# Patient Record
Sex: MALE | Race: BLACK OR AFRICAN AMERICAN | NOT HISPANIC OR LATINO | Employment: OTHER | URBAN - NONMETROPOLITAN AREA
[De-identification: names, ages, dates, MRNs, and addresses within clinical notes are randomized per-mention and may not be internally consistent; named-entity substitution may affect disease eponyms.]

---

## 2019-09-13 PROCEDURE — 99282 EMERGENCY DEPT VISIT SF MDM: CPT

## 2019-09-14 ENCOUNTER — HOSPITAL ENCOUNTER (EMERGENCY)
Facility: HOSPITAL | Age: 39
Discharge: HOME | End: 2019-09-14
Admitting: EMERGENCY MEDICINE

## 2019-09-14 VITALS
BODY MASS INDEX: 22.4 KG/M2 | DIASTOLIC BLOOD PRESSURE: 117 MMHG | RESPIRATION RATE: 16 BRPM | TEMPERATURE: 97.5 F | HEIGHT: 71 IN | SYSTOLIC BLOOD PRESSURE: 162 MMHG | HEART RATE: 67 BPM | WEIGHT: 160 LBS | OXYGEN SATURATION: 100 %

## 2019-09-14 DIAGNOSIS — G89.29 CHRONIC RIGHT-SIDED LOW BACK PAIN WITH RIGHT-SIDED SCIATICA: Primary | ICD-10-CM

## 2019-09-14 DIAGNOSIS — M54.41 CHRONIC RIGHT-SIDED LOW BACK PAIN WITH RIGHT-SIDED SCIATICA: Primary | ICD-10-CM

## 2019-09-14 RX ORDER — LIDOCAINE 50 MG/G
1 PATCH TOPICAL EVERY 24 HOURS
Qty: 5 PATCH | Refills: 0 | Status: SHIPPED | OUTPATIENT
Start: 2019-09-14 | End: 2020-11-28

## 2019-09-14 RX ORDER — CYCLOBENZAPRINE HCL 10 MG
10 TABLET ORAL 3 TIMES DAILY PRN
Qty: 12 TABLET | Refills: 0 | Status: SHIPPED | OUTPATIENT
Start: 2019-09-14 | End: 2020-11-28

## 2019-09-14 RX ORDER — NAPROXEN 500 MG/1
500 TABLET ORAL 2 TIMES DAILY WITH MEALS
Qty: 20 TABLET | Refills: 0 | Status: SHIPPED | OUTPATIENT
Start: 2019-09-14 | End: 2020-11-28

## 2019-09-14 NOTE — ED PROVIDER NOTES
Subjective     History provided by:  Patient  Back Pain   Location:  Lumbar spine  Quality:  Aching  Radiates to:  R thigh  Pain severity:  Moderate  Pain is:  Worse during the day  Onset quality:  Gradual  Duration:  72 months  Timing:  Intermittent  Progression:  Waxing and waning  Chronicity:  Recurrent  Context: lifting heavy objects    Relieved by:  Nothing  Worsened by:  Nothing  Ineffective treatments:  None tried  Associated symptoms: no abdominal pain, no abdominal swelling, no bladder incontinence, no bowel incontinence, no chest pain, no dysuria, no fever, no headaches, no leg pain, no numbness, no paresthesias, no pelvic pain, no perianal numbness, no tingling, no weakness and no weight loss    Risk factors: no lack of exercise, not obese and no recent surgery        Review of Systems   Constitutional: Negative for chills, diaphoresis, fatigue, fever and weight loss.   HENT: Negative for congestion and trouble swallowing.    Respiratory: Negative for shortness of breath and wheezing.    Cardiovascular: Negative for chest pain and palpitations.   Gastrointestinal: Negative for abdominal pain, bowel incontinence, diarrhea and nausea.   Genitourinary: Negative for bladder incontinence, dysuria and pelvic pain.   Musculoskeletal: Positive for back pain. Negative for arthralgias and myalgias.   Neurological: Negative for dizziness, tingling, weakness, numbness, headaches and paresthesias.   Hematological: Negative for adenopathy. Does not bruise/bleed easily.       Past Medical History:   Diagnosis Date   • Asthma    • Hypertension        No Known Allergies    No past surgical history on file.    No family history on file.    Social History     Socioeconomic History   • Marital status: Single     Spouse name: Not on file   • Number of children: Not on file   • Years of education: Not on file   • Highest education level: Not on file   Tobacco Use   • Smoking status: Current Every Day Smoker     Packs/day: 1.00  "    Types: Cigarettes   Substance and Sexual Activity   • Alcohol use: Yes     Comment: OCC   • Drug use: No   • Sexual activity: Defer       Lab Results (last 24 hours)     ** No results found for the last 24 hours. **          Objective   Physical Exam   Constitutional: He is oriented to person, place, and time. He appears well-developed and well-nourished. No distress.   HENT:   Head: Normocephalic and atraumatic.   Right Ear: External ear normal.   Left Ear: External ear normal.   Eyes: Conjunctivae and EOM are normal. Pupils are equal, round, and reactive to light. Right eye exhibits no discharge. Left eye exhibits no discharge. No scleral icterus.   Neck: Normal range of motion. Neck supple. No tracheal deviation present. No thyromegaly present.   Cardiovascular: Normal rate, regular rhythm, normal heart sounds and intact distal pulses. Exam reveals no friction rub.   No murmur heard.  Pulmonary/Chest: Effort normal and breath sounds normal. No respiratory distress. He has no wheezes.   Abdominal: Soft. Bowel sounds are normal. He exhibits no distension. There is no tenderness. There is no guarding.   Musculoskeletal:        Lumbar back: He exhibits decreased range of motion, tenderness, pain and spasm. He exhibits no bony tenderness, no swelling, no edema, no deformity and no laceration.   Right paraspinal muscular spasm.  Positive straight leg raises at 20 degrees.  Neurovascularly intact distally.   Neurological: He is alert and oriented to person, place, and time.   Skin: Skin is warm and dry. Capillary refill takes less than 2 seconds. He is not diaphoretic.   Psychiatric: He has a normal mood and affect. His behavior is normal.   Nursing note and vitals reviewed.      Procedures         No orders to display       BP (!) 158/102 (BP Location: Left arm, Patient Position: Sitting)   Pulse 70   Temp 97.5 °F (36.4 °C) (Oral)   Resp 16   Ht 180.3 cm (71\")   Wt 72.6 kg (160 lb)   SpO2 98%   BMI 22.32 " kg/m²     ED Course         Medications - No data to display         MDM  Number of Diagnoses or Management Options  Chronic right-sided low back pain with right-sided sciatica:   Diagnosis management comments: 39-year-old Afro-American male with radicular symptoms.  No red flag symptoms or midline tenderness, saddle anesthesia, bowel incontinence, bladder retention.  States his pain is been ongoing for 8 years approximately.  He has had pain management injections previously.  No indications for further work-up at this time.  We will treat symptomatically and have follow-up at the orthopedic [facility] with primary care.  He is agreeable to plan of care at this time.    Risk of Complications, Morbidity, and/or Mortality  Presenting problems: low  Diagnostic procedures: low  Management options: low    Patient Progress  Patient progress: stable      Final diagnoses:   Chronic right-sided low back pain with right-sided sciatica          Jaylan Dudley PA-C  09/14/19 0056

## 2019-09-14 NOTE — DISCHARGE INSTRUCTIONS
Please follow-up with your primary care physician or at the orthopedic [facility] as directed.  Return to the ER should he develop any other new, worsening or other concerning symptoms such as those listed below.      Contact a health care provider if:  Your pain and other symptoms get worse.  Your pain medicine is not helping.  Your pain has not improved after a few weeks of home care.  You have a fever.  Get help right away if:  You have severe pain, weakness, or numbness.  You have difficulty with bladder or bowel control.

## 2020-11-28 PROCEDURE — U0003 INFECTIOUS AGENT DETECTION BY NUCLEIC ACID (DNA OR RNA); SEVERE ACUTE RESPIRATORY SYNDROME CORONAVIRUS 2 (SARS-COV-2) (CORONAVIRUS DISEASE [COVID-19]), AMPLIFIED PROBE TECHNIQUE, MAKING USE OF HIGH THROUGHPUT TECHNOLOGIES AS DESCRIBED BY CMS-2020-01-R: HCPCS | Performed by: NURSE PRACTITIONER

## 2021-01-25 ENCOUNTER — TRANSCRIBE ORDERS (OUTPATIENT)
Dept: ADMINISTRATIVE | Facility: HOSPITAL | Age: 41
End: 2021-01-25

## 2021-01-25 ENCOUNTER — HOSPITAL ENCOUNTER (OUTPATIENT)
Dept: GENERAL RADIOLOGY | Facility: HOSPITAL | Age: 41
Discharge: HOME | End: 2021-01-25

## 2021-01-25 DIAGNOSIS — M79.645 FINGER PAIN, LEFT: ICD-10-CM

## 2021-01-25 DIAGNOSIS — M79.645 FINGER PAIN, LEFT: Primary | ICD-10-CM

## 2021-01-25 PROCEDURE — 73140 X-RAY EXAM OF FINGER(S): CPT

## 2021-07-13 ENCOUNTER — HOSPITAL ENCOUNTER (EMERGENCY)
Facility: HOSPITAL | Age: 41
Discharge: HOME | End: 2021-07-14
Admitting: INTERNAL MEDICINE

## 2021-07-13 DIAGNOSIS — T78.40XA ALLERGIC REACTION, INITIAL ENCOUNTER: Primary | ICD-10-CM

## 2021-07-13 DIAGNOSIS — I10 ESSENTIAL HYPERTENSION: ICD-10-CM

## 2021-07-13 PROCEDURE — 99283 EMERGENCY DEPT VISIT LOW MDM: CPT

## 2021-07-14 VITALS
DIASTOLIC BLOOD PRESSURE: 98 MMHG | SYSTOLIC BLOOD PRESSURE: 148 MMHG | TEMPERATURE: 98.9 F | RESPIRATION RATE: 20 BRPM | OXYGEN SATURATION: 98 % | HEART RATE: 72 BPM | WEIGHT: 158 LBS | HEIGHT: 71 IN | BODY MASS INDEX: 22.12 KG/M2

## 2021-07-14 PROCEDURE — 96372 THER/PROPH/DIAG INJ SC/IM: CPT

## 2021-07-14 PROCEDURE — 25010000002 DEXAMETHASONE PER 1 MG: Performed by: NURSE PRACTITIONER

## 2021-07-14 RX ORDER — CLONIDINE HYDROCHLORIDE 0.1 MG/1
0.2 TABLET ORAL ONCE
Status: COMPLETED | OUTPATIENT
Start: 2021-07-14 | End: 2021-07-14

## 2021-07-14 RX ORDER — METHYLPREDNISOLONE 4 MG/1
TABLET ORAL
Qty: 21 TABLET | Refills: 0 | Status: SHIPPED | OUTPATIENT
Start: 2021-07-14 | End: 2022-04-01

## 2021-07-14 RX ORDER — DEXAMETHASONE SODIUM PHOSPHATE 10 MG/ML
10 INJECTION INTRAMUSCULAR; INTRAVENOUS ONCE
Status: COMPLETED | OUTPATIENT
Start: 2021-07-14 | End: 2021-07-14

## 2021-07-14 RX ORDER — TRIAMCINOLONE ACETONIDE 1 MG/G
CREAM TOPICAL 2 TIMES DAILY
Qty: 15 G | Refills: 0 | Status: SHIPPED | OUTPATIENT
Start: 2021-07-14 | End: 2021-07-21

## 2021-07-14 RX ADMIN — CLONIDINE HYDROCHLORIDE 0.2 MG: 0.1 TABLET ORAL at 00:26

## 2021-07-14 RX ADMIN — DEXAMETHASONE SODIUM PHOSPHATE 10 MG: 10 INJECTION INTRAMUSCULAR; INTRAVENOUS at 00:26

## 2021-07-14 NOTE — DISCHARGE INSTRUCTIONS
Follow up with one of the [facility] physician groups below to setup primary care. If you have trouble making an appointment, please call the [facility] Nurse Line at [phone]    Dr. Alejandra Serrano DO, Dr. Ruben Pineda DO, and NAM [place]  [facility]  [address] [place] [address], [id number] [phone]    Dr. Zi Fields MD  [facility] - [address] [address], [address], [place], [place] [address] [phone]    Dr. Beka Arrieta DO, Dr. Ehsan Ahuja DO,  NAM Worthy, and NAM Martin  [facility] - [address] [address], [address], [place], [place] [id number] [phone]     Dr. Goldie Hays MD, and NAM Andrew  [facility] [address] [id number] [phone]    Dr. Simón Morales MD and Dr. Brandon Burroughs MD  [address], [address] [phone]    Dr. Angelo Connell MD  [facility]  [address] [place] [address], [address], [place], [place], [address] [phone]    Dr. Rambo Lee MD  [facility]  [address], [place], [address] [phone]    Drink plenty of fluid. Tylenol or motrin as needed for pain/fever. Medication as ordered.  Can take over the counter benadryl as directed for the next few days.  Monitor blood pressure and keep record.  Take BP medication as previously prescribed. Follow up with PCP - call tomorrow for appointment. Return to ED if condition does not improve or worsens

## 2021-07-14 NOTE — ED PROVIDER NOTES
Subjective   40 yom presents with c/o rash to his arms and abdomen.  He sates he noticed the rash a couple days ago.  He states it itches at times.  He denies SOB.   He states he had similar issues a few years ago and saw Dr Hu, dermatology.  It was determined he was allergic to a certain type of dust.  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 His BP is elevated today.  He denies headache, visual changes or CP.  He has a history of HTN          Review of Systems   Constitutional: Negative for activity change, appetite change, fatigue and fever.   HENT: Negative for congestion, ear pain, facial swelling and sore throat.    Eyes: Negative for discharge and visual disturbance.   Respiratory: Negative for apnea, chest tightness, shortness of breath, wheezing and stridor.    Cardiovascular: Negative for chest pain and palpitations.   Gastrointestinal: Negative for abdominal distention, abdominal pain, diarrhea, nausea and vomiting.   Genitourinary: Negative for difficulty urinating and dysuria.   Musculoskeletal: Negative for arthralgias and myalgias.   Skin: Positive for rash. Negative for wound.   Neurological: Negative for dizziness and seizures.   Psychiatric/Behavioral: Negative for agitation and confusion.       Past Medical History:   Diagnosis Date   • Asthma    • Hypertension        No Known Allergies    No past surgical history on file.    No family history on file.    Social History     Socioeconomic History   • Marital status: Single     Spouse name: Not on file   • Number of children: Not on file   • Years of education: Not on file   • Highest education level: Not on file   Tobacco Use   • Smoking status: Current Every Day Smoker     Packs/day: 1.00     Types: Cigarettes   Substance and Sexual Activity   • Alcohol use: Yes     Comment: OCC   • Drug use: No   • Sexual activity: Defer           Objective   Physical Exam  Vitals and nursing note reviewed.   Constitutional:        Appearance: He is well-developed.   HENT:      Head: Normocephalic.   Eyes:      Pupils: Pupils are equal, round, and reactive to light.   Cardiovascular:      Rate and Rhythm: Normal rate and regular rhythm.      Heart sounds: No murmur heard.     Pulmonary:      Effort: Pulmonary effort is normal.      Breath sounds: Normal breath sounds.   Abdominal:      General: Bowel sounds are normal.      Palpations: Abdomen is soft.   Musculoskeletal:         General: Normal range of motion.      Cervical back: Normal range of motion and neck supple.   Skin:     General: Skin is warm and dry.      Comments: Red raised rash present to abdomen and bilateral upper extremities.  There is no drainage.   Neurological:      Mental Status: He is alert and oriented to person, place, and time.         Procedures           ED Course  ED Course as of Jul 14 1600 Wed Jul 14, 2021   0007 I discussed treatment plan with the patient.  His BP is elevated - manual BP recheck 208/106.  He denies headache, visual changes or CP.  He states he has HTN and has not been taking his medication. We will treat his BP prior to discharge. He voiced understanding of plan of care and discharge instructions.       [KS]      ED Course User Index  [KS] Claude Perez, APRN                                           MDM  Number of Diagnoses or Management Options  Allergic reaction, initial encounter: minor  Essential hypertension: established and worsening     Amount and/or Complexity of Data Reviewed  Clinical lab tests: ordered and reviewed  Tests in the radiology section of CPT®: ordered and reviewed    Risk of Complications, Morbidity, and/or Mortality  Presenting problems: minimal  Diagnostic procedures: minimal  Management options: minimal    Patient Progress  Patient progress: stable      Final diagnoses:   Allergic reaction, initial encounter   Essential hypertension       ED Disposition  ED Disposition     ED Disposition Condition  Comment    Discharge Stable           Provider, No Known  [facility] [address]    Schedule an appointment as soon as possible for a visit in 1 day  Routine ED follow up         Medication List      New Prescriptions    methylPREDNISolone 4 MG dose pack  Commonly known as: MEDROL  Take as directed on package instructions.     triamcinolone 0.1 % cream  Commonly known as: KENALOG  Apply  topically to the appropriate area as directed 2 (Two) Times a Day for 7 days.           Where to Get Your Medications      These medications were sent to [facility]/pharmacy #[id number] - [place], [place] - [address] AT ACROSS FROM [address] - [phone] [facility] [phone]   [address] [facility] [address]    Hours: 24-hours Phone: [phone]   · methylPREDNISolone 4 MG dose pack  · triamcinolone 0.1 % cream          Chris, Claude Phoenix, APRN  07/14/21 7102

## 2022-04-01 PROCEDURE — 87491 CHLMYD TRACH DNA AMP PROBE: CPT | Performed by: FAMILY MEDICINE

## 2022-04-01 PROCEDURE — 87591 N.GONORRHOEAE DNA AMP PROB: CPT | Performed by: FAMILY MEDICINE

## 2022-04-01 PROCEDURE — 87661 TRICHOMONAS VAGINALIS AMPLIF: CPT | Performed by: FAMILY MEDICINE

## 2022-06-07 ENCOUNTER — TRANSCRIBE ORDERS (OUTPATIENT)
Dept: ADMINISTRATIVE | Facility: HOSPITAL | Age: 42
End: 2022-06-07

## 2022-06-07 ENCOUNTER — HOSPITAL ENCOUNTER (OUTPATIENT)
Dept: GENERAL RADIOLOGY | Facility: HOSPITAL | Age: 42
Discharge: HOME | End: 2022-06-07

## 2022-06-07 DIAGNOSIS — M54.50 LUMBAR BACK PAIN: Primary | ICD-10-CM

## 2022-06-07 DIAGNOSIS — M54.50 LUMBAR BACK PAIN: ICD-10-CM

## 2022-06-07 PROCEDURE — 72100 X-RAY EXAM L-S SPINE 2/3 VWS: CPT

## 2022-10-01 ENCOUNTER — HOSPITAL ENCOUNTER (EMERGENCY)
Facility: HOSPITAL | Age: 42
Discharge: HOME | End: 2022-10-02
Attending: STUDENT IN AN ORGANIZED HEALTH CARE EDUCATION/TRAINING PROGRAM | Admitting: STUDENT IN AN ORGANIZED HEALTH CARE EDUCATION/TRAINING PROGRAM

## 2022-10-01 ENCOUNTER — APPOINTMENT (OUTPATIENT)
Dept: GENERAL RADIOLOGY | Facility: HOSPITAL | Age: 42
End: 2022-10-01

## 2022-10-01 DIAGNOSIS — I10 HYPERTENSION, UNSPECIFIED TYPE: Primary | ICD-10-CM

## 2022-10-01 LAB
ALBUMIN SERPL-MCNC: 4.5 G/DL (ref 3.5–5.2)
ALBUMIN/GLOB SERPL: 1.3 G/DL
ALP SERPL-CCNC: 196 U/L (ref 39–117)
ALT SERPL W P-5'-P-CCNC: 137 U/L (ref 1–41)
ANION GAP SERPL CALCULATED.3IONS-SCNC: 12 MMOL/L (ref 5–15)
AST SERPL-CCNC: 293 U/L (ref 1–40)
BASOPHILS # BLD MANUAL: 0.07 10*3/MM3 (ref 0–0.2)
BASOPHILS NFR BLD MANUAL: 1 % (ref 0–1.5)
BILIRUB SERPL-MCNC: 1.1 MG/DL (ref 0–1.2)
BUN SERPL-MCNC: 11 MG/DL (ref 6–20)
BUN/CREAT SERPL: 10.8 (ref 7–25)
CALCIUM SPEC-SCNC: 9.4 MG/DL (ref 8.6–10.5)
CHLORIDE SERPL-SCNC: 98 MMOL/L (ref 98–107)
CO2 SERPL-SCNC: 25 MMOL/L (ref 22–29)
CREAT SERPL-MCNC: 1.02 MG/DL (ref 0.76–1.27)
DEPRECATED RDW RBC AUTO: 37.2 FL (ref 37–54)
EGFRCR SERPLBLD CKD-EPI 2021: 94.1 ML/MIN/1.73
EOSINOPHIL # BLD MANUAL: 0.28 10*3/MM3 (ref 0–0.4)
EOSINOPHIL NFR BLD MANUAL: 3.9 % (ref 0.3–6.2)
ERYTHROCYTE [DISTWIDTH] IN BLOOD BY AUTOMATED COUNT: 12 % (ref 12.3–15.4)
GLOBULIN UR ELPH-MCNC: 3.4 GM/DL
GLUCOSE SERPL-MCNC: 104 MG/DL (ref 65–99)
HCT VFR BLD AUTO: 37.7 % (ref 37.5–51)
HGB BLD-MCNC: 14.2 G/DL (ref 13–17.7)
HOLD SPECIMEN: NORMAL
HOLD SPECIMEN: NORMAL
LYMPHOCYTES # BLD MANUAL: 2.17 10*3/MM3 (ref 0.7–3.1)
LYMPHOCYTES NFR BLD MANUAL: 2 % (ref 5–12)
MCH RBC QN AUTO: 32.1 PG (ref 26.6–33)
MCHC RBC AUTO-ENTMCNC: 37.7 G/DL (ref 31.5–35.7)
MCV RBC AUTO: 85.3 FL (ref 79–97)
MONOCYTES # BLD: 0.14 10*3/MM3 (ref 0.1–0.9)
NEUTROPHILS # BLD AUTO: 4.48 10*3/MM3 (ref 1.7–7)
NEUTROPHILS NFR BLD MANUAL: 62.7 % (ref 42.7–76)
PLAT MORPH BLD: NORMAL
PLATELET # BLD AUTO: 141 10*3/MM3 (ref 140–450)
PMV BLD AUTO: 11.2 FL (ref 6–12)
POTASSIUM SERPL-SCNC: 4.7 MMOL/L (ref 3.5–5.2)
PROT SERPL-MCNC: 7.9 G/DL (ref 6–8.5)
RBC # BLD AUTO: 4.42 10*6/MM3 (ref 4.14–5.8)
SODIUM SERPL-SCNC: 135 MMOL/L (ref 136–145)
SPHEROCYTES BLD QL SMEAR: ABNORMAL
TARGETS BLD QL SMEAR: ABNORMAL
TROPONIN T SERPL-MCNC: <0.01 NG/ML (ref 0–0.03)
VARIANT LYMPHS NFR BLD MANUAL: 10.8 % (ref 0–5)
VARIANT LYMPHS NFR BLD MANUAL: 19.6 % (ref 19.6–45.3)
WBC MORPH BLD: NORMAL
WBC NRBC COR # BLD: 7.14 10*3/MM3 (ref 3.4–10.8)
WHOLE BLOOD HOLD SPECIMEN: NORMAL

## 2022-10-01 PROCEDURE — 71045 X-RAY EXAM CHEST 1 VIEW: CPT

## 2022-10-01 PROCEDURE — 85379 FIBRIN DEGRADATION QUANT: CPT | Performed by: STUDENT IN AN ORGANIZED HEALTH CARE EDUCATION/TRAINING PROGRAM

## 2022-10-01 PROCEDURE — 93005 ELECTROCARDIOGRAM TRACING: CPT | Performed by: STUDENT IN AN ORGANIZED HEALTH CARE EDUCATION/TRAINING PROGRAM

## 2022-10-01 PROCEDURE — 99285 EMERGENCY DEPT VISIT HI MDM: CPT

## 2022-10-01 PROCEDURE — 85025 COMPLETE CBC W/AUTO DIFF WBC: CPT | Performed by: STUDENT IN AN ORGANIZED HEALTH CARE EDUCATION/TRAINING PROGRAM

## 2022-10-01 PROCEDURE — 36415 COLL VENOUS BLD VENIPUNCTURE: CPT

## 2022-10-01 PROCEDURE — 84484 ASSAY OF TROPONIN QUANT: CPT

## 2022-10-01 PROCEDURE — 96374 THER/PROPH/DIAG INJ IV PUSH: CPT

## 2022-10-01 PROCEDURE — 80053 COMPREHEN METABOLIC PANEL: CPT

## 2022-10-01 PROCEDURE — 84484 ASSAY OF TROPONIN QUANT: CPT | Performed by: STUDENT IN AN ORGANIZED HEALTH CARE EDUCATION/TRAINING PROGRAM

## 2022-10-01 PROCEDURE — 93010 ELECTROCARDIOGRAM REPORT: CPT | Performed by: INTERNAL MEDICINE

## 2022-10-01 PROCEDURE — 85007 BL SMEAR W/DIFF WBC COUNT: CPT | Performed by: STUDENT IN AN ORGANIZED HEALTH CARE EDUCATION/TRAINING PROGRAM

## 2022-10-01 RX ORDER — LABETALOL HYDROCHLORIDE 5 MG/ML
10 INJECTION, SOLUTION INTRAVENOUS ONCE
Status: COMPLETED | OUTPATIENT
Start: 2022-10-01 | End: 2022-10-01

## 2022-10-01 RX ORDER — ASPIRIN 81 MG/1
324 TABLET, CHEWABLE ORAL ONCE
Status: COMPLETED | OUTPATIENT
Start: 2022-10-01 | End: 2022-10-01

## 2022-10-01 RX ORDER — SODIUM CHLORIDE 0.9 % (FLUSH) 0.9 %
10 SYRINGE (ML) INJECTION AS NEEDED
Status: DISCONTINUED | OUTPATIENT
Start: 2022-10-01 | End: 2022-10-02 | Stop reason: HOSPADM

## 2022-10-01 RX ADMIN — ASPIRIN 324 MG: 81 TABLET, CHEWABLE ORAL at 22:58

## 2022-10-01 RX ADMIN — LABETALOL HYDROCHLORIDE 10 MG: 5 INJECTION INTRAVENOUS at 23:38

## 2022-10-02 ENCOUNTER — APPOINTMENT (OUTPATIENT)
Dept: CT IMAGING | Facility: HOSPITAL | Age: 42
End: 2022-10-02

## 2022-10-02 VITALS
WEIGHT: 152 LBS | HEART RATE: 62 BPM | TEMPERATURE: 98.4 F | RESPIRATION RATE: 18 BRPM | DIASTOLIC BLOOD PRESSURE: 93 MMHG | OXYGEN SATURATION: 94 % | SYSTOLIC BLOOD PRESSURE: 143 MMHG | BODY MASS INDEX: 21.76 KG/M2 | HEIGHT: 70 IN

## 2022-10-02 LAB
D DIMER PPP FEU-MCNC: 0.76 MCGFEU/ML (ref 0–0.5)
TROPONIN T SERPL-MCNC: <0.01 NG/ML (ref 0–0.03)
WHOLE BLOOD HOLD COAG: NORMAL

## 2022-10-02 PROCEDURE — 0 IOPAMIDOL PER 1 ML: Performed by: STUDENT IN AN ORGANIZED HEALTH CARE EDUCATION/TRAINING PROGRAM

## 2022-10-02 PROCEDURE — 71275 CT ANGIOGRAPHY CHEST: CPT

## 2022-10-02 RX ORDER — AMLODIPINE BESYLATE 5 MG/1
5 TABLET ORAL DAILY
Qty: 14 TABLET | Refills: 0 | Status: SHIPPED | OUTPATIENT
Start: 2022-10-02 | End: 2022-10-05 | Stop reason: SDUPTHER

## 2022-10-02 RX ADMIN — IOPAMIDOL 100 ML: 755 INJECTION, SOLUTION INTRAVENOUS at 01:36

## 2022-10-02 NOTE — DISCHARGE INSTRUCTIONS
Please return if your chest pain severely worsens, if you become lightheaded or pass out, if you have severe shortness of breath, or for any other emergent concerns. Otherwise please see your primary care doctor as soon as possible for repeat evaluation and consideration of more testing.

## 2022-10-02 NOTE — ED PROVIDER NOTES
Subjective   History of Present Illness   Patient presents with chest pain.  Feels sharp and is in the front of his chest.  Has been present for most of the day.  Rates it as mild to moderate.  He checked his blood pressure and it was extremely high and he thinks this is related prompted his presentation.  No difficulty breathing but he does feel the pain is worse with inspiration.  No fevers or chills.  No lightheadedness or syncope.  No abdominal pain nausea or vomiting.  Has not seen a doctor about his high blood pressure.    Review of Systems   Constitutional: Negative for chills and fever.   HENT: Negative for congestion and sore throat.    Eyes: Negative for pain and redness.   Respiratory: Negative for cough and shortness of breath.    Cardiovascular: Positive for chest pain. Negative for palpitations.   Gastrointestinal: Negative for abdominal pain and vomiting.   Genitourinary: Negative for difficulty urinating and dysuria.   Musculoskeletal: Negative for gait problem and joint swelling.   Skin: Negative for rash and wound.   Neurological: Negative for syncope and light-headedness.       Past Medical History:   Diagnosis Date   • Asthma    • Hypertension        No Known Allergies    History reviewed. No pertinent surgical history.    History reviewed. No pertinent family history.    Social History     Socioeconomic History   • Marital status: Single   Tobacco Use   • Smoking status: Current Every Day Smoker     Packs/day: 1.00     Types: Cigarettes   • Smokeless tobacco: Never Used   Substance and Sexual Activity   • Alcohol use: Yes     Comment: OCC   • Drug use: No   • Sexual activity: Defer           Objective   Physical Exam  Vitals reviewed.   Constitutional:       General: He is not in acute distress.  HENT:      Head: Normocephalic and atraumatic.   Eyes:      Extraocular Movements: Extraocular movements intact.      Conjunctiva/sclera: Conjunctivae normal.   Cardiovascular:      Pulses: Normal  pulses.      Heart sounds: Normal heart sounds.   Pulmonary:      Effort: Pulmonary effort is normal. No respiratory distress.   Abdominal:      General: Abdomen is flat. There is no distension.   Musculoskeletal:      Cervical back: Normal range of motion and neck supple.      Right lower leg: No tenderness. No edema.      Left lower leg: No tenderness. No edema.   Skin:     General: Skin is warm and dry.   Neurological:      General: No focal deficit present.      Mental Status: He is alert. Mental status is at baseline.   Psychiatric:         Behavior: Behavior normal.         Thought Content: Thought content normal.         Procedures           ED Course  ED Course as of 10/03/22 0746   Sat Oct 01, 2022   2321 Heart sore 2 [AS]      ED Course User Index  [AS] Jones Bryant MD MDM   James Taylor is a 42 y.o. male with PMH above who presents to the Emergency Department with chest pain. Diagnoses considered include hypertensive emergency, PE, ACS, MSK chest pain, pleurisy, GERD, pneumonia, pericarditis, aortic dissection. Patient hemodynamically stable; tamponade physiology unlikely. Given the differential, initial evaluation will include ECG, CXR, CBC, BMP, Tn x2.     ED Course:   - HEARS score calculated to be 2.  - Patient received labetalol without much improvement in his blood pressure; he denied chest pain or repeat evaluation  - EKG reviewed by me; shows sinus rhythm, no focal ischemic changes, no arrhythmia.  - chest x-ray reviewed by me and shows no focal consolidations, no mediastinal widening, no cardiomegaly, no other acute cardiopulmonary process.  - Lab studies reviewed by me and are significant for elevated D-dimer, mildly elevated LFTs; patient endorses that he does drink alcohol and his AST to ALT is about 2-1.  Normal troponin.  CTA chest ordered.  -Patient received Vasotec with significant improvement in his blood pressure and resolution of his  pain  -CTA chest negative for acute thromboembolic phenomenon  - On re-evaluation, patient feels well. Repeat Troponin negative. At this time, ACS is unlikely given the above ECG interpretation and negative troponin. Aortic dissection unlikely given lack of widened mediastinum on CXR, pain does not radiate to the back, is not described as tearing, and peripheral pulses noted to be equal in bilateral upper extremities. Pericarditis unlikely as the pain is not positional, no diffuse ST changes on ECG. No sign of pneumonia on CXR. Tamponade physiology unlikely given BP stable, no JVD on exam, no electrical alternans on ECG.     Blood pressure has improved to non severe levels  No further workup indicated at this time. Patient is stable and appropriate for discharge. Patient received strict return precautions including worsening chest pain, shortness of breath, lightheadedness, passing out, or other concerns and was instructed to follow-up with their primary care provider immediately regarding their chest pain for consideration of further outpatient workup and management of his hypertension.  Will prescribe amlodipine until follow-up.  All questions answered. Patient/family was understanding and in agreement with today's assessment and plan. The patient was monitored during their stay in the ED and dispositioned without acute event.    Electronically signed by:  Jones Bryant MD 10/3/2022 07:46 CDT  Note: Dragon medical dictation software was used in the creation of this note.        Final diagnoses:   Hypertension, unspecified type       ED Disposition  ED Disposition     ED Disposition   Discharge    Condition   Stable    Comment   --             Roman Mondragon MD  [address]  [place] [address]  [phone]               Medication List      Changed    amLODIPine 5 MG tablet  Commonly known as: NORVASC  Take 1 tablet by mouth Daily.  What changed:   · medication strength  · how much to take            Where to Get Your Medications      These medications were sent to [facility]/pharmacy #[id number] - [place], [place] - [address] AT [address]CROSS FROM [address] - [phone]  - [phone]   [address] [place] [address]    Hours: 24-hours Phone: [phone]   · amLODIPine 5 MG tablet          Jones Bryant MD  10/03/22 0770

## 2022-10-03 LAB
QT INTERVAL: 382 MS
QT INTERVAL: 414 MS
QTC INTERVAL: 443 MS
QTC INTERVAL: 456 MS

## 2022-10-05 ENCOUNTER — OFFICE VISIT (OUTPATIENT)
Dept: FAMILY MEDICINE CLINIC | Facility: CLINIC | Age: 42
End: 2022-10-05

## 2022-10-05 VITALS
DIASTOLIC BLOOD PRESSURE: 113 MMHG | WEIGHT: 155.6 LBS | BODY MASS INDEX: 22.28 KG/M2 | OXYGEN SATURATION: 99 % | HEART RATE: 86 BPM | TEMPERATURE: 97.7 F | HEIGHT: 70 IN | SYSTOLIC BLOOD PRESSURE: 192 MMHG

## 2022-10-05 DIAGNOSIS — Z00.00 LABORATORY TESTS ORDERED AS PART OF A COMPLETE PHYSICAL EXAM (CPE): ICD-10-CM

## 2022-10-05 DIAGNOSIS — R74.8 ELEVATED LIVER ENZYMES: ICD-10-CM

## 2022-10-05 DIAGNOSIS — R25.2 MUSCLE CRAMPS: ICD-10-CM

## 2022-10-05 DIAGNOSIS — I10 HYPERTENSION, UNSPECIFIED TYPE: Primary | ICD-10-CM

## 2022-10-05 DIAGNOSIS — Z83.3 FAMILY HISTORY OF DIABETES MELLITUS IN MOTHER: ICD-10-CM

## 2022-10-05 DIAGNOSIS — Z11.59 NEED FOR HEPATITIS C SCREENING TEST: ICD-10-CM

## 2022-10-05 DIAGNOSIS — F17.200 SMOKER: ICD-10-CM

## 2022-10-05 PROCEDURE — 99214 OFFICE O/P EST MOD 30 MIN: CPT | Performed by: FAMILY MEDICINE

## 2022-10-05 RX ORDER — AMLODIPINE BESYLATE 5 MG/1
5 TABLET ORAL DAILY
Qty: 30 TABLET | Refills: 0 | Status: SHIPPED | OUTPATIENT
Start: 2022-10-05 | End: 2022-11-02 | Stop reason: SDUPTHER

## 2022-10-05 NOTE — PATIENT INSTRUCTIONS
Please fast for your upcoming labs.  Fasting means having no juice, no milk, and no food, but you can have anything with zero calories such as water, black coffee, unsweet tea or diet soda while you are fasting.  We recommend 12 hours of fasting before the labs but if you can't do that, then 8-10 hours is acceptable. There is no need for an appointment for the labwork; the main lab is open 6 AM to 5 PM Monday through Friday on the first floor in the [facility].  Go to the main entrance.  Make sure you drink lots of water before the labs are done so it's easier for them to draw the blood and for you to urinate if urine tests have been ordered.      - You should get a Tdap  (tetanus and whooping cough vaccine) and a pneumonia shot due to the smoking.

## 2022-10-06 NOTE — PROGRESS NOTES
Chief Complaint  Establish Care and Hypertension    Subjective        History of Present Illness  James Taylor is a 42 y.o. male who presents to [facility]     James Taylor is present to establish care. The patient was recently seen in the ER at [facility] by Jones Bryant MD, for chest pain and he had markedly elevated blood pressure. He was at the emergency room on 10/01/2022. Those notes were reviewed today. Blood pressure has been high in the past, as high as 176/112 mmHg back in 04/2022. On review of his chart, the patient's chest pain is rated mild to moderate and sharp in the center of his chest. He denied any associated difficulty breathing, but did feel the pain was worse with inspiration. He has a history of hypertension and asthma. At the emergency room, he had an EKG that showed a normal sinus rhythm, moderate voltage criteria for LVH and was rated as a borderline EKG with nonspecific T wave abnormality replacing inverted T waves in the inferior lead leads. The patient had a CMP with a blood sugar of 104, a slightly low sodium of 135, and markedly elevated liver function tests with an ALT of 137, AST of 293, and alkaline phosphatase of 196. He has not had elevated liver function tests in that range in the past, but did have an elevated bilirubin of 1.4 approximately 7 years ago and an AST of 55. Then, the patient underwent a CT angiogram of his chest which showed no evidence of pulmonary embolus or acute intrathoracic pathology. He did have calcified and noncalcified slightly enlarged lymph nodes at the right hilum measuring up to 2 cm, probably related to healed and granulomatous disease. He was noted to have hepatic steatosis. He had no evidence of any other lymphadenopathy. He was treated at the ER with labetalol and aspirin. His troponins were fine. His D-dimer was mildly elevated at 0.16. His CBC had low monocytes and some mildly elevated atypical lymphocytes. He had  no anemia and he had no macrocytosis. He did have a mildly decreased RDW of 12.0. The patient was prescribed amlodipine 5 mg daily and given 14 pills. His blood pressure on check in today was 192/113 mmHg. His body mass index is normal and he has no known drug allergies.    The patient reports he has had hypertension for a couple of years, but it has not gotten worse since he got older. He is a smoker and smokes half a pack a day. The patient reports he has been smoking more since his father passed away 6 years ago at the age of 63 from bone marrow cancer. His mother is a diabetic. The patient reports he was tested for diabetes when he was in the hospital, but he was never told anything. He was told his blood pressure can mess with his kidneys. The patient reports he drinks 2 to 3 beers daily. He denies taking a lot of Tylenol or ibuprofen. The patient is not sure if he has had trouble with his stomach ulcers in the past. He reports he is doing good right now. The patient is a  and does some lifting at work. He reports the blood pressure gives him headaches sometimes. The patient reports the pain used to be in both temples, not more one side than the other. He denies having vision changes when the headaches come on. The patient reports the pressure in his eyes are okay. He denies having any swelling in his hands or feet from the high blood pressure. The patient has never had to see a cardiologist. He has not taken any other blood pressure medications in the past.    His maternal uncle had 2 strokes and is in a nursing home now.        Review of Systems     Past Medical History:   Diagnosis Date   • Asthma    • Hypertension        Outpatient Medications Prior to Visit   Medication Sig Dispense Refill   • amLODIPine (NORVASC) 5 MG tablet Take 1 tablet by mouth Daily. 14 tablet 0     No facility-administered medications prior to visit.        Objective   Vital Signs:   BP (!) 192/113 (BP Location: Left arm,  "Patient Position: Sitting, Cuff Size: Adult)   Pulse 86   Temp 97.7 °F (36.5 °C) (Temporal)   Ht 177.8 cm (70\")   Wt 70.6 kg (155 lb 9.6 oz)   SpO2 99%   BMI 22.33 kg/m²       Physical Exam  Vitals reviewed.   Constitutional:       Appearance: Normal appearance. He is not ill-appearing.   HENT:      Right Ear: External ear normal.      Left Ear: External ear normal.      Nose: Nose normal.   Eyes:      Extraocular Movements: Extraocular movements intact.      Conjunctiva/sclera: Conjunctivae normal.   Cardiovascular:      Rate and Rhythm: Normal rate and regular rhythm.      Heart sounds: No murmur heard.     Comments: His heartbeats are rather pounding in the left lower chest.    Pulmonary:      Effort: Pulmonary effort is normal.      Breath sounds: Normal breath sounds.   Abdominal:      Palpations: Abdomen is soft. There is no hepatomegaly or splenomegaly.      Tenderness: There is no abdominal tenderness. Negative signs include Jeter's sign.      Comments:        Musculoskeletal:         General: No swelling. Normal range of motion.      Right lower leg: No edema.      Left lower leg: No edema.   Lymphadenopathy:      Head:      Right side of head: No tonsillar adenopathy.      Left side of head: No tonsillar adenopathy.      Cervical: No cervical adenopathy.   Skin:     General: Skin is warm.      Findings: No rash.   Neurological:      General: No focal deficit present.      Mental Status: He is alert and oriented to person, place, and time.      Motor: No tremor.   Psychiatric:         Mood and Affect: Mood normal.         Behavior: Behavior normal.                 Assessment and Plan    Diagnoses and all orders for this visit:    1. Hypertension, unspecified type (Primary)  -     TSH; Future  -     Comprehensive Metabolic Panel; Future  -     Urinalysis With Microscopic - Urine, Clean Catch; Future  -     MicroAlbumin, Urine, Random - Urine, Clean Catch; Future  -     amLODIPine (NORVASC) 5 MG " tablet; Take 1 tablet by mouth Daily. For high blood pressure  Dispense: 30 tablet; Refill: 0    2. Family history of diabetes mellitus in mother  -     Hemoglobin A1c; Future    3. Smoker  -     Comprehensive Metabolic Panel; Future    4. Elevated liver enzymes  -     Iron Profile; Future  -     TSH; Future  -     Gamma GT; Future  -     Hemoglobin A1c; Future  -     Lactate Dehydrogenase; Future  -     Hepatitis panel, acute; Future    5. Laboratory tests ordered as part of a complete physical exam (CPE)  -     Lipid Panel; Future  -     Iron Profile; Future  -     Vitamin D 25 Hydroxy; Future  -     Magnesium; Future  -     TSH; Future  -     Comprehensive Metabolic Panel; Future  -     Urinalysis With Microscopic - Urine, Clean Catch; Future  -     MicroAlbumin, Urine, Random - Urine, Clean Catch; Future  -     Hemoglobin A1c; Future    6. Muscle cramps  -     Vitamin D 25 Hydroxy; Future  -     Magnesium; Future    7. Need for hepatitis C screening test  -     Hepatitis panel, acute; Future      The plan will be to continue the amlodipine 5 mg daily, get some fasting labs to look at comorbidities and make sure his lipids are okay and rule out diabetes, which he may have a risk for given his mother's history of diabetes and his elevated liver function tests. We are going to screen for hepatitis C as well. He will return for the lab results in the office at the time of his upcoming physical and I have counseled him today on vaccinations.          Follow Up {Instructions Charge Capture  Follow-up Communications :23}  Return in about 19 days (around 10/24/2022) for Annual physical/ recheck BP.    Patient was given instructions and counseling regarding his condition or for health maintenance advice.   Patient Instructions   - Fast for upcoming labs    - You should get a Tdap  (tetanus and whooping cough vaccine) and a pneumonia shot due to the smoking.          Please see specific information/handouts pulled into  the AVS if appropriate.       Radha Desir M.D.  [facility]    Transcribed from ambient dictation for Radha Desir MD by Vanda Suarez.  10/05/22   19:22 CDT    Patient verbalized consent to the visit recording.

## 2022-10-24 ENCOUNTER — LAB (OUTPATIENT)
Dept: LAB | Facility: HOSPITAL | Age: 42
End: 2022-10-24

## 2022-10-24 DIAGNOSIS — R25.2 MUSCLE CRAMPS: ICD-10-CM

## 2022-10-24 DIAGNOSIS — Z11.59 NEED FOR HEPATITIS C SCREENING TEST: ICD-10-CM

## 2022-10-24 DIAGNOSIS — I10 HYPERTENSION, UNSPECIFIED TYPE: ICD-10-CM

## 2022-10-24 DIAGNOSIS — F17.200 SMOKER: ICD-10-CM

## 2022-10-24 DIAGNOSIS — Z00.00 LABORATORY TESTS ORDERED AS PART OF A COMPLETE PHYSICAL EXAM (CPE): ICD-10-CM

## 2022-10-24 DIAGNOSIS — R74.8 ELEVATED LIVER ENZYMES: ICD-10-CM

## 2022-10-24 DIAGNOSIS — Z83.3 FAMILY HISTORY OF DIABETES MELLITUS IN MOTHER: ICD-10-CM

## 2022-10-24 LAB
25(OH)D3 SERPL-MCNC: 19.4 NG/ML (ref 30–100)
ALBUMIN SERPL-MCNC: 4.5 G/DL (ref 3.5–5.2)
ALBUMIN UR-MCNC: <1.2 MG/DL
ALBUMIN/GLOB SERPL: 1.5 G/DL
ALP SERPL-CCNC: 164 U/L (ref 39–117)
ALT SERPL W P-5'-P-CCNC: 90 U/L (ref 1–41)
ANION GAP SERPL CALCULATED.3IONS-SCNC: 15.9 MMOL/L (ref 5–15)
AST SERPL-CCNC: 301 U/L (ref 1–40)
BILIRUB SERPL-MCNC: 1 MG/DL (ref 0–1.2)
BUN SERPL-MCNC: 8 MG/DL (ref 6–20)
BUN/CREAT SERPL: 7.8 (ref 7–25)
CALCIUM SPEC-SCNC: 8.9 MG/DL (ref 8.6–10.5)
CHLORIDE SERPL-SCNC: 94 MMOL/L (ref 98–107)
CHOLEST SERPL-MCNC: 143 MG/DL (ref 0–200)
CO2 SERPL-SCNC: 22.1 MMOL/L (ref 22–29)
CREAT SERPL-MCNC: 1.02 MG/DL (ref 0.76–1.27)
EGFRCR SERPLBLD CKD-EPI 2021: 94.1 ML/MIN/1.73
GGT SERPL-CCNC: 1932 U/L (ref 8–61)
GLOBULIN UR ELPH-MCNC: 3 GM/DL
GLUCOSE SERPL-MCNC: 73 MG/DL (ref 65–99)
HAV IGM SERPL QL IA: NORMAL
HBA1C MFR BLD: <4.3 % (ref 4.8–5.6)
HBV CORE IGM SERPL QL IA: NORMAL
HBV SURFACE AG SERPL QL IA: NORMAL
HCV AB SER DONR QL: NORMAL
HDLC SERPL-MCNC: 43 MG/DL (ref 40–60)
IRON 24H UR-MRATE: 133 MCG/DL (ref 59–158)
IRON SATN MFR SERPL: 37 % (ref 20–50)
LDH SERPL-CCNC: 281 U/L (ref 135–225)
LDLC SERPL CALC-MCNC: 71 MG/DL (ref 0–100)
LDLC/HDLC SERPL: 1.54 {RATIO}
MAGNESIUM SERPL-MCNC: 2 MG/DL (ref 1.6–2.6)
POTASSIUM SERPL-SCNC: 3.5 MMOL/L (ref 3.5–5.2)
PROT SERPL-MCNC: 7.5 G/DL (ref 6–8.5)
SODIUM SERPL-SCNC: 132 MMOL/L (ref 136–145)
TIBC SERPL-MCNC: 364 MCG/DL (ref 298–536)
TRANSFERRIN SERPL-MCNC: 244 MG/DL (ref 200–360)
TRIGL SERPL-MCNC: 168 MG/DL (ref 0–150)
TSH SERPL DL<=0.05 MIU/L-ACNC: 0.48 UIU/ML (ref 0.27–4.2)
VLDLC SERPL-MCNC: 29 MG/DL (ref 5–40)

## 2022-10-24 PROCEDURE — 83036 HEMOGLOBIN GLYCOSYLATED A1C: CPT

## 2022-10-24 PROCEDURE — 84443 ASSAY THYROID STIM HORMONE: CPT

## 2022-10-24 PROCEDURE — 81001 URINALYSIS AUTO W/SCOPE: CPT

## 2022-10-24 PROCEDURE — 36415 COLL VENOUS BLD VENIPUNCTURE: CPT

## 2022-10-24 PROCEDURE — 82043 UR ALBUMIN QUANTITATIVE: CPT

## 2022-10-24 PROCEDURE — 80061 LIPID PANEL: CPT

## 2022-10-24 PROCEDURE — 82306 VITAMIN D 25 HYDROXY: CPT

## 2022-10-24 PROCEDURE — 83735 ASSAY OF MAGNESIUM: CPT

## 2022-10-24 PROCEDURE — 83540 ASSAY OF IRON: CPT

## 2022-10-24 PROCEDURE — 80053 COMPREHEN METABOLIC PANEL: CPT

## 2022-10-24 PROCEDURE — 84466 ASSAY OF TRANSFERRIN: CPT

## 2022-10-24 PROCEDURE — 83615 LACTATE (LD) (LDH) ENZYME: CPT

## 2022-10-24 PROCEDURE — 82977 ASSAY OF GGT: CPT

## 2022-10-24 PROCEDURE — 80074 ACUTE HEPATITIS PANEL: CPT

## 2022-10-25 LAB
BACTERIA UR QL AUTO: NORMAL /HPF
BILIRUB UR QL STRIP: NEGATIVE
CLARITY UR: CLEAR
COLOR UR: YELLOW
GLUCOSE UR STRIP-MCNC: NEGATIVE MG/DL
HGB UR QL STRIP.AUTO: NEGATIVE
HYALINE CASTS UR QL AUTO: NORMAL /LPF
KETONES UR QL STRIP: NEGATIVE
LEUKOCYTE ESTERASE UR QL STRIP.AUTO: NEGATIVE
NITRITE UR QL STRIP: NEGATIVE
PH UR STRIP.AUTO: <=5 [PH] (ref 5–8)
PROT UR QL STRIP: NEGATIVE
RBC # UR STRIP: NORMAL /HPF
REF LAB TEST METHOD: NORMAL
SP GR UR STRIP: <1.005 (ref 1–1.03)
SQUAMOUS #/AREA URNS HPF: NORMAL /HPF
UROBILINOGEN UR QL STRIP: ABNORMAL
WBC # UR STRIP: NORMAL /HPF

## 2022-11-02 ENCOUNTER — OFFICE VISIT (OUTPATIENT)
Dept: FAMILY MEDICINE CLINIC | Facility: CLINIC | Age: 42
End: 2022-11-02

## 2022-11-02 VITALS
OXYGEN SATURATION: 100 % | BODY MASS INDEX: 22.33 KG/M2 | DIASTOLIC BLOOD PRESSURE: 100 MMHG | SYSTOLIC BLOOD PRESSURE: 209 MMHG | TEMPERATURE: 97 F | HEART RATE: 68 BPM | WEIGHT: 156 LBS | HEIGHT: 70 IN

## 2022-11-02 DIAGNOSIS — I10 SEVERE UNCONTROLLED HYPERTENSION: Primary | ICD-10-CM

## 2022-11-02 DIAGNOSIS — E78.1 PURE HYPERTRIGLYCERIDEMIA: ICD-10-CM

## 2022-11-02 DIAGNOSIS — E55.9 VITAMIN D DEFICIENCY: ICD-10-CM

## 2022-11-02 DIAGNOSIS — R79.89 ELEVATED LFTS: ICD-10-CM

## 2022-11-02 DIAGNOSIS — E87.1 HYPONATREMIA: ICD-10-CM

## 2022-11-02 DIAGNOSIS — R74.8 ELEVATED LIVER ENZYMES: ICD-10-CM

## 2022-11-02 DIAGNOSIS — R10.811 RIGHT UPPER QUADRANT ABDOMINAL TENDERNESS WITHOUT REBOUND TENDERNESS: ICD-10-CM

## 2022-11-02 DIAGNOSIS — F17.200 SMOKER: ICD-10-CM

## 2022-11-02 DIAGNOSIS — Z82.3 FAMILY HISTORY OF STROKE: ICD-10-CM

## 2022-11-02 PROCEDURE — 99214 OFFICE O/P EST MOD 30 MIN: CPT | Performed by: FAMILY MEDICINE

## 2022-11-02 RX ORDER — AMLODIPINE BESYLATE 5 MG/1
5 TABLET ORAL DAILY
Qty: 30 TABLET | Refills: 5 | Status: ON HOLD | OUTPATIENT
Start: 2022-11-02 | End: 2023-03-05

## 2022-11-02 RX ORDER — CLONIDINE HYDROCHLORIDE 0.1 MG/1
0.1 TABLET ORAL ONCE
Status: COMPLETED | OUTPATIENT
Start: 2022-11-02 | End: 2022-11-02

## 2022-11-02 RX ORDER — LISINOPRIL 20 MG/1
20 TABLET ORAL DAILY
Qty: 30 TABLET | Refills: 5 | Status: SHIPPED | OUTPATIENT
Start: 2022-11-02 | End: 2023-03-10 | Stop reason: HOSPADM

## 2022-11-02 RX ADMIN — CLONIDINE HYDROCHLORIDE 0.1 MG: 0.1 TABLET ORAL at 16:08

## 2022-11-02 NOTE — PATIENT INSTRUCTIONS
- Check the blood pressure daily with a goal of < 140/90 and keep a log of the readings.    - Ideal blood pressure control is 120's/70's.    - If you have any lip swelling or throat clogging/cough with the start of the lisinopril, please stop the medication and let Dr. Desir know.

## 2022-11-06 RX ORDER — ERGOCALCIFEROL 1.25 MG/1
50000 CAPSULE ORAL WEEKLY
Qty: 5 CAPSULE | Refills: 11 | Status: SHIPPED | OUTPATIENT
Start: 2022-11-06

## 2022-11-07 NOTE — PROGRESS NOTES
Chief Complaint  Hypertension (3 week follow up,)    Subjective        History of Present Illness  James Taylor is a 42 y.o. male who presents to [facility]   BP is markedly uncontrolled at 209/100, even higher than last appt here when it was 192/113 on 10/5/22.  Pt has no chest pain, no headache, no dizziness, no shortness of breath or swelling in legs.  Generally feels his normal self.    Result Review :   The following data was reviewed by: Radha Desir MD on 11/02/2022:    Lab on 10/24/2022   Component Date Value Ref Range Status   • Total Cholesterol 10/24/2022 143  0 - 200 mg/dL Final   • Triglycerides 10/24/2022 168 (H)  0 - 150 mg/dL Final   • HDL Cholesterol 10/24/2022 43  40 - 60 mg/dL Final   • LDL Cholesterol  10/24/2022 71  0 - 100 mg/dL Final   • VLDL Cholesterol 10/24/2022 29  5 - 40 mg/dL Final   • LDL/HDL Ratio 10/24/2022 1.54   Final   • Iron 10/24/2022 133  59 - 158 mcg/dL Final   • Iron Saturation 10/24/2022 37  20 - 50 % Final   • Transferrin 10/24/2022 244  200 - 360 mg/dL Final   • TIBC 10/24/2022 364  298 - 536 mcg/dL Final   • 25 Hydroxy, Vitamin D 10/24/2022 19.4 (L)  30.0 - 100.0 ng/ml Final   • Magnesium 10/24/2022 2.0  1.6 - 2.6 mg/dL Final   • TSH 10/24/2022 0.481  0.270 - 4.200 uIU/mL Final   • Glucose 10/24/2022 73  65 - 99 mg/dL Final   • BUN 10/24/2022 8  6 - 20 mg/dL Final   • Creatinine 10/24/2022 1.02  0.76 - 1.27 mg/dL Final   • Sodium 10/24/2022 132 (L)  136 - 145 mmol/L Final   • Potassium 10/24/2022 3.5  3.5 - 5.2 mmol/L Final   • Chloride 10/24/2022 94 (L)  98 - 107 mmol/L Final   • CO2 10/24/2022 22.1  22.0 - 29.0 mmol/L Final   • Calcium 10/24/2022 8.9  8.6 - 10.5 mg/dL Final   • Total Protein 10/24/2022 7.5  6.0 - 8.5 g/dL Final   • Albumin 10/24/2022 4.50  3.50 - 5.20 g/dL Final   • ALT (SGPT) 10/24/2022 90 (H)  1 - 41 U/L Final   • AST (SGOT) 10/24/2022 301 (H)  1 - 40 U/L Final   • Alkaline Phosphatase 10/24/2022 164 (H)  39 - 117  U/L Final   • Total Bilirubin 10/24/2022 1.0  0.0 - 1.2 mg/dL Final   • Globulin 10/24/2022 3.0  gm/dL Final   • A/G Ratio 10/24/2022 1.5  g/dL Final   • BUN/Creatinine Ratio 10/24/2022 7.8  7.0 - 25.0 Final   • Anion Gap 10/24/2022 15.9 (H)  5.0 - 15.0 mmol/L Final   • eGFR 10/24/2022 94.1  >60.0 mL/min/1.73 Final    National Kidney Foundation and American Society of Nephrology (ASN) Task Force recommended calculation based on the Chronic Kidney Disease Epidemiology Collaboration (CKD-EPI) equation refit without adjustment for race.   • GGT 10/24/2022 1,932 (H)  8 - 61 U/L Final   • Microalbumin, Urine 10/24/2022 <1.2  mg/dL Final   • Hemoglobin A1C 10/24/2022 <4.30 (L)  4.80 - 5.60 % Final   • LDH 10/24/2022 281 (H)  135 - 225 U/L Final   • Hepatitis B Surface Ag 10/24/2022 Non-Reactive  Non-Reactive Final   • Hep A IgM 10/24/2022 Non-Reactive  Non-Reactive Final   • Hep B C IgM 10/24/2022 Non-Reactive  Non-Reactive Final   • Hepatitis C Ab 10/24/2022 Non-Reactive  Non-Reactive Final   • Color, UA 10/24/2022 Yellow  Yellow, Straw Final   • Appearance, UA 10/24/2022 Clear  Clear Final   • pH, UA 10/24/2022 <=5.0  5.0 - 8.0 Final   • Specific Gravity, UA 10/24/2022 <1.005 (L)  1.005 - 1.030 Final   • Glucose, UA 10/24/2022 Negative  Negative Final   • Ketones, UA 10/24/2022 Negative  Negative Final   • Bilirubin, UA 10/24/2022 Negative  Negative Final   • Blood, UA 10/24/2022 Negative  Negative Final   • Protein, UA 10/24/2022 Negative  Negative Final   • Leuk Esterase, UA 10/24/2022 Negative  Negative Final   • Nitrite, UA 10/24/2022 Negative  Negative Final   • Urobilinogen, UA 10/24/2022 0.2 E.U./dL  0.2 - 1.0 E.U./dL Final   • RBC, UA 10/24/2022 0-2  None Seen, 0-2 /HPF Final   • WBC, UA 10/24/2022 0-2  None Seen, 0-2 /HPF Final   • Bacteria, UA 10/24/2022 None Seen  None Seen /HPF Final   • Squamous Epithelial Cells, UA 10/24/2022 0-2  None Seen, 0-2 /HPF Final   • Hyaline Casts, UA 10/24/2022 0-2  None Seen  /LPF Final   • Methodology 10/24/2022 Automated Microscopy   Final   Admission on 10/01/2022, Discharged on 10/02/2022   Component Date Value Ref Range Status   • QT Interval 10/01/2022 382  ms Final   • QTC Interval 10/01/2022 443  ms Final   • QT Interval 10/01/2022 414  ms Final   • QTC Interval 10/01/2022 456  ms Final   • Glucose 10/01/2022 104 (H)  65 - 99 mg/dL Final   • BUN 10/01/2022 11  6 - 20 mg/dL Final   • Creatinine 10/01/2022 1.02  0.76 - 1.27 mg/dL Final   • Sodium 10/01/2022 135 (L)  136 - 145 mmol/L Final   • Potassium 10/01/2022 4.7  3.5 - 5.2 mmol/L Final    Slight hemolysis detected by analyzer. Results may be affected.   • Chloride 10/01/2022 98  98 - 107 mmol/L Final   • CO2 10/01/2022 25.0  22.0 - 29.0 mmol/L Final   • Calcium 10/01/2022 9.4  8.6 - 10.5 mg/dL Final   • Total Protein 10/01/2022 7.9  6.0 - 8.5 g/dL Final   • Albumin 10/01/2022 4.50  3.50 - 5.20 g/dL Final   • ALT (SGPT) 10/01/2022 137 (H)  1 - 41 U/L Final   • AST (SGOT) 10/01/2022 293 (H)  1 - 40 U/L Final    Slight hemolysis detected by analyzer. Results may be affected.   • Alkaline Phosphatase 10/01/2022 196 (H)  39 - 117 U/L Final   • Total Bilirubin 10/01/2022 1.1  0.0 - 1.2 mg/dL Final   • Globulin 10/01/2022 3.4  gm/dL Final   • A/G Ratio 10/01/2022 1.3  g/dL Final   • BUN/Creatinine Ratio 10/01/2022 10.8  7.0 - 25.0 Final   • Anion Gap 10/01/2022 12.0  5.0 - 15.0 mmol/L Final   • eGFR 10/01/2022 94.1  >60.0 mL/min/1.73 Final    National Kidney Foundation and American Society of Nephrology (ASN) Task Force recommended calculation based on the Chronic Kidney Disease Epidemiology Collaboration (CKD-EPI) equation refit without adjustment for race.   • Troponin T 10/01/2022 <0.010  0.000 - 0.030 ng/mL Final   • Troponin T 10/01/2022 <0.010  0.000 - 0.030 ng/mL Final   • Extra Tube 10/01/2022 Hold for add-ons.   Final    Auto resulted.   • Extra Tube 10/01/2022 hold for add-on   Final    Auto resulted   • Extra Tube  10/01/2022 Hold for add-ons.   Final    Auto resulted.   • Extra Tube 10/01/2022 Hold for add-ons.   Final    Auto resulted   • WBC 10/01/2022 7.14  3.40 - 10.80 10*3/mm3 Final   • RBC 10/01/2022 4.42  4.14 - 5.80 10*6/mm3 Final   • Hemoglobin 10/01/2022 14.2  13.0 - 17.7 g/dL Final   • Hematocrit 10/01/2022 37.7  37.5 - 51.0 % Final   • MCV 10/01/2022 85.3  79.0 - 97.0 fL Final   • MCH 10/01/2022 32.1  26.6 - 33.0 pg Final   • MCHC 10/01/2022 37.7 (H)  31.5 - 35.7 g/dL Final   • RDW 10/01/2022 12.0 (L)  12.3 - 15.4 % Final   • RDW-SD 10/01/2022 37.2  37.0 - 54.0 fl Final   • MPV 10/01/2022 11.2  6.0 - 12.0 fL Final   • Platelets 10/01/2022 141  140 - 450 10*3/mm3 Final   • Neutrophil % 10/01/2022 62.7  42.7 - 76.0 % Final   • Lymphocyte % 10/01/2022 19.6  19.6 - 45.3 % Final   • Monocyte % 10/01/2022 2.0 (L)  5.0 - 12.0 % Final   • Eosinophil % 10/01/2022 3.9  0.3 - 6.2 % Final   • Basophil % 10/01/2022 1.0  0.0 - 1.5 % Final   • Atypical Lymphocyte % 10/01/2022 10.8 (H)  0.0 - 5.0 % Final   • Neutrophils Absolute 10/01/2022 4.48  1.70 - 7.00 10*3/mm3 Final   • Lymphocytes Absolute 10/01/2022 2.17  0.70 - 3.10 10*3/mm3 Final   • Monocytes Absolute 10/01/2022 0.14  0.10 - 0.90 10*3/mm3 Final   • Eosinophils Absolute 10/01/2022 0.28  0.00 - 0.40 10*3/mm3 Final   • Basophils Absolute 10/01/2022 0.07  0.00 - 0.20 10*3/mm3 Final   • Target Cells 10/01/2022 Mod/2+  None Seen Final   • Spherocytes 10/01/2022 Slight/1+  None Seen Final   • WBC Morphology 10/01/2022 Normal  Normal Final   • Platelet Morphology 10/01/2022 Normal  Normal Final   • D-Dimer, Quantitative 10/01/2022 0.76 (H)  0.00 - 0.50 MCGFEU/mL Final                Review of Systems     Past Medical History:   Diagnosis Date   • Asthma    • Hypertension        Outpatient Medications Prior to Visit   Medication Sig Dispense Refill   • amLODIPine (NORVASC) 5 MG tablet Take 1 tablet by mouth Daily. For high blood pressure 30 tablet 0     No facility-administered  "medications prior to visit.        Objective   Vital Signs:   BP (!) 209/100   Pulse 68   Temp 97 °F (36.1 °C) (Temporal)   Ht 177.8 cm (70\")   Wt 70.8 kg (156 lb)   SpO2 100%   BMI 22.38 kg/m²       Physical Exam  Vitals reviewed.   Constitutional:       Appearance: Normal appearance. He is not ill-appearing.   HENT:      Right Ear: External ear normal.      Left Ear: External ear normal.      Nose: Nose normal.      Mouth/Throat:      Mouth: Mucous membranes are moist.      Pharynx: Oropharynx is clear.   Eyes:      Extraocular Movements: Extraocular movements intact.      Conjunctiva/sclera: Conjunctivae normal.   Neck:      Comments: Normal thyroid exam  Cardiovascular:      Rate and Rhythm: Normal rate and regular rhythm.      Heart sounds: No murmur heard.  Pulmonary:      Effort: Pulmonary effort is normal.      Breath sounds: Normal breath sounds.   Abdominal:      General: Bowel sounds are normal.      Palpations: Abdomen is soft.      Tenderness: There is no abdominal tenderness.   Musculoskeletal:      Cervical back: Neck supple.      Right lower leg: No edema.      Left lower leg: No edema.   Lymphadenopathy:      Head:      Right side of head: No tonsillar adenopathy.      Left side of head: No tonsillar adenopathy.      Cervical: No cervical adenopathy.   Skin:     General: Skin is warm.      Findings: No rash.   Neurological:      General: No focal deficit present.      Mental Status: He is alert and oriented to person, place, and time.      Motor: No tremor or seizure activity.      Coordination: Coordination is intact.      Gait: Gait is intact.   Psychiatric:         Mood and Affect: Mood normal.         Behavior: Behavior normal.                 Assessment and Plan {CC Problem List  Visit Diagnosis  ROS  Review (Popup)  [facility] Maintenance  Quality  BestPractice  Medications  SmartSets  SnapShot Encounters  Media :23}   Diagnoses and all orders for this visit:    1. Severe " uncontrolled hypertension (Primary)  -     cloNIDine (CATAPRES) tablet 0.1 mg  -     amLODIPine (NORVASC) 5 MG tablet; Take 1 tablet by mouth Daily. For high blood pressure  Dispense: 30 tablet; Refill: 5  -     lisinopril (PRINIVIL,ZESTRIL) 20 MG tablet; Take 1 tablet by mouth Daily. For blood pressure control  Dispense: 30 tablet; Refill: 5    2. Elevated liver enzymes  -     Ambulatory Referral to Gastroenterology  -     US Elastography Parenchyma; Future  -     US Liver; Future    3. Smoker    4. Family history of stroke    5. Right upper quadrant abdominal tenderness without rebound tenderness  -     Ambulatory Referral to Gastroenterology  -     US Elastography Parenchyma; Future  -     US Liver; Future    6. Pure hypertriglyceridemia    7. Vitamin D deficiency  -     vitamin D (ERGOCALCIFEROL) 1.25 MG (52074 UT) capsule capsule; Take 1 capsule by mouth 1 (One) Time Per Week. To treat low vitamin D. Take with food.  Dispense: 5 capsule; Refill: 11    8. Hyponatremia    9. Elevated LFTs    Pt was given a clonidine 0.1 mg pill here in the office and blood pressure improved mildly;        11/2/2022 3:03 PM 11/2/2022 3:41 PM 11/2/2022 4:08 PM   /112 227/111 209/100   BP Location Left arm Left arm         Told pt to go fill the BP meds and start the lisinopril as soon as he gets home.  Continue the amlodipine in the AM's.    TOBACCO CESSATION COUNSELING:    Method and skills for cessation: recommend quitting ASAP; try progressive restrictions, increasing length of time between cigarettes and finding something else to do with hands and attention   Established quit date: none   Medication management, if this is the chosen method: none   Resources for patient support. **Free literature may be given to patient.:   Schedule follow-up appointment to assess patient status: next visit   Total time counseled (in minutes):3             Follow Up   Return in about 29 days (around 12/1/2022) for recheck blood pressure  and bring the BP log in.    Patient was given instructions and counseling regarding his condition or for health maintenance advice.   Patient Instructions   - Check the blood pressure daily with a goal of < 140/90 and keep a log of the readings.    - Ideal blood pressure control is 120's/70's.    - If you have any lip swelling or throat clogging/cough with the start of the lisinopril, please stop the medication and let Dr. Desir know.       Please see specific information/handouts pulled into the AVS if appropriate.       Radha Desir M.D.  [facility]   Family Medicine

## 2022-11-09 ENCOUNTER — LAB (OUTPATIENT)
Dept: LAB | Facility: HOSPITAL | Age: 42
End: 2022-11-09

## 2022-11-09 ENCOUNTER — OFFICE VISIT (OUTPATIENT)
Dept: GASTROENTEROLOGY | Facility: CLINIC | Age: 42
End: 2022-11-09

## 2022-11-09 VITALS
DIASTOLIC BLOOD PRESSURE: 90 MMHG | HEART RATE: 83 BPM | SYSTOLIC BLOOD PRESSURE: 148 MMHG | OXYGEN SATURATION: 99 % | TEMPERATURE: 98 F | WEIGHT: 155 LBS | HEIGHT: 70 IN | BODY MASS INDEX: 22.19 KG/M2

## 2022-11-09 DIAGNOSIS — R79.89 ELEVATED LIVER FUNCTION TESTS: Primary | ICD-10-CM

## 2022-11-09 DIAGNOSIS — R79.89 ELEVATED LIVER FUNCTION TESTS: ICD-10-CM

## 2022-11-09 DIAGNOSIS — F10.10 ETOH ABUSE: ICD-10-CM

## 2022-11-09 DIAGNOSIS — Z78.9 NONSMOKER: ICD-10-CM

## 2022-11-09 LAB
ALBUMIN SERPL-MCNC: 4.6 G/DL (ref 3.5–5.2)
ALBUMIN/GLOB SERPL: 1.4 G/DL
ALP SERPL-CCNC: 142 U/L (ref 39–117)
ALT SERPL W P-5'-P-CCNC: 50 U/L (ref 1–41)
ANION GAP SERPL CALCULATED.3IONS-SCNC: 10 MMOL/L (ref 5–15)
AST SERPL-CCNC: 89 U/L (ref 1–40)
BILIRUB SERPL-MCNC: 0.8 MG/DL (ref 0–1.2)
BUN SERPL-MCNC: 7 MG/DL (ref 6–20)
BUN/CREAT SERPL: 7.1 (ref 7–25)
CALCIUM SPEC-SCNC: 9.8 MG/DL (ref 8.6–10.5)
CHLORIDE SERPL-SCNC: 96 MMOL/L (ref 98–107)
CO2 SERPL-SCNC: 29 MMOL/L (ref 22–29)
CREAT SERPL-MCNC: 0.99 MG/DL (ref 0.76–1.27)
EGFRCR SERPLBLD CKD-EPI 2021: 97.5 ML/MIN/1.73
FERRITIN SERPL-MCNC: 797.8 NG/ML (ref 30–400)
GLOBULIN UR ELPH-MCNC: 3.4 GM/DL
GLUCOSE SERPL-MCNC: 90 MG/DL (ref 65–99)
INR PPP: 1.11 (ref 0.91–1.09)
POTASSIUM SERPL-SCNC: 3.3 MMOL/L (ref 3.5–5.2)
PROT SERPL-MCNC: 8 G/DL (ref 6–8.5)
PROTHROMBIN TIME: 13.9 SECONDS (ref 11.9–14.6)
SODIUM SERPL-SCNC: 135 MMOL/L (ref 136–145)

## 2022-11-09 PROCEDURE — 80053 COMPREHEN METABOLIC PANEL: CPT | Performed by: CLINICAL NURSE SPECIALIST

## 2022-11-09 PROCEDURE — 86376 MICROSOMAL ANTIBODY EACH: CPT | Performed by: CLINICAL NURSE SPECIALIST

## 2022-11-09 PROCEDURE — 99214 OFFICE O/P EST MOD 30 MIN: CPT | Performed by: CLINICAL NURSE SPECIALIST

## 2022-11-09 PROCEDURE — 82103 ALPHA-1-ANTITRYPSIN TOTAL: CPT | Performed by: CLINICAL NURSE SPECIALIST

## 2022-11-09 PROCEDURE — 82104 ALPHA-1-ANTITRYPSIN PHENO: CPT | Performed by: CLINICAL NURSE SPECIALIST

## 2022-11-09 PROCEDURE — 36415 COLL VENOUS BLD VENIPUNCTURE: CPT | Performed by: CLINICAL NURSE SPECIALIST

## 2022-11-09 PROCEDURE — 82728 ASSAY OF FERRITIN: CPT | Performed by: CLINICAL NURSE SPECIALIST

## 2022-11-09 PROCEDURE — 86381 MITOCHONDRIAL ANTIBODY EACH: CPT

## 2022-11-09 PROCEDURE — 85610 PROTHROMBIN TIME: CPT | Performed by: CLINICAL NURSE SPECIALIST

## 2022-11-09 PROCEDURE — 86038 ANTINUCLEAR ANTIBODIES: CPT | Performed by: CLINICAL NURSE SPECIALIST

## 2022-11-09 PROCEDURE — 86015 ACTIN ANTIBODY EACH: CPT | Performed by: CLINICAL NURSE SPECIALIST

## 2022-11-09 PROCEDURE — 82390 ASSAY OF CERULOPLASMIN: CPT | Performed by: CLINICAL NURSE SPECIALIST

## 2022-11-09 NOTE — PROGRESS NOTES
James Taylor  1980 11/9/2022  Chief Complaint   Patient presents with   • GI Problem     New patient ref for abdominal pain and elevated liver function test     Subjective   HPI  James Tyalor is a 42 y.o. male who presents with a complaint of elevated liver function tests. This was incidental finding with his PCP for follow up on blood pressure. ALT 90, , T bili 1.0, ALKP 164. He has no associated symptoms. No nausea or vomiting. No wt loss. No fever chills or sweats. Labs for hepatitis A, B, and C are negative. No family hx for liver disease. He does drink ETOH on average 2 beers per day and occasional liquor. He has drank since he was 21 and more heavily for 3 years.     Past Medical History:   Diagnosis Date   • Asthma    • Hypertension      History reviewed. No pertinent surgical history.    Outpatient Medications Marked as Taking for the 11/9/22 encounter (Office Visit) with Yudelka Taylor APRN   Medication Sig Dispense Refill   • amLODIPine (NORVASC) 5 MG tablet Take 1 tablet by mouth Daily. For high blood pressure 30 tablet 5   • lisinopril (PRINIVIL,ZESTRIL) 20 MG tablet Take 1 tablet by mouth Daily. For blood pressure control 30 tablet 5   • vitamin D (ERGOCALCIFEROL) 1.25 MG (85295 UT) capsule capsule Take 1 capsule by mouth 1 (One) Time Per Week. To treat low vitamin D. Take with food. 5 capsule 11     No Known Allergies  Social History     Socioeconomic History   • Marital status: Single   Tobacco Use   • Smoking status: Every Day     Packs/day: 1.00     Years: 10.00     Pack years: 10.00     Types: Cigarettes     Start date: 4/2/2012   • Smokeless tobacco: Never   Substance and Sexual Activity   • Alcohol use: Yes     Alcohol/week: 2.0 standard drinks     Types: 2 Cans of beer per week     Comment: 2 beers a day   • Drug use: No   • Sexual activity: Yes     Partners: Female     Birth control/protection: Condom, Same-sex partner     Family History   Problem Relation Age of Onset   •  "Anxiety disorder Mother    • Diabetes Mother    • Hyperlipidemia Mother    • Cancer Father    • Hyperlipidemia Father    • Colon cancer Neg Hx    • Colon polyps Neg Hx      Health Maintenance   Topic Date Due   • COVID-19 Vaccine (1) Never done   • ANNUAL PHYSICAL  Never done   • Pneumococcal Vaccine 0-64 (1 - PCV) Never done   • TDAP/TD VACCINES (1 - Tdap) Never done   • INFLUENZA VACCINE  03/31/2023 (Originally 8/1/2022)   • HEPATITIS C SCREENING  Completed     Review of Systems   Constitutional: Negative for activity change, appetite change, chills, diaphoresis, fatigue, fever and unexpected weight change.   HENT: Negative for ear pain, hearing loss, mouth sores, sore throat, trouble swallowing and voice change.    Eyes: Negative.    Respiratory: Negative for cough, choking, shortness of breath and wheezing.    Cardiovascular: Negative for chest pain and palpitations.   Gastrointestinal: Negative for abdominal pain, blood in stool, constipation, diarrhea, nausea and vomiting.   Endocrine: Negative for cold intolerance and heat intolerance.   Genitourinary: Negative for decreased urine volume, dysuria, frequency, hematuria and urgency.   Musculoskeletal: Negative for back pain, gait problem and myalgias.   Skin: Negative for color change, pallor and rash.   Allergic/Immunologic: Negative for food allergies and immunocompromised state.   Neurological: Negative for dizziness, tremors, seizures, syncope, weakness, light-headedness, numbness and headaches.   Hematological: Negative for adenopathy. Does not bruise/bleed easily.   Psychiatric/Behavioral: Negative for agitation and confusion. The patient is not nervous/anxious.    All other systems reviewed and are negative.    Objective   Vitals:    11/09/22 1427   BP: 148/90   Pulse: 83   Temp: 98 °F (36.7 °C)   TempSrc: Temporal   SpO2: 99%   Weight: 70.3 kg (155 lb)   Height: 177.8 cm (70\")     Body mass index is 22.24 kg/m².  Physical Exam  Constitutional:       " Appearance: He is well-developed.   HENT:      Head: Normocephalic and atraumatic.   Eyes:      Pupils: Pupils are equal, round, and reactive to light.   Neck:      Trachea: No tracheal deviation.   Cardiovascular:      Rate and Rhythm: Normal rate and regular rhythm.      Heart sounds: Normal heart sounds. No murmur heard.    No friction rub. No gallop.   Pulmonary:      Effort: Pulmonary effort is normal. No respiratory distress.      Breath sounds: Normal breath sounds. No wheezing or rales.   Chest:      Chest wall: No tenderness.   Abdominal:      General: Bowel sounds are normal. There is no distension.      Palpations: Abdomen is soft. Abdomen is not rigid.      Tenderness: There is no abdominal tenderness. There is no guarding or rebound.   Musculoskeletal:         General: No tenderness or deformity. Normal range of motion.      Cervical back: Normal range of motion and neck supple.   Skin:     General: Skin is warm and dry.      Coloration: Skin is not pale.      Findings: No rash.   Neurological:      Mental Status: He is alert and oriented to person, place, and time.      Deep Tendon Reflexes: Reflexes are normal and symmetric.   Psychiatric:         Behavior: Behavior normal.         Thought Content: Thought content normal.         Judgment: Judgment normal.       Assessment & Plan   Diagnoses and all orders for this visit:    1. Elevated liver function tests (Primary)  -     Alpha - 1 - Antitrypsin Phenotype  -     FABIÁN  -     Anti-Smooth Muscle Antibody Titer  -     Ceruloplasmin  -     Ferritin  -     Mitochondrial Antibodies, M2; Future  -     Anti-microsomal Antibody  -     Comprehensive Metabolic Panel  -     Protime-INR    2. ETOH abuse    3. Nonsmoker    ETOH cessation suggested  Get labs for underlying liver disease  Discussed risk factors.   Ultrasound is Friday    Part of this note may be an electronic transcription/translation of spoken language to printed text using the Dragon Dictation  System.  Body mass index is 22.24 kg/m².  Return in about 3 weeks (around 11/30/2022).    BMI is within normal parameters. No other follow-up for BMI required.      All risks, benefits, alternatives, and indications of colonoscopy and/or Endoscopy procedure have been discussed with the patient. Risks to include perforation of the colon requiring possible surgery or colostomy, risk of bleeding from biopsies or removal of colon tissue, possibility of missing a colon polyp or cancer, or adverse drug reaction.  Benefits to include the diagnosis and management of disease of the colon and rectum. Alternatives to include barium enema, radiographic evaluation, lab testing or no intervention. Pt verbalizes understanding and agrees.     Yudelka Taylor, APRN  11/9/2022  14:59 CST          If you smoke or use tobacco, 4 minutes reading provided  Steps to Quit Smoking  Smoking tobacco can be harmful to your health and can affect almost every organ in your body. Smoking puts you, and those around you, at risk for developing many serious chronic diseases. Quitting smoking is difficult, but it is one of the best things that you can do for your health. It is never too late to quit.  What are the benefits of quitting smoking?  When you quit smoking, you lower your risk of developing serious diseases and conditions, such as:  · Lung cancer or lung disease, such as COPD.  · Heart disease.  · Stroke.  · Heart attack.  · Infertility.  · Osteoporosis and bone fractures.  Additionally, symptoms such as coughing, wheezing, and shortness of breath may get better when you quit. You may also find that you get sick less often because your body is stronger at fighting off colds and infections. If you are pregnant, quitting smoking can help to reduce your chances of having a baby of low birth weight.  How do I get ready to quit?  When you decide to quit smoking, create a plan to make sure that you are successful. Before you quit:  · Pick a  date to quit. Set a date within the next two weeks to give you time to prepare.  · Write down the reasons why you are quitting. Keep this list in places where you will see it often, such as on your bathroom mirror or in your car or wallet.  · Identify the people, places, things, and activities that make you want to smoke (triggers) and avoid them. Make sure to take these actions:  ¨ Throw away all cigarettes at home, at work, and in your car.  ¨ Throw away smoking accessories, such as ashtrays and lighters.  ¨ Clean your car and make sure to empty the ashtray.  ¨ Clean your home, including curtains and carpets.  · Tell your family, friends, and coworkers that you are quitting. Support from your loved ones can make quitting easier.  · Talk with your health care provider about your options for quitting smoking.  · Find out what treatment options are covered by your health insurance.  What strategies can I use to quit smoking?  Talk with your healthcare provider about different strategies to quit smoking. Some strategies include:  · Quitting smoking altogether instead of gradually lessening how much you smoke over a period of time. Research shows that quitting “cold turkey” is more successful than gradually quitting.  · Attending in-person counseling to help you build problem-solving skills. You are more likely to have success in quitting if you attend several counseling sessions. Even short sessions of 10 minutes can be effective.  · Finding resources and support systems that can help you to quit smoking and remain smoke-free after you quit. These resources are most helpful when you use them often. They can include:  ¨ Online chats with a counselor.  ¨ Telephone quitlines.  ¨ Printed self-help materials.  ¨ Support groups or group counseling.  ¨ Text messaging programs.  ¨ Mobile phone applications.  · Taking medicines to help you quit smoking. (If you are pregnant or breastfeeding, talk with your health care provider  first.) Some medicines contain nicotine and some do not. Both types of medicines help with cravings, but the medicines that include nicotine help to relieve withdrawal symptoms. Your health care provider may recommend:  ¨ Nicotine patches, gum, or lozenges.  ¨ Nicotine inhalers or sprays.  ¨ Non-nicotine medicine that is taken by mouth.  Talk with your health care provider about combining strategies, such as taking medicines while you are also receiving in-person counseling. Using these two strategies together makes you more likely to succeed in quitting than if you used either strategy on its own.  If you are pregnant or breastfeeding, talk with your health care provider about finding counseling or other support strategies to quit smoking. Do not take medicine to help you quit smoking unless told to do so by your health care provider.  What things can I do to make it easier to quit?  Quitting smoking might feel overwhelming at first, but there is a lot that you can do to make it easier. Take these important actions:  · Reach out to your family and friends and ask that they support and encourage you during this time. Call telephone quitlines, reach out to support groups, or work with a counselor for support.  · Ask people who smoke to avoid smoking around you.  · Avoid places that trigger you to smoke, such as bars, parties, or smoke-break areas at work.  · Spend time around people who do not smoke.  · Lessen stress in your life, because stress can be a smoking trigger for some people. To lessen stress, try:  ¨ Exercising regularly.  ¨ Deep-breathing exercises.  ¨ Yoga.  ¨ Meditating.  ¨ Performing a body scan. This involves closing your eyes, scanning your body from head to toe, and noticing which parts of your body are particularly tense. Purposefully relax the muscles in those areas.  · Download or purchase mobile phone or tablet apps (applications) that can help you stick to your quit plan by providing  reminders, tips, and encouragement. There are many free apps, such as QuitGuide from the CDC (Centers for Disease Control and Prevention). You can find other support for quitting smoking (smoking cessation) through smokefree.gov and other websites.  How will I feel when I quit smoking?  Within the first 24 hours of quitting smoking, you may start to feel some withdrawal symptoms. These symptoms are usually most noticeable 2-3 days after quitting, but they usually do not last beyond 2-3 weeks. Changes or symptoms that you might experience include:  · Mood swings.  · Restlessness, anxiety, or irritation.  · Difficulty concentrating.  · Dizziness.  · Strong cravings for sugary foods in addition to nicotine.  · Mild weight gain.  · Constipation.  · Nausea.  · Coughing or a sore throat.  · Changes in how your medicines work in your body.  · A depressed mood.  · Difficulty sleeping (insomnia).  After the first 2-3 weeks of quitting, you may start to notice more positive results, such as:  · Improved sense of smell and taste.  · Decreased coughing and sore throat.  · Slower heart rate.  · Lower blood pressure.  · Clearer skin.  · The ability to breathe more easily.  · Fewer sick days.  Quitting smoking is very challenging for most people. Do not get discouraged if you are not successful the first time. Some people need to make many attempts to quit before they achieve long-term success. Do your best to stick to your quit plan, and talk with your health care provider if you have any questions or concerns.  This information is not intended to replace advice given to you by your health care provider. Make sure you discuss any questions you have with your health care provider.  Document Released: 12/12/2002 Document Revised: 08/15/2017 Document Reviewed: 05/03/2016  [employer] Interactive Patient Education © 2017 [employer]

## 2022-11-10 LAB
ANA SER QL: NEGATIVE
CERULOPLASMIN SERPL-MCNC: 21 MG/DL (ref 16–31)
LKM-1 AB SER-ACNC: 2.4 UNITS (ref 0–20)
MITOCHONDRIA M2 IGG SER-ACNC: <20 UNITS (ref 0–20)
SMA IGG SER-ACNC: 19 UNITS (ref 0–19)

## 2022-11-11 ENCOUNTER — APPOINTMENT (OUTPATIENT)
Dept: ULTRASOUND IMAGING | Facility: HOSPITAL | Age: 42
End: 2022-11-11

## 2022-11-11 ENCOUNTER — HOSPITAL ENCOUNTER (OUTPATIENT)
Dept: ULTRASOUND IMAGING | Facility: HOSPITAL | Age: 42
End: 2022-11-11

## 2022-11-14 LAB
A1AT PHENOTYP SERPL IFE: NORMAL
A1AT SERPL-MCNC: 165 MG/DL (ref 101–187)

## 2022-11-16 ENCOUNTER — HOSPITAL ENCOUNTER (OUTPATIENT)
Dept: ULTRASOUND IMAGING | Facility: HOSPITAL | Age: 42
Discharge: HOME | End: 2022-11-16
Admitting: FAMILY MEDICINE

## 2022-11-16 ENCOUNTER — APPOINTMENT (OUTPATIENT)
Dept: ULTRASOUND IMAGING | Facility: HOSPITAL | Age: 42
End: 2022-11-16

## 2022-11-16 DIAGNOSIS — R10.811 RIGHT UPPER QUADRANT ABDOMINAL TENDERNESS WITHOUT REBOUND TENDERNESS: ICD-10-CM

## 2022-11-16 DIAGNOSIS — R74.8 ELEVATED LIVER ENZYMES: ICD-10-CM

## 2022-11-16 PROCEDURE — 76705 ECHO EXAM OF ABDOMEN: CPT

## 2022-11-16 PROCEDURE — 76981 USE PARENCHYMA: CPT

## 2022-11-30 ENCOUNTER — OFFICE VISIT (OUTPATIENT)
Dept: GASTROENTEROLOGY | Facility: CLINIC | Age: 42
End: 2022-11-30

## 2022-11-30 VITALS
WEIGHT: 155 LBS | BODY MASS INDEX: 22.19 KG/M2 | DIASTOLIC BLOOD PRESSURE: 80 MMHG | TEMPERATURE: 98 F | SYSTOLIC BLOOD PRESSURE: 138 MMHG | OXYGEN SATURATION: 99 % | HEART RATE: 76 BPM | HEIGHT: 70 IN

## 2022-11-30 DIAGNOSIS — F10.10 ETOH ABUSE: ICD-10-CM

## 2022-11-30 DIAGNOSIS — R79.89 ELEVATED LIVER FUNCTION TESTS: Primary | ICD-10-CM

## 2022-11-30 DIAGNOSIS — Z78.9 NONSMOKER: ICD-10-CM

## 2022-11-30 PROCEDURE — 99213 OFFICE O/P EST LOW 20 MIN: CPT | Performed by: CLINICAL NURSE SPECIALIST

## 2022-11-30 NOTE — PROGRESS NOTES
James Taylor  1980 11/30/2022  Chief Complaint   Patient presents with   • GI Problem     Discuss elevated liver functions     Subjective   HPI  James Taylor is a 42 y.o. male who presents with a complaint of fatty liver due to ETOH. Ultrasound with elastography was F2 showing no mass and steatosis. He has cut back to approx 1 beer per day 24 oz. He is aware of the need to continue cutting back. Ultrasound was consistent with fatty liver.   Lab Results   Component Value Date    GLUCOSE 90 11/09/2022    BUN 7 11/09/2022    CREATININE 0.99 11/09/2022    BCR 7.1 11/09/2022    K 3.3 (L) 11/09/2022    CO2 29.0 11/09/2022    CALCIUM 9.8 11/09/2022    ALBUMIN 4.60 11/09/2022    AST 89 (H) 11/09/2022    ALT 50 (H) 11/09/2022       No abdominal pain. No nausea or vomiting. No fever chills or sweats.       Past Medical History:   Diagnosis Date   • Asthma    • Hypertension      History reviewed. No pertinent surgical history.    Outpatient Medications Marked as Taking for the 11/30/22 encounter (Office Visit) with Yudelka Taylor APRN   Medication Sig Dispense Refill   • amLODIPine (NORVASC) 5 MG tablet Take 1 tablet by mouth Daily. For high blood pressure 30 tablet 5   • lisinopril (PRINIVIL,ZESTRIL) 20 MG tablet Take 1 tablet by mouth Daily. For blood pressure control 30 tablet 5   • vitamin D (ERGOCALCIFEROL) 1.25 MG (04682 UT) capsule capsule Take 1 capsule by mouth 1 (One) Time Per Week. To treat low vitamin D. Take with food. 5 capsule 11     No Known Allergies  Social History     Socioeconomic History   • Marital status: Single   Tobacco Use   • Smoking status: Every Day     Packs/day: 1.00     Years: 10.00     Pack years: 10.00     Types: Cigarettes     Start date: 4/2/2012   • Smokeless tobacco: Never   Substance and Sexual Activity   • Alcohol use: Yes     Alcohol/week: 2.0 standard drinks     Types: 2 Cans of beer per week     Comment: 2 beers a day   • Drug use: No   • Sexual activity: Yes     Partners:  Female     Birth control/protection: Condom, Same-sex partner     Family History   Problem Relation Age of Onset   • Anxiety disorder Mother    • Diabetes Mother    • Hyperlipidemia Mother    • Cancer Father    • Hyperlipidemia Father    • Colon cancer Neg Hx    • Colon polyps Neg Hx      Health Maintenance   Topic Date Due   • COVID-19 Vaccine (1) Never done   • ANNUAL PHYSICAL  Never done   • Pneumococcal Vaccine 0-64 (1 - PCV) Never done   • TDAP/TD VACCINES (1 - Tdap) Never done   • INFLUENZA VACCINE  03/31/2023 (Originally 8/1/2022)   • HEPATITIS C SCREENING  Completed     Review of Systems   Constitutional: Negative for activity change, appetite change, chills, diaphoresis, fatigue, fever and unexpected weight change.   HENT: Negative for ear pain, hearing loss, mouth sores, sore throat, trouble swallowing and voice change.    Eyes: Negative.    Respiratory: Negative for cough, choking, shortness of breath and wheezing.    Cardiovascular: Negative for chest pain and palpitations.   Gastrointestinal: Negative for abdominal pain, blood in stool, constipation, diarrhea, nausea and vomiting.   Endocrine: Negative for cold intolerance and heat intolerance.   Genitourinary: Negative for decreased urine volume, dysuria, frequency, hematuria and urgency.   Musculoskeletal: Negative for back pain, gait problem and myalgias.   Skin: Negative for color change, pallor and rash.   Allergic/Immunologic: Negative for food allergies and immunocompromised state.   Neurological: Negative for dizziness, tremors, seizures, syncope, weakness, light-headedness, numbness and headaches.   Hematological: Negative for adenopathy. Does not bruise/bleed easily.   Psychiatric/Behavioral: Negative for agitation and confusion. The patient is not nervous/anxious.    All other systems reviewed and are negative.    Objective   Vitals:    11/30/22 1347   BP: 138/80   Pulse: 76   Temp: 98 °F (36.7 °C)   TempSrc: Temporal   SpO2: 99%   Weight:  "70.3 kg (155 lb)   Height: 177.8 cm (70\")     Body mass index is 22.24 kg/m².  Physical Exam  Constitutional:       Appearance: He is well-developed.   HENT:      Head: Normocephalic and atraumatic.   Eyes:      Pupils: Pupils are equal, round, and reactive to light.   Neck:      Trachea: No tracheal deviation.   Cardiovascular:      Rate and Rhythm: Normal rate and regular rhythm.      Heart sounds: Normal heart sounds. No murmur heard.    No friction rub. No gallop.   Pulmonary:      Effort: Pulmonary effort is normal. No respiratory distress.      Breath sounds: Normal breath sounds. No wheezing or rales.   Chest:      Chest wall: No tenderness.   Abdominal:      General: Bowel sounds are normal. There is no distension.      Palpations: Abdomen is soft. Abdomen is not rigid.      Tenderness: There is no abdominal tenderness. There is no guarding or rebound.   Musculoskeletal:         General: No tenderness or deformity. Normal range of motion.      Cervical back: Normal range of motion and neck supple.   Skin:     General: Skin is warm and dry.      Coloration: Skin is not pale.      Findings: No rash.   Neurological:      Mental Status: He is alert and oriented to person, place, and time.      Deep Tendon Reflexes: Reflexes are normal and symmetric.   Psychiatric:         Behavior: Behavior normal.         Thought Content: Thought content normal.         Judgment: Judgment normal.       Assessment & Plan   Diagnoses and all orders for this visit:    1. Elevated liver function tests (Primary)  -     Comprehensive Metabolic Panel; Future    2. ETOH abuse    3. Nonsmoker    fatty liver discussed. I discussed how fatty liver can lead to cirrhosis, disability and pre-mature death.  How it also can be a sign of increased risk for cardiovascular disease.  I discussed the importance of getting rid of fat in the liver by controlling lipids and glucose, avoiding etoh helps, and gradual weight loss to ideal body weight is " very important.      He is aware to keep cutting back his ETOH I have offered to help find a rehab or AA and he says that he can continue to wean off.     Numbers are trending down    Part of this note may be an electronic transcription/translation of spoken language to printed text using the Dragon Dictation System.  Body mass index is 22.24 kg/m².  Return in about 3 months (around 2/28/2023).    BMI is within normal parameters. No other follow-up for BMI required.      All risks, benefits, alternatives, and indications of colonoscopy and/or Endoscopy procedure have been discussed with the patient. Risks to include perforation of the colon requiring possible surgery or colostomy, risk of bleeding from biopsies or removal of colon tissue, possibility of missing a colon polyp or cancer, or adverse drug reaction.  Benefits to include the diagnosis and management of disease of the colon and rectum. Alternatives to include barium enema, radiographic evaluation, lab testing or no intervention. Pt verbalizes understanding and agrees.     Yudelka Taylor, APRN  11/30/2022  14:25 CST          If you smoke or use tobacco, 4 minutes reading provided  Steps to Quit Smoking  Smoking tobacco can be harmful to your health and can affect almost every organ in your body. Smoking puts you, and those around you, at risk for developing many serious chronic diseases. Quitting smoking is difficult, but it is one of the best things that you can do for your health. It is never too late to quit.  What are the benefits of quitting smoking?  When you quit smoking, you lower your risk of developing serious diseases and conditions, such as:  · Lung cancer or lung disease, such as COPD.  · Heart disease.  · Stroke.  · Heart attack.  · Infertility.  · Osteoporosis and bone fractures.  Additionally, symptoms such as coughing, wheezing, and shortness of breath may get better when you quit. You may also find that you get sick less often because  your body is stronger at fighting off colds and infections. If you are pregnant, quitting smoking can help to reduce your chances of having a baby of low birth weight.  How do I get ready to quit?  When you decide to quit smoking, create a plan to make sure that you are successful. Before you quit:  · Pick a date to quit. Set a date within the next two weeks to give you time to prepare.  · Write down the reasons why you are quitting. Keep this list in places where you will see it often, such as on your bathroom mirror or in your car or wallet.  · Identify the people, places, things, and activities that make you want to smoke (triggers) and avoid them. Make sure to take these actions:  ¨ Throw away all cigarettes at home, at work, and in your car.  ¨ Throw away smoking accessories, such as ashtrays and lighters.  ¨ Clean your car and make sure to empty the ashtray.  ¨ Clean your home, including curtains and carpets.  · Tell your family, friends, and coworkers that you are quitting. Support from your loved ones can make quitting easier.  · Talk with your health care provider about your options for quitting smoking.  · Find out what treatment options are covered by your health insurance.  What strategies can I use to quit smoking?  Talk with your healthcare provider about different strategies to quit smoking. Some strategies include:  · Quitting smoking altogether instead of gradually lessening how much you smoke over a period of time. Research shows that quitting “cold turkey” is more successful than gradually quitting.  · Attending in-person counseling to help you build problem-solving skills. You are more likely to have success in quitting if you attend several counseling sessions. Even short sessions of 10 minutes can be effective.  · Finding resources and support systems that can help you to quit smoking and remain smoke-free after you quit. These resources are most helpful when you use them often. They can  include:  ¨ Online chats with a counselor.  ¨ Telephone quitlines.  ¨ Printed self-help materials.  ¨ Support groups or group counseling.  ¨ Text messaging programs.  ¨ Mobile phone applications.  · Taking medicines to help you quit smoking. (If you are pregnant or breastfeeding, talk with your health care provider first.) Some medicines contain nicotine and some do not. Both types of medicines help with cravings, but the medicines that include nicotine help to relieve withdrawal symptoms. Your health care provider may recommend:  ¨ Nicotine patches, gum, or lozenges.  ¨ Nicotine inhalers or sprays.  ¨ Non-nicotine medicine that is taken by mouth.  Talk with your health care provider about combining strategies, such as taking medicines while you are also receiving in-person counseling. Using these two strategies together makes you more likely to succeed in quitting than if you used either strategy on its own.  If you are pregnant or breastfeeding, talk with your health care provider about finding counseling or other support strategies to quit smoking. Do not take medicine to help you quit smoking unless told to do so by your health care provider.  What things can I do to make it easier to quit?  Quitting smoking might feel overwhelming at first, but there is a lot that you can do to make it easier. Take these important actions:  · Reach out to your family and friends and ask that they support and encourage you during this time. Call telephone quitlines, reach out to support groups, or work with a counselor for support.  · Ask people who smoke to avoid smoking around you.  · Avoid places that trigger you to smoke, such as bars, parties, or smoke-break areas at work.  · Spend time around people who do not smoke.  · Lessen stress in your life, because stress can be a smoking trigger for some people. To lessen stress, try:  ¨ Exercising regularly.  ¨ Deep-breathing exercises.  ¨ Yoga.  ¨ Meditating.  ¨ Performing a body  scan. This involves closing your eyes, scanning your body from head to toe, and noticing which parts of your body are particularly tense. Purposefully relax the muscles in those areas.  · Download or purchase mobile phone or tablet apps (applications) that can help you stick to your quit plan by providing reminders, tips, and encouragement. There are many free apps, such as QuitGuide from the CDC (Centers for Disease Control and Prevention). You can find other support for quitting smoking (smoking cessation) through smokefree.gov and other websites.  How will I feel when I quit smoking?  Within the first 24 hours of quitting smoking, you may start to feel some withdrawal symptoms. These symptoms are usually most noticeable 2-3 days after quitting, but they usually do not last beyond 2-3 weeks. Changes or symptoms that you might experience include:  · Mood swings.  · Restlessness, anxiety, or irritation.  · Difficulty concentrating.  · Dizziness.  · Strong cravings for sugary foods in addition to nicotine.  · Mild weight gain.  · Constipation.  · Nausea.  · Coughing or a sore throat.  · Changes in how your medicines work in your body.  · A depressed mood.  · Difficulty sleeping (insomnia).  After the first 2-3 weeks of quitting, you may start to notice more positive results, such as:  · Improved sense of smell and taste.  · Decreased coughing and sore throat.  · Slower heart rate.  · Lower blood pressure.  · Clearer skin.  · The ability to breathe more easily.  · Fewer sick days.  Quitting smoking is very challenging for most people. Do not get discouraged if you are not successful the first time. Some people need to make many attempts to quit before they achieve long-term success. Do your best to stick to your quit plan, and talk with your health care provider if you have any questions or concerns.  This information is not intended to replace advice given to you by your health care provider. Make sure you discuss  any questions you have with your health care provider.  Document Released: 12/12/2002 Document Revised: 08/15/2017 Document Reviewed: 05/03/2016  [employer] Interactive Patient Education © 2017 [employer]

## 2022-12-07 ENCOUNTER — OFFICE VISIT (OUTPATIENT)
Dept: FAMILY MEDICINE CLINIC | Facility: CLINIC | Age: 42
End: 2022-12-07

## 2022-12-07 VITALS
OXYGEN SATURATION: 99 % | HEART RATE: 75 BPM | WEIGHT: 160 LBS | DIASTOLIC BLOOD PRESSURE: 120 MMHG | BODY MASS INDEX: 22.9 KG/M2 | SYSTOLIC BLOOD PRESSURE: 210 MMHG | TEMPERATURE: 98.4 F | HEIGHT: 70 IN

## 2022-12-07 DIAGNOSIS — F17.200 SMOKER: ICD-10-CM

## 2022-12-07 DIAGNOSIS — E55.9 VITAMIN D DEFICIENCY: ICD-10-CM

## 2022-12-07 DIAGNOSIS — R74.8 ELEVATED LIVER ENZYMES: ICD-10-CM

## 2022-12-07 DIAGNOSIS — E87.6 HYPOKALEMIA: ICD-10-CM

## 2022-12-07 DIAGNOSIS — I10 SEVERE UNCONTROLLED HYPERTENSION: Primary | ICD-10-CM

## 2022-12-07 DIAGNOSIS — R45.4 IRRITABLE: ICD-10-CM

## 2022-12-07 DIAGNOSIS — E78.1 PURE HYPERTRIGLYCERIDEMIA: ICD-10-CM

## 2022-12-07 DIAGNOSIS — F43.29 STRESS AND ADJUSTMENT REACTION: ICD-10-CM

## 2022-12-07 PROCEDURE — 99214 OFFICE O/P EST MOD 30 MIN: CPT | Performed by: FAMILY MEDICINE

## 2022-12-07 RX ORDER — AMLODIPINE BESYLATE 10 MG/1
10 TABLET ORAL DAILY
Qty: 30 TABLET | Refills: 2 | Status: SHIPPED | OUTPATIENT
Start: 2022-12-07 | End: 2023-03-10 | Stop reason: HOSPADM

## 2022-12-07 RX ORDER — BUSPIRONE HYDROCHLORIDE 5 MG/1
5 TABLET ORAL 3 TIMES DAILY PRN
Qty: 30 TABLET | Refills: 2 | Status: SHIPPED | OUTPATIENT
Start: 2022-12-07 | End: 2023-03-22

## 2022-12-10 NOTE — PROGRESS NOTES
Chief Complaint  Follow-up and Hypertension    Subjective        History of Present Illness  James Taylor is a 42 y.o. male who presents to [facility]   His BP is extremely high.  He is nervous about being at the doctor and says it is often high.  In Nov here it was 209/100 but since then he has had two visits in GI with BP of 138/80 and 148/90; says he is compliant with his BP meds.    Pt has woman with him who has encouraged him to ask about his irritability and he says he needs something to help with that as he can get hicks at times and yell.  Pt has a h/o heavy drinking and elevated LFTs but has cut back to 2 beers a day reportedly with occ hard liquor.      Result Review :   The following data was reviewed by: Radha Desir MD on 12/07/2022:    Lab on 11/09/2022   Component Date Value Ref Range Status   • Mitochondrial Ab 11/09/2022 <20.0  0.0 - 20.0 Units Final                                    Negative    0.0 - 20.0                                  Equivocal  20.1 - 24.9                                  Positive         >24.9  Mitochondrial (M2) Antibodies are found in 90-96% of  patients with primary biliary cirrhosis.   Office Visit on 11/09/2022   Component Date Value Ref Range Status   • A-1 Antitrypsin 11/09/2022 165  101 - 187 mg/dL Final   • Phenotype 11/09/2022 MM   Final           Phenotype  Population     A-1-AT Concentration*                    Incidence %   % of MM (Typical Range)         MM            86.5%       100%     (96 - 189)         MS             8.0%        86%     (83 - 161)         MZ             3.9%        61%     (60 - 111)         FM             0.4%       100%     (93 - 191)         SZ             0.3%        41%     (42 -  75)         SS             0.1%        64%     (62 - 119)         ZZ             0.05%       19%     (16 -  38)         FS             0.05%       70%     (70 - 128)         FZ            Unknown      46%     (44 -  88)          FF            Unknown                Unknown  *A-1-AT concentration in the homozygous MM phenotype   is taken as the reference normal. Percent deficiency   in each phenotype is reported relative to this   reference. Ranges used to confirm phenotype.   • FABIÁN Direct 11/09/2022 Negative  Negative Final   • Smooth Muscle Ab 11/09/2022 19  0 - 19 Units Final                     Negative                     0 - 19                   Weak positive               20 - 30                   Moderate to strong positive     >30   Actin Antibodies are found in 52-85% of patients with   autoimmune hepatitis or chronic active hepatitis and   in 22% of patients with primary biliary cirrhosis.   • Ceruloplasmin 11/09/2022 21  16 - 31 mg/dL Final   • Ferritin 11/09/2022 797.80 (H)  30.00 - 400.00 ng/mL Final   • Liver Fraction: 11/09/2022 2.4  0.0 - 20.0 Units Final                                    Negative    0.0 - 20.0                                  Equivocal  20.1 - 24.9                                  Positive         >24.9  LKM type 1 antibodies are detected in patients with  autoimmune hepatitis type 2 and in up to 8% of  patients with chronic HCV infection.   • Glucose 11/09/2022 90  65 - 99 mg/dL Final   • BUN 11/09/2022 7  6 - 20 mg/dL Final   • Creatinine 11/09/2022 0.99  0.76 - 1.27 mg/dL Final   • Sodium 11/09/2022 135 (L)  136 - 145 mmol/L Final   • Potassium 11/09/2022 3.3 (L)  3.5 - 5.2 mmol/L Final   • Chloride 11/09/2022 96 (L)  98 - 107 mmol/L Final   • CO2 11/09/2022 29.0  22.0 - 29.0 mmol/L Final   • Calcium 11/09/2022 9.8  8.6 - 10.5 mg/dL Final   • Total Protein 11/09/2022 8.0  6.0 - 8.5 g/dL Final   • Albumin 11/09/2022 4.60  3.50 - 5.20 g/dL Final   • ALT (SGPT) 11/09/2022 50 (H)  1 - 41 U/L Final   • AST (SGOT) 11/09/2022 89 (H)  1 - 40 U/L Final   • Alkaline Phosphatase 11/09/2022 142 (H)  39 - 117 U/L Final   • Total Bilirubin 11/09/2022 0.8  0.0 - 1.2 mg/dL Final   • Globulin 11/09/2022 3.4  gm/dL  Final   • A/G Ratio 11/09/2022 1.4  g/dL Final   • BUN/Creatinine Ratio 11/09/2022 7.1  7.0 - 25.0 Final   • Anion Gap 11/09/2022 10.0  5.0 - 15.0 mmol/L Final   • eGFR 11/09/2022 97.5  >60.0 mL/min/1.73 Final    National Kidney Foundation and American Society of Nephrology (ASN) Task Force recommended calculation based on the Chronic Kidney Disease Epidemiology Collaboration (CKD-EPI) equation refit without adjustment for race.   • Protime 11/09/2022 13.9  11.9 - 14.6 Seconds Final   • INR 11/09/2022 1.11 (H)  0.91 - 1.09 Final   Lab on 10/24/2022   Component Date Value Ref Range Status   • Total Cholesterol 10/24/2022 143  0 - 200 mg/dL Final   • Triglycerides 10/24/2022 168 (H)  0 - 150 mg/dL Final   • HDL Cholesterol 10/24/2022 43  40 - 60 mg/dL Final   • LDL Cholesterol  10/24/2022 71  0 - 100 mg/dL Final   • VLDL Cholesterol 10/24/2022 29  5 - 40 mg/dL Final   • LDL/HDL Ratio 10/24/2022 1.54   Final   • Iron 10/24/2022 133  59 - 158 mcg/dL Final   • Iron Saturation 10/24/2022 37  20 - 50 % Final   • Transferrin 10/24/2022 244  200 - 360 mg/dL Final   • TIBC 10/24/2022 364  298 - 536 mcg/dL Final   • 25 Hydroxy, Vitamin D 10/24/2022 19.4 (L)  30.0 - 100.0 ng/ml Final   • Magnesium 10/24/2022 2.0  1.6 - 2.6 mg/dL Final   • TSH 10/24/2022 0.481  0.270 - 4.200 uIU/mL Final   • Glucose 10/24/2022 73  65 - 99 mg/dL Final   • BUN 10/24/2022 8  6 - 20 mg/dL Final   • Creatinine 10/24/2022 1.02  0.76 - 1.27 mg/dL Final   • Sodium 10/24/2022 132 (L)  136 - 145 mmol/L Final   • Potassium 10/24/2022 3.5  3.5 - 5.2 mmol/L Final   • Chloride 10/24/2022 94 (L)  98 - 107 mmol/L Final   • CO2 10/24/2022 22.1  22.0 - 29.0 mmol/L Final   • Calcium 10/24/2022 8.9  8.6 - 10.5 mg/dL Final   • Total Protein 10/24/2022 7.5  6.0 - 8.5 g/dL Final   • Albumin 10/24/2022 4.50  3.50 - 5.20 g/dL Final   • ALT (SGPT) 10/24/2022 90 (H)  1 - 41 U/L Final   • AST (SGOT) 10/24/2022 301 (H)  1 - 40 U/L Final   • Alkaline Phosphatase 10/24/2022  164 (H)  39 - 117 U/L Final   • Total Bilirubin 10/24/2022 1.0  0.0 - 1.2 mg/dL Final   • Globulin 10/24/2022 3.0  gm/dL Final   • A/G Ratio 10/24/2022 1.5  g/dL Final   • BUN/Creatinine Ratio 10/24/2022 7.8  7.0 - 25.0 Final   • Anion Gap 10/24/2022 15.9 (H)  5.0 - 15.0 mmol/L Final   • eGFR 10/24/2022 94.1  >60.0 mL/min/1.73 Final    National Kidney Foundation and American Society of Nephrology (ASN) Task Force recommended calculation based on the Chronic Kidney Disease Epidemiology Collaboration (CKD-EPI) equation refit without adjustment for race.   • GGT 10/24/2022 1,932 (H)  8 - 61 U/L Final   • Microalbumin, Urine 10/24/2022 <1.2  mg/dL Final   • Hemoglobin A1C 10/24/2022 <4.30 (L)  4.80 - 5.60 % Final   • LDH 10/24/2022 281 (H)  135 - 225 U/L Final   • Hepatitis B Surface Ag 10/24/2022 Non-Reactive  Non-Reactive Final   • Hep A IgM 10/24/2022 Non-Reactive  Non-Reactive Final   • Hep B C IgM 10/24/2022 Non-Reactive  Non-Reactive Final   • Hepatitis C Ab 10/24/2022 Non-Reactive  Non-Reactive Final   • Color, UA 10/24/2022 Yellow  Yellow, Straw Final   • Appearance, UA 10/24/2022 Clear  Clear Final   • pH, UA 10/24/2022 <=5.0  5.0 - 8.0 Final   • Specific Gravity, UA 10/24/2022 <1.005 (L)  1.005 - 1.030 Final   • Glucose, UA 10/24/2022 Negative  Negative Final   • Ketones, UA 10/24/2022 Negative  Negative Final   • Bilirubin, UA 10/24/2022 Negative  Negative Final   • Blood, UA 10/24/2022 Negative  Negative Final   • Protein, UA 10/24/2022 Negative  Negative Final   • Leuk Esterase, UA 10/24/2022 Negative  Negative Final   • Nitrite, UA 10/24/2022 Negative  Negative Final   • Urobilinogen, UA 10/24/2022 0.2 E.U./dL  0.2 - 1.0 E.U./dL Final   • RBC, UA 10/24/2022 0-2  None Seen, 0-2 /HPF Final   • WBC, UA 10/24/2022 0-2  None Seen, 0-2 /HPF Final   • Bacteria, UA 10/24/2022 None Seen  None Seen /HPF Final   • Squamous Epithelial Cells, UA 10/24/2022 0-2  None Seen, 0-2 /HPF Final   • Hyaline Casts, UA 10/24/2022  "0-2  None Seen /LPF Final   • Methodology 10/24/2022 Automated Microscopy   Final                Review of Systems     Past Medical History:   Diagnosis Date   • Asthma    • Hypertension        Outpatient Medications Prior to Visit   Medication Sig Dispense Refill   • amLODIPine (NORVASC) 5 MG tablet Take 1 tablet by mouth Daily. For high blood pressure 30 tablet 5   • lisinopril (PRINIVIL,ZESTRIL) 20 MG tablet Take 1 tablet by mouth Daily. For blood pressure control 30 tablet 5   • vitamin D (ERGOCALCIFEROL) 1.25 MG (98404 UT) capsule capsule Take 1 capsule by mouth 1 (One) Time Per Week. To treat low vitamin D. Take with food. 5 capsule 11     No facility-administered medications prior to visit.      Pt had extensive testing Oct 1st at the ER when he went in with chest pain and uncontrolled HTN.  Those notes were reviewed today.    Objective   Vital Signs:   BP (!) 210/120 (BP Location: Left arm, Patient Position: Sitting, Cuff Size: Adult)   Pulse 75   Temp 98.4 °F (36.9 °C) (Temporal)   Ht 177.8 cm (70\")   Wt 72.6 kg (160 lb)   SpO2 99%   BMI 22.96 kg/m²       Physical Exam  Vitals reviewed.   HENT:      Head: Normocephalic.   Eyes:      Comments: Injected sclerae, says it's a family trait.  R slt > than L injection.  No subconjunctival hemorrhage.  Normal lids.  No eye drainage.    Neck:      Comments: No JVD  Cardiovascular:      Rate and Rhythm: Normal rate and regular rhythm.   Pulmonary:      Effort: Pulmonary effort is normal.      Breath sounds: Normal breath sounds.   Abdominal:      General: Bowel sounds are normal.      Palpations: Abdomen is soft.      Tenderness: There is no abdominal tenderness.   Musculoskeletal:      Right lower leg: No edema.      Left lower leg: No edema.   Skin:     General: Skin is warm.      Findings: No erythema.   Neurological:      Mental Status: He is alert and oriented to person, place, and time.   Psychiatric:         Mood and Affect: Affect normal.         " Speech: Speech normal.         Behavior: Behavior normal. Behavior is cooperative.         Cognition and Memory: Cognition and memory normal.      Comments: Concerned about the BP but not agitated or in any obvious discomfort.  No signs of stroke changes on exam.                 Assessment and Plan    Diagnoses and all orders for this visit:    1. Severe uncontrolled hypertension (Primary)  -     amLODIPine (NORVASC) 10 MG tablet; Take 1 tablet by mouth Daily. For high blood pressure  Dispense: 30 tablet; Refill: 2  -     Comprehensive Metabolic Panel; Future  -     Urine Drug Screen - Urine, Clean Catch; Future  -     MicroAlbumin, Urine, Random - Urine, Clean Catch; Future  -     Urinalysis With Microscopic - Urine, Clean Catch; Future    2. Irritable  -     sertraline (Zoloft) 50 MG tablet; Take 1 tablet by mouth Daily. To help with mood. Take with food  Dispense: 30 tablet; Refill: 1  -     Urine Drug Screen - Urine, Clean Catch; Future    3. Stress and adjustment reaction  -     sertraline (Zoloft) 50 MG tablet; Take 1 tablet by mouth Daily. To help with mood. Take with food  Dispense: 30 tablet; Refill: 1  -     busPIRone (BUSPAR) 5 MG tablet; Take 1 tablet by mouth 3 (Three) Times a Day As Needed (for relief of irritability, stress or anxiety).  Dispense: 30 tablet; Refill: 2  -     Urine Drug Screen - Urine, Clean Catch; Future    4. Pure hypertriglyceridemia    5. Smoker  -     Comprehensive Metabolic Panel; Future    6. Elevated liver enzymes  -     Comprehensive Metabolic Panel; Future  -     Urinalysis With Microscopic - Urine, Clean Catch; Future    7. Vitamin D deficiency  -     Vitamin D,25-Hydroxy; Future    8. Hypokalemia  -     Comprehensive Metabolic Panel; Future    Treating stress reactions may help lower the blood pressure.  Lowering the blood pressure may also make him less irritable.    We decided not to send him to the ER for acute management today since he appears fine on exam and BP was  normal range in the past week at GI.  Will do more aggressive home management.  He needs to focus on good sleep and good relaxing exercise ( rather than all the hard physical activity he does at work).    Will have him increase the amlodipine to 10 mg daily and gave him a print out for the 10 mg pill.  He will take another 5 mg of norvasc as soon as he gets home and then in the AM start taking two of the 5 mg pills once a day.  If that is tolerated and effective ( no swelling in legs and no constipation and improved numbers at goal of 120/70's), he will fill the 10 mg rx and continue the lisinopril at the 20 mg daily dose.    Lisinopril should be correcting the low K.    If the blood pressure is still running over 145/90, then he needs to increase the lisinopril to 40 mg a day and continue the 10 mg norvasc.  He will need to call for that rx to be sent in on the 40 mg lisinopril.  He will monitor for any cough or shortness of breath.     Will get recheck of Vit D, CMP ( including K and renal /liver status), as well as a UDS at the next appointment.        Follow Up   Return in about 4 weeks (around 1/4/2023) for Recheck, high blood pressure and Zoloft start.  Sooner prn.  Bring in home BP readings or send some through [employer].    Patient was given instructions and counseling regarding his condition or for health maintenance advice.   Patient Instructions   - Go home and take another amlodipine 5  mg pill and recheck the blood pressure in an hour.    - Call us if the blood pressure isn't coming down in the next 24 hours and if it's not to goal in 7 to 10 days.    - Go to ER for onset of any difficulties with speech, movement, or thinking that could be signs of a stroke.     Please see specific information/handouts pulled into the AVS if appropriate.       Radha Desir M.D.  [facility]

## 2023-01-05 ENCOUNTER — TELEPHONE (OUTPATIENT)
Dept: FAMILY MEDICINE CLINIC | Facility: CLINIC | Age: 43
End: 2023-01-05
Payer: MEDICAID

## 2023-01-05 DIAGNOSIS — F43.29 STRESS AND ADJUSTMENT REACTION: ICD-10-CM

## 2023-01-05 DIAGNOSIS — R45.4 IRRITABLE: ICD-10-CM

## 2023-01-05 NOTE — TELEPHONE ENCOUNTER
Per [facility] pharmacy requesting 90 day supply on sertraline hcl 50 mg. Last filled by Dr. Desir on 12/7/22, qty 30, 1 refill. Please advise

## 2023-03-04 ENCOUNTER — APPOINTMENT (OUTPATIENT)
Dept: GENERAL RADIOLOGY | Facility: HOSPITAL | Age: 43
DRG: 64 | End: 2023-03-04
Payer: MEDICAID

## 2023-03-04 ENCOUNTER — APPOINTMENT (OUTPATIENT)
Dept: CT IMAGING | Facility: HOSPITAL | Age: 43
DRG: 64 | End: 2023-03-04
Payer: MEDICAID

## 2023-03-04 ENCOUNTER — HOSPITAL ENCOUNTER (INPATIENT)
Facility: HOSPITAL | Age: 43
LOS: 6 days | Discharge: NURSING, REHAB OR LONG-TERM CARE | DRG: 64 | End: 2023-03-10
Attending: STUDENT IN AN ORGANIZED HEALTH CARE EDUCATION/TRAINING PROGRAM | Admitting: NEUROLOGICAL SURGERY
Payer: MEDICAID

## 2023-03-04 DIAGNOSIS — R13.10 DYSPHAGIA, UNSPECIFIED TYPE: ICD-10-CM

## 2023-03-04 DIAGNOSIS — G93.6 CEREBRAL EDEMA: ICD-10-CM

## 2023-03-04 DIAGNOSIS — R29.898 LEFT ARM WEAKNESS: ICD-10-CM

## 2023-03-04 DIAGNOSIS — R29.810 FACIAL DROOP: ICD-10-CM

## 2023-03-04 DIAGNOSIS — Z78.9 DECREASED ACTIVITIES OF DAILY LIVING (ADL): ICD-10-CM

## 2023-03-04 DIAGNOSIS — Z74.09 IMPAIRED MOBILITY: ICD-10-CM

## 2023-03-04 DIAGNOSIS — R90.89 MIDLINE SHIFT OF BRAIN: ICD-10-CM

## 2023-03-04 DIAGNOSIS — I62.9 INTRACRANIAL BLEEDING: Primary | ICD-10-CM

## 2023-03-04 DIAGNOSIS — Z86.79 HISTORY OF HYPERTENSION: ICD-10-CM

## 2023-03-04 DIAGNOSIS — R51.9 ACUTE NONINTRACTABLE HEADACHE, UNSPECIFIED HEADACHE TYPE: ICD-10-CM

## 2023-03-04 PROBLEM — F10.10 ALCOHOL ABUSE: Status: ACTIVE | Noted: 2023-03-04

## 2023-03-04 PROBLEM — R74.01 ELEVATED TRANSAMINASE LEVEL: Status: ACTIVE | Noted: 2023-03-04

## 2023-03-04 PROBLEM — I10 MALIGNANT HYPERTENSION: Status: ACTIVE | Noted: 2023-03-04

## 2023-03-04 LAB
ABO GROUP BLD: NORMAL
ALBUMIN SERPL-MCNC: 4.5 G/DL (ref 3.5–5.2)
ALBUMIN/GLOB SERPL: 1.5 G/DL
ALP SERPL-CCNC: 190 U/L (ref 39–117)
ALT SERPL W P-5'-P-CCNC: 180 U/L (ref 1–41)
ANION GAP SERPL CALCULATED.3IONS-SCNC: 12 MMOL/L (ref 5–15)
APTT PPP: 32.5 SECONDS (ref 24.1–35)
AST SERPL-CCNC: 615 U/L (ref 1–40)
BASOPHILS # BLD AUTO: 0.07 10*3/MM3 (ref 0–0.2)
BASOPHILS NFR BLD AUTO: 1.1 % (ref 0–1.5)
BILIRUB SERPL-MCNC: 1.2 MG/DL (ref 0–1.2)
BLD GP AB SCN SERPL QL: NEGATIVE
BUN SERPL-MCNC: 14 MG/DL (ref 6–20)
BUN/CREAT SERPL: 12.4 (ref 7–25)
CALCIUM SPEC-SCNC: 9.2 MG/DL (ref 8.6–10.5)
CHLORIDE SERPL-SCNC: 102 MMOL/L (ref 98–107)
CHOLEST SERPL-MCNC: 121 MG/DL (ref 0–200)
CO2 SERPL-SCNC: 26 MMOL/L (ref 22–29)
CREAT SERPL-MCNC: 1.13 MG/DL (ref 0.76–1.27)
DEPRECATED RDW RBC AUTO: 41.3 FL (ref 37–54)
EGFRCR SERPLBLD CKD-EPI 2021: 83.2 ML/MIN/1.73
EOSINOPHIL # BLD AUTO: 0.17 10*3/MM3 (ref 0–0.4)
EOSINOPHIL NFR BLD AUTO: 2.6 % (ref 0.3–6.2)
ERYTHROCYTE [DISTWIDTH] IN BLOOD BY AUTOMATED COUNT: 13.2 % (ref 12.3–15.4)
GLOBULIN UR ELPH-MCNC: 3.1 GM/DL
GLUCOSE BLDC GLUCOMTR-MCNC: 133 MG/DL (ref 70–130)
GLUCOSE SERPL-MCNC: 110 MG/DL (ref 65–99)
HBA1C MFR BLD: <4.2 % (ref 4.8–5.6)
HCT VFR BLD AUTO: 38.3 % (ref 37.5–51)
HDLC SERPL-MCNC: 24 MG/DL (ref 40–60)
HGB BLD-MCNC: 13.6 G/DL (ref 13–17.7)
HOLD SPECIMEN: NORMAL
HOLD SPECIMEN: NORMAL
INR PPP: 1.22 (ref 0.91–1.09)
LDL/HDL RATIO NULL: ABNORMAL
LDLC SERPL CALC-MCNC: 20 MG/DL (ref 0–100)
LYMPHOCYTES # BLD AUTO: 2.58 10*3/MM3 (ref 0.7–3.1)
LYMPHOCYTES NFR BLD AUTO: 39.2 % (ref 19.6–45.3)
MCH RBC QN AUTO: 30.8 PG (ref 26.6–33)
MCHC RBC AUTO-ENTMCNC: 35.5 G/DL (ref 31.5–35.7)
MCV RBC AUTO: 86.7 FL (ref 79–97)
MONOCYTES # BLD AUTO: 0.41 10*3/MM3 (ref 0.1–0.9)
MONOCYTES NFR BLD AUTO: 6.2 % (ref 5–12)
NEUTROPHILS NFR BLD AUTO: 3.35 10*3/MM3 (ref 1.7–7)
NEUTROPHILS NFR BLD AUTO: 50.9 % (ref 42.7–76)
PLATELET # BLD AUTO: 104 10*3/MM3 (ref 140–450)
PMV BLD AUTO: 11 FL (ref 6–12)
POTASSIUM SERPL-SCNC: 3.8 MMOL/L (ref 3.5–5.2)
PROT SERPL-MCNC: 7.6 G/DL (ref 6–8.5)
PROTHROMBIN TIME: 15.6 SECONDS (ref 11.8–14.8)
RBC # BLD AUTO: 4.42 10*6/MM3 (ref 4.14–5.8)
RH BLD: POSITIVE
SODIUM SERPL-SCNC: 140 MMOL/L (ref 136–145)
T&S EXPIRATION DATE: NORMAL
TRIGL SERPL-MCNC: 589 MG/DL (ref 0–150)
TROPONIN T SERPL HS-MCNC: 17 NG/L
VLDLC SERPL-MCNC: 77 MG/DL (ref 5–40)
WBC NRBC COR # BLD: 6.58 10*3/MM3 (ref 3.4–10.8)
WHOLE BLOOD HOLD COAG: NORMAL
WHOLE BLOOD HOLD SPECIMEN: NORMAL

## 2023-03-04 PROCEDURE — 86901 BLOOD TYPING SEROLOGIC RH(D): CPT | Performed by: STUDENT IN AN ORGANIZED HEALTH CARE EDUCATION/TRAINING PROGRAM

## 2023-03-04 PROCEDURE — 86850 RBC ANTIBODY SCREEN: CPT | Performed by: STUDENT IN AN ORGANIZED HEALTH CARE EDUCATION/TRAINING PROGRAM

## 2023-03-04 PROCEDURE — 99285 EMERGENCY DEPT VISIT HI MDM: CPT

## 2023-03-04 PROCEDURE — 70498 CT ANGIOGRAPHY NECK: CPT

## 2023-03-04 PROCEDURE — 85730 THROMBOPLASTIN TIME PARTIAL: CPT | Performed by: STUDENT IN AN ORGANIZED HEALTH CARE EDUCATION/TRAINING PROGRAM

## 2023-03-04 PROCEDURE — 84484 ASSAY OF TROPONIN QUANT: CPT | Performed by: STUDENT IN AN ORGANIZED HEALTH CARE EDUCATION/TRAINING PROGRAM

## 2023-03-04 PROCEDURE — 25510000001 IOPAMIDOL PER 1 ML: Performed by: STUDENT IN AN ORGANIZED HEALTH CARE EDUCATION/TRAINING PROGRAM

## 2023-03-04 PROCEDURE — 97166 OT EVAL MOD COMPLEX 45 MIN: CPT

## 2023-03-04 PROCEDURE — 70496 CT ANGIOGRAPHY HEAD: CPT

## 2023-03-04 PROCEDURE — 99223 1ST HOSP IP/OBS HIGH 75: CPT | Performed by: NEUROLOGICAL SURGERY

## 2023-03-04 PROCEDURE — 93010 ELECTROCARDIOGRAM REPORT: CPT | Performed by: EMERGENCY MEDICINE

## 2023-03-04 PROCEDURE — 80053 COMPREHEN METABOLIC PANEL: CPT | Performed by: STUDENT IN AN ORGANIZED HEALTH CARE EDUCATION/TRAINING PROGRAM

## 2023-03-04 PROCEDURE — 83036 HEMOGLOBIN GLYCOSYLATED A1C: CPT | Performed by: NEUROLOGICAL SURGERY

## 2023-03-04 PROCEDURE — 92610 EVALUATE SWALLOWING FUNCTION: CPT

## 2023-03-04 PROCEDURE — 80061 LIPID PANEL: CPT | Performed by: NEUROLOGICAL SURGERY

## 2023-03-04 PROCEDURE — 70450 CT HEAD/BRAIN W/O DYE: CPT

## 2023-03-04 PROCEDURE — 86900 BLOOD TYPING SEROLOGIC ABO: CPT | Performed by: STUDENT IN AN ORGANIZED HEALTH CARE EDUCATION/TRAINING PROGRAM

## 2023-03-04 PROCEDURE — 71045 X-RAY EXAM CHEST 1 VIEW: CPT

## 2023-03-04 PROCEDURE — 93005 ELECTROCARDIOGRAM TRACING: CPT | Performed by: STUDENT IN AN ORGANIZED HEALTH CARE EDUCATION/TRAINING PROGRAM

## 2023-03-04 PROCEDURE — 82962 GLUCOSE BLOOD TEST: CPT

## 2023-03-04 PROCEDURE — 85025 COMPLETE CBC W/AUTO DIFF WBC: CPT | Performed by: STUDENT IN AN ORGANIZED HEALTH CARE EDUCATION/TRAINING PROGRAM

## 2023-03-04 PROCEDURE — 85610 PROTHROMBIN TIME: CPT | Performed by: STUDENT IN AN ORGANIZED HEALTH CARE EDUCATION/TRAINING PROGRAM

## 2023-03-04 PROCEDURE — 97162 PT EVAL MOD COMPLEX 30 MIN: CPT

## 2023-03-04 PROCEDURE — 36415 COLL VENOUS BLD VENIPUNCTURE: CPT | Performed by: NEUROLOGICAL SURGERY

## 2023-03-04 RX ORDER — SODIUM CHLORIDE 0.9 % (FLUSH) 0.9 %
10 SYRINGE (ML) INJECTION AS NEEDED
Status: DISCONTINUED | OUTPATIENT
Start: 2023-03-04 | End: 2023-03-10 | Stop reason: HOSPADM

## 2023-03-04 RX ORDER — NICOTINE 21 MG/24HR
1 PATCH, TRANSDERMAL 24 HOURS TRANSDERMAL EVERY 24 HOURS
Status: DISCONTINUED | OUTPATIENT
Start: 2023-03-04 | End: 2023-03-10 | Stop reason: HOSPADM

## 2023-03-04 RX ORDER — CHLORHEXIDINE GLUCONATE 500 MG/1
1 CLOTH TOPICAL EVERY 24 HOURS
Status: DISCONTINUED | OUTPATIENT
Start: 2023-03-05 | End: 2023-03-09

## 2023-03-04 RX ORDER — LORAZEPAM 1 MG/1
1 TABLET ORAL
Status: DISCONTINUED | OUTPATIENT
Start: 2023-03-04 | End: 2023-03-10 | Stop reason: HOSPADM

## 2023-03-04 RX ORDER — LORAZEPAM 1 MG/1
2 TABLET ORAL
Status: DISCONTINUED | OUTPATIENT
Start: 2023-03-04 | End: 2023-03-10 | Stop reason: HOSPADM

## 2023-03-04 RX ORDER — ENALAPRILAT 2.5 MG/2ML
1.25 INJECTION INTRAVENOUS EVERY 6 HOURS PRN
Status: DISCONTINUED | OUTPATIENT
Start: 2023-03-04 | End: 2023-03-10 | Stop reason: HOSPADM

## 2023-03-04 RX ORDER — LORAZEPAM 2 MG/ML
2 INJECTION INTRAMUSCULAR
Status: DISCONTINUED | OUTPATIENT
Start: 2023-03-04 | End: 2023-03-10 | Stop reason: HOSPADM

## 2023-03-04 RX ORDER — SODIUM CHLORIDE 0.9 % (FLUSH) 0.9 %
10 SYRINGE (ML) INJECTION EVERY 12 HOURS SCHEDULED
Status: DISCONTINUED | OUTPATIENT
Start: 2023-03-04 | End: 2023-03-10 | Stop reason: HOSPADM

## 2023-03-04 RX ORDER — AMLODIPINE BESYLATE 10 MG/1
10 TABLET ORAL DAILY
Status: DISCONTINUED | OUTPATIENT
Start: 2023-03-05 | End: 2023-03-10 | Stop reason: HOSPADM

## 2023-03-04 RX ORDER — ACETAMINOPHEN 325 MG/1
650 TABLET ORAL EVERY 4 HOURS PRN
Status: DISCONTINUED | OUTPATIENT
Start: 2023-03-04 | End: 2023-03-10 | Stop reason: HOSPADM

## 2023-03-04 RX ORDER — LABETALOL HYDROCHLORIDE 5 MG/ML
10 INJECTION, SOLUTION INTRAVENOUS
Status: DISCONTINUED | OUTPATIENT
Start: 2023-03-04 | End: 2023-03-09

## 2023-03-04 RX ORDER — LORAZEPAM 2 MG/ML
1 INJECTION INTRAMUSCULAR
Status: DISCONTINUED | OUTPATIENT
Start: 2023-03-04 | End: 2023-03-10 | Stop reason: HOSPADM

## 2023-03-04 RX ORDER — CHLORHEXIDINE GLUCONATE 500 MG/1
1 CLOTH TOPICAL ONCE
Status: COMPLETED | OUTPATIENT
Start: 2023-03-04 | End: 2023-03-04

## 2023-03-04 RX ORDER — ACETAMINOPHEN 650 MG/1
650 SUPPOSITORY RECTAL EVERY 4 HOURS PRN
Status: DISCONTINUED | OUTPATIENT
Start: 2023-03-04 | End: 2023-03-10 | Stop reason: HOSPADM

## 2023-03-04 RX ORDER — SODIUM CHLORIDE 9 MG/ML
40 INJECTION, SOLUTION INTRAVENOUS AS NEEDED
Status: DISCONTINUED | OUTPATIENT
Start: 2023-03-04 | End: 2023-03-10 | Stop reason: HOSPADM

## 2023-03-04 RX ADMIN — NICARDIPINE HYDROCHLORIDE 12.5 MG/HR: 25 INJECTION, SOLUTION INTRAVENOUS at 02:17

## 2023-03-04 RX ADMIN — Medication 1 APPLICATION: at 17:51

## 2023-03-04 RX ADMIN — CHLORHEXIDINE GLUCONATE 1 APPLICATION: 500 CLOTH TOPICAL at 17:51

## 2023-03-04 RX ADMIN — ENALAPRILAT 1.25 MG: 1.25 INJECTION INTRAVENOUS at 21:54

## 2023-03-04 RX ADMIN — NICARDIPINE HYDROCHLORIDE 10 MG/HR: 25 INJECTION, SOLUTION INTRAVENOUS at 19:24

## 2023-03-04 RX ADMIN — NICARDIPINE HYDROCHLORIDE 5 MG/HR: 25 INJECTION, SOLUTION INTRAVENOUS at 01:38

## 2023-03-04 RX ADMIN — NICARDIPINE HYDROCHLORIDE 5 MG/HR: 25 INJECTION, SOLUTION INTRAVENOUS at 09:49

## 2023-03-04 RX ADMIN — Medication 10 ML: at 21:00

## 2023-03-04 RX ADMIN — Medication 10 ML: at 02:17

## 2023-03-04 RX ADMIN — Medication 10 ML: at 09:49

## 2023-03-04 RX ADMIN — NICARDIPINE HYDROCHLORIDE 5 MG/HR: 25 INJECTION, SOLUTION INTRAVENOUS at 14:46

## 2023-03-04 RX ADMIN — METOPROLOL TARTRATE 25 MG: 25 TABLET, FILM COATED ORAL at 20:59

## 2023-03-04 RX ADMIN — NICARDIPINE HYDROCHLORIDE 12.5 MG/HR: 25 INJECTION, SOLUTION INTRAVENOUS at 21:54

## 2023-03-04 RX ADMIN — NICARDIPINE HYDROCHLORIDE 7.5 MG/HR: 25 INJECTION, SOLUTION INTRAVENOUS at 04:03

## 2023-03-04 RX ADMIN — NICOTINE 1 PATCH: 21 PATCH, EXTENDED RELEASE TRANSDERMAL at 17:51

## 2023-03-04 RX ADMIN — IOPAMIDOL 100 ML: 755 INJECTION, SOLUTION INTRAVENOUS at 01:41

## 2023-03-04 RX ADMIN — SERTRALINE HYDROCHLORIDE 50 MG: 50 TABLET, FILM COATED ORAL at 17:51

## 2023-03-04 NOTE — PLAN OF CARE
Goal Outcome Evaluation:  Plan of Care Reviewed With: patient        Progress: no change  Outcome Evaluation: OT eval completed. Pt A&Ox4 with fatigue reported. Pt reports PLOF of I. Pt completed supine>sit with CGA and tolerated EOB sitting with CGA/SBA required. Pt required Max A for donning socks while seated EOB. Pt completed sit<>stand t/f with CGA and impulsivity noted. Pt completed forward/backward steps at EOB with Mod A required secondary to limited weight shift and step length with L LE. Pt with c/o increased fatigue with activities and requested transition to supine with Mod A required. Skilled OT warranted to provide education and address deficits in balance, activity tolerance, trunk control, coordination, grasp, ROM, and strength in order to increase pt safety and I during ADLs, fxl mobility, and fxl t/f's. Pt would benefit from IP rehab at D/C.

## 2023-03-04 NOTE — PLAN OF CARE
Problem: Adult Inpatient Plan of Care  Goal: Plan of Care Review  Outcome: Ongoing, Progressing  Flowsheets (Taken 3/4/2023 0900)  Progress: (Eval) --  Plan of Care Reviewed With: (RNGuillermina)   patient   family   other (see comments)  Outcome Evaluation:  Clinical bedside swallowing evaluation completed per SLP this AM, co-eval with OT. Pt was fatigued, yet maintained ability to respond to cues and follow commands throughout eval with increased fatigue only noted at the conclusion of the evaluation. Pt denied hx of dysphagia and pneumonia. Mild dysarthria observed characterized by decreased breath support, decreased articulatory precision though only minimally impacted speech intelligibility. Oral mech exam revealed decreased mandibular strength and ROM bilaterally, mod lingual deviation upon protrusion with reduced lingual ROM, moderate reduced L labial ROM with retraction for smile. Mildly delayed volitional swallow, decreased respiratory support with cough. Pt was presented a full range of consistencies excluding mechanical soft. Pt was noted to have mildly reduced, yet functional labial seal on spoon and straw. He appeared to have mildly delayed oral transit, which was inconsistent, though only appeared to have functional impact with regular/thin liquid consistency, as he was observed to have two piecemeal swallows with each thin liquid trial. Mildly audible swallow with thin, concern for reduced pharyngeal control with thin. Adequate mandibular bite strength with solid, though reduced rotary mandibular movement during mastication, which was also prolonged with the regular solid. Mild L lower labial residue from solid, required a 1x verbal cue to clear though did not warrant cues for orientation of location of residue, therefore, confirming L labial sensory awareness of residue. No oral residue post solid trial. No overt s/s of aspiration. SLP educated pt and family on observations, general aspiration risks and  concerns, and SLP diet, liquid, and safe feeding recommendations. This was also reviewed with RN, Guillermina, post eval. Cannot fully r/o aspiration at this time.   RECS:    D/C NPO status, ok to start mechanical soft diet consistency with regular/thin liquid consistency   Supervision with staff or family with PO intake, monitor for impulsivity and encourage pt to reduce rate of feeding if this is observed   Will likely warrant staff or family assistance with feeding   Encourage bolus placement on the R side of the oral cavity for optimal sensation and bolus control   Monitor for L pocketing, encourage alternation between bites and sips consistency   Encouarge L lingual sweep if L oral residue is observed   Meds whole with thin liquids and/or in pudding/applesauce   General aspiration precautions   RN to monitor for increased lung congestion   Notify SLP if concerns arise with recommended diet.   ST to continue to follow and tx for dysphagia. Will plan to eval motor speech and cognitive fx if concerns persist. Thanks!

## 2023-03-04 NOTE — THERAPY EVALUATION
Patient Name: James Taylor  : 1980    MRN: [id number]                              Today's Date: 3/4/2023       Admit Date: 3/4/2023    Visit Dx:     ICD-10-CM ICD-9-CM   1. Intracranial bleeding (HCC)  I62.9 432.9   2. Facial droop  R29.810 781.94   3. Left arm weakness  R29.898 729.89   4. Acute nonintractable headache, unspecified headache type  R51.9 784.0   5. History of hypertension  Z86.79 V12.59   6. Cerebral edema (HCC)  G93.6 348.5   7. Midline shift of brain  R90.89 793.0   8. Dysphagia, unspecified type  R13.10 787.20     Patient Active Problem List   Diagnosis   • Intracranial bleeding (HCC)     Past Medical History:   Diagnosis Date   • Asthma    • Hypertension      History reviewed. No pertinent surgical history.   General Information     Row Name 23 0855          OT Time and Intention    Document Type evaluation  Pt presents with facial droop and L arm weakness. Dx: R basal ganglia hemorrhagic stroke. Hx includes: HTN.  -LR     Mode of Treatment occupational therapy  -LR     Row Name 23 0855          General Information    Patient Profile Reviewed yes  -LR     Prior Level of Function independent:;all household mobility;community mobility;transfer;ADL's;home management;cooking;cleaning;driving;shopping  -LR     Existing Precautions/Restrictions fall  L sided weakness, SBP goal < 140  -LR     Barriers to Rehab medically complex  -LR     Row Name 23 0855          Occupational Profile    Environmental Supports and Barriers (Occupational Profile) tub/shower combo  -LR     Row Name 23 0855          Living Environment    People in Home parent(s)  -LR     Row Name 23 0855          Home Main Entrance    Number of Stairs, Main Entrance one  -LR     Stair Railings, Main Entrance none  -LR     Row Name 23 0855          Stairs Within Home, Primary    Number of Stairs, Within Home, Primary none  -LR     Row Name 23 0855          Cognition    Orientation Status  (Cognition) oriented x 4  -LR     Row Name 03/04/23 0855          Safety Issues, Functional Mobility    Safety Issues Affecting Function (Mobility) impulsivity;problem-solving;safety precaution awareness;safety precautions follow-through/compliance;sequencing abilities  -LR     Impairments Affecting Function (Mobility) balance;coordination;endurance/activity tolerance;grasp;motor control;muscle tone abnormal;postural/trunk control;range of motion (ROM);strength  -LR           User Key  (r) = Recorded By, (t) = Taken By, (c) = Cosigned By    Initials Name Provider Type    LR Kathleen Cavazos OT Occupational Therapist                 Mobility/ADL's     Row Name 03/04/23 0855          Bed Mobility    Bed Mobility supine-sit;sit-supine  -LR     Supine-Sit [place] (Bed Mobility) contact guard;verbal cues  -LR     Sit-Supine [place] (Bed Mobility) moderate assist (50% patient effort);verbal cues  -LR     Bed Mobility, Safety Issues decreased use of arms for pushing/pulling;decreased use of legs for bridging/pushing  -LR     Assistive Device (Bed Mobility) bed rails;head of bed elevated  -LR     Row Name 03/04/23 0855          Transfers    Transfers sit-stand transfer;stand-sit transfer  -LR     Row Name 03/04/23 0855          Sit-Stand Transfer    Sit-Stand [place] (Transfers) contact guard;verbal cues  -LR     Row Name 03/04/23 0855          Stand-Sit Transfer    Stand-Sit [place] (Transfers) contact guard;verbal cues  -LR     Row Name 03/04/23 0855          Functional Mobility    Functional Mobility- Ind. Level moderate assist (50% patient effort);verbal cues required  -LR     Functional Mobility- Safety Issues step length decreased;weight-shifting ability decreased;loses balance backward  -LR     Functional Mobility- Comment forward/backward steps at EOB  -LR     Row Name 03/04/23 0855          Activities of Daily Living    BADL Assessment/Intervention lower body dressing  -     Row Name  03/04/23 0855          Lower Body Dressing Assessment/Training    [place] Level (Lower Body Dressing) don;socks;maximum assist (25% patient effort);verbal cues  -LR     Position (Lower Body Dressing) edge of bed sitting  -LR           User Key  (r) = Recorded By, (t) = Taken By, (c) = Cosigned By    Initials Name Provider Type    LR Kathleen Cavazos OT Occupational Therapist               Obj/Interventions     Row Name 03/04/23 0855          Sensory Assessment (Somatosensory)    Sensory Assessment (Somatosensory) UE sensation intact  -LR     Row Name 03/04/23 0855          Vision Assessment/Intervention    Visual Impairment/Limitations WFL  -LR     Vision Assessment Comment Pt with eyes closed frequently throughout eval.  -LR     Row Name 03/04/23 0855          Range of Motion Comprehensive    General Range of Motion upper extremity range of motion deficits identified  -LR     Comment, General Range of Motion L UE proximal AROM approx 50%, PROM WFL, distal AROM WFL, except hand 75%; R UE AROM WFL  -LR     Row Name 03/04/23 0855          Strength Comprehensive (MMT)    General Manual Muscle Testing (MMT) Assessment upper extremity strength deficits identified  -LR     Comment, General Manual Muscle Testing (MMT) Assessment R UE 4+/5, L UE distal 3/5, L UE proximal 2/5  -LR     Row Name 03/04/23 0855          Motor Skills    Motor Skills coordination  -LR     Coordination fine motor deficit;gross motor deficit;left;upper extremity;severe impairment  -LR     Row Name 03/04/23 0855          Balance    Balance Assessment sitting static balance;sitting dynamic balance;standing static balance;standing dynamic balance  -LR     Static Sitting Balance standby assist  -LR     Dynamic Sitting Balance standby assist;contact guard  -LR     Position, Sitting Balance unsupported;sitting edge of bed;supported  -LR     Static Standing Balance minimal assist  -LR     Dynamic Standing Balance moderate assist  -LR      Position/Device Used, Standing Balance unsupported  -LR           User Key  (r) = Recorded By, (t) = Taken By, (c) = Cosigned By    Initials Name Provider Type    Kathleen Lugo OT Occupational Therapist               Goals/Plan     Row Name 03/04/23 0855          Transfer Goal 1 (OT)    Activity/Assistive Device (Transfer Goal 1, OT) toilet;bed-to-chair/chair-to-bed  -LR     [place] Level/Cues Needed (Transfer Goal 1, OT) standby assist  -LR     Time Frame (Transfer Goal 1, OT) long term goal (LTG);by discharge  -LR     Progress/Outcome (Transfer Goal 1, OT) new goal  -LR     Row Name 03/04/23 0855          Dressing Goal 1 (OT)    Activity/Device (Dressing Goal 1, OT) lower body dressing  -LR     [place]/Cues Needed (Dressing Goal 1, OT) minimum assist (75% or more patient effort)  -LR     Time Frame (Dressing Goal 1, OT) long term goal (LTG);by discharge  -LR     Progress/Outcome (Dressing Goal 1, OT) new goal  -LR     Row Name 03/04/23 0855          Toileting Goal 1 (OT)    Activity/Device (Toileting Goal 1, OT) toileting skills, all  -LR     [place] Level/Cues Needed (Toileting Goal 1, OT) contact guard required  -LR     Time Frame (Toileting Goal 1, OT) long term goal (LTG);by discharge  -LR     Progress/Outcome (Toileting Goal 1, OT) new goal  -LR     Row Name 03/04/23 0855          Therapy Assessment/Plan (OT)    Planned Therapy Interventions (OT) activity tolerance training;adaptive equipment training;BADL retraining;functional balance retraining;neuromuscular control/coordination retraining;occupation/activity based interventions;passive ROM/stretching;patient/caregiver education/training;ROM/therapeutic exercise;strengthening exercise;transfer/mobility retraining  -LR           User Key  (r) = Recorded By, (t) = Taken By, (c) = Cosigned By    Initials Name Provider Type    Kathleen Lugo OT Occupational Therapist               Clinical Impression     Row Name 03/04/23 0855           Pain Assessment    Pretreatment Pain Rating 0/10 - no pain  -LR     Posttreatment Pain Rating 0/10 - no pain  -LR     Row Name 03/04/23 0855          Plan of Care Review    Plan of Care Reviewed With patient  -LR     Progress no change  -LR     Outcome Evaluation OT eval completed. Pt A&Ox4 with fatigue reported. Pt reports PLOF of I. Pt completed supine>sit with CGA and tolerated EOB sitting with CGA/SBA required. Pt required Max A for donning socks while seated EOB. Pt completed sit<>stand t/f with CGA and impulsivity noted. Pt completed forward/backward steps at EOB with Mod A required secondary to limited weight shift and step length with L LE. Pt with c/o increased fatigue with activities and requested transition to supine with Mod A required. Skilled OT warranted to provide education and address deficits in balance, activity tolerance, trunk control, coordination, grasp, ROM, and strength in order to increase pt safety and I during ADLs, fxl mobility, and fxl t/f's. Pt would benefit from IP rehab at D/C.  -LR     Row Name 03/04/23 0897          Therapy Assessment/Plan (OT)    Rehab Potential (OT) good, to achieve stated therapy goals  -LR     Criteria for Skilled Therapeutic Interventions Met (OT) yes;skilled treatment is necessary  -LR     Therapy Frequency (OT) 5 times/wk  -LR     Predicted Duration of Therapy Intervention (OT) until hospital D/C  -LR     Row Name 03/04/23 0855          Therapy Plan Review/Discharge Plan (OT)    Anticipated Discharge Disposition (OT) inpatient rehabilitation facility  -LR     Row Name 03/04/23 0855          Vital Signs    Intra Systolic BP Rehab 137  -LR     Intra Treatment Diastolic   -LR     Pre SpO2 (%) 97  -LR     O2 Delivery Pre Treatment room air  -LR     Intra SpO2 (%) 97  -LR     O2 Delivery Intra Treatment room air  -LR     Post SpO2 (%) 97  -LR     O2 Delivery Post Treatment room air  -LR     Pre Patient Position Supine  -LR     Intra Patient  Position Standing  -LR     Post Patient Position Supine  -LR     Row Name 03/04/23 0855          Positioning and Restraints    Pre-Treatment Position in bed  -LR     Post Treatment Position bed  -LR     In Bed notified nsg;fowlers;call light within reach;encouraged to call for assist;with family/caregiver;side rails up x2  -LR           User Key  (r) = Recorded By, (t) = Taken By, (c) = Cosigned By    Initials Name Provider Type    LR Kathleen Cavazos OT Occupational Therapist               Outcome Measures     Row Name 03/04/23 0855          How much help from another is currently needed...    Putting on and taking off regular lower body clothing? 2  -LR     Bathing (including washing, rinsing, and drying) 2  -LR     Toileting (which includes using toilet bed pan or urinal) 3  -LR     Putting on and taking off regular upper body clothing 3  -LR     Taking care of personal grooming (such as brushing teeth) 3  -LR     Eating meals 3  -LR     AM-PAC 6 Clicks Score (OT) 16  -LR     Row Name 03/04/23 0855          Modified [place] Scale    Pre-Stroke Modified [place] Scale 0 - No Symptoms at all.  -LR     Modified [place] Scale 4 - Moderately severe disability.  Unable to walk without assistance, and unable to attend to own bodily needs without assistance.  -LR     Row Name 03/04/23 0855          Functional Assessment    Outcome Measure Options AM-PAC 6 Clicks Daily Activity (OT);Modified [place]  -LR           User Key  (r) = Recorded By, (t) = Taken By, (c) = Cosigned By    Initials Name Provider Type    Kathleen Lugo OT Occupational Therapist                Occupational Therapy Education     Title: PT OT SLP Therapies (Done)     Topic: Occupational Therapy (Done)     Point: ADL training (Done)     Description:   Instruct learner(s) on proper safety adaptation and remediation techniques during self care or transfers.   Instruct in proper use of assistive devices.              Learning Progress Summary            Patient Acceptance, E,D, VU,NR by LR at 3/4/2023 0951                   Point: Home exercise program (Done)     Description:   Instruct learner(s) on appropriate technique for monitoring, assisting and/or progressing therapeutic exercises/activities.              Learning Progress Summary           Patient Acceptance, E,D, VU,NR by LR at 3/4/2023 0951                   Point: Precautions (Done)     Description:   Instruct learner(s) on prescribed precautions during self-care and functional transfers.              Learning Progress Summary           Patient Acceptance, E,D, VU,NR by LR at 3/4/2023 0951                   Point: Body mechanics (Done)     Description:   Instruct learner(s) on proper positioning and spine alignment during self-care, functional mobility activities and/or exercises.              Learning Progress Summary           Patient Acceptance, E,D, VU,NR by LR at 3/4/2023 0951                               User Key     Initials Effective Dates Name Provider Type Discipline     11/22/22 -  Kathleen Cavazos OT Occupational Therapist OT              OT Recommendation and Plan  Planned Therapy Interventions (OT): activity tolerance training, adaptive equipment training, BADL retraining, functional balance retraining, neuromuscular control/coordination retraining, occupation/activity based interventions, passive ROM/stretching, patient/caregiver education/training, ROM/therapeutic exercise, strengthening exercise, transfer/mobility retraining  Therapy Frequency (OT): 5 times/wk  Plan of Care Review  Plan of Care Reviewed With: patient  Progress: no change  Outcome Evaluation: OT eval completed. Pt A&Ox4 with fatigue reported. Pt reports PLOF of I. Pt completed supine>sit with CGA and tolerated EOB sitting with CGA/SBA required. Pt required Max A for donning socks while seated EOB. Pt completed sit<>stand t/f with CGA and impulsivity noted. Pt completed forward/backward steps at EOB with Mod A  required secondary to limited weight shift and step length with L LE. Pt with c/o increased fatigue with activities and requested transition to supine with Mod A required. Skilled OT warranted to provide education and address deficits in balance, activity tolerance, trunk control, coordination, grasp, ROM, and strength in order to increase pt safety and I during ADLs, fxl mobility, and fxl t/f's. Pt would benefit from IP rehab at D/C.     Time Calculation:    Time Calculation- OT     Row Name 03/04/23 0855             Time Calculation- OT    OT Start Time 0855  +8 min chart review  -LR      OT Stop Time 0932  -LR      OT Time Calculation (min) 37 min  -LR      OT Received On 03/04/23  -LR      OT Goal Re-Cert Due Date 03/14/23  -LR         Untimed Charges    OT Eval/Re-eval Minutes 45  -LR         Total Minutes    Untimed Charges Total Minutes 45  -LR       Total Minutes 45  -LR            User Key  (r) = Recorded By, (t) = Taken By, (c) = Cosigned By    Initials Name Provider Type    LR Kathleen Cavazos OT Occupational Therapist              Therapy Charges for Today     Code Description Service Date Service Provider Modifiers Qty    [id number] HC OT EVAL MOD COMPLEXITY 3 3/4/2023 Kathleen Cavazos OT GO 1               Kathleen Cavazos OT  3/4/2023

## 2023-03-04 NOTE — ED PROVIDER NOTES
"EMERGENCY DEPARTMENT ATTENDING NOTE    Patient Name: James Taylor    Chief Complaint   Patient presents with   • Stroke       PATIENT PRESENTATION:  James Taylor is a very pleasant 42 y.o. male with history of hypertension who presented to this emergency department visiting his mother and then when other family arrived they felt he was off his normal with facial droop and left arm weakness.    Patient denies any prior history of stroke he does has history hypertension for which he takes blood pressure medication.  He actually feels fine and had not even noticed his symptoms until family told him.  He endorses a mild headache denies any vision change or hearing changes.  Denies any numbness in his extremities.  Denies any fevers chills chest pain shortness of breath.  No history of headaches or migraine headaches.      PHYSICAL EXAM:   VS: /76   Pulse 84   Temp 97.7 °F (36.5 °C) (Oral)   Resp 19   Ht 177.8 cm (70\")   Wt 68.2 kg (150 lb 4.8 oz)   SpO2 99%   BMI 21.57 kg/m²   GENERAL: Well-appearing middle-age man sitting up stretcher no acute distress; well-nourished, well-developed, awake, alert, no acute distress, nontoxic appearing, comfortable  EYES: PERRL, sclerae anicteric, extraocular movements grossly intact, symmetric lids  EARS, NOSE, MOUTH, THROAT: atraumatic external nose and ears, moist mucous membranes  NECK: symmetric, trachea midline  RESPIRATORY: unlabored respiratory effort, clear to auscultation bilaterally, good air movement  CARDIOVASCULAR: no murmurs, peripheral pulses 2+ and equal in all extremities  GI: soft, nontender, nondistended  MUSCULOSKELETAL/EXTREMITIES: extremities without obvious deformity  SKIN: warm and dry with no obvious rashes  NEUROLOGIC: moving all 4 extremities symmetrically, CN II-XII grossly intact except for some left lower facial asymmetry with smile and facial droop as well as left upper extremity weakness that does not completely hit the bed  PSYCHIATRIC: " alert, pleasant and cooperative. Appropriate mood and affect.      MEDICAL DECISION MAKING:    James Taylor is a 42 y.o. male with history of hypertension who initially was visiting his mother in this emergency department and then when family arrived they felt like he had acute new symptoms of facial droop and left arm weakness so immediately presented to triage and a stroke alert was activated.    NIHSS 2 for L upper extremity weakness and minor paralysis of the left is labial fold and smile asymmetry.    Differential Diagnosis Considered: Acute intracranial bleeding including subdural subarachnoid and epidural hemorrhage, acute ischemic stroke, Bell's palsy    Labs Ordered:  Labs Reviewed   COMPREHENSIVE METABOLIC PANEL - Abnormal; Notable for the following components:       Result Value    Glucose 110 (*)     ALT (SGPT) 180 (*)     AST (SGOT) 615 (*)     Alkaline Phosphatase 190 (*)     All other components within normal limits    Narrative:     GFR Normal >60                  Chronic Kidney Disease <60                  Kidney Failure <15                     PROTIME-INR - Abnormal; Notable for the following components:    Protime 15.6 (*)     INR 1.22 (*)     All other components within normal limits   SINGLE HSTROPONIN T - Abnormal; Notable for the following components:    HS Troponin T 17 (*)     All other components within normal limits    Narrative:     High Sensitive Troponin T Reference Range:                  <10.0 ng/L- Negative Female for AMI                  <15.0 ng/L- Negative Male for AMI                  >=10 - Abnormal Female indicating possible myocardial injury.                  >=15 - Abnormal Male indicating possible myocardial injury.                   Clinicians would have to utilize clinical acumen, EKG, Troponin, and serial changes to determine if it is an Acute Myocardial Infarction or myocardial injury due to an underlying chronic condition.                                        CBC WITH  AUTO DIFFERENTIAL - Abnormal; Notable for the following components:    Platelets 104 (*)     All other components within normal limits   POCT GLUCOSE FINGERSTICK - Abnormal; Notable for the following components:    Glucose 133 (*)     All other components within normal limits   APTT - Normal   RAINBOW DRAW    Narrative:     The following orders were created for panel order [place] Draw.                  Procedure                               Abnormality         Status                                     ---------                               -----------         ------                                     Green Top (Gel)[[id number]]                                  Final result                               Lavender Top[[id number]]                                     Final result                               Red Top[[id number]]                                          Final result                               Light Blue Top[[id number]]                                   Final result                                                 Please view results for these tests on the individual orders.   HEMOGLOBIN A1C   LIPID PANEL   POCT GLUCOSE FINGERSTICK   POCT GLUCOSE FINGERSTICK   POCT GLUCOSE FINGERSTICK   POCT GLUCOSE FINGERSTICK   POCT GLUCOSE FINGERSTICK   TYPE AND SCREEN   CBC AND DIFFERENTIAL    Narrative:     The following orders were created for panel order CBC & Differential.                  Procedure                               Abnormality         Status                                     ---------                               -----------         ------                                     CBC Auto Differential[[id number]]        Abnormal            Final result                                                 Please view results for these tests on the individual orders.   GREEN TOP   LAVENDER TOP   RED TOP   LIGHT BLUE TOP        Imaging Ordered:   CT Head Without Contrast Stroke Protocol    (Results Pending)    CT Angiogram Head w AI Analysis of LVO    (Results Pending)   CT Angiogram Neck    (Results Pending)   XR Chest 1 View    (Results Pending)       Internal chart review:   Past Medical History:   Diagnosis Date   • Asthma    • Hypertension        History reviewed. No pertinent surgical history.    No Known Allergies      Current Facility-Administered Medications:   •  acetaminophen (TYLENOL) tablet 650 mg, 650 mg, Oral, Q4H PRN **OR** acetaminophen (TYLENOL) suppository 650 mg, 650 mg, Rectal, Q4H PRN, Brandon Gannon MD  •  enalaprilat (VASOTEC) injection 1.25 mg, 1.25 mg, Intravenous, Q6H PRN, Brandon Gannon MD  •  labetalol (NORMODYNE,TRANDATE) injection 10 mg, 10 mg, Intravenous, Q10 Min PRN, Brandon Gannon MD  •  niCARdipine (CARDENE) 25 mg in 250 mL NS infusion, 5-15 mg/hr, Intravenous, Titrated, Romero Jang MD, Last Rate: 100 mL/hr at 03/04/23 0145, 10 mg/hr at 03/04/23 0145  •  niCARdipine (CARDENE) 25 mg in 250 mL NS infusion, 5-15 mg/hr, Intravenous, Titrated, Brandon Gannon MD, Last Rate: 125 mL/hr at 03/04/23 0217, 12.5 mg/hr at 03/04/23 0217  •  sodium chloride 0.9 % flush 10 mL, 10 mL, Intravenous, PRN, Romero Jang MD  •  sodium chloride 0.9 % flush 10 mL, 10 mL, Intravenous, Q12H, Brandon Gannon MD, 10 mL at 03/04/23 0217  •  sodium chloride 0.9 % flush 10 mL, 10 mL, Intravenous, PRN, Brandon Gannon MD  •  sodium chloride 0.9 % infusion 40 mL, 40 mL, Intravenous, PRN, Brandon Gannon MD    My EKG interpretation: Normal sinus rhythm with rate 77 significant left ventricular hypertrophy seen in the lateral leads with notable T wave inversions in lead V3 to V6.    My lab interpretation: Initial fingerstick glucose 133.  Slight elevated high sensor troponin 17.  Slight elevated under 1.22.  CMP with notable AST and ALT liver enzyme elevation.  Otherwise normal white blood cell count hemoglobin.    My imaging interpretation: CT head with large intraparenchymal hemorrhage in  the right basal ganglia.  Surrounding edema with slight midline shift 2 mm.    Discussed with: Initially the on-call neurologist due to stroke activation.  Immediately called neurosurgery given intracranial bleeding and they admitted the patient to the ICU to their service.  Dr. Gannon recommended Cardene with goal blood pressure 140 which was ordered.    ED Course and Re-evaluation: 43yo M who was initially visiting his mother in this hospital and then when his ride was picking him up noticed concerning symptoms for stroke so was immediately brought back to the emergency department.  I was called to triage by our triage nurse and we will evaluate the patient.  Notable left facial droop and left upper extremity drift but otherwise normal neurologic exam.  Given patient's age and history of hypertension concern for possible stroke.  Stroke alert was immediately activated given NIH stroke scale of 2.  Discussed with our neurologist, Dr. Mckeon, who agreed with imaging.  As soon as I reviewed the imaging and the CT scanner immediately called neurosurgery, Dr. Gannon.  Initiated Cardene with a blood pressure goal of 140.  Initiated every hour neurochecks.  Suspect poorly controlled hypertension as led to his acute basal ganglia intracranial hemorrhage.  No other history of trauma or signs of trauma.  No evidence of any other skull fracture.  No evidence of any cerebral aneurysm.  Patient was admitted to the ICU for further management under neurosurgery.      ED Diagnosis:  Facial droop; Left arm weakness; Acute nonintractable headache, unspecified headache type; History of hypertension; Intracranial bleeding (HCC); Cerebral edema (HCC); Midline shift of brain    Disposition: admit to ICU with neurosurgery        Signed:  Romero Jang MD  Emergency Medicine Physician    Please note that portions of this note were completed with a voice recognition program.      Romero Jang MD  03/04/23 0254

## 2023-03-04 NOTE — H&P
Date of Admission: 3/4/2023  Primary Care Physician: Radha Desir MD        Subjective:    Chief complaint hemorrhagic stroke    HPI: 42-year-old gentleman who was in his otherwise normal state of health yesterday when family members noticed that he was having trouble with weakness and speaking.  He was sent to the emergency room where a CT scan demonstrated a right basal ganglia hemorrhagic stroke.  He does have a history of malignant hypertension is treated by Dr. Desir.  He is not taking any anticoagulants.  This morning's complaint of headache.  He denies any nausea or vomiting.    Review of Systems   Neurological: Positive for headaches.   All other systems reviewed and are negative.       Pertinent positives/negatives documented in HPI.  All other systems reviewed and negative.    Past Medical History:   Past Medical History:   Diagnosis Date   • Asthma    • Hypertension        Past Surgical History: History reviewed. No pertinent surgical history.    Family History: family history includes Anxiety disorder in his mother; Cancer in his father; Diabetes in his mother; Hyperlipidemia in his father and mother.    Social History:  reports that he has been smoking cigarettes. He started smoking about 10 years ago. He has a 15.00 pack-year smoking history. He has never used smokeless tobacco. He reports current alcohol use of about 4.0 standard drinks per week. He reports that he does not use drugs.    Code Status: Full    Medications:  Medications Prior to Admission   Medication Sig Dispense Refill Last Dose   • amLODIPine (NORVASC) 10 MG tablet Take 1 tablet by mouth Daily. For high blood pressure 30 tablet 2    • amLODIPine (NORVASC) 5 MG tablet Take 1 tablet by mouth Daily. For high blood pressure 30 tablet 5    • busPIRone (BUSPAR) 5 MG tablet Take 1 tablet by mouth 3 (Three) Times a Day As Needed (for relief of irritability, stress or anxiety). 30 tablet 2    • lisinopril (PRINIVIL,ZESTRIL) 20 MG tablet  Take 1 tablet by mouth Daily. For blood pressure control 30 tablet 5    • sertraline (Zoloft) 50 MG tablet Take 1 tablet by mouth Daily. To help with mood. Take with food 90 tablet 0    • vitamin D (ERGOCALCIFEROL) 1.25 MG (68684 UT) capsule capsule Take 1 capsule by mouth 1 (One) Time Per Week. To treat low vitamin D. Take with food. 5 capsule 11        Allergies:  No Known Allergies    Objective:  Physical Exam  Eyes:      Extraocular Movements: EOM normal.      Pupils: Pupils are equal, round, and reactive to light.   Cardiovascular:      Rate and Rhythm: Normal rate and regular rhythm.   Pulmonary:      Effort: No respiratory distress.      Breath sounds: Normal breath sounds.   Abdominal:      General: There is no distension.      Palpations: Abdomen is soft.   Neurological:      Mental Status: He is oriented to person, place, and time.      Coordination: Finger-Nose-Finger Test abnormal (Left upper and lower extremity ataxia).   Psychiatric:         Speech: Speech normal.         Neurologic Exam     Mental Status   Oriented to person, place, and time.   Attention: normal.   Speech: speech is normal   Level of consciousness: alert  Knowledge: good.     Cranial Nerves     CN II   Visual fields full to confrontation.     CN III, IV, VI   Pupils are equal, round, and reactive to light.  Extraocular motions are normal.   Ophthalmoparesis: Right gaze preference but extraocular movements are intact.    CN V   Facial sensation intact.     CN VII   Right facial weakness: none  Left facial weakness: central (Left facial droop)    CN VIII   CN VIII normal.     CN IX, X   CN IX normal.   CN X normal.     CN XI   CN XI normal.     CN XII   CN XII normal.     Motor Exam   Muscle bulk: normal  Overall muscle tone: normal  Right arm pronator drift: absent  Left arm pronator drift: absent    Strength   Strength 5/5 except as noted. Right upper and lower extremity 5 out of 5 in all flexion-extension movements    Left upper and  lower extremity 3 out of 5 in most flexion-extension movements but ataxic.     Sensory Exam   Left arm light touch: decreased from elbow  Left leg light touch: decreased from knee  Left arm pinprick: decreased from elbow  Left leg pinprick: decreased from knee    Gait, Coordination, and Reflexes     Coordination   Finger to nose coordination: abnormal (Left upper and lower extremity ataxia)    Tremor   Resting tremor: absent  Intention tremor: absent  Action tremor: absent    Reflexes   Reflexes 2+ except as noted.       Vital Signs  Temp:  [97.7 °F (36.5 °C)-97.9 °F (36.6 °C)] 97.7 °F (36.5 °C)  Heart Rate:  [] 136  Resp:  [16-19] 19  BP: (108-211)/() 141/90        Results Review:  I reviewed the patient's new clinical results.  I reviewed the patient's new imaging results and agree with the interpretation.  I reviewed the patient's other test results and agree with the interpretation         Lab Results (last 24 hours)     Procedure Component Value Units Date/Time    Lipid Panel [[id number]]  (Abnormal) Collected: 03/04/23 0253    Specimen: Blood Updated: 03/04/23 0325     Total Cholesterol 121 mg/dL      Triglycerides 589 mg/dL      HDL Cholesterol 24 mg/dL      LDL Cholesterol  20 mg/dL      VLDL Cholesterol 77 mg/dL      LDL/HDL Ratio Unable to Calculate    Narrative:      Cholesterol Reference Ranges  (U.S. Department of Health and Human Services ATP III Classifications)    Desirable          <200 mg/dL  Borderline High    200-239 mg/dL  High Risk          >240 mg/dL      Triglyceride Reference Ranges  (U.S. Department of Health and Human Services ATP III Classifications)    Normal           <150 mg/dL  Borderline High  150-199 mg/dL  High             200-499 mg/dL  Very High        >500 mg/dL    HDL Reference Ranges  (U.S. Department of Health and Human Services ATP III Classifications)    Low     <40 mg/dl (major risk factor for CHD)  High    >60 mg/dl ('negative' risk factor for CHD)        LDL  Reference Ranges  (U.S. Department of Health and Human Services ATP III Classifications)    Optimal          <100 mg/dL  Near Optimal     100-129 mg/dL  Borderline High  130-159 mg/dL  High             160-189 mg/dL  Very High        >189 mg/dL    Hemoglobin A1c [[id number]]  (Abnormal) Collected: 03/04/23 0253    Specimen: Blood Updated: 03/04/23 0315     Hemoglobin A1C <4.20 %     Narrative:      Hemoglobin A1C Ranges:    Increased Risk for Diabetes  5.7% to 6.4%  Diabetes                     >= 6.5%  Diabetic Goal                < 7.0%    [place] Draw [[id number]] Collected: 03/04/23 0115    Specimen: Blood Updated: 03/04/23 0215    Narrative:      The following orders were created for panel order [place] Draw.  Procedure                               Abnormality         Status                     ---------                               -----------         ------                     Green Top (Gel)[[id number]]                                  Final result               Lavender Top[[id number]]                                     Final result               Red Top[[id number]]                                          Final result               Light Blue Top[[id number]]                                   Final result                 Please view results for these tests on the individual orders.    Green Top (Gel) [[id number]] Collected: 03/04/23 0115    Specimen: Blood Updated: 03/04/23 0215     Extra Tube Hold for add-ons.     Comment: Auto resulted.       Light Blue Top [[id number]] Collected: 03/04/23 0115    Specimen: Blood Updated: 03/04/23 0215     Extra Tube Hold for add-ons.     Comment: Auto resulted       Lavender Top [[id number]] Collected: 03/04/23 0115    Specimen: Blood Updated: 03/04/23 0215     Extra Tube hold for add-on     Comment: Auto resulted       Red Top [[id number]] Collected: 03/04/23 0115    Specimen: Blood Updated: 03/04/23 0215     Extra Tube Hold for add-ons.     Comment: Auto resulted.        Comprehensive Metabolic Panel [[id number]]  (Abnormal) Collected: 03/04/23 0115    Specimen: Blood Updated: 03/04/23 0156     Glucose 110 mg/dL      BUN 14 mg/dL      Creatinine 1.13 mg/dL      Sodium 140 mmol/L      Potassium 3.8 mmol/L      Chloride 102 mmol/L      CO2 26.0 mmol/L      Calcium 9.2 mg/dL      Total Protein 7.6 g/dL      Albumin 4.5 g/dL      ALT (SGPT) 180 U/L      AST (SGOT) 615 U/L      Alkaline Phosphatase 190 U/L      Total Bilirubin 1.2 mg/dL      Globulin 3.1 gm/dL      A/G Ratio 1.5 g/dL      BUN/Creatinine Ratio 12.4     Anion Gap 12.0 mmol/L      eGFR 83.2 mL/min/1.73     Narrative:      GFR Normal >60  Chronic Kidney Disease <60  Kidney Failure <15      Single High Sensitivity Troponin T [[id number]]  (Abnormal) Collected: 03/04/23 0115    Specimen: Blood Updated: 03/04/23 0154     HS Troponin T 17 ng/L     Narrative:      High Sensitive Troponin T Reference Range:  <10.0 ng/L- Negative Female for AMI  <15.0 ng/L- Negative Male for AMI  >=10 - Abnormal Female indicating possible myocardial injury.  >=15 - Abnormal Male indicating possible myocardial injury.   Clinicians would have to utilize clinical acumen, EKG, Troponin, and serial changes to determine if it is an Acute Myocardial Infarction or myocardial injury due to an underlying chronic condition.         Protime-INR [[id number]]  (Abnormal) Collected: 03/04/23 0115    Specimen: Blood Updated: 03/04/23 0152     Protime 15.6 Seconds      INR 1.22    aPTT [[id number]]  (Normal) Collected: 03/04/23 0115    Specimen: Blood Updated: 03/04/23 0152     PTT 32.5 seconds     CBC & Differential [[id number]]  (Abnormal) Collected: 03/04/23 0115    Specimen: Blood Updated: 03/04/23 0143    Narrative:      The following orders were created for panel order CBC & Differential.  Procedure                               Abnormality         Status                     ---------                               -----------         ------                     CBC  Auto Differential[[id number]]        Abnormal            Final result                 Please view results for these tests on the individual orders.    CBC Auto Differential [[id number]]  (Abnormal) Collected: 03/04/23 0115    Specimen: Blood Updated: 03/04/23 0143     WBC 6.58 10*3/mm3      RBC 4.42 10*6/mm3      Hemoglobin 13.6 g/dL      Hematocrit 38.3 %      MCV 86.7 fL      MCH 30.8 pg      MCHC 35.5 g/dL      RDW 13.2 %      RDW-SD 41.3 fl      MPV 11.0 fL      Platelets 104 10*3/mm3      Neutrophil % 50.9 %      Lymphocyte % 39.2 %      Monocyte % 6.2 %      Eosinophil % 2.6 %      Basophil % 1.1 %      Neutrophils, Absolute 3.35 10*3/mm3      Lymphocytes, Absolute 2.58 10*3/mm3      Monocytes, Absolute 0.41 10*3/mm3      Eosinophils, Absolute 0.17 10*3/mm3      Basophils, Absolute 0.07 10*3/mm3     POC Glucose Once [[id number]]  (Abnormal) Collected: 03/04/23 0106    Specimen: Blood Updated: 03/04/23 0117     Glucose 133 mg/dL      Comment: : [id number] Claudepierre ArianeMeter ID: [id number]             Imaging Results (Last 24 Hours)     Procedure Component Value Units Date/Time    XR Chest 1 View [[id number]] Resulted: 03/04/23 0152     Updated: 03/04/23 0152    CT Head Without Contrast Stroke Protocol [[id number]] Resulted: 03/04/23 0112     Updated: 03/04/23 0141    CT Angiogram Head w AI Analysis of LVO [[id number]] Resulted: 03/04/23 0112     Updated: 03/04/23 0141    CT Angiogram Neck [[id number]] Resulted: 03/04/23 0112     Updated: 03/04/23 0141                 Assessment/Plan:   1.  Hemorrhagic stroke.  Patient was admitted to the ICU.  He is currently on a Cardene drip for systolic blood pressures greater than 140.  His blood pressures been under good control since she is admitted.  He does have history of malignant hypertension.  We are getting to the hospital service involved to aid with blood pressure control.  We will repeat CT scan this morning.  Will be seen by ST PT and OT  2.  Elevated LFTs.   Patient has a history of elevated LFTs per his primary care doctor notes.  Again hospitalist service to see  3.  Markedly elevated triglycerides.    Active Hospital Problems    Diagnosis    • **Intracranial bleeding (HCC)        I discussed the patient's findings and my recommendations with patient, family and nursing staff    Brandon Gannon MD  03/04/23  07:32 CST    Time: More than 50% of time spent in counseling and coordination of care:  Total face-to-face/floor time 60 min.  Time spent in counseling 30 min. Counseling included the following topics: Diagnosis, condition, treatment, and plan

## 2023-03-04 NOTE — ED NOTES
PT'S GIRLFRIEND REPORTS [facility] PAIN, UNKNOWN LNWT

## 2023-03-04 NOTE — THERAPY EVALUATION
Patient Name: James Taylor  : 1980    MRN: [id number]                              Today's Date: 3/4/2023       Admit Date: 3/4/2023    Visit Dx:     ICD-10-CM ICD-9-CM   1. Intracranial bleeding (HCC)  I62.9 432.9   2. Facial droop  R29.810 781.94   3. Left arm weakness  R29.898 729.89   4. Acute nonintractable headache, unspecified headache type  R51.9 784.0   5. History of hypertension  Z86.79 V12.59   6. Cerebral edema (HCC)  G93.6 348.5   7. Midline shift of brain  R90.89 793.0   8. Dysphagia, unspecified type  R13.10 787.20   9. Impaired mobility  Z74.09 799.89     Patient Active Problem List   Diagnosis   • Intracranial bleeding (HCC)     Past Medical History:   Diagnosis Date   • Asthma    • Hypertension      History reviewed. No pertinent surgical history.   General Information     Row Name 23 1042          Physical Therapy Time and Intention    Document Type evaluation  difficulty speaking, L sided weakness, Dx:  R basal ganglia hemorrhagic stroke  -     Mode of Treatment physical therapy  -     Row Name 23 1042          General Information    Patient Profile Reviewed yes  -     Prior Level of Function independent:;community mobility;ADL's;driving  -     Existing Precautions/Restrictions fall  INR elevated  -     Barriers to Rehab physical barrier  -     Row Name 23 1042          Living Environment    People in Home parent(s)  -     Row Name 23 1042          Home Main Entrance    Number of Stairs, Main Entrance one  -     Stair Railings, Main Entrance none  -     Row Name 23 1042          Stairs Within Home, Primary    Number of Stairs, Within Home, Primary none  -     Row Name 23 1042          Cognition    Orientation Status (Cognition) oriented x 4  -     Row Name 23 1042          Safety Issues, Functional Mobility    Safety Issues Affecting Function (Mobility) ability to follow commands;insight into deficits/self-awareness;safety  precaution awareness;problem-solving  -     Impairments Affecting Function (Mobility) balance;coordination;endurance/activity tolerance;grasp;motor control;strength  -           User Key  (r) = Recorded By, (t) = Taken By, (c) = Cosigned By    Initials Name Provider Type     Swapnil Reid, PT Physical Therapist               Mobility     Row Name 03/04/23 1042          Bed Mobility    Bed Mobility supine-sit;sit-supine  -     Supine-Sit [place] (Bed Mobility) contact guard  -     Sit-Supine [place] (Bed Mobility) standby assist  -     Assistive Device (Bed Mobility) head of bed elevated  -     Row Name 03/04/23 1042          Sit-Stand Transfer    Sit-Stand [place] (Transfers) minimum assist (75% patient effort)  -[facility] Name 03/04/23 1042          Gait/Stairs (Locomotion)    [place] Level (Gait) moderate assist (50% patient effort)  -     Distance in Feet (Gait) 30  -     Deviations/Abnormal Patterns (Gait) left sided deviations;ataxic  -     Left Sided Gait Deviations leans left  drifts L, step to gait pattern on L  -           User Key  (r) = Recorded By, (t) = Taken By, (c) = Cosigned By    Initials Name Provider Type     Swapnil Reid, PT Physical Therapist               Obj/Interventions     Row Name 03/04/23 1042          Range of Motion Comprehensive    General Range of Motion bilateral upper extremity ROM WFL;bilateral lower extremity ROM WFL  -     Row Name 03/04/23 1042          Strength Comprehensive (MMT)    General Manual Muscle Testing (MMT) Assessment upper extremity strength deficits identified;lower extremity strength deficits identified  -     Comment, General Manual Muscle Testing (MMT) Assessment RLE 5/5; L knee ext 5/5; L hip flexion 3/5; L DF/PF 3/5  -     Row Name 03/04/23 1042          Sensory Assessment (Somatosensory)    Sensory Assessment (Somatosensory) sensation intact  -           User Key  (r) = Recorded By, (t) = Taken By, (c) =  Cosigned By    Initials Name Provider Type     Swapnil Reid, PT Physical Therapist               Goals/Plan     Row Name 03/04/23 1042          Bed Mobility Goal 1 (PT)    Activity/Assistive Device (Bed Mobility Goal 1, PT) bed mobility activities, all  -     [place] Level/Cues Needed (Bed Mobility Goal 1, PT) standby assist  -     Time Frame (Bed Mobility Goal 1, PT) 10 days  -     Progress/Outcomes (Bed Mobility Goal 1, PT) new goal  -[facility] Name 03/04/23 1042          Transfer Goal 1 (PT)    Activity/Assistive Device (Transfer Goal 1, PT) sit-to-stand/stand-to-sit  -     [place] Level/Cues Needed (Transfer Goal 1, PT) standby assist  -     Time Frame (Transfer Goal 1, PT) 10 days  -LH     Progress/Outcome (Transfer Goal 1, PT) new goal  -[facility] Name 03/04/23 1042          Gait Training Goal 1 (PT)    Activity/Assistive Device (Gait Training Goal 1, PT) gait (walking locomotion);decrease fall risk;diminish gait deviation;improve balance and speed;decrease asymmetrical patterns  -     [place] Level (Gait Training Goal 1, PT) contact guard required  -     Distance (Gait Training Goal 1, PT) 75ft  -LH     Time Frame (Gait Training Goal 1, PT) 10 days  -LH     Progress/Outcome (Gait Training Goal 1, PT) new [facility]  -[facility] Name 03/04/23 1042          Therapy Assessment/Plan (PT)    Planned Therapy Interventions (PT) balance training;bed mobility training;gait training;home exercise program;strengthening;patient/family education;transfer training;neuromuscular re-education  -           User Key  (r) = Recorded By, (t) = Taken By, (c) = Cosigned By    Initials Name Provider Type     Swapnil Reid, PT Physical Therapist               Clinical Impression     Row Name 03/04/23 1042          Pain    Pretreatment Pain Rating 0/10 - no pain  -     Posttreatment Pain Rating 0/10 - no pain  -     Pain Intervention(s) Medication (See MAR)  -[facility] Name 03/04/23 1042           Plan of Care Review    Plan of Care Reviewed With patient  -     Outcome Evaluation PT IE complete.  A&Ox4.  No c/o pain.  Pt drowsy, but follows commands.  Pt independent PLOF.  CGA bed mobility.  Min A sit<>stand.  Ambulated 30ft mod A x1.  Pt leans left, drifts left, and demo a step-to gait pattern on left.  Pt w/ L hip and ankle weakness, but L knee extension was 5/5 MMT.  BP reading 166/121 post activity.  RN notified.  PT to see for progressive ambulation, strengthening, and gait safety.  Recommend acute rehab at [place].  Thank you for referral.  -     Row Name 03/04/23 1042          Therapy Assessment/Plan (PT)    Patient/Family Therapy Goals Statement (PT) return home  -     Rehab Potential (PT) good, to achieve stated therapy goals  -     Criteria for Skilled Interventions Met (PT) yes;skilled treatment is necessary  -     Therapy Frequency (PT) 2 times/day  -     Predicted Duration of Therapy Intervention (PT) until MA  -     Row Name 03/04/23 1042          Vital Signs    Post Systolic BP Rehab 166   -LH     Post Treatment Diastolic    15 minutes later BP was 142/92  -     Row Name 03/04/23 1042          Positioning and Restraints    Pre-Treatment Position in bed  -     Post Treatment Position bed  -     In Bed fowlers;call light within reach;side rails up x2;notified [facility]  -           User Key  (r) = Recorded By, (t) = Taken By, (c) = Cosigned By    Initials Name Provider Type     Swapnil Reid, PT Physical Therapist               Outcome Measures     Row Name 03/04/23 1042 03/04/23 0900       How much help from another person do you currently need...    Turning from your back to your side while in flat bed without using bedrails? 3  - 3  -MB    Moving from lying on back to sitting on the side of a flat bed without bedrails? 2  - 3  -MB    Moving to and from a bed to a chair (including a wheelchair)? 2  - 3  -MB    Standing up from a chair using your arms (e.g., wheelchair,  bedside chair)? 3  - 3  -MB    Climbing 3-5 steps with a railing? 2  - 3  -MB    To walk in hospital room? 2  - 3  -MB    AM-PAC 6 Clicks Score (PT) 14  - 18  -MB    Highest level of mobility 4 --> Transferred to chair/commode  - 6 --> Walked 10 steps or more  -MB    Row Name 03/04/23 1042 03/04/23 0855       Modified [place] Scale    Pre-Stroke Modified [place] Scale 0 - No Symptoms at all.  - 0 - No Symptoms at all.  -LR    Modified [place] Scale 4 - Moderately severe disability.  Unable to walk without assistance, and unable to attend to own bodily needs without assistance.  -LH 4 - Moderately severe disability.  Unable to walk without assistance, and unable to attend to own bodily needs without assistance.  -    Row Name 03/04/23 1042 03/04/23 0855       Functional Assessment    Outcome Measure Options AM-PAC 6 Clicks Basic Mobility (PT)  - AM-PAC 6 Clicks Daily Activity (OT);Modified Gee  -          User Key  (r) = Recorded By, (t) = Taken By, (c) = Cosigned By    Initials Name Provider Type     Swapnil Reid, PT Physical Therapist    Guillermina Brewer RN Registered Nurse    Kathleen Lugo, OT Occupational Therapist                             Physical Therapy Education     Title: PT OT SLP Therapies (Done)     Topic: Physical Therapy (Done)     Point: Mobility training (Done)     Learning Progress Summary           Patient Acceptance, E,D, DU,VU by  at 3/4/2023 1127    Comment: benefits of PT and POC, call for A OOB                   Point: Precautions (Done)     Learning Progress Summary           Patient Acceptance, E,D, DU,VU by  at 3/4/2023 1127    Comment: benefits of PT and POC, call for A OOB                               User Key     Initials Effective Dates Name Provider Type Discipline     02/03/23 -  Swapnil Reid, PT Physical Therapist PT              PT Recommendation and Plan  Planned Therapy Interventions (PT): balance training, bed mobility training, gait  training, home exercise program, strengthening, patient/family education, transfer training, neuromuscular re-education  Plan of Care Reviewed With: patient  Outcome Evaluation: PT IE complete.  A&Ox4.  No c/o pain.  Pt drowsy, but follows commands.  Pt independent PLOF.  CGA bed mobility.  Min A sit<>stand.  Ambulated 30ft mod A x1.  Pt leans left, drifts left, and demo a step-to gait pattern on left.  Pt w/ L hip and ankle weakness, but L knee extension was 5/5 MMT.  BP reading 166/121 post activity.  RN notified.  PT to see for progressive ambulation, strengthening, and gait safety.  Recommend acute rehab at [place].  Thank you for referral.     Time Calculation:    PT Charges     Row Name 03/04/23 1129             Time Calculation    Start Time 1042  -      Stop Time 1125  -      Time Calculation (min) 43 min  -      PT Received On 03/04/23  -      PT Goal Re-Cert Due Date 03/14/23  -         Untimed Charges    PT Eval/Re-eval Minutes 43  -         Total Minutes    Untimed Charges Total Minutes 43  -       Total Minutes 43  -            User Key  (r) = Recorded By, (t) = Taken By, (c) = Cosigned By    Initials Name Provider Type     Swapnil Reid, PT Physical Therapist              Therapy Charges for Today     Code Description Service Date Service Provider Modifiers Qty    63180998095 HC PT EVAL MOD COMPLEXITY 3 3/4/2023 Swapnil Reid PT GP 1          PT G-Codes  Outcome Measure Options: AM-PAC 6 Clicks Basic Mobility (PT)  AM-PAC 6 Clicks Score (PT): 14  AM-PAC 6 Clicks Score (OT): 16  Modified [place] Scale: 4 - Moderately severe disability.  Unable to walk without assistance, and unable to attend to own bodily needs without assistance.  PT Discharge Summary  Anticipated Discharge Disposition (PT): inpatient rehabilitation facility    Swapnil Reid PT  3/4/2023

## 2023-03-04 NOTE — PLAN OF CARE
Problem: Adult Inpatient Plan of Care  Goal: Plan of Care Review  Recent Flowsheet Documentation  Taken 3/4/2023 1042 by Swapnil Reid, PT  Plan of Care Reviewed With: patient  Outcome Evaluation: PT IE complete.  A&Ox4.  No c/o pain.  Pt drowsy, but follows commands.  Pt independent PLOF.  CGA bed mobility.  Min A sit<>stand.  Ambulated 30ft mod A x1.  Pt leans left, drifts left, and demo a step-to gait pattern on left.  Pt w/ L hip and ankle weakness, but L knee extension was 5/5 MMT.  BP reading 166/121 post activity.  RN notified.  PT to see for progressive ambulation, strengthening, and gait safety.  Recommend acute rehab at [place].  Thank you for referral.   Goal Outcome Evaluation:  Plan of Care Reviewed With: patient           Outcome Evaluation: PT IE complete.  A&Ox4.  No c/o pain.  Pt drowsy, but follows commands.  Pt independent PLOF.  CGA bed mobility.  Min A sit<>stand.  Ambulated 30ft mod A x1.  Pt leans left, drifts left, and demo a step-to gait pattern on left.  Pt w/ L hip and ankle weakness, but L knee extension was 5/5 MMT.  BP reading 166/121 post activity.  RN notified.  PT to see for progressive ambulation, strengthening, and gait safety.  Recommend acute rehab at [place].  Thank you for referral.

## 2023-03-04 NOTE — THERAPY EVALUATION
Acute Care - Speech Language Pathology   Swallow Initial Evaluation Psychiatric     Patient Name: James Taylor  : 1980  MRN: [id number]  Today's Date: 3/4/2023               Admit Date: 3/4/2023     Clinical bedside swallowing evaluation completed per SLP this AM, co-eval with OT. Pt was fatigued, yet maintained ability to respond to cues and follow commands throughout eval with increased fatigue only noted at the conclusion of the evaluation. Pt denied hx of dysphagia and pneumonia. Mild dysarthria observed characterized by decreased breath support, decreased articulatory precision though only minimally impacted speech intelligibility. [facility] revealed decreased mandibular strength and ROM bilaterally, mod lingual deviation upon protrusion with reduced lingual ROM, moderate reduced L labial ROM with retraction for smile. Mildly delayed volitional swallow, decreased respiratory support with cough. Pt was presented a full range of consistencies excluding mechanical soft. Pt was noted to have mildly reduced, yet functional labial seal on spoon and straw. He appeared to have mildly delayed oral transit, which was inconsistent, though only appeared to have functional impact with regular/thin liquid consistency, as he was observed to have two piecemeal swallows with each thin liquid trial. Mildly audible swallow with thin, concern for reduced pharyngeal control with thin. Adequate mandibular bite strength with solid, though reduced rotary mandibular movement during mastication, which was also prolonged with the regular solid. Mild L lower labial residue from solid, required a 1x verbal cue to clear though did not warrant cues for orientation of location of residue, therefore, confirming L labial sensory awareness of residue. No oral residue post solid trial. No overt s/s of aspiration. SLP educated pt and family on observations, general aspiration risks and concerns, and SLP diet, liquid, and safe feeding  recommendations. This was also reviewed with RNGuillermina, post eval. Cannot fully r/o aspiration at this time.   RECS:   • D/C NPO status, ok to start mechanical soft diet consistency with regular/thin liquid consistency  • Supervision with staff or family with PO intake, monitor for impulsivity and encourage pt to reduce rate of feeding if this is observed  • Will likely warrant staff or family assistance with feeding  • Encourage bolus placement on the R side of the oral cavity for optimal sensation and bolus control  • Monitor for L pocketing, encourage alternation between bites and sips consistency  • Encouarge L lingual sweep if L oral residue is observed  • Meds whole with thin liquids and/or in pudding/applesauce  • General aspiration precautions  • RN to monitor for increased lung congestion  • Notify SLP if concerns arise with recommended diet.   ST to continue to follow and tx for dysphagia. Will plan to eval motor speech and cognitive fx if concerns persist. Thanks!   Kristine Sanchez, CCC-SLP 3/4/2023 09:57 CST    Visit Dx:     ICD-10-CM ICD-9-CM   1. Intracranial bleeding (HCC)  I62.9 432.9   2. Facial droop  R29.810 781.94   3. Left arm weakness  R29.898 729.89   4. Acute nonintractable headache, unspecified headache type  R51.9 784.0   5. History of hypertension  Z86.79 V12.59   6. Cerebral edema (HCC)  G93.6 348.5   7. Midline shift of brain  R90.89 793.0   8. Dysphagia, unspecified type  R13.10 787.20     Patient Active Problem List   Diagnosis   • Intracranial bleeding (HCC)     Past Medical History:   Diagnosis Date   • Asthma    • Hypertension      History reviewed. No pertinent surgical history.    SLP Recommendation and Plan  SLP Swallowing Diagnosis: mild-moderate, oral dysphagia, suspected pharyngeal dysphagia (03/04/23 0900)  SLP Diet Recommendation: soft to chew textures, thin liquids (03/04/23 0900)  Recommended Precautions and Strategies: upright posture during/after eating, small bites of  food and sips of liquid, 1:1 supervision, assist with feeding, fatigue precautions, general aspiration precautions, check mouth frequently for oral residue/pocketing, alternate between small bites of food and sips of liquid, other (see comments) (Bolus placement on strong side of oral cavity for optimal bolus control.) (03/04/23 0900)  SLP Rec. for Method of Medication Administration: meds whole, with thin liquids (03/04/23 0900)     Monitor for Signs of Aspiration: yes, notify SLP if any concerns, cough, gurgly voice, throat clearing, pneumonia, right lower lobe infiltrates (03/04/23 0900)     Swallow Criteria for Skilled Therapeutic Interventions Met: demonstrates skilled criteria (03/04/23 0900)  Anticipated Discharge Disposition (SLP): unknown (03/04/23 0900)  Rehab Potential/Prognosis, Swallowing: good, to achieve stated therapy goals (03/04/23 0900)  Therapy Frequency (Swallow): at least, 2 days per week (03/04/23 0900)  Predicted Duration Therapy Intervention (Days): until discharge (03/04/23 0900)                                        Plan of Care Reviewed With: patient, family, other (see comments) (RN, Guillermina)  Progress:  (Eval)  Outcome Evaluation: Clinical bedside swallowing evaluation completed per SLP this AM, co-eval with OT. Pt was fatigued, yet maintained ability to respond to cues and follow commands throughout eval with increased fatigue only noted at the conclusion of the evaluation. Pt denied hx of dysphagia and pneumonia. Mild dysarthria observed characterized by decreased breath support, decreased articulatory precision though only minimally impacted speech intelligibility. Oral mech exam revealed decreased mandibular strength and ROM bilaterally, mod lingual deviation upon protrusion with reduced lingual ROM, moderate reduced L labial ROM with retraction for smile. Mildly delayed volitional swallow, decreased respiratory support with cough. Pt was presented a full range of consistencies  excluding mechanical soft. Pt was noted to have mildly reduced, yet functional labial seal on spoon and straw. He appeared to have mildly delayed oral transit, which was inconsistent, though only appeared to have functional impact with regular/thin liquid consistency, as he was observed to have two piecemeal swallows with each thin liquid trial. Mildly audible swallow with thin, concern for reduced pharyngeal control with thin. Adequate mandibular bite strength with solid, though reduced rotary mandibular movement during mastication, which was also prolonged with the regular solid. Mild L lower labial residue from solid, required a 1x verbal cue to clear though did not warrant cues for orientation of location of residue, therefore, confirming L labial sensory awareness of residue. No oral residue post solid trial. No overt s/s of aspiration. SLP educated pt and family on observations, general aspiration risks and concerns, and SLP diet, liquid, and safe feeding recommendations. This was also reviewed with RN, Guillermina, tavo rolle. Cannot fully r/o aspiration at this time. RECS: D/C NPO status, ok to start mechanical soft diet consistency with regular/thin liquid consistency; Supervision with staff or family with PO intake, monitor for impulsivity and encourage pt to reduce rate of feeding if this is observed; Will likely warrant staff or family assistance with feeding; Encourage bolus placement on the R side of the oral cavity for optimal sensation and bolus control; Monitor for L pocketing, encourage alternation between bites and sips consistency; Encouarge L lingual sweep if L oral residue is observed; Meds whole with thin liquids and/or in pudding/applesauce; General aspiration precautions; RN to monitor      SWALLOW EVALUATION (last 72 hours)     SLP Adult Swallow Evaluation     Row Name 03/04/23 0900                   Rehab Evaluation    Document Type evaluation  -TM        Subjective Information fatigue  -TM         Patient Observations alert;cooperative  -TM        Patient/Family/Caregiver Comments/Observations Female visitor in room at time of eval.  -TM        Patient Effort adequate  -TM           General Information    Patient Profile Reviewed yes  -TM        Pertinent History Of Current Problem Acute R basal ganglia hemorrhagic CVA, weakness, speech difficulty, headache. Hx of asthma, HTN, tobacco use, alcohol use.  -TM        Current Method of Nutrition NPO  -TM        Precautions/Limitations, Vision WFL;for purposes of eval  -TM        Precautions/Limitations, Hearing WFL;for purposes of eval  -TM        Prior Level of Function-Communication WFL  -TM        Prior Level of Function-Swallowing no diet consistency restrictions  -TM        Plans/Goals Discussed with patient;family;other (see comments)  RNGuillermina  -        Barriers to Rehab none identified  -TM        Patient's Goals for Discharge patient did not state  -TM        Family Goals for Discharge family did not state  -TM           Pain    Additional Documentation Pain Scale: Numbers Pre/Post-Treatment (Group)  -TM           Pain Scale: Numbers Pre/Post-Treatment    Pretreatment Pain Rating 0/10 - no pain  -TM           Oral Motor Structure and Function    Dentition Assessment natural, present and adequate  -TM        Secretion Management WNL/WFL  -TM        Mucosal Quality moist, healthy  -TM        Volitional Swallow delayed  -TM        Volitional Cough reduced respiratory support  -TM           Oral Musculature and Cranial Nerve Assessment    Oral Motor General Assessment oral labial or buccal impairment;mandibular impairment;lingual impairment  -TM        Mandibular Impairment Detail, Cranial Nerve V (Trigeminal) CN5: motor impairment;reduced strength on left  -TM        Oral Labial or Buccal Impairment, Detail, Cranial Nerve VII (Facial): CN7: Motor Impairment;left labial droop;reduced ROM;other (see comments)  L labial weakness with retraction for smile   -TM        Lingual Impairment, Detail. Cranial Nerves IX, XII (Glossopharyngeal and Hypoglossal) CN12: Motor Impairment;reduced strength left;reduced strength;reduced lingual ROM;other (see comments)  Mod L lingual deviation upon protrusion  -TM        Vocal Impairment, Detail. Cranial Nerve X (Vagus) CN10: Motor;vocal quality abnormality (see comments);other (see comments)  Decreased vocal intensity  -TM           General Eating/Swallowing Observations    Respiratory Support Currently in Use room air  -TM        Eating/Swallowing Skills fed by SLP  -TM        Positioning During Eating upright 90 degree;other (see comments)  On side of bed, co-eval with OT  -TM        Utensils Used spoon;straw  -TM        Consistencies Trialed pudding thick;honey-thick liquids;nectar/syrup-thick liquids;thin liquids;regular textures  -TM           Respiratory    Respiratory Status WFL  -TM           Clinical Swallow Eval    Oral Prep Phase impaired  -TM        Oral Transit impaired  -TM        Oral Residue WFL  -TM        Pharyngeal Phase suspected pharyngeal impairment  -TM        Esophageal Phase unremarkable  -TM        Clinical Swallow Evaluation Summary See note.  -TM           Oral Prep Concerns    Oral Prep Concerns prolonged mastication;incomplete or weak lip closure around spoon  -TM        Prolonged Mastication regular consistencies  -TM        Incomplete or Weak Lip Closure Around Spoon pudding;honey  -TM           Oral Transit Concerns    Oral Transit Concerns delayed initiation of bolus transit  -TM        Delayed Intiation of Bolus Transit thin  -TM        Oral Transit Concerns, Comment Piecemeal transit with thin  -TM           Pharyngeal Phase Concerns    Pharyngeal Phase Concerns other (see comments)  Piecemeal swallows with thin  -TM           SLP Evaluation Clinical Impression    SLP Swallowing Diagnosis mild-moderate;oral dysphagia;suspected pharyngeal dysphagia  -TM        Functional Impact risk of  aspiration/pneumonia  -TM        Rehab Potential/Prognosis, Swallowing good, to achieve stated therapy goals  -TM        Swallow Criteria for Skilled Therapeutic Interventions Met demonstrates skilled criteria  -TM           Recommendations    Therapy Frequency (Swallow) at least;2 days per week  -TM        Predicted Duration Therapy Intervention (Days) until discharge  -TM        SLP Diet Recommendation soft to chew textures;thin liquids  -TM        Recommended Precautions and Strategies upright posture during/after eating;small bites of food and sips of liquid;1:1 supervision;assist with feeding;fatigue precautions;general aspiration precautions;check mouth frequently for oral residue/pocketing;alternate between small bites of food and sips of liquid;other (see comments)  Bolus placement on strong side of oral cavity for optimal bolus control.  -TM        Oral Care Recommendations Oral Care BID/PRN  -TM        SLP Rec. for Method of Medication Administration meds whole;with thin liquids  -TM        Monitor for Signs of Aspiration yes;notify SLP if any concerns;cough;gurgly voice;throat clearing;pneumonia;right lower lobe infiltrates  -TM        Anticipated Discharge Disposition (SLP) unknown  -TM           Swallow Goals (SLP)    Swallow LTGs Swallow Long Term Goal (free text)  -TM        Swallow STGs diet tolerance goal selection (SLP);labial strengthening goal selection (SLP);lingual strengthening goal selection (SLP)  -TM        Diet Tolerance Goal Selection (SLP) Patient will tolerate trials of  -TM        Labial Strengthening Goal Selection (SLP) labial strengthening, SLP goal 1  -TM        Lingual Strengthening Goal Selection (SLP) lingual strengthening, SLP goal 1  -TM           (LTG) Swallow    (LTG) Swallow Pt will tolerate LRD without overt s/s of aspiration.  -TM        [place] (Swallow Long Term Goal) with minimal cues (75-90% accuracy)  -TM        Time Frame (Swallow Long Term Goal) by discharge   -TM        Barriers (Swallow Long Term Goal) L weakness  -TM        Progress/Outcomes (Swallow Long Term Goal) new goal;goal ongoing  -TM           (STG) Patient will tolerate trials of    Consistencies Trialed (Tolerate trials) soft to chew (ground) textures;thin liquids;regular textures  -TM        Desired Outcome (Tolerate trials) without signs/symptoms of aspiration;without signs of distress;with adequate oral prep/transit/clearance  -TM        [place] (Tolerate trials) with minimal cues (75-90% accuracy)  -TM        Time Frame (Tolerate trials) by discharge  -TM        Progress/Outcomes (Tolerate trials) new goal;goal ongoing  -TM           (STG) Labial Strengthening Goal 1 (SLP)    Activity (Labial Strengthening Goal 1, SLP) increase labial tone  -TM        Increase Labial Tone labial resistance exercises  -TM        [place]/Accuracy (Labial Strengthening Goal 1, SLP) with minimal cues (75-90% accuracy)  -TM        Time Frame (Labial Strengthening Goal 1, SLP) by discharge  -TM        Barriers (Labial Strengthening Goal 1, SLP) L weakness  -TM        Progress/Outcomes (Labial Strengthening Goal 1, SLP) new goal;goal ongoing  -TM           (STG) Lingual Strengthening Goal 1 (SLP)    Activity (Lingual Strengthening Goal 1, SLP) increase lingual tone/sensation/control/coordination/movement;increase tongue back strength  -TM        Increase Lingual Tone/Sensation/Control/Coordination/Movement lingual movement exercises  -TM        Increase Tongue Back Strength lingual resistance exercises  -TM        [place]/Accuracy (Lingual Strengthening Goal 1, SLP) with minimal cues (75-90% accuracy)  -TM        Time Frame (Lingual Strengthening Goal 1, SLP) by discharge  -TM        Barriers (Lingual Strengthening Goal 1, SLP) L weakness  -TM        Progress/Outcomes (Lingual Strengthening Goal 1, SLP) new goal;goal ongoing  -TM              User Key  (r) = Recorded By, (t) = Taken By, (c) = Cosigned By     Initials Name Effective Dates    TM Kristine Sanchez, CCC-SLP 02/03/23 -                 EDUCATION  The patient has been educated in the following areas:   Dysphagia (Swallowing Impairment).        SLP GOALS     Row Name 03/04/23 0900             (LTG) Swallow    (LTG) Swallow Pt will tolerate LRD without overt s/s of aspiration.  -TM      [place] (Swallow Long Term Goal) with minimal cues (75-90% accuracy)  -TM      Time Frame (Swallow Long Term Goal) by discharge  -TM      Barriers (Swallow Long Term Goal) L weakness  -TM      Progress/Outcomes (Swallow Long Term Goal) new goal;goal ongoing  -TM         (STG) Patient will tolerate trials of    Consistencies Trialed (Tolerate trials) soft to chew (ground) textures;thin liquids;regular textures  -TM      Desired Outcome (Tolerate trials) without signs/symptoms of aspiration;without signs of distress;with adequate oral prep/transit/clearance  -TM      [place] (Tolerate trials) with minimal cues (75-90% accuracy)  -TM      Time Frame (Tolerate trials) by discharge  -TM      Progress/Outcomes (Tolerate trials) new goal;goal ongoing  -TM         (STG) Labial Strengthening Goal 1 (SLP)    Activity (Labial Strengthening Goal 1, SLP) increase labial tone  -TM      Increase Labial Tone labial resistance exercises  -TM      [place]/Accuracy (Labial Strengthening Goal 1, SLP) with minimal cues (75-90% accuracy)  -TM      Time Frame (Labial Strengthening Goal 1, SLP) by discharge  -TM      Barriers (Labial Strengthening Goal 1, SLP) L weakness  -TM      Progress/Outcomes (Labial Strengthening Goal 1, SLP) new goal;goal ongoing  -TM         (STG) Lingual Strengthening Goal 1 (SLP)    Activity (Lingual Strengthening Goal 1, SLP) increase lingual tone/sensation/control/coordination/movement;increase tongue back strength  -TM      Increase Lingual Tone/Sensation/Control/Coordination/Movement lingual movement exercises  -TM      Increase Tongue Back Strength lingual  resistance exercises  -TM      [place]/Accuracy (Lingual Strengthening Goal 1, SLP) with minimal cues (75-90% accuracy)  -TM      Time Frame (Lingual Strengthening Goal 1, SLP) by discharge  -TM      Barriers (Lingual Strengthening Goal 1, SLP) L weakness  -TM      Progress/Outcomes (Lingual Strengthening Goal 1, SLP) new goal;goal ongoing  -TM            User Key  (r) = Recorded By, (t) = Taken By, (c) = Cosigned By    Initials Name Provider Type    TM Kristine Sanchez CCC-SLP Speech and Language Pathologist                   Time Calculation:    Time Calculation- SLP     Row Name 03/04/23 0956             Time Calculation- SLP    SLP Start Time 0900  -TM      SLP Stop Time 0958  -TM      SLP Time Calculation (min) 58 min  -TM      SLP Received On 03/04/23  -TM      SLP Goal Re-Cert Due Date 03/14/23  -TM         Untimed Charges    SLP Eval/Re-eval  ST Eval Oral Pharyng Swallow - 69481  -TM      [id number] Eval Oral Pharyng Swallow Minutes 58  -TM         Total Minutes    Untimed Charges Total Minutes 58  -TM       Total Minutes 58  -TM            User Key  (r) = Recorded By, (t) = Taken By, (c) = Cosigned By    Initials Name Provider Type    TM Kristine Sanchez CCC-SLP Speech and Language Pathologist                Therapy Charges for Today     Code Description Service Date Service Provider Modifiers Qty    [id number]  ST EVAL ORAL PHARYNG SWALLOW 4 3/4/2023 Kristine Sanchez CCC-SLP GN 1               ROSS Garza  3/4/2023

## 2023-03-04 NOTE — ED NOTES
MD Jang notified immediately upon quick assessment of pt in triage to come assess pt on stretcher. MD Jang by bedside within 1 minute.

## 2023-03-04 NOTE — CASE MANAGEMENT/SOCIAL WORK
Discharge Planning Assessment  [facility]     Patient Name: James Taylor  MRN: [id number]  Today's Date: 3/4/2023    Admit Date: 3/4/2023        Discharge Needs Assessment     Row Name 03/04/23 1311       Living Environment    People in Home parent(s)    Name(s) of People in Home Mother    Current Living Arrangements home    Primary Care Provided by self    Provides Primary Care For no one    Family Caregiver if Needed parent(s)    Quality of Family Relationships helpful;involved    Able to Return to Prior Arrangements other (see comments)    Living Arrangement Comments Therapy recommending acute rehab       Resource/Environmental Concerns    Resource/Environmental Concerns none    Transportation Concerns none       Food Insecurity    Within the past 12 months, you worried that your food would run out before you got the money to buy more. Never true    Within the past 12 months, the food you bought just didn't last and you didn't have money to get more. Never true       Transition Planning    Patient/Family Anticipates Transition to home with help/services;inpatient rehabilitation facility    Patient/Family Anticipated Services at Transition rehabilitation services    Transportation Anticipated family or friend will provide       Discharge Needs Assessment    Readmission Within the Last 30 Days no previous admission in last 30 days    Equipment Currently Used at Home none    Concerns to be Addressed adjustment to diagnosis/illness;discharge planning    Anticipated Changes Related to Illness none    Equipment Needed After Discharge other (see comments)    Outpatient/Agency/Support Group Needs inpatient rehabilitation facility;homecare agency    Discharge Facility/Level of Care Needs rehabilitation facility;home with home health               Discharge Plan     Row Name 03/04/23 4608       Plan    Plan Comments Pt admitted with stroke. Noted therapy has recommended inpt rehab at [place]. SW did call pt mother Ady  [phone]) to discuss but had to leave a VM.              Continued Care and Services - Admitted Since 3/4/2023    Coordination has not been started for this encounter.          Demographic Summary    No documentation.                Functional Status    No documentation.                Psychosocial    No documentation.                Abuse/Neglect    No documentation.                Legal    No documentation.                Substance Abuse    No documentation.                Patient Forms    No documentation.                   LOI Mata

## 2023-03-04 NOTE — PLAN OF CARE
Goal Outcome Evaluation:      Pt remains on cardene gtt at 5 mg/hr to maintain SBP <140. UOP good. Oriented x4. Follows commands, but markedly weaker on left side. PERRLA. Otherwise stable. Will continue to monitor.     Problem: Adjustment to Illness (Stroke, Hemorrhagic)  Goal: Optimal Coping  Intervention: Support Psychosocial Response to Stroke  Recent Flowsheet Documentation  Taken 3/4/2023 0400 by Kareem Pollack, RN  Family/Support System Care: support provided  Taken 3/4/2023 0200 by Kareem Pollack, RN  Family/Support System Care: support provided     Problem: Functional Ability Impaired (Stroke, Hemorrhagic)  Goal: Optimal Functional Ability  Intervention: Optimize Functional Ability  Recent Flowsheet Documentation  Taken 3/4/2023 0400 by Kareem Pollakc, RN  Activity Management: bedrest  Taken 3/4/2023 0300 by Kareem Pollack, NANCY  Activity Management: bedrest  Taken 3/4/2023 0200 by Kareem Pollack, NANCY  Activity Management: bedrest     Problem: Pain (Stroke, Hemorrhagic)  Goal: Acceptable Pain Control  Intervention: Monitor and Manage Pain  Recent Flowsheet Documentation  Taken 3/4/2023 0200 by Kareem Pollack, RN  Pain Management Interventions: care clustered     Problem: Sensorimotor Impairment (Stroke, Hemorrhagic)  Goal: Improved Sensorimotor Function  Intervention: Optimize Range of Motion, Motor Control and Function  Recent Flowsheet Documentation  Taken 3/4/2023 0200 by Kareem Pollack RN  Positioning/Transfer Devices:   pillows   in use     Problem: Hypertension Acute  Goal: Blood Pressure Within Desired Range  Intervention: Normalize Blood Pressure  Recent Flowsheet Documentation  Taken 3/4/2023 0200 by Kareem Pollack, RN  Medication Review/Management:   medications reviewed   dosing adjusted

## 2023-03-05 ENCOUNTER — APPOINTMENT (OUTPATIENT)
Dept: CT IMAGING | Facility: HOSPITAL | Age: 43
DRG: 64 | End: 2023-03-05
Payer: MEDICAID

## 2023-03-05 PROCEDURE — 70450 CT HEAD/BRAIN W/O DYE: CPT

## 2023-03-05 PROCEDURE — 99291 CRITICAL CARE FIRST HOUR: CPT | Performed by: NEUROLOGICAL SURGERY

## 2023-03-05 PROCEDURE — 74174 CTA ABD&PLVS W/CONTRAST: CPT

## 2023-03-05 PROCEDURE — 97110 THERAPEUTIC EXERCISES: CPT

## 2023-03-05 PROCEDURE — 25010000002 LORAZEPAM PER 2 MG: Performed by: FAMILY MEDICINE

## 2023-03-05 PROCEDURE — 25510000001 IOPAMIDOL PER 1 ML: Performed by: NEUROLOGICAL SURGERY

## 2023-03-05 PROCEDURE — 97116 GAIT TRAINING THERAPY: CPT

## 2023-03-05 RX ORDER — TERAZOSIN 5 MG/1
5 CAPSULE ORAL NIGHTLY
Status: DISCONTINUED | OUTPATIENT
Start: 2023-03-05 | End: 2023-03-10 | Stop reason: HOSPADM

## 2023-03-05 RX ORDER — METOPROLOL TARTRATE 50 MG/1
50 TABLET, FILM COATED ORAL EVERY 12 HOURS SCHEDULED
Status: DISCONTINUED | OUTPATIENT
Start: 2023-03-05 | End: 2023-03-10 | Stop reason: HOSPADM

## 2023-03-05 RX ADMIN — NICARDIPINE HYDROCHLORIDE 5 MG/HR: 25 INJECTION, SOLUTION INTRAVENOUS at 15:57

## 2023-03-05 RX ADMIN — NICARDIPINE HYDROCHLORIDE 7.5 MG/HR: 25 INJECTION, SOLUTION INTRAVENOUS at 03:05

## 2023-03-05 RX ADMIN — LABETALOL HYDROCHLORIDE 10 MG: 5 INJECTION INTRAVENOUS at 14:53

## 2023-03-05 RX ADMIN — NICARDIPINE HYDROCHLORIDE 10 MG/HR: 25 INJECTION, SOLUTION INTRAVENOUS at 23:53

## 2023-03-05 RX ADMIN — NICOTINE 1 PATCH: 21 PATCH, EXTENDED RELEASE TRANSDERMAL at 18:14

## 2023-03-05 RX ADMIN — Medication 1 APPLICATION: at 20:10

## 2023-03-05 RX ADMIN — LORAZEPAM 1 MG: 1 TABLET ORAL at 14:51

## 2023-03-05 RX ADMIN — NICARDIPINE HYDROCHLORIDE 10 MG/HR: 25 INJECTION, SOLUTION INTRAVENOUS at 20:54

## 2023-03-05 RX ADMIN — ENALAPRILAT 1.25 MG: 1.25 INJECTION INTRAVENOUS at 13:49

## 2023-03-05 RX ADMIN — METOPROLOL TARTRATE 25 MG: 25 TABLET, FILM COATED ORAL at 09:16

## 2023-03-05 RX ADMIN — NICARDIPINE HYDROCHLORIDE 10 MG/HR: 25 INJECTION, SOLUTION INTRAVENOUS at 18:19

## 2023-03-05 RX ADMIN — Medication 1 APPLICATION: at 09:16

## 2023-03-05 RX ADMIN — NICARDIPINE HYDROCHLORIDE 12.5 MG/HR: 25 INJECTION, SOLUTION INTRAVENOUS at 00:19

## 2023-03-05 RX ADMIN — LORAZEPAM 1 MG: 1 TABLET ORAL at 02:38

## 2023-03-05 RX ADMIN — SERTRALINE HYDROCHLORIDE 50 MG: 50 TABLET, FILM COATED ORAL at 18:14

## 2023-03-05 RX ADMIN — LORAZEPAM 2 MG: 2 INJECTION INTRAMUSCULAR; INTRAVENOUS at 18:14

## 2023-03-05 RX ADMIN — LORAZEPAM 2 MG: 2 INJECTION INTRAMUSCULAR; INTRAVENOUS at 19:36

## 2023-03-05 RX ADMIN — AMLODIPINE BESYLATE 10 MG: 10 TABLET ORAL at 09:16

## 2023-03-05 RX ADMIN — Medication 10 ML: at 13:49

## 2023-03-05 RX ADMIN — CHLORHEXIDINE GLUCONATE 1 APPLICATION: 500 CLOTH TOPICAL at 03:05

## 2023-03-05 RX ADMIN — IOPAMIDOL 100 ML: 755 INJECTION, SOLUTION INTRAVENOUS at 23:15

## 2023-03-05 RX ADMIN — Medication 10 ML: at 20:10

## 2023-03-05 NOTE — PLAN OF CARE
Patient remains alert and oriented to person, place, and time. NIHSS 7 based on left facial droop, left arm ataxia, and drift in left arm and leg. Slight increase noted in midline shift on 8 pm CT scan. MD is aware. SBP maintained less than 140 with Cardene drip and PRN medication. Highest CIWA score this shift 10 and PRN ativan given reduced score to 2. Patient has very unsteady gait. Patient educated about not getting out of bed without assistance. Bed alarm is on.  Urine output is marginally adequate and very dark y[place]a in color.     Problem: Skin Injury Risk Increased  Goal: Skin Health and Integrity  Outcome: Ongoing, Progressing  Intervention: Optimize Skin Protection  Recent Flowsheet Documentation  Taken 3/5/2023 0358 by Ulysses Alex, RN  Head of Bed (HOB) Positioning: HOB at 30-45 degrees  Taken 3/5/2023 0000 by Ulysses Alex, RN  Head of Bed (HOB) Positioning: HOB at 20-30 degrees     Problem: Adjustment to Illness (Stroke, Hemorrhagic)  Goal: Optimal Coping  Outcome: Ongoing, Progressing     Problem: Bowel Elimination Impaired (Stroke, Hemorrhagic)  Goal: Effective Bowel Elimination  Outcome: Ongoing, Progressing     Problem: Cerebral Tissue Perfusion (Stroke, Hemorrhagic)  Goal: Optimal Cerebral Tissue Perfusion  Outcome: Ongoing, Progressing     Problem: Cognitive Impairment (Stroke, Hemorrhagic)  Goal: Optimal Cognitive Function  Outcome: Ongoing, Progressing     Problem: Communication Impairment (Stroke, Hemorrhagic)  Goal: Effective Communication Skills  Outcome: Ongoing, Progressing  Intervention: Optimize Communication Skills  Recent Flowsheet Documentation  Taken 3/5/2023 0500 by Ulysses Alex, RN  Communication Enhancement Strategies: call light answered in person  Taken 3/5/2023 0358 by Ulysses Alex RN  Communication Enhancement Strategies: call light answered in person  Taken 3/4/2023 1955 by Ulysses Alex, RN  Communication Enhancement Strategies: call light answered in person     Problem:  Functional Ability Impaired (Stroke, Hemorrhagic)  Goal: Optimal Functional Ability  Outcome: Ongoing, Progressing  Intervention: Optimize Functional Ability  Recent Flowsheet Documentation  Taken 3/5/2023 0000 by Ulysses Alex RN  Activity Management: bedrest  Taken 3/4/2023 1955 by Ulysses Alex RN  Activity Management: bedrest     Problem: Pain (Stroke, Hemorrhagic)  Goal: Acceptable Pain Control  Outcome: Ongoing, Progressing     Problem: Respiratory Compromise (Stroke, Hemorrhagic)  Goal: Effective Oxygenation and Ventilation  Outcome: Ongoing, Progressing  Intervention: Optimize Oxygenation and Ventilation  Recent Flowsheet Documentation  Taken 3/5/2023 0358 by Ulysses Alex RN  Head of Bed (HOB) Positioning: HOB at 30-45 degrees  Taken 3/5/2023 0000 by Ulysses Alex RN  Head of Bed (HOB) Positioning: HOB at 20-30 degrees     Problem: Sensorimotor Impairment (Stroke, Hemorrhagic)  Goal: Improved Sensorimotor Function  Outcome: Ongoing, Progressing  Intervention: Optimize Range of Motion, Motor Control and Function  Recent Flowsheet Documentation  Taken 3/5/2023 0358 by Ulysses Alex RN  Positioning/Transfer Devices:   pillows   in use  Taken 3/5/2023 0000 by Ulysses Alex RN  Positioning/Transfer Devices:   pillows   in use  Taken 3/4/2023 1955 by Ulysses Alex RN  Range of Motion: active ROM (range of motion) encouraged     Problem: Swallowing Impairment (Stroke, Hemorrhagic)  Goal: Optimal Eating and Swallowing Without Aspiration  Outcome: Ongoing, Progressing     Problem: Urinary Elimination Impaired (Stroke, Hemorrhagic)  Goal: Effective Urinary Elimination  Outcome: Ongoing, Progressing     Problem: Hypertension Acute  Goal: Blood Pressure Within Desired Range  Outcome: Ongoing, Progressing     Problem: Adult Inpatient Plan of Care  Goal: Plan of Care Review  Outcome: Ongoing, Progressing  Goal: Patient-Specific Goal (Individualized)  Outcome: Ongoing, Progressing  Goal: Absence of Hospital-Acquired  Illness or Injury  Outcome: Ongoing, Progressing  Intervention: Identify and Manage Fall Risk  Recent Flowsheet Documentation  Taken 3/5/2023 0500 by Ulysses Alex RN  Safety Promotion/Fall Prevention:   safety round/check completed   fall prevention program maintained  Taken 3/5/2023 0358 by Ulysses Alex RN  Safety Promotion/Fall Prevention: safety round/check completed  Taken 3/5/2023 0301 by Ulysses Alex RN  Safety Promotion/Fall Prevention:   safety round/check completed   fall prevention program maintained  Taken 3/5/2023 0200 by Ulysses Alex RN  Safety Promotion/Fall Prevention:   safety round/check completed   fall prevention program maintained  Taken 3/5/2023 0100 by Ulysses Alex RN  Safety Promotion/Fall Prevention:   safety round/check completed   fall prevention program maintained  Taken 3/5/2023 0000 by Ulysses Alex RN  Safety Promotion/Fall Prevention:   safety round/check completed   fall prevention program maintained  Taken 3/4/2023 2300 by Ulysses Alex RN  Safety Promotion/Fall Prevention:   safety round/check completed   fall prevention program maintained  Taken 3/4/2023 2200 by Ulysses Alex RN  Safety Promotion/Fall Prevention:   safety round/check completed   fall prevention program maintained  Taken 3/4/2023 2100 by Ulysses Alex RN  Safety Promotion/Fall Prevention:   safety round/check completed   fall prevention program maintained  Taken 3/4/2023 1955 by Ulysses Alex RN  Safety Promotion/Fall Prevention:   safety round/check completed   fall prevention program maintained  Intervention: Prevent Skin Injury  Recent Flowsheet Documentation  Taken 3/5/2023 0358 by Ulysses Alex RN  Body Position: position changed independently  Taken 3/5/2023 0000 by Ulysses Alex RN  Body Position: position changed independently  Taken 3/4/2023 1955 by Ulysses Alex RN  Body Position: position changed independently  Intervention: Prevent and Manage VTE (Venous Thromboembolism) Risk  Recent Flowsheet  Documentation  Taken 3/5/2023 0358 by Ulysses Alex RN  VTE Prevention/Management:   bilateral   sequential compression devices off  Taken 3/5/2023 0000 by Ulysses Alex RN  Activity Management: bedrest  VTE Prevention/Management:   bilateral   sequential compression devices on  Taken 3/4/2023 1955 by Ulysses Alex RN  Activity Management: bedrest  VTE Prevention/Management:   sequential compression devices on   bilateral  Range of Motion: active ROM (range of motion) encouraged  Goal: Optimal Comfort and Wellbeing  Outcome: Ongoing, Progressing  Intervention: Provide Person-Centered Care  Recent Flowsheet Documentation  Taken 3/5/2023 0500 by Ulysses Alex RN  Trust Relationship/Rapport: care explained  Taken 3/5/2023 0358 by Ulysses Alex RN  Trust Relationship/Rapport: care explained  Taken 3/5/2023 0301 by Ulysses Alex RN  Trust Relationship/Rapport: care explained  Taken 3/5/2023 0200 by Ulysses Alex RN  Trust Relationship/Rapport: care explained  Taken 3/5/2023 0100 by Ulysses Alex RN  Trust Relationship/Rapport: care explained  Taken 3/5/2023 0000 by Ulysses Alex RN  Trust Relationship/Rapport: care explained  Taken 3/4/2023 2300 by Ulysses Alex RN  Trust Relationship/Rapport: care explained  Taken 3/4/2023 2200 by Ulysses Alex RN  Trust Relationship/Rapport: care explained  Taken 3/4/2023 2100 by Ulysses Alex RN  Trust Relationship/Rapport: care explained  Taken 3/4/2023 1955 by Ulysses Alex RN  Trust Relationship/Rapport: care explained  Taken 3/4/2023 1900 by Ulysses Alex RN  Trust Relationship/Rapport: care explained  Goal: Readiness for Transition of Care  Outcome: Ongoing, Progressing     Problem: Fall Injury Risk  Goal: Absence of Fall and Fall-Related Injury  Outcome: Ongoing, Progressing  Intervention: Promote Injury-Free Environment  Recent Flowsheet Documentation  Taken 3/5/2023 0500 by Ulysses Alex RN  Safety Promotion/Fall Prevention:   safety round/check completed   fall prevention  program maintained  Taken 3/5/2023 0358 by Ulysses Alex, RN  Safety Promotion/Fall Prevention: safety round/check completed  Taken 3/5/2023 0301 by Ulysses Alex, RN  Safety Promotion/Fall Prevention:   safety round/check completed   fall prevention program maintained  Taken 3/5/2023 0200 by Ulysses Alex, NANCY  Safety Promotion/Fall Prevention:   safety round/check completed   fall prevention program maintained  Taken 3/5/2023 0100 by Ulysses Alex, RN  Safety Promotion/Fall Prevention:   safety round/check completed   fall prevention program maintained  Taken 3/5/2023 0000 by Ulysses Alex RN  Safety Promotion/Fall Prevention:   safety round/check completed   fall prevention program maintained  Taken 3/4/2023 2300 by Ulysses Alex, RN  Safety Promotion/Fall Prevention:   safety round/check completed   fall prevention program maintained  Taken 3/4/2023 2200 by Ulysses Alex, NANCY  Safety Promotion/Fall Prevention:   safety round/check completed   fall prevention program maintained  Taken 3/4/2023 2100 by Ulysses Alex, RN  Safety Promotion/Fall Prevention:   safety round/check completed   fall prevention program maintained  Taken 3/4/2023 1955 by Ulysses Alex RN  Safety Promotion/Fall Prevention:   safety round/check completed   fall prevention program maintained   Goal Outcome Evaluation:

## 2023-03-05 NOTE — THERAPY TREATMENT NOTE
Acute Care - Physical Therapy Treatment Note  Psychiatric     Patient Name: James Taylor  : 1980  MRN: [id number]  Today's Date: 3/5/2023      Visit Dx:     ICD-10-CM ICD-9-CM   1. Intracranial bleeding (HCC)  I62.9 432.9   2. Facial droop  R29.810 781.94   3. Left arm weakness  R29.898 729.89   4. Acute nonintractable headache, unspecified headache type  R51.9 784.0   5. History of hypertension  Z86.79 V12.59   6. Cerebral edema (HCC)  G93.6 348.5   7. Midline shift of brain  R90.89 793.0   8. Dysphagia, unspecified type  R13.10 787.20   9. Impaired mobility  Z74.09 799.89     Patient Active Problem List   Diagnosis   • Intracranial bleeding (HCC)   • Malignant hypertension   • Elevated transaminase level   • Alcohol abuse     Past Medical History:   Diagnosis Date   • Asthma    • Hypertension      History reviewed. No pertinent surgical history.  PT Assessment (last 12 hours)     PT Evaluation and Treatment     Row Name 23          Physical Therapy Time and Intention    Subjective Information no complaints  -     Document Type therapy note (daily note)  -     Mode of Treatment physical therapy  -     Comment pt extremely lethargic. Had great difficulty keeping eyes open. Pt. did wake up a little more once standing, but reverted back once sitting.   -     Row Name 23          General Information    Existing Precautions/Restrictions fall  Left side weakness and neglect.  -     Row Name 23          Pain    Pretreatment Pain Rating 0/10 - no pain  -     Posttreatment Pain Rating 0/10 - no pain  -     Row Name 23          Bed Mobility    Supine-Sit [place] (Bed Mobility) verbal cues;minimum assist (75% patient effort);moderate assist (50% patient effort)  -     Assistive Device (Bed Mobility) head of bed elevated  -     Row Name 23          Sit-Stand Transfer    Sit-Stand [place] (Transfers) verbal cues;minimum assist (75% patient  effort)  -     Row Name 03/05/23 0931          Stand-Sit Transfer    Stand-Sit [place] (Transfers) verbal cues;minimum assist (75% patient effort)  -     Row Name 03/05/23 0931          Gait/Stairs (Locomotion)    [place] Level (Gait) verbal cues;minimum assist (75% patient effort);moderate assist (50% patient effort);2 person assist  -     Distance in Feet (Gait) 30  -     Deviations/Abnormal Patterns (Gait) left sided deviations;ataxic;naren decreased;stride length decreased  -     Left Sided Gait Deviations heel strike decreased;leans left  step to and hip hiking with LLE.  -     Row Name 03/05/23 0931          Balance    Comment, Balance Min-Mod assist for sitting balance. Left and posterior lean. Worked on anterior and right leaning. Pt. unaware of deficits while sitting EOB.  -     Row Name 03/05/23 0931          Motor Skills    Comments, Therapeutic Exercise actively moves R UE/LE. active movement of L LE, but required assist of L UE  -     Row Name 03/05/23 0931          Plan of Care Review    Plan of Care Reviewed With patient  -     Progress no change  -     Outcome Evaluation Pt. agreed to therapy. Pt. very lethargic and difficulty keeping eyes open during tx. Pt. needed MOD assist for bed mobility. He was unable to maintain midline with sitting EOB-needing Min-Mod assist to correct and maintain. He appeared unaware of his deficits. He was able to actively move his R UE and LE, but required assist for moving L UE and demonstrated neglect of that limb. Pt. was able to stand with MIN-MOD assist. He walked a short distance with assist of 2. He walks with a left lean and left hip hiking to clear foot. Pt. will need acute rehab following discharge for further therapy.  -     Row Name 03/05/23 0931          Positioning and Restraints    Pre-Treatment Position in bed  -     Post Treatment Position chair  -     In Chair reclined;call light within reach;encouraged to call for  assist;notified nsg  -           User Key  (r) = Recorded By, (t) = Taken By, (c) = Cosigned By    Initials Name Provider Type    Radha Guillen PTA Physical Therapist Assistant                Physical Therapy Education     Title: PT OT SLP Therapies (Done)     Topic: Physical Therapy (Done)     Point: Mobility training (Done)     Learning Progress Summary           Patient Acceptance, E,D, DU,VU by  at 3/4/2023 1127    Comment: benefits of PT and POC, call for A OOB                   Point: Precautions (Done)     Learning Progress Summary           Patient Acceptance, E,D, DU,VU by  at 3/4/2023 1127    Comment: benefits of PT and POC, call for A OOB                               User Key     Initials Effective Dates Name Provider Type [facility] 02/03/23 -  Swapnil Reid, PT Physical Therapist PT              PT Recommendation and Plan     Plan of Care Reviewed With: patient  Progress: no change  Outcome Evaluation: Pt. agreed to therapy. Pt. very lethargic and difficulty keeping eyes open during tx. Pt. needed MOD assist for bed mobility. He was unable to maintain midline with sitting EOB-needing Min-Mod assist to correct and maintain. He appeared unaware of his deficits. He was able to actively move his R UE and LE, but required assist for moving L UE and demonstrated neglect of that limb. Pt. was able to stand with MIN-MOD assist. He walked a short distance with assist of 2. He walks with a left lean and left hip hiking to clear foot. Pt. will need acute rehab following discharge for further therapy.   Outcome Measures     Row Name 03/05/23 0931             How much help from another person do you currently need...    Turning from your back to your side while in flat bed without using bedrails? 3  -MF      Moving from lying on back to sitting on the side of a flat bed without bedrails? 2  -MF      Moving to and from a bed to a chair (including a wheelchair)? 3  -MF      Standing up from a  chair using your arms (e.g., wheelchair, bedside chair)? 3  -MF      Climbing 3-5 steps with a railing? 1  -MF      To walk in hospital room? 2  -MF      AM-PAC 6 Clicks Score (PT) 14  -MF         Functional Assessment    Outcome Measure Options AM-PAC 6 Clicks Basic Mobility (PT)  -MF            User Key  (r) = Recorded By, (t) = Taken By, (c) = Cosigned By    Initials Name Provider Type    Radha Guillen PTA Physical Therapist Assistant                 Time Calculation:    PT Charges     Row Name 03/05/23 1342             Time Calculation    Start Time 0931  -MF      Stop Time 0959  -MF      Time Calculation (min) 28 min  -MF      PT Received On 03/05/23  -         Time Calculation- PT    Total Timed Code Minutes- PT 28 minute(s)  -         Timed Charges    50603 - PT Therapeutic Exercise Minutes 14  -MF      30526 - Gait Training Minutes  14  -MF         Total Minutes    Timed Charges Total Minutes 28  -MF       Total Minutes 28  -MF            User Key  (r) = Recorded By, (t) = Taken By, (c) = Cosigned By    Initials Name Provider Type    Radha Guillen PTA Physical Therapist Assistant              Therapy Charges for Today     Code Description Service Date Service Provider Modifiers Qty    73248233637 HC PT THER PROC EA 15 MIN 3/5/2023 Radha Flynn PTA GP 1    99020460496 HC GAIT TRAINING EA 15 MIN 3/5/2023 Radha Flynn PTA GP 1          PT G-Codes  Outcome Measure Options: AM-PAC 6 Clicks Basic Mobility (PT)  AM-PAC 6 Clicks Score (PT): 14  AM-PAC 6 Clicks Score (OT): 16  Modified Gee Scale: 4 - Moderately severe disability.  Unable to walk without assistance, and unable to attend to own bodily needs without assistance.    Radha Flynn PTA  3/5/2023

## 2023-03-05 NOTE — CONSULTS
[facility] Medicine Consult  Consults    Date of Admission: 3/4/2023  Date of Consult: 03/05/23    Primary Care Physician: Radha Desir MD  Referring Physician: Dr. Brandon Gannon  Chief Complaint/Reason for Consultation: Medical management of management of hypertension    Subjective   History of Present Illness    This 42-year-old male presented to emergency department with a chief complaint of weakness and difficulty speaking.  Evaluation in the emergency department showed a CT scan significant for right basal ganglia hemorrhagic stroke.  The patient has a history of hypertension that is difficult to control and he was treated by Dr. Desir.  His primary complaint was of headache prior to development of neurologic symptoms.  He was evaluated yesterday initially and was started on 10 mg of amlodipine daily as well as metoprolol 25 mg twice daily.  Initially nursing was able to wean him from Cardene drip.  But that drip has been restarted today because of elevated blood pressure outside goal range.  Metoprolol will be increased to 50 mg p.o. twice daily and Hytrin 5 mg daily will be added to his regimen.  I will obtain a CT angiogram of the abdomen and pelvis to assess for renal artery stenosis.  Lab work shows triglycerides elevated at 589, HDL low at 24, VLDL elevated at 77.  Glucose 110, alk phos 190, , .  INR 1.22.  CBC is unremarkable.  CT angiogram of the neck shows patent vertebral arteries with calcified and noncalcified plaque in both carotids.  CT angiogram of the head showed no major branch occlusion or aneurysm.  CT of the head showed acute hemorrhage in the right basal ganglia measuring 4.7 x 1.8 cm with mild surrounding edema with a lateral ventricular shift to the left at 2.9 mm.  Repeat CT scan of the head today shows left shift slightly increased to 4 mm with no change in the hemorrhage.    Review of Systems   Constitutional: Positive for  activity change.   HENT: Negative.    Eyes: Negative.    Respiratory: Negative.    Cardiovascular: Negative.    Gastrointestinal: Negative.    Endocrine: Negative.    Genitourinary: Negative.    Musculoskeletal: Negative.    Skin: Negative.    Allergic/Immunologic: Negative.    Neurological: Positive for speech difficulty and weakness.      Otherwise complete ROS is negative except as mentioned above.    Past Medical History:   Past Medical History:   Diagnosis Date   • Asthma    • Hypertension      Past Surgical History:History reviewed. No pertinent surgical history.  Social History:  reports that he has been smoking cigarettes. He started smoking about 10 years ago. He has a 15.00 pack-year smoking history. He has never used smokeless tobacco. He reports current alcohol use of about 4.0 standard drinks per week. He reports that he does not use drugs.    Family History: family history includes Anxiety disorder in his mother; Cancer in his father; Diabetes in his mother; Hyperlipidemia in his father and mother.     Allergies: No Known Allergies  Medications: Scheduled Meds:amLODIPine, 10 mg, Oral, Daily  Chlorhexidine Gluconate Cloth, 1 application, Topical, Q24H  metoprolol tartrate, 25 mg, Oral, Q12H  mupirocin, 1 application, Each Nare, BID  nicotine, 1 patch, Transdermal, Q24H  sertraline, 50 mg, Oral, Q24H  sodium chloride, 10 mL, Intravenous, Q12H      Continuous Infusions:niCARdipine, 5-15 mg/hr, Last Rate: Stopped (03/05/23 0549)  niCARdipine, 5-15 mg/hr, Last Rate: 5 mg/hr (03/05/23 1557)      PRN Meds:.•  acetaminophen **OR** acetaminophen  •  enalaprilat  •  labetalol  •  LORazepam **OR** LORazepam **OR** LORazepam **OR** LORazepam **OR** LORazepam **OR** LORazepam  •  sodium chloride  •  sodium chloride  •  sodium chloride    I have utilized all available immediate resources to obtain, update, or review the patient's current medications (including all prescriptions, over-the-counter products, herbals,  cannabis/cannabidiol products, and vitamin/mineral/dietary (nutritional) supplements).     Objective   Objective    Physical Exam:   Temp:  [97.5 °F (36.4 °C)-98.7 °F (37.1 °C)] 97.5 °F (36.4 °C)  Heart Rate:  [] 92  Resp:  [18-19] 19  BP: ()/() 130/101  Physical Exam  Constitutional:       Appearance: Normal appearance. He is normal weight.   HENT:      Head: Normocephalic and atraumatic.      Right Ear: External ear normal.      Left Ear: External ear normal.      Nose: Nose normal.      Mouth/Throat:      Mouth: Mucous membranes are moist.      Pharynx: Oropharynx is clear.   Eyes:      General: No scleral icterus.     Extraocular Movements: Extraocular movements intact.      Conjunctiva/sclera: Conjunctivae normal.      Pupils: Pupils are equal, round, and reactive to light.   Cardiovascular:      Rate and Rhythm: Normal rate and regular rhythm.      Pulses: Normal pulses.      Heart sounds: Normal heart sounds. No murmur heard.  Pulmonary:      Effort: Pulmonary effort is normal. No respiratory distress.      Breath sounds: Normal breath sounds.   Abdominal:      General: Abdomen is flat. Bowel sounds are normal.      Palpations: Abdomen is soft. There is no mass.      Tenderness: There is no abdominal tenderness.   Musculoskeletal:         General: Normal range of motion.      Right lower leg: No edema.      Left lower leg: No edema.   Skin:     General: Skin is warm and dry.      Coloration: Skin is not jaundiced.   Neurological:      Mental Status: He is alert.      Motor: Weakness and pronator drift (L) present. Atrophy: ROLAND, LUE.      Comments: 3/5 strength left upper and lower extremities   Psychiatric:         Mood and Affect: Mood normal.         Judgment: Judgment normal.         Results Reviewed:  I have personally reviewed current lab, radiology, and data and agree with results.  Lab Results (last 24 hours)     ** No results found for the last 24 hours. **        Imaging Results  (Last 24 Hours)     Procedure Component Value Units Date/Time    CT Head Without Contrast [[id number]] Collected: 03/05/23 1123     Updated: 03/05/23 1129    Narrative:      EXAMINATION:  CT HEAD WO CONTRAST-  3/5/2023 10:46 AM CST     HISTORY: Stroke, follow up; I62.9-Nontraumatic intracranial hemorrhage,  unspecified; R29.810-Facial weakness; R29.898-Other symptoms and signs  involving the musculoskeletal system; R51.9-Headache, unspecified;  Z86.79-Personal history of other diseases of the circulatory system;  G93.6-Cerebral edema; R90.89-Other abnormal findings on diagnostic  imaging of central nervous system.     TECHNIQUE: Multiple axial images were obtained through the brain without  contrast infusion. Multiplanar images were reconstructed.     DLP: 684 mGy-cm. Automated dosage reduction technique was utilized to  reduce patient dosage.     COMPARISON: 03/04/2023.     FINDINGS: A right basal ganglia hemorrhage is not significantly changed.  This measures about 4.8 x 2.3 cm. There is edema surrounding the  hemorrhage. There is sulcal effacement on the right side. There is  compression of the right lateral ventricle. There is shifting of the  lateral ventricles to the left by about 4 mm. There is mucosal  thickening in the right maxillary sinus. No calvarial fracture is seen.          Impression:      Right basal ganglia hemorrhage is not significantly  changed. There is surrounding edema with sulcal effacement. There is  compression of the right lateral ventricle and shifting of the lateral  ventricles to the left by about 4 mm.     This report was finalized on 03/05/2023 11:26 by Dr. Claus Bhagat MD.    CT Head Without Contrast [[id number]] Collected: 03/04/23 2005     Updated: 03/04/23 2013    Narrative:      EXAM/TECHNIQUE: CT head without contrast     INDICATION: Stroke, follow up; I62.9-Nontraumatic intracranial  hemorrhage, unspecified; R29.810-Facial weakness; R29.898-Other symptoms  and signs involving  the musculoskeletal system; R51.9-Headache,  unspecified; Z86.79-Personal history of other diseases of the  circulatory system; G93.6-Cerebral edema; R90.89-Other abnormal findings  on diagnostic imaging of central nervous system; R13.10-Dysphagia,  unspecifi     COMPARISON: 03/04/2020 0759     DLP: 663 mGy cm. Automated exposure control was also utilized to  decrease patient radiation dose.     FINDINGS:     The study is degraded by patient motion.     Slight increase in size of 4.8 x 2.2 cm focus of acute hemorrhage in the  RIGHT basal ganglia with mild surrounding vasogenic edema. Slight  increase in 5 to 6 mm RIGHT to LEFT midline shift. Similar mass effect  on the RIGHT lateral ventricle. No intraventricular hemorrhage. No loss  of gray-white differentiation. Basilar cisterns are patent. No acute  orbital finding. Mastoid air cells are clear. Partially imaged RIGHT  maxillary sinus opacification. No acute osseous finding.       Impression:         Slight increase in size of 4.8 x 2.2 cm acute hemorrhage in the RIGHT  basal ganglia with mild surrounding vasogenic edema. Slight increase in  5 to 6 mm RIGHT to LEFT midline shift.  This report was finalized on 03/04/2023 20:10 by Dr. Jackson Weber MD.          Assessment / Plan   Assessment:   Active Hospital Problems    Diagnosis    • **Intracranial bleeding (HCC)    • Malignant hypertension    • Elevated transaminase level    • Alcohol abuse         Treatment Plan  Initiate low-dose CIWA protocol  Amlodipine 10 mg p.o. daily  Increase metoprolol to 50 mg p.o. every 12 hours  Add Hytrin 5 mg daily to regimen  Plan to transition metoprolol to tartrate to metoprolol succinate at discharge  CT angiogram of the abdomen and pelvis to assess for renal artery stenosis    Medical Decision Making  Number and Complexity of problems:   1) malignant hypertension: Acute, high complexity  2) intracranial bleeding: Acute, high complexity  3) elevated transaminases secondary to  alcohol abuse: Chronic, moderate complexity    Differential Diagnosis: None others considered    Conditions and Status        Condition is improving.     [facility] Data  External documents reviewed: Care everywhere documents  Cardiac tracing (EKG, telemetry) interpretation: See HPI  Radiology interpretation: See HPI  Labs reviewed: See HPI  Any tests that were considered but not ordered: Urinary 17 hydroxysteroids and metanephrines     Decision rules/scores evaluated (example [id number], [place], etc): None     Discussed with: The patient     Care Planning  Shared decision making: The patient  Code status and discussions: Full code    Disposition  Social Determinants of Health that impact treatment or disposition: None known  I expect the patient to be discharged by the primary service.     I confirmed that the patient's Advance Care Plan is present, code status is documented, or surrogate decision maker is listed in the patient's medical record.     The patient's surrogate decision maker is his mother.     Patient seen and examined by me on 3/5/2023 at 1600.    Electronically signed by Donavan Hernandez DO, 03/05/23, 16:57 CST.

## 2023-03-05 NOTE — PLAN OF CARE
Goal Outcome Evaluation:  Plan of Care Reviewed With: patient        Progress: no change  Outcome Evaluation: Pt. agreed to therapy. Pt. very lethargic and difficulty keeping eyes open during tx. Pt. needed MOD assist for bed mobility. He was unable to maintain midline with sitting EOB-needing Min-Mod assist to correct and maintain. He appeared unaware of his deficits. He was able to actively move his R UE and LE, but required assist for moving L UE and demonstrated neglect of that limb. Pt. was able to stand with MIN-MOD assist. He walked a short distance with assist of 2. He walks with a left lean and left hip hiking to clear foot. Pt. will need acute rehab following discharge for further therapy.

## 2023-03-05 NOTE — PROGRESS NOTES
"Progress Note    Patient:  James Taylor  YOB: 1980  MRN: [id number]   Admit date: 3/4/2023   Admitting Physician: Brandon Gannon MD  Primary Care Physician: Radha Desir MD    Chief Complaint: Hemorrhagic stroke    Interval History: No events overnight.  Tolerating p.o.  Working with physical therapy.    Intake/Output Summary (Last 24 hours) at 3/5/2023 0839  Last data filed at 3/5/2023 0412  Gross per 24 hour   Intake 1767 ml   Output 450 ml   Net 1317 ml     Allergies: No Known Allergies  Current Scheduled Medications:   amLODIPine, 10 mg, Oral, Daily  Chlorhexidine Gluconate Cloth, 1 application, Topical, Q24H  metoprolol tartrate, 25 mg, Oral, Q12H  mupirocin, 1 application, Each Nare, BID  nicotine, 1 patch, Transdermal, Q24H  sertraline, 50 mg, Oral, Q24H  sodium chloride, 10 mL, Intravenous, Q12H      Current PRN Medications:  •  acetaminophen **OR** acetaminophen  •  enalaprilat  •  labetalol  •  LORazepam **OR** LORazepam **OR** LORazepam **OR** LORazepam **OR** LORazepam **OR** LORazepam  •  sodium chloride  •  sodium chloride  •  sodium chloride    Review of Systems   Neurological: Positive for weakness.   All other systems reviewed and are negative.      Pertinent positives/negatives documented in HPI.  All other systems reviewed and negative.    Vital Signs:  /88   Pulse 85   Temp 98 °F (36.7 °C) (Oral)   Resp 18   Ht 177.8 cm (70\")   Wt 65.5 kg (144 lb 8 oz)   SpO2 96%   BMI 20.73 kg/m²     Physical Exam  Cardiovascular:      Rate and Rhythm: Normal rate and regular rhythm.   Pulmonary:      Effort: No respiratory distress.      Breath sounds: Normal breath sounds.   Abdominal:      General: There is no distension.      Palpations: Abdomen is soft.       Vital signs reviewed.  Line/IV site: No erythema, warmth, induration, or tenderness.    Objective:  Vital signs: (most recent): Blood pressure 118/88, pulse 85, temperature 98 °F (36.7 °C), temperature source Oral, " "resp. rate 18, height 177.8 cm (70\"), weight 65.5 kg (144 lb 8 oz), SpO2 96 %.    Lungs:  He is not in respiratory distress.    Heart: Normal rate.  Regular rhythm.    Abdomen: Abdomen is soft and non-distended.        Neurologic Exam  Mental Status   Oriented to person, place, and time.   Attention: normal.   Speech: speech is normal   Level of consciousness: alert  Knowledge: good.      Cranial Nerves      CN II   Visual fields full to confrontation.      CN III, IV, VI   Pupils are equal, round, and reactive to light.  Extraocular motions are normal.   Ophthalmoparesis: Right gaze preference but extraocular movements are intact.     CN V   Facial sensation intact.      CN VII   Right facial weakness: none  Left facial weakness: central (Left facial droop)     CN VIII   CN VIII normal.      CN IX, X   CN IX normal.   CN X normal.      CN XI   CN XI normal.      CN XII   CN XII normal.      Motor Exam   Muscle bulk: normal  Overall muscle tone: normal  Right arm pronator drift: absent  Left arm pronator drift: absent     Strength   Strength 5/5 except as noted. Right upper and lower extremity 5 out of 5 in all flexion-extension movements    Left upper and lower extremity 3 out of 5 in most flexion-extension movements but ataxic.     Lab Results:  ABG:     CBC:   Results from last 7 days   Lab Units 03/04/23  0115   WBC 10*3/mm3 6.58   HEMOGLOBIN g/dL 13.6   HEMATOCRIT % 38.3   PLATELETS 10*3/mm3 104*     BMP:  Results from last 7 days   Lab Units 03/04/23  0115   SODIUM mmol/L 140   POTASSIUM mmol/L 3.8   CHLORIDE mmol/L 102   CO2 mmol/L 26.0   BUN mg/dL 14   CREATININE mg/dL 1.13   GLUCOSE mg/dL 110*   CALCIUM mg/dL 9.2   ALT (SGPT) U/L 180*     Culture Results: No results found for: BLOODCX, URINECX, WOUNDCX, MRSACX, RESPCX, STOOLCX        Radiology:   CT scan of the brain yesterday shows some increasing surrounding edema slight increase in size of the overall hemorrhage.  Some increased midline " shift.  Additional Studies Reviewed:     Impression/Recommendations:   CV: Heart rate and blood pressure stable.  Currently off Cardene with good control blood pressure.   hospitalist consult pending  RESP: Oxygenating well  GI: Tolerating p.o.  : BUN/creatinine urine output are adequate  NEURO: Exam essentially stable.  Was able to ambulate yesterday with physical therapy.  Repeat CT scan today.  ID:  Approximately 1 hour was spent in critical care management of this patient including review of radiology studies, review of vital signs and laboratory values, physical exam, counseling with the patient.    Intracranial bleeding (HCC)    Malignant hypertension    Elevated transaminase level    Alcohol abuse      Brandon Gannon MD

## 2023-03-05 NOTE — THERAPY TREATMENT NOTE
Acute Care - Physical Therapy Treatment Note  [facility]     Patient Name: James Taylor  : 1980  MRN: [id number]  Today's Date: 3/5/2023      Visit Dx:     ICD-10-CM ICD-9-CM   1. Intracranial bleeding (HCC)  I62.9 432.9   2. Facial droop  R29.810 781.94   3. Left arm weakness  R29.898 729.89   4. Acute nonintractable headache, unspecified headache type  R51.9 784.0   5. History of hypertension  Z86.79 V12.59   6. Cerebral edema (HCC)  G93.6 348.5   7. Midline shift of brain  R90.89 793.0   8. Dysphagia, unspecified type  R13.10 787.20   9. Impaired mobility  Z74.09 799.89     Patient Active Problem List   Diagnosis   • Intracranial bleeding (HCC)   • Malignant hypertension   • Elevated transaminase level   • Alcohol abuse     Past Medical History:   Diagnosis Date   • Asthma    • Hypertension      History reviewed. No pertinent surgical history.  PT Assessment (last 12 hours)     PT Evaluation and Treatment     Row Name 23 1400 23       Physical Therapy Time and Intention    Subjective Information no complaints  -MF no complaints  -    Document Type therapy note (daily note)  - therapy note (daily note)  -    Mode of Treatment physical therapy  - physical therapy  -    Comment pt still lethargic, but less than this morning.  - pt extremely lethargic. Had great difficulty keeping eyes open. Pt. did wake up a little more once standing, but reverted back once sitting.   -    Row Name 23 1400 23       General Information    Existing Precautions/Restrictions fall  L side weakness and neglect  - fall  Left side weakness and neglect.  -    Limitations/Impairments safety/cognitive  impulsive  - --    Row Name 23 1400 23       Pain    Pretreatment Pain Rating 0/10 - no pain  - 0/10 - no pain  -MF    Posttreatment Pain Rating 0/10 - no pain  - 0/10 - no pain  -    Row Name 23 1400 23       Bed Mobility    Supine-Sit  [place] (Bed Mobility) verbal cues;contact guard;minimum assist (75% patient effort)  - verbal cues;minimum assist (75% patient effort);moderate assist (50% patient effort)  -    Sit-Supine [place] (Bed Mobility) verbal cues;contact guard  - --    Assistive Device (Bed Mobility) head of bed elevated  - head of bed elevated  -    Row Name 03/05/23 1400          Transfers    Comment, (Transfers) stood x 2  -     Row Name 03/05/23 1400 03/05/23 0931       Sit-Stand Transfer    Sit-Stand [place] (Transfers) verbal cues;minimum assist (75% patient effort)  - verbal cues;minimum assist (75% patient effort)  -    Row Name 03/05/23 1400 03/05/23 0931       Stand-Sit Transfer    Stand-Sit [place] (Transfers) verbal cues;minimum assist (75% patient effort)  - verbal cues;minimum assist (75% patient effort)  -    Row Name 03/05/23 1400 03/05/23 0931       Gait/Stairs (Locomotion)    [place] Level (Gait) verbal cues;minimum assist (75% patient effort);moderate assist (50% patient effort)  - verbal cues;minimum assist (75% patient effort);moderate assist (50% patient effort);2 person assist  -    Assistive Device (Gait) --  Left HHA  - --    Distance in Feet (Gait) 40  - 30  -    Deviations/Abnormal Patterns (Gait) left sided deviations;ataxic;naren decreased;stride length decreased  - left sided deviations;ataxic;naren decreased;stride length decreased  -    Left Sided Gait Deviations hip hiking;heel strike decreased  - heel strike decreased;leans left  step to and hip hiking with LLE.  -    Comment, (Gait/Stairs) lots of cues for heel strike on Left-pt able to complete about 25% of the time.  - --    Row Name 03/05/23 1400 03/05/23 0931       Balance    Comment, Balance CGA for sitting EOB  - Min-Mod assist for sitting balance. Left and posterior lean. Worked on anterior and right leaning. Pt. unaware of deficits while sitting EOB.  -    Row Name 03/05/23  0931          Motor Skills    Comments, Therapeutic Exercise actively moves R UE/LE. active movement of L LE, but required assist of L UE  -     Row Name 03/05/23 1400          Hip (Therapeutic Exercise)    Hip (Therapeutic Exercise) AROM (active range of motion)  -     Hip AROM (Therapeutic Exercise) bilateral;flexion;sitting;10 repetitions  -     Row Name 03/05/23 1400          Knee (Therapeutic Exercise)    Knee (Therapeutic Exercise) AROM (active range of motion)  -     Knee AROM (Therapeutic Exercise) bilateral;LAQ (long arc quad);sitting;10 repetitions  -     Row Name 03/05/23 1400          Ankle (Therapeutic Exercise)    Ankle (Therapeutic Exercise) PROM (passive range of motion);AROM (active range of motion)  -     Ankle AROM (Therapeutic Exercise) right;dorsiflexion;sitting;10 repetitions  -     Ankle PROM (Therapeutic Exercise) left;dorsiflexion;sitting;10 repetitions  -     Row Name 03/05/23 0931          Plan of Care Review    Plan of Care Reviewed With patient  -     Progress no change  -     Outcome Evaluation Pt. agreed to therapy. Pt. very lethargic and difficulty keeping eyes open during tx. Pt. needed MOD assist for bed mobility. He was unable to maintain midline with sitting EOB-needing Min-Mod assist to correct and maintain. He appeared unaware of his deficits. He was able to actively move his R UE and LE, but required assist for moving L UE and demonstrated neglect of that limb. Pt. was able to stand with MIN-MOD assist. He walked a short distance with assist of 2. He walks with a left lean and left hip hiking to clear foot. Pt. will need acute rehab following discharge for further therapy.  -     Row Name 03/05/23 1400          Vital Signs    Post Systolic BP Rehab 161  -     Post Treatment Diastolic BP 97  -     Row Name 03/05/23 1400 03/05/23 0931       Positioning and Restraints    Pre-Treatment Position in bed  - in bed  -    Post Treatment Position bed  -  chair  -MF    In Bed fowlers;call light within reach;encouraged to call for assist;exit alarm on;side rails up x3  -MF --    In Chair -- reclined;call light within reach;encouraged to call for assist;notified nsg  -MF          User Key  (r) = Recorded By, (t) = Taken By, (c) = Cosigned By    Initials Name Provider Type    Radha Guillen PTA Physical Therapist Assistant                Physical Therapy Education     Title: PT OT SLP Therapies (Done)     Topic: Physical Therapy (Done)     Point: Mobility training (Done)     Learning Progress Summary           Patient Acceptance, E,D, DU,VU by  at 3/4/2023 1127    Comment: benefits of PT and POC, call for A OOB                   Point: Precautions (Done)     Learning Progress Summary           Patient Acceptance, E,D, DU,VU by  at 3/4/2023 1127    Comment: benefits of PT and POC, call for A OOB                               User Key     Initials Effective Dates Name Provider Type Discipline     02/03/23 -  Swapnil Reid, PT Physical Therapist PT              PT Recommendation and Plan     Plan of Care Reviewed With: patient  Progress: no change  Outcome Evaluation: Pt. agreed to therapy. Pt. very lethargic and difficulty keeping eyes open during tx. Pt. needed MOD assist for bed mobility. He was unable to maintain midline with sitting EOB-needing Min-Mod assist to correct and maintain. He appeared unaware of his deficits. He was able to actively move his R UE and LE, but required assist for moving L UE and demonstrated neglect of that limb. Pt. was able to stand with MIN-MOD assist. He walked a short distance with assist of 2. He walks with a left lean and left hip hiking to clear foot. Pt. will need acute rehab following discharge for further therapy.   Outcome Measures     Row Name 03/05/23 0931             How much help from another person do you currently need...    Turning from your back to your side while in flat bed without using bedrails? 3  -MF       Moving from lying on back to sitting on the side of a flat bed without bedrails? 2  -MF      Moving to and from a bed to a chair (including a wheelchair)? 3  -MF      Standing up from a chair using your arms (e.g., wheelchair, bedside chair)? 3  -MF      Climbing 3-5 steps with a railing? 1  -MF      To walk in hospital room? 2  -MF      AM-PAC 6 Clicks Score (PT) 14  -MF         Functional Assessment    Outcome Measure Options AM-PAC 6 Clicks Basic Mobility (PT)  -MF            User Key  (r) = Recorded By, (t) = Taken By, (c) = Cosigned By    Initials Name Provider Type    Radha Guillen PTA Physical Therapist Assistant                 Time Calculation:    PT Charges     Row Name 03/05/23 1431 03/05/23 1342          Time Calculation    Start Time 1400  -MF 0931  -MF     Stop Time 1425  -MF 0959  -MF     Time Calculation (min) 25 min  -MF 28 min  -MF     PT Received On -- 03/05/23  -MF        Time Calculation- PT    Total Timed Code Minutes- PT 25 minute(s)  -MF 28 minute(s)  -MF        Timed Charges    82880 - PT Therapeutic Exercise Minutes 10  -MF 14  -MF     51579 - Gait Training Minutes  15  -MF 14  -MF        Total Minutes    Timed Charges Total Minutes 25  -MF 28  -MF      Total Minutes 25  -MF 28  -MF           User Key  (r) = Recorded By, (t) = Taken By, (c) = Cosigned By    Initials Name Provider Type     Radha Flynn PTA Physical Therapist Assistant              Therapy Charges for Today     Code Description Service Date Service Provider Modifiers Qty    94263667732 HC PT THER PROC EA 15 MIN 3/5/2023 Radha Flynn, PTA GP 1    72670230153 HC GAIT TRAINING EA 15 MIN 3/5/2023 Radha Flynn, PTA GP 1    36462713133 HC PT THER PROC EA 15 MIN 3/5/2023 Radha Flynn, PTA GP 1    06093610595 HC GAIT TRAINING EA 15 MIN 3/5/2023 Radha Flynn, PTA GP 1          PT G-Codes  Outcome Measure Options: AM-PAC 6 Clicks Basic Mobility (PT)  AM-PAC 6 Clicks Score (PT):  14  AM-PAC 6 Clicks Score (OT): 16  Modified [place] Scale: 4 - Moderately severe disability.  Unable to walk without assistance, and unable to attend to own bodily needs without assistance.    Radha Flynn, PTA  3/5/2023

## 2023-03-06 LAB
QT INTERVAL: 452 MS
QTC INTERVAL: 511 MS

## 2023-03-06 PROCEDURE — 25010000002 SODIUM CHLORIDE 0.9 % WITH KCL 20 MEQ 20-0.9 MEQ/L-% SOLUTION: Performed by: FAMILY MEDICINE

## 2023-03-06 PROCEDURE — 97530 THERAPEUTIC ACTIVITIES: CPT

## 2023-03-06 PROCEDURE — 92526 ORAL FUNCTION THERAPY: CPT | Performed by: SPEECH-LANGUAGE PATHOLOGIST

## 2023-03-06 PROCEDURE — 97110 THERAPEUTIC EXERCISES: CPT

## 2023-03-06 PROCEDURE — 97110 THERAPEUTIC EXERCISES: CPT | Performed by: OCCUPATIONAL THERAPIST

## 2023-03-06 PROCEDURE — 99232 SBSQ HOSP IP/OBS MODERATE 35: CPT | Performed by: NURSE PRACTITIONER

## 2023-03-06 RX ORDER — SODIUM CHLORIDE AND POTASSIUM CHLORIDE 150; 900 MG/100ML; MG/100ML
100 INJECTION, SOLUTION INTRAVENOUS CONTINUOUS
Status: DISCONTINUED | OUTPATIENT
Start: 2023-03-06 | End: 2023-03-09

## 2023-03-06 RX ADMIN — LABETALOL HYDROCHLORIDE 10 MG: 5 INJECTION INTRAVENOUS at 13:33

## 2023-03-06 RX ADMIN — ENALAPRILAT 1.25 MG: 1.25 INJECTION INTRAVENOUS at 00:02

## 2023-03-06 RX ADMIN — LABETALOL HYDROCHLORIDE 10 MG: 5 INJECTION INTRAVENOUS at 12:58

## 2023-03-06 RX ADMIN — ENALAPRILAT 1.25 MG: 1.25 INJECTION INTRAVENOUS at 18:15

## 2023-03-06 RX ADMIN — CHLORHEXIDINE GLUCONATE 1 APPLICATION: 500 CLOTH TOPICAL at 04:02

## 2023-03-06 RX ADMIN — Medication 10 ML: at 08:05

## 2023-03-06 RX ADMIN — AMLODIPINE BESYLATE 10 MG: 10 TABLET ORAL at 13:59

## 2023-03-06 RX ADMIN — LABETALOL HYDROCHLORIDE 10 MG: 5 INJECTION INTRAVENOUS at 08:05

## 2023-03-06 RX ADMIN — Medication 1 APPLICATION: at 08:05

## 2023-03-06 RX ADMIN — NICARDIPINE HYDROCHLORIDE 5 MG/HR: 25 INJECTION, SOLUTION INTRAVENOUS at 14:26

## 2023-03-06 RX ADMIN — LORAZEPAM 1 MG: 1 TABLET ORAL at 19:36

## 2023-03-06 RX ADMIN — NICARDIPINE HYDROCHLORIDE 5 MG/HR: 25 INJECTION, SOLUTION INTRAVENOUS at 21:26

## 2023-03-06 RX ADMIN — NICARDIPINE HYDROCHLORIDE 10 MG/HR: 25 INJECTION, SOLUTION INTRAVENOUS at 02:47

## 2023-03-06 RX ADMIN — NICOTINE 1 PATCH: 21 PATCH, EXTENDED RELEASE TRANSDERMAL at 17:38

## 2023-03-06 RX ADMIN — LORAZEPAM 2 MG: 1 TABLET ORAL at 21:48

## 2023-03-06 RX ADMIN — Medication 1 APPLICATION: at 20:02

## 2023-03-06 RX ADMIN — POTASSIUM CHLORIDE AND SODIUM CHLORIDE 100 ML/HR: 900; 150 INJECTION, SOLUTION INTRAVENOUS at 23:32

## 2023-03-06 RX ADMIN — ENALAPRILAT 1.25 MG: 1.25 INJECTION INTRAVENOUS at 12:29

## 2023-03-06 RX ADMIN — METOPROLOL TARTRATE 50 MG: 50 TABLET, FILM COATED ORAL at 20:02

## 2023-03-06 RX ADMIN — ENALAPRILAT 1.25 MG: 1.25 INJECTION INTRAVENOUS at 06:50

## 2023-03-06 RX ADMIN — Medication 10 ML: at 20:02

## 2023-03-06 RX ADMIN — METOPROLOL TARTRATE 50 MG: 50 TABLET, FILM COATED ORAL at 13:58

## 2023-03-06 RX ADMIN — POTASSIUM CHLORIDE AND SODIUM CHLORIDE 100 ML/HR: 900; 150 INJECTION, SOLUTION INTRAVENOUS at 14:26

## 2023-03-06 RX ADMIN — TERAZOSIN HYDROCHLORIDE 5 MG: 5 CAPSULE ORAL at 20:02

## 2023-03-06 RX ADMIN — NICARDIPINE HYDROCHLORIDE 5 MG/HR: 25 INJECTION, SOLUTION INTRAVENOUS at 17:32

## 2023-03-06 NOTE — PROGRESS NOTES
[facility] Medicine Services  INPATIENT PROGRESS NOTE    Patient Name: James Taylor  Date of Admission: 3/4/2023  Today's Date: 03/06/23  Length of Stay: 2  Primary Care Physician: Radha Desir MD    Subjective   Chief Complaint: high blood pressure.  HPI   Patient is sleepy/sedate this AM.   Has had ativan per the CIWA protocol.    Blood pressures ranging from  1212/78 to 166/135 at Midnight.  Overall pressure control is good.         Review of Systems   All pertinent negatives and positives are as above. All other systems have been reviewed and are negative unless otherwise stated.     Objective    Temp:  [97.5 °F (36.4 °C)-100 °F (37.8 °C)] 98.4 °F (36.9 °C)  Heart Rate:  [] 83  Resp:  [18-21] 21  BP: (110-171)/() 125/79  Physical Exam  Vitals and nursing note reviewed.   Constitutional:       Appearance: Normal appearance.      Comments: Sleepy/sedate   HENT:      Head: Normocephalic and atraumatic.      Right Ear: External ear normal.      Left Ear: External ear normal.      Nose: Nose normal.      Mouth/Throat:      Mouth: Mucous membranes are moist.   Eyes:      Extraocular Movements: Extraocular movements intact.      Conjunctiva/sclera: Conjunctivae normal.      Pupils: Pupils are equal, round, and reactive to light.   Cardiovascular:      Rate and Rhythm: Normal rate and regular rhythm.      Pulses: Normal pulses.      Heart sounds: No murmur heard.    No friction rub. No gallop.   Pulmonary:      Effort: Pulmonary effort is normal.      Breath sounds: Normal breath sounds.   Abdominal:      General: Bowel sounds are normal.      Palpations: Abdomen is soft.   Musculoskeletal:         General: Normal range of motion.      Cervical back: No rigidity.   Skin:     General: Skin is warm and dry.      Capillary Refill: Capillary refill takes less than 2 seconds.   Neurological:      Comments: Sleepy/sedate   Psychiatric:         Behavior: Behavior normal.            Results Review:  I have reviewed the labs, radiology results, and diagnostic studies.    Laboratory Data:   Results from last 7 days   Lab Units 03/04/23  0115   WBC 10*3/mm3 6.58   HEMOGLOBIN g/dL 13.6   HEMATOCRIT % 38.3   PLATELETS 10*3/mm3 104*        Results from last 7 days   Lab Units 03/04/23  0115   SODIUM mmol/L 140   POTASSIUM mmol/L 3.8   CHLORIDE mmol/L 102   CO2 mmol/L 26.0   BUN mg/dL 14   CREATININE mg/dL 1.13   CALCIUM mg/dL 9.2   BILIRUBIN mg/dL 1.2   ALK PHOS U/L 190*   ALT (SGPT) U/L 180*   AST (SGOT) U/L 615*   GLUCOSE mg/dL 110*       Culture Data:   No results found for: BLOODCX, URINECX, WOUNDCX, MRSACX, RESPCX, STOOLCX    Radiology Data:   Imaging Results (Last 24 Hours)     Procedure Component Value Units Date/Time    CT Angiogram Abdomen Pelvis [[id number]] Collected: 03/06/23 0710     Updated: 03/06/23 0739    Narrative:      EXAMINATION: CT ANGIOGRAM ABDOMEN PELVIS-      3/5/2023 11:03 PM CST     HISTORY: Renal artery stenosis; I62.9-Nontraumatic intracranial  hemorrhage, unspecified; R29.810-Facial weakness; R29.898-Other symptoms  and signs involving the musculoskeletal system; R51.9-Headache,  unspecified; Z86.79-Personal history of other diseases of the  circulatory system; G93.6-Cerebral edema; R90.89-Other abnormal findings  on diagnostic imaging of central nervous system; R13.10-Dysphagia, unspe     In order to have a CT radiation dose as low as reasonably achievable  Automated Exposure Control was utilized for adjustment of the mA and/or  KV according to patient size.     DLP in mGycm= 467     The CT angiography of the abdomen and pelvis are performed after  intravenous contrast enhancement.     Images are acquired in axial plane and subsequent 2-D reconstruction in  coronal and sagittal planes and 3-D maximum intensity projection images  reconstruction.     There is no previous study for comparison.     Atheromatous changes of the abdominal aorta and iliac arteries.      No aneurysmal dilatation or dissection.     There is normal origin course and caliber of the celiac and superior  mesenteric arteries and branches.     There is normal origin course and caliber of right and both renal  arteries. There are single renal arteries bilaterally. There is an area  of of irregularity and a filling defect and a resultant moderate  stenosis of the superior segment of branch of the right renal artery. No  focal stenosis or aneurysmal dilatation.     Normal origin course and caliber of the inferior mesenteric artery with  subsequent normal branches.     Normal common, internal and external iliac arteries bilaterally is seen.  Normal common femoral artery bilaterally dividing into normal  superficial and deep femoral arteries.     The lung bases included in the study show an area of consolidation in  the left lower lobe which probably represent an acute or subacute  inflammatory/infectious process. This was not seen in the previous CT  scan of the chest dated 10/02/2022.     Fatty infiltration of the liver and moderate hepatomegaly.     No radiopaque gallstones.     There is ill-defined decreased attenuation in the tail of the pancreas  with mild surrounding retroperitoneal fat infiltration which may  represent an evolving pancreatitis?. The remaining pancreas is  unremarkable. No ductal dilatation. There is moderate left anterior  pararenal fascial thickening and a small fluid accumulation in the left  paracolic gutter.     The adrenal glands are normal.     There is poor nephrogram involving the upper pole of the left kidney. No  discrete mass is seen. The remaining kidneys are unremarkable. No  calculi. No hydronephrosis. The ureters are not optimally visualized or  evaluated. The urinary bladder is poorly distended. No intrinsic  abnormality.     Prostate is not significantly enlarged.     The stomach is decompressed. Small bowel is not significantly distended.  No finding to suggest  pancreatitis. Large volume stool is seen in the  colon. No finding to suggest obstruction.     There is no evidence of abdominal or pelvic lymphadenopathy.     Images are reviewed in bone window show no acute bony abnormality or  bone lesion.       Impression:      1. The irregularity and moderate stenosis of the superior segmental  branch of the left renal artery. There is poor nephrogram of the upper  pole of the left kidney which may represent ischemia of the upper pole  of the left kidney. The main renal arteries bilaterally appear normal.  2. The changes in and around the tail of the pancreas may represent  evolving acute pancreatitis. This likely to be clinically correlated and  further evaluated.  3. Fatty infiltration of the liver and moderate hepatomegaly.                                               This report was finalized on 03/06/2023 07:35 by Dr. Heath Valente MD.    CT Head Without Contrast [[id number]] Collected: 03/05/23 1123     Updated: 03/05/23 1129    Narrative:      EXAMINATION:  CT HEAD WO CONTRAST-  3/5/2023 10:46 AM CST     HISTORY: Stroke, follow up; I62.9-Nontraumatic intracranial hemorrhage,  unspecified; R29.810-Facial weakness; R29.898-Other symptoms and signs  involving the musculoskeletal system; R51.9-Headache, unspecified;  Z86.79-Personal history of other diseases of the circulatory system;  G93.6-Cerebral edema; R90.89-Other abnormal findings on diagnostic  imaging of central nervous system.     TECHNIQUE: Multiple axial images were obtained through the brain without  contrast infusion. Multiplanar images were reconstructed.     DLP: 684 mGy-cm. Automated dosage reduction technique was utilized to  reduce patient dosage.     COMPARISON: 03/04/2023.     FINDINGS: A right basal ganglia hemorrhage is not significantly changed.  This measures about 4.8 x 2.3 cm. There is edema surrounding the  hemorrhage. There is sulcal effacement on the right side. There is  compression of the  right lateral ventricle. There is shifting of the  lateral ventricles to the left by about 4 mm. There is mucosal  thickening in the right maxillary sinus. No calvarial fracture is seen.          Impression:      Right basal ganglia hemorrhage is not significantly  changed. There is surrounding edema with sulcal effacement. There is  compression of the right lateral ventricle and shifting of the lateral  ventricles to the left by about 4 mm.     This report was finalized on 03/05/2023 11:26 by Dr. Claus Bhagat MD.          I have reviewed the patient's current medications.     Assessment/Plan   Assessment  Active Hospital Problems    Diagnosis    • **Intracranial bleeding (HCC)    • Malignant hypertension    • Elevated transaminase level    • Alcohol abuse        Treatment Plan  Blood pressure is controlled.   Continue current medications.  Monitor BP  Would reduce dose of ativan.      Medical Decision Making  Number and Complexity of problems:     Malignant HTN acute high risk  ICH acute high risk  Elevated transaminases chronic moderate risk  Alcohol abuse chronic moderate risk      Differential Diagnosis: none    Conditions and Status        Condition is improving.        BP is controlled    MDM Data  External documents reviewed: none  Cardiac tracing (EKG, telemetry) interpretation: NSR   Radiology interpretation: Data reviewed  Labs reviewed: reviewed  Any tests that were considered but not ordered: none     Decision rules/scores evaluated (example [id number], Wells, etc): nond     Discussed with: nursing     Care Planning  Shared decision making: patient too sedate to discuss care with  Code status and discussions: full    Disposition  Social Determinants of Health that impact treatment or disposition: none  I expect the patient to be discharged by primary team.     Electronically signed by Alejandra Serrano, 03/06/23, 07:56 CST.

## 2023-03-06 NOTE — PAYOR COMM NOTE
"3/6/23 [facility] [phone]    [phone]    ER ADMIT TO ICU ON 3/4/23. INPATIENT ORDER.    FAXING FOR INPATIENT REVIEW AND INPATIENT APPROVAL.              James Taylor (42 y.o. Male)     Date of Birth   1980    Social Security Number       Address   [address] [place] [address]    Home Phone   [phone]    MRN   [id number]       Orthodoxy   Latter-day    Marital Status   Single                            Admission Date   3/4/23    Admission Type   Emergency    Admitting Provider   Brandon Gannon MD    Attending Provider   Brandon Gannon MD    Department, Room/Bed   [facility] INTENSIVE CARE, I002/1       Discharge Date       Discharge Disposition       Discharge Destination                               Attending Provider: Brandon Gannon MD    Allergies: No Known Allergies    Isolation: None   Infection: None   Code Status: CPR    Ht: 177.8 cm (70\")   Wt: 67.3 kg (148 lb 4.8 oz)    Admission Cmt: None   Principal Problem: Intracranial bleeding (HCC) [I62.9]                 Active Insurance as of 3/4/2023     Primary Coverage     Payor Plan Insurance Group Employer/Plan Group    [place] [id number]     Payor Plan Address Payor Plan Phone Number Payor Plan Fax Number Effective Dates    HU[employer]ICAL [address] [address] [phone]  1/1/2021 - None Entered    [facility] [id number]       Subscriber Name Subscriber Birth Date Member ID       JAMES TAYLOR 1980 [id number]                 Emergency Contacts      (Rel.) Home Phone Work Phone Mobile Phone    Jesus Taylor (Mother) [phone] -- --           [facility] Encounter Date/Time: 3/4/2023 0106   Hospital Account: [id number]    MRN: [id number]   Patient:  James Taylor   Contact Serial #: [id number]   SSN:          ENCOUNTER             Patient Class: Inpatient   Unit: [facility] ICU   Hospital Service: Neurosurgery     Bed: I002/1 "   Admitting Provider: Brandon Gannon MD   Referring Physician: Brandon Gannon   Attending Provider: Brandon Gannon MD   Adm Diagnosis: Intracranial bleeding (H*               PATIENT          Name: James Taylor : 1980 (42 yrs)   Address: [facility] [address] Sex: Male   City: [place] [address]   County: [facility]   Marital Status: SINGLE Ethnicity: NOT                                                                              Race: BLACK   Primary Care Provider: Radha Desir MD Patients Phone: Home Phone: [phone]     Mobile Phone: [phone]   EMERGENCY CONTACT   Contact Name Legal Guardian? Relationship to Patient Home Phone Work Phone   1. Jesus Taylor  2. *No Contact Specified* No    Mother    [phone]              GUARANTOR            Guarantor: James Taylor     : 1980   Address: [facility] [address] Sex: [place]     [place] [address]     Relation to Patient: Self       Home Phone: [phone]   Guarantor ID: [id number]       Work Phone:     GUARANTOR EMPLOYER   Employer: [employer]         Status: FULL TIME   COVERAGE          PRIMARY INSURANCE   [place] Plan[place]   Group Number: [id number] Insurance Type: INDEMNITY   Subscriber Name: JAMES TAYLOR Subscriber : 1980   Subscriber ID: [id number] Coverage Address: [place] [address]   Pat. Rel. to Subscriber: Self Coverage Phone: [phone]   SECONDARY INSURANCE   Payor: N/A Plan: N/A   Group Number:   Insurance Type:     Subscriber Name:   Subscriber :     Subscriber ID:   Coverage Address:     Pat. Rel. to Subscriber:   Coverage Phone:        Contact Serial # ([id number])         2023    Chart ID ([id number])           Romero Jang MD   Physician  Emergency Medicine  ED Provider Notes      Signed  Date of Service:  23  Creation Time:  23     Signed        Expand AllCollapse All   "  EMERGENCY DEPARTMENT ATTENDING NOTE     Patient Name: James Taylor         Chief Complaint   Patient presents with   • Stroke         PATIENT PRESENTATION:  James Taylor is a very pleasant 42 y.o. male with history of hypertension who presented to this emergency department visiting his mother and then when other family arrived they felt he was off his normal with facial droop and left arm weakness.     Patient denies any prior history of stroke he does has history hypertension for which he takes blood pressure medication.  He actually feels fine and had not even noticed his symptoms until family told him.  He endorses a mild headache denies any vision change or hearing changes.  Denies any numbness in his extremities.  Denies any fevers chills chest pain shortness of breath.  No history of headaches or migraine headaches.        PHYSICAL EXAM:   VS: /76   Pulse 84   Temp 97.7 °F (36.5 °C) (Oral)   Resp 19   Ht 177.8 cm (70\")   Wt 68.2 kg (150 lb 4.8 oz)   SpO2 99%   BMI 21.57 kg/m²   GENERAL: Well-appearing middle-age man sitting up stretcher no acute distress; well-nourished, well-developed, awake, alert, no acute distress, nontoxic appearing, comfortable  EYES: PERRL, sclerae anicteric, extraocular movements grossly intact, symmetric lids  EARS, NOSE, MOUTH, THROAT: atraumatic external nose and ears, moist mucous membranes  NECK: symmetric, trachea midline  RESPIRATORY: unlabored respiratory effort, clear to auscultation bilaterally, good air movement  CARDIOVASCULAR: no murmurs, peripheral pulses 2+ and equal in all extremities  GI: soft, nontender, nondistended  MUSCULOSKELETAL/EXTREMITIES: extremities without obvious deformity  SKIN: warm and dry with no obvious rashes  NEUROLOGIC: moving all 4 extremities symmetrically, CN II-XII grossly intact except for some left lower facial asymmetry with smile and facial droop as well as left upper extremity weakness that does not completely hit the " bed  PSYCHIATRIC: alert, pleasant and cooperative. Appropriate mood and affect.        MEDICAL DECISION MAKING:     James Taylor is a 42 y.o. male with history of hypertension who initially was visiting his mother in this emergency department and then when family arrived they felt like he had acute new symptoms of facial droop and left arm weakness so immediately               presented to triage and a stroke alert was activated.     NIHSS 2 for L upper extremity weakness and minor paralysis of the left is labial fold and smile asymmetry.     Differential Diagnosis Considered: Acute intracranial bleeding including subdural subarachnoid and epidural hemorrhage, acute ischemic stroke, Bell's palsy       Internal chart review:   Medical History        Past Medical History:   Diagnosis Date   • Asthma     • Hypertension              My lab interpretation: Initial fingerstick glucose 133.  Slight elevated high sensor troponin 17.  Slight elevated under 1.22.  CMP with notable AST and ALT liver enzyme elevation.  Otherwise normal white blood cell count hemoglobin.     My imaging interpretation: CT head with large intraparenchymal hemorrhage in the right basal ganglia.  Surrounding edema with slight midline shift 2 mm.     Discussed with: Initially the on-call neurologist due to stroke activation.  Immediately called neurosurgery given intracranial bleeding and they admitted the patient to the ICU to their service.  Dr. Gannon recommended Cardene with goal blood pressure 140 which was ordered.     ED Course and Re-evaluation: 41yo M who was initially visiting his mother in this hospital and then when his ride was picking him up noticed concerning symptoms for stroke so was immediately brought back to the emergency department.  I was called to triage by our triage nurse and we will evaluate the patient.  Notable left facial droop and left upper extremity drift but otherwise normal neurologic exam.  Given patient's age and  history of hypertension concern for possible stroke.  Stroke alert was immediately activated given NIH stroke scale of 2.  Discussed with our neurologist, Dr. Mckeon, who agreed with imaging.  As soon as I reviewed the imaging and the CT scanner immediately called neurosurgery, Dr. Gannon.  Initiated Cardene with a blood pressure goal of 140.  Initiated every hour neurochecks.  Suspect poorly controlled hypertension as led to his acute basal ganglia intracranial hemorrhage.  No other history of trauma or signs of trauma.  No evidence of any other skull fracture.  No evidence of any cerebral aneurysm.  Patient was admitted to the ICU for further management under neurosurgery.        ED Diagnosis:  Facial droop; Left arm weakness; Acute nonintractable headache, unspecified headache type; History of hypertension; Intracranial bleeding (HCC); Cerebral edema (HCC); Midline shift of brain     Disposition: admit to ICU with neurosurgery     Signed:  Romero Jang MD  Emergency Medicine Physician     Please note that portions of this note were completed with a voice recognition program.      Romero Jang MD  03/04/23 0254       Brandon Gannon MD   Physician  Neurosurgery  H&P      Signed  Date of Service:  03/04/23 0732  Creation Time:  03/04/23 0732     Signed        Expand AllCollapse All    Date of Admission: 3/4/2023  Primary Care Physician: Radha Desir MD           Subjective:     Chief complaint hemorrhagic stroke     HPI: 42-year-old gentleman who was in his otherwise normal state of health yesterday when family members noticed that he was having trouble with weakness and speaking.  He was sent to the emergency room where a CT scan demonstrated a right basal ganglia hemorrhagic stroke.  He does have a history of malignant hypertension is treated by Dr. Desir.  He is not taking any anticoagulants.  This morning's complaint of headache.  He denies any nausea or vomiting.     Review of Systems    Neurological: Positive for headaches.   All other systems reviewed and are negative.        Pertinent positives/negatives documented in HPI.  All other systems reviewed and negative.     Past Medical History:   Medical History        Past Medical History:   Diagnosis Date   • Asthma     • Hypertension              Past Surgical History:   Surgical History   History reviewed. No pertinent surgical history.        Family History: family history includes Anxiety disorder in his mother; Cancer in his father; Diabetes in his mother; Hyperlipidemia in his father and mother.     Social History:  reports that he has been smoking cigarettes. He started smoking about 10 years ago. He has a 15.00 pack-year smoking history. He has never used smokeless tobacco. He reports current alcohol use of about 4.0 standard drinks per week. He reports that he does not use drugs.     Code Status: Full     Medications:  Prescriptions Prior to Admission           Medications Prior to Admission   Medication Sig Dispense Refill Last Dose   • amLODIPine (NORVASC) 10 MG tablet Take 1 tablet by mouth Daily. For high blood pressure 30 tablet 2     • amLODIPine (NORVASC) 5 MG tablet Take 1 tablet by mouth Daily. For high blood pressure 30 tablet 5     • busPIRone (BUSPAR) 5 MG tablet Take 1 tablet by mouth 3 (Three) Times a Day As Needed (for relief of irritability, stress or anxiety). 30 tablet 2     • lisinopril (PRINIVIL,ZESTRIL) 20 MG tablet Take 1 tablet by mouth Daily. For blood pressure control 30 tablet 5     • sertraline (Zoloft) 50 MG tablet Take 1 tablet by mouth Daily. To help with mood. Take with food 90 tablet 0     • vitamin D (ERGOCALCIFEROL) 1.25 MG (75732 UT) capsule capsule Take 1 capsule by mouth 1 (One) Time Per Week. To treat low vitamin D. Take with food. 5 capsule 11              Allergies:  No Known Allergies              Objective:  Physical Exam  Eyes:      Extraocular Movements: EOM normal.      Pupils: Pupils are  equal, round, and reactive to light.   Cardiovascular:      Rate and Rhythm: Normal rate and regular rhythm.   Pulmonary:      Effort: No respiratory distress.      Breath sounds: Normal breath sounds.   Abdominal:      General: There is no distension.      Palpations: Abdomen is soft.   Neurological:      Mental Status: He is oriented to person, place, and time.      Coordination: Finger-Nose-Finger Test abnormal (Left upper and lower extremity ataxia).   Psychiatric:         Speech: Speech normal.            Neurologic Exam      Mental Status   Oriented to person, place, and time.   Attention: normal.   Speech: speech is normal   Level of consciousness: alert  Knowledge: good.      Cranial Nerves      CN II   Visual fields full to confrontation.      CN III, IV, VI   Pupils are equal, round, and reactive to light.  Extraocular motions are normal.   Ophthalmoparesis: Right gaze preference but extraocular movements are intact.     CN V   Facial sensation intact.      CN VII   Right facial weakness: none  Left facial weakness: central (Left facial droop)     CN VIII   CN VIII normal.      CN IX, X   CN IX normal.   CN X normal.      CN XI   CN XI normal.      CN XII   CN XII normal.      Motor Exam   Muscle bulk: normal  Overall muscle tone: normal  Right arm pronator drift: absent  Left arm pronator drift: absent     Strength   Strength 5/5 except as noted. Right upper and lower extremity 5 out of 5 in all flexion-extension movements    Left upper and lower extremity 3 out of 5 in most flexion-extension movements but ataxic.      Sensory Exam   Left arm light touch: decreased from elbow  Left leg light touch: decreased from knee  Left arm pinprick: decreased from elbow  Left leg pinprick: decreased from knee     Gait, Coordination, and Reflexes      Coordination   Finger to nose coordination: abnormal (Left upper and lower extremity ataxia)     Tremor   Resting tremor: absent  Intention tremor: absent  Action  tremor: absent        Vital Signs  Temp:  [97.7 °F (36.5 °C)-97.9 °F (36.6 °C)] 97.7 °F (36.5 °C)  Heart Rate:  [] 136  Resp:  [16-19] 19  BP: (108-211)/() 141/90           Results Review:  I reviewed the patient's new clinical results.  I reviewed the patient's new imaging results and agree with the interpretation.  I reviewed the patient's other test results and agree with the interpretation                     Lab Results (last 24 hours)      Procedure Component Value Units Date/Time     Lipid Panel [[id number]]  (Abnormal) Collected: 03/04/23 0253     Specimen: Blood Updated: 03/04/23 0325       Total Cholesterol 121 mg/dL         Triglycerides 589 mg/dL         HDL Cholesterol 24 mg/dL         LDL Cholesterol  20 mg/dL         VLDL Cholesterol 77 mg/dL         LDL/HDL Ratio Unable to Calculate     Narrative:       Cholesterol Reference Ranges  (U.S. Department of Health and Human Services ATP III Classifications)     Desirable          <200 mg/dL  Borderline High    200-239 mg/dL  High Risk          >240 mg/dL        Triglyceride Reference Ranges  (U.S. Department of Health and Human Services ATP III Classifications)     Normal           <150 mg/dL  Borderline High  150-199 mg/dL  High             200-499 mg/dL  Very High        >500 mg/dL     HDL Reference Ranges  (U.S. Department of Health and Human Services ATP III Classifications)     Low     <40 mg/dl (major risk factor for CHD)  High    >60 mg/dl ('negative' risk factor for CHD)           LDL Reference Ranges  (U.S. Department of Health and Human Services ATP III Classifications)     Optimal          <100 mg/dL  Near Optimal     100-129 mg/dL  Borderline High  130-159 mg/dL  High             160-189 mg/dL  Very High        >189 mg/dL     Hemoglobin A1c [[id number]]  (Abnormal) Collected: 03/04/23 0253     Specimen: Blood Updated: 03/04/23 0315       Hemoglobin A1C <4.20 %       Narrative:       Hemoglobin A1C Ranges:     Increased Risk for  Diabetes  5.7% to 6.4%  Diabetes                     >= 6.5%  Diabetic Goal                < 7.0%     [facility] Draw [[id number]] Collected: 03/04/23 0115     Specimen: Blood Updated: 03/04/23 0215     Narrative:       The following orders were created for panel order [facility] Draw.  Procedure                               Abnormality         Status                     ---------                               -----------         ------                     Green Top (Gel)[[id number]]                                  Final result               Lavender Top[[id number]]                                     Final result               Red Top[[id number]]                                          Final result               Light Blue Top[[id number]]                                   Final result                  Please view results for these tests on the individual orders.     Green Top (Gel) [[id number]] Collected: 03/04/23 0115     Specimen: Blood Updated: 03/04/23 0215       Extra Tube Hold for add-ons.       Comment: Auto resulted.        Light Blue Top [[id number]] Collected: 03/04/23 0115     Specimen: Blood Updated: 03/04/23 0215       Extra Tube Hold for add-ons.       Comment: Auto resulted        Lavender Top [[id number]] Collected: 03/04/23 0115     Specimen: Blood Updated: 03/04/23 0215       Extra Tube hold for add-on       Comment: Auto resulted        Red Top [[id number]] Collected: 03/04/23 0115     Specimen: Blood Updated: 03/04/23 0215       Extra Tube Hold for add-ons.       Comment: Auto resulted.        Comprehensive Metabolic Panel [[id number]]  (Abnormal) Collected: 03/04/23 0115     Specimen: Blood Updated: 03/04/23 0156       Glucose 110 mg/dL         BUN 14 mg/dL         Creatinine 1.13 mg/dL         Sodium 140 mmol/L         Potassium 3.8 mmol/L         Chloride 102 mmol/L         CO2 26.0 mmol/L         Calcium 9.2 mg/dL         Total Protein 7.6 g/dL         Albumin 4.5 g/dL         ALT (SGPT) 180 U/L          AST (SGOT) 615 U/L         Alkaline Phosphatase 190 U/L         Total Bilirubin 1.2 mg/dL         Globulin 3.1 gm/dL         A/G Ratio 1.5 g/dL         BUN/Creatinine Ratio 12.4       Anion Gap 12.0 mmol/L         eGFR 83.2 mL/min/1.73       Narrative:       GFR Normal >60  Chronic Kidney Disease <60  Kidney Failure <15        Single High Sensitivity Troponin T [[id number]]  (Abnormal) Collected: 03/04/23 0115     Specimen: Blood Updated: 03/04/23 0154       HS Troponin T 17 ng/L       Narrative:       High Sensitive Troponin T Reference Range:  <10.0 ng/L- Negative Female for AMI  <15.0 ng/L- Negative Male for AMI  >=10 - Abnormal Female indicating possible myocardial injury.  >=15 - Abnormal Male indicating possible myocardial injury.   Clinicians would have to utilize clinical acumen, EKG, Troponin, and serial changes to determine if it is an Acute Myocardial Infarction or myocardial injury due to an underlying chronic condition.                 Assessment/Plan:   1.  Hemorrhagic stroke.  Patient was admitted to the ICU.  He is currently on a Cardene drip for systolic blood pressures greater than 140.  His blood pressures been under good control since she is admitted.  He does have history of malignant hypertension.  We are getting to the hospital service involved to aid with blood pressure control.  We will repeat CT scan this morning.  Will be seen by ST PT and OT  2.  Elevated LFTs.  Patient has a history of elevated LFTs per his primary care doctor notes.  Again hospitalist service to see  3.  Markedly elevated triglycerides.          Active Hospital Problems     Diagnosis     • **Intracranial bleeding (HCC)           I discussed the patient's findings and my recommendations with patient, family and nursing staff     Brandon Gannon MD  03/04/23  07:32 CST     Time: More than 50% of time spent in counseling and coordination of care:  Total face-to-face/floor time 60 min.  Time spent in counseling 30 min.  "Counseling included the following topics: Diagnosis, condition, treatment, and plan        Brandon Gannon MD   Physician  Neurosurgery  Progress Notes      Addendum  Date of Service:  03/05/23 0839  Creation Time:  03/05/23 0839        Progress Note     Patient:  James Taylor  YOB: 1980  MRN: [id number]       Admit date: 3/4/2023   Admitting Physician: Brandon Gannon MD  Primary Care Physician: Radha Desir MD     Chief Complaint: Hemorrhagic stroke     Interval History: No events overnight.  Tolerating p.o.  Working with physical therapy.     Intake/Output Summary (Last 24 hours) at 3/5/2023 0839  Last data filed at 3/5/2023 0412      Gross per 24 hour   Intake 1767 ml   Output 450 ml   Net 1317 ml      Allergies: No Known Allergies  Current Scheduled Medications:   amLODIPine, 10 mg, Oral, Daily  Chlorhexidine Gluconate Cloth, 1 application, Topical, Q24H  metoprolol tartrate, 25 mg, Oral, Q12H  mupirocin, 1 application, Each Nare, BID  nicotine, 1 patch, Transdermal, Q24H  sertraline, 50 mg, Oral, Q24H  sodium chloride, 10 mL, Intravenous, Q12H        Current PRN Medications:  •  acetaminophen **OR** acetaminophen  •  enalaprilat  •  labetalol  •  LORazepam **OR** LORazepam **OR** LORazepam **OR** LORazepam **OR** LORazepam **OR** LORazepam  •  sodium chloride  •  sodium chloride  •  sodium chloride           Neurological: Positive for weakness.   All other systems reviewed and are negative.        Pertinent positives/negatives documented in HPI.  All other systems reviewed and negative.     Vital Signs:  /88   Pulse 85   Temp 98 °F (36.7 °C) (Oral)   Resp 18   Ht 177.8 cm (70\")   Wt 65.5 kg (144 lb 8 oz)   SpO2 96%   BMI 20.73 kg/m²      Physical Exam  Cardiovascular:      Rate and Rhythm: Normal rate and regular rhythm.   Pulmonary:      Effort: No respiratory distress.      Breath sounds: Normal breath sounds.   Abdominal:      General: There is no distension.      " "Palpations: Abdomen is soft.         Vital signs reviewed.  Line/IV site: No erythema, warmth, induration, or tenderness.     Objective:  Vital signs: (most recent): Blood pressure 118/88, pulse 85, temperature 98 °F (36.7 °C), temperature source Oral, resp. rate 18, height 177.8 cm (70\"), weight 65.5 kg (144 lb 8 oz), SpO2 96 %.    Lungs:  He is not in respiratory distress.    Heart: Normal rate.  Regular rhythm.    Abdomen: Abdomen is soft and non-distended.          Neurologic Exam  Mental Status   Oriented to person, place, and time.   Attention: normal.   Speech: speech is normal   Level of consciousness: alert  Knowledge: good.      Cranial Nerves      CN II   Visual fields full to confrontation.      CN III, IV, VI   Pupils are equal, round, and reactive to light.  Extraocular motions are normal.   Ophthalmoparesis: Right gaze preference but extraocular movements are intact.     CN V   Facial sensation intact.      CN VII   Right facial weakness: none  Left facial weakness: central (Left facial droop)     CN VIII   CN VIII normal.      CN IX, X   CN IX normal.   CN X normal.      CN XI   CN XI normal.      CN XII   CN XII normal.      Motor Exam   Muscle bulk: normal  Overall muscle tone: normal  Right arm pronator drift: absent  Left arm pronator drift: absent     Strength   Strength 5/5 except as noted. Right upper and lower extremity 5 out of 5 in all flexion-extension movements     Left upper and lower extremity 3 out of 5 in most flexion-extension movements but ataxic.      Lab Results:  ABG:     CBC:        Results from last 7 days   Lab Units 03/04/23  0115   WBC 10*3/mm3 6.58   HEMOGLOBIN g/dL 13.6   HEMATOCRIT % 38.3   PLATELETS 10*3/mm3 104*      BMP:       Results from last 7 days   Lab Units 03/04/23  0115   SODIUM mmol/L 140   POTASSIUM mmol/L 3.8   CHLORIDE mmol/L 102   CO2 mmol/L 26.0   BUN mg/dL 14   CREATININE mg/dL 1.13   GLUCOSE mg/dL 110*   CALCIUM mg/dL 9.2   ALT (SGPT) U/L 180* "      Culture Results: No results found for: BLOODCX, URINECX, WOUNDCX, MRSACX, RESPCX, STOOLCX           Radiology:   CT scan of the brain yesterday shows some increasing surrounding edema slight increase in size of the overall hemorrhage.  Some increased midline shift.  Additional Studies Reviewed:      Impression/Recommendations:   CV: Heart rate and blood pressure stable.  Currently off Cardene with good control blood pressure.   hospitalist consult pending  RESP: Oxygenating well  GI: Tolerating p.o.  : BUN/creatinine urine output are adequate  NEURO: Exam essentially stable.  Was able to ambulate yesterday with physical therapy.  Repeat CT scan today.  ID:  Approximately 1 hour was spent in critical care management of this patient including review of radiology studies, review of vital signs and laboratory values, physical exam, counseling with the patient.    Intracranial bleeding (HCC)    Malignant hypertension    Elevated transaminase level  Alcohol abuse        MD David Young Maurice S, DO   Physician  Specialty:  Hospitalist  Consults      Signed  Date of Service:  03/05/23 1657  Creation Time:  03/05/23 1657     Signed        [facility] Medicine Consult  Consults     Date of Admission: 3/4/2023  Date of Consult: 03/05/23     Primary Care Physician: Radha Desir MD  Referring Physician: Dr. Brandon Gannon  Chief Complaint/Reason for Consultation: Medical management of management of hypertension     Subjective   History of Present Illness     This 42-year-old male presented to emergency department with a chief complaint of weakness and difficulty speaking.  Evaluation in the emergency department showed a CT scan significant for right basal ganglia hemorrhagic stroke.  The patient has a history of hypertension that is difficult to control and he was treated by Dr. Desir.  His primary complaint was of headache prior  to development of neurologic symptoms.  He was evaluated yesterday initially and was started on 10 mg of amlodipine daily as well as metoprolol 25 mg twice daily.  Initially nursing was able to wean him from Cardene drip.  But that drip has been restarted today because of elevated blood pressure outside goal range.  Metoprolol will be increased to 50 mg p.o. twice daily and Hytrin 5 mg daily will be added to his regimen.  I will obtain a CT angiogram of the abdomen and pelvis to assess for renal artery stenosis.  Lab work shows triglycerides elevated at 589, HDL low at 24, VLDL elevated at 77.  Glucose 110, alk phos 190, , .  INR 1.22.  CBC is unremarkable.  CT angiogram of the neck shows patent vertebral arteries with calcified and noncalcified plaque in both carotids.  CT angiogram of the head showed no major branch occlusion or aneurysm.  CT of the head showed acute hemorrhage in the right basal ganglia measuring 4.7 x 1.8 cm with mild surrounding edema with a lateral ventricular shift to the left at 2.9 mm.  Repeat CT scan of the head today shows left shift slightly increased to 4 mm with no change in the hemorrhage.                 Review of Systems   Constitutional: Positive for activity change.   HENT: Negative.    Eyes: Negative.    Respiratory: Negative.    Cardiovascular: Negative.    Gastrointestinal: Negative.    Endocrine: Negative.    Genitourinary: Negative.    Musculoskeletal: Negative.    Skin: Negative.    Allergic/Immunologic: Negative.    Neurological: Positive for speech difficulty and weakness.      Otherwise complete ROS is negative except as mentioned above.     Past Medical History:   Medical History        Past Medical History:   Diagnosis Date   • Asthma     • Hypertension           Past Surgical History:  Surgical History   History reviewed. No pertinent surgical history.     Social History:  reports that he has been smoking cigarettes. He started smoking about 10 years ago.  He has a 15.00 pack-year smoking history. He has never used smokeless tobacco. He reports current alcohol use of about 4.0 standard drinks per week. He reports that he does not use drugs.     Family History: family history includes Anxiety disorder in his mother; Cancer in his father; Diabetes in his mother; Hyperlipidemia in his father and mother.      Objective       Physical Exam:   Temp:  [97.5 °F (36.4 °C)-98.7 °F (37.1 °C)] 97.5 °F (36.4 °C)  Heart Rate:  [] 92  Resp:  [18-19] 19  BP: ()/() 130/101  Physical Exam  Constitutional:       Appearance: Normal appearance. He is normal weight.   HENT:      Head: Normocephalic and atraumatic.      Right Ear: External ear normal.      Left Ear: External ear normal.      Nose: Nose normal.      Mouth/Throat:      Mouth: Mucous membranes are moist.      Pharynx: Oropharynx is clear.   Eyes:      General: No scleral icterus.     Extraocular Movements: Extraocular movements intact.      Conjunctiva/sclera: Conjunctivae normal.      Pupils: Pupils are equal, round, and reactive to light.   Cardiovascular:      Rate and Rhythm: Normal rate and regular rhythm.      Pulses: Normal pulses.      Heart sounds: Normal heart sounds. No murmur heard.  Pulmonary:      Effort: Pulmonary effort is normal. No respiratory distress.      Breath sounds: Normal breath sounds.   Abdominal:      General: Abdomen is flat. Bowel sounds are normal.      Palpations: Abdomen is soft. There is no mass.      Tenderness: There is no abdominal tenderness.   Musculoskeletal:         General: Normal range of motion.      Right lower leg: No edema.      Left lower leg: No edema.   Skin:     General: Skin is warm and dry.      Coloration: Skin is not jaundiced.   Neurological:      Mental Status: He is alert.      Motor: Weakness and pronator drift (L) present. Atrophy: ROLAND, LUE.      Comments: 3/5 strength left upper and lower extremities   Psychiatric:         Mood and Affect: Mood  normal.         Judgment: Judgment normal.            Results Reviewed:  I have personally reviewed current lab, radiology, and data and agree with results.      Lab Results (last 24 hours)      ** No results found for the last 24 hours. **                  Imaging Results (Last 24 Hours)      Procedure Component Value Units Date/Time     CT Head Without Contrast         EXAMINATION:  CT HEAD WO CONTRAST-  3/5/2023 10:46 AM CST     HISTORY: Stroke, follow up; I62.9-Nontraumatic intracranial hemorrhage,  unspecified; R29.810-Facial weakness; R29.898-Other symptoms and signs  involving the musculoskeletal system; R51.9-Headache, unspecified;  Z86.79-Personal history of other diseases of the circulatory system;  G93.6-Cerebral edema; R90.89-Other abnormal findings on diagnostic  imaging of central nervous system.     TECHNIQUE: Multiple axial images were obtained through the brain without  contrast infusion. Multiplanar images were reconstructed.     DLP: 684 mGy-cm. Automated dosage reduction technique was utilized to  reduce patient dosage.     COMPARISON: 03/04/2023.     FINDINGS: A right basal ganglia hemorrhage is not significantly changed.  This measures about 4.8 x 2.3 cm. There is edema surrounding the  hemorrhage. There is sulcal effacement on the right side. There is  compression of the right lateral ventricle. There is shifting of the  lateral ventricles to the left by about 4 mm. There is mucosal  thickening in the right maxillary sinus. No calvarial fracture is seen.            Impression:       Right basal ganglia hemorrhage is not significantly  changed. There is surrounding edema with sulcal effacement. There is  compression of the right lateral ventricle and shifting of the lateral  ventricles to the left by about 4 mm.     This report was finalized on 03/05/2023 11:26 by Dr. Claus Bhagat MD.     CT Head Without Contrast [[id number]] Collected: 03/04/23 2005       Updated: 03/04/23 2013     Narrative:        Narrative:        EXAM/TECHNIQUE: CT head without contrast     INDICATION: Stroke, follow up; I62.9-Nontraumatic intracranial  hemorrhage, unspecified; R29.810-Facial weakness; R29.898-Other symptoms  and signs involving the musculoskeletal system; R51.9-Headache,  unspecified; Z86.79-Personal history of other diseases of the  circulatory system; G93.6-Cerebral edema; R90.89-Other abnormal findings  on diagnostic imaging of central nervous system; R13.10-Dysphagia,  unspecifi     COMPARISON: 03/04/2020 0759     DLP: 663 mGy cm. Automated exposure control was also utilized to  decrease patient radiation dose.     FINDINGS:     The study is degraded by patient motion.     Slight increase in size of 4.8 x 2.2 cm focus of acute hemorrhage in the  RIGHT basal ganglia with mild surrounding vasogenic edema. Slight  increase in 5 to 6 mm RIGHT to LEFT midline shift. Similar mass effect  on the RIGHT lateral ventricle. No intraventricular hemorrhage. No loss  of gray-white differentiation. Basilar cisterns are patent. No acute  orbital finding. Mastoid air cells are clear. Partially imaged RIGHT  maxillary sinus opacification. No acute osseous finding.        Impression:          Slight increase in size of 4.8 x 2.2 cm acute hemorrhage in the RIGHT  basal ganglia with mild surrounding vasogenic edema. Slight increase in  5 to 6 mm RIGHT to LEFT midline shift.  This report was finalized on 03/04/2023 20:10 by Dr. Jackson Weber MD.         Assessment:        Active Hospital Problems     Diagnosis     • **Intracranial bleeding (HCC)     • Malignant hypertension     • Elevated transaminase level     • Alcohol abuse           Treatment Plan  Initiate low-dose CIWA protocol  Amlodipine 10 mg p.o. daily  Increase metoprolol to 50 mg p.o. every 12 hours  Add Hytrin 5 mg daily to regimen  Plan to transition metoprolol to tartrate to metoprolol succinate at discharge  CT angiogram of the abdomen and pelvis to assess for renal  artery stenosis     Medical Decision Making  Number and Complexity of problems:   1) malignant hypertension: Acute, high complexity  2) intracranial bleeding: Acute, high complexity  3) elevated transaminases secondary to alcohol abuse: Chronic, moderate complexity     Differential Diagnosis: None others considered     Conditions and Status        Condition is improving.     [facility] Data  External documents reviewed: Care everywhere documents  Cardiac tracing (EKG, telemetry) interpretation: See HPI  Radiology interpretation: See HPI  Labs reviewed: See HPI  Any tests that were considered but not ordered: Urinary 17 hydroxysteroids and metanephrines     Decision rules/scores evaluated (example [id number], Wells, etc): None     Discussed with: The patient     Care Planning  Shared decision making: The patient  Code status and discussions: Full code     Disposition  Social Determinants of Health that impact treatment or disposition: None known  I expect the patient to be discharged by the primary service.      I confirmed that the patient's Advance Care Plan is present, code status is documented, or surrogate decision maker is listed in the patient's medical record.      The patient's surrogate decision maker is his mother.      Patient seen and examined by me on 3/5/2023 at 1600.     Electronically signed by Donavan Hernandez DO, 03/05/23, 16:57 CST.       [facility] Medicine Services  INPATIENT PROGRESS NOTE     Patient Name: James Taylor  Date of Admission: 3/4/2023  Today's Date: 03/06/23  Length of Stay: 2  Primary Care Physician: Radha Desir MD     Subjective   Chief Complaint: high blood pressure.  HPI   Patient is sleepy/sedate this AM.   Has had ativan per the CIWA protocol.     Blood pressures ranging from  1212/78 to 166/135 at Midnight.  Overall pressure control is good.            Review of Systems   All pertinent negatives and positives are as above. All other  systems have been reviewed and are negative unless otherwise stated.      Objective    Temp:  [97.5 °F (36.4 °C)-100 °F (37.8 °C)] 98.4 °F (36.9 °C)  Heart Rate:  [] 83  Resp:  [18-21] 21  BP: (110-171)/() 125/79  Physical Exam  Vitals and nursing note reviewed.   Constitutional:       Appearance: Normal appearance.      Comments: Sleepy/sedate   HENT:      Head: Normocephalic and atraumatic.      Right Ear: External ear normal.      Left Ear: External ear normal.      Nose: Nose normal.      Mouth/Throat:      Mouth: Mucous membranes are moist.   Eyes:      Extraocular Movements: Extraocular movements intact.      Conjunctiva/sclera: Conjunctivae normal.      Pupils: Pupils are equal, round, and reactive to light.   Cardiovascular:      Rate and Rhythm: Normal rate and regular rhythm.      Pulses: Normal pulses.      Heart sounds: No murmur heard.    No friction rub. No gallop.   Pulmonary:      Effort: Pulmonary effort is normal.      Breath sounds: Normal breath sounds.   Abdominal:      General: Bowel sounds are normal.      Palpations: Abdomen is soft.   Musculoskeletal:         General: Normal range of motion.      Cervical back: No rigidity.   Skin:     General: Skin is warm and dry.      Capillary Refill: Capillary refill takes less than 2 seconds.   Neurological:      Comments: Sleepy/sedate   Psychiatric:         Behavior: Behavior normal.               Results Review:  I have reviewed the labs, radiology results, and diagnostic studies.     Laboratory Data:        Results from last 7 days   Lab Units 03/04/23  0115   WBC 10*3/mm3 6.58   HEMOGLOBIN g/dL 13.6   HEMATOCRIT % 38.3   PLATELETS 10*3/mm3 104*              Results from last 7 days   Lab Units 03/04/23  0115   SODIUM mmol/L 140   POTASSIUM mmol/L 3.8   CHLORIDE mmol/L 102   CO2 mmol/L 26.0   BUN mg/dL 14   CREATININE mg/dL 1.13   CALCIUM mg/dL 9.2   BILIRUBIN mg/dL 1.2   ALK PHOS U/L 190*   ALT (SGPT) U/L 180*   AST (SGOT) U/L 615*    GLUCOSE mg/dL 110*         Culture Data:   No results found for: BLOODCX, URINECX, WOUNDCX, MRSACX, RESPCX, STOOLCX     Radiology Data:           Imaging Results (Last 24 Hours)      Procedure Component Value Units Date/Time     CT Angiogram Abdomen Pelvis [[id number]] Collected: 03/06/23 0710       Updated: 03/06/23 0739     Narrative:       EXAMINATION: CT ANGIOGRAM ABDOMEN PELVIS-      3/5/2023 11:03 PM CST     HISTORY: Renal artery stenosis; I62.9-Nontraumatic intracranial  hemorrhage, unspecified; R29.810-Facial weakness; R29.898-Other symptoms  and signs involving the musculoskeletal system; R51.9-Headache,  unspecified; Z86.79-Personal history of other diseases of the  circulatory system; G93.6-Cerebral edema; R90.89-Other abnormal findings  on diagnostic imaging of central nervous system; R13.10-Dysphagia, unspe     In order to have a CT radiation dose as low as reasonably achievable  Automated Exposure Control was utilized for adjustment of the mA and/or  KV according to patient size.     DLP in mGycm= 467     The CT angiography of the abdomen and pelvis are performed after  intravenous contrast enhancement.     Images are acquired in axial plane and subsequent 2-D reconstruction in  coronal and sagittal planes and 3-D maximum intensity projection images  reconstruction.     There is no previous study for comparison.     Atheromatous changes of the abdominal aorta and iliac arteries.     No aneurysmal dilatation or dissection.     There is normal origin course and caliber of the celiac and superior  mesenteric arteries and branches.     There is normal origin course and caliber of right and both renal  arteries. There are single renal arteries bilaterally. There is an area  of of irregularity and a filling defect and a resultant moderate  stenosis of the superior segment of branch of the right renal artery. No  focal stenosis or aneurysmal dilatation.     Normal origin course and caliber of the inferior  mesenteric artery with  subsequent normal branches.     Normal common, internal and external iliac arteries bilaterally is seen.  Normal common femoral artery bilaterally dividing into normal  superficial and deep femoral arteries.     The lung bases included in the study show an area of consolidation in  the left lower lobe which probably represent an acute or subacute  inflammatory/infectious process. This was not seen in the previous CT  scan of the chest dated 10/02/2022.     Fatty infiltration of the liver and moderate hepatomegaly.     No radiopaque gallstones.     There is ill-defined decreased attenuation in the tail of the pancreas  with mild surrounding retroperitoneal fat infiltration which may  represent an evolving pancreatitis?. The remaining pancreas is  unremarkable. No ductal dilatation. There is moderate left anterior  pararenal fascial thickening and a small fluid accumulation in the left  paracolic gutter.     The adrenal glands are normal.     There is poor nephrogram involving the upper pole of the left kidney. No  discrete mass is seen. The remaining kidneys are unremarkable. No  calculi. No hydronephrosis. The ureters are not optimally visualized or  evaluated. The urinary bladder is poorly distended. No intrinsic  abnormality.     Prostate is not significantly enlarged.     The stomach is decompressed. Small bowel is not significantly distended.  No finding to suggest pancreatitis. Large volume stool is seen in the  colon. No finding to suggest obstruction.     There is no evidence of abdominal or pelvic lymphadenopathy.     Images are reviewed in bone window show no acute bony abnormality or  bone lesion.        Impression:       1. The irregularity and moderate stenosis of the superior segmental  branch of the left renal artery. There is poor nephrogram of the upper  pole of the left kidney which may represent ischemia of the upper pole  of the left kidney. The main renal arteries  bilaterally appear normal.  2. The changes in and around the tail of the pancreas may represent  evolving acute pancreatitis. This likely to be clinically correlated and  further evaluated.  3. Fatty infiltration of the liver and moderate hepatomegaly.                          This report was finalized on 03/06/2023 07:35 by Dr. Heath Valente MD.      CT Head Without Contrast [[id number]] Collected: 03/05/23 1123       Updated: 03/05/23 1129     Narrative:       EXAMINATION:  CT HEAD WO CONTRAST-  3/5/2023 10:46 AM CST     HISTORY: Stroke, follow up; I62.9-Nontraumatic intracranial hemorrhage,  unspecified; R29.810-Facial weakness; R29.898-Other symptoms and signs  involving the musculoskeletal system; R51.9-Headache, unspecified;  Z86.79-Personal history of other diseases of the circulatory system;  G93.6-Cerebral edema; R90.89-Other abnormal findings on diagnostic  imaging of central nervous system.     TECHNIQUE: Multiple axial images were obtained through the brain without  contrast infusion. Multiplanar images were reconstructed.     DLP: 684 mGy-cm. Automated dosage reduction technique was utilized to  reduce patient dosage.     COMPARISON: 03/04/2023.     FINDINGS: A right basal ganglia hemorrhage is not significantly changed.  This measures about 4.8 x 2.3 cm. There is edema surrounding the  hemorrhage. There is sulcal effacement on the right side. There is  compression of the right lateral ventricle. There is shifting of the  lateral ventricles to the left by about 4 mm. There is mucosal  thickening in the right maxillary sinus. No calvarial fracture is seen.           Impression:       Right basal ganglia hemorrhage is not significantly  changed. There is surrounding edema with sulcal effacement. There is  compression of the right lateral ventricle and shifting of the lateral  ventricles to the left by about 4 mm.        I have reviewed the patient's current medications.      Assessment/Plan    Assessment       Active Hospital Problems     Diagnosis     • **Intracranial bleeding (HCC)     • Malignant hypertension     • Elevated transaminase level     • Alcohol abuse           Treatment Plan  Blood pressure is controlled.   Continue current medications.  Monitor BP  Would reduce dose of ativan.        Medical Decision Making  Number and Complexity of problems:      Malignant HTN acute high risk  ICH acute high risk  Elevated transaminases chronic moderate risk  Alcohol abuse chronic moderate risk        Differential Diagnosis: none     Conditions and Status        Condition is improving.        BP is controlled     MDM Data  External documents reviewed: none  Cardiac tracing (EKG, telemetry) interpretation: NSR   Radiology interpretation: Data reviewed  Labs reviewed: reviewed  Any tests that were considered but not ordered: none     Decision rules/scores evaluated (example [id number], Wells, etc): nond     Discussed with: nursing     Care Planning  Shared decision making: patient too sedate to discuss care with  Code status and discussions: full     Disposition  Social Determinants of Health that impact treatment or disposition: none  I expect the patient to be discharged by primary team.      Electronically signed by Alejanrda Serrano, 03/06/23, 07:56 CST.                  ED to Hosp-Admission (Current) on 3/4/2023     ED to Hosp-Admission (Current) on 3/4/2023        Detailed Report           Feliciano Boothe, NAM   Nurse Practitioner  Neurosurgery  Progress Notes      Signed  Date of Service:  03/06/23 0749  Creation Time:  03/06/23 0749     Signed           Progress Note     Patient:  James Taylor  YOB: 1980  MRN: [id number]       Admit date: 3/4/2023   Admitting Physician: Brandon Gannon MD  Primary Care Physician: Radha Desir MD     Chief Complaint: Hemorrhagic CVA     Interval History: Concerned patient was going through alcohol withdrawals yesterday.  [facility] protocol  "initiated.  Patient did receive Ativan and is drowsy this morning.  He does awaken to stimuli and does follow commands.  Still having weakness of the left upper and lower extremity.  Hospitalist is following for elevated liver enzymes as well as hypertension     Intake/Output Summary (Last 24 hours) at 3/6/2023 0749  Last data filed at 3/6/2023 0405      Gross per 24 hour   Intake 1804 ml   Output 350 ml   Net 1454 ml      Allergies: No Known Allergies  Current Scheduled Medications:   amLODIPine, 10 mg, Oral, Daily  Chlorhexidine Gluconate Cloth, 1 application, Topical, Q24H  metoprolol tartrate, 50 mg, Oral, Q12H  mupirocin, 1 application, Each Nare, BID  nicotine, 1 patch, Transdermal, Q24H  sertraline, 50 mg, Oral, Q24H  sodium chloride, 10 mL, Intravenous, Q12H  terazosin, 5 mg, Oral, Nightly        Current PRN Medications:  •  acetaminophen **OR** acetaminophen  •  enalaprilat  •  labetalol  •  LORazepam **OR** LORazepam **OR** LORazepam **OR** LORazepam **OR** LORazepam **OR** LORazepam  •  sodium chloride  •  sodium chloride  •  sodium chloride     Review of Systems   Constitutional: Negative for fever.         Pertinent positives/negatives documented in HPI.  All other systems reviewed and negative.     Vital Signs:  /79   Pulse 83   Temp 98.4 °F (36.9 °C) (Axillary)   Resp 21   Ht 177.8 cm (70\")   Wt 67.3 kg (148 lb 4.8 oz)   SpO2 92%   BMI 21.28 kg/m²      Physical Exam  Vitals and nursing note reviewed.   Constitutional:       General: Vital signs are normal.   HENT:      Head: Normocephalic.      Nose: Nose normal.      Mouth/Throat:      Mouth: Mucous membranes are moist.   Eyes:      Extraocular Movements: EOM normal.      Pupils: Pupils are equal, round, and reactive to light.   Cardiovascular:      Rate and Rhythm: Normal rate and regular rhythm.      Pulses: Intact distal pulses.   Pulmonary:      Effort: Pulmonary effort is normal. No respiratory distress.   Abdominal:      General: " "Bowel sounds are normal. There is no distension.      Palpations: Abdomen is soft.   Skin:     General: Skin is warm and dry.   Neurological:      Mental Status: He is oriented to person, place, and time. He is lethargic.      Cranial Nerves: Cranial nerve deficit present.      Motor: Weakness present.   Psychiatric:         Mood and Affect: Affect is flat.         Speech: Speech is delayed and slurred.         Behavior: Behavior is slowed.         Judgment: Judgment is not impulsive.         Vital signs reviewed.  Line/IV site: No erythema, warmth, induration, or tenderness.     Objective:  General Appearance:  Comfortable, well-appearing, in no acute distress and not in pain.    Vital signs: (most recent): Blood pressure 125/79, pulse 83, temperature 98.4 °F (36.9 °C), temperature source Axillary, resp. rate 21, height 177.8 cm (70\"), weight 67.3 kg (148 lb 4.8 oz), SpO2 92 %.  Vital signs are normal.  No fever.    Output: Producing urine.    HEENT: Normal HEENT exam.    Lungs:  Normal effort and normal respiratory rate.  He is not in respiratory distress.    Heart: Normal rate.  Regular rhythm.    Chest: Symmetric chest wall expansion.   Skin:  Warm and dry.    Abdomen: Abdomen is soft and non-distended.  Bowel sounds are normal.   There is no abdominal tenderness.     Pulses: Distal pulses are intact.          Neurologic Exam      Mental Status   Oriented to person, place, and time.   Attention: decreased. Concentration: decreased.   Speech: slurred   Level of consciousness: drowsy ,  arousable by verbal stimuli  Normal comprehension.      Cranial Nerves      CN II   Visual fields full to confrontation.      CN III, IV, VI   Pupils are equal, round, and reactive to light.  Extraocular motions are normal.      CN V   Facial sensation intact.                  CN XII   Tongue: not atrophic  Tongue deviation: left     Motor Exam   Muscle bulk: normal     Strength   Strength 5/5 except as noted. Left hemiparesis with " upper and lower extremity being 3 out of 5 in all muscle groups    Right upper and lower extremity 5 out of 5 in all muscle groups      Sensory Exam   Light touch normal.                   Impression/Recommendations:   CV: Blood pressure overall is improving.  Continue with treatment per hospitalist.  Heart rate stable  RESP: Oxygen level stable  GI: Tolerating p.o.  : Decreased urine output  NEURO: Neuro exam stable  ID: None  Continue with current treatment.  Appreciate hospitalist input.  If patient has a good day will consider transfer out of the unit tomorrow.  We will repeat CT scan of the head in the morning       Intracranial bleeding (HCC)    Malignant hypertension    Elevated transaminase level    Alcohol abuse        Feliciano Boothe, APRN               Notes      Component  Ref Range & Units 2 d ago   HS Troponin T  <15 ng/L 17 High            0150 97.9 (36.6) 74 19 167/99 Sitting -- 99   03/04/23 0134 -- 80 -- 185/123 Abnormal  -- -- 100   03/04/23 0110 97.9 (36.6) 72 16 211/109 Abnormal  Sitting room air 99       Time Temp Pulse Resp BP Patient Position Device (Oxygen Therapy) SpO2   03/06/23 0900 -- 78 18 126/85 -- -- 93   03/06/23 0800 97.8 (36.6) 89 17 139/99 -- room air 94   03/06/23 0700 -- 88 -- 135/89 -- -- 94   03/06/23 0630 -- 83 -- 125/79 -- -- 92   03/06/23 0615 -- 85 -- 128/78 -- -- 91   03/06/23 0600 -- 88 -- 134/85 -- -- 92   03/                      Current Facility-Administered Medications   Medication Dose Route Frequency Provider Last Rate Last Admin   • acetaminophen (TYLENOL) tablet 650 mg  650 mg Oral Q4H PRN Brandon Gannon MD        Or   • acetaminophen (TYLENOL) suppository 650 mg  650 mg Rectal Q4H PRN Brandon Gannon MD       • amLODIPine (NORVASC) tablet 10 mg  10 mg Oral Daily Donavan Hernandez DO   10 mg at 03/05/23 0916   • Chlorhexidine Gluconate Cloth 2 % pads 1 application  1 application Topical Q24H Brandon Gannon MD   1 application at 03/06/23 0402   •  enalaprilat (VASOTEC) injection 1.25 mg  1.25 mg Intravenous Q6H PRN Brandon Gannon MD   1.25 mg at 03/06/23 0650   • labetalol (NORMODYNE,TRANDATE) injection 10 mg  10 mg Intravenous Q10 Min PRN Brandon Gannon MD   10 mg at 03/06/23 0805   • LORazepam (ATIVAN) tablet 1 mg  1 mg Oral Q2H PRN Donavan Hernandez DO   1 mg at 03/05/23 1451    Or   • LORazepam (ATIVAN) injection 1 mg  1 mg Intravenous Q2H PRN Donavan Hernandez DO        Or   • LORazepam (ATIVAN) tablet 2 mg  2 mg Oral Q1H PRN Donavan Hernandez DO        Or   • LORazepam (ATIVAN) injection 2 mg  2 mg Intravenous Q1H PRN Donavan Hernandez DO   2 mg at 03/05/23 1936    Or   • LORazepam (ATIVAN) injection 2 mg  2 mg Intravenous Q15 Min PRN Donavan Hernandez DO        Or   • LORazepam (ATIVAN) injection 2 mg  2 mg Intramuscular Q15 Min PRN Donavan Hernandez DO       • metoprolol tartrate (LOPRESSOR) tablet 50 mg  50 mg Oral Q12H Donavan Hernandez DO       • mupirocin (BACTROBAN) 2 % nasal ointment 1 application  1 application Each Nare BID Brandon Gannon MD   1 application at 03/06/23 0805   • niCARdipine (CARDENE) 25 mg in 250 mL NS infusion  5-15 mg/hr Intravenous Titrated Brandon Gnanon MD 50 mL/hr at 03/06/23 0337 5 mg/hr at 03/06/23 0337   • nicotine (NICODERM CQ) 21 MG/24HR patch 1 patch  1 patch Transdermal Q24H Brandon Gannon MD   1 patch at 03/05/23 1814   • sertraline (ZOLOFT) tablet 50 mg  50 mg Oral Q24H Donavan Hernandez DO   50 mg at 03/05/23 1814   • sodium chloride 0.9 % flush 10 mL  10 mL Intravenous PRN Romero Jang MD       • sodium chloride 0.9 % flush 10 mL  10 mL Intravenous Q12H Brandon Gannon MD   10 mL at 03/06/23 0805   • sodium chloride 0.9 % flush 10 mL  10 mL Intravenous PRN Brandon Gannon MD       • sodium chloride 0.9 % infusion 40 mL  40 mL Intravenous PRN Brandon Gannon MD       • terazosin (HYTRIN) capsule 5 mg  5 mg Oral Nightly Donavan Hernandez, DO

## 2023-03-06 NOTE — THERAPY TREATMENT NOTE
Patient Name: James Taylor  : 1980    MRN: [id number]                              Today's Date: 3/6/2023       Admit Date: 3/4/2023    Visit Dx:     ICD-10-CM ICD-9-CM   1. Intracranial bleeding (HCC)  I62.9 432.9   2. Facial droop  R29.810 781.94   3. Left arm weakness  R29.898 729.89   4. Acute nonintractable headache, unspecified headache type  R51.9 784.0   5. History of hypertension  Z86.79 V12.59   6. Cerebral edema (HCC)  G93.6 348.5   7. Midline shift of brain  R90.89 793.0   8. Dysphagia, unspecified type  R13.10 787.20   9. Impaired mobility  Z74.09 799.89   10. Decreased activities of daily living (ADL)  Z78.9 V49.89     Patient Active Problem List   Diagnosis   • Intracranial bleeding (HCC)   • Malignant hypertension   • Elevated transaminase level   • Alcohol abuse     Past Medical History:   Diagnosis Date   • Asthma    • Hypertension      History reviewed. No pertinent surgical history.   General Information     Row Name 23 1001          OT Time and Intention    Document Type therapy note (daily note)  -JJ (r) PB (t) JJ (c)     Mode of Treatment occupational therapy  -JJ (r) PB (t) JJ (c)     Row Name 23 1001          General Information    Patient Profile Reviewed yes  -JJ (r) PB (t) JJ (c)     Existing Precautions/Restrictions fall  -JJ (r) PB (t) JJ (c)     Barriers to Rehab medically complex;physical barrier  -JJ (r) PB (t) JJ (c)     Row Name 23 1001          Cognition    Orientation Status (Cognition) unable/difficult to assess  pt responds to vcs but difficult to verablize answers d/t fatigue & drowsiness  -JJ (r) PB (t) JJ (c)     Row Name 23 1001          Safety Issues, Functional Mobility    Safety Issues Affecting Function (Mobility) awareness of need for assistance;insight into deficits/self-awareness;safety precaution awareness;safety precautions follow-through/compliance  -JJ (r) PB (t) JJ (c)     Impairments Affecting Function (Mobility)  balance;coordination;endurance/activity tolerance;grasp;motor control;strength;muscle tone abnormal  -JJ (r) PB (t) JJ (c)           User Key  (r) = Recorded By, (t) = Taken By, (c) = Cosigned By    Initials Name Provider Type    Yudelka Parisi OTR/L, CSRS Occupational Therapist    Clara James, OT Student OT Student                 Mobility/ADL's     Row Name 03/06/23 1001          Bed Mobility    Bed Mobility scooting/bridging  -JJ (r) PB (t) JJ (c)     Scooting/Bridging [place] (Bed Mobility) maximum assist (25% patient effort);2 person assist  -JJ (r) PB (t) JJ (c)     Comment, (Bed Mobility) t/fs and mob deferred this date d/t pt lethargic in recliner on arrival  -JJ (r) PB (t) JJ (c)           User Key  (r) = Recorded By, (t) = Taken By, (c) = Cosigned By    Initials Name Provider Type    Yudelka Parisi OTR/L, CSRS Occupational Therapist    Clara James, OT Student OT Student               Obj/Interventions     Row Name 03/06/23 1001          Strength Comprehensive (MMT)    Comment, General Manual Muscle Testing (MMT) Assessment 3+/5  strength this date  -JJ (r) PB (t) JJ (c)     Row Name 03/06/23 1001          Motor Skills    Motor Skills muscle tone  -JJ (r) PB (t) JJ (c)     Muscle Tone left;upper extremity(s);hypotonia  -JJ (r) PB (t) JJ (c)     Therapeutic Exercise shoulder;elbow/forearm;wrist;hand  completed AAROM/PROM throughout joints 5x L UE  -JJ (r) PB (t) JJ (c)           User Key  (r) = Recorded By, (t) = Taken By, (c) = Cosigned By    Initials Name Provider Type    Yudelka Parisi OTR/L, CSRS Occupational Therapist    Clara James, OT Student OT Student               Goals/Plan    No documentation.                Clinical Impression     Row Name 03/06/23 1001          Pain Assessment    Pretreatment Pain Rating 0/10 - no pain  -JJ (r) PB (t) JJ (c)     Posttreatment Pain Rating 0/10 - no pain  -JJ (r) PB (t) JJ (c)     Pain Intervention(s)  Repositioned;Ambulation/increased activity  -JJ (r) PB (t) JJ (c)     Row Name 03/06/23 1001          Plan of Care Review    Plan of Care Reviewed With patient;friend  -JJ (r) PB (t) JJ (c)     Outcome Evaluation OT tx this date. Pt lethargic during session, responds to vcs but only opens eyes with cues. Pt in recliner on arrival, t/fs and amb deferred d/t fatigue and drowsiness. Pt responded to vcs for use of L UE, noted 3+/5 L  strength. L UE PROM/AAROM stretches, 5 reps each joint. Pt opened eyes 5x on command during session, able to attend L and midline for 10 seconds at a time. Pt noted to attempt to reposition self 2x but required Max A x2 for scooting/reposititoning in recliner. Skilled OT to continue POC. Anticipated d/c to IP rehab.  -JJ (r) PB (t) JJ (c)     Row Name 03/06/23 1001          Therapy Plan Review/Discharge Plan (OT)    Anticipated Discharge Disposition (OT) inpatient rehabilitation facility  -JJ (r) PB (t) JJ (c)     Row Name 03/06/23 1001          Vital Signs    O2 Delivery Pre Treatment room air  -JJ (r) PB (t) JJ (c)     O2 Delivery Intra Treatment room air  -JJ (r) PB (t) JJ (c)     O2 Delivery Post Treatment room air  -JJ (r) PB (t) JJ (c)     Pre Patient Position Supine  -JJ (r) PB (t) JJ (c)     Intra Patient Position Supine  -JJ (r) PB (t) JJ (c)     Post Patient Position Supine  -JJ (r) PB (t) JJ (c)     Row Name 03/06/23 1001          Positioning and Restraints    Pre-Treatment Position sitting in chair/recliner  -JJ (r) PB (t) JJ (c)     Post Treatment Position chair  -JJ (r) PB (t) JJ (c)     In Chair reclined;with family/caregiver;LUE elevated;patient within staff view;call light within reach;encouraged to call for assist  -JJ (r) PB (t) JJ (c)           User Key  (r) = Recorded By, (t) = Taken By, (c) = Cosigned By    Initials Name Provider Type    Yudelka Parisi, OTR/L, CSRS Occupational Therapist    Clara James, OT Student OT Student               Outcome  Measures     Row Name 03/06/23 1001          How much help from another is currently needed...    Putting on and taking off regular lower body clothing? 2  -JJ (r) PB (t) JJ (c)     Bathing (including washing, rinsing, and drying) 2  -JJ (r) PB (t) JJ (c)     Toileting (which includes using toilet bed pan or urinal) 3  -JJ (r) PB (t) JJ (c)     Putting on and taking off regular upper body clothing 3  -JJ (r) PB (t) JJ (c)     Taking care of personal grooming (such as brushing teeth) 3  -JJ (r) PB (t) JJ (c)     Eating meals 3  -JJ (r) PB (t) JJ (c)     AM-PAC 6 Clicks Score (OT) 16  -JJ (r) PB (t)     Row Name 03/06/23 0900 03/06/23 0800       How much help from another person do you currently need...    Turning from your back to your side while in flat bed without using bedrails? 2  -IBETH 2  -KS    Moving from lying on back to sitting on the side of a flat bed without bedrails? 2  -IBETH 2  -KS    Moving to and from a bed to a chair (including a wheelchair)? 2  -IBETH 2  -KS    Standing up from a chair using your arms (e.g., wheelchair, bedside chair)? 2  -IBETH 2  -KS    Climbing 3-5 steps with a railing? 1  -IBETH 2  -KS    To walk in hospital room? 1  -IBETH 2  -KS    AM-PAC 6 Clicks Score (PT) 10  -IBETH 12  -KS    Highest level of mobility 4 --> Transferred to chair/commode  -IBETH 4 --> Transferred to chair/commode  -KS    Row Name 03/06/23 1001          Modified [place] Scale    Pre-Stroke Modified [place] Scale 0 - No Symptoms at all.  -JJ (r) PB (t) JJ (c)     Modified [place] Scale 4 - Moderately severe disability.  Unable to walk without assistance, and unable to attend to own bodily needs without assistance.  -JJ (r) PB (t) JJ (c)     Row Name 03/06/23 1001          Functional Assessment    Outcome Measure Options AM-PAC 6 Clicks Daily Activity (OT)  -JJ (r) PB (t) JJ (c)           User Key  (r) = Recorded By, (t) = Taken By, (c) = Cosigned By    Initials Name Provider Type    Mayra Valadez, PTA Physical Therapist  Assistant    Jaja Garcia, RN Registered Nurse    Yudelka Parisi, OTR/L, CSRS Occupational Therapist    Clara James, OT Student OT Student                Occupational Therapy Education     Title: PT OT SLP Therapies (In Progress)     Topic: Occupational Therapy (In Progress)     Point: ADL training (In Progress)     Description:   Instruct learner(s) on proper safety adaptation and remediation techniques during self care or transfers.   Instruct in proper use of assistive devices.              Learning Progress Summary           Patient Acceptance, E, NR by PB at 3/6/2023 1001    Acceptance, E,D, VU,NR by LR at 3/4/2023 0951                   Point: Home exercise program (Done)     Description:   Instruct learner(s) on appropriate technique for monitoring, assisting and/or progressing therapeutic exercises/activities.              Learning Progress Summary           Patient Acceptance, E,D, VU,NR by LR at 3/4/2023 0951                   Point: Precautions (In Progress)     Description:   Instruct learner(s) on prescribed precautions during self-care and functional transfers.              Learning Progress Summary           Patient Acceptance, E, NR by PB at 3/6/2023 1001    Acceptance, E,D, VU,NR by LR at 3/4/2023 0951                   Point: Body mechanics (In Progress)     Description:   Instruct learner(s) on proper positioning and spine alignment during self-care, functional mobility activities and/or exercises.              Learning Progress Summary           Patient Acceptance, E, NR by PB at 3/6/2023 1001    Acceptance, E,D, VU,NR by LR at 3/4/2023 0951                               User Key     Initials Effective Dates Name Provider Type Discipline     11/22/22 -  Kathleen Cavazos OT Occupational Therapist OT     01/03/23 -  Clara Glaser, OT Student OT Student OT              OT Recommendation and Plan     Plan of Care Review  Plan of Care Reviewed With: patient,  friend  Outcome Evaluation: OT tx this date. Pt lethargic during session, responds to vcs but only opens eyes with cues. Pt in recliner on arrival, t/fs and amb deferred d/t fatigue and drowsiness. Pt responded to vcs for use of L UE, noted 3+/5 L  strength. L UE PROM/AAROM stretches, 5 reps each joint. Pt opened eyes 5x on command during session, able to attend L and midline for 10 seconds at a time. Pt noted to attempt to reposition self 2x but required Max A x2 for scooting/reposititoning in recliner. Skilled OT to continue POC. Anticipated d/c to IP rehab.     Time Calculation:    Time Calculation- OT     Row Name 03/06/23 1001             Time Calculation- OT    OT Start Time 0936  -JJ (r) PB (t) JJ (c)      OT Stop Time 1000  -JJ (r) PB (t) JJ (c)      OT Time Calculation (min) 24 min  -JJ (r) PB (t)      Total Timed Code Minutes- OT 24 minute(s)  -JJ (r) PB (t) JJ (c)      OT Received On 03/06/23  -JJ (r) PB (t) JJ (c)            User Key  (r) = Recorded By, (t) = Taken By, (c) = Cosigned By    Initials Name Provider Type    Yudelka Parisi, OTR/L, CSRS Occupational Therapist    Clara James, OT Student OT Student                       Clara Glaser, OT Student  3/6/2023

## 2023-03-06 NOTE — PLAN OF CARE
Goal Outcome Evaluation:  Plan of Care Reviewed With: patient, friend           Outcome Evaluation: OT tx this date. Pt lethargic during session, responds to vcs but only opens eyes with cues. Pt in recliner on arrival, t/fs and amb deferred d/t fatigue and drowsiness. Pt responded to vcs for use of L UE, noted 3+/5 L  strength. L UE PROM/AAROM stretches, 5 reps each joint. Pt opened eyes 5x on command during session, able to attend L and midline for 10 seconds at a time. Pt noted to attempt to reposition self 2x but required Max A x2 for scooting/reposititoning in recliner. Skilled OT to continue POC. Anticipated d/c to IP rehab.

## 2023-03-06 NOTE — PLAN OF CARE
Goal Outcome Evaluation:  Plan of Care Reviewed With: patient, caregiver        Progress: declining       Swallow treatment completed for diet toleration purposes. The patient was initially asleep and did not wake to name. Tactile stimulation and verbal greeting did wake patient. He remained drowsy throughout and did require max cues to remain alert. Speech remains dysarthric with low volume.  He had a 1x cough with the initial thin water intake. No other overt s/s of aspiration given at least 6 oz of thin water intake. Completed regular solid with max cues needed to remain alert and attentive to completing trial.     SLP recommends downgrade to puree diet and continue thin liquids. Medications whole as tolerated. If increased lethargy would recommend holding meal tray. Will require 1:1 assistance with all intake due to level of alertness.     SLP will continue to follow and treat.     Janeen Gunter MS CCC-SLP 3/6/2023 08:54 CST

## 2023-03-06 NOTE — PLAN OF CARE
Goal Outcome Evaluation:  Plan of Care Reviewed With: patient, family        Progress: no change  Outcome Evaluation: Ntn follow up. Pt asleep at time of RD visit. Family at bedside report pt may have lost weight over the past couple of months. Will follow to complete NPE as appropriate. Pureed diet. Magic cup BID. Cont to follow for plan of care.

## 2023-03-06 NOTE — PLAN OF CARE
Goal Outcome Evaluation:              Outcome Evaluation: Patient agreed to therapy. Min assist for supine to sit. Mod assist to scoot to EOB. Left lean and left forwrd head posture. Neglect of left LE even when touched. L UE neglect until tactile cued. Weak assisted L UE push/pulls. Reached all direction with R UE requiring assist to move torso. Stood with MOd assist and max assist to maintain standing at times. Not able to pull L knee back. Heavy left lean with left knee flexed. Not able to perform lateral shift and when I tried to shift patients weight to Right he strongly pushed to left. Had difficulty distinguishing Right from left with just verbal cues. Max assist to transfer to bedside recliner. Could advance L UE but required assist to shift weight enough to advance R LE. As soon as he sat heavy lean to left. Pillows on left side of trunk , head and L UE to maintain neutral as well as possible.

## 2023-03-06 NOTE — PLAN OF CARE
Patient remains alert and oriented to person, place, month, and year. Unable to state situation but is more lethargic after receiving PRN ativan for CIWA score of 22. Patient was experiencing audible and visual hallucinations at approximately 8 pm last night.  MD made aware. CT of abd/pelvis was obtained but has not been read at this time. Tmax of 100.0 this shift. NSR per monitor. Cardene required overnight to maintain sbp less than 140 titrated off at 5am. UOP adequate. Incontinent at times.     Problem: Skin Injury Risk Increased  Goal: Skin Health and Integrity  Outcome: Ongoing, Progressing  Intervention: Optimize Skin Protection  Recent Flowsheet Documentation  Taken 3/6/2023 0405 by Ulysses Alex RN  Head of Bed (HOB) Positioning: HOB at 30-45 degrees  Taken 3/6/2023 0010 by Ulysses Alex RN  Head of Bed (HOB) Positioning: HOB at 30-45 degrees  Taken 3/5/2023 1950 by Ulysses Alex RN  Head of Bed (HOB) Positioning: HOB at 30 degrees     Problem: Adjustment to Illness (Stroke, Hemorrhagic)  Goal: Optimal Coping  Outcome: Ongoing, Progressing     Problem: Bowel Elimination Impaired (Stroke, Hemorrhagic)  Goal: Effective Bowel Elimination  Outcome: Ongoing, Progressing     Problem: Cerebral Tissue Perfusion (Stroke, Hemorrhagic)  Goal: Optimal Cerebral Tissue Perfusion  Outcome: Ongoing, Progressing     Problem: Cognitive Impairment (Stroke, Hemorrhagic)  Goal: Optimal Cognitive Function  Outcome: Ongoing, Progressing     Problem: Communication Impairment (Stroke, Hemorrhagic)  Goal: Effective Communication Skills  Outcome: Ongoing, Progressing     Problem: Functional Ability Impaired (Stroke, Hemorrhagic)  Goal: Optimal Functional Ability  Outcome: Ongoing, Progressing  Intervention: Optimize Functional Ability  Recent Flowsheet Documentation  Taken 3/6/2023 0405 by Ulysses Alex RN  Activity Management: bedrest  Taken 3/6/2023 0010 by Ulysses Alex RN  Activity Management: bedrest  Taken 3/5/2023 1950 by Isai  NANCY Arora  Activity Management: bedrest     Problem: Pain (Stroke, Hemorrhagic)  Goal: Acceptable Pain Control  Outcome: Ongoing, Progressing     Problem: Respiratory Compromise (Stroke, Hemorrhagic)  Goal: Effective Oxygenation and Ventilation  Outcome: Ongoing, Progressing  Intervention: Optimize Oxygenation and Ventilation  Recent Flowsheet Documentation  Taken 3/6/2023 0405 by Ulysses Alex RN  Head of Bed (HOB) Positioning: HOB at 30-45 degrees  Taken 3/6/2023 0010 by Ulysses Alex RN  Head of Bed (HOB) Positioning: HOB at 30-45 degrees  Taken 3/5/2023 1950 by Ulysses Aelx RN  Head of Bed (HOB) Positioning: HOB at 30 degrees     Problem: Sensorimotor Impairment (Stroke, Hemorrhagic)  Goal: Improved Sensorimotor Function  Outcome: Ongoing, Progressing  Intervention: Optimize Range of Motion, Motor Control and Function  Recent Flowsheet Documentation  Taken 3/6/2023 0010 by Ulysses Alex RN  Positioning/Transfer Devices:   pillows   in use  Taken 3/5/2023 1950 by Ulysses Alex RN  Positioning/Transfer Devices: pillows     Problem: Swallowing Impairment (Stroke, Hemorrhagic)  Goal: Optimal Eating and Swallowing Without Aspiration  Outcome: Ongoing, Progressing  Intervention: Optimize Eating and Swallowing  Recent Flowsheet Documentation  Taken 3/6/2023 0405 by Ulysses Alex RN  Aspiration Precautions: awake/alert before oral intake  Taken 3/6/2023 0010 by Ulysses Alex RN  Aspiration Precautions: awake/alert before oral intake     Problem: Urinary Elimination Impaired (Stroke, Hemorrhagic)  Goal: Effective Urinary Elimination  Outcome: Ongoing, Progressing  Intervention: Promote Effective Bladder Elimination  Recent Flowsheet Documentation  Taken 3/6/2023 0405 by Ulysses Alex RN  Urinary Elimination Promotion: toileting offered  Taken 3/6/2023 0010 by Ulysses Alex RN  Urinary Elimination Promotion: toileting offered     Problem: Hypertension Acute  Goal: Blood Pressure Within Desired Range  Outcome: Ongoing,  Progressing     Problem: Adult Inpatient Plan of Care  Goal: Plan of Care Review  Outcome: Ongoing, Progressing  Goal: Patient-Specific Goal (Individualized)  Outcome: Ongoing, Progressing  Goal: Absence of Hospital-Acquired Illness or Injury  Outcome: Ongoing, Progressing  Intervention: Identify and Manage Fall Risk  Recent Flowsheet Documentation  Taken 3/6/2023 0405 by Ulysses Alex RN  Safety Promotion/Fall Prevention:   safety round/check completed   fall prevention program maintained  Taken 3/6/2023 0300 by Ulysses Alex RN  Safety Promotion/Fall Prevention:   safety round/check completed   fall prevention program maintained  Taken 3/6/2023 0200 by Ulysses Alex RN  Safety Promotion/Fall Prevention: safety round/check completed  Taken 3/6/2023 0055 by Ulysses Alex RN  Safety Promotion/Fall Prevention:   safety round/check completed   fall prevention program maintained  Taken 3/6/2023 0010 by Ulysses Alex RN  Safety Promotion/Fall Prevention:   safety round/check completed   fall prevention program maintained  Taken 3/5/2023 2300 by Ulysses Alex RN  Safety Promotion/Fall Prevention:   safety round/check completed   fall prevention program maintained  Taken 3/5/2023 2200 by Ulysses Alex RN  Safety Promotion/Fall Prevention:   safety round/check completed   fall prevention program maintained  Taken 3/5/2023 2100 by Ulysses Alex RN  Safety Promotion/Fall Prevention:   safety round/check completed   fall prevention program maintained  Taken 3/5/2023 1950 by Ulysses Alex RN  Safety Promotion/Fall Prevention: safety round/check completed  Taken 3/5/2023 1900 by Ulysses Alex RN  Safety Promotion/Fall Prevention:   safety round/check completed   fall prevention program maintained  Intervention: Prevent Skin Injury  Recent Flowsheet Documentation  Taken 3/6/2023 0405 by Ulysses Alex RN  Body Position:   turned   right   position changed independently  Taken 3/6/2023 0010 by Ulysses Alex RN  Body Position:  position changed independently  Taken 3/5/2023 1950 by Ulysses Alex RN  Body Position:   turned   right  Intervention: Prevent and Manage VTE (Venous Thromboembolism) Risk  Recent Flowsheet Documentation  Taken 3/6/2023 0405 by Ulysses Alex RN  Activity Management: bedrest  VTE Prevention/Management: compression stockings on  Taken 3/6/2023 0010 by Ulysses Alex RN  Activity Management: bedrest  VTE Prevention/Management:   sequential compression devices on   bilateral  Taken 3/5/2023 1950 by Ulysses Alex RN  Activity Management: bedrest  VTE Prevention/Management:   bilateral   sequential compression devices on  Goal: Optimal Comfort and Wellbeing  Outcome: Ongoing, Progressing  Intervention: Provide Person-Centered Care  Recent Flowsheet Documentation  Taken 3/6/2023 0405 by Ulysses Alex RN  Trust Relationship/Rapport: care explained  Taken 3/6/2023 0300 by Ulysses Alex RN  Trust Relationship/Rapport: care explained  Taken 3/6/2023 0200 by Ulysses Alex RN  Trust Relationship/Rapport: care explained  Taken 3/6/2023 0055 by Ulysses Alex RN  Trust Relationship/Rapport: care explained  Taken 3/6/2023 0010 by Ulysses Alex RN  Trust Relationship/Rapport: care explained  Taken 3/5/2023 2300 by Ulysses Alex RN  Trust Relationship/Rapport: care explained  Taken 3/5/2023 2200 by Ulysses Alex RN  Trust Relationship/Rapport: care explained  Taken 3/5/2023 2100 by Ulysses Alex RN  Trust Relationship/Rapport: care explained  Taken 3/5/2023 1950 by Ulysses Alex RN  Trust Relationship/Rapport: care explained  Goal: Readiness for Transition of Care  Outcome: Ongoing, Progressing     Problem: Fall Injury Risk  Goal: Absence of Fall and Fall-Related Injury  Outcome: Ongoing, Progressing  Intervention: Promote Injury-Free Environment  Recent Flowsheet Documentation  Taken 3/6/2023 0405 by Ulysses Alex RN  Safety Promotion/Fall Prevention:   safety round/check completed   fall prevention program maintained  Taken 3/6/2023  0300 by Ulysses Alex, RN  Safety Promotion/Fall Prevention:   safety round/check completed   fall prevention program maintained  Taken 3/6/2023 0200 by Ulysses Alex, RN  Safety Promotion/Fall Prevention: safety round/check completed  Taken 3/6/2023 0055 by Ulysses Alex, RN  Safety Promotion/Fall Prevention:   safety round/check completed   fall prevention program maintained  Taken 3/6/2023 0010 by Ulysses Alex, RN  Safety Promotion/Fall Prevention:   safety round/check completed   fall prevention program maintained  Taken 3/5/2023 2300 by Ulysses Alex, RN  Safety Promotion/Fall Prevention:   safety round/check completed   fall prevention program maintained  Taken 3/5/2023 2200 by Ulysses Alex, RN  Safety Promotion/Fall Prevention:   safety round/check completed   fall prevention program maintained  Taken 3/5/2023 2100 by Ulysses Alex, RN  Safety Promotion/Fall Prevention:   safety round/check completed   fall prevention program maintained  Taken 3/5/2023 1950 by Ulysses Alex, RN  Safety Promotion/Fall Prevention: safety round/check completed  Taken 3/5/2023 1900 by Ulysses Alex, RN  Safety Promotion/Fall Prevention:   safety round/check completed   fall prevention program maintained   Goal Outcome Evaluation:

## 2023-03-06 NOTE — CASE MANAGEMENT/SOCIAL WORK
Continued Stay Note   [place]     Patient Name: James Taylor  MRN: [id number]  Today's Date: 3/6/2023    Admit Date: 3/4/2023    Plan: Acute Rehab likely   Discharge Plan     Row Name 03/06/23 1055       Plan    Plan Acute Rehab likely    Plan Comments SW attempted to reach patient's mother and received voicemail, left voicemail requesting return call.  Patient has been lethargic per notes, which appears to be affecting his ability to participate in therapy.   may attempt to speak with patient directly regarding acute rehab referral if patient is able.  Will likely hold on referral today anyway, due to lethargy.  SW following.  Patient's insurance will require prior approval for rehab placement.               Discharge Codes    No documentation.                     FRACISCO Cramer

## 2023-03-06 NOTE — THERAPY TREATMENT NOTE
Acute Care - Speech Language Pathology   Swallow Treatment Note [facility]     Patient Name: James Taylor  : 1980  MRN: [id number]  Today's Date: 3/6/2023               Admit Date: 3/4/2023  Swallow treatment completed for diet toleration purposes. The patient was initially asleep and did not wake to name. Tactile stimulation and verbal greeting did wake patient. He remained drowsy throughout and did require max cues to remain alert. Speech remains dysarthric with low volume.  He had a 1x cough with the initial thin water intake. No other overt s/s of aspiration given at least 6 oz of thin water intake. Completed regular solid with max cues needed to remain alert and attentive to completing trial.     SLP recommends downgrade to puree diet and continue thin liquids. Medications whole as tolerated. If increased lethargy would recommend holding meal tray. Will require 1:1 assistance with all intake due to level of alertness.     SLP will continue to follow and treat.     Janeen Gunter MS CCC-SLP 3/6/2023 08:56 CST    Visit Dx:     ICD-10-CM ICD-9-CM   1. Intracranial bleeding (HCC)  I62.9 432.9   2. Facial droop  R29.810 781.94   3. Left arm weakness  R29.898 729.89   4. Acute nonintractable headache, unspecified headache type  R51.9 784.0   5. History of hypertension  Z86.79 V12.59   6. Cerebral edema (HCC)  G93.6 348.5   7. Midline shift of brain  R90.89 793.0   8. Dysphagia, unspecified type  R13.10 787.20   9. Impaired mobility  Z74.09 799.89     Patient Active Problem List   Diagnosis   • Intracranial bleeding (HCC)   • Malignant hypertension   • Elevated transaminase level   • Alcohol abuse     Past Medical History:   Diagnosis Date   • Asthma    • Hypertension      History reviewed. No pertinent surgical history.    SLP Recommendation and Plan                                            Daily Summary of Progress (SLP): progress toward functional goals is gradual (23 0808)               Treatment  Assessment (SLP): worsened (03/06/23 0808)  Treatment Assessment Comments (SLP): See note (03/06/23 0808)  Plan for Continued Treatment (SLP): goals adjusted to reflect functional decline demonstrated (03/06/23 0808)         Plan of Care Reviewed With: patient, caregiver  Progress: declining      SWALLOW EVALUATION (last 72 hours)     SLP Adult Swallow Evaluation     Row Name 03/06/23 0808 03/04/23 0900                Rehab Evaluation    Document Type therapy note (daily note)  -MG evaluation  -TM       Subjective Information no complaints  -MG fatigue  -TM       Patient Observations lethargic;cooperative;agree to therapy  -MG alert;cooperative  -TM       Patient/Family/Caregiver Comments/Observations No family present  -MG Female visitor in room at time of eval.  -TM       Patient Effort adequate  -MG adequate  -TM          General Information    Patient Profile Reviewed -- yes  -TM       Pertinent History Of Current Problem -- Acute R basal ganglia hemorrhagic CVA, weakness, speech difficulty, headache. Hx of asthma, HTN, tobacco use, alcohol use.  -TM       Current Method of Nutrition -- NPO  -TM       Precautions/Limitations, Vision -- WFL;for purposes of eval  -TM       Precautions/Limitations, Hearing -- WFL;for purposes of eval  -TM       Prior Level of Function-Communication -- WFL  -TM       Prior Level of Function-Swallowing -- no diet consistency restrictions  -TM       Plans/Goals Discussed with -- patient;family;other (see comments)  Guillermina CRUZ  -       Barriers to Rehab -- none identified  -TM       Patient's Goals for Discharge -- patient did not state  -TM       Family Goals for Discharge -- family did not state  -TM          Pain    Additional Documentation Pain Scale: FACES Pre/Post-Treatment (Group)  -MG Pain Scale: Numbers Pre/Post-Treatment (Group)  -TM          Pain Scale: Numbers Pre/Post-Treatment    Pretreatment Pain Rating -- 0/10 - no pain  -TM          Pain Scale: FACES Pre/Post-Treatment     Pain: FACES Scale, Pretreatment 0-->no hurt  -MG --       Posttreatment Pain Rating 0-->no hurt  -MG --          Oral Motor Structure and Function    Dentition Assessment -- natural, present and adequate  -TM       Secretion Management -- WNL/WFL  -TM       Mucosal Quality -- moist, healthy  -TM       Volitional Swallow -- delayed  -TM       Volitional Cough -- reduced respiratory support  -TM          Oral Musculature and Cranial Nerve Assessment    Oral Motor General Assessment -- oral labial or buccal impairment;mandibular impairment;lingual impairment  -TM       Mandibular Impairment Detail, Cranial Nerve V (Trigeminal) -- CN5: motor impairment;reduced strength on left  -TM       Oral Labial or Buccal Impairment, Detail, Cranial Nerve VII (Facial): -- CN7: Motor Impairment;left labial droop;reduced ROM;other (see comments)  L labial weakness with retraction for smile  -TM       Lingual Impairment, Detail. Cranial Nerves IX, XII (Glossopharyngeal and Hypoglossal) -- CN12: Motor Impairment;reduced strength left;reduced strength;reduced lingual ROM;other (see comments)  Mod L lingual deviation upon protrusion  -TM       Vocal Impairment, Detail. Cranial Nerve X (Vagus) -- CN10: Motor;vocal quality abnormality (see comments);other (see comments)  Decreased vocal intensity  -TM          General Eating/Swallowing Observations    Respiratory Support Currently in Use -- room air  -TM       Eating/Swallowing Skills -- fed by SLP  -TM       Positioning During Eating -- upright 90 degree;other (see comments)  On side of bed, co-eval with OT  -TM       Utensils Used -- spoon;straw  -TM       Consistencies Trialed -- pudding thick;honey-thick liquids;nectar/syrup-thick liquids;thin liquids;regular textures  -TM          Respiratory    Respiratory Status -- WFL  -TM          Clinical Swallow Eval    Oral Prep Phase -- impaired  -TM       Oral Transit -- impaired  -TM       Oral Residue -- WFL  -TM       Pharyngeal Phase --  suspected pharyngeal impairment  -TM       Esophageal Phase -- unremarkable  -TM       Clinical Swallow Evaluation Summary -- See note.  -TM          Oral Prep Concerns    Oral Prep Concerns -- prolonged mastication;incomplete or weak lip closure around spoon  -TM       Prolonged Mastication -- regular consistencies  -TM       Incomplete or Weak Lip Closure Around Spoon -- pudding;honey  -TM          Oral Transit Concerns    Oral Transit Concerns -- delayed initiation of bolus transit  -TM       Delayed Intiation of Bolus Transit -- thin  -TM       Oral Transit Concerns, Comment -- Piecemeal transit with thin  -TM          Pharyngeal Phase Concerns    Pharyngeal Phase Concerns -- other (see comments)  Piecemeal swallows with thin  -TM          SLP Evaluation Clinical Impression    SLP Swallowing Diagnosis -- mild-moderate;oral dysphagia;suspected pharyngeal dysphagia  -TM       Functional Impact -- risk of aspiration/pneumonia  -TM       Rehab Potential/Prognosis, Swallowing -- good, to achieve stated therapy goals  -TM       Swallow Criteria for Skilled Therapeutic Interventions Met -- demonstrates skilled criteria  -TM          SLP Treatment Clinical Impressions    Treatment Assessment (SLP) worsened  -MG --       Treatment Assessment Comments (SLP) See note  -MG --       Daily Summary of Progress (SLP) progress toward functional goals is gradual  -MG --       Barriers to Overall Progress (SLP) Lethargy  -MG --       Plan for Continued Treatment (SLP) goals adjusted to reflect functional decline demonstrated  -MG --       Care Plan Review evaluation/treatment results reviewed;care plan/treatment goals reviewed;risks/benefits reviewed;current/potential barriers reviewed;patient/other agree to care plan  -MG --       Care Plan Review, Other Participant(s) caregiver  NANCY Wilkinson  - --          Recommendations    Therapy Frequency (Swallow) -- at least;2 days per week  -TM       Predicted Duration Therapy Intervention  (Days) -- until discharge  -       SLP Diet Recommendation -- soft to chew textures;thin liquids  -TM       Recommended Precautions and Strategies -- upright posture during/after eating;small bites of food and sips of liquid;1:1 supervision;assist with feeding;fatigue precautions;general aspiration precautions;check mouth frequently for oral residue/pocketing;alternate between small bites of food and sips of liquid;other (see comments)  Bolus placement on strong side of oral cavity for optimal bolus control.  -TM       Oral Care Recommendations -- Oral Care BID/PRN  -TM       SLP Rec. for Method of Medication Administration -- meds whole;with thin liquids  -TM       Monitor for Signs of Aspiration -- yes;notify SLP if any concerns;cough;gurgly voice;throat clearing;pneumonia;right lower lobe infiltrates  -TM       Anticipated Discharge Disposition (SLP) -- unknown  -TM          Swallow Goals (SLP)    Swallow LTGs Swallow Long Term Goal (free text)  -MG Swallow Long Term Goal (free text)  -TM       Swallow STGs diet tolerance goal selection (SLP);labial strengthening goal selection (SLP);lingual strengthening goal selection (SLP)  -MG diet tolerance goal selection (SLP);labial strengthening goal selection (SLP);lingual strengthening goal selection (SLP)  -TM       Diet Tolerance Goal Selection (SLP) Patient will tolerate trials of  -MG Patient will tolerate trials of  -TM       Labial Strengthening Goal Selection (SLP) labial strengthening, SLP goal 1  -MG labial strengthening, SLP goal 1  -TM       Lingual Strengthening Goal Selection (SLP) lingual strengthening, SLP goal 1  -MG lingual strengthening, SLP goal 1  -TM          (LTG) Swallow    (LTG) Swallow Pt will tolerate LRD without overt s/s of aspiration.  -MG Pt will tolerate LRD without overt s/s of aspiration.  -TM       [place] (Swallow Long Term Goal) with minimal cues (75-90% accuracy)  -MG with minimal cues (75-90% accuracy)  -TM       Time Frame  (Swallow Long Term Goal) by discharge  -MG by discharge  -TM       Barriers (Swallow Long Term Goal) L weakness  -MG L weakness  -TM       Progress/Outcomes (Swallow Long Term Goal) progress slower than expected  -MG new goal;goal ongoing  -TM          (STG) Patient will tolerate trials of    Consistencies Trialed (Tolerate trials) soft to chew (ground) textures;thin liquids;regular textures  -MG soft to chew (ground) textures;thin liquids;regular textures  -TM       Desired Outcome (Tolerate trials) without signs/symptoms of aspiration;without signs of distress;with adequate oral prep/transit/clearance  -MG without signs/symptoms of aspiration;without signs of distress;with adequate oral prep/transit/clearance  -TM       [place] (Tolerate trials) with minimal cues (75-90% accuracy)  -MG with minimal cues (75-90% accuracy)  -TM       Time Frame (Tolerate trials) by discharge  -MG by discharge  -TM       Progress/Outcomes (Tolerate trials) progress slower than expected  -MG new goal;goal ongoing  -TM          (STG) Labial Strengthening Goal 1 (SLP)    Activity (Labial Strengthening Goal 1, SLP) increase labial tone  -MG increase labial tone  -TM       Increase Labial Tone labial resistance exercises  -MG labial resistance exercises  -TM       [place]/Accuracy (Labial Strengthening Goal 1, SLP) with minimal cues (75-90% accuracy)  -MG with minimal cues (75-90% accuracy)  -TM       Time Frame (Labial Strengthening Goal 1, SLP) by discharge  -MG by discharge  -TM       Barriers (Labial Strengthening Goal 1, SLP) L weakness  -MG L weakness  -TM       Progress/Outcomes (Labial Strengthening Goal 1, SLP) goal ongoing  -MG new goal;goal ongoing  -TM          (STG) Lingual Strengthening Goal 1 (SLP)    Activity (Lingual Strengthening Goal 1, SLP) increase lingual tone/sensation/control/coordination/movement;increase tongue back strength  -MG increase lingual tone/sensation/control/coordination/movement;increase  tongue back strength  -TM       Increase Lingual Tone/Sensation/Control/Coordination/Movement lingual movement exercises  -MG lingual movement exercises  -TM       Increase Tongue Back Strength lingual resistance exercises  -MG lingual resistance exercises  -TM       [place]/Accuracy (Lingual Strengthening Goal 1, SLP) with minimal cues (75-90% accuracy)  -MG with minimal cues (75-90% accuracy)  -TM       Time Frame (Lingual Strengthening Goal 1, SLP) by discharge  -MG by discharge  -TM       Barriers (Lingual Strengthening Goal 1, SLP) L weakness  -MG L weakness  -TM       Progress/Outcomes (Lingual Strengthening Goal 1, SLP) goal ongoing  -MG new goal;goal ongoing  -TM             User Key  (r) = Recorded By, (t) = Taken By, (c) = Cosigned By    Initials Name Effective Dates    TM Kristine Sanchez, CCC-SLP 02/03/23 -     MG Janeen Gunter, MS CCC-SLP 08/12/22 -                 EDUCATION  The patient has been educated in the following areas:   Dysphagia (Swallowing Impairment) Oral Care/Hydration Modified Diet Instruction.        SLP GOALS     Row Name 03/06/23 0808 03/04/23 0900          (LTG) Swallow    (LTG) Swallow Pt will tolerate LRD without overt s/s of aspiration.  -MG Pt will tolerate LRD without overt s/s of aspiration.  -TM     [place] (Swallow Long Term Goal) with minimal cues (75-90% accuracy)  -MG with minimal cues (75-90% accuracy)  -TM     Time Frame (Swallow Long Term Goal) by discharge  -MG by discharge  -TM     Barriers (Swallow Long Term Goal) L weakness  -MG L weakness  -TM     Progress/Outcomes (Swallow Long Term Goal) progress slower than expected  -MG new goal;goal ongoing  -TM        (STG) Patient will tolerate trials of    Consistencies Trialed (Tolerate trials) soft to chew (ground) textures;thin liquids;regular textures  -MG soft to chew (ground) textures;thin liquids;regular textures  -TM     Desired Outcome (Tolerate trials) without signs/symptoms of aspiration;without  signs of distress;with adequate oral prep/transit/clearance  -MG without signs/symptoms of aspiration;without signs of distress;with adequate oral prep/transit/clearance  -TM     [place] (Tolerate trials) with minimal cues (75-90% accuracy)  -MG with minimal cues (75-90% accuracy)  -TM     Time Frame (Tolerate trials) by discharge  -MG by discharge  -TM     Progress/Outcomes (Tolerate trials) progress slower than expected  -MG new goal;goal ongoing  -TM        (STG) Labial Strengthening Goal 1 (SLP)    Activity (Labial Strengthening Goal 1, SLP) increase labial tone  -MG increase labial tone  -TM     Increase Labial Tone labial resistance exercises  -MG labial resistance exercises  -TM     [place]/Accuracy (Labial Strengthening Goal 1, SLP) with minimal cues (75-90% accuracy)  -MG with minimal cues (75-90% accuracy)  -TM     Time Frame (Labial Strengthening Goal 1, SLP) by discharge  -MG by discharge  -TM     Barriers (Labial Strengthening Goal 1, SLP) L weakness  -MG L weakness  -TM     Progress/Outcomes (Labial Strengthening Goal 1, SLP) goal ongoing  -MG new goal;goal ongoing  -TM        (STG) Lingual Strengthening Goal 1 (SLP)    Activity (Lingual Strengthening Goal 1, SLP) increase lingual tone/sensation/control/coordination/movement;increase tongue back strength  -MG increase lingual tone/sensation/control/coordination/movement;increase tongue back strength  -TM     Increase Lingual Tone/Sensation/Control/Coordination/Movement lingual movement exercises  -MG lingual movement exercises  -TM     Increase Tongue Back Strength lingual resistance exercises  -MG lingual resistance exercises  -TM     [place]/Accuracy (Lingual Strengthening Goal 1, SLP) with minimal cues (75-90% accuracy)  -MG with minimal cues (75-90% accuracy)  -TM     Time Frame (Lingual Strengthening Goal 1, SLP) by discharge  -MG by discharge  -TM     Barriers (Lingual Strengthening Goal 1, SLP) L weakness  -MG L weakness  -TM      Progress/Outcomes (Lingual Strengthening Goal 1, SLP) goal ongoing  -MG new goal;goal ongoing  -TM           User Key  (r) = Recorded By, (t) = Taken By, (c) = Cosigned By    Initials Name Provider Type    TM Kristine Sanchez, CCC-SLP Speech and Language Pathologist    Janeen Peacock, MS CCC-SLP Speech and Language Pathologist                   Time Calculation:    Time Calculation- SLP     Row Name 03/06/23 0855             Time Calculation- SLP    SLP Start Time 0808  -MG      SLP Stop Time 0850  -MG      SLP Time Calculation (min) 42 min  -MG      SLP Received On 03/06/23  -MG         Untimed Charges    30289-VE Treatment Swallow Minutes 42  -MG         Total Minutes    Untimed Charges Total Minutes 42  -MG       Total Minutes 42  -MG            User Key  (r) = Recorded By, (t) = Taken By, (c) = Cosigned By    Initials Name Provider Type    Janeen Peacock, MS CCC-SLP Speech and Language Pathologist                Therapy Charges for Today     Code Description Service Date Service Provider Modifiers Qty    [id number] HC ST TREATMENT SWALLOW 3 3/6/2023 Janeen Gunter MS CCC-SLP GN 1               Janeen Gunter MS CCC-DONNIE  3/6/2023

## 2023-03-06 NOTE — THERAPY TREATMENT NOTE
Acute Care - Physical Therapy Treatment Note  [facility]     Patient Name: James Taylor  : 1980  MRN: [id number]  Today's Date: 3/6/2023      Visit Dx:     ICD-10-CM ICD-9-CM   1. Intracranial bleeding (HCC)  I62.9 432.9   2. Facial droop  R29.810 781.94   3. Left arm weakness  R29.898 729.89   4. Acute nonintractable headache, unspecified headache type  R51.9 784.0   5. History of hypertension  Z86.79 V12.59   6. Cerebral edema (HCC)  G93.6 348.5   7. Midline shift of brain  R90.89 793.0   8. Dysphagia, unspecified type  R13.10 787.20   9. Impaired mobility  Z74.09 799.89     Patient Active Problem List   Diagnosis   • Intracranial bleeding (HCC)   • Malignant hypertension   • Elevated transaminase level   • Alcohol abuse     Past Medical History:   Diagnosis Date   • Asthma    • Hypertension      History reviewed. No pertinent surgical history.  PT Assessment (last 12 hours)     PT Evaluation and Treatment     Row Name 23 0848          Physical Therapy Time and Intention    Subjective Information no complaints  -IBETH     Document Type therapy note (daily note)  -IBETH     Mode of Treatment physical therapy  -     Row Name 23 0848          General Information    Patient Observations agree to therapy;lethargic;decreased LOC  -IBTEH     Existing Precautions/Restrictions fall  L weakness and neglect  -IBETH     Limitations/Impairments safety/cognitive  -     Row Name 23 0848          Pain    Pretreatment Pain Rating 0/10 - no pain  -IBETH     Posttreatment Pain Rating 0/10 - no pain  -IBETH     Row Name 23 0848          Bed Mobility    Scooting/Bridging [place] (Bed Mobility) moderate assist (50% patient effort)  To EOB  -IBETH     Supine-Sit [place] (Bed Mobility) verbal cues;minimum assist (75% patient effort)  -IBETH     Row Name 23 0848          Transfers    Comment, (Transfers) stood x3  -IBETH     Row Name 23 0848          Bed-Chair Transfer    Bed-Chair [place] (Transfers)  verbal cues;maximum assist (25% patient effort)  -     Row Name 03/06/23 0848          Sit-Stand Transfer    Sit-Stand [place] (Transfers) verbal cues;moderate assist (50% patient effort)  -     Row Name 03/06/23 0848          Stand-Sit Transfer    Stand-Sit [place] (Transfers) verbal cues;moderate assist (50% patient effort)  -     Row Name 03/06/23 0848          Gait/Stairs (Locomotion)    [place] Level (Gait) maximum assist (25% patient effort)  -     Row Name 03/06/23 0848          Balance    Static Sitting Balance contact guard;minimal assist  -IBETH     Dynamic Sitting Balance minimal assist  -     Position, Sitting Balance unsupported;supported;sitting edge of bed  -     Static Standing Balance moderate assist  -IBETH     Dynamic Standing Balance maximum assist  -     Comment, Balance could reach with R UE when touched hi R UE. L Lean,Cannot correct. L UE weight bearing.  -     Row Name 03/06/23 0848          Plan of Care Review    Outcome Evaluation Patient agreed to therapy. Min assist for supine to sit. Mod assist to scoot to EOB. Left lean and left forwrd head posture. Neglect of left LE even when touched. L UE neglect until tactile cued. Weak assisted L UE push/pulls. Reached all direction with R UE requiring assist to move torso. Stood with MOd assist and max assist to maintain standing at times. Not able to pull L knee back. Heavy left lean with left knee flexed. Not able to perform lateral shift and when I tried to shift patients weight to Right he strongly pushed to left. Had difficulty distinguishing Right from left with just verbal cues. Max assist to transfer to bedside recliner. Could advance L UE but required assist to shift weight enough to advance R LE. As soon as he sat heavy lean to left. Pillows on left side of trunk , head and L UE to maintain neutral as well as possible.  -     Row Name 03/06/23 0848          Positioning and Restraints    Pre-Treatment Position  in bed  -     Post Treatment Position chair  -     In Chair reclined;notified nsg;call light within reach;encouraged to call for assist;LUE elevated  -           User Key  (r) = Recorded By, (t) = Taken By, (c) = Cosigned By    Initials Name Provider Type    Mayra Valadez PTA Physical Therapist Assistant                Physical Therapy Education     Title: PT OT SLP Therapies (In Progress)     Topic: Physical Therapy (In Progress)     Point: Mobility training (In Progress)     Learning Progress Summary           Patient Acceptance, E,D, NR by  at 3/6/2023 0927    Comment: Being aware of left side of body    Acceptance, E,D, DU,VU by  at 3/4/2023 1127    Comment: benefits of PT and POC, call for A OOB                   Point: Precautions (Done)     Learning Progress Summary           Patient Acceptance, E,D, DU,VU by  at 3/4/2023 1127    Comment: benefits of PT and POC, call for A OOB                               User Key     Initials Effective Dates Name Provider Type Discipline     02/03/23 -  Swapnil Reid, PT Physical Therapist PT     02/03/23 -  Mayra Peterson PTA Physical Therapist Assistant PT              PT Recommendation and Plan     Outcome Evaluation: Patient agreed to therapy. Min assist for supine to sit. Mod assist to scoot to EOB. Left lean and left forwrd head posture. Neglect of left LE even when touched. L UE neglect until tactile cued. Weak assisted L UE push/pulls. Reached all direction with R UE requiring assist to move torso. Stood with MOd assist and max assist to maintain standing at times. Not able to pull L knee back. Heavy left lean with left knee flexed. Not able to perform lateral shift and when I tried to shift patients weight to Right he strongly pushed to left. Had difficulty distinguishing Right from left with just verbal cues. Max assist to transfer to bedside recliner. Could advance L UE but required assist to shift weight enough to advance R LE. As  soon as he sat heavy lean to left. Pillows on left side of trunk , head and L UE to maintain neutral as well as possible.   Outcome Measures     Row Name 03/06/23 0900 03/05/23 0931          How much help from another person do you currently need...    Turning from your back to your side while in flat bed without using bedrails? 2  -IBETH 3  -MF     Moving from lying on back to sitting on the side of a flat bed without bedrails? 2  -IBETH 2  -MF     Moving to and from a bed to a chair (including a wheelchair)? 2  -IBETH 3  -MF     Standing up from a chair using your arms (e.g., wheelchair, bedside chair)? 2  -IBETH 3  -MF     Climbing 3-5 steps with a railing? 1  -IBETH 1  -MF     To walk in hospital room? 1  -IBETH 2  -MF     AM-PAC 6 Clicks Score (PT) 10  -IBETH 14  -MF        Functional Assessment    Outcome Measure Options -- AM-PAC 6 Clicks Basic Mobility (PT)  -MF           User Key  (r) = Recorded By, (t) = Taken By, (c) = Cosigned By    Initials Name Provider Type    Mayra Valadez PTA Physical Therapist Assistant     Radha Flynn PTA Physical Therapist Assistant                 Time Calculation:    PT Charges     Row Name 03/06/23 0927             Time Calculation    Start Time 0848  -IBETH      Stop Time 0911  -IBETH      Time Calculation (min) 23 min  -IBETH         Timed Charges    27414 - PT Therapeutic Activity Minutes 23  -IBETH         Total Minutes    Timed Charges Total Minutes 23  -IBETH       Total Minutes 23  -IBETH            User Key  (r) = Recorded By, (t) = Taken By, (c) = Cosigned By    Initials Name Provider Type    Mayra Valadez PTA Physical Therapist Assistant              Therapy Charges for Today     Code Description Service Date Service Provider Modifiers Qty    97539689627 HC PT THERAPEUTIC ACT EA 15 MIN 3/6/2023 Mayra Peterson PTA GP 2          PT G-Codes  Outcome Measure Options: AM-PAC 6 Clicks Basic Mobility (PT)  AM-PAC 6 Clicks Score (PT): 10  AM-PAC 6 Clicks Score (OT): 16  Modified  Mon[place] Scale: 4 - Moderately severe disability.  Unable to walk without assistance, and unable to attend to own bodily needs without assistance.    Mayra Peterson, PTA  3/6/2023     Principal Discharge DX:	Head injury  Secondary Diagnosis:	Abrasion

## 2023-03-06 NOTE — PROGRESS NOTES
"Progress Note    Patient:  James Taylor  YOB: 1980  MRN: [id number]   Admit date: 3/4/2023   Admitting Physician: Brandon Gannon MD  Primary Care Physician: Radha Desir MD    Chief Complaint: Hemorrhagic CVA    Interval History: Concerned patient was going through alcohol withdrawals yesterday.  CIWA protocol initiated.  Patient did receive Ativan and is drowsy this morning.  He does awaken to stimuli and does follow commands.  Still having weakness of the left upper and lower extremity.  Hospitalist is following for elevated liver enzymes as well as hypertension    Intake/Output Summary (Last 24 hours) at 3/6/2023 0749  Last data filed at 3/6/2023 0405  Gross per 24 hour   Intake 1804 ml   Output 350 ml   Net 1454 ml     Allergies: No Known Allergies  Current Scheduled Medications:   amLODIPine, 10 mg, Oral, Daily  Chlorhexidine Gluconate Cloth, 1 application, Topical, Q24H  metoprolol tartrate, 50 mg, Oral, Q12H  mupirocin, 1 application, Each Nare, BID  nicotine, 1 patch, Transdermal, Q24H  sertraline, 50 mg, Oral, Q24H  sodium chloride, 10 mL, Intravenous, Q12H  terazosin, 5 mg, Oral, Nightly      Current PRN Medications:  •  acetaminophen **OR** acetaminophen  •  enalaprilat  •  labetalol  •  LORazepam **OR** LORazepam **OR** LORazepam **OR** LORazepam **OR** LORazepam **OR** LORazepam  •  sodium chloride  •  sodium chloride  •  sodium chloride    Review of Systems   Constitutional: Negative for fever.       Pertinent positives/negatives documented in HPI.  All other systems reviewed and negative.    Vital Signs:  /79   Pulse 83   Temp 98.4 °F (36.9 °C) (Axillary)   Resp 21   Ht 177.8 cm (70\")   Wt 67.3 kg (148 lb 4.8 oz)   SpO2 92%   BMI 21.28 kg/m²     Physical Exam  Vitals and nursing note reviewed.   Constitutional:       General: Vital signs are normal.   HENT:      Head: Normocephalic.      Nose: Nose normal.      Mouth/Throat:      Mouth: Mucous membranes are moist. " "  Eyes:      Extraocular Movements: EOM normal.      Pupils: Pupils are equal, round, and reactive to light.   Cardiovascular:      Rate and Rhythm: Normal rate and regular rhythm.      Pulses: Intact distal pulses.   Pulmonary:      Effort: Pulmonary effort is normal. No respiratory distress.   Abdominal:      General: Bowel sounds are normal. There is no distension.      Palpations: Abdomen is soft.   Skin:     General: Skin is warm and dry.   Neurological:      Mental Status: He is oriented to person, place, and time. He is lethargic.      Cranial Nerves: Cranial nerve deficit present.      Motor: Weakness present.   Psychiatric:         Mood and Affect: Affect is flat.         Speech: Speech is delayed and slurred.         Behavior: Behavior is slowed.         Judgment: Judgment is not impulsive.       Vital signs reviewed.  Line/IV site: No erythema, warmth, induration, or tenderness.    Objective:  General Appearance:  Comfortable, well-appearing, in no acute distress and not in pain.    Vital signs: (most recent): Blood pressure 125/79, pulse 83, temperature 98.4 °F (36.9 °C), temperature source Axillary, resp. rate 21, height 177.8 cm (70\"), weight 67.3 kg (148 lb 4.8 oz), SpO2 92 %.  Vital signs are normal.  No fever.    Output: Producing urine.    HEENT: Normal HEENT exam.    Lungs:  Normal effort and normal respiratory rate.  He is not in respiratory distress.    Heart: Normal rate.  Regular rhythm.    Chest: Symmetric chest wall expansion.   Skin:  Warm and dry.    Abdomen: Abdomen is soft and non-distended.  Bowel sounds are normal.   There is no abdominal tenderness.     Pulses: Distal pulses are intact.        Neurologic Exam     Mental Status   Oriented to person, place, and time.   Attention: decreased. Concentration: decreased.   Speech: slurred   Level of consciousness: drowsy ,  arousable by verbal stimuli  Normal comprehension.     Cranial Nerves     CN II   Visual fields full to confrontation. "     CN III, IV, VI   Pupils are equal, round, and reactive to light.  Extraocular motions are normal.     CN V   Facial sensation intact.     CN VII   Right facial weakness: none  Left facial weakness: central    CN VIII   CN VIII normal.     CN IX, X   CN IX normal.   CN X normal.     CN XI   CN XI normal.     CN XII   Tongue: not atrophic  Tongue deviation: left    Motor Exam   Muscle bulk: normal    Strength   Strength 5/5 except as noted. Left hemiparesis with upper and lower extremity being 3 out of 5 in all muscle groups    Right upper and lower extremity 5 out of 5 in all muscle groups     Sensory Exam   Light touch normal.       Lab Results:  ABG:     CBC:   Results from last 7 days   Lab Units 03/04/23  0115   WBC 10*3/mm3 6.58   HEMOGLOBIN g/dL 13.6   HEMATOCRIT % 38.3   PLATELETS 10*3/mm3 104*     BMP:  Results from last 7 days   Lab Units 03/04/23  0115   SODIUM mmol/L 140   POTASSIUM mmol/L 3.8   CHLORIDE mmol/L 102   CO2 mmol/L 26.0   BUN mg/dL 14   CREATININE mg/dL 1.13   GLUCOSE mg/dL 110*   CALCIUM mg/dL 9.2   ALT (SGPT) U/L 180*     Culture Results: No results found for: BLOODCX, URINECX, WOUNDCX, MRSACX, RESPCX, STOOLCX          Impression/Recommendations:   CV: Blood pressure overall is improving.  Continue with treatment per hospitalist.  Heart rate stable  RESP: Oxygen level stable  GI: Tolerating p.o.  : Decreased urine output  NEURO: Neuro exam stable  ID: None  Continue with current treatment.  Appreciate hospitalist input.  If patient has a good day will consider transfer out of the unit tomorrow.  We will repeat CT scan of the head in the morning      Intracranial bleeding (HCC)    Malignant hypertension    Elevated transaminase level    Alcohol abuse      Feliciano Boothe, APRN

## 2023-03-07 ENCOUNTER — APPOINTMENT (OUTPATIENT)
Dept: CT IMAGING | Facility: HOSPITAL | Age: 43
DRG: 64 | End: 2023-03-07
Payer: MEDICAID

## 2023-03-07 LAB
ALBUMIN SERPL-MCNC: 3.5 G/DL (ref 3.5–5.2)
ALBUMIN/GLOB SERPL: 1.1 G/DL
ALP SERPL-CCNC: 117 U/L (ref 39–117)
ALT SERPL W P-5'-P-CCNC: 80 U/L (ref 1–41)
ANION GAP SERPL CALCULATED.3IONS-SCNC: 9 MMOL/L (ref 5–15)
ANISOCYTOSIS BLD QL: ABNORMAL
AST SERPL-CCNC: 80 U/L (ref 1–40)
BILIRUB SERPL-MCNC: 1.5 MG/DL (ref 0–1.2)
BUN SERPL-MCNC: 8 MG/DL (ref 6–20)
BUN/CREAT SERPL: 11.8 (ref 7–25)
CALCIUM SPEC-SCNC: 8.9 MG/DL (ref 8.6–10.5)
CHLORIDE SERPL-SCNC: 101 MMOL/L (ref 98–107)
CO2 SERPL-SCNC: 25 MMOL/L (ref 22–29)
CREAT SERPL-MCNC: 0.68 MG/DL (ref 0.76–1.27)
DEPRECATED RDW RBC AUTO: 42.8 FL (ref 37–54)
EGFRCR SERPLBLD CKD-EPI 2021: 119 ML/MIN/1.73
EOSINOPHIL # BLD MANUAL: 0.32 10*3/MM3 (ref 0–0.4)
EOSINOPHIL NFR BLD MANUAL: 3 % (ref 0.3–6.2)
ERYTHROCYTE [DISTWIDTH] IN BLOOD BY AUTOMATED COUNT: 13.1 % (ref 12.3–15.4)
GLOBULIN UR ELPH-MCNC: 3.2 GM/DL
GLUCOSE SERPL-MCNC: 93 MG/DL (ref 65–99)
HCT VFR BLD AUTO: 32.4 % (ref 37.5–51)
HGB BLD-MCNC: 11.5 G/DL (ref 13–17.7)
LYMPHOCYTES # BLD MANUAL: 1.09 10*3/MM3 (ref 0.7–3.1)
LYMPHOCYTES NFR BLD MANUAL: 2 % (ref 5–12)
MACROCYTES BLD QL SMEAR: ABNORMAL
MAGNESIUM SERPL-MCNC: 1.7 MG/DL (ref 1.6–2.6)
MCH RBC QN AUTO: 31.6 PG (ref 26.6–33)
MCHC RBC AUTO-ENTMCNC: 35.5 G/DL (ref 31.5–35.7)
MCV RBC AUTO: 89 FL (ref 79–97)
MONOCYTES # BLD: 0.22 10*3/MM3 (ref 0.1–0.9)
NEUTROPHILS # BLD AUTO: 9.15 10*3/MM3 (ref 1.7–7)
NEUTROPHILS NFR BLD MANUAL: 79.8 % (ref 42.7–76)
NEUTS BAND NFR BLD MANUAL: 5.1 % (ref 0–5)
PLATELET # BLD AUTO: 62 10*3/MM3 (ref 140–450)
PMV BLD AUTO: 12.2 FL (ref 6–12)
POIKILOCYTOSIS BLD QL SMEAR: ABNORMAL
POTASSIUM SERPL-SCNC: 3.5 MMOL/L (ref 3.5–5.2)
PROT SERPL-MCNC: 6.7 G/DL (ref 6–8.5)
RBC # BLD AUTO: 3.64 10*6/MM3 (ref 4.14–5.8)
SMALL PLATELETS BLD QL SMEAR: ABNORMAL
SODIUM SERPL-SCNC: 135 MMOL/L (ref 136–145)
TARGETS BLD QL SMEAR: ABNORMAL
VARIANT LYMPHS NFR BLD MANUAL: 3 % (ref 0–5)
VARIANT LYMPHS NFR BLD MANUAL: 7.1 % (ref 19.6–45.3)
WBC MORPH BLD: NORMAL
WBC NRBC COR # BLD: 10.78 10*3/MM3 (ref 3.4–10.8)

## 2023-03-07 PROCEDURE — 85007 BL SMEAR W/DIFF WBC COUNT: CPT | Performed by: FAMILY MEDICINE

## 2023-03-07 PROCEDURE — 92526 ORAL FUNCTION THERAPY: CPT | Performed by: SPEECH-LANGUAGE PATHOLOGIST

## 2023-03-07 PROCEDURE — 70450 CT HEAD/BRAIN W/O DYE: CPT

## 2023-03-07 PROCEDURE — 85025 COMPLETE CBC W/AUTO DIFF WBC: CPT | Performed by: FAMILY MEDICINE

## 2023-03-07 PROCEDURE — 83735 ASSAY OF MAGNESIUM: CPT | Performed by: FAMILY MEDICINE

## 2023-03-07 PROCEDURE — 97530 THERAPEUTIC ACTIVITIES: CPT

## 2023-03-07 PROCEDURE — 25010000002 SODIUM CHLORIDE 0.9 % WITH KCL 20 MEQ 20-0.9 MEQ/L-% SOLUTION: Performed by: FAMILY MEDICINE

## 2023-03-07 PROCEDURE — 99232 SBSQ HOSP IP/OBS MODERATE 35: CPT | Performed by: NEUROLOGICAL SURGERY

## 2023-03-07 PROCEDURE — 80053 COMPREHEN METABOLIC PANEL: CPT | Performed by: FAMILY MEDICINE

## 2023-03-07 PROCEDURE — 97535 SELF CARE MNGMENT TRAINING: CPT

## 2023-03-07 RX ORDER — HYDRALAZINE HYDROCHLORIDE 10 MG/1
10 TABLET, FILM COATED ORAL EVERY 8 HOURS SCHEDULED
Status: DISCONTINUED | OUTPATIENT
Start: 2023-03-07 | End: 2023-03-07

## 2023-03-07 RX ORDER — HYDRALAZINE HYDROCHLORIDE 10 MG/1
10 TABLET, FILM COATED ORAL EVERY 8 HOURS SCHEDULED
Status: DISCONTINUED | OUTPATIENT
Start: 2023-03-07 | End: 2023-03-08

## 2023-03-07 RX ADMIN — METOPROLOL TARTRATE 50 MG: 50 TABLET, FILM COATED ORAL at 20:02

## 2023-03-07 RX ADMIN — HYDRALAZINE HYDROCHLORIDE 10 MG: 10 TABLET, FILM COATED ORAL at 17:42

## 2023-03-07 RX ADMIN — ENALAPRILAT 1.25 MG: 1.25 INJECTION INTRAVENOUS at 04:46

## 2023-03-07 RX ADMIN — NICARDIPINE HYDROCHLORIDE 5 MG/HR: 25 INJECTION, SOLUTION INTRAVENOUS at 20:02

## 2023-03-07 RX ADMIN — METOPROLOL TARTRATE 50 MG: 50 TABLET, FILM COATED ORAL at 12:23

## 2023-03-07 RX ADMIN — NICOTINE 1 PATCH: 21 PATCH, EXTENDED RELEASE TRANSDERMAL at 17:44

## 2023-03-07 RX ADMIN — Medication 1 APPLICATION: at 20:02

## 2023-03-07 RX ADMIN — ENALAPRILAT 1.25 MG: 1.25 INJECTION INTRAVENOUS at 22:38

## 2023-03-07 RX ADMIN — POTASSIUM CHLORIDE AND SODIUM CHLORIDE 100 ML/HR: 900; 150 INJECTION, SOLUTION INTRAVENOUS at 09:24

## 2023-03-07 RX ADMIN — POTASSIUM CHLORIDE AND SODIUM CHLORIDE 100 ML/HR: 900; 150 INJECTION, SOLUTION INTRAVENOUS at 17:29

## 2023-03-07 RX ADMIN — Medication 10 ML: at 09:09

## 2023-03-07 RX ADMIN — Medication 1 APPLICATION: at 09:08

## 2023-03-07 RX ADMIN — AMLODIPINE BESYLATE 10 MG: 10 TABLET ORAL at 12:23

## 2023-03-07 RX ADMIN — CHLORHEXIDINE GLUCONATE 1 APPLICATION: 500 CLOTH TOPICAL at 04:12

## 2023-03-07 RX ADMIN — ENALAPRILAT 1.25 MG: 1.25 INJECTION INTRAVENOUS at 16:32

## 2023-03-07 RX ADMIN — Medication 10 ML: at 20:03

## 2023-03-07 RX ADMIN — TERAZOSIN HYDROCHLORIDE 5 MG: 5 CAPSULE ORAL at 20:02

## 2023-03-07 NOTE — THERAPY TREATMENT NOTE
Acute Care - Occupational Therapy Treatment Note  [facility]     Patient Name: James Taylor  : 1980  MRN: [id number]  Today's Date: 3/7/2023             Admit Date: 3/4/2023       ICD-10-CM ICD-9-CM   1. Intracranial bleeding (HCC)  I62.9 432.9   2. Facial droop  R29.810 781.94   3. Left arm weakness  R29.898 729.89   4. Acute nonintractable headache, unspecified headache type  R51.9 784.0   5. History of hypertension  Z86.79 V12.59   6. Cerebral edema (HCC)  G93.6 348.5   7. Midline shift of brain  R90.89 793.0   8. Dysphagia, unspecified type  R13.10 787.20   9. Impaired mobility  Z74.09 799.89   10. Decreased activities of daily living (ADL)  Z78.9 V49.89     Patient Active Problem List   Diagnosis   • Intracranial bleeding (HCC)   • Malignant hypertension   • Elevated transaminase level   • Alcohol abuse     Past Medical History:   Diagnosis Date   • Asthma    • Hypertension      History reviewed. No pertinent surgical history.      OT ASSESSMENT FLOWSHEET (last 12 hours)     OT Evaluation and Treatment     Row Name 23 1410                   OT Time and Intention    Subjective Information no complaints  -AC        Document Type therapy note (daily note)  -AC        Mode of Treatment occupational therapy  -AC           General Information    Existing Precautions/Restrictions fall  L sided weakness  -AC           Pain Assessment    Pretreatment Pain Rating 0/10 - no pain  -AC        Posttreatment Pain Rating 0/10 - no pain  -AC           Activities of Daily Living    BADL Assessment/Intervention grooming  -AC           Grooming Assessment/Training    [place] Level (Grooming) wash face, hands;set up;oral care regimen;minimum assist (75% patient effort)  -AC        Position (Grooming) edge of bed sitting  -AC        Comment, (Grooming) verbal cues to stay awake, keep eyes open  -AC           Bed Mobility    Supine-Sit [place] (Bed Mobility) contact guard;verbal cues  -AC        Sit-Supine  [place] (Bed Mobility) minimum assist (75% patient effort);verbal cues  -AC        Assistive Device (Bed Mobility) head of bed elevated  -AC           Functional Mobility    Functional Mobility- Ind. Level minimum assist (75% patient effort);2 person assist required;verbal cues required;nonverbal cues required (demo/gesture)  -AC        Functional Mobility- Comment side steps toward HOB, HHA, verbal cues to advance LLE  -AC           Transfer Assessment/Treatment    Transfers sit-stand transfer;stand-sit transfer  -AC           Sit-Stand Transfer    Sit-Stand [place] (Transfers) minimum assist (75% patient effort);2 person assist  -AC           Stand-Sit Transfer    Stand-Sit [place] (Transfers) minimum assist (75% patient effort);2 person assist;verbal cues;nonverbal cues (demo/gesture)  -AC           Plan of Care Review    Plan of Care Reviewed With patient;significant other  -AC        Progress improving  -AC        Outcome Evaluation OT tx completed.  Pt lethargic and requires occasional verbal cues to keep eyes open.  Pt is willing and motivated to work with therapy.  Significant other, Jelena, present and encouraging pt to complete oral hygiene.  Pt came to EOB with CGA.  Neglects L side and has poor use of LUE.  Needs encouragement and assist to position and functionally use LUE.  Pt completed face washing with set up using R hand and oral hygiene with rBidgett.  Pt able to maintain midline orientation while seated with CGA.  Pt stood with minAx2.  Significant other present and stood in front of pt as a visual cue upon standing for midline orientation in standing.  Pt able to take side steps with minAx2.  Verbal cues needed to advance LLE.  Pt returned to supine with Bridgett.  OT will continue to treat.  Pt would benefit from acute IP rehab.  -AC           Positioning and Restraints    Pre-Treatment Position in bed  -AC        Post Treatment Position bed  -AC        In Bed fowlers;call light within  reach;encouraged to call for assist;with family/caregiver;patient within staff view;side rails up x2;RUE elevated;LUE elevated;SCD pump applied;legs elevated  -              User Key  (r) = Recorded By, (t) = Taken By, (c) = Cosigned By    Initials Name Effective Dates    YAMINI Solis Ron N, OTR/L, CNT 02/03/23 -                  Occupational Therapy Education     Title: PT OT SLP Therapies (In Progress)     Topic: Occupational Therapy (In Progress)     Point: ADL training (Done)     Description:   Instruct learner(s) on proper safety adaptation and remediation techniques during self care or transfers.   Instruct in proper use of assistive devices.              Learning Progress Summary           Patient Acceptance, E,TB,D, VU,NR by AC at 3/7/2023 1505    Acceptance, E, NR by PB at 3/6/2023 1001    Acceptance, E,D, VU,NR by LR at 3/4/2023 0951   Significant Other Acceptance, E,TB,D, VU,NR by AC at 3/7/2023 1505                   Point: Home exercise program (Done)     Description:   Instruct learner(s) on appropriate technique for monitoring, assisting and/or progressing therapeutic exercises/activities.              Learning Progress Summary           Patient Acceptance, E,D, VU,NR by LR at 3/4/2023 0951                   Point: Precautions (In Progress)     Description:   Instruct learner(s) on prescribed precautions during self-care and functional transfers.              Learning Progress Summary           Patient Acceptance, E, NR by PB at 3/6/2023 1001    Acceptance, E,D, VU,NR by LR at 3/4/2023 0951                   Point: Body mechanics (In Progress)     Description:   Instruct learner(s) on proper positioning and spine alignment during self-care, functional mobility activities and/or exercises.              Learning Progress Summary           Patient Acceptance, E, NR by PB at 3/6/2023 1001    Acceptance, E,D, VU,NR by LR at 3/4/2023 0951                               User Key     Initials Effective  Dates Name Provider Type Discipline     02/03/23 -  Ron Solis, OTR/L, CNT Occupational Therapist OT    LR 11/22/22 -  Kathleen Cavazos OT Occupational Therapist OT    PB 01/03/23 -  Clara Glaser OT Student OT Student OT                  OT Recommendation and Plan     Plan of Care Review  Plan of Care Reviewed With: patient, significant other  Progress: improving  Outcome Evaluation: OT tx completed.  Pt lethargic and requires occasional verbal cues to keep eyes open.  Pt is willing and motivated to work with therapy.  Significant other, Jelena, present and encouraging pt to complete oral hygiene.  Pt came to EOB with CGA.  Neglects L side and has poor use of LUE.  Needs encouragement and assist to position and functionally use LUE.  Pt completed face washing with set up using R hand and oral hygiene with Bridgett.  Pt able to maintain midline orientation while seated with CGA.  Pt stood with minAx2.  Significant other present and stood in front of pt as a visual cue upon standing for midline orientation in standing.  Pt able to take side steps with minAx2.  Verbal cues needed to advance LLE.  Pt returned to supine with Bridgett.  OT will continue to treat.  Pt would benefit from acute IP rehab.  Plan of Care Reviewed With: patient, significant other  Outcome Evaluation: OT tx completed.  Pt lethargic and requires occasional verbal cues to keep eyes open.  Pt is willing and motivated to work with therapy.  Significant other, Jelena, present and encouraging pt to complete oral hygiene.  Pt came to EOB with CGA.  Neglects L side and has poor use of LUE.  Needs encouragement and assist to position and functionally use LUE.  Pt completed face washing with set up using R hand and oral hygiene with Bridgett.  Pt able to maintain midline orientation while seated with CGA.  Pt stood with minAx2.  Significant other present and stood in front of pt as a visual cue upon standing for midline orientation in standing.  Pt  able to take side steps with minAx2.  Verbal cues needed to advance LLE.  Pt returned to supine with Bridgett.  OT will continue to treat.  Pt would benefit from acute IP rehab.     Outcome Measures     Row Name 03/07/23 1410 03/07/23 0900 03/06/23 0900       How much help from another person do you currently need...    Turning from your back to your side while in flat bed without using bedrails? -- 3  -IBETH 2  -IBETH    Moving from lying on back to sitting on the side of a flat bed without bedrails? -- 3  -IBETH 2  -IBETH    Moving to and from a bed to a chair (including a wheelchair)? -- 2  -IBETH 2  -IBETH    Standing up from a chair using your arms (e.g., wheelchair, bedside chair)? -- 2  -IBETH 2  -IBETH    Climbing 3-5 steps with a railing? -- 1  -IBETH 1  -IBETH    To walk in hospital room? -- 2  -IBETH 1  -IBETH    AM-PAC 6 Clicks Score (PT) -- 13  -IBETH 10  -IBETH       How much help from another is currently needed...    Putting on and taking off regular lower body clothing? 2  -AC -- --    Bathing (including washing, rinsing, and drying) 2  -AC -- --    Toileting (which includes using toilet bed pan or urinal) 2  -AC -- --    Putting on and taking off regular upper body clothing 3  -AC -- --    Taking care of personal grooming (such as brushing teeth) 3  -AC -- --    Eating meals 3  -AC -- --    AM-PAC 6 Clicks Score (OT) 15  -AC -- --       Functional Assessment    Outcome Measure Options AM-PAC 6 Clicks Daily Activity (OT)  -AC -- --    Row Name 03/05/23 0931             How much help from another person do you currently need...    Turning from your back to your side while in flat bed without using bedrails? 3  -MF      Moving from lying on back to sitting on the side of a flat bed without bedrails? 2  -MF      Moving to and from a bed to a chair (including a wheelchair)? 3  -MF      Standing up from a chair using your arms (e.g., wheelchair, bedside chair)? 3  -MF      Climbing 3-5 steps with a railing? 1  -MF      To walk in hospital room? 2   -      AM-PAC 6 Clicks Score (PT) 14  -         Functional Assessment    Outcome Measure Options AM-PAC 6 Clicks Basic Mobility (PT)  -            User Key  (r) = Recorded By, (t) = Taken By, (c) = Cosigned By    Initials Name Provider Type    Ron Wolf, OTR/L, CNT Occupational Therapist    Mayra Valadez, PTA Physical Therapist Assistant     Radha Flynn, PTA Physical Therapist Assistant                Time Calculation:    Time Calculation- OT     Row Name 03/07/23 1458             Time Calculation- OT    OT Start Time 1410  -AC      OT Stop Time 1450  -AC      OT Time Calculation (min) 40 min  -AC      Total Timed Code Minutes- OT 40 minute(s)  -AC      OT Received On 03/07/23  -            User Key  (r) = Recorded By, (t) = Taken By, (c) = Cosigned By    Initials Name Provider Type    Ron Wolf, OTR/L, SLAVA Occupational Therapist              Therapy Charges for Today     Code Description Service Date Service Provider Modifiers Qty    [id number]  OT SELF CARE/MGMT/TRAIN EA 15 MIN 3/7/2023 Ron Solis, OTR/L, SLAVA GO 3               Ron RONDON. Will OTR/L, SLAVA  3/7/2023

## 2023-03-07 NOTE — PLAN OF CARE
Goal Outcome Evaluation:  Plan of Care Reviewed With: (P) patient, significant other        Progress: (P) improving     Patient was seen on this date for swallow tx. Girlfriend at b/s assisting with feeding. Breakfast tray had been delivered when ST entered room. Pt is more alert and responsive today. PO trials of puree / thin liquids were administered. No overt s/s of aspiration present during trials. It is noted that some trials of thin liquids resulted in anterior spillage from the left labial region. Facial and trigeminal nerve sensory functions are WNL. Suspect spillage secondary to waning alertness. Pt required mod prompting throughout meal to masticate and wipe mouth. Pt is mostly oriented but demonstrates unawareness of time-based/historian questions. Cues were effective in eliminating anterior spillage and prolonged oral prep. ST will continue to treat with plans to upgrade diet. Contact if any acute changes occur.

## 2023-03-07 NOTE — PLAN OF CARE
Goal Outcome Evaluation:              Outcome Evaluation: Patient continues to be very lethargic and seldom opens eyes. Prformed supine to sit with Min assist and HOB elevated. Heavy left forward lean. Began by having pt. correct to right and finding/maintaining neutral. Many cues to ask pt. to open eyes explaining it will help with where his body is. Was eventually able to hold neutral up to 1 minute. Worked on cervical rotation and lateral bends to right.  Reaching in all direction, trunk movement very gaurded. AA push/pulls with L UE. Stood x5 with Max assist. Heavy left lean with L knee flexed. Pt. not able to pull knee back, was blocked by PTA. Assisted lateral weight shifting which was limited due to patient resisting. Four attempts to transfer to chair but pt. was not able to advance right lower extremity. Patient will continue to benefit from strengthening, sitting and standing balance activities,

## 2023-03-07 NOTE — PLAN OF CARE
Goal Outcome Evaluation:   Neuro exam unchanged (oriented, left arm weakness, slight L facial droop). Ativan given per [facility]WA protocol. On/off cardene. Vasotec x1. Repeat CT done this am.

## 2023-03-07 NOTE — PLAN OF CARE
Goal Outcome Evaluation:  Plan of Care Reviewed With: patient, significant other        Progress: improving  Outcome Evaluation: OT tx completed.  Pt lethargic and requires occasional verbal cues to keep eyes open.  Pt is willing and motivated to work with therapy.  Significant other, Jelena, present and encouraging pt to complete oral hygiene.  Pt came to EOB with CGA.  Neglects L side and has poor use of LUE.  Needs encouragement and assist to position and functionally use LUE.  Pt completed face washing with set up using R hand and oral hygiene with Bridgett.  Pt able to maintain midline orientation while seated with CGA.  Pt stood with minAx2.  Significant other present and stood in front of pt as a visual cue upon standing for midline orientation in standing.  Pt able to take side steps with minAx2.  Verbal cues needed to advance LLE.  Pt returned to supine with Bridgett.  OT will continue to treat.  Pt would benefit from acute IP rehab.

## 2023-03-07 NOTE — PLAN OF CARE
Goal Outcome Evaluation:              Outcome Evaluation: lethargic after working with PT this morning, however is more awake now. Neuro otherwise unchanged, back on Cardene, anticipating orders for increased PO BP meds.      Problem: Skin Injury Risk Increased  Goal: Skin Health and Integrity  Outcome: Ongoing, Progressing  Intervention: Optimize Skin Protection  Recent Flowsheet Documentation  Taken 3/7/2023 1600 by Deven Guy RN  Pressure Reduction Techniques:   heels elevated off bed   weight shift assistance provided  Head of Bed (HOB) Positioning: HOB at 30 degrees  Pressure Reduction Devices: pressure-redistributing mattress utilized  Skin Protection:   incontinence pads utilized   tubing/devices free from skin contact  Taken 3/7/2023 1400 by Deven Guy RN  Head of Bed (HOB) Positioning: HOB at 30 degrees  Taken 3/7/2023 1200 by Deven Guy RN  Pressure Reduction Techniques:   heels elevated off bed   weight shift assistance provided   frequent weight shift encouraged  Head of Bed (HOB) Positioning: HOB at 30 degrees  Pressure Reduction Devices: pressure-redistributing mattress utilized  Skin Protection:   incontinence pads utilized   tubing/devices free from skin contact  Taken 3/7/2023 1000 by Deven Guy RN  Head of Bed (HOB) Positioning: HOB at 30 degrees  Taken 3/7/2023 0800 by Deven Guy RN  Pressure Reduction Techniques:   heels elevated off bed   weight shift assistance provided   frequent weight shift encouraged  Head of Bed (HOB) Positioning: HOB at 30 degrees  Pressure Reduction Devices: pressure-redistributing mattress utilized  Skin Protection:   incontinence pads utilized   tubing/devices free from skin contact     Problem: Adjustment to Illness (Stroke, Hemorrhagic)  Goal: Optimal Coping  Outcome: Ongoing, Progressing  Intervention: Support Psychosocial Response to Stroke  Recent Flowsheet Documentation  Taken 3/7/2023 1600 by Deven Guy RN  Family/Support System Care: support  provided  Taken 3/7/2023 1200 by Deven Guy RN  Family/Support System Care: support provided  Taken 3/7/2023 0800 by Deven Guy RN  Family/Support System Care: support provided     Problem: Bowel Elimination Impaired (Stroke, Hemorrhagic)  Goal: Effective Bowel Elimination  Outcome: Ongoing, Progressing     Problem: Cerebral Tissue Perfusion (Stroke, Hemorrhagic)  Goal: Optimal Cerebral Tissue Perfusion  Outcome: Ongoing, Progressing  Intervention: Protect and Optimize Cerebral Perfusion  Recent Flowsheet Documentation  Taken 3/7/2023 1600 by Deven uGy RN  Fluid/Electrolyte Management: fluids provided  Taken 3/7/2023 1200 by Deven Guy RN  Fluid/Electrolyte Management: fluids provided  Taken 3/7/2023 0800 by Deven Guy RN  Fluid/Electrolyte Management: fluids provided     Problem: Cognitive Impairment (Stroke, Hemorrhagic)  Goal: Optimal Cognitive Function  Outcome: Ongoing, Progressing     Problem: Communication Impairment (Stroke, Hemorrhagic)  Goal: Effective Communication Skills  Outcome: Ongoing, Progressing  Intervention: Optimize Communication Skills  Recent Flowsheet Documentation  Taken 3/7/2023 1200 by Deven Guy RN  Communication Enhancement Strategies: call light answered in person  Taken 3/7/2023 0800 by Deven Guy RN  Communication Enhancement Strategies: call light answered in person     Problem: Functional Ability Impaired (Stroke, Hemorrhagic)  Goal: Optimal Functional Ability  Outcome: Ongoing, Progressing  Intervention: Optimize Functional Ability  Recent Flowsheet Documentation  Taken 3/7/2023 1600 by Deven Guy RN  Activity Management: activity adjusted per tolerance  Taken 3/7/2023 1200 by Deven Guy RN  Activity Management: activity adjusted per tolerance  Taken 3/7/2023 0800 by Deven Guy RN  Activity Management: activity adjusted per tolerance     Problem: Pain (Stroke, Hemorrhagic)  Goal: Acceptable Pain Control  Outcome: Ongoing, Progressing     Problem:  Respiratory Compromise (Stroke, Hemorrhagic)  Goal: Effective Oxygenation and Ventilation  Outcome: Ongoing, Progressing  Intervention: Optimize Oxygenation and Ventilation  Recent Flowsheet Documentation  Taken 3/7/2023 1600 by Deven Guy RN  Head of Bed (HOB) Positioning: HOB at 30 degrees  Taken 3/7/2023 1400 by Deven Guy RN  Head of Bed (HOB) Positioning: HOB at 30 degrees  Taken 3/7/2023 1200 by Deven Guy RN  Head of Bed (HOB) Positioning: HOB at 30 degrees  Taken 3/7/2023 1000 by Deven Guy RN  Head of Bed (HOB) Positioning: HOB at 30 degrees  Taken 3/7/2023 0800 by Deven Guy RN  Head of Bed (HOB) Positioning: HOB at 30 degrees     Problem: Sensorimotor Impairment (Stroke, Hemorrhagic)  Goal: Improved Sensorimotor Function  Outcome: Ongoing, Progressing  Intervention: Optimize Range of Motion, Motor Control and Function  Recent Flowsheet Documentation  Taken 3/7/2023 1600 by Deven Guy RN  Positioning/Transfer Devices:   pillows   in use  Range of Motion: active ROM (range of motion) encouraged  Taken 3/7/2023 1400 by Deven Guy RN  Positioning/Transfer Devices:   pillows   in use  Taken 3/7/2023 1200 by Deven Guy RN  Positioning/Transfer Devices:   pillows   in use  Range of Motion: active ROM (range of motion) encouraged  Taken 3/7/2023 1000 by Deven Guy RN  Positioning/Transfer Devices:   pillows   in use  Taken 3/7/2023 0800 by Deven Guy RN  Positioning/Transfer Devices:   pillows   in use  Range of Motion: active ROM (range of motion) encouraged  Intervention: Optimize Sensory and Perceptual Ability  Recent Flowsheet Documentation  Taken 3/7/2023 1600 by Deven Guy RN  Pressure Reduction Techniques:   heels elevated off bed   weight shift assistance provided  Pressure Reduction Devices: pressure-redistributing mattress utilized  Taken 3/7/2023 1200 by Deven Guy RN  Pressure Reduction Techniques:   heels elevated off bed   weight shift assistance provided    frequent weight shift encouraged  Pressure Reduction Devices: pressure-redistributing mattress utilized  Taken 3/7/2023 0800 by Deven Guy RN  Pressure Reduction Techniques:   heels elevated off bed   weight shift assistance provided   frequent weight shift encouraged  Pressure Reduction Devices: pressure-redistributing mattress utilized     Problem: Swallowing Impairment (Stroke, Hemorrhagic)  Goal: Optimal Eating and Swallowing Without Aspiration  Outcome: Ongoing, Progressing  Intervention: Optimize Eating and Swallowing  Recent Flowsheet Documentation  Taken 3/7/2023 1600 by Deven Guy RN  Aspiration Precautions: awake/alert before oral intake  Taken 3/7/2023 1200 by Deven Guy RN  Aspiration Precautions: awake/alert before oral intake  Feeding/Eating Techniques: monitored for feeding stress cues  Taken 3/7/2023 0800 by Deven Guy RN  Aspiration Precautions: awake/alert before oral intake  Feeding/Eating Techniques: monitored for feeding stress cues     Problem: Urinary Elimination Impaired (Stroke, Hemorrhagic)  Goal: Effective Urinary Elimination  Outcome: Ongoing, Progressing  Intervention: Promote Effective Bladder Elimination  Recent Flowsheet Documentation  Taken 3/7/2023 1600 by Deven Guy RN  Urinary Elimination Promotion: toileting offered  Taken 3/7/2023 0800 by Deven Guy RN  Urinary Elimination Promotion: toileting offered     Problem: Hypertension Acute  Goal: Blood Pressure Within Desired Range  Outcome: Ongoing, Progressing  Intervention: Normalize Blood Pressure  Recent Flowsheet Documentation  Taken 3/7/2023 1600 by Deven Guy RN  Medication Review/Management: medications reviewed  Taken 3/7/2023 1200 by Deven Guy RN  Medication Review/Management: medications reviewed  Taken 3/7/2023 0800 by Deven Guy RN  Medication Review/Management: medications reviewed     Problem: Adult Inpatient Plan of Care  Goal: Plan of Care Review  Outcome: Ongoing,  Progressing  Flowsheets  Taken 3/7/2023 1708 by Deven Guy, RN  Outcome Evaluation: lethargic after working with PT this morning, however is more awake now. Neuro otherwise unchanged, back on Cardene, anticipating orders for increased PO BP meds.  Taken 3/7/2023 1410 by Ron Solis, OTR/L, CNT  Plan of Care Reviewed With:   patient   significant other  Goal: Patient-Specific Goal (Individualized)  Outcome: Ongoing, Progressing  Goal: Absence of Hospital-Acquired Illness or Injury  Outcome: Ongoing, Progressing  Intervention: Identify and Manage Fall Risk  Recent Flowsheet Documentation  Taken 3/7/2023 1700 by Deven Guy, NANCY  Safety Promotion/Fall Prevention:   safety round/check completed   fall prevention program maintained  Taken 3/7/2023 1600 by Deven Guy RN  Safety Promotion/Fall Prevention:   safety round/check completed   fall prevention program maintained  Taken 3/7/2023 1500 by Deven Guy RN  Safety Promotion/Fall Prevention:   safety round/check completed   fall prevention program maintained  Taken 3/7/2023 1400 by Deven Guy RN  Safety Promotion/Fall Prevention:   safety round/check completed   fall prevention program maintained  Taken 3/7/2023 1300 by Deven Guy RN  Safety Promotion/Fall Prevention:   safety round/check completed   fall prevention program maintained  Taken 3/7/2023 1200 by Deven Guy RN  Safety Promotion/Fall Prevention:   safety round/check completed   fall prevention program maintained  Taken 3/7/2023 1100 by Deven Guy RN  Safety Promotion/Fall Prevention:   safety round/check completed   fall prevention program maintained  Taken 3/7/2023 1000 by Deven Guy RN  Safety Promotion/Fall Prevention:   safety round/check completed   fall prevention program maintained  Taken 3/7/2023 0900 by Deven Guy RN  Safety Promotion/Fall Prevention:   safety round/check completed   fall prevention program maintained  Taken 3/7/2023 0800 by Deven Guy, NANCY  Safety  Promotion/Fall Prevention:   safety round/check completed   fall prevention program maintained  Taken 3/7/2023 0700 by Deven Guy RN  Safety Promotion/Fall Prevention:   safety round/check completed   fall prevention program maintained  Intervention: Prevent Skin Injury  Recent Flowsheet Documentation  Taken 3/7/2023 1600 by Deven Guy RN  Body Position: position changed independently  Skin Protection:   incontinence pads utilized   tubing/devices free from skin contact  Taken 3/7/2023 1400 by Deven Guy RN  Body Position: position changed independently  Taken 3/7/2023 1200 by Deven Guy RN  Body Position: position changed independently  Skin Protection:   incontinence pads utilized   tubing/devices free from skin contact  Taken 3/7/2023 1000 by Deven Guy RN  Body Position:   right   turned   upper extremity elevated   lower extremity elevated  Taken 3/7/2023 0800 by Deven Guy RN  Body Position: position changed independently  Skin Protection:   incontinence pads utilized   tubing/devices free from skin contact  Intervention: Prevent and Manage VTE (Venous Thromboembolism) Risk  Recent Flowsheet Documentation  Taken 3/7/2023 1600 by Deven Guy RN  Activity Management: activity adjusted per tolerance  VTE Prevention/Management:   bilateral   sequential compression devices on  Range of Motion: active ROM (range of motion) encouraged  Taken 3/7/2023 1200 by Deven Guy RN  Activity Management: activity adjusted per tolerance  VTE Prevention/Management:   bilateral   sequential compression devices on  Range of Motion: active ROM (range of motion) encouraged  Taken 3/7/2023 0800 by Deven Guy RN  Activity Management: activity adjusted per tolerance  VTE Prevention/Management:   bilateral   sequential compression devices on  Range of Motion: active ROM (range of motion) encouraged  Goal: Optimal Comfort and Wellbeing  Outcome: Ongoing, Progressing  Intervention: Provide Person-Centered  Care  Recent Flowsheet Documentation  Taken 3/7/2023 1600 by Deven Guy RN  Trust Relationship/Rapport: care explained  Taken 3/7/2023 1200 by Deven Guy RN  Trust Relationship/Rapport: care explained  Taken 3/7/2023 0800 by Deven Guy RN  Trust Relationship/Rapport: care explained  Goal: Readiness for Transition of Care  Outcome: Ongoing, Progressing     Problem: Fall Injury Risk  Goal: Absence of Fall and Fall-Related Injury  Outcome: Ongoing, Progressing  Intervention: Identify and Manage Contributors  Recent Flowsheet Documentation  Taken 3/7/2023 1600 by Deven Guy RN  Medication Review/Management: medications reviewed  Taken 3/7/2023 1200 by Deven Guy RN  Medication Review/Management: medications reviewed  Taken 3/7/2023 0800 by Deven Guy RN  Medication Review/Management: medications reviewed  Intervention: Promote Injury-Free Environment  Recent Flowsheet Documentation  Taken 3/7/2023 1700 by Deven Guy RN  Safety Promotion/Fall Prevention:   safety round/check completed   fall prevention program maintained  Taken 3/7/2023 1600 by Deven Guy RN  Safety Promotion/Fall Prevention:   safety round/check completed   fall prevention program maintained  Taken 3/7/2023 1500 by Deven Guy RN  Safety Promotion/Fall Prevention:   safety round/check completed   fall prevention program maintained  Taken 3/7/2023 1400 by Deven Guy RN  Safety Promotion/Fall Prevention:   safety round/check completed   fall prevention program maintained  Taken 3/7/2023 1300 by Deven Guy RN  Safety Promotion/Fall Prevention:   safety round/check completed   fall prevention program maintained  Taken 3/7/2023 1200 by Deven Guy RN  Safety Promotion/Fall Prevention:   safety round/check completed   fall prevention program maintained  Taken 3/7/2023 1100 by Deven Guy RN  Safety Promotion/Fall Prevention:   safety round/check completed   fall prevention program maintained  Taken 3/7/2023 1000 by Brooks  Deven, NANCY  Safety Promotion/Fall Prevention:   safety round/check completed   fall prevention program maintained  Taken 3/7/2023 0900 by Deven Guy RN  Safety Promotion/Fall Prevention:   safety round/check completed   fall prevention program maintained  Taken 3/7/2023 0800 by Deven Guy, NANCY  Safety Promotion/Fall Prevention:   safety round/check completed   fall prevention program maintained  Taken 3/7/2023 0700 by Deven Guy, NANCY  Safety Promotion/Fall Prevention:   safety round/check completed   fall prevention program maintained

## 2023-03-07 NOTE — THERAPY TREATMENT NOTE
Acute Care - Speech Language Pathology   Swallow Treatment Note [facility]     Patient Name: James Taylor  : 1980  MRN: [id number]  Today's Date: 3/7/2023               Admit Date: 3/4/2023    Visit Dx: Patient was seen on this date for swallow tx. Girlfriend at b/s assisting with feeding. Breakfast tray had been delivered when ST entered room. Pt is more alert and responsive today. PO trials of puree / thin liquids were administered. No overt s/s of aspiration present during trials. It is noted that some trials of thin liquids resulted in anterior spillage from the left labial region. Facial and trigeminal nerve sensory functions are WNL. Suspect spillage secondary to waning alertness. Pt required mod prompting throughout meal to masticate and wipe mouth. Pt is mostly oriented but demonstrates unawareness of time-based/historian questions. Cues were effective in eliminating anterior spillage and prolonged oral prep. ST will continue to treat with plans to upgrade diet. Contact if any acute changes occur.    Completed by Ronnie Marques, SLP Student       ICD-10-CM ICD-9-CM   1. Intracranial bleeding (HCC)  I62.9 432.9   2. Facial droop  R29.810 781.94   3. Left arm weakness  R29.898 729.89   4. Acute nonintractable headache, unspecified headache type  R51.9 784.0   5. History of hypertension  Z86.79 V12.59   6. Cerebral edema (HCC)  G93.6 348.5   7. Midline shift of brain  R90.89 793.0   8. Dysphagia, unspecified type  R13.10 787.20   9. Impaired mobility  Z74.09 799.89   10. Decreased activities of daily living (ADL)  Z78.9 V49.89     Patient Active Problem List   Diagnosis    Intracranial bleeding (HCC)    Malignant hypertension    Elevated transaminase level    Alcohol abuse     Past Medical History:   Diagnosis Date    Asthma     Hypertension      History reviewed. No pertinent surgical history.    SLP Recommendation and Plan                                                                            Plan of  Care Reviewed With: patient, caregiver  Progress: declining      SWALLOW EVALUATION (last 72 hours)       SLP Adult Swallow Evaluation       Row Name 03/07/23 0802 03/06/23 0808                Rehab Evaluation    Document Type therapy note (daily note)  -MG (r) JH (t) MG (c) therapy note (daily note)  -MG       Subjective Information no complaints  -MG (r) JH (t) MG (c) no complaints  -MG       Patient Observations alert;cooperative;agree to therapy  -MG (r) JH (t) MG (c) lethargic;cooperative;agree to therapy  -MG       Patient/Family/Caregiver Comments/Observations -- No family present  -MG       Patient Effort good  -MG (r) JH (t) MG (c) adequate  -MG       Comment --  Lethargic but cooperative  -MG (r) JH (t) MG (c) --       Symptoms Noted During/After Treatment none  -MG (r) JH (t) MG (c) --          Pain    Additional Documentation Pain Scale: FACES Pre/Post-Treatment (Group)  -MG (r) JH (t) MG (c) Pain Scale: FACES Pre/Post-Treatment (Group)  -MG          Pain Scale: Numbers Pre/Post-Treatment    Pretreatment Pain Rating 0/10 - no pain  -MG (r) JH (t) MG (c) --       Posttreatment Pain Rating 0/10 - no pain  -MG (r) JH (t) MG (c) --          Pain Scale: FACES Pre/Post-Treatment    Pain: FACES Scale, Pretreatment -- 0-->no hurt  -MG       Posttreatment Pain Rating -- 0-->no hurt  -MG          SLP Treatment Clinical Impressions    Treatment Assessment (SLP) improved  -MG (r) JH (t) MG (c) worsened  -MG       Treatment Assessment Comments (SLP) -- See note  -MG       Daily Summary of Progress (SLP) progress toward functional goals is good  -MG (r) JH (t) MG (c) progress toward functional goals is gradual  -MG       Barriers to Overall Progress (SLP) Lethargy;Fatigue  -MG (r) JH (t) MG (c) Lethargy  -MG       Plan for Continued Treatment (SLP) continue treatment per plan of care  -MG (r) JH (t) MG (c) goals adjusted to reflect functional decline demonstrated  -MG       Care Plan Review evaluation/treatment  results reviewed  -MG (r) JH (t) MG (c) evaluation/treatment results reviewed;care plan/treatment goals reviewed;risks/benefits reviewed;current/potential barriers reviewed;patient/other agree to care plan  -MG       Care Plan Review, Other Participant(s) caregiver;significant other  -MG (r) JH (t) MG (c) caregiver  NANCY Wilkinson  -MG          Swallow Goals (SLP)    Swallow LTGs Swallow Long Term Goal (free text)  -MG (r) JH (t) MG (c) Swallow Long Term Goal (free text)  -MG       Swallow STGs diet tolerance goal selection (SLP);labial strengthening goal selection (SLP);lingual strengthening goal selection (SLP)  -MG (r) JH (t) MG (c) diet tolerance goal selection (SLP);labial strengthening goal selection (SLP);lingual strengthening goal selection (SLP)  -MG       Diet Tolerance Goal Selection (SLP) Patient will tolerate trials of  -MG (r) JH (t) MG (c) Patient will tolerate trials of  -MG       Labial Strengthening Goal Selection (SLP) labial strengthening, SLP goal 1  -MG (r) JH (t) MG (c) labial strengthening, SLP goal 1  -MG       Lingual Strengthening Goal Selection (SLP) lingual strengthening, SLP goal 1  -MG (r) JH (t) MG (c) lingual strengthening, SLP goal 1  -MG          (LTG) Swallow    (LTG) Swallow Pt will tolerate LRD without overt s/s of aspiration.  -MG (r) JH (t) MG (c) Pt will tolerate LRD without overt s/s of aspiration.  -MG       [place] (Swallow Long Term Goal) with minimal cues (75-90% accuracy)  -MG (r) JH (t) MG (c) with minimal cues (75-90% accuracy)  -MG       Time Frame (Swallow Long Term Goal) by discharge  -MG (r) JH (t) MG (c) by discharge  -MG       Barriers (Swallow Long Term Goal) L weakness  -MG (r) JH (t) MG (c) L weakness  -MG       Progress/Outcomes (Swallow Long Term Goal) good progress toward goal  -MG (r) JH (t) MG (c) progress slower than expected  -MG          (STG) Patient will tolerate trials of    Consistencies Trialed (Tolerate trials) soft to chew (ground) textures;thin  liquids;regular textures  -MG (r) JH (t) MG (c) soft to chew (ground) textures;thin liquids;regular textures  -MG       Desired Outcome (Tolerate trials) without signs/symptoms of aspiration;without signs of distress;with adequate oral prep/transit/clearance  -MG (r) JH (t) MG (c) without signs/symptoms of aspiration;without signs of distress;with adequate oral prep/transit/clearance  -MG       [place] (Tolerate trials) with minimal cues (75-90% accuracy)  -MG (r) JH (t) MG (c) with minimal cues (75-90% accuracy)  -MG       Time Frame (Tolerate trials) by discharge  -MG (r) JH (t) MG (c) by discharge  -MG       Progress/Outcomes (Tolerate trials) good progress toward goal  -MG (r) JH (t) MG (c) progress slower than expected  -MG          (STG) Labial Strengthening Goal 1 (SLP)    Activity (Labial Strengthening Goal 1, SLP) increase labial tone  -MG (r) JH (t) MG (c) increase labial tone  -MG       Increase Labial Tone labial resistance exercises  -MG (r) JH (t) MG (c) labial resistance exercises  -MG       [place]/Accuracy (Labial Strengthening Goal 1, SLP) with minimal cues (75-90% accuracy)  -MG (r) JH (t) MG (c) with minimal cues (75-90% accuracy)  -MG       Time Frame (Labial Strengthening Goal 1, SLP) by discharge  -MG (r) JH (t) MG (c) by discharge  -MG       Barriers (Labial Strengthening Goal 1, SLP) L weakness  -MG (r) JH (t) MG (c) L weakness  -MG       Progress/Outcomes (Labial Strengthening Goal 1, SLP) continuing progress toward goal  -MG goal ongoing  -MG          (STG) Lingual Strengthening Goal 1 (SLP)    Activity (Lingual Strengthening Goal 1, SLP) increase lingual tone/sensation/control/coordination/movement;increase tongue back strength  -MG (r) JH (t) MG (c) increase lingual tone/sensation/control/coordination/movement;increase tongue back strength  -MG       Increase Lingual Tone/Sensation/Control/Coordination/Movement lingual movement exercises  -MG (r) JH (t) MG (c) lingual movement  exercises  -MG       Increase Tongue Back Strength lingual resistance exercises  -MG (r) JH (t) MG (c) lingual resistance exercises  -MG       [place]/Accuracy (Lingual Strengthening Goal 1, SLP) with minimal cues (75-90% accuracy)  -MG (r) JH (t) MG (c) with minimal cues (75-90% accuracy)  -MG       Time Frame (Lingual Strengthening Goal 1, SLP) by discharge  -MG (r) JH (t) MG (c) by discharge  -MG       Barriers (Lingual Strengthening Goal 1, SLP) L weakness  -MG (r) JH (t) MG (c) L weakness  -MG       Progress/Outcomes (Lingual Strengthening Goal 1, SLP) continuing progress toward goal  -MG goal ongoing  -MG                 User Key  (r) = Recorded By, (t) = Taken By, (c) = Cosigned By      Initials Name Effective Dates    MG Janeen Gunter, MS CCC-SLP 08/12/22 -     Ronnie Nevarez, Speech Therapy Student 12/12/22 -                     EDUCATION  The patient has been educated in the following areas:   Dysphagia (Swallowing Impairment) Modified Diet Instruction.        SLP GOALS       Row Name 03/07/23 0802 03/06/23 0808          (LTG) Swallow    (LTG) Swallow Pt will tolerate LRD without overt s/s of aspiration.  -MG (r) JH (t) MG (c) Pt will tolerate LRD without overt s/s of aspiration.  -MG     [place] (Swallow Long Term Goal) with minimal cues (75-90% accuracy)  -MG (r) JH (t) MG (c) with minimal cues (75-90% accuracy)  -MG     Time Frame (Swallow Long Term Goal) by discharge  -MG (r) JH (t) MG (c) by discharge  -MG     Barriers (Swallow Long Term Goal) L weakness  -MG (r) JH (t) MG (c) L weakness  -MG     Progress/Outcomes (Swallow Long Term Goal) good progress toward goal  -MG (r) JH (t) MG (c) progress slower than expected  -MG        (STG) Patient will tolerate trials of    Consistencies Trialed (Tolerate trials) soft to chew (ground) textures;thin liquids;regular textures  -MG (r) JH (t) MG (c) soft to chew (ground) textures;thin liquids;regular textures  -MG     Desired Outcome (Tolerate  trials) without signs/symptoms of aspiration;without signs of distress;with adequate oral prep/transit/clearance  -MG (r) JH (t) MG (c) without signs/symptoms of aspiration;without signs of distress;with adequate oral prep/transit/clearance  -MG     [place] (Tolerate trials) with minimal cues (75-90% accuracy)  -MG (r) JH (t) MG (c) with minimal cues (75-90% accuracy)  -MG     Time Frame (Tolerate trials) by discharge  -MG (r) JH (t) MG (c) by discharge  -MG     Progress/Outcomes (Tolerate trials) good progress toward goal  -MG (r) JH (t) MG (c) progress slower than expected  -MG        (STG) Labial Strengthening Goal 1 (SLP)    Activity (Labial Strengthening Goal 1, SLP) increase labial tone  -MG (r) JH (t) MG (c) increase labial tone  -MG     Increase Labial Tone labial resistance exercises  -MG (r) JH (t) MG (c) labial resistance exercises  -MG     [place]/Accuracy (Labial Strengthening Goal 1, SLP) with minimal cues (75-90% accuracy)  -MG (r) JH (t) MG (c) with minimal cues (75-90% accuracy)  -MG     Time Frame (Labial Strengthening Goal 1, SLP) by discharge  -MG (r) JH (t) MG (c) by discharge  -MG     Barriers (Labial Strengthening Goal 1, SLP) L weakness  -MG (r) JH (t) MG (c) L weakness  -MG     Progress/Outcomes (Labial Strengthening Goal 1, SLP) continuing progress toward goal  -MG goal ongoing  -MG        (STG) Lingual Strengthening Goal 1 (SLP)    Activity (Lingual Strengthening Goal 1, SLP) increase lingual tone/sensation/control/coordination/movement;increase tongue back strength  -MG (r) JH (t) MG (c) increase lingual tone/sensation/control/coordination/movement;increase tongue back strength  -MG     Increase Lingual Tone/Sensation/Control/Coordination/Movement lingual movement exercises  -MG (r) JH (t) MG (c) lingual movement exercises  -MG     Increase Tongue Back Strength lingual resistance exercises  -MG (r) JH (t) MG (c) lingual resistance exercises  -MG     [place]/Accuracy  (Lingual Strengthening Goal 1, SLP) with minimal cues (75-90% accuracy)  -MG (r) JH (t) MG (c) with minimal cues (75-90% accuracy)  -MG     Time Frame (Lingual Strengthening Goal 1, SLP) by discharge  -MG (r) JH (t) MG (c) by discharge  -MG     Barriers (Lingual Strengthening Goal 1, SLP) L weakness  -MG (r) JH (t) MG (c) L weakness  -MG     Progress/Outcomes (Lingual Strengthening Goal 1, SLP) continuing progress toward goal  -MG goal ongoing  -MG               User Key  (r) = Recorded By, (t) = Taken By, (c) = Cosigned By      Initials Name Provider Type    Janeen Peacock MS CCC-SLP Speech and Language Pathologist    Ronnie Nevarez, Speech Therapy Student SLP Student                       Time Calculation:    Time Calculation- SLP       Row Name 03/07/23 0847             Time Calculation- SLP    SLP Start Time 0802  -MG (r) JH (t) MG (c)      SLP Stop Time 0847  -MG (r) JH (t) MG (c)      SLP Time Calculation (min) 45 min  -MG (r) JH (t)      SLP Received On 03/07/23  -MG (r) JH (t) MG (c)         Untimed Charges    48102-QE Treatment Swallow Minutes 45  -MG         Total Minutes    Untimed Charges Total Minutes 45  -MG       Total Minutes 45  -MG                User Key  (r) = Recorded By, (t) = Taken By, (c) = Cosigned By      Initials Name Provider Type    Janeen Peacock MS CCC-SLP Speech and Language Pathologist    Ronnie Nevarez, Speech Therapy Student SLP Student                    Therapy Charges for Today       Code Description Service Date Service Provider Modifiers Qty    55203792728 HC ST TREATMENT SWALLOW 3 3/6/2023 Janeen Gunter MS CCC-SLP GN 1    [id number] HC ST TREATMENT SWALLOW 3 3/7/2023 Janeen Gunter MS CCC-SLP GN 1                 Janeen Gunter MS CCC-DONNIE  3/7/2023

## 2023-03-07 NOTE — PLAN OF CARE
Goal Outcome Evaluation:  Plan of Care Reviewed With: patient        Progress: improving  Outcome Evaluation: More alert this evening, able to feed himself.  Neuro exam otherwise unchanged.  PRN vasotec and labetolol given, cardene eventually restarted.  IVF ordered until taking enough fluids by mouth.      Problem: Skin Injury Risk Increased  Goal: Skin Health and Integrity  Outcome: Ongoing, Progressing  Intervention: Optimize Skin Protection  Recent Flowsheet Documentation  Taken 3/6/2023 1400 by Jaja Arroyo RN  Head of Bed (\[facility]\"") Positioning: HOB at 30 degrees  Taken 3/6/2023 1200 by Jaja Arroyo RN  Pressure Reduction Techniques:   heels elevated off bed   weight shift assistance provided  Pressure Reduction Devices: pressure-redistributing mattress utilized  Skin Protection:   incontinence pads utilized   tubing/devices free from skin contact  Taken 3/6/2023 1000 by Jaja Arroyo RN  Head of Bed (\[facility]\"") Positioning: HOB at 30 degrees  Taken 3/6/2023 0800 by Jaja Arroyo RN  Pressure Reduction Techniques:   weight shift assistance provided   heels elevated off bed  Head of Bed (\[facility]\"") Positioning: HOB at 30 degrees  Pressure Reduction Devices: pressure-redistributing mattress utilized  Skin Protection:   tubing/devices free from skin contact   incontinence pads utilized     Problem: Adjustment to Illness (Stroke, Hemorrhagic)  Goal: Optimal Coping  Outcome: Ongoing, Progressing     Problem: Bowel Elimination Impaired (Stroke, Hemorrhagic)  Goal: Effective Bowel Elimination  Outcome: Ongoing, Progressing     Problem: Cerebral Tissue Perfusion (Stroke, Hemorrhagic)  Goal: Optimal Cerebral Tissue Perfusion  Outcome: Ongoing, Progressing     Problem: Cognitive Impairment (Stroke, Hemorrhagic)  Goal: Optimal Cognitive Function  Outcome: Ongoing, Progressing     Problem: Communication Impairment (Stroke, Hemorrhagic)  Goal: Effective Communication Skills  Outcome: Ongoing, Progressing     Problem: Functional  Ability Impaired (Stroke, Hemorrhagic)  Goal: Optimal Functional Ability  Outcome: Ongoing, Progressing  Intervention: Optimize Functional Ability  Recent Flowsheet Documentation  Taken 3/6/2023 1200 by Jaja Arroyo RN  Activity Management:   activity adjusted per tolerance   up in chair  Taken 3/6/2023 0800 by Jaja Arroyo RN  Activity Management: activity adjusted per tolerance     Problem: Pain (Stroke, Hemorrhagic)  Goal: Acceptable Pain Control  Outcome: Ongoing, Progressing     Problem: Respiratory Compromise (Stroke, Hemorrhagic)  Goal: Effective Oxygenation and Ventilation  Outcome: Ongoing, Progressing  Intervention: Optimize Oxygenation and Ventilation  Recent Flowsheet Documentation  Taken 3/6/2023 1400 by Jaja Arroyo RN  Head of Bed (HOB) Positioning: HOB at 30 degrees  Taken 3/6/2023 1000 by Jaja Arroyo RN  Head of Bed (HOB) Positioning: HOB at 30 degrees  Taken 3/6/2023 0800 by Jaja Arroyo RN  Head of Bed (HOB) Positioning: HOB at 30 degrees     Problem: Sensorimotor Impairment (Stroke, Hemorrhagic)  Goal: Improved Sensorimotor Function  Outcome: Ongoing, Progressing  Intervention: Optimize Range of Motion, Motor Control and Function  Recent Flowsheet Documentation  Taken 3/6/2023 1400 by Jaja Arroyo RN  Positioning/Transfer Devices: pillows  Taken 3/6/2023 1000 by Jaja Arroyo RN  Positioning/Transfer Devices: pillows  Taken 3/6/2023 0800 by Jaja Arroyo RN  Positioning/Transfer Devices: pillows  Intervention: Optimize Sensory and Perceptual Ability  Recent Flowsheet Documentation  Taken 3/6/2023 1200 by Jaja Arroyo RN  Pressure Reduction Techniques:   heels elevated off bed   weight shift assistance provided  Pressure Reduction Devices: pressure-redistributing mattress utilized  Taken 3/6/2023 0800 by Jaja Arroyo RN  Pressure Reduction Techniques:   weight shift assistance provided   heels elevated off bed  Pressure Reduction Devices: pressure-redistributing  mattress utilized     Problem: Swallowing Impairment (Stroke, Hemorrhagic)  Goal: Optimal Eating and Swallowing Without Aspiration  Outcome: Ongoing, Progressing     Problem: Urinary Elimination Impaired (Stroke, Hemorrhagic)  Goal: Effective Urinary Elimination  Outcome: Ongoing, Progressing     Problem: Hypertension Acute  Goal: Blood Pressure Within Desired Range  Outcome: Ongoing, Progressing     Problem: Adult Inpatient Plan of Care  Goal: Patient-Specific Goal (Individualized)  Outcome: Ongoing, Progressing  Goal: Absence of Hospital-Acquired Illness or Injury  Outcome: Ongoing, Progressing  Intervention: Identify and Manage Fall Risk  Recent Flowsheet Documentation  Taken 3/6/2023 1500 by Jaja Arroyo RN  Safety Promotion/Fall Prevention: safety round/check completed  Taken 3/6/2023 1400 by Jaja Arroyo RN  Safety Promotion/Fall Prevention: safety round/check completed  Taken 3/6/2023 1300 by Jaja Arroyo RN  Safety Promotion/Fall Prevention: safety round/check completed  Taken 3/6/2023 1200 by Jaja Arroyo RN  Safety Promotion/Fall Prevention: safety round/check completed  Taken 3/6/2023 1100 by Jaja Arroyo RN  Safety Promotion/Fall Prevention: safety round/check completed  Taken 3/6/2023 1000 by Jaja Arroyo RN  Safety Promotion/Fall Prevention: safety round/check completed  Taken 3/6/2023 0900 by Jaja Arroyo RN  Safety Promotion/Fall Prevention: safety round/check completed  Taken 3/6/2023 0800 by Jaja Arroyo RN  Safety Promotion/Fall Prevention: safety round/check completed  Taken 3/6/2023 0700 by Jaja Arroyo RN  Safety Promotion/Fall Prevention: safety round/check completed  Intervention: Prevent Skin Injury  Recent Flowsheet Documentation  Taken 3/6/2023 1400 by Jaja Arroyo RN  Body Position:   turned   right   lower extremity elevated   upper extremity elevated  Taken 3/6/2023 1200 by Jaja Arroyo RN  Body Position: weight shifting  Skin Protection:   incontinence  pads utilized   tubing/devices free from skin contact  Taken 3/6/2023 1000 by Jaja Arroyo RN  Body Position:   turned   right   lower extremity elevated   upper extremity elevated  Taken 3/6/2023 0800 by Jaja Arroyo RN  Body Position:   turned   left   lower extremity elevated   upper extremity elevated  Skin Protection:   tubing/devices free from skin contact   incontinence pads utilized  Intervention: Prevent and Manage VTE (Venous Thromboembolism) Risk  Recent Flowsheet Documentation  Taken 3/6/2023 1200 by Jaja Arroyo RN  Activity Management:   activity adjusted per tolerance   up in chair  VTE Prevention/Management:   bilateral   sequential compression devices on  Taken 3/6/2023 0800 by Jaja Arroyo RN  Activity Management: activity adjusted per tolerance  VTE Prevention/Management:   bilateral   sequential compression devices on  Goal: Optimal Comfort and Wellbeing  Outcome: Ongoing, Progressing  Goal: Readiness for Transition of Care  Outcome: Ongoing, Progressing     Problem: Fall Injury Risk  Goal: Absence of Fall and Fall-Related Injury  Outcome: Ongoing, Progressing  Intervention: Promote Injury-Free Environment  Recent Flowsheet Documentation  Taken 3/6/2023 1500 by Jaja Arroyo RN  Safety Promotion/Fall Prevention: safety round/check completed  Taken 3/6/2023 1400 by Jaja Arroyo RN  Safety Promotion/Fall Prevention: safety round/check completed  Taken 3/6/2023 1300 by Jaja Arroyo RN  Safety Promotion/Fall Prevention: safety round/check completed  Taken 3/6/2023 1200 by Jaja Arroyo RN  Safety Promotion/Fall Prevention: safety round/check completed  Taken 3/6/2023 1100 by Jaja Arroyo RN  Safety Promotion/Fall Prevention: safety round/check completed  Taken 3/6/2023 1000 by Jaja Arroyo RN  Safety Promotion/Fall Prevention: safety round/check completed  Taken 3/6/2023 0900 by Jaja Arroyo RN  Safety Promotion/Fall Prevention: safety round/check completed  Taken  3/6/2023 0800 by Jaja Arroyo, RN  Safety Promotion/Fall Prevention: safety round/check completed  Taken 3/6/2023 0700 by Jaja Arroyo, RN  Safety Promotion/Fall Prevention: safety round/check completed

## 2023-03-07 NOTE — PROGRESS NOTES
"Progress Note    Patient:  James Taylor  YOB: 1980  MRN: [id number]   Admit date: 3/4/2023   Admitting Physician: Brandon Gannon MD  Primary Care Physician: Radha Desir MD    Chief Complaint: Hemorrhagic stroke    Interval History: Still with some hallucinations.  CIWA protocol per hospitalist continues.  Tolerating p.o.  Working with physical therapy.    Intake/Output Summary (Last 24 hours) at 3/7/2023 0808  Last data filed at 3/7/2023 0800  Gross per 24 hour   Intake 2245.54 ml   Output 900 ml   Net 1345.54 ml     Allergies: No Known Allergies  Current Scheduled Medications:   amLODIPine, 10 mg, Oral, Daily  Chlorhexidine Gluconate Cloth, 1 application, Topical, Q24H  metoprolol tartrate, 50 mg, Oral, Q12H  mupirocin, 1 application, Each Nare, BID  nicotine, 1 patch, Transdermal, Q24H  sertraline, 50 mg, Oral, Q24H  sodium chloride, 10 mL, Intravenous, Q12H  terazosin, 5 mg, Oral, Nightly      Current PRN Medications:  •  acetaminophen **OR** acetaminophen  •  enalaprilat  •  labetalol  •  LORazepam **OR** LORazepam **OR** LORazepam **OR** LORazepam **OR** LORazepam **OR** LORazepam  •  sodium chloride  •  sodium chloride  •  sodium chloride    Review of Systems   Neurological: Positive for weakness.   Psychiatric/Behavioral: Positive for agitation, behavioral problems and confusion.   All other systems reviewed and are negative.      Pertinent positives/negatives documented in HPI.  All other systems reviewed and negative.    Vital Signs:  /91   Pulse 71   Temp 98.4 °F (36.9 °C) (Oral)   Resp 14   Ht 177.8 cm (70\")   Wt 67.3 kg (148 lb 4.8 oz)   SpO2 95%   BMI 21.28 kg/m²     Physical Exam  Cardiovascular:      Rate and Rhythm: Normal rate and regular rhythm.   Pulmonary:      Effort: No respiratory distress.      Breath sounds: Normal breath sounds.   Abdominal:      General: There is no distension.      Palpations: Abdomen is soft.       Vital signs reviewed.  Line/IV site: " "No erythema, warmth, induration, or tenderness.    Objective:  Vital signs: (most recent): Blood pressure 137/91, pulse 71, temperature 98.4 °F (36.9 °C), temperature source Oral, resp. rate 14, height 177.8 cm (70\"), weight 67.3 kg (148 lb 4.8 oz), SpO2 95 %.    Lungs:  He is not in respiratory distress.    Heart: Normal rate.  Regular rhythm.    Abdomen: Abdomen is soft and non-distended.        Neurologic Exam  Mental Status   Oriented to person, place, and time.   Attention: decreased. Concentration: decreased.   Speech: slurred   Level of consciousness: drowsy ,  arousable by verbal stimuli  Normal comprehension.      Cranial Nerves      CN II   Visual fields full to confrontation.      CN III, IV, VI   Pupils are equal, round, and reactive to light.  Extraocular motions are normal.      CN V   Facial sensation intact.      CN VII   Right facial weakness: none  Left facial weakness: central     CN VIII   CN VIII normal.      CN IX, X   CN IX normal.   CN X normal.      CN XI   CN XI normal.      CN XII   Tongue: not atrophic  Tongue deviation: left     Motor Exam   Muscle bulk: normal     Strength   Strength 5/5 except as noted. Left hemiparesis with upper and lower extremity being 3 out of 5 in all muscle groups    Right upper and lower extremity 5 out of 5 in all muscle groups      Sensory Exam   Light touch normal.      Lab Results:  ABG:     CBC:   Results from last 7 days   Lab Units 03/04/23  0115   WBC 10*3/mm3 6.58   HEMOGLOBIN g/dL 13.6   HEMATOCRIT % 38.3   PLATELETS 10*3/mm3 104*     BMP:  Results from last 7 days   Lab Units 03/04/23  0115   SODIUM mmol/L 140   POTASSIUM mmol/L 3.8   CHLORIDE mmol/L 102   CO2 mmol/L 26.0   BUN mg/dL 14   CREATININE mg/dL 1.13   GLUCOSE mg/dL 110*   CALCIUM mg/dL 9.2   ALT (SGPT) U/L 180*     Culture Results: No results found for: BLOODCX, URINECX, WOUNDCX, MRSACX, RESPCX, STOOLCX        Radiology:   CT scan with stable right basal ganglia intracranial " hemorrhage.  Additional Studies Reviewed:     Impression/Recommendations:   CV: Heart rate stable.  Blood pressure improving with the oral medications.  Off Cardene currently.  RESP: Oxygenating well  GI: Tolerating p.o.  : BUN/creatinine and urine output are adequate.  NEURO: Still with some confusion and hallucinations.  Overall neurologic exam essentially stable.  CT scan also unchanged.  ID:  Approximately 45 minutes were spent in critical care management of this patient including physical exam, review of CAT scan review of chart and consulting with the patient.    Intracranial bleeding (HCC)    Malignant hypertension    Elevated transaminase level    Alcohol abuse      Brandon Gannon MD

## 2023-03-07 NOTE — PROGRESS NOTES
[facility]  INPATIENT PROGRESS NOTE    Patient Name: James Taylor  Date of Admission: 3/4/2023  Today's Date: 03/07/23  Length of Stay: 3  Primary Care Physician: No primary care provider on file.    Subjective   Chief Complaint: Hemorrhagic stroke  HPI   Patient had oral ativan during night.  Per nursing still sleepy and difficult to follow command.           Review of Systems   All pertinent negatives and positives are as above. All other systems have been reviewed and are negative unless otherwise stated.     Objective    Temp:  [98.4 °F (36.9 °C)-99.2 °F (37.3 °C)] 98.4 °F (36.9 °C)  Heart Rate:  [] 65  Resp:  [12-24] 18  BP: (107-154)/() 133/91  Physical Exam  Vitals and nursing note reviewed.   Constitutional:       Comments: Patient opened eyes to command and smiled.      HENT:      Head: Normocephalic and atraumatic.      Right Ear: External ear normal.      Left Ear: External ear normal.      Nose: Nose normal.      Mouth/Throat:      Pharynx: Oropharynx is clear.   Eyes:      Pupils: Pupils are equal, round, and reactive to light.   Cardiovascular:      Rate and Rhythm: Normal rate and regular rhythm.      Pulses: Normal pulses.   Pulmonary:      Effort: Pulmonary effort is normal.      Breath sounds: Normal breath sounds.   Abdominal:      General: Abdomen is flat. Bowel sounds are normal.   Musculoskeletal:      Cervical back: No rigidity.      Comments: Patient moved each extremity some when commanded to without assistance.   Skin:     General: Skin is warm and dry.      Capillary Refill: Capillary refill takes less than 2 seconds.   Psychiatric:      Comments: Still sleepy             Results Review:  I have reviewed the labs, radiology results, and diagnostic studies.    Laboratory Data:   Results from last 7 days   Lab Units 03/07/23  0802 03/04/23  0115   WBC 10*3/mm3 10.78 6.58   HEMOGLOBIN g/dL 11.5* 13.6   HEMATOCRIT % 32.4* 38.3    PLATELETS 10*3/mm3 62* 104*        Results from last 7 days   Lab Units 03/07/23  0802 03/04/23  0115   SODIUM mmol/L 135* 140   POTASSIUM mmol/L 3.5 3.8   CHLORIDE mmol/L 101 102   CO2 mmol/L 25.0 26.0   BUN mg/dL 8 14   CREATININE mg/dL 0.68* 1.13   CALCIUM mg/dL 8.9 9.2   BILIRUBIN mg/dL 1.5* 1.2   ALK PHOS U/L 117 190*   ALT (SGPT) U/L 80* 180*   AST (SGOT) U/L 80* 615*   GLUCOSE mg/dL 93 110*       Culture Data:   No results found for: BLOODCX, URINECX, WOUNDCX, MRSACX, RESPCX, STOOLCX    Radiology Data:   Imaging Results (Last 24 Hours)     Procedure Component Value Units Date/Time    CT Head Without Contrast [[id number]] Collected: 03/07/23 0538     Updated: 03/07/23 0554    Narrative:      EXAMINATION: CT HEAD WO CONTRAST-      3/7/2023 4:57 AM CST     HISTORY: ich; I62.9-Nontraumatic intracranial hemorrhage, unspecified;  R29.810-Facial weakness; R29.898-Other symptoms and signs involving the  musculoskeletal system; R51.9-Headache, unspecified; Z86.79-Personal  history of other diseases of the circulatory system; G93.6-Cerebral  edema; R90.89-Other abnormal findings on diagnostic imaging of central  nervous system; R13.10-Dysphagia, unspecified; Z74.09-Oth     In order to have a CT radiation dose as low as reasonably achievable  Automated Exposure Control was utilized for adjustment of the mA and/or  KV according to patient size.     DLP in mGycm= 630     The CT scan of the head is performed without intravenous contrast  enhancement.     Images are acquired in axial plane with subsequent reconstruction in  coronal and sagittal plane.     The Comparison is made with the previous study dated 03/05/2023.     The arcuate hemorrhage in the right basal ganglia is reidentified. The  hematoma measures 4.8 x 2.3 cm and is unchanged. There is increasing  surrounding edema. There is persistent effacement of the right  frontoparietal and temporal cortical sulci.     There is significant compression and displacement  of the right lateral  ventricle. There is 5.1 mm shift to the left which has minimally  increased since the previous study (4 mm).     There is no intraventricular extension of the hemorrhage. No  subarachnoid or subdural hemorrhage.     There is moderate asymmetrical dilatation of the left lateral ventricle  which is similar to the previous study.     The images are reviewed in bone window show no definite displaced  fracture. A subtle fracture will be obscured due to motion artifact.  There is mucosal thickening of the ethmoid sinuses and opacification of  the right maxillary sinus similar to the previous study. The mastoid air  cells are clear.       Impression:      1. A persistent and unchanged right basal ganglia acute  hemorrhage/intraparenchymal hematoma.  2. A mild increase surrounding edema and mild increase shift to the  left.  3. The Persistent effacement of the frontoparietal and temporal cortical  sulci and compression of the left lateral ventricle and a mild  asymmetrical dilatation of left lateral ventricle.                                   This report was finalized on 03/07/2023 05:51 by Dr. Heath Valente MD.          I have reviewed the patient's current medications.     Assessment/Plan   Assessment  Active Hospital Problems    Diagnosis    • **Intracranial bleeding (HCC)    • Malignant hypertension    • Elevated transaminase level    • Alcohol abuse        Treatment Plan  Minimize benzo use.  Monitor blood pressure     Medical Decision Making  Number and Complexity of problems:   Malignant HTN acute high risk  ICH acute high risk  Elevated transaminases chronic moderate risk      Conditions and Status        Condition is unchanged.     [facility] Data  External documents reviewed: none  Cardiac tracing (EKG, telemetry) interpretation: nsr  Radiology interpretation: reviewed  Labs reviewed: reviewed.  Any tests that were considered but not ordered: none     Decision rules/scores evaluated (example  [id number], [place], etc): none     Discussed with: patient nurse     Care Planning  Shared decision making: nurse  Code status and discussions: full    Disposition  Social Determinants of Health that impact treatment or disposition: none  I expect the patient to be discharged by primary team.     Electronically signed by Alejandra Serrano, 03/07/23, 14:57 CST.

## 2023-03-08 LAB
ALBUMIN SERPL-MCNC: 3.5 G/DL (ref 3.5–5.2)
ALBUMIN/GLOB SERPL: 1.1 G/DL
ALP SERPL-CCNC: 110 U/L (ref 39–117)
ALT SERPL W P-5'-P-CCNC: 60 U/L (ref 1–41)
ANION GAP SERPL CALCULATED.3IONS-SCNC: 10 MMOL/L (ref 5–15)
AST SERPL-CCNC: 49 U/L (ref 1–40)
BILIRUB SERPL-MCNC: 1 MG/DL (ref 0–1.2)
BUN SERPL-MCNC: 6 MG/DL (ref 6–20)
BUN/CREAT SERPL: 8.5 (ref 7–25)
CALCIUM SPEC-SCNC: 9.1 MG/DL (ref 8.6–10.5)
CHLORIDE SERPL-SCNC: 99 MMOL/L (ref 98–107)
CO2 SERPL-SCNC: 25 MMOL/L (ref 22–29)
CREAT SERPL-MCNC: 0.71 MG/DL (ref 0.76–1.27)
EGFRCR SERPLBLD CKD-EPI 2021: 117.5 ML/MIN/1.73
GLOBULIN UR ELPH-MCNC: 3.2 GM/DL
GLUCOSE SERPL-MCNC: 119 MG/DL (ref 65–99)
POTASSIUM SERPL-SCNC: 3.2 MMOL/L (ref 3.5–5.2)
PROT SERPL-MCNC: 6.7 G/DL (ref 6–8.5)
SODIUM SERPL-SCNC: 134 MMOL/L (ref 136–145)

## 2023-03-08 PROCEDURE — 97530 THERAPEUTIC ACTIVITIES: CPT | Performed by: OCCUPATIONAL THERAPIST

## 2023-03-08 PROCEDURE — 80053 COMPREHEN METABOLIC PANEL: CPT | Performed by: NEUROLOGICAL SURGERY

## 2023-03-08 PROCEDURE — 99232 SBSQ HOSP IP/OBS MODERATE 35: CPT | Performed by: NURSE PRACTITIONER

## 2023-03-08 PROCEDURE — 92526 ORAL FUNCTION THERAPY: CPT | Performed by: SPEECH-LANGUAGE PATHOLOGIST

## 2023-03-08 PROCEDURE — 25010000002 SODIUM CHLORIDE 0.9 % WITH KCL 20 MEQ 20-0.9 MEQ/L-% SOLUTION: Performed by: FAMILY MEDICINE

## 2023-03-08 RX ORDER — LISINOPRIL 10 MG/1
10 TABLET ORAL NIGHTLY
Status: DISCONTINUED | OUTPATIENT
Start: 2023-03-08 | End: 2023-03-10 | Stop reason: HOSPADM

## 2023-03-08 RX ORDER — HYDRALAZINE HYDROCHLORIDE 25 MG/1
25 TABLET, FILM COATED ORAL EVERY 8 HOURS SCHEDULED
Status: DISCONTINUED | OUTPATIENT
Start: 2023-03-08 | End: 2023-03-10 | Stop reason: HOSPADM

## 2023-03-08 RX ORDER — PANTOPRAZOLE SODIUM 40 MG/1
40 TABLET, DELAYED RELEASE ORAL
Status: DISCONTINUED | OUTPATIENT
Start: 2023-03-08 | End: 2023-03-10 | Stop reason: HOSPADM

## 2023-03-08 RX ADMIN — POTASSIUM CHLORIDE AND SODIUM CHLORIDE 100 ML/HR: 900; 150 INJECTION, SOLUTION INTRAVENOUS at 03:45

## 2023-03-08 RX ADMIN — Medication 10 ML: at 08:06

## 2023-03-08 RX ADMIN — SERTRALINE HYDROCHLORIDE 50 MG: 50 TABLET, FILM COATED ORAL at 16:30

## 2023-03-08 RX ADMIN — Medication 10 ML: at 20:20

## 2023-03-08 RX ADMIN — HYDRALAZINE HYDROCHLORIDE 25 MG: 25 TABLET, FILM COATED ORAL at 22:36

## 2023-03-08 RX ADMIN — CHLORHEXIDINE GLUCONATE 1 APPLICATION: 500 CLOTH TOPICAL at 03:46

## 2023-03-08 RX ADMIN — METOPROLOL TARTRATE 50 MG: 50 TABLET, FILM COATED ORAL at 08:05

## 2023-03-08 RX ADMIN — HYDRALAZINE HYDROCHLORIDE 10 MG: 10 TABLET, FILM COATED ORAL at 06:30

## 2023-03-08 RX ADMIN — TERAZOSIN HYDROCHLORIDE 5 MG: 5 CAPSULE ORAL at 20:20

## 2023-03-08 RX ADMIN — NICARDIPINE HYDROCHLORIDE 2.5 MG/HR: 25 INJECTION, SOLUTION INTRAVENOUS at 03:46

## 2023-03-08 RX ADMIN — Medication 1 APPLICATION: at 08:06

## 2023-03-08 RX ADMIN — LISINOPRIL 10 MG: 10 TABLET ORAL at 20:20

## 2023-03-08 RX ADMIN — PANTOPRAZOLE SODIUM 40 MG: 40 TABLET, DELAYED RELEASE ORAL at 16:30

## 2023-03-08 RX ADMIN — ACETAMINOPHEN 650 MG: 325 TABLET, FILM COATED ORAL at 01:30

## 2023-03-08 RX ADMIN — AMLODIPINE BESYLATE 10 MG: 10 TABLET ORAL at 08:05

## 2023-03-08 RX ADMIN — HYDRALAZINE HYDROCHLORIDE 25 MG: 25 TABLET, FILM COATED ORAL at 14:36

## 2023-03-08 RX ADMIN — METOPROLOL TARTRATE 50 MG: 50 TABLET, FILM COATED ORAL at 20:20

## 2023-03-08 RX ADMIN — Medication 1 APPLICATION: at 20:20

## 2023-03-08 RX ADMIN — NICOTINE 1 PATCH: 21 PATCH, EXTENDED RELEASE TRANSDERMAL at 16:52

## 2023-03-08 NOTE — PLAN OF CARE
Goal Outcome Evaluation:  Plan of Care Reviewed With: patient, significant other        Progress: improving  Outcome Evaluation: OT tx this date. A&Ox4 with s/o in room on arrival. Pt more alert and attentive this session. Pt completed sup<>sit t/fs and scooting/bridging with CGA and vcs. Pt able to sit EOB 20+ minutes with minimal L lean but shoulders angled R, pt unaware but could correct with vcs. Pt demonstrates decreased awareness of L UE, letting it hang to his side often. Pt completed dynamic reaching with across midline and AAROM with LUE, able to complete with gravity eliminated. Completed trunk control/balance exercises EOB. Pt demonstrates R gaze preference, able to attend to midline and L visual field with vcs but would maintain focus in R visual field. Pt provided with extensive educ on protecting L extremeties during t/fs, positioning, etc as well as continuing to utilize L UE during adls and actively exercising L UE between therapy txs. Pt visibly fatigued toward end of session. Skilled OT to continue POC. Pt would benefit from d/c to inpatient rehab.

## 2023-03-08 NOTE — THERAPY TREATMENT NOTE
Acute Care - Speech Language Pathology   Swallow Treatment Note [facility]     Patient Name: James Taylor  : 1980  MRN: [id number]  Today's Date: 3/8/2023               Admit Date: 3/4/2023    Visit Dx: Patient was seen on this date for swallow tx. Pt has improved significantly in alertness and tx effort from previous sessions. Girlfriend at b/s assisting with feeding. Pt had just finished breakfast when ST entered room. Pt ate approximately 95% of meal w/o any c/o aspiration-like symptoms or difficulty. Left labial/lingual weakness is still present; however, is improving. CN VII and V sensation remains intact. PO trials of regular solids were administered secondary to improved alertness and current diet tolerance. No overt s/s of aspiration noted x4. It is recommended that pt be upgraded to a thin liquid / mech soft diet. Pt needs direct supervision during meals to monitor alertness and prevent oral holding/pocketing and prolonged mastication. ST will continue to treat. Contact if any acute changes occur.     Completed by Ronnie Marques, SLP Student       ICD-10-CM ICD-9-CM   1. Intracranial bleeding (HCC)  I62.9 432.9   2. Facial droop  R29.810 781.94   3. Left arm weakness  R29.898 729.89   4. Acute nonintractable headache, unspecified headache type  R51.9 784.0   5. History of hypertension  Z86.79 V12.59   6. Cerebral edema (HCC)  G93.6 348.5   7. Midline shift of brain  R90.89 793.0   8. Dysphagia, unspecified type  R13.10 787.20   9. Impaired mobility  Z74.09 799.89   10. Decreased activities of daily living (ADL)  Z78.9 V49.89     Patient Active Problem List   Diagnosis    Intracranial bleeding (HCC)    Malignant hypertension    Elevated transaminase level    Alcohol abuse     Past Medical History:   Diagnosis Date    Asthma     Hypertension      History reviewed. No pertinent surgical history.    SLP Recommendation and Plan                                                                            Plan of  Mr. 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 He reports congestion, cough, and mild right side \"rib\" pain associated with cough. Mr. Alida Gee continues to work daily and exercise.   He Care Reviewed With: patient, caregiver  Progress: declining      SWALLOW EVALUATION (last 72 hours)       SLP Adult Swallow Evaluation       Row Name 03/08/23 0807 03/07/23 0802 03/06/23 0808             Rehab Evaluation    Document Type therapy note (daily note)  -MG (r) JH (t) MG (c) therapy note (daily note)  -MG (r) JH (t) MG (c) therapy note (daily note)  -MG      Subjective Information no complaints  -MG (r) JH (t) MG (c) no complaints  -MG (r) JH (t) MG (c) no complaints  -MG      Patient Observations cooperative;agree to therapy;alert  -MG (r) JH (t) MG (c) alert;cooperative;agree to therapy  -MG (r) JH (t) MG (c) lethargic;cooperative;agree to therapy  -MG      Patient/Family/Caregiver Comments/Observations --  Girlfriend at b/s  -MG (r) JH (t) MG (c) -- No family present  -MG      Patient Effort good  -MG (r) JH (t) MG (c) good  -MG (r) JH (t) MG (c) adequate  -MG      Comment --  Improved alertness  -MG (r) JH (t) MG (c) --  Lethargic but cooperative  -MG (r) JH (t) MG (c) --      Symptoms Noted During/After Treatment none  -MG (r) JH (t) MG (c) none  -MG (r) JH (t) MG (c) --         Pain    Additional Documentation Pain Scale: FACES Pre/Post-Treatment (Group)  -MG (r) JH (t) MG (c) Pain Scale: FACES Pre/Post-Treatment (Group)  -MG (r) JH (t) MG (c) Pain Scale: FACES Pre/Post-Treatment (Group)  -MG         Pain Scale: Numbers Pre/Post-Treatment    Pretreatment Pain Rating -- 0/10 - no pain  -MG (r) JH (t) MG (c) --      Posttreatment Pain Rating -- 0/10 - no pain  -MG (r) JH (t) MG (c) --         Pain Scale: FACES Pre/Post-Treatment    Pain: FACES Scale, Pretreatment 0-->no hurt  -MG (r) JH (t) MG (c) -- 0-->no hurt  -MG      Posttreatment Pain Rating 0-->no hurt  -MG (r) JH (t) MG (c) -- 0-->no hurt  -MG         SLP Treatment Clinical Impressions    Treatment Assessment (SLP) improved  -MG (r) JH (t) MG (c) improved  -MG (r) JH (t) MG (c) worsened  -MG      Treatment Assessment Comments (SLP) -- -- See  [facility]. He will consider this.     Sarah Rivera MD    TOTAL TIME SPENT WITH PATIENT: 25 minutes note  -MG      Daily Summary of Progress (SLP) progress toward functional goals as expected  -MG (r) JH (t) MG (c) progress toward functional goals is good  -MG (r) JH (t) MG (c) progress toward functional goals is gradual  -MG      Barriers to Overall Progress (SLP) Fatigue  -MG (r) JH (t) MG (c) Lethargy;Fatigue  -MG (r) JH (t) MG (c) Lethargy  -MG      Plan for Continued Treatment (SLP) continue treatment per plan of care  -MG (r) JH (t) MG (c) continue treatment per plan of care  -MG (r) JH (t) MG (c) goals adjusted to reflect functional decline demonstrated  -MG      Care Plan Review evaluation/treatment results reviewed  -MG (r) JH (t) MG (c) evaluation/treatment results reviewed  -MG (r) JH (t) MG (c) evaluation/treatment results reviewed;care plan/treatment goals reviewed;risks/benefits reviewed;current/potential barriers reviewed;patient/other agree to care plan  -MG      Care Plan Review, Other Participant(s) caregiver  -MG (r) JH (t) MG (c) caregiver;significant other  -MG (r) JH (t) MG (c) caregiver  NANCY Wilkinson  -MG         Swallow Goals (SLP)    Swallow LTGs Swallow Long Term Goal (free text)  -MG (r) JH (t) MG (c) Swallow Long Term Goal (free text)  -MG (r) JH (t) MG (c) Swallow Long Term Goal (free text)  -MG      Swallow STGs diet tolerance goal selection (SLP);labial strengthening goal selection (SLP);lingual strengthening goal selection (SLP)  -MG (r) JH (t) MG (c) diet tolerance goal selection (SLP);labial strengthening goal selection (SLP);lingual strengthening goal selection (SLP)  -MG (r) JH (t) MG (c) diet tolerance goal selection (SLP);labial strengthening goal selection (SLP);lingual strengthening goal selection (SLP)  -MG      Diet Tolerance Goal Selection (SLP) Patient will tolerate trials of  -MG (r) JH (t) MG (c) Patient will tolerate trials of  -MG (r) JH (t) MG (c) Patient will tolerate trials of  -MG      Labial Strengthening Goal Selection (SLP) labial strengthening, SLP goal 1  -MG  (r) JH (t) MG (c) labial strengthening, SLP goal 1  -MG (r) JH (t) MG (c) labial strengthening, SLP goal 1  -MG      Lingual Strengthening Goal Selection (SLP) lingual strengthening, SLP goal 1  -MG (r) JH (t) MG (c) lingual strengthening, SLP goal 1  -MG (r) JH (t) MG (c) lingual strengthening, SLP goal 1  -MG         (LTG) Swallow    (LTG) Swallow Pt will tolerate LRD without overt s/s of aspiration.  -MG (r) JH (t) MG (c) Pt will tolerate LRD without overt s/s of aspiration.  -MG (r) JH (t) MG (c) Pt will tolerate LRD without overt s/s of aspiration.  -MG      [place] (Swallow Long Term Goal) with minimal cues (75-90% accuracy)  -MG (r) JH (t) MG (c) with minimal cues (75-90% accuracy)  -MG (r) JH (t) MG (c) with minimal cues (75-90% accuracy)  -MG      Time Frame (Swallow Long Term Goal) by discharge  -MG (r) JH (t) MG (c) by discharge  -MG (r) JH (t) MG (c) by discharge  -MG      Barriers (Swallow Long Term Goal) L weakness  -MG (r) JH (t) MG (c) L weakness  -MG (r) JH (t) MG (c) L weakness  -MG      Progress/Outcomes (Swallow Long Term Goal) good progress toward goal  -MG (r) JH (t) MG (c) good progress toward goal  -MG (r) JH (t) MG (c) progress slower than expected  -MG         (STG) Patient will tolerate trials of    Consistencies Trialed (Tolerate trials) soft to chew (ground) textures;thin liquids;regular textures  -MG (r) JH (t) MG (c) soft to chew (ground) textures;thin liquids;regular textures  -MG (r) JH (t) MG (c) soft to chew (ground) textures;thin liquids;regular textures  -MG      Desired Outcome (Tolerate trials) without signs/symptoms of aspiration;without signs of distress;with adequate oral prep/transit/clearance  -MG (r) JH (t) MG (c) without signs/symptoms of aspiration;without signs of distress;with adequate oral prep/transit/clearance  -MG (r) JH (t) MG (c) without signs/symptoms of aspiration;without signs of distress;with adequate oral prep/transit/clearance  -MG      [place]  (Tolerate trials) with minimal cues (75-90% accuracy)  -MG (r) JH (t) MG (c) with minimal cues (75-90% accuracy)  -MG (r) JH (t) MG (c) with minimal cues (75-90% accuracy)  -MG      Time Frame (Tolerate trials) by discharge  -MG (r) JH (t) MG (c) by discharge  -MG (r) JH (t) MG (c) by discharge  -MG      Progress/Outcomes (Tolerate trials) good progress toward goal  -MG (r) JH (t) MG (c) good progress toward goal  -MG (r) JH (t) MG (c) progress slower than expected  -MG         (STG) Labial Strengthening Goal 1 (SLP)    Activity (Labial Strengthening Goal 1, SLP) increase labial tone  -MG (r) JH (t) MG (c) increase labial tone  -MG (r) JH (t) MG (c) increase labial tone  -MG      Increase Labial Tone labial resistance exercises  -MG (r) JH (t) MG (c) labial resistance exercises  -MG (r) JH (t) MG (c) labial resistance exercises  -MG      [place]/Accuracy (Labial Strengthening Goal 1, SLP) with minimal cues (75-90% accuracy)  -MG (r) JH (t) MG (c) with minimal cues (75-90% accuracy)  -MG (r) JH (t) MG (c) with minimal cues (75-90% accuracy)  -MG      Time Frame (Labial Strengthening Goal 1, SLP) by discharge  -MG (r) JH (t) MG (c) by discharge  -MG (r) JH (t) MG (c) by discharge  -MG      Barriers (Labial Strengthening Goal 1, SLP) L weakness  -MG (r) JH (t) MG (c) L weakness  -MG (r) JH (t) MG (c) L weakness  -MG      Progress/Outcomes (Labial Strengthening Goal 1, SLP) good progress toward goal  -MG (r) JH (t) MG (c) continuing progress toward goal  -MG goal ongoing  -MG         (STG) Lingual Strengthening Goal 1 (SLP)    Activity (Lingual Strengthening Goal 1, SLP) increase lingual tone/sensation/control/coordination/movement;increase tongue back strength  -MG (r) JH (t) MG (c) increase lingual tone/sensation/control/coordination/movement;increase tongue back strength  -MG (r) JH (t) MG (c) increase lingual tone/sensation/control/coordination/movement;increase tongue back strength  -MG      Increase Lingual  Tone/Sensation/Control/Coordination/Movement lingual movement exercises  -MG (r) JH (t) MG (c) lingual movement exercises  -MG (r) JH (t) MG (c) lingual movement exercises  -MG      Increase Tongue Back Strength lingual resistance exercises  -MG (r) JH (t) MG (c) lingual resistance exercises  -MG (r) JH (t) MG (c) lingual resistance exercises  -MG      [place]/Accuracy (Lingual Strengthening Goal 1, SLP) with minimal cues (75-90% accuracy)  -MG (r) JH (t) MG (c) with minimal cues (75-90% accuracy)  -MG (r) JH (t) MG (c) with minimal cues (75-90% accuracy)  -MG      Time Frame (Lingual Strengthening Goal 1, SLP) by discharge  -MG (r) JH (t) MG (c) by discharge  -MG (r) JH (t) MG (c) by discharge  -MG      Barriers (Lingual Strengthening Goal 1, SLP) L weakness  -MG (r) JH (t) MG (c) L weakness  -MG (r) JH (t) MG (c) L weakness  -MG      Progress/Outcomes (Lingual Strengthening Goal 1, SLP) good progress toward goal  -MG (r) JH (t) MG (c) continuing progress toward goal  -MG goal ongoing  -MG                User Key  (r) = Recorded By, (t) = Taken By, (c) = Cosigned By      Initials Name Effective Dates    MG Janeen Gunter, MS CCC-SLP 08/12/22 -     Ronnie Nevarez, Speech Therapy Student 12/12/22 -                     EDUCATION  The patient has been educated in the following areas:   Dysphagia (Swallowing Impairment) Modified Diet Instruction.        SLP GOALS       Row Name 03/08/23 0807 03/07/23 0802 03/06/23 0808       (LTG) Swallow    (LTG) Swallow Pt will tolerate LRD without overt s/s of aspiration.  -MG (r) JH (t) MG (c) Pt will tolerate LRD without overt s/s of aspiration.  -MG (r) JH (t) MG (c) Pt will tolerate LRD without overt s/s of aspiration.  -MG    [place] (Swallow Long Term Goal) with minimal cues (75-90% accuracy)  -MG (r) JH (t) MG (c) with minimal cues (75-90% accuracy)  -MG (r) JH (t) MG (c) with minimal cues (75-90% accuracy)  -MG    Time Frame (Swallow Long Term Goal) by discharge   -MG (r) JH (t) MG (c) by discharge  -MG (r) JH (t) MG (c) by discharge  -MG    Barriers (Swallow Long Term Goal) L weakness  -MG (r) JH (t) MG (c) L weakness  -MG (r) JH (t) MG (c) L weakness  -MG    Progress/Outcomes (Swallow Long Term Goal) good progress toward goal  -MG (r) JH (t) MG (c) good progress toward goal  -MG (r) JH (t) MG (c) progress slower than expected  -MG       (STG) Patient will tolerate trials of    Consistencies Trialed (Tolerate trials) soft to chew (ground) textures;thin liquids;regular textures  -MG (r) JH (t) MG (c) soft to chew (ground) textures;thin liquids;regular textures  -MG (r) JH (t) MG (c) soft to chew (ground) textures;thin liquids;regular textures  -MG    Desired Outcome (Tolerate trials) without signs/symptoms of aspiration;without signs of distress;with adequate oral prep/transit/clearance  -MG (r) JH (t) MG (c) without signs/symptoms of aspiration;without signs of distress;with adequate oral prep/transit/clearance  -MG (r) JH (t) MG (c) without signs/symptoms of aspiration;without signs of distress;with adequate oral prep/transit/clearance  -MG    [place] (Tolerate trials) with minimal cues (75-90% accuracy)  -MG (r) JH (t) MG (c) with minimal cues (75-90% accuracy)  -MG (r) JH (t) MG (c) with minimal cues (75-90% accuracy)  -MG    Time Frame (Tolerate trials) by discharge  -MG (r) JH (t) MG (c) by discharge  -MG (r) JH (t) MG (c) by discharge  -MG    Progress/Outcomes (Tolerate trials) good progress toward goal  -MG (r) JH (t) MG (c) good progress toward goal  -MG (r) JH (t) MG (c) progress slower than expected  -MG       (STG) Labial Strengthening Goal 1 (SLP)    Activity (Labial Strengthening Goal 1, SLP) increase labial tone  -MG (r) JH (t) MG (c) increase labial tone  -MG (r) JH (t) MG (c) increase labial tone  -MG    Increase Labial Tone labial resistance exercises  -MG (r) JH (t) MG (c) labial resistance exercises  -MG (r) JH (t) MG (c) labial resistance exercises   -MG    [place]/Accuracy (Labial Strengthening Goal 1, SLP) with minimal cues (75-90% accuracy)  -MG (r) JH (t) MG (c) with minimal cues (75-90% accuracy)  -MG (r) JH (t) MG (c) with minimal cues (75-90% accuracy)  -MG    Time Frame (Labial Strengthening Goal 1, SLP) by discharge  -MG (r) JH (t) MG (c) by discharge  -MG (r) JH (t) MG (c) by discharge  -MG    Barriers (Labial Strengthening Goal 1, SLP) L weakness  -MG (r) JH (t) MG (c) L weakness  -MG (r) JH (t) MG (c) L weakness  -MG    Progress/Outcomes (Labial Strengthening Goal 1, SLP) good progress toward goal  -MG (r) JH (t) MG (c) continuing progress toward goal  -MG goal ongoing  -MG       (STG) Lingual Strengthening Goal 1 (SLP)    Activity (Lingual Strengthening Goal 1, SLP) increase lingual tone/sensation/control/coordination/movement;increase tongue back strength  -MG (r) JH (t) MG (c) increase lingual tone/sensation/control/coordination/movement;increase tongue back strength  -MG (r) JH (t) MG (c) increase lingual tone/sensation/control/coordination/movement;increase tongue back strength  -MG    Increase Lingual Tone/Sensation/Control/Coordination/Movement lingual movement exercises  -MG (r) JH (t) MG (c) lingual movement exercises  -MG (r) JH (t) MG (c) lingual movement exercises  -MG    Increase Tongue Back Strength lingual resistance exercises  -MG (r) JH (t) MG (c) lingual resistance exercises  -MG (r) JH (t) MG (c) lingual resistance exercises  -MG    [place]/Accuracy (Lingual Strengthening Goal 1, SLP) with minimal cues (75-90% accuracy)  -MG (r) JH (t) MG (c) with minimal cues (75-90% accuracy)  -MG (r) JH (t) MG (c) with minimal cues (75-90% accuracy)  -MG    Time Frame (Lingual Strengthening Goal 1, SLP) by discharge  -MG (r) JH (t) MG (c) by discharge  -MG (r) JH (t) MG (c) by discharge  -MG    Barriers (Lingual Strengthening Goal 1, SLP) L weakness  -MG (r) JH (t) MG (c) L weakness  -MG (r) JH (t) MG (c) L weakness  -MG     Progress/Outcomes (Lingual Strengthening Goal 1, SLP) good progress toward goal  -MG (r) JH (t) MG (c) continuing progress toward goal  -MG goal ongoing  -MG              User Key  (r) = Recorded By, (t) = Taken By, (c) = Cosigned By      Initials Name Provider Type     Janeen Gunter MS CCC-SLP Speech and Language Pathologist    Ronnie Nevarez, Speech Therapy Student SLP Student                       Time Calculation:    Time Calculation- SLP       Row Name 03/08/23 0836 03/08/23 0835          Time Calculation- SLP    SLP Start Time 0805  -MG (r) JH (t) MG (c) 0807  -MG (r) JH (t) MG (c)     SLP Stop Time 0836  -MG (r) JH (t) MG (c) 0836  -MG (r) JH (t) MG (c)     SLP Time Calculation (min) 31 min  -MG (r) JH (t) 29 min  -MG (r) JH (t)     SLP Received On 03/08/23  -MG (r) JH (t) MG (c) --        Untimed Charges    59977-[id number] Treatment Swallow Minutes 31  -MG --        Total Minutes    Untimed Charges Total Minutes 31  -MG --      Total Minutes 31  -MG --               User Key  (r) = Recorded By, (t) = Taken By, (c) = Cosigned By      Initials Name Provider Type     Janeen Gunter MS CCC-SLP Speech and Language Pathologist    Ronnie Nevarez, Speech Therapy Student SLP Student                    Therapy Charges for Today       Code Description Service Date Service Provider Modifiers Qty    26165967055 HC ST TREATMENT SWALLOW 3 3/7/2023 Janeen Gunter MS CCC-SLP GN 1    13713351373 HC ST TREATMENT SWALLOW 2 3/8/2023 Janeen Gunter MS CCC-SLP GN 1                 Janeen Gunter MS CCC-DONNIE  3/8/2023

## 2023-03-08 NOTE — PROGRESS NOTES
"Progress Note    Patient:  James Taylor  YOB: 1980  MRN: [id number]   Admit date: 3/4/2023   Admitting Physician: Brandon Gannon MD  Primary Care Physician: No primary care provider on file.    Chief Complaint: Hemorrhagic CVA    Interval History: No events overnight.  Patient has been on Cardene to maintain blood pressure less than 140.  Less agitation noted through the night    Intake/Output Summary (Last 24 hours) at 3/8/2023 0710  Last data filed at 3/8/2023 0300  Gross per 24 hour   Intake 2418.84 ml   Output 1100 ml   Net 1318.84 ml     Allergies: No Known Allergies  Current Scheduled Medications:   amLODIPine, 10 mg, Oral, Daily  Chlorhexidine Gluconate Cloth, 1 application, Topical, Q24H  hydrALAZINE, 10 mg, Oral, Q8H  metoprolol tartrate, 50 mg, Oral, Q12H  mupirocin, 1 application, Each Nare, BID  nicotine, 1 patch, Transdermal, Q24H  sertraline, 50 mg, Oral, Q24H  sodium chloride, 10 mL, Intravenous, Q12H  terazosin, 5 mg, Oral, Nightly      Current PRN Medications:  •  acetaminophen **OR** acetaminophen  •  enalaprilat  •  labetalol  •  LORazepam **OR** LORazepam **OR** LORazepam **OR** LORazepam **OR** LORazepam **OR** LORazepam  •  sodium chloride  •  sodium chloride  •  sodium chloride    Review of Systems   Constitutional: Negative for fever.       Pertinent positives/negatives documented in HPI.  All other systems reviewed and negative.    Vital Signs:  /79   Pulse 59   Temp 97.6 °F (36.4 °C) (Axillary)   Resp 16   Ht 177.8 cm (70\")   Wt 69.5 kg (153 lb 3.5 oz)   SpO2 96%   BMI 21.98 kg/m²     Physical Exam  Vitals and nursing note reviewed.   Constitutional:       General: Vital signs are normal.   HENT:      Head: Normocephalic.      Nose: Nose normal.      Mouth/Throat:      Mouth: Mucous membranes are moist.   Eyes:      Extraocular Movements: EOM normal.      Pupils: Pupils are equal, round, and reactive to light.   Cardiovascular:      Rate and Rhythm: Normal rate " "and regular rhythm.      Pulses: Intact distal pulses.   Pulmonary:      Effort: Pulmonary effort is normal. No respiratory distress.   Abdominal:      General: Bowel sounds are normal. There is no distension.      Palpations: Abdomen is soft.   Skin:     General: Skin is warm and dry.   Neurological:      Mental Status: He is oriented to person, place, and time. He is lethargic.      Cranial Nerves: Cranial nerve deficit present.      Motor: Weakness present.   Psychiatric:         Mood and Affect: Affect is flat.         Speech: Speech is delayed.         Behavior: Behavior is slowed.         Judgment: Judgment is not impulsive.       Vital signs reviewed.  Line/IV site: No erythema, warmth, induration, or tenderness.    Objective:  General Appearance:  Comfortable, well-appearing, in no acute distress and not in pain.    Vital signs: (most recent): Blood pressure 124/79, pulse 59, temperature 97.6 °F (36.4 °C), temperature source Axillary, resp. rate 16, height 177.8 cm (70\"), weight 69.5 kg (153 lb 3.5 oz), SpO2 96 %.  Vital signs are normal.  No fever.    Output: Producing urine.    HEENT: Normal HEENT exam.    Lungs:  Normal effort and normal respiratory rate.  He is not in respiratory distress.    Heart: Normal rate.  Regular rhythm.    Chest: Symmetric chest wall expansion.   Skin:  Warm and dry.    Abdomen: Abdomen is soft and non-distended.  Bowel sounds are normal.   There is no abdominal tenderness.     Pulses: Distal pulses are intact.        Neurologic Exam     Mental Status   Oriented to person, place, and time.   Attention: normal. Concentration: normal.   Level of consciousness: arousable by verbal stimuli ,  alert  Normal comprehension.     Cranial Nerves     CN II   Visual fields full to confrontation.     CN III, IV, VI   Pupils are equal, round, and reactive to light.  Extraocular motions are normal.     CN V   Facial sensation intact.     CN VII   Right facial weakness: none  Left facial " weakness: central    CN VIII   CN VIII normal.     CN IX, X   CN IX normal.   CN X normal.     CN XI   CN XI normal.     CN XII   Tongue: not atrophic  Tongue deviation: left    Motor Exam   Muscle bulk: normal    Strength   Strength 5/5 except as noted. Left hemiparesis with upper and lower extremity being 3 out of 5 in all muscle groups with ataxic movements    Right upper and lower extremity 5 out of 5 in all muscle groups     Sensory Exam   Light touch normal.       Lab Results:  ABG:     CBC:   Results from last 7 days   Lab Units 03/07/23  0802 03/04/23  0115   WBC 10*3/mm3 10.78 6.58   HEMOGLOBIN g/dL 11.5* 13.6   HEMATOCRIT % 32.4* 38.3   PLATELETS 10*3/mm3 62* 104*     BMP:  Results from last 7 days   Lab Units 03/07/23  0802 03/04/23  0115   SODIUM mmol/L 135* 140   POTASSIUM mmol/L 3.5 3.8   CHLORIDE mmol/L 101 102   CO2 mmol/L 25.0 26.0   BUN mg/dL 8 14   CREATININE mg/dL 0.68* 1.13   GLUCOSE mg/dL 93 110*   CALCIUM mg/dL 8.9 9.2   ALT (SGPT) U/L 80* 180*     Culture Results: No results found for: BLOODCX, URINECX, WOUNDCX, MRSACX, RESPCX, STOOLCX        Impression/Recommendations:   CV: Heart rate stable.  Patient on Cardene to maintain blood pressure less than 140  RESP: Oxygen level stable  GI: Tolerating p.o.  : Adequate urine output  NEURO: Neuro exam stable  ID: None  Continue with physical and Occupational Therapy.  If patient has a good day will consider transferring out to the neuro floor.  Patient will require rehab post hospital stay.   evaluate patient for rehab placement      Intracranial bleeding (HCC)    Malignant hypertension    Elevated transaminase level    Alcohol abuse      Feliciano Boothe, APRN

## 2023-03-08 NOTE — PLAN OF CARE
Goal Outcome Evaluation:    Patient still has left side facial drop and left side limb ataxia. AAOX4, Has odered to transfer to regular room.

## 2023-03-08 NOTE — THERAPY TREATMENT NOTE
Patient Name: James Taylor  : 1980    MRN: [id number]                              Today's Date: 3/8/2023       Admit Date: 3/4/2023    Visit Dx:     ICD-10-CM ICD-9-CM   1. Intracranial bleeding (HCC)  I62.9 432.9   2. Facial droop  R29.810 781.94   3. Left arm weakness  R29.898 729.89   4. Acute nonintractable headache, unspecified headache type  R51.9 784.0   5. History of hypertension  Z86.79 V12.59   6. Cerebral edema (HCC)  G93.6 348.5   7. Midline shift of brain  R90.89 793.0   8. Dysphagia, unspecified type  R13.10 787.20   9. Impaired mobility  Z74.09 799.89   10. Decreased activities of daily living (ADL)  Z78.9 V49.89     Patient Active Problem List   Diagnosis   • Intracranial bleeding (HCC)   • Malignant hypertension   • Elevated transaminase level   • Alcohol abuse     Past Medical History:   Diagnosis Date   • Asthma    • Hypertension      History reviewed. No pertinent surgical history.   General Information     Row Name 23          OT Time and Intention    Document Type therapy note (daily note)  -JJ (r) PB (t) JJ (c)     Mode of Treatment occupational therapy  -JJ (r) PB (t) JJ (c)     Row Name 23          General Information    Patient Profile Reviewed yes  -JJ (r) PB (t) JJ (c)     Existing Precautions/Restrictions fall  L sided weakness  -JJ (r) PB (t) JJ (c)     Barriers to Rehab medically complex;physical barrier  -JJ (r) PB (t) JJ (c)     Row Name 23          Cognition    Orientation Status (Cognition) oriented x 4  -JJ (r) PB (t) JJ (c)     Row Name 23          Safety Issues, Functional Mobility    Safety Issues Affecting Function (Mobility) insight into deficits/self-awareness;safety precaution awareness;safety precautions follow-through/compliance  -JJ (r) PB (t) JJ (c)     Impairments Affecting Function (Mobility) balance;coordination;endurance/activity tolerance;grasp;motor control;strength;muscle tone abnormal;postural/trunk  control;sensation/sensory awareness  -JJ (r) PB (t) JJ (c)           User Key  (r) = Recorded By, (t) = Taken By, (c) = Cosigned By    Initials Name Provider Type    Yudelka Parisi OTR/L, COURTNEY Occupational Therapist    Clara James, OT Student OT Student                 Mobility/ADL's     Row Name 03/08/23 0819          Bed Mobility    Bed Mobility scooting/bridging;supine-sit;sit-supine  -JJ (r) PB (t) JJ (c)     Scooting/Bridging [place] (Bed Mobility) contact guard;verbal cues;nonverbal cues (demo/gesture)  -JJ (r) PB (t) JJ (c)     Supine-Sit [place] (Bed Mobility) contact guard;verbal cues  -JJ (r) PB (t) JJ (c)     Sit-Supine [place] (Bed Mobility) contact guard;verbal cues  -JJ (r) PB (t) JJ (c)     Bed Mobility, Safety Issues decreased use of arms for pushing/pulling;decreased use of legs for bridging/pushing  -JJ (r) PB (t) JJ (c)     Assistive Device (Bed Mobility) head of bed elevated;bed rails  -JJ (r) PB (t) JJ (c)     Row Name 03/08/23 0819          Transfers    Comment, (Transfers) t/fs deferred this date d/t increased fatigue and drowsiness throughout session  -JJ (r) PB (t) JJ (c)           User Key  (r) = Recorded By, (t) = Taken By, (c) = Cosigned By    Initials Name Provider Type    Yudelka Parisi OTR/L, COURTNEY Occupational Therapist    Clara James OT Student OT Student               Obj/Interventions     Row Name 03/08/23 0819          Sensory Assessment (Somatosensory)    Sensory Assessment pt demonstrates decreased sensation in L UE but improved localization from eval  -JJ (r) PB (t) JJ (c)     Row Name 03/08/23 0819          Sensory Interventions    Sensory Re-education (Sensation) touch localization retraining  -JJ (r) PB (t) JJ (c)     Row Name 03/08/23 0819          Vision Assessment/Intervention    Visual Impairment/Limitations visual/perceptual impairments present  -JJ (r) PB (t) JJ (c)     Vision Assessment Comment pt has R gaze preference.  able to attend L with cues but maintains focus to R visual field  -JJ (r) PB (t) JJ (c)     Row Name 03/08/23 0819          Strength Comprehensive (MMT)    Comment, General Manual Muscle Testing (MMT) Assessment L  strength 4-/5 this date; 2/5 for L shoulder but shows improvement  -JJ (r) PB (t) JJ (c)     Row Name 03/08/23 0819          Motor Skills    Motor Skills coordination;motor control/coordination interventions  -JJ (r) PB (t) JJ (c)     Coordination fine motor deficit;gross motor deficit;moderate impairment;finger to nose  -JJ (r) PB (t) JJ (c)     Muscle Tone left;upper extremity(s);moderate impairment;hypotonia  -JJ (r) PB (t) JJ (c)     Motor Control/Coordination Interventions gross motor coordination activities;therapeutic exercise/ROM  -JJ (r) PB (t) JJ (c)     Therapeutic Exercise shoulder;elbow/forearm;wrist;hand  gravity eliminated ROM exercises  -JJ (r) PB (t) JJ (c)     Row Name 03/08/23 0819          Balance    Balance Assessment sitting static balance;sitting dynamic balance  -JJ (r) PB (t) JJ (c)     Static Sitting Balance standby assist;verbal cues  -JJ (r) PB (t) JJ (c)     Dynamic Sitting Balance contact guard;verbal cues  -JJ (r) PB (t) JJ (c)     Balance Interventions sitting;dynamic;moderate challenge;dynamic reaching;UE activity with balance activity;trunk training exercise  -JJ (r) PB (t) JJ (c)           User Key  (r) = Recorded By, (t) = Taken By, (c) = Cosigned By    Initials Name Provider Type    Yudelka Parisi OTR/L, CSRS Occupational Therapist    PB Clara Glaser OT Student OT Student               Goals/Plan    No documentation.                Clinical Impression     Row Name 03/08/23 0819          Pain Assessment    Pretreatment Pain Rating 0/10 - no pain  -JJ (r) PB (t) JJ (c)     Posttreatment Pain Rating 0/10 - no pain  -JJ (r) PB (t) JJ (c)     Pain Intervention(s) Repositioned;Ambulation/increased activity  -JJ (r) PB (t) JJ (c)     Row Name 03/08/23 0819           Plan of Care Review    Plan of Care Reviewed With patient;significant other  -JJ (r) PB (t) JJ (c)     Progress improving  -JJ (r) PB (t) JJ (c)     Outcome Evaluation OT tx this date. A&Ox4 with s/o in room on arrival. Pt more alert and attentive this session. Pt completed sup<>sit t/fs and scooting/bridging with CGA and vcs. Pt able to sit EOB 20+ minutes with minimal L lean but shoulders angled R, pt unaware but could correct with vcs. Pt demonstrates decreased awareness of L UE, letting it hang to his side often. Pt completed dynamic reaching with across midline and AAROM with LUE, able to complete with gravity eliminated. Completed trunk control/balance exercises EOB. Pt demonstrates R gaze preference, able to attend to midline and L visual field with vcs but would maintain focus in R visual field. Pt provided with extensive educ on protecting L extremeties during t/fs, positioning, etc as well as continuing to utilize L UE during adls and actively exercising L UE between therapy txs. Pt visibly fatigued toward end of session. Skilled OT to continue POC. Pt would benefit from d/c to inpatient rehab.  -JJ (r) PB (t) JJ (c)     Row Name 03/08/23 0819          Therapy Plan Review/Discharge Plan (OT)    Anticipated Discharge Disposition (OT) inpatient rehabilitation facility  -JJ (r) PB (t) JJ (c)     Row Name 03/08/23 0819          Vital Signs    O2 Delivery Pre Treatment room air  -JJ (r) PB (t) JJ (c)     O2 Delivery Intra Treatment room air  -JJ (r) PB (t) JJ (c)     O2 Delivery Post Treatment room air  -JJ (r) PB (t) JJ (c)     Pre Patient Position Supine  -JJ (r) PB (t) JJ (c)     Intra Patient Position Sitting  -JJ (r) PB (t) JJ (c)     Post Patient Position Supine  -JJ (r) PB (t) JJ (c)     Row Name 03/08/23 0819          Positioning and Restraints    Pre-Treatment Position in bed  -JJ (r) PB (t) JJ (c)     Post Treatment Position bed  -JJ (r) PB (t) JJ (c)     In Bed fowlers;call light within  reach;encouraged to call for assist;side rails up x3;with family/caregiver;patient within staff view  -JJ (r) PB (t) JJ (c)           User Key  (r) = Recorded By, (t) = Taken By, (c) = Cosigned By    Initials Name Provider Type    Yudelka Parisi OTR/L, COURTNEY Occupational Therapist    Clara James, OT Student OT Student               Outcome Measures     Row Name 03/08/23 0819          How much help from another is currently needed...    Putting on and taking off regular lower body clothing? 2  -JJ (r) PB (t) JJ (c)     Bathing (including washing, rinsing, and drying) 2  -JJ (r) PB (t) JJ (c)     Toileting (which includes using toilet bed pan or urinal) 3  -JJ (r) PB (t) JJ (c)     Putting on and taking off regular upper body clothing 3  -JJ (r) PB (t) JJ (c)     Taking care of personal grooming (such as brushing teeth) 3  -JJ (r) PB (t) JJ (c)     Eating meals 3  -JJ (r) PB (t) JJ (c)     AM-PAC 6 Clicks Score (OT) 16  -JJ (r) PB (t)     Row Name 03/08/23 0819          Functional Assessment    Outcome Measure Options AM-PAC 6 Clicks Daily Activity (OT)  -JJ (r) PB (t) JJ (c)           User Key  (r) = Recorded By, (t) = Taken By, (c) = Cosigned By    Initials Name Provider Type    Yudelka Parisi OTR/L, COURTNEY Occupational Therapist    Clara aJmes, OT Student OT Student                Occupational Therapy Education     Title: PT OT SLP Therapies (In Progress)     Topic: Occupational Therapy (Done)     Point: ADL training (Done)     Description:   Instruct learner(s) on proper safety adaptation and remediation techniques during self care or transfers.   Instruct in proper use of assistive devices.              Learning Progress Summary           Patient Acceptance, E, VU,NR by PB at 3/8/2023 0819    Acceptance, E,TB,D, VU,NR by AC at 3/7/2023 1505    Acceptance, E, NR by PB at 3/6/2023 1001    Acceptance, E,D, VU,NR by LR at 3/4/2023 0951   Significant Other Acceptance, E, VU,NR by PB at  3/8/2023 0819    Acceptance, E,TB,D, VU,NR by AC at 3/7/2023 1505                   Point: Home exercise program (Done)     Description:   Instruct learner(s) on appropriate technique for monitoring, assisting and/or progressing therapeutic exercises/activities.              Learning Progress Summary           Patient Acceptance, E,D, VU,NR by LR at 3/4/2023 0951                   Point: Precautions (Done)     Description:   Instruct learner(s) on prescribed precautions during self-care and functional transfers.              Learning Progress Summary           Patient Acceptance, E, VU,NR by PB at 3/8/2023 0819    Acceptance, E, NR by PB at 3/6/2023 1001    Acceptance, E,D, VU,NR by LR at 3/4/2023 0951   Significant Other Acceptance, E, VU,NR by PB at 3/8/2023 0819                   Point: Body mechanics (Done)     Description:   Instruct learner(s) on proper positioning and spine alignment during self-care, functional mobility activities and/or exercises.              Learning Progress Summary           Patient Acceptance, E, VU,NR by PB at 3/8/2023 0819    Acceptance, E, NR by PB at 3/6/2023 1001    Acceptance, E,D, VU,NR by LR at 3/4/2023 0951   Significant Other Acceptance, E, VU,NR by PB at 3/8/2023 0819                               User Key     Initials Effective Dates Name Provider Type Discipline     02/03/23 -  Ron Solis, OTR/L, CNT Occupational Therapist OT    LR 11/22/22 -  Kathleen Cavazos OT Occupational Therapist OT    PB 01/03/23 -  Clara Glaser OT Student OT Student OT              OT Recommendation and Plan     Plan of Care Review  Plan of Care Reviewed With: patient, significant other  Progress: improving  Outcome Evaluation: OT tx this date. A&Ox4 with s/o in room on arrival. Pt more alert and attentive this session. Pt completed sup<>sit t/fs and scooting/bridging with CGA and vcs. Pt able to sit EOB 20+ minutes with minimal L lean but shoulders angled R, pt unaware but could  correct with vcs. Pt demonstrates decreased awareness of L UE, letting it hang to his side often. Pt completed dynamic reaching with across midline and AAROM with LUE, able to complete with gravity eliminated. Completed trunk control/balance exercises EOB. Pt demonstrates R gaze preference, able to attend to midline and L visual field with vcs but would maintain focus in R visual field. Pt provided with extensive educ on protecting L extremeties during t/fs, positioning, etc as well as continuing to utilize L UE during adls and actively exercising L UE between therapy txs. Pt visibly fatigued toward end of session. Skilled OT to continue POC. Pt would benefit from d/c to inpatient rehab.     Time Calculation:    Time Calculation- OT     Row Name 03/08/23 0819             Time Calculation- OT    OT Start Time 0819  -JJ (r) PB (t) JJ (c)      OT Stop Time 0850  -JJ (r) PB (t) JJ (c)      OT Time Calculation (min) 31 min  -JJ (r) PB (t)      Total Timed Code Minutes- OT 31 minute(s)  -JJ (r) PB (t) JJ (c)      OT Received On 03/08/23  -JJ (r) PB (t) JJ (c)            User Key  (r) = Recorded By, (t) = Taken By, (c) = Cosigned By    Initials Name Provider Type    Yudelka Parisi, TOMER/L, CSRS Occupational Therapist    Clara James, OT Student OT Student                       Clara Glaser, OT Student  3/8/2023

## 2023-03-08 NOTE — PLAN OF CARE
Goal Outcome Evaluation:  Plan of Care Reviewed With: (P) patient, caregiver        Progress: (P) improving     Patient was seen on this date for swallow tx. Pt has improved significantly in alertness and tx effort from previous sessions. Girlfriend at b/s assisting with feeding. Pt had just finished breakfast when ST entered room. Pt ate approximately 95% of meal w/o any c/o aspiration-like symptoms or difficulty. Left labial/lingual weakness is still present; however, is improving. CN VII and V sensation remains intact. PO trials of regular solids were administered secondary to improved alertness and current diet tolerance. No overt s/s of aspiration noted x4. It is recommended that pt be upgraded to a thin liquid / mech soft diet. Pt needs direct supervision during meals to monitor alertness and prevent oral holding/pocketing and prolonged mastication. ST will continue to treat. Contact if any acute changes occur.

## 2023-03-08 NOTE — PROGRESS NOTES
[facility] Medicine Services  INPATIENT PROGRESS NOTE    Patient Name: James Taylor  Date of Admission: 3/4/2023  Today's Date: 03/08/23  Length of Stay: 4  Primary Care Physician: No primary care provider on file.    Subjective   Chief Complaint: Hemorrhagic stroke  HPI   Patient with complaints of epigastric pain.  Has had in the past.   No nausea or vomiting.     Was on cardene drip for bp last night.  Received 3 doses of vasotec IV in 24 hours.   Still on IVF      Review of Systems   All pertinent negatives and positives are as above. All other systems have been reviewed and are negative unless otherwise stated.     Objective    Temp:  [97.4 °F (36.3 °C)-98.7 °F (37.1 °C)] 97.6 °F (36.4 °C)  Heart Rate:  [] 58  Resp:  [12-25] 16  BP: (116-166)/() 124/78  Physical Exam  Vitals and nursing note reviewed.   Constitutional:       General: He is not in acute distress.     Comments: Patient opened eyes to command and smiled.      HENT:      Head: Normocephalic and atraumatic.      Right Ear: External ear normal.      Left Ear: External ear normal.      Nose: Nose normal.      Mouth/Throat:      Pharynx: Oropharynx is clear.   Eyes:      Pupils: Pupils are equal, round, and reactive to light.   Cardiovascular:      Rate and Rhythm: Normal rate and regular rhythm.      Pulses: Normal pulses.   Pulmonary:      Effort: Pulmonary effort is normal.      Breath sounds: Normal breath sounds.   Abdominal:      General: Abdomen is flat. Bowel sounds are normal.      Tenderness: There is abdominal tenderness (epigastrium).   Musculoskeletal:      Cervical back: No rigidity.      Right lower leg: No edema.      Left lower leg: No edema.      Comments: Moves all extremities   Skin:     General: Skin is warm and dry.      Capillary Refill: Capillary refill takes less than 2 seconds.   Neurological:      Mental Status: He is alert.   Psychiatric:         Mood and Affect: Mood normal.              Results Review:  I have reviewed the labs, radiology results, and diagnostic studies.    Laboratory Data:   Results from last 7 days   Lab Units 03/07/23  0802 03/04/23  0115   WBC 10*3/mm3 10.78 6.58   HEMOGLOBIN g/dL 11.5* 13.6   HEMATOCRIT % 32.4* 38.3   PLATELETS 10*3/mm3 62* 104*        Results from last 7 days   Lab Units 03/07/23  0802 03/04/23  0115   SODIUM mmol/L 135* 140   POTASSIUM mmol/L 3.5 3.8   CHLORIDE mmol/L 101 102   CO2 mmol/L 25.0 26.0   BUN mg/dL 8 14   CREATININE mg/dL 0.68* 1.13   CALCIUM mg/dL 8.9 9.2   BILIRUBIN mg/dL 1.5* 1.2   ALK PHOS U/L 117 190*   ALT (SGPT) U/L 80* 180*   AST (SGOT) U/L 80* 615*   GLUCOSE mg/dL 93 110*       Culture Data:   No results found for: BLOODCX, URINECX, WOUNDCX, MRSACX, RESPCX, STOOLCX    Radiology Data:   Imaging Results (Last 24 Hours)     ** No results found for the last 24 hours. **          I have reviewed the patient's current medications.     Assessment/Plan   Assessment  Active Hospital Problems    Diagnosis    • **Intracranial bleeding (HCC)    • Malignant hypertension    • Elevated transaminase level    • Alcohol abuse        Treatment Plan  Dc ivf  Add lisinopril 10 mg at hs.   Increase hydralazine to 25 mg q8h  Add protonix 40 mg bid  cmp cbc in AM  Monitor stool for blood loss      Medical Decision Making  Number and Complexity of problems:   Malignant HTN acute high risk  ICH acute high risk  Elevated transaminases chronic moderate risk      Conditions and Status        Improving     MDM Data  External documents reviewed: none  Cardiac tracing (EKG, telemetry) interpretation: nsr  Radiology interpretation: reviewed  Labs reviewed: reviewed.  Any tests that were considered but not ordered: none     Decision rules/scores evaluated (example [id number], [place], etc): none     Discussed with: patient nurse     Care Planning  Shared decision making: nurse patient  Code status and discussions: full    Disposition  Social Determinants of  Health that impact treatment or disposition: none  I expect the patient to be discharged by primary team.     Electronically signed by Alejandra Serrano, 03/08/23, 09:40 CST.

## 2023-03-09 LAB
ANION GAP SERPL CALCULATED.3IONS-SCNC: 11 MMOL/L (ref 5–15)
BUN SERPL-MCNC: 11 MG/DL (ref 6–20)
BUN/CREAT SERPL: 12.9 (ref 7–25)
CALCIUM SPEC-SCNC: 9.4 MG/DL (ref 8.6–10.5)
CHLORIDE SERPL-SCNC: 99 MMOL/L (ref 98–107)
CO2 SERPL-SCNC: 24 MMOL/L (ref 22–29)
CREAT SERPL-MCNC: 0.85 MG/DL (ref 0.76–1.27)
EGFRCR SERPLBLD CKD-EPI 2021: 111.3 ML/MIN/1.73
GLUCOSE SERPL-MCNC: 106 MG/DL (ref 65–99)
POTASSIUM SERPL-SCNC: 3.7 MMOL/L (ref 3.5–5.2)
SODIUM SERPL-SCNC: 134 MMOL/L (ref 136–145)

## 2023-03-09 PROCEDURE — 97116 GAIT TRAINING THERAPY: CPT

## 2023-03-09 PROCEDURE — 92526 ORAL FUNCTION THERAPY: CPT | Performed by: SPEECH-LANGUAGE PATHOLOGIST

## 2023-03-09 PROCEDURE — 99232 SBSQ HOSP IP/OBS MODERATE 35: CPT | Performed by: NURSE PRACTITIONER

## 2023-03-09 PROCEDURE — 97535 SELF CARE MNGMENT TRAINING: CPT | Performed by: OCCUPATIONAL THERAPIST

## 2023-03-09 PROCEDURE — 80048 BASIC METABOLIC PNL TOTAL CA: CPT | Performed by: FAMILY MEDICINE

## 2023-03-09 PROCEDURE — 25010000002 SODIUM CHLORIDE 0.9 % WITH KCL 20 MEQ 20-0.9 MEQ/L-% SOLUTION: Performed by: NURSE PRACTITIONER

## 2023-03-09 RX ORDER — LABETALOL HYDROCHLORIDE 5 MG/ML
10 INJECTION, SOLUTION INTRAVENOUS
Status: DISCONTINUED | OUTPATIENT
Start: 2023-03-09 | End: 2023-03-10 | Stop reason: HOSPADM

## 2023-03-09 RX ORDER — SODIUM CHLORIDE AND POTASSIUM CHLORIDE 150; 900 MG/100ML; MG/100ML
75 INJECTION, SOLUTION INTRAVENOUS CONTINUOUS
Status: DISCONTINUED | OUTPATIENT
Start: 2023-03-09 | End: 2023-03-10 | Stop reason: HOSPADM

## 2023-03-09 RX ADMIN — AMLODIPINE BESYLATE 10 MG: 10 TABLET ORAL at 09:14

## 2023-03-09 RX ADMIN — PANTOPRAZOLE SODIUM 40 MG: 40 TABLET, DELAYED RELEASE ORAL at 09:15

## 2023-03-09 RX ADMIN — PANTOPRAZOLE SODIUM 40 MG: 40 TABLET, DELAYED RELEASE ORAL at 18:41

## 2023-03-09 RX ADMIN — Medication 1 APPLICATION: at 09:14

## 2023-03-09 RX ADMIN — METOPROLOL TARTRATE 50 MG: 50 TABLET, FILM COATED ORAL at 09:14

## 2023-03-09 RX ADMIN — HYDRALAZINE HYDROCHLORIDE 25 MG: 25 TABLET, FILM COATED ORAL at 14:54

## 2023-03-09 RX ADMIN — HYDRALAZINE HYDROCHLORIDE 25 MG: 25 TABLET, FILM COATED ORAL at 06:20

## 2023-03-09 RX ADMIN — LISINOPRIL 10 MG: 10 TABLET ORAL at 21:51

## 2023-03-09 RX ADMIN — TERAZOSIN HYDROCHLORIDE 5 MG: 5 CAPSULE ORAL at 21:51

## 2023-03-09 RX ADMIN — NICOTINE 1 PATCH: 21 PATCH, EXTENDED RELEASE TRANSDERMAL at 21:52

## 2023-03-09 RX ADMIN — CHLORHEXIDINE GLUCONATE 1 APPLICATION: 500 CLOTH TOPICAL at 04:17

## 2023-03-09 RX ADMIN — Medication 10 ML: at 21:52

## 2023-03-09 RX ADMIN — SERTRALINE HYDROCHLORIDE 50 MG: 50 TABLET, FILM COATED ORAL at 21:51

## 2023-03-09 RX ADMIN — POTASSIUM CHLORIDE AND SODIUM CHLORIDE 75 ML/HR: 900; 150 INJECTION, SOLUTION INTRAVENOUS at 21:51

## 2023-03-09 RX ADMIN — METOPROLOL TARTRATE 50 MG: 50 TABLET, FILM COATED ORAL at 21:51

## 2023-03-09 RX ADMIN — Medication 10 ML: at 09:14

## 2023-03-09 RX ADMIN — HYDRALAZINE HYDROCHLORIDE 25 MG: 25 TABLET, FILM COATED ORAL at 22:15

## 2023-03-09 NOTE — CASE MANAGEMENT/SOCIAL WORK
Continued Stay Note   Connie     Patient Name: James Taylor  MRN: [id number]  Today's Date: 3/9/2023    Admit Date: 3/4/2023    Plan: [facility] - pending insurance approval   Discharge Plan     Row Name 03/09/23 1259       Plan    Plan [facility] - pending insurance approval    Plan Comments [facility] has accepted patient for admission pending insurance approval.  [facility] is awaiting updated physical therapy notes to start insurance precert.               Discharge Codes    No documentation.                     FRACISCO Crmaer

## 2023-03-09 NOTE — CASE MANAGEMENT/SOCIAL WORK
Continued Stay Note  [facility]     Patient Name: James Taylor  MRN: [id number]  Today's Date: 3/9/2023    Admit Date: 3/4/2023    Plan: [facility] referral pending   Discharge Plan     Row Name 03/09/23 0826       Plan    Plan [facility] referral pending    Patient/Family in Agreement with Plan yes    Provided Post Acute Provider List? Yes    Post Acute Provider List Inpatient Rehab    Provided Post Acute Provider Quality & Resource List? Yes    Post Acute Provider Quality and Resource List Inpatient Rehab    Delivered To Patient    Method of Delivery Telephone    Plan Comments Patient is in agreement with referral to [facility].  Referral made, awaiting response.               Discharge Codes    No documentation.                     SHADI CramerW

## 2023-03-09 NOTE — PROGRESS NOTES
"Progress Note    Patient:  James Taylor  YOB: 1980  MRN: [id number]   Admit date: 3/4/2023   Admitting Physician: Brandon Gannon MD  Primary Care Physician: No primary care provider on file.    Chief Complaint: Hemorrhagic CVA    Interval History: No events overnight.  Patient is doing better mentally.  He is working with physical and Occupational Therapy.  Blood pressure is under better control    Intake/Output Summary (Last 24 hours) at 3/9/2023 0843  Last data filed at 3/9/2023 0630  Gross per 24 hour   Intake 250 ml   Output 1025 ml   Net -775 ml     Allergies: No Known Allergies  Current Scheduled Medications:   amLODIPine, 10 mg, Oral, Daily  Chlorhexidine Gluconate Cloth, 1 application, Topical, Q24H  hydrALAZINE, 25 mg, Oral, Q8H  lisinopril, 10 mg, Oral, Nightly  metoprolol tartrate, 50 mg, Oral, Q12H  mupirocin, 1 application, Each Nare, BID  nicotine, 1 patch, Transdermal, Q24H  pantoprazole, 40 mg, Oral, BID AC  sertraline, 50 mg, Oral, Q24H  sodium chloride, 10 mL, Intravenous, Q12H  terazosin, 5 mg, Oral, Nightly      Current PRN Medications:  •  acetaminophen **OR** acetaminophen  •  enalaprilat  •  labetalol  •  LORazepam **OR** LORazepam **OR** LORazepam **OR** LORazepam **OR** LORazepam **OR** LORazepam  •  sodium chloride  •  sodium chloride  •  sodium chloride    Review of Systems   Constitutional: Negative for fever.   Musculoskeletal: Negative.    Neurological: Positive for weakness.   Psychiatric/Behavioral: Negative.        Pertinent positives/negatives documented in HPI.  All other systems reviewed and negative.    Vital Signs:  /82   Pulse 66   Temp 98.3 °F (36.8 °C) (Oral)   Resp 16 Comment: Simultaneous filing. User may be unaware of other data.  Ht 177.8 cm (70\")   Wt 67.5 kg (148 lb 13 oz)   SpO2 95%   BMI 21.35 kg/m²     Physical Exam  Vitals and nursing note reviewed.   Constitutional:       General: Vital signs are normal.   HENT:      Head: " "Normocephalic.      Nose: Nose normal.      Mouth/Throat:      Mouth: Mucous membranes are moist.   Eyes:      Extraocular Movements: EOM normal.      Pupils: Pupils are equal, round, and reactive to light.   Cardiovascular:      Rate and Rhythm: Normal rate and regular rhythm.      Pulses: Intact distal pulses.   Pulmonary:      Effort: Pulmonary effort is normal. No respiratory distress.   Abdominal:      General: Bowel sounds are normal. There is no distension.      Palpations: Abdomen is soft.   Skin:     General: Skin is warm and dry.   Neurological:      Mental Status: He is alert and oriented to person, place, and time.      Cranial Nerves: Cranial nerve deficit present.      Motor: Weakness present.   Psychiatric:         Mood and Affect: Affect is flat.         Speech: Speech is delayed.         Behavior: Behavior is slowed.         Judgment: Judgment is not impulsive.       Vital signs reviewed.  Line/IV site: No erythema, warmth, induration, or tenderness.    Objective:  General Appearance:  Comfortable, well-appearing, in no acute distress and not in pain.    Vital signs: (most recent): Blood pressure 138/82, pulse 66, temperature 98.3 °F (36.8 °C), temperature source Oral, resp. rate 16, height 177.8 cm (70\"), weight 67.5 kg (148 lb 13 oz), SpO2 95 %.  Vital signs are normal.  No fever.    Output: Producing urine.    HEENT: Normal HEENT exam.    Lungs:  Normal effort and normal respiratory rate.  He is not in respiratory distress.    Heart: Normal rate.  Regular rhythm.    Chest: Symmetric chest wall expansion.   Skin:  Warm and dry.    Abdomen: Abdomen is soft and non-distended.  Bowel sounds are normal.   There is no abdominal tenderness.     Pulses: Distal pulses are intact.        Neurologic Exam     Mental Status   Oriented to person, place, and time.   Attention: normal. Concentration: normal.   Level of consciousness: arousable by verbal stimuli ,  alert  Normal comprehension.     Cranial Nerves "     CN II   Visual fields full to confrontation.     CN III, IV, VI   Pupils are equal, round, and reactive to light.  Extraocular motions are normal.     CN V   Facial sensation intact.     CN VII   Right facial weakness: none  Left facial weakness: central    CN VIII   CN VIII normal.     CN IX, X   CN IX normal.   CN X normal.     CN XI   CN XI normal.     CN XII   Tongue: not atrophic  Tongue deviation: left    Motor Exam   Muscle bulk: normal    Strength   Strength 5/5 except as noted. Left hemiparesis with upper and lower extremity being 3 out of 5 in all muscle groups with ataxic movements    Right upper and lower extremity 5 out of 5 in all muscle groups     Sensory Exam   Light touch normal.       Lab Results:  ABG:     CBC:   Results from last 7 days   Lab Units 03/07/23  0802 03/04/23  0115   WBC 10*3/mm3 10.78 6.58   HEMOGLOBIN g/dL 11.5* 13.6   HEMATOCRIT % 32.4* 38.3   PLATELETS 10*3/mm3 62* 104*     BMP:  Results from last 7 days   Lab Units 03/08/23  0906 03/07/23  0802 03/04/23  0115   SODIUM mmol/L 134* 135* 140   POTASSIUM mmol/L 3.2* 3.5 3.8   CHLORIDE mmol/L 99 101 102   CO2 mmol/L 25.0 25.0 26.0   BUN mg/dL 6 8 14   CREATININE mg/dL 0.71* 0.68* 1.13   GLUCOSE mg/dL 119* 93 110*   CALCIUM mg/dL 9.1 8.9 9.2   ALT (SGPT) U/L 60* 80* 180*     Culture Results: No results found for: BLOODCX, URINECX, WOUNDCX, MRSACX, RESPCX, STOOLCX          Impression/Recommendations:   CV: Heart rate and blood pressure stable.  Continue to monitor  RESP: Oxygen level stable  GI: Tolerating p.o.  : Adequate urine output  NEURO: Neuro exam stable and improving  ID: None  Hospitalist to continue seeing patient for medical issues.  Waiting for placement to rehab.  Continue with physical and Occupational Therapy.  Okay to transfer when bed available      Intracranial bleeding (HCC)    Malignant hypertension    Elevated transaminase level    Alcohol abuse      Feliciano Boothe, APRN

## 2023-03-09 NOTE — THERAPY TREATMENT NOTE
Patient Name: James Taylor  : 1980    MRN: [id number]                              Today's Date: 3/9/2023       Admit Date: 3/4/2023    Visit Dx:     ICD-10-CM ICD-9-CM   1. Intracranial bleeding (HCC)  I62.9 432.9   2. Facial droop  R29.810 781.94   3. Left arm weakness  R29.898 729.89   4. Acute nonintractable headache, unspecified headache type  R51.9 784.0   5. History of hypertension  Z86.79 V12.59   6. Cerebral edema (HCC)  G93.6 348.5   7. Midline shift of brain  R90.89 793.0   8. Dysphagia, unspecified type  R13.10 787.20   9. Impaired mobility  Z74.09 799.89   10. Decreased activities of daily living (ADL)  Z78.9 V49.89     Patient Active Problem List   Diagnosis   • Intracranial bleeding (HCC)   • Malignant hypertension   • Elevated transaminase level   • Alcohol abuse     Past Medical History:   Diagnosis Date   • Asthma    • Hypertension      History reviewed. No pertinent surgical history.   General Information     Row Name 23 1049          OT Time and Intention    Document Type therapy note (daily note)  -JJ (r) PB (t) JJ (c)     Mode of Treatment occupational therapy  -JJ (r) PB (t) JJ (c)     Row Name 23 1049          General Information    Patient Profile Reviewed yes  -JJ (r) PB (t) JJ (c)     Existing Precautions/Restrictions seizures;fall  L sided weakness  -JJ (r) PB (t) JJ (c)     Barriers to Rehab medically complex;physical barrier  -JJ (r) PB (t) JJ (c)     Row Name 23 1049          Cognition    Orientation Status (Cognition) oriented x 4  -JJ (r) PB (t) JJ (c)     Row Name 23 1049          Safety Issues, Functional Mobility    Safety Issues Affecting Function (Mobility) insight into deficits/self-awareness;safety precaution awareness;safety precautions follow-through/compliance  -JJ (r) PB (t) JJ (c)     Impairments Affecting Function (Mobility) balance;coordination;endurance/activity tolerance;grasp;motor control;strength;muscle tone abnormal;postural/trunk  control;sensation/sensory awareness  -JJ (r) PB (t) JJ (c)           User Key  (r) = Recorded By, (t) = Taken By, (c) = Cosigned By    Initials Name Provider Type    Yudelka Parisi OTR/L, COURTNEY Occupational Therapist    Clara James, OT Student OT Student                 Mobility/ADL's     Row Name 03/09/23 1049          Bed Mobility    Bed Mobility supine-sit;sit-supine;scooting/bridging  -JJ (r) PB (t) JJ (c)     Scooting/Bridging [place] (Bed Mobility) supervision;verbal cues  -JJ (r) PB (t) JJ (c)     Supine-Sit [place] (Bed Mobility) supervision  -JJ (r) PB (t) JJ (c)     Sit-Supine [place] (Bed Mobility) supervision  -JJ (r) PB (t) JJ (c)     Bed Mobility, Safety Issues decreased use of arms for pushing/pulling;decreased use of legs for bridging/pushing  -JJ (r) PB (t) JJ (c)     Assistive Device (Bed Mobility) head of bed elevated;bed rails  -JJ (r) PB (t) JJ (c)     Row Name 03/09/23 1049          Activities of Daily Living    BADL Assessment/Intervention grooming  -JJ (r) PB (t) JJ (c)     Row Name 03/09/23 1049          Grooming Assessment/Training    [place] Level (Grooming) oral care regimen;wash face, hands;independent  -JJ (r) PB (t) JJ (c)     Position (Grooming) edge of bed sitting  -JJ (r) PB (t) JJ (c)           User Key  (r) = Recorded By, (t) = Taken By, (c) = Cosigned By    Initials Name Provider Type    Yudelka Parisi OTR/L, COURTNEY Occupational Therapist    Clara James, OT Student OT Student               Obj/Interventions     Row Name 03/09/23 1049          Motor Skills    Motor Skills motor control/coordination interventions;functional endurance  -JJ (r) PB (t) JJ (c)     Motor Control/Coordination Interventions weight-bearing activities;fine motor manipulation/dexterity activities;occupation/activity based treatment  -JJ (r) PB (t) JJ (c)     Row Name 03/09/23 1049          Balance    Balance Assessment sitting static balance;sitting dynamic  balance  -JJ (r) PB (t) JJ (c)     Static Sitting Balance set-up  -JJ (r) PB (t) JJ (c)     Dynamic Sitting Balance set-up  -JJ (r) PB (t) JJ (c)     Position, Sitting Balance unsupported;sitting edge of bed  -JJ (r) PB (t) JJ (c)     Balance Interventions sitting;dynamic;occupation based/functional task;dynamic reaching  -JJ (r) PB (t) JJ (c)           User Key  (r) = Recorded By, (t) = Taken By, (c) = Cosigned By    Initials Name Provider Type    Yudelka Parisi, OTR/L, CSRS Occupational Therapist    lCara James, OT Student OT Student               Goals/Plan    No documentation.                Clinical Impression     Row Name 03/09/23 1049          Pain Assessment    Pretreatment Pain Rating 0/10 - no pain  -JJ (r) PB (t) JJ (c)     Posttreatment Pain Rating 0/10 - no pain  -JJ (r) PB (t) JJ (c)     Pain Intervention(s) Repositioned;Ambulation/increased activity  -JJ (r) PB (t) JJ (c)     Row Name 03/09/23 1049          Plan of Care Review    Plan of Care Reviewed With patient;family;significant other  -JJ (r) PB (t) JJ (c)     Progress improving  -JJ (r) PB (t) JJ (c)     Outcome Evaluation OT tx this date. A&Ox4 with s/o in room on arrival. Pt completed sup<>sit t/fs and scooted hips to EOB with supervision and vcs. Pt completed grooming tasks indep, EOB unsupported for ~20 minutes. Pt placed objects in his L hand and unscrewed caps with R hand without vcs. Pt provided with and educ on using tubing to build up handles on items for easier grasp in L UE. Pt able to sit up in bed unsupported for 3 minutes to WB through L forearm and hand 2x each for 20+ seconds. Pt fatigued at end of session, educ on POC and progress made. Skilled OT to continue POC. Anticipated d/c to IP rehab to continue skilled therapy services.  -JJ (r) PB (t) JJ (c)     Row Name 03/09/23 1049          Therapy Plan Review/Discharge Plan (OT)    Anticipated Discharge Disposition (OT) inpatient rehabilitation facility  -[facility] (r) [facility]  (t) JJ (c)     Row Name 03/09/23 1049          Vital Signs    O2 Delivery Pre Treatment room air  -JJ (r) PB (t) JJ (c)     O2 Delivery Intra Treatment room air  -JJ (r) PB (t) JJ (c)     O2 Delivery Post Treatment room air  -JJ (r) PB (t) JJ (c)     Pre Patient Position Supine  -JJ (r) PB (t) JJ (c)     Intra Patient Position Sitting  -JJ (r) PB (t) JJ (c)     Post Patient Position Supine  -JJ (r) PB (t) JJ (c)     Row Name 03/09/23 1049          Positioning and Restraints    Pre-Treatment Position in bed  -JJ (r) PB (t) JJ (c)     Post Treatment Position bed  -JJ (r) PB (t) JJ (c)     In Bed fowlers;call light within reach;encouraged to call for assist;side rails up x3;with family/caregiver;patient within staff view;SCD pump applied  -JJ (r) PB (t) JJ (c)           User Key  (r) = Recorded By, (t) = Taken By, (c) = Cosigned By    Initials Name Provider Type    Yudelka Parisi OTR/L, CSRS Occupational Therapist    Clara James, MARIA [place] ROSAR[place] Student OT Student               Outcome Measures     Row Name 03/09/23 1049          How much help from another is currently needed...    Putting on and taking off regular lower body clothing? 2  -JJ (r) PB (t) JJ (c)     Bathing (including washing, rinsing, and drying) 2  -JJ (r) PB (t) JJ (c)     Toileting (which includes using toilet bed pan or urinal) 3  -JJ (r) PB (t) JJ (c)     Putting on and taking off regular upper body clothing 3  -JJ (r) PB (t) JJ (c)     Taking care of personal grooming (such as brushing teeth) 4  -JJ (r) PB (t) JJ (c)     Eating meals 3  -JJ (r) PB (t) JJ (c)     AM-PAC 6 Clicks Score (OT) 17  -JJ (r) PB (t)     Row Name 03/09/23 6567          How much help from another person do you currently need...    Turning from your back to your side while in flat bed without using bedrails? 4  -TB     Moving from lying on back to sitting on the side of a flat bed without bedrails? 4  -TB     Moving to and from a bed to a chair (including a wheelchair)? 3   -TB     Standing up from a chair using your arms (e.g., wheelchair, bedside chair)? 4  -TB     Climbing 3-5 steps with a railing? 3  -TB     To walk in hospital room? 3  -TB     AM-PAC 6 Clicks Score (PT) 21  -TB     Highest level of mobility 6 --> Walked 10 steps or more  -TB     Row Name 03/09/23 1315 03/09/23 1049       Functional Assessment    Outcome Measure Options AM-PAC 6 Clicks Basic Mobility (PT)  -TB AM-PAC 6 Clicks Daily Activity (OT)  -JJ (r) PB (t) JJ (c)          User Key  (r) = Recorded By, (t) = Taken By, (c) = Cosigned By    Initials Name Provider Type    TB Saud Raza, PTA Physical Therapist Assistant    Yudelka Parisi, OTR/L, CSRS Occupational Therapist    PB Clara Glaser, OT Student OT Student                Occupational Therapy Education     Title: PT OT SLP Therapies (In Progress)     Topic: Occupational Therapy (Done)     Point: ADL training (Done)     Description:   Instruct learner(s) on proper safety adaptation and remediation techniques during self care or transfers.   Instruct in proper use of assistive devices.              Learning Progress Summary           Patient Eager, E, VU by PB at 3/9/2023 1049    Acceptance, E, VU,NR by PB at 3/8/2023 0819    Acceptance, E,TB,D, VU,NR by AC at 3/7/2023 1505    Acceptance, E, NR by PB at 3/6/2023 1001    Acceptance, E,D, VU,NR by LR at 3/4/2023 0951   Family Eager, E, VU by PB at 3/9/2023 1049   Significant Other Eager, E, VU by PB at 3/9/2023 1049    Acceptance, E, VU,NR by PB at 3/8/2023 0819    Acceptance, E,TB,D, VU,NR by AC at 3/7/2023 1505                   Point: Home exercise program (Done)     Description:   Instruct learner(s) on appropriate technique for monitoring, assisting and/or progressing therapeutic exercises/activities.              Learning Progress Summary           Patient Acceptance, E,D, VU,NR by LR at 3/4/2023 0951                   Point: Precautions (Done)     Description:   Instruct learner(s)  on prescribed precautions during self-care and functional transfers.              Learning Progress Summary           Patient Eager, E, VU by PB at 3/9/2023 1049    Acceptance, E, VU,NR by PB at 3/8/2023 0819    Acceptance, E, NR by PB at 3/6/2023 1001    Acceptance, E,D, VU,NR by LR at 3/4/2023 0951   Family Eager, E, VU by PB at 3/9/2023 1049   Significant Other Eager, E, VU by PB at 3/9/2023 1049    Acceptance, E, VU,NR by PB at 3/8/2023 0819                   Point: Body mechanics (Done)     Description:   Instruct learner(s) on proper positioning and spine alignment during self-care, functional mobility activities and/or exercises.              Learning Progress Summary           Patient Eager, E, VU by PB at 3/9/2023 1049    Acceptance, E, VU,NR by PB at 3/8/2023 0819    Acceptance, E, NR by PB at 3/6/2023 1001    Acceptance, E,D, VU,NR by LR at 3/4/2023 0951   Family Eager, E, VU by PB at 3/9/2023 1049   Significant Other Eager, E, VU by PB at 3/9/2023 1049    Acceptance, E, VU,NR by PB at 3/8/2023 0819                               User Key     Initials Effective Dates Name Provider Type Discipline    AC 02/03/23 -  Ron Solis, OTR/L, CNT Occupational Therapist OT    LR 11/22/22 -  Kathleen Cavazos OT Occupational Therapist OT    PB 01/03/23 -  Clara Glaser OT Student OT Student OT              OT Recommendation and Plan     Plan of Care Review  Plan of Care Reviewed With: patient, family, significant other  Progress: improving  Outcome Evaluation: OT tx this date. A&Ox4 with s/o in room on arrival. Pt completed sup<>sit t/fs and scooted hips to EOB with supervision and vcs. Pt completed grooming tasks indep, EOB unsupported for ~20 minutes. Pt placed objects in his L hand and unscrewed caps with R hand without vcs. Pt provided with and educ on using tubing to build up handles on items for easier grasp in L UE. Pt able to sit up in bed unsupported for 3 minutes to WB through L forearm and  hand 2x each for 20+ seconds. Pt fatigued at end of session, educ on POC and progress made. Skilled OT to continue POC. Anticipated d/c to IP rehab to continue skilled therapy services.     Time Calculation:    Time Calculation- OT     Row Name 03/09/23 1134             Time Calculation- OT    OT Start Time 1049  -JJ (r) PB (t) JJ (c)      OT Stop Time 1127  -JJ (r) PB (t) JJ (c)      OT Time Calculation (min) 38 min  -JJ (r) PB (t)      Total Timed Code Minutes- OT 38 minute(s)  -JJ (r) PB (t) JJ (c)      OT Received On 03/09/23  -JJ (r) PB (t) JJ (c)            User Key  (r) = Recorded By, (t) = Taken By, (c) = Cosigned By    Initials Name Provider Type    Yudelka Parisi, TOMER/L, CSRS Occupational Therapist    Clara James, OT Student OT Student                       Clara Glaser OT Student  3/9/2023

## 2023-03-09 NOTE — PLAN OF CARE
Goal Outcome Evaluation:  Plan of Care Reviewed With: patient, family, significant other        Progress: improving  Outcome Evaluation: OT tx this date. A&Ox4 with s/o in room on arrival. Pt completed sup<>sit t/fs and scooted hips to EOB with supervision and vcs. Pt completed grooming tasks indep, EOB unsupported for ~20 minutes. Pt placed objects in his L hand and unscrewed caps with R hand without vcs. Pt provided with and educ on using tubing to build up handles on items for easier grasp in L UE. Pt able to sit up in bed unsupported for 3 minutes to WB through L forearm and hand 2x each for 20+ seconds. Pt fatigued at end of session, educ on POC and progress made. Skilled OT to continue POC. Anticipated d/c to IP rehab to continue skilled therapy services.

## 2023-03-09 NOTE — PROGRESS NOTES
[facility]  INPATIENT PROGRESS NOTE    Patient Name: James Taylor  Date of Admission: 3/4/2023  Today's Date: 03/09/23  Length of Stay: 5  Primary Care Physician: No primary care provider on file.    Subjective   Chief Complaint: Hemorrhagic stroke     HPI    Feeling better  No nausea.  Had bm yesterday    Neurosurgery ok with patient going to floor and is ready for rehab.       Review of Systems   All pertinent negatives and positives are as above. All other systems have been reviewed and are negative unless otherwise stated.     Objective    Temp:  [97.6 °F (36.4 °C)-98.3 °F (36.8 °C)] 98.3 °F (36.8 °C)  Heart Rate:  [56-79] 66  Resp:  [14-18] 16  BP: (124-169)/() 138/82  Physical Exam  Vitals and nursing note reviewed.   Constitutional:       General: He is not in acute distress.     Comments: Awake and conversant   HENT:      Head: Normocephalic and atraumatic.      Right Ear: External ear normal.      Left Ear: External ear normal.      Nose: Nose normal.      Mouth/Throat:      Pharynx: Oropharynx is clear.   Eyes:      Pupils: Pupils are equal, round, and reactive to light.   Cardiovascular:      Rate and Rhythm: Normal rate and regular rhythm.      Pulses: Normal pulses.   Pulmonary:      Effort: Pulmonary effort is normal.      Breath sounds: Normal breath sounds.   Abdominal:      General: Abdomen is flat. Bowel sounds are normal.      Tenderness: Tenderness: epigastrium.   Musculoskeletal:      Cervical back: No rigidity.      Right lower leg: No edema.      Left lower leg: No edema.      Comments: Moves all extremities  Remains weaker on left side.    Skin:     General: Skin is warm and dry.      Capillary Refill: Capillary refill takes less than 2 seconds.   Neurological:      Mental Status: He is alert.   Psychiatric:         Mood and Affect: Mood normal.             Results Review:  I have reviewed the labs, radiology results, and diagnostic  studies.    Laboratory Data:   Results from last 7 days   Lab Units 03/07/23  0802 03/04/23  0115   WBC 10*3/mm3 10.78 6.58   HEMOGLOBIN g/dL 11.5* 13.6   HEMATOCRIT % 32.4* 38.3   PLATELETS 10*3/mm3 62* 104*        Results from last 7 days   Lab Units 03/09/23  0824 03/08/23  0906 03/07/23  0802 03/04/23  0115   SODIUM mmol/L 134* 134* 135* 140   POTASSIUM mmol/L 3.7 3.2* 3.5 3.8   CHLORIDE mmol/L 99 99 101 102   CO2 mmol/L 24.0 25.0 25.0 26.0   BUN mg/dL 11 6 8 14   CREATININE mg/dL 0.85 0.71* 0.68* 1.13   CALCIUM mg/dL 9.4 9.1 8.9 9.2   BILIRUBIN mg/dL  --  1.0 1.5* 1.2   ALK PHOS U/L  --  110 117 190*   ALT (SGPT) U/L  --  60* 80* 180*   AST (SGOT) U/L  --  49* 80* 615*   GLUCOSE mg/dL 106* 119* 93 110*       Culture Data:   No results found for: BLOODCX, URINECX, WOUNDCX, MRSACX, RESPCX, STOOLCX    Radiology Data:   Imaging Results (Last 24 Hours)     ** No results found for the last 24 hours. **          I have reviewed the patient's current medications.     Assessment/Plan   Assessment  Active Hospital Problems    Diagnosis    • **Intracranial bleeding (HCC)    • Malignant hypertension    • Elevated transaminase level    • Alcohol abuse        Treatment Plan    Continue current medications  Follow BP   Neurosurgery attending service      Medical Decision Making  Number and Complexity of problems:   Malignant HTN acute high risk  ICH acute high risk  Elevated transaminases chronic moderate risk      Conditions and Status        Improving     MDM Data  External documents reviewed: none  Cardiac tracing (EKG, telemetry) interpretation: nsr  Radiology interpretation: reviewed  Labs reviewed: reviewed.  Any tests that were considered but not ordered: none     Decision rules/scores evaluated (example [id number], Wells, etc): none     Discussed with: patient      Care Planning  Shared decision making: nurse   Code status and discussions: full    Disposition  Social Determinants of Health that impact treatment or  disposition: none  I expect the patient to be discharged by primary team.     Electronically signed by Alejandra Serrano, 03/09/23, 10:29 CST.

## 2023-03-09 NOTE — PLAN OF CARE
Goal Outcome Evaluation:  Plan of Care Reviewed With: patient        Progress: improving  Outcome Evaluation: Pt agreeable to therapy w/ no complaints. Bed mob Mod Ind to EOB w/ HOB elevated. Pt able to scoot himself toward EOB. Pt does have a L laterla lean but is able to correct w/ cues. Sitting balance SBA. Performed AROM BLE x15. Stood CGA/SBA. Inital L lateral lean in standing but is able to get to neutral w/ cues. Amb 125'x4 w/ HHA on the L side. Pt wants to lean a little to the L during ambulation. Pt needed cues to increase heel strike on L foot and widen his EREN. Pt scissored his feet x3 times needing Min A to correct his LOB. Stopped to correct EREN and posture a couple times. Pt w/ no complaints or difficulty. Will cont POC.

## 2023-03-09 NOTE — PLAN OF CARE
Goal Outcome Evaluation:  Plan of Care Reviewed With: (P) patient, caregiver        Progress: (P) improving     Patient was seen on this date for swallow tx. Pt was asleep when ST entered room, girlfriend at b/s. Pt was easily aroused and more alert than previous treatment sessions. Pt had no c/o pain and reports that independence is becoming less difficult. Pt self-fed via regular utensils. PO trials of mech soft and thin liquids were tolerated well with no s/s of aspiration. Lt facial/labial weakness still present; however, ROM has improved and lingual function is almost WNL. It is recommended that pt continue current POC and diet recommendations. Pt still requires some assistance with feeding but is requiring minimal cues to masticate and maintain lip seal. ST will continue to treat. Contact if any acute changes occur.

## 2023-03-09 NOTE — THERAPY TREATMENT NOTE
Acute Care - Speech Language Pathology   Swallow Treatment Note [facility]     Patient Name: James Taylor  : 1980  MRN: [id number]  Today's Date: 3/9/2023               Admit Date: 3/4/2023    Visit Dx: Patient was seen on this date for swallow tx. Pt was asleep when ST entered room, girlfriend at b/s. Pt was easily aroused and more alert than previous treatment sessions. Pt had no c/o pain and reports that independence is becoming less difficult. Pt self-fed via regular utensils. PO trials of [facility] soft and thin liquids were tolerated well with no s/s of aspiration. Lt facial/labial weakness still present; however, ROM has improved and lingual function is almost WNL. It is recommended that pt continue current POC and diet recommendations. Pt still requires some assistance with feeding but is requiring minimal cues to masticate and maintain lip seal. ST will continue to treat. Contact if any acute changes occur.     Completed by Ronnie Marques, SLP student       ICD-10-CM ICD-9-CM   1. Intracranial bleeding (HCC)  I62.9 432.9   2. Facial droop  R29.810 781.94   3. Left arm weakness  R29.898 729.89   4. Acute nonintractable headache, unspecified headache type  R51.9 784.0   5. History of hypertension  Z86.79 V12.59   6. Cerebral edema (HCC)  G93.6 348.5   7. Midline shift of brain  R90.89 793.0   8. Dysphagia, unspecified type  R13.10 787.20   9. Impaired mobility  Z74.09 799.89   10. Decreased activities of daily living (ADL)  Z78.9 V49.89     Patient Active Problem List   Diagnosis    Intracranial bleeding (HCC)    Malignant hypertension    Elevated transaminase level    Alcohol abuse     Past Medical History:   Diagnosis Date    Asthma     Hypertension      History reviewed. No pertinent surgical history.    SLP Recommendation and Plan                                                                            Plan of Care Reviewed With: patient, caregiver  Progress: declining      SWALLOW EVALUATION (last 72  hours)       SLP Adult Swallow Evaluation       Row Name 03/09/23 0754 03/08/23 0807 03/07/23 0802             Rehab Evaluation    Document Type therapy note (daily note)  -MG (r) JH (t) MG (c) therapy note (daily note)  -MG (r) JH (t) MG (c) therapy note (daily note)  -MG (r) JH (t) MG (c)      Subjective Information no complaints  -MG (r) JH (t) MG (c) no complaints  -MG (r) JH (t) MG (c) no complaints  -MG (r) JH (t) MG (c)      Patient Observations cooperative;alert;agree to therapy  -MG (r) JH (t) MG (c) cooperative;agree to therapy;alert  -MG (r) JH (t) MG (c) alert;cooperative;agree to therapy  -MG (r) JH (t) MG (c)      Patient/Family/Caregiver Comments/Observations -- --  Girlfriend at b/s  -MG (r) JH (t) MG (c) --      Patient Effort good  -MG (r) JH (t) MG (c) good  -MG (r) JH (t) MG (c) good  -MG (r) JH (t) MG (c)      Comment -- --  Improved alertness  -MG (r) JH (t) MG (c) --  Lethargic but cooperative  -MG (r) JH (t) MG (c)      Symptoms Noted During/After Treatment none  -MG (r) JH (t) MG (c) none  -MG (r) JH (t) MG (c) none  -MG (r) JH (t) MG (c)         Pain    Additional Documentation Pain Scale: FACES Pre/Post-Treatment (Group)  -MG (r) JH (t) MG (c) Pain Scale: FACES Pre/Post-Treatment (Group)  -MG (r) JH (t) MG (c) Pain Scale: FACES Pre/Post-Treatment (Group)  -MG (r) JH (t) MG (c)         Pain Scale: Numbers Pre/Post-Treatment    Pretreatment Pain Rating -- -- 0/10 - no pain  -MG (r) JH (t) MG (c)      Posttreatment Pain Rating -- -- 0/10 - no pain  -MG (r) JH (t) MG (c)         Pain Scale: FACES Pre/Post-Treatment    Pain: FACES Scale, Pretreatment 0-->no hurt  -MG (r) JH (t) MG (c) 0-->no hurt  -MG (r) JH (t) MG (c) --      Posttreatment Pain Rating 0-->no hurt  -MG (r) JH (t) MG (c) 0-->no hurt  -MG (r) JH (t) MG (c) --         SLP Treatment Clinical Impressions    Treatment Assessment (SLP) improved  -MG (r) JH (t) MG (c) improved  -MG (r) JH (t) MG (c) improved  -MG (r) JH (t) MG (c)       Daily Summary of Progress (SLP) progress toward functional goals is good  -MG (r) JH (t) MG (c) progress toward functional goals as expected  -MG (r) JH (t) MG (c) progress toward functional goals is good  -MG (r) JH (t) MG (c)      Barriers to Overall Progress (SLP) Fatigue  -MG (r) JH (t) MG (c) Fatigue  -MG (r) JH (t) MG (c) Lethargy;Fatigue  -MG (r) JH (t) MG (c)      Plan for Continued Treatment (SLP) continue treatment per plan of care  -MG (r) JH (t) MG (c) continue treatment per plan of care  -MG (r) JH (t) MG (c) continue treatment per plan of care  -MG (r) JH (t) MG (c)      Care Plan Review evaluation/treatment results reviewed;care plan/treatment goals reviewed  -MG (r) JH (t) MG (c) evaluation/treatment results reviewed  -MG (r) JH (t) MG (c) evaluation/treatment results reviewed  -MG (r) JH (t) MG (c)      Care Plan Review, Other Participant(s) caregiver  -MG (r) JH (t) MG (c) caregiver  -MG (r) JH (t) MG (c) caregiver;significant other  -MG (r) JH (t) MG (c)         Swallow Goals (SLP)    Swallow LTGs Swallow Long Term Goal (free text)  -MG (r) JH (t) MG (c) Swallow Long Term Goal (free text)  -MG (r) JH (t) MG (c) Swallow Long Term Goal (free text)  -MG (r) JH (t) MG (c)      Swallow STGs diet tolerance goal selection (SLP);labial strengthening goal selection (SLP);lingual strengthening goal selection (SLP)  -MG (r) JH (t) MG (c) diet tolerance goal selection (SLP);labial strengthening goal selection (SLP);lingual strengthening goal selection (SLP)  -MG (r) JH (t) MG (c) diet tolerance goal selection (SLP);labial strengthening goal selection (SLP);lingual strengthening goal selection (SLP)  -MG (r) JH (t) MG (c)      Diet Tolerance Goal Selection (SLP) Patient will tolerate trials of  -MG (r) JH (t) MG (c) Patient will tolerate trials of  -MG (r) JH (t) MG (c) Patient will tolerate trials of  -MG (r) JH (t) MG (c)      Labial Strengthening Goal Selection (SLP) labial strengthening, SLP goal 1  -MG  (r) JH (t) MG (c) labial strengthening, SLP goal 1  -MG (r) JH (t) MG (c) labial strengthening, SLP goal 1  -MG (r) JH (t) MG (c)      Lingual Strengthening Goal Selection (SLP) lingual strengthening, SLP goal 1  -MG (r) JH (t) MG (c) lingual strengthening, SLP goal 1  -MG (r) JH (t) MG (c) lingual strengthening, SLP goal 1  -MG (r) JH (t) MG (c)         (LTG) Swallow    (LTG) Swallow Pt will tolerate LRD without overt s/s of aspiration.  -MG (r) JH (t) MG (c) Pt will tolerate LRD without overt s/s of aspiration.  -MG (r) JH (t) MG (c) Pt will tolerate LRD without overt s/s of aspiration.  -MG (r) JH (t) MG (c)      [place] (Swallow Long Term Goal) with minimal cues (75-90% accuracy)  -MG (r) JH (t) MG (c) with minimal cues (75-90% accuracy)  -MG (r) JH (t) MG (c) with minimal cues (75-90% accuracy)  -MG (r) JH (t) MG (c)      Time Frame (Swallow Long Term Goal) by discharge  -MG (r) JH (t) MG (c) by discharge  -MG (r) JH (t) MG (c) by discharge  -MG (r) JH (t) MG (c)      Barriers (Swallow Long Term Goal) L weakness  -MG (r) JH (t) MG (c) L weakness  -MG (r) JH (t) MG (c) L weakness  -MG (r) JH (t) MG (c)      Progress/Outcomes (Swallow Long Term Goal) good progress toward goal  -MG (r) JH (t) MG (c) good progress toward goal  -MG (r) JH (t) MG (c) good progress toward goal  -MG (r) JH (t) MG (c)         (STG) Patient will tolerate trials of    Consistencies Trialed (Tolerate trials) soft to chew (ground) textures;thin liquids;regular textures  -MG (r) JH (t) MG (c) soft to chew (ground) textures;thin liquids;regular textures  -MG (r) JH (t) MG (c) soft to chew (ground) textures;thin liquids;regular textures  -MG (r) JH (t) MG (c)      Desired Outcome (Tolerate trials) without signs/symptoms of aspiration;without signs of distress;with adequate oral prep/transit/clearance  -MG (r) JH (t) MG (c) without signs/symptoms of aspiration;without signs of distress;with adequate oral prep/transit/clearance  -MG (r) JH  (t) MG (c) without signs/symptoms of aspiration;without signs of distress;with adequate oral prep/transit/clearance  -MG (r) JH (t) MG (c)      [place] (Tolerate trials) with minimal cues (75-90% accuracy)  -MG (r) JH (t) MG (c) with minimal cues (75-90% accuracy)  -MG (r) JH (t) MG (c) with minimal cues (75-90% accuracy)  -MG (r) JH (t) MG (c)      Time Frame (Tolerate trials) by discharge  -MG (r) JH (t) MG (c) by discharge  -MG (r) JH (t) MG (c) by discharge  -MG (r) JH (t) MG (c)      Progress/Outcomes (Tolerate trials) good progress toward goal  -MG (r) JH (t) MG (c) good progress toward goal  -MG (r) JH (t) MG (c) good progress toward goal  -MG (r) JH (t) MG (c)         (STG) Labial Strengthening Goal 1 (SLP)    Activity (Labial Strengthening Goal 1, SLP) increase labial tone  -MG (r) JH (t) MG (c) increase labial tone  -MG (r) JH (t) MG (c) increase labial tone  -MG (r) JH (t) MG (c)      Increase Labial Tone labial resistance exercises  -MG (r) JH (t) MG (c) labial resistance exercises  -MG (r) JH (t) MG (c) labial resistance exercises  -MG (r) JH (t) MG (c)      [place]/Accuracy (Labial Strengthening Goal 1, SLP) with minimal cues (75-90% accuracy)  -MG (r) JH (t) MG (c) with minimal cues (75-90% accuracy)  -MG (r) JH (t) MG (c) with minimal cues (75-90% accuracy)  -MG (r) JH (t) MG (c)      Time Frame (Labial Strengthening Goal 1, SLP) by discharge  -MG (r) JH (t) MG (c) by discharge  -MG (r) JH (t) MG (c) by discharge  -MG (r) JH (t) MG (c)      Barriers (Labial Strengthening Goal 1, SLP) L weakness  -MG (r) JH (t) MG (c) L weakness  -MG (r) JH (t) MG (c) L weakness  -MG (r) JH (t) MG (c)      Progress/Outcomes (Labial Strengthening Goal 1, SLP) good progress toward goal  -MG (r) JH (t) MG (c) good progress toward goal  -MG (r) JH (t) MG (c) continuing progress toward goal  -MG         (STG) Lingual Strengthening Goal 1 (SLP)    Activity (Lingual Strengthening Goal 1, SLP) increase lingual  tone/sensation/control/coordination/movement;increase tongue back strength  -MG (r) JH (t) MG (c) increase lingual tone/sensation/control/coordination/movement;increase tongue back strength  -MG (r) JH (t) MG (c) increase lingual tone/sensation/control/coordination/movement;increase tongue back strength  -MG (r) JH (t) MG (c)      Increase Lingual Tone/Sensation/Control/Coordination/Movement lingual movement exercises  -MG (r) JH (t) MG (c) lingual movement exercises  -MG (r) JH (t) MG (c) lingual movement exercises  -MG (r) JH (t) MG (c)      Increase Tongue Back Strength lingual resistance exercises  -MG (r) JH (t) MG (c) lingual resistance exercises  -MG (r) JH (t) MG (c) lingual resistance exercises  -MG (r) JH (t) MG (c)      [place]/Accuracy (Lingual Strengthening Goal 1, SLP) with minimal cues (75-90% accuracy)  -MG (r) JH (t) MG (c) with minimal cues (75-90% accuracy)  -MG (r) JH (t) MG (c) with minimal cues (75-90% accuracy)  -MG (r) JH (t) MG (c)      Time Frame (Lingual Strengthening Goal 1, SLP) by discharge  -MG (r) JH (t) MG (c) by discharge  -MG (r) JH (t) MG (c) by discharge  -MG (r) JH (t) MG (c)      Barriers (Lingual Strengthening Goal 1, SLP) L weakness  -MG (r) JH (t) MG (c) L weakness  -MG (r) JH (t) MG (c) L weakness  -MG (r) JH (t) MG (c)      Progress/Outcomes (Lingual Strengthening Goal 1, SLP) good progress toward goal  -MG (r) JH (t) MG (c) good progress toward goal  -MG (r) JH (t) MG (c) continuing progress toward goal  -MG                User Key  (r) = Recorded By, (t) = Taken By, (c) = Cosigned By      Initials Name Effective Dates    Janeen Peacock, MS CCC-SLP 08/12/22 -     Ronnie Nevarez, Speech Therapy Student 12/12/22 -                     EDUCATION  The patient has been educated in the following areas:   Dysphagia (Swallowing Impairment) Modified Diet Instruction.        SLP GOALS       Row Name 03/09/23 0754 03/08/23 0807 03/07/23 0802       (LTG) Swallow    (LTG)  Swallow Pt will tolerate LRD without overt s/s of aspiration.  -MG (r) JH (t) MG (c) Pt will tolerate LRD without overt s/s of aspiration.  -MG (r) JH (t) MG (c) Pt will tolerate LRD without overt s/s of aspiration.  -MG (r) JH (t) MG (c)    [place] (Swallow Long Term Goal) with minimal cues (75-90% accuracy)  -MG (r) JH (t) MG (c) with minimal cues (75-90% accuracy)  -MG (r) JH (t) MG (c) with minimal cues (75-90% accuracy)  -MG (r) JH (t) MG (c)    Time Frame (Swallow Long Term Goal) by discharge  -MG (r) JH (t) MG (c) by discharge  -MG (r) JH (t) MG (c) by discharge  -MG (r) JH (t) MG (c)    Barriers (Swallow Long Term Goal) L weakness  -MG (r) JH (t) MG (c) L weakness  -MG (r) JH (t) MG (c) L weakness  -MG (r) JH (t) MG (c)    Progress/Outcomes (Swallow Long Term Goal) good progress toward goal  -MG (r) JH (t) MG (c) good progress toward goal  -MG (r) JH (t) MG (c) good progress toward goal  -MG (r) JH (t) MG (c)       (STG) Patient will tolerate trials of    Consistencies Trialed (Tolerate trials) soft to chew (ground) textures;thin liquids;regular textures  -MG (r) JH (t) MG (c) soft to chew (ground) textures;thin liquids;regular textures  -MG (r) JH (t) MG (c) soft to chew (ground) textures;thin liquids;regular textures  -MG (r) JH (t) MG (c)    Desired Outcome (Tolerate trials) without signs/symptoms of aspiration;without signs of distress;with adequate oral prep/transit/clearance  -MG (r) JH (t) MG (c) without signs/symptoms of aspiration;without signs of distress;with adequate oral prep/transit/clearance  -MG (r) JH (t) MG (c) without signs/symptoms of aspiration;without signs of distress;with adequate oral prep/transit/clearance  -MG (r) JH (t) MG (c)    [place] (Tolerate trials) with minimal cues (75-90% accuracy)  -MG (r) JH (t) MG (c) with minimal cues (75-90% accuracy)  -MG (r) JH (t) MG (c) with minimal cues (75-90% accuracy)  -MG (r) JH (t) MG (c)    Time Frame (Tolerate trials) by  discharge  -MG (r) JH (t) MG (c) by discharge  -MG (r) JH (t) MG (c) by discharge  -MG (r) JH (t) MG (c)    Progress/Outcomes (Tolerate trials) good progress toward goal  -MG (r) JH (t) MG (c) good progress toward goal  -MG (r) JH (t) MG (c) good progress toward goal  -MG (r) JH (t) MG (c)       (STG) Labial Strengthening Goal 1 (SLP)    Activity (Labial Strengthening Goal 1, SLP) increase labial tone  -MG (r) JH (t) MG (c) increase labial tone  -MG (r) JH (t) MG (c) increase labial tone  -MG (r) JH (t) MG (c)    Increase Labial Tone labial resistance exercises  -MG (r) JH (t) MG (c) labial resistance exercises  -MG (r) JH (t) MG (c) labial resistance exercises  -MG (r) JH (t) MG (c)    [place]/Accuracy (Labial Strengthening Goal 1, SLP) with minimal cues (75-90% accuracy)  -MG (r) JH (t) MG (c) with minimal cues (75-90% accuracy)  -MG (r) JH (t) MG (c) with minimal cues (75-90% accuracy)  -MG (r) JH (t) MG (c)    Time Frame (Labial Strengthening Goal 1, SLP) by discharge  -MG (r) JH (t) MG (c) by discharge  -MG (r) JH (t) MG (c) by discharge  -MG (r) JH (t) MG (c)    Barriers (Labial Strengthening Goal 1, SLP) L weakness  -MG (r) JH (t) MG (c) L weakness  -MG (r) JH (t) MG (c) L weakness  -MG (r) JH (t) MG (c)    Progress/Outcomes (Labial Strengthening Goal 1, SLP) good progress toward goal  -MG (r) JH (t) MG (c) good progress toward goal  -MG (r) JH (t) MG (c) continuing progress toward goal  -MG       (STG) Lingual Strengthening Goal 1 (SLP)    Activity (Lingual Strengthening Goal 1, SLP) increase lingual tone/sensation/control/coordination/movement;increase tongue back strength  -MG (r) JH (t) MG (c) increase lingual tone/sensation/control/coordination/movement;increase tongue back strength  -MG (r) JH (t) MG (c) increase lingual tone/sensation/control/coordination/movement;increase tongue back strength  -MG (r) JH (t) MG (c)    Increase Lingual Tone/Sensation/Control/Coordination/Movement lingual movement  exercises  -MG (r) JH (t) MG (c) lingual movement exercises  -MG (r) JH (t) MG (c) lingual movement exercises  -MG (r) JH (t) MG (c)    Increase Tongue Back Strength lingual resistance exercises  -MG (r) JH (t) MG (c) lingual resistance exercises  -MG (r) JH (t) MG (c) lingual resistance exercises  -MG (r) JH (t) MG (c)    [place]/Accuracy (Lingual Strengthening Goal 1, SLP) with minimal cues (75-90% accuracy)  -MG (r) JH (t) MG (c) with minimal cues (75-90% accuracy)  -MG (r) JH (t) MG (c) with minimal cues (75-90% accuracy)  -MG (r) JH (t) MG (c)    Time Frame (Lingual Strengthening Goal 1, SLP) by discharge  -MG (r) JH (t) MG (c) by discharge  -MG (r) JH (t) MG (c) by discharge  -MG (r) JH (t) MG (c)    Barriers (Lingual Strengthening Goal 1, SLP) L weakness  -MG (r) JH (t) MG (c) L weakness  -MG (r) JH (t) MG (c) L weakness  -MG (r) JH (t) MG (c)    Progress/Outcomes (Lingual Strengthening Goal 1, SLP) good progress toward goal  -MG (r) JH (t) MG (c) good progress toward goal  -MG (r) JH (t) MG (c) continuing progress toward goal  -MG              User Key  (r) = Recorded By, (t) = Taken By, (c) = Cosigned By      Initials Name Provider Type    MG Janeen Gunter, MS CCC-SLP Speech and Language Pathologist    Ronnie Nevarez, Speech Therapy Student SLP Student                       Time Calculation:    Time Calculation- SLP       Row Name 03/09/23 0846             Time Calculation- SLP    SLP Start Time 0754  -MG (r) JH (t) MG (c)      SLP Stop Time 0835  -MG (r) JH (t) MG (c)      SLP Time Calculation (min) 41 min  -MG (r) JH (t)      SLP Received On 03/09/23  -MG (r) JH (t) MG (c)         Untimed Charges    33575-VT Treatment Swallow Minutes 41  -MG         Total Minutes    Untimed Charges Total Minutes 41  -MG       Total Minutes 41  -MG                User Key  (r) = Recorded By, (t) = Taken By, (c) = Cosigned By      Initials Name Provider Type    Janeen Peacock MS CCC-SLP Speech and Language  Pathologist    Ronnie Nevarez, Speech Therapy Student SLP Student                    Therapy Charges for Today       Code Description Service Date Service Provider Modifiers Qty    81712408744 HC ST TREATMENT SWALLOW 2 3/8/2023 Janeen Gunter, MS CCC-SLP GN 1    [id number] HC ST TREATMENT SWALLOW 3 3/9/2023 Janeen Gunter, MS KRUEGER-SLP GN 1                 Janeen Gunter, MS KRUEGER-SLP  3/9/2023

## 2023-03-09 NOTE — DISCHARGE PLACEMENT REQUEST
"To: [facility]    From: James Forde (42 y.o. Male)     Date of Birth   1980    Social Security Number       Address   [address] [place] [address]  [facility] [address]    Home Phone   [phone]    MRN   [id number]       [employer]   [employer]    Marital Status   Single                            Admission Date   3/4/23    Admission Type   Emergency    Admitting Provider   Brandon Gannon MD    Attending Provider   Brandon Gannon MD    Department, Room/Bed   [facility] INTENSIVE CARE, I002/1       Discharge Date       Discharge Disposition       Discharge Destination                               Attending Provider: Brandon Gannon MD    Allergies: No Known Allergies    Isolation: None   Infection: None   Code Status: CPR    Ht: 177.8 cm (70\")   Wt: 67.5 kg (148 lb 13 oz)    Admission Cmt: None   Principal Problem: Intracranial bleeding (HCC) [I62.9]                 Active Insurance as of 3/4/2023     Primary Coverage     Payor Plan Insurance Group Employer/Plan Group    [place] [id number]     Payor Plan Address Payor Plan Phone Number Payor Plan Fax Number Effective Dates    [employer] [address] [address] [phone]  1/1/2021 - None Entered    [facility] [id number]       Subscriber Name Subscriber Birth Date Member ID       JAMES TAYLOR 1980 [id number]                 Emergency Contacts      (Rel.) Home Phone Work Phone Mobile Phone    Jesus Taylor (Mother) [phone] -- --    Alfredo Taylor (Brother) -- -- [phone]    LanceGuillermina (Sister) -- -- [phone]            Insurance Information                [place] Phone: [phone]    Subscriber: James Taylor Subscriber#: [id number]    Group#: [id number] Precert#: --          "

## 2023-03-09 NOTE — PLAN OF CARE
Pt remains in ICU alert and oriented. Neuro unchanged, still having left sided weakness. Blood pressure controlled with scheduled meds. Urine output 375mL with additional 2x incontinent episodes.

## 2023-03-09 NOTE — PLAN OF CARE
Goal Outcome Evaluation:  Plan of Care Reviewed With: patient, family        Progress: improving  Outcome Evaluation: Ntn follow up completed .Pt reports appetite is improving. Oral intake 94% of four meals. Mechanical,ground meats/Cardiac diet. Boost Plus BID, magic cup daily. Cont to follow for plan of care.

## 2023-03-09 NOTE — THERAPY TREATMENT NOTE
Acute Care - Physical Therapy Treatment Note  [facility]     Patient Name: James Taylor  : 1980  MRN: [id number]  Today's Date: 3/9/2023      Visit Dx:     ICD-10-CM ICD-9-CM   1. Intracranial bleeding (HCC)  I62.9 432.9   2. Facial droop  R29.810 781.94   3. Left arm weakness  R29.898 729.89   4. Acute nonintractable headache, unspecified headache type  R51.9 784.0   5. History of hypertension  Z86.79 V12.59   6. Cerebral edema (HCC)  G93.6 348.5   7. Midline shift of brain  R90.89 793.0   8. Dysphagia, unspecified type  R13.10 787.20   9. Impaired mobility  Z74.09 799.89   10. Decreased activities of daily living (ADL)  Z78.9 V49.89     Patient Active Problem List   Diagnosis   • Intracranial bleeding (HCC)   • Malignant hypertension   • Elevated transaminase level   • Alcohol abuse     Past Medical History:   Diagnosis Date   • Asthma    • Hypertension      History reviewed. No pertinent surgical history.  PT Assessment (last 12 hours)     PT Evaluation and Treatment     Row Name 23 1300          Physical Therapy Time and Intention    Subjective Information no complaints  -TB     Document Type therapy note (daily note)  -TB     Mode of Treatment physical therapy  -TB     Row Name 23 1300          General Information    Existing Precautions/Restrictions --  L sided weakness  -TB     Row Name 23 1300          Pain    Pretreatment Pain Rating 0/10 - no pain  -TB     Posttreatment Pain Rating 0/10 - no pain  -TB     Row Name 23 1300          Bed Mobility    Bed Mobility scooting/bridging;supine-sit;sit-supine  -TB     Scooting/Bridging [place] (Bed Mobility) modified independence  -TB     Supine-Sit [place] (Bed Mobility) modified independence  -TB     Sit-Supine [place] (Bed Mobility) modified independence  -TB     Assistive Device (Bed Mobility) head of bed elevated  -TB     Row Name 23 1300          Transfers    Transfers sit-stand transfer;stand-sit transfer  -TB      Row Name 03/09/23 1300          Sit-Stand Transfer    Sit-Stand [place] (Transfers) standby assist  -TB     Row Name 03/09/23 1300          Stand-Sit Transfer    Stand-Sit [place] (Transfers) standby assist  -TB     Row Name 03/09/23 1300          Gait/Stairs (Locomotion)    [place] Level (Gait) contact guard;standby assist  -TB     Assistive Device (Gait) other (see comments)  HHA  -TB     Distance in Feet (Gait) 125'x4  -TB     Deviations/Abnormal Patterns (Gait) left sided deviations;naren decreased;stride length decreased  -TB     Left Sided Gait Deviations heel strike decreased  -TB     Comment, (Gait/Stairs) Cues for posture  -TB     Row Name 03/09/23 1300          Balance    Balance Assessment sitting static balance;sitting dynamic balance  -TB     Static Sitting Balance standby assist  -TB     Dynamic Sitting Balance standby assist  -TB     Position, Sitting Balance unsupported;sitting edge of bed  -TB     Row Name 03/09/23 1300          Motor Skills    Comments, Therapeutic Exercise AROM BLE x15  -TB     Row Name 03/09/23 1300          Plan of Care Review    Plan of Care Reviewed With patient  -TB     Progress improving  -TB     Outcome Evaluation Pt agreeable to therapy w/ no complaints. Bed mob Mod Ind to EOB w/ HOB elevated. Pt able to scoot himself toward EOB. Pt does have a L laterla lean but is able to correct w/ cues. Sitting balance SBA. Performed AROM BLE x15. Stood CGA/SBA. Inital L lateral lean in standing but is able to get to neutral w/ cues. Amb 125'x4 w/ HHA on the L side. Pt wants to lean a little to the L during ambulation. Pt needed cues to increase heel strike on L foot and widen his EREN. Pt scissored his feet x3 times needing Min A to correct his LOB. Stopped to correct EREN and posture a couple times. Pt w/ no complaints or difficulty. Will cont POC.  -TB     Row Name 03/09/23 1300          Positioning and Restraints    Pre-Treatment Position in bed  -TB     Post  Treatment Position bed  -TB     In Bed fowlers;call light within reach;encouraged to call for assist;with family/caregiver;side rails up x2  -TB           User Key  (r) = Recorded By, (t) = Taken By, (c) = Cosigned By    Initials Name Provider Type    TB Saud Raza, LIBAN Physical Therapist Assistant                Physical Therapy Education     Title: PT OT SLP Therapies (In Progress)     Topic: Physical Therapy (In Progress)     Point: Mobility training (In Progress)     Learning Progress Summary           Patient Acceptance, E, NR by  at 3/7/2023 0943    Comment: Bed mobility    Acceptance, E,D, NR by  at 3/6/2023 0927    Comment: Being aware of left side of body    Acceptance, E,D, DU,VU by  at 3/4/2023 1127    Comment: benefits of PT and POC, call for A OOB                   Point: Precautions (Done)     Learning Progress Summary           Patient Acceptance, E,D, DU,VU by  at 3/4/2023 1127    Comment: benefits of PT and POC, call for A OOB                               User Key     Initials Effective Dates Name Provider Type Discipline     02/03/23 -  Swapnil Reid, PT Physical Therapist PT    IBETH 02/03/23 -  Mayra Peterson PTA Physical Therapist Assistant PT              PT Recommendation and Plan     Plan of Care Reviewed With: patient  Progress: improving  Outcome Evaluation: Pt agreeable to therapy w/ no complaints. Bed mob Mod Ind to EOB w/ HOB elevated. Pt able to scoot himself toward EOB. Pt does have a L laterla lean but is able to correct w/ cues. Sitting balance SBA. Performed AROM BLE x15. Stood CGA/SBA. Inital L lateral lean in standing but is able to get to neutral w/ cues. Amb 125'x4 w/ HHA on the L side. Pt wants to lean a little to the L during ambulation. Pt needed cues to increase heel strike on L foot and widen his EREN. Pt scissored his feet x3 times needing Min A to correct his LOB. Stopped to correct EREN and posture a couple times. Pt w/ no complaints or difficulty.  Will cont POC.   Outcome Measures     Row Name 03/09/23 1315 03/07/23 1410 03/07/23 0900       How much help from another person do you currently need...    Turning from your back to your side while in flat bed without using bedrails? 4  -TB -- 3  -IBETH    Moving from lying on back to sitting on the side of a flat bed without bedrails? 4  -TB -- 3  -IBETH    Moving to and from a bed to a chair (including a wheelchair)? 3  -TB -- 2  -IBETH    Standing up from a chair using your arms (e.g., wheelchair, bedside chair)? 4  -TB -- 2  -IBETH    Climbing 3-5 steps with a railing? 3  -TB -- 1  -IBETH    To walk in hospital room? 3  -TB -- 2  -IBETH    AM-PAC 6 Clicks Score (PT) 21  -TB -- 13  -IBETH       How much help from another is currently needed...    Putting on and taking off regular lower body clothing? -- 2  -AC --    Bathing (including washing, rinsing, and drying) -- 2  -AC --    Toileting (which includes using toilet bed pan or urinal) -- 2  -AC --    Putting on and taking off regular upper body clothing -- 3  -AC --    Taking care of personal grooming (such as brushing teeth) -- 3  -AC --    Eating meals -- 3  -AC --    AM-PAC 6 Clicks Score (OT) -- 15  -AC --       Functional Assessment    Outcome Measure Options AM-PAC 6 Clicks Basic Mobility (PT)  -TB AM-PAC 6 Clicks Daily Activity (OT)  -AC --          User Key  (r) = Recorded By, (t) = Taken By, (c) = Cosigned By    Initials Name Provider Type    AC Ron Solis, OTR/L, CNT Occupational Therapist    Mayra Valadez, PTA Physical Therapist Assistant    Saud Wilson, PTA Physical Therapist Assistant                 Time Calculation:    PT Charges     Row Name 03/09/23 1347             Time Calculation    Start Time 1315  -TB      Stop Time 1338  -TB      Time Calculation (min) 23 min  -TB      PT Received On 03/09/23  -TB         Time Calculation- PT    Total Timed Code Minutes- PT 23 minute(s)  -TB            User Key  (r) = Recorded By, (t) = Taken By,  (c) = Cosigned By    Initials Name Provider Type    TB Saud Raza, LIBAN Physical Therapist Assistant              Therapy Charges for Today     Code Description Service Date Service Provider Modifiers Qty    [id number] HC GAIT TRAINING EA 15 MIN 3/9/2023 Saud Raza PTA GP 2          PT G-Codes  Outcome Measure Options: AM-PAC 6 Clicks Basic Mobility (PT)  AM-PAC 6 Clicks Score (PT): 21  AM-PAC 6 Clicks Score (OT): (P) 17  Modified Craig Scale: 4 - Moderately severe disability.  Unable to walk without assistance, and unable to attend to own bodily needs without assistance.    Saud Raza PTA  3/9/2023

## 2023-03-10 ENCOUNTER — HOSPITAL ENCOUNTER (INPATIENT)
Age: 43
LOS: 7 days | Discharge: HOME | End: 2023-03-17
Attending: PSYCHIATRY & NEUROLOGY | Admitting: PSYCHIATRY & NEUROLOGY
Payer: MEDICAID

## 2023-03-10 ENCOUNTER — APPOINTMENT (OUTPATIENT)
Dept: CT IMAGING | Facility: HOSPITAL | Age: 43
DRG: 64 | End: 2023-03-10
Payer: MEDICAID

## 2023-03-10 VITALS
OXYGEN SATURATION: 99 % | TEMPERATURE: 98 F | HEART RATE: 57 BPM | BODY MASS INDEX: 21.33 KG/M2 | RESPIRATION RATE: 17 BRPM | HEIGHT: 70 IN | SYSTOLIC BLOOD PRESSURE: 113 MMHG | WEIGHT: 149 LBS | DIASTOLIC BLOOD PRESSURE: 65 MMHG

## 2023-03-10 DIAGNOSIS — I61.0 NONTRAUMATIC SUBCORTICAL HEMORRHAGE OF CEREBRAL HEMISPHERE, UNSPECIFIED LATERALITY (HCC): Primary | ICD-10-CM

## 2023-03-10 PROBLEM — I61.9 INTRACEREBRAL HEMORRHAGE, NONTRAUMATIC (HCC): Status: ACTIVE | Noted: 2023-03-10

## 2023-03-10 LAB
BASOPHILS ABSOLUTE: 0.1 K/UL (ref 0–0.2)
BASOPHILS RELATIVE PERCENT: 0.8 % (ref 0–1)
EOSINOPHILS ABSOLUTE: 0.2 K/UL (ref 0–0.6)
EOSINOPHILS RELATIVE PERCENT: 2.8 % (ref 0–5)
HCT VFR BLD CALC: 30.4 % (ref 42–52)
HEMOGLOBIN: 10.8 G/DL (ref 14–18)
IMMATURE GRANULOCYTES #: 0.1 K/UL
LYMPHOCYTES ABSOLUTE: 2.9 K/UL (ref 1.1–4.5)
LYMPHOCYTES RELATIVE PERCENT: 33.7 % (ref 20–40)
MCH RBC QN AUTO: 31.9 PG (ref 27–31)
MCHC RBC AUTO-ENTMCNC: 35.5 G/DL (ref 33–37)
MCV RBC AUTO: 89.7 FL (ref 80–94)
MONOCYTES ABSOLUTE: 1 K/UL (ref 0–0.9)
MONOCYTES RELATIVE PERCENT: 12 % (ref 0–10)
NEUTROPHILS ABSOLUTE: 4.3 K/UL (ref 1.5–7.5)
NEUTROPHILS RELATIVE PERCENT: 50 % (ref 50–65)
PDW BLD-RTO: 12.6 % (ref 11.5–14.5)
PLATELET # BLD: 120 K/UL (ref 130–400)
PMV BLD AUTO: 11.3 FL (ref 9.4–12.4)
PREALBUMIN: 14 MG/DL (ref 20–40)
RBC # BLD: 3.39 M/UL (ref 4.7–6.1)
SARS-COV-2 RNA RESP QL NAA+PROBE: NOT DETECTED
WBC # BLD: 8.6 K/UL (ref 4.8–10.8)

## 2023-03-10 PROCEDURE — 97530 THERAPEUTIC ACTIVITIES: CPT

## 2023-03-10 PROCEDURE — 1180000000 HC REHAB R&B

## 2023-03-10 PROCEDURE — 97116 GAIT TRAINING THERAPY: CPT

## 2023-03-10 PROCEDURE — 25010000002 SODIUM CHLORIDE 0.9 % WITH KCL 20 MEQ 20-0.9 MEQ/L-% SOLUTION: Performed by: NURSE PRACTITIONER

## 2023-03-10 PROCEDURE — 85025 COMPLETE CBC W/AUTO DIFF WBC: CPT

## 2023-03-10 PROCEDURE — 87635 SARS-COV-2 COVID-19 AMP PRB: CPT | Performed by: NEUROLOGICAL SURGERY

## 2023-03-10 PROCEDURE — 99239 HOSP IP/OBS DSCHRG MGMT >30: CPT | Performed by: NURSE PRACTITIONER

## 2023-03-10 PROCEDURE — 6370000000 HC RX 637 (ALT 250 FOR IP): Performed by: PSYCHIATRY & NEUROLOGY

## 2023-03-10 PROCEDURE — 94150 VITAL CAPACITY TEST: CPT

## 2023-03-10 PROCEDURE — 84134 ASSAY OF PREALBUMIN: CPT

## 2023-03-10 PROCEDURE — 70450 CT HEAD/BRAIN W/O DYE: CPT

## 2023-03-10 PROCEDURE — 36415 COLL VENOUS BLD VENIPUNCTURE: CPT

## 2023-03-10 PROCEDURE — 97110 THERAPEUTIC EXERCISES: CPT

## 2023-03-10 PROCEDURE — 92526 ORAL FUNCTION THERAPY: CPT

## 2023-03-10 RX ORDER — HYDRALAZINE HYDROCHLORIDE 25 MG/1
25 TABLET, FILM COATED ORAL EVERY 8 HOURS
Status: DISCONTINUED | OUTPATIENT
Start: 2023-03-10 | End: 2023-03-17 | Stop reason: HOSPADM

## 2023-03-10 RX ORDER — NICOTINE 21 MG/24HR
1 PATCH, TRANSDERMAL 24 HOURS TRANSDERMAL EVERY 24 HOURS
Status: DISCONTINUED | OUTPATIENT
Start: 2023-03-11 | End: 2023-03-17 | Stop reason: HOSPADM

## 2023-03-10 RX ORDER — NICOTINE 21 MG/24HR
1 PATCH, TRANSDERMAL 24 HOURS TRANSDERMAL EVERY 24 HOURS
Start: 2023-03-10 | End: 2023-03-22

## 2023-03-10 RX ORDER — PANTOPRAZOLE SODIUM 40 MG/1
40 TABLET, DELAYED RELEASE ORAL
Start: 2023-03-10 | End: 2023-03-22

## 2023-03-10 RX ORDER — TERAZOSIN 5 MG/1
5 CAPSULE ORAL NIGHTLY
Status: ON HOLD | COMMUNITY
End: 2023-03-17 | Stop reason: SDUPTHER

## 2023-03-10 RX ORDER — ERGOCALCIFEROL 1.25 MG/1
50000 CAPSULE ORAL WEEKLY
Status: DISCONTINUED | OUTPATIENT
Start: 2023-03-13 | End: 2023-03-17 | Stop reason: HOSPADM

## 2023-03-10 RX ORDER — METOPROLOL TARTRATE 50 MG/1
50 TABLET, FILM COATED ORAL EVERY 12 HOURS SCHEDULED
Start: 2023-03-10

## 2023-03-10 RX ORDER — LISINOPRIL 10 MG/1
10 TABLET ORAL NIGHTLY
Status: ON HOLD | COMMUNITY
End: 2023-03-17 | Stop reason: SDUPTHER

## 2023-03-10 RX ORDER — METOPROLOL TARTRATE 50 MG/1
50 TABLET, FILM COATED ORAL EVERY 12 HOURS
Status: ON HOLD | COMMUNITY
End: 2023-03-17 | Stop reason: SDUPTHER

## 2023-03-10 RX ORDER — AMLODIPINE BESYLATE 10 MG/1
10 TABLET ORAL DAILY
Start: 2023-03-11

## 2023-03-10 RX ORDER — SENNA AND DOCUSATE SODIUM 50; 8.6 MG/1; MG/1
1 TABLET, FILM COATED ORAL 2 TIMES DAILY
Status: DISCONTINUED | OUTPATIENT
Start: 2023-03-10 | End: 2023-03-17 | Stop reason: HOSPADM

## 2023-03-10 RX ORDER — HYDRALAZINE HYDROCHLORIDE 25 MG/1
25 TABLET, FILM COATED ORAL EVERY 8 HOURS
Status: ON HOLD | COMMUNITY
End: 2023-03-17 | Stop reason: SDUPTHER

## 2023-03-10 RX ORDER — PANTOPRAZOLE SODIUM 40 MG/1
40 TABLET, DELAYED RELEASE ORAL
Status: ON HOLD | COMMUNITY
End: 2023-03-17 | Stop reason: HOSPADM

## 2023-03-10 RX ORDER — PANTOPRAZOLE SODIUM 40 MG/1
40 TABLET, DELAYED RELEASE ORAL
Status: DISCONTINUED | OUTPATIENT
Start: 2023-03-11 | End: 2023-03-17 | Stop reason: HOSPADM

## 2023-03-10 RX ORDER — TERAZOSIN 5 MG/1
5 CAPSULE ORAL NIGHTLY
Start: 2023-03-10

## 2023-03-10 RX ORDER — BUSPIRONE HYDROCHLORIDE 5 MG/1
5 TABLET ORAL 3 TIMES DAILY PRN
Status: ON HOLD | COMMUNITY
End: 2023-03-17 | Stop reason: HOSPADM

## 2023-03-10 RX ORDER — DOXAZOSIN MESYLATE 4 MG/1
4 TABLET ORAL DAILY
Status: DISCONTINUED | OUTPATIENT
Start: 2023-03-10 | End: 2023-03-17 | Stop reason: HOSPADM

## 2023-03-10 RX ORDER — BUSPIRONE HYDROCHLORIDE 5 MG/1
5 TABLET ORAL 3 TIMES DAILY PRN
Status: DISCONTINUED | OUTPATIENT
Start: 2023-03-10 | End: 2023-03-17 | Stop reason: HOSPADM

## 2023-03-10 RX ORDER — ACETAMINOPHEN 325 MG/1
650 TABLET ORAL EVERY 4 HOURS PRN
Status: DISCONTINUED | OUTPATIENT
Start: 2023-03-10 | End: 2023-03-17 | Stop reason: HOSPADM

## 2023-03-10 RX ORDER — METOPROLOL TARTRATE 50 MG/1
50 TABLET, FILM COATED ORAL EVERY 12 HOURS
Status: DISCONTINUED | OUTPATIENT
Start: 2023-03-10 | End: 2023-03-17 | Stop reason: HOSPADM

## 2023-03-10 RX ORDER — AMLODIPINE BESYLATE 10 MG/1
10 TABLET ORAL DAILY
Status: ON HOLD | COMMUNITY
Start: 2023-03-11 | End: 2023-03-17 | Stop reason: SDUPTHER

## 2023-03-10 RX ORDER — NICOTINE 21 MG/24HR
1 PATCH, TRANSDERMAL 24 HOURS TRANSDERMAL EVERY 24 HOURS
Status: ON HOLD | COMMUNITY
Start: 2023-03-11 | End: 2023-03-17 | Stop reason: HOSPADM

## 2023-03-10 RX ORDER — LISINOPRIL 10 MG/1
10 TABLET ORAL NIGHTLY
Status: DISCONTINUED | OUTPATIENT
Start: 2023-03-10 | End: 2023-03-17 | Stop reason: HOSPADM

## 2023-03-10 RX ORDER — BISACODYL 10 MG
10 SUPPOSITORY, RECTAL RECTAL DAILY PRN
Status: DISCONTINUED | OUTPATIENT
Start: 2023-03-10 | End: 2023-03-17 | Stop reason: HOSPADM

## 2023-03-10 RX ORDER — AMLODIPINE BESYLATE 10 MG/1
10 TABLET ORAL DAILY
Status: DISCONTINUED | OUTPATIENT
Start: 2023-03-11 | End: 2023-03-17 | Stop reason: HOSPADM

## 2023-03-10 RX ORDER — LISINOPRIL 10 MG/1
10 TABLET ORAL NIGHTLY
Start: 2023-03-10

## 2023-03-10 RX ORDER — CHOLECALCIFEROL (VITAMIN D3) 1250 MCG
CAPSULE ORAL WEEKLY
Status: ON HOLD | COMMUNITY
Start: 2023-03-13 | End: 2023-03-17 | Stop reason: HOSPADM

## 2023-03-10 RX ORDER — HYDRALAZINE HYDROCHLORIDE 25 MG/1
25 TABLET, FILM COATED ORAL EVERY 8 HOURS SCHEDULED
Start: 2023-03-10 | End: 2023-03-22 | Stop reason: SDUPTHER

## 2023-03-10 RX ADMIN — METOPROLOL TARTRATE 50 MG: 50 TABLET, FILM COATED ORAL at 21:23

## 2023-03-10 RX ADMIN — METOPROLOL TARTRATE 50 MG: 50 TABLET, FILM COATED ORAL at 08:46

## 2023-03-10 RX ADMIN — LISINOPRIL 10 MG: 10 TABLET ORAL at 21:23

## 2023-03-10 RX ADMIN — HYDRALAZINE HYDROCHLORIDE 25 MG: 25 TABLET, FILM COATED ORAL at 21:23

## 2023-03-10 RX ADMIN — HYDRALAZINE HYDROCHLORIDE 25 MG: 25 TABLET, FILM COATED ORAL at 15:01

## 2023-03-10 RX ADMIN — HYDRALAZINE HYDROCHLORIDE 25 MG: 25 TABLET, FILM COATED ORAL at 06:46

## 2023-03-10 RX ADMIN — Medication 10 ML: at 08:47

## 2023-03-10 RX ADMIN — POTASSIUM CHLORIDE AND SODIUM CHLORIDE 75 ML/HR: 900; 150 INJECTION, SOLUTION INTRAVENOUS at 11:14

## 2023-03-10 RX ADMIN — SENNOSIDES AND DOCUSATE SODIUM 1 TABLET: 50; 8.6 TABLET ORAL at 21:23

## 2023-03-10 RX ADMIN — AMLODIPINE BESYLATE 10 MG: 10 TABLET ORAL at 08:46

## 2023-03-10 RX ADMIN — PANTOPRAZOLE SODIUM 40 MG: 40 TABLET, DELAYED RELEASE ORAL at 06:46

## 2023-03-10 NOTE — PLAN OF CARE
Goal Outcome Evaluation:  Plan of Care Reviewed With: patient      Intro self to pt, explained poc. Pt a/o x 4, voices understanding. NIH completed at bedside with night shift nurse at shift change. Pt scored 2. Encouraged pt to use call light for any assist. Pt voices understanding. No falls or injuries at this time. Pt uses urinal and is able to ambulate w/o difficulty. Pt denies pain at this time. Encouraged pt to report pain to nursing staff. No difficulty breathing, respirations e/u. No issues swallowing. PRN's for elevated bp.

## 2023-03-10 NOTE — CASE MANAGEMENT/SOCIAL WORK
Continued Stay Note  [facility]     Patient Name: James Taylor  MRN: [id number]  Today's Date: 3/10/2023    Admit Date: 3/4/2023    Plan: [facility]   Discharge Plan     Row Name 03/10/23 1352       Plan    Plan [facility]    Final Discharge Disposition Code 62 - inpatient rehab facility    Final Note Insurance has approved admission to [facility]. SW notified NAM Wiggins. Discharge summary and covid swab fax to [phone]. RN report [phone]. Patient will go to room 814.             LOI Alvarez

## 2023-03-10 NOTE — PLAN OF CARE
Goal Outcome Evaluation:  Plan of Care Reviewed With: patient        Progress: no change  Outcome Evaluation: A&Ox4. VSS. No c/o pain this shift. Denies n/t. IVF maintained. NIH=2. Up with standby assist to bathroom. Voids without difficulty. Appears to be resting well this shift. Call light in reach, safety maintained.

## 2023-03-10 NOTE — PLAN OF CARE
Goal Outcome Evaluation:  Plan of Care Reviewed With: patient, significant other        Progress: improving  Outcome Evaluation: OT tx complete. Pt A&Ox4 w/ no c/o, spouse & son in room. Pt sitting EOB eating lunch upon arrival. Pt completed LUE strengthening, balance & coordination exercises. Pt sit<>stand and amb in room for dynamic balance activities GCA. Pt demos fair standing balance, continues to demo decreased grasp,  strength, & coordination in LUE. Pt is gradually progressing toward therapy goals and is motivated for therapy despite flat affect. Pt is a good candidate for IRF.

## 2023-03-10 NOTE — PLAN OF CARE
Goal Outcome Evaluation:  Plan of Care Reviewed With: patient        Progress: improving  Outcome Evaluation: see note

## 2023-03-10 NOTE — DISCHARGE SUMMARY
Date of Discharge:  3/10/2023    Discharge Diagnosis: Hemorrhagic CVA    Presenting Problem/History of Present Illness  Intracranial bleeding (HCC) [I62.9]  Intracranial bleeding (HCC) [I62.9]       Hospital Course  Patient is a 42 y.o. male presented with trouble with speaking and with weakness on the left side.  He is brought to the emergency room where he was found to have a right basal ganglia hemorrhagic CVA.  The patient was also found to be hypertensive.  He been working with PCP in regards to his hypertension.  He was also complaining of a headache.  He denies any nausea vomiting.  The patient did go through serial imaging which did show stable appearance of the hemorrhage.  He did appear to go through alcohol withdrawal as well.  Hospitalist was consulted to follow the patient for his hypertension as well as alcohol withdrawal management.  The patient did have significant weakness in the left upper and lower extremity.  He has been working with physical and Occupational Therapy and has been making improvement.  His blood pressure is under better control.  It is felt that this on the patient is stable and suitable to be discharged to [facility] rehab for further rehabilitation to help him get over the effects of the hemorrhagic CVA.  It is recommended that he keep his blood pressure less than 150.  We will follow-up with him in 3 weeks with repeat CT scan of the head.  He will need to follow-up with his primary care doctor post hospital stay as well..      Procedures Performed         Consults:   Consults     Date and Time Order Name Status Description    3/4/2023  7:21 AM Inpatient Hospitalist Consult              Condition on Discharge: Stable    Vital Signs  Temp:  [97.4 °F (36.3 °C)-98.6 °F (37 °C)] 98 °F (36.7 °C)  Heart Rate:  [57-77] 57  Resp:  [16-20] 17  BP: (113-147)/(65-94) 113/65    Physical Exam:   Physical Exam  Vitals and nursing note reviewed.   HENT:      Head: Normocephalic.      Nose: Nose  normal.      Mouth/Throat:      Mouth: Mucous membranes are moist.   Eyes:      Extraocular Movements: EOM normal.      Pupils: Pupils are equal, round, and reactive to light.   Cardiovascular:      Rate and Rhythm: Normal rate and regular rhythm.   Pulmonary:      Effort: Pulmonary effort is normal. No respiratory distress.   Abdominal:      General: Bowel sounds are normal. There is no distension.      Palpations: Abdomen is soft.   Skin:     General: Skin is warm and dry.   Neurological:      Mental Status: He is alert and oriented to person, place, and time.      Cranial Nerves: Cranial nerve deficit present.      Motor: Weakness present.   Psychiatric:         Mood and Affect: Affect is flat.         Speech: Speech is delayed.         Behavior: Behavior is slowed.         Judgment: Judgment is not impulsive.          Neurologic Exam     Mental Status   Oriented to person, place, and time.   Attention: normal. Concentration: normal.   Level of consciousness: arousable by verbal stimuli ,  alert  Normal comprehension.     Cranial Nerves     CN II   Visual fields full to confrontation.     CN III, IV, VI   Pupils are equal, round, and reactive to light.  Extraocular motions are normal.     CN V   Facial sensation intact.     CN VII   Right facial weakness: none  Left facial weakness: central    CN VIII   CN VIII normal.     CN IX, X   CN IX normal.   CN X normal.     CN XI   CN XI normal.     CN XII   Tongue: not atrophic  Tongue deviation: left    Motor Exam   Muscle bulk: normal    Strength   Strength 5/5 except as noted. Left hemiparesis with upper and lower extremity being 4 out of 5 in all muscle groups with ataxic movements    Right upper and lower extremity 5 out of 5 in all muscle groups     Sensory Exam   Light touch normal.     Gait, Coordination, and Reflexes Ambulating with assistance          Discharge Disposition  Rehab Facility or Unit (DC - External)    Discharge Medications     Discharge  Medications      New Medications      Instructions Start Date   hydrALAZINE 25 MG tablet  Commonly known as: APRESOLINE   25 mg, Oral, Every 8 Hours Scheduled      metoprolol tartrate 50 MG tablet  Commonly known as: LOPRESSOR   50 mg, Oral, Every 12 Hours Scheduled      nicotine 21 MG/24HR patch  Commonly known as: NICODERM CQ   1 patch, Transdermal, Every 24 Hours      pantoprazole 40 MG EC tablet  Commonly known as: PROTONIX   40 mg, Oral, 2 Times Daily Before Meals      terazosin 5 MG capsule  Commonly known as: HYTRIN   5 mg, Oral, Nightly         Changes to Medications      Instructions Start Date   amLODIPine 10 MG tablet  Commonly known as: NORVASC  What changed: additional instructions   10 mg, Oral, Daily   Start Date: March 11, 2023     lisinopril 10 MG tablet  Commonly known as: PRINIVILZESTRIL  What changed:   · medication strength  · how much to take  · when to take this  · additional instructions   10 mg, Oral, Nightly         Continue These Medications      Instructions Start Date   busPIRone 5 MG tablet  Commonly known as: BUSPAR   5 mg, Oral, 3 Times Daily PRN      sertraline 50 MG tablet  Commonly known as: Zoloft   50 mg, Oral, Daily, To help with mood. Take with food      vitamin D 1.25 MG (99810 UT) capsule capsule  Commonly known as: ERGOCALCIFEROL   50,000 Units, Oral, Weekly, To treat low vitamin D. Take with food.             Discharge Diet:   Diet Instructions     Diet: Regular; Thin      Discharge Diet: Regular    Fluid Consistency: Thin          Activity at Discharge:   Activity Instructions     Other Activity Restrictions      Type of Restriction: Other    Explain Other Restrictions: No driving.  No strenuous activity    Other Instructions (Specify)      Activity Instructions: No strenuous activity, no driving          Follow-up Appointments  Future Appointments   Date Time Provider Department Center   4/11/2023  2:00 PM Yudelka Taylor APRN [facility]     Additional  Instructions for the Follow-ups that You Need to Schedule     Call MD With Problems / Concerns   As directed      Instructions: Worsening headache, drainage from wound, fever, confusion    Order Comments: Instructions: Worsening headache, drainage from wound, fever, confusion          Discharge Follow-up with Specialty: jelly boothe np; 3 Weeks   As directed      Specialty: jelly boothe np    Follow Up: 3 Weeks    Follow Up Details: CT scan to be done on day of appointment with Jelly         CT Head Without Contrast   Mar 15, 2023      To be done on day of appointment with Jelly Boothe in 3 weeks    To be done on day of appointment with Jelly Boothe in 3 weeks    Order Comments: To be done on day of appointment with Jelly Boothe in 3 weeks     STEREOTACTIC GUIDANCE PROTOCOL?: No               Test Results Pending at Discharge       Jelly Boothe, NAM  03/10/23  14:14 CST    Time: Discharge 35 min

## 2023-03-10 NOTE — THERAPY TREATMENT NOTE
Acute Care - Physical Therapy Treatment Note  [facility]     Patient Name: James Taylor  : 1980  MRN: [id number]  Today's Date: 3/10/2023      Visit Dx:     ICD-10-CM ICD-9-CM   1. Intracranial bleeding (HCC)  I62.9 432.9   2. Facial droop  R29.810 781.94   3. Left arm weakness  R29.898 729.89   4. Acute nonintractable headache, unspecified headache type  R51.9 784.0   5. History of hypertension  Z86.79 V12.59   6. Cerebral edema (HCC)  G93.6 348.5   7. Midline shift of brain  R90.89 793.0   8. Dysphagia, unspecified type  R13.10 787.20   9. Impaired mobility  Z74.09 799.89   10. Decreased activities of daily living (ADL)  Z78.9 V49.89     Patient Active Problem List   Diagnosis   • Intracranial bleeding (HCC)   • Malignant hypertension   • Elevated transaminase level   • Alcohol abuse     Past Medical History:   Diagnosis Date   • Asthma    • Hypertension      History reviewed. No pertinent surgical history.  PT Assessment (last 12 hours)     PT Evaluation and Treatment     Row Name 03/10/23 1440 03/10/23 0850       Physical Therapy Time and Intention    Subjective Information no complaints  -TB no complaints  -TB    Document Type therapy note (daily note)  -TB therapy note (daily note)  -TB    Mode of Treatment physical therapy  -TB physical therapy  -TB    Comment -- L sided weakness  -TB    Row Name 03/10/23 1440          General Information    Existing Precautions/Restrictions seizures;fall  -TB     Row Name 03/10/23 1440 03/10/23 0850       Pain    Pretreatment Pain Rating 0/10 - no pain  -TB 0/10 - no pain  -TB    Posttreatment Pain Rating 0/10 - no pain  -TB 0/10 - no pain  -TB    Row Name 03/10/23 1440 03/10/23 0850       Bed Mobility    Bed Mobility sit-supine  -TB scooting/bridging;supine-sit  -TB    Scooting/Bridging [place] (Bed Mobility) -- modified independence  -TB    Supine-Sit [place] (Bed Mobility) -- modified independence  -TB    Sit-Supine [place] (Bed Mobility) modified  independence  -TB --    Assistive Device (Bed Mobility) head of bed elevated  -TB head of bed elevated  -TB    Row Name 03/10/23 1440 03/10/23 0850       Transfers    Transfers sit-stand transfer;stand-sit transfer;car transfer  -TB sit-stand transfer;stand-sit transfer  -TB    Row Name 03/10/23 1440 03/10/23 0850       Sit-Stand Transfer    Sit-Stand [place] (Transfers) standby assist  -TB standby assist  -TB    Row Name 03/10/23 1440 03/10/23 0850       Stand-Sit Transfer    Stand-Sit [place] (Transfers) standby assist  -TB standby assist  -TB    Row Name 03/10/23 1440          Car Transfer    Type (Car Transfer) sit-stand;stand-sit  -TB     [place] Level (Car Transfer) independent  -TB     Row Name 03/10/23 1440 03/10/23 0850       Gait/Stairs (Locomotion)    [place] Level (Gait) contact guard;standby assist  -TB contact guard;standby assist  -TB    Distance in Feet (Gait) 400'  -'  -TB    Deviations/Abnormal Patterns (Gait) -- left sided deviations;naren decreased;stride length decreased  -TB    Left Sided Gait Deviations -- heel strike decreased  -TB    Row Name 03/10/23 1440 03/10/23 0850       Balance    Balance Assessment -- sitting static balance;sitting dynamic balance  -TB    Static Sitting Balance -- modified independence  -TB    Dynamic Sitting Balance -- modified independence  -TB    Position, Sitting Balance -- sitting edge of bed  -TB    Comment, Balance Single leg stance, marching, tandem stance  -TB --    Row Name 03/10/23 0850          Motor Skills    Therapeutic Exercise --  AROM BLE x15  -TB     Row Name 03/10/23 0850          Plan of Care Review    Plan of Care Reviewed With patient  -TB     Progress improving  -TB     Outcome Evaluation Pt w/ no complaints. [facility]ious to [facility] therapy. Bed mob Mod Ind w/ HOB elevated. Seated balance Mod Ind. Able to perform AROM BLE x15 w/o LOB. Stood SBA. Amb 350' w/ CGA/SBA. Cues needed to widen EREN  and increase hip/knee flexion on L side to clear his foot. Pts balance during ambulation has improved since previous tx. Encouraged contiued use of LUE to regain strength/function.  -TB     Row Name 03/10/23 1440 03/10/23 0850       Positioning and Restraints    Pre-Treatment Position other (comment)  -TB in bed  -TB    Post Treatment Position bed  -TB bed  -TB    In Bed sitting EOB;with family/caregiver  -TB sitting EOB;call light within reach;encouraged to call for assist;with family/caregiver  -TB          User Key  (r) = Recorded By, (t) = Taken By, (c) = Cosigned By    Initials Name Provider Type    TB Saud Raza, PTA Physical Therapist Assistant                Physical Therapy Education     Title: PT OT SLP Therapies (In Progress)     Topic: Physical Therapy (In Progress)     Point: Mobility training (In Progress)     Learning Progress Summary           Patient Acceptance, E, NR by  at 3/7/2023 0943    Comment: Bed mobility    Acceptance, E,D, NR by  at 3/6/2023 0927    Comment: Being aware of left side of body    Acceptance, E,D, DU,VU by  at 3/4/2023 1127    Comment: benefits of PT and POC, call for A OOB                   Point: Precautions (Done)     Learning Progress Summary           Patient Acceptance, E,D, DU,VU by  at 3/4/2023 1127    Comment: benefits of PT and POC, call for A OOB                               User Key     Initials Effective Dates Name Provider Type Discipline     02/03/23 -  Swapnil Reid, PT Physical Therapist PT    IBETH 02/03/23 -  Mayra Peterson PTA Physical Therapist Assistant PT              PT Recommendation and Plan     Plan of Care Reviewed With: patient  Progress: improving  Outcome Evaluation: Pt w/ no complaints. Anxious to transfer to [facility] for continued therapy. Bed mob Mod Ind w/ HOB elevated. Seated balance Mod Ind. Able to perform AROM BLE x15 w/o LOB. Stood SBA. Amb 350' w/ CGA/SBA. Cues needed to widen EREN and increase hip/knee flexion  on L side to clear his foot. Pts balance during ambulation has improved since previous tx. Encouraged contiued use of LUE to regain strength/function.   Outcome Measures     Row Name 03/10/23 1300 03/10/23 0915 03/09/23 1315       How much help from another person do you currently need...    Turning from your back to your side while in flat bed without using bedrails? -- 4  -TB 4  -TB    Moving from lying on back to sitting on the side of a flat bed without bedrails? -- 4  -TB 4  -TB    Moving to and from a bed to a chair (including a wheelchair)? -- 4  -TB 3  -TB    Standing up from a chair using your arms (e.g., wheelchair, bedside chair)? -- 4  -TB 4  -TB    Climbing 3-5 steps with a railing? -- 3  -TB 3  -TB    To walk in hospital room? -- 3  -TB 3  -TB    AM-PAC 6 Clicks Score (PT) -- 22  -TB 21  -TB       How much help from another is currently needed...    Putting on and taking off regular lower body clothing? 2  -AC (r) EC (t) AC (c) -- --    Bathing (including washing, rinsing, and drying) 2  -AC (r) EC (t) AC (c) -- --    Toileting (which includes using toilet bed pan or urinal) 3  -AC (r) EC (t) AC (c) -- --    Putting on and taking off regular upper body clothing 3  -AC (r) EC (t) AC (c) -- --    Taking care of personal grooming (such as brushing teeth) 3  -AC (r) EC (t) AC (c) -- --    Eating meals 4  -AC (r) EC (t) AC (c) -- --    AM-PAC 6 Clicks Score (OT) 17  -AC (r) EC (t) -- --       Functional Assessment    Outcome Measure Options AM-PAC 6 Clicks Daily Activity (OT)  -AC (r) EC (t) AC (c) AM-PAC 6 Clicks Basic Mobility (PT)  -TB AM-PAC 6 Clicks Basic Mobility (PT)  -TB          User Key  (r) = Recorded By, (t) = Taken By, (c) = Cosigned By    Initials Name Provider Type    AC Ron Solis, OTR/L, CNT Occupational Therapist    TB Saud Raza, PTA Physical Therapist Assistant    EC Kym Arellano, OT Student OT Student                 Time Calculation:    PT Charges     Row Name  03/10/23 1544 03/10/23 1021          Time Calculation    Start Time 1440  -TB 0850  -TB     Stop Time 1503  -TB 0915  -TB     Time Calculation (min) 23 min  -TB 25 min  -TB     PT Received On 03/10/23  -TB 03/10/23  -TB        Time Calculation- PT    Total Timed Code Minutes- PT 23 minute(s)  -TB 25 minute(s)  -TB           User Key  (r) = Recorded By, (t) = Taken By, (c) = Cosigned By    Initials Name Provider Type    TB Saud Raza, PTA Physical Therapist Assistant              Therapy Charges for Today     Code Description Service Date Service Provider Modifiers Qty    16615988322 HC GAIT TRAINING EA 15 MIN 3/9/2023 Saud Raza, PTA GP 2    97645019195 HC GAIT TRAINING EA 15 MIN 3/10/2023 Saud Raza, PTA GP 2    88372344258 HC GAIT TRAINING EA 15 MIN 3/10/2023 Saud Raza, PTA GP 1    50212370677 HC PT THERAPEUTIC ACT EA 15 MIN 3/10/2023 Saud Raza, PTA GP 1          PT G-Codes  Outcome Measure Options: AM-PAC 6 Clicks Daily Activity (OT)  AM-PAC 6 Clicks Score (PT): 22  AM-PAC 6 Clicks Score (OT): 17  Modified Cutler Scale: 4 - Moderately severe disability.  Unable to walk without assistance, and unable to attend to own bodily needs without assistance.    Saud Raza PTA  3/10/2023

## 2023-03-10 NOTE — THERAPY DISCHARGE NOTE
Acute Care - Speech Language Pathology   Swallow Treatment Note/Discharge [facility]     Patient Name: James Taylor  : 1980  MRN: [id number]  Today's Date: 3/10/2023               Admit Date: 3/4/2023   Pt was seen for diet toleration and swallowing tx. Upon arrival, pt was sitting on edge of bed with girlfriend and child at bedside. Pt was prompted to complete trials of regular solids and thin liquids. No overt s/s of aspiration observed. Pt spontaneously performed lingual sweep to remove crumbs off of lip and chin. While L side facial/labial weakness is still present, pt mastication and swallow is WNL and near baseline. Pt reported no complaints or difficulties with current diet. It is recommended that pt continue with current diet. General aspiration precautions. ST to sign off. Consult if additional concerns arise.     JOHANA Bledsoe/SLP  3/10/2023  13:42 CST        Visit Dx:    ICD-10-CM ICD-9-CM   1. Intracranial bleeding (HCC)  I62.9 432.9   2. Facial droop  R29.810 781.94   3. Left arm weakness  R29.898 729.89   4. Acute nonintractable headache, unspecified headache type  R51.9 784.0   5. History of hypertension  Z86.79 V12.59   6. Cerebral edema (HCC)  G93.6 348.5   7. Midline shift of brain  R90.89 793.0   8. Dysphagia, unspecified type  R13.10 787.20   9. Impaired mobility  Z74.09 799.89   10. Decreased activities of daily living (ADL)  Z78.9 V49.89     Patient Active Problem List   Diagnosis   • Intracranial bleeding (HCC)   • Malignant hypertension   • Elevated transaminase level   • Alcohol abuse     Past Medical History:   Diagnosis Date   • Asthma    • Hypertension      History reviewed. No pertinent surgical history.    SLP Recommendation and Plan     SLP Diet Recommendation: soft to chew textures, thin liquids (03/10/23 1315)     Monitor for Signs of Aspiration: yes, notify SLP if any concerns, cough, gurgly voice, throat clearing, pneumonia, right lower lobe infiltrates (03/10/23 1315)         Anticipated Discharge Disposition (SLP): unknown (03/10/23 1333)     Therapy Frequency (Swallow): at least, 2 days per week (03/10/23 1315)  Predicted Duration Therapy Intervention (Days): until discharge (03/10/23 1315)     Daily Summary of Progress (SLP): progress toward functional goals is good (03/10/23 1315)     Anticipated Discharge Disposition (SLP): unknown (03/10/23 1333)           Reason for Discharge: all goals and outcomes met, no further needs identified (03/10/23 1333)     Treatment Assessment (SLP): improved (03/10/23 1315)  Treatment Assessment Comments (SLP):  (see note) (03/10/23 1315)  Plan for Continued Treatment (SLP): treatment no longer indicated as all goals met (03/10/23 1315)    Plan of Care Reviewed With: patient (03/10/23 1334)  Progress: improving (03/10/23 1334)  Outcome Evaluation: see note (03/10/23 1334)    SWALLOW EVALUATION (last 72 hours)     SLP Adult Swallow Evaluation     Row Name 03/10/23 1315 03/09/23 0754 03/08/23 0807             Rehab Evaluation    Document Type therapy note (daily note)  -JR therapy note (daily note)  -MG (r) JH (t) MG (c) therapy note (daily note)  -MG (r) JH (t) MG (c)      Subjective Information no complaints  -JR no complaints  -MG (r) JH (t) MG (c) no complaints  -MG (r) JH (t) MG (c)      Patient Observations alert;cooperative;agree to therapy  -JR cooperative;alert;agree to therapy  -MG (r) JH (t) MG (c) cooperative;agree to therapy;alert  -MG (r) JH (t) MG (c)      Patient/Family/Caregiver Comments/Observations --  Girlfriend and child at bedside  -JR -- --  Girlfriend at b/s  -MG (r) JH (t) MG (c)      Patient Effort good  -JR good  -MG (r) JH (t) MG (c) good  -MG (r) JH (t) MG (c)      Comment -- -- --  Improved alertness  -MG (r) JH (t) MG (c)      Symptoms Noted During/After Treatment none  -JR none  -MG (r) JH (t) MG (c) none  -MG (r) JH (t) MG (c)         General Information    Patient Profile Reviewed yes  -JR -- --         Pain     Additional Documentation Pain Scale: FACES Pre/Post-Treatment (Group)  -JR Pain Scale: FACES Pre/Post-Treatment (Group)  -MG (r) JH (t) MG (c) Pain Scale: FACES Pre/Post-Treatment (Group)  -MG (r) JH (t) MG (c)         Pain Scale: Numbers Pre/Post-Treatment    Pretreatment Pain Rating 0/10 - no pain  -JR -- --      Posttreatment Pain Rating 0/10 - no pain  -JR -- --         Pain Scale: FACES Pre/Post-Treatment    Pain: FACES Scale, Pretreatment 0-->no hurt  -JR 0-->no hurt  -MG (r) JH (t) MG (c) 0-->no hurt  -MG (r) JH (t) MG (c)      Posttreatment Pain Rating 0-->no hurt  -JR 0-->no hurt  -MG (r) JH (t) MG (c) 0-->no hurt  -MG (r) JH (t) MG (c)         SLP Treatment Clinical Impressions    Treatment Assessment (SLP) improved  -JR improved  -MG (r) JH (t) MG (c) improved  -MG (r) JH (t) MG (c)      Treatment Assessment Comments (SLP) --  see note  -JR -- --      Daily Summary of Progress (SLP) progress toward functional goals is good  -JR progress toward functional goals is good  -MG (r) JH (t) MG (c) progress toward functional goals as expected  -MG (r) JH (t) MG (c)      Barriers to Overall Progress (SLP) -- Fatigue  -MG (r) JH (t) MG (c) Fatigue  -MG (r) JH (t) MG (c)      Plan for Continued Treatment (SLP) treatment no longer indicated as all goals met  -JR continue treatment per plan of care  -MG (r) JH (t) MG (c) continue treatment per plan of care  -MG (r) JH (t) MG (c)      Care Plan Review evaluation/treatment results reviewed  -JR evaluation/treatment results reviewed;care plan/treatment goals reviewed  -MG (r) JH (t) MG (c) evaluation/treatment results reviewed  -MG (r) JH (t) MG (c)      Care Plan Review, Other Participant(s) caregiver  -JR caregiver  -MG (r) JH (t) MG (c) caregiver  -MG (r) JH (t) MG (c)         Recommendations    Therapy Frequency (Swallow) at least;2 days per week  - -- --      Predicted Duration Therapy Intervention (Days) until discharge  - -- --      SLP Diet Recommendation  soft to chew textures;thin liquids  - -- --      Recommended Precautions and Strategies upright posture during/after eating;small bites of food and sips of liquid;1:1 supervision;assist with feeding;fatigue precautions;general aspiration precautions;check mouth frequently for oral residue/pocketing;alternate between small bites of food and sips of liquid;other (see comments)  - -- --      Oral Care Recommendations Oral Care BID/PRN  - -- --      SLP Rec. for Method of Medication Administration meds whole;with thin liquids  - -- --      Monitor for Signs of Aspiration yes;notify SLP if any concerns;cough;gurgly voice;throat clearing;pneumonia;right lower lobe infiltrates  - -- --      Anticipated Discharge Disposition (SLP) unknown  - -- --         Swallow Goals (SLP)    Swallow LTGs Swallow Long Term Goal (free text)  -JR Swallow Long Term Goal (free text)  -MG (r) JH (t) MG (c) Swallow Long Term Goal (free text)  -MG (r) JH (t) MG (c)      Swallow STGs diet tolerance goal selection (SLP);labial strengthening goal selection (SLP);lingual strengthening goal selection (SLP)  -JR diet tolerance goal selection (SLP);labial strengthening goal selection (SLP);lingual strengthening goal selection (SLP)  -MG (r) JH (t) MG (c) diet tolerance goal selection (SLP);labial strengthening goal selection (SLP);lingual strengthening goal selection (SLP)  -MG (r) JH (t) MG (c)      Diet Tolerance Goal Selection (SLP) Patient will tolerate trials of  -JR Patient will tolerate trials of  -MG (r) JH (t) MG (c) Patient will tolerate trials of  -MG (r) JH (t) MG (c)      Labial Strengthening Goal Selection (SLP) labial strengthening, SLP goal 1  -JR labial strengthening, SLP goal 1  -MG (r) JH (t) MG (c) labial strengthening, SLP goal 1  -MG (r) JH (t) MG (c)      Lingual Strengthening Goal Selection (SLP) lingual strengthening, SLP goal 1  -JR lingual strengthening, SLP goal 1  -MG (r) JH (t) MG (c) lingual strengthening, SLP  goal 1  -MG (r) JH (t) MG (c)         (LTG) Swallow    (LTG) Swallow Pt will tolerate LRD without overt s/s of aspiration.  -JR Pt will tolerate LRD without overt s/s of aspiration.  -MG (r) JH (t) MG (c) Pt will tolerate LRD without overt s/s of aspiration.  -MG (r) JH (t) MG (c)      [place] (Swallow Long Term Goal) with minimal cues (75-90% accuracy)  -JR with minimal cues (75-90% accuracy)  -MG (r) JH (t) MG (c) with minimal cues (75-90% accuracy)  -MG (r) JH (t) MG (c)      Time Frame (Swallow Long Term Goal) by discharge  -JR by discharge  -MG (r) JH (t) MG (c) by discharge  -MG (r) JH (t) MG (c)      Barriers (Swallow Long Term Goal) L weakness  -JR L weakness  -MG (r) JH (t) MG (c) L weakness  -MG (r) JH (t) MG (c)      Progress/Outcomes (Swallow Long Term Goal) goal met  -JR good progress toward goal  -MG (r) JH (t) MG (c) good progress toward goal  -MG (r) JH (t) MG (c)         (STG) Patient will tolerate trials of    Consistencies Trialed (Tolerate trials) soft to chew (ground) textures;thin liquids;regular textures  -JR soft to chew (ground) textures;thin liquids;regular textures  -MG (r) JH (t) MG (c) soft to chew (ground) textures;thin liquids;regular textures  -MG (r) JH (t) MG (c)      Desired Outcome (Tolerate trials) without signs/symptoms of aspiration;without signs of distress;with adequate oral prep/transit/clearance  -JR without signs/symptoms of aspiration;without signs of distress;with adequate oral prep/transit/clearance  -MG (r) JH (t) MG (c) without signs/symptoms of aspiration;without signs of distress;with adequate oral prep/transit/clearance  -MG (r) JH (t) MG (c)      [place] (Tolerate trials) with minimal cues (75-90% accuracy)  -JR with minimal cues (75-90% accuracy)  -MG (r) JH (t) MG (c) with minimal cues (75-90% accuracy)  -MG (r) JH (t) MG (c)      Time Frame (Tolerate trials) by discharge  -JR by discharge  -MG (r) JH (t) MG (c) by discharge  -MG (r) JH (t) MG (c)       Progress/Outcomes (Tolerate trials) goal met  -JR good progress toward goal  -MG (r) JH (t) MG (c) good progress toward goal  -MG (r) JH (t) MG (c)         (STG) Labial Strengthening Goal 1 (SLP)    Activity (Labial Strengthening Goal 1, SLP) increase labial tone  -JR increase labial tone  -MG (r) JH (t) MG (c) increase labial tone  -MG (r) JH (t) MG (c)      Increase Labial Tone labial resistance exercises  -JR labial resistance exercises  -MG (r) JH (t) MG (c) labial resistance exercises  -MG (r) JH (t) MG (c)      [place]/Accuracy (Labial Strengthening Goal 1, SLP) with minimal cues (75-90% accuracy)  -JR with minimal cues (75-90% accuracy)  -MG (r) JH (t) MG (c) with minimal cues (75-90% accuracy)  -MG (r) JH (t) MG (c)      Time Frame (Labial Strengthening Goal 1, SLP) by discharge  -JR by discharge  -MG (r) JH (t) MG (c) by discharge  -MG (r) JH (t) MG (c)      Barriers (Labial Strengthening Goal 1, SLP) L weakness  -JR L weakness  -MG (r) JH (t) MG (c) L weakness  -MG (r) JH (t) MG (c)      Progress/Outcomes (Labial Strengthening Goal 1, SLP) goal met  -JR good progress toward goal  -MG (r) JH (t) MG (c) good progress toward goal  -MG (r) JH (t) MG (c)         (STG) Lingual Strengthening Goal 1 (SLP)    Activity (Lingual Strengthening Goal 1, SLP) increase lingual tone/sensation/control/coordination/movement;increase tongue back strength  -JR increase lingual tone/sensation/control/coordination/movement;increase tongue back strength  -MG (r) JH (t) MG (c) increase lingual tone/sensation/control/coordination/movement;increase tongue back strength  -MG (r) JH (t) MG (c)      Increase Lingual Tone/Sensation/Control/Coordination/Movement lingual movement exercises  -JR lingual movement exercises  -MG (r) JH (t) MG (c) lingual movement exercises  -MG (r) JH (t) MG (c)      Increase Tongue Back Strength lingual resistance exercises  -JR lingual resistance exercises  -MG (r) JH (t) MG (c) lingual resistance  exercises  -MG (r) JH (t) MG (c)      [place]/Accuracy (Lingual Strengthening Goal 1, SLP) with minimal cues (75-90% accuracy)  -JR with minimal cues (75-90% accuracy)  -MG (r) JH (t) MG (c) with minimal cues (75-90% accuracy)  -MG (r) JH (t) MG (c)      Time Frame (Lingual Strengthening Goal 1, SLP) by discharge  -JR by discharge  -MG (r) JH (t) MG (c) by discharge  -MG (r) JH (t) MG (c)      Barriers (Lingual Strengthening Goal 1, SLP) L weakness  -JR L weakness  -MG (r) JH (t) MG (c) L weakness  -MG (r) JH (t) MG (c)      Progress/Outcomes (Lingual Strengthening Goal 1, SLP) goal met  -JR good progress toward goal  -MG (r) JH (t) MG (c) good progress toward goal  -MG (r) JH (t) MG (c)            User Key  (r) = Recorded By, (t) = Taken By, (c) = Cosigned By    Initials Name Effective Dates    MG Janeen Gunter, MS CCC-SLP 08/12/22 -     Susan Hernandez, SLP 01/04/23 -     Ronnie Nevarez, Speech Therapy Student 12/12/22 -                 EDUCATION  The patient has been educated in the following areas:   Dysphagia (Swallowing Impairment).         SLP GOALS     Row Name 03/10/23 1315 03/09/23 0754 03/08/23 0807       (LTG) Swallow    (LTG) Swallow Pt will tolerate LRD without overt s/s of aspiration.  -JR Pt will tolerate LRD without overt s/s of aspiration.  -MG (r) JH (t) MG (c) Pt will tolerate LRD without overt s/s of aspiration.  -MG (r) JH (t) MG (c)    [place] (Swallow Long Term Goal) with minimal cues (75-90% accuracy)  -JR with minimal cues (75-90% accuracy)  -MG (r) JH (t) MG (c) with minimal cues (75-90% accuracy)  -MG (r) JH (t) MG (c)    Time Frame (Swallow Long Term Goal) by discharge  -JR by discharge  -MG (r) JH (t) MG (c) by discharge  -MG (r) JH (t) MG (c)    Barriers (Swallow Long Term Goal) L weakness  -JR L weakness  -MG (r) JH (t) MG (c) L weakness  -MG (r) JH (t) MG (c)    Progress/Outcomes (Swallow Long Term Goal) goal met  -JR good progress toward goal  -MG (r) JH (t) MG (c) good  progress toward goal  -MG (r) JH (t) MG (c)       (STG) Patient will tolerate trials of    Consistencies Trialed (Tolerate trials) soft to chew (ground) textures;thin liquids;regular textures  -JR soft to chew (ground) textures;thin liquids;regular textures  -MG (r) JH (t) MG (c) soft to chew (ground) textures;thin liquids;regular textures  -MG (r) JH (t) MG (c)    Desired Outcome (Tolerate trials) without signs/symptoms of aspiration;without signs of distress;with adequate oral prep/transit/clearance  -JR without signs/symptoms of aspiration;without signs of distress;with adequate oral prep/transit/clearance  -MG (r) JH (t) MG (c) without signs/symptoms of aspiration;without signs of distress;with adequate oral prep/transit/clearance  -MG (r) JH (t) MG (c)    [place] (Tolerate trials) with minimal cues (75-90% accuracy)  -JR with minimal cues (75-90% accuracy)  -MG (r) JH (t) MG (c) with minimal cues (75-90% accuracy)  -MG (r) JH (t) MG (c)    Time Frame (Tolerate trials) by discharge  -JR by discharge  -MG (r) JH (t) MG (c) by discharge  -MG (r) JH (t) MG (c)    Progress/Outcomes (Tolerate trials) goal met  -JR good progress toward goal  -MG (r) JH (t) MG (c) good progress toward goal  -MG (r) JH (t) MG (c)       (STG) Labial Strengthening Goal 1 (SLP)    Activity (Labial Strengthening Goal 1, SLP) increase labial tone  -JR increase labial tone  -MG (r) JH (t) MG (c) increase labial tone  -MG (r) JH (t) MG (c)    Increase Labial Tone labial resistance exercises  -JR labial resistance exercises  -MG (r) JH (t) MG (c) labial resistance exercises  -MG (r) JH (t) MG (c)    [place]/Accuracy (Labial Strengthening Goal 1, SLP) with minimal cues (75-90% accuracy)  -JR with minimal cues (75-90% accuracy)  -MG (r) JH (t) MG (c) with minimal cues (75-90% accuracy)  -MG (r) JH (t) MG (c)    Time Frame (Labial Strengthening Goal 1, SLP) by discharge  -JR by discharge  -MG (r) JH (t) MG (c) by discharge  -MG (r) JH  (t) MG (c)    Barriers (Labial Strengthening Goal 1, SLP) L weakness  -JR L weakness  -MG (r) JH (t) MG (c) L weakness  -MG (r) JH (t) MG (c)    Progress/Outcomes (Labial Strengthening Goal 1, SLP) goal met  -JR good progress toward goal  -MG (r) JH (t) MG (c) good progress toward goal  -MG (r) JH (t) MG (c)       (STG) Lingual Strengthening Goal 1 (SLP)    Activity (Lingual Strengthening Goal 1, SLP) increase lingual tone/sensation/control/coordination/movement;increase tongue back strength  -JR increase lingual tone/sensation/control/coordination/movement;increase tongue back strength  -MG (r) JH (t) MG (c) increase lingual tone/sensation/control/coordination/movement;increase tongue back strength  -MG (r) JH (t) MG (c)    Increase Lingual Tone/Sensation/Control/Coordination/Movement lingual movement exercises  -JR lingual movement exercises  -MG (r) JH (t) MG (c) lingual movement exercises  -MG (r) JH (t) MG (c)    Increase Tongue Back Strength lingual resistance exercises  -JR lingual resistance exercises  -MG (r) JH (t) MG (c) lingual resistance exercises  -MG (r) JH (t) MG (c)    [place]/Accuracy (Lingual Strengthening Goal 1, SLP) with minimal cues (75-90% accuracy)  -JR with minimal cues (75-90% accuracy)  -MG (r) JH (t) MG (c) with minimal cues (75-90% accuracy)  -MG (r) JH (t) MG (c)    Time Frame (Lingual Strengthening Goal 1, SLP) by discharge  -JR by discharge  -MG (r) JH (t) MG (c) by discharge  -MG (r) JH (t) MG (c)    Barriers (Lingual Strengthening Goal 1, SLP) L weakness  -JR L weakness  -MG (r) JH (t) MG (c) L weakness  -MG (r) JH (t) MG (c)    Progress/Outcomes (Lingual Strengthening Goal 1, SLP) goal met  -JR good progress toward goal  -MG (r) JH (t) MG (c) good progress toward goal  -MG (r) JH (t) MG (c)          User Key  (r) = Recorded By, (t) = Taken By, (c) = Cosigned By    Initials Name Provider Type    Janeen Peacock, MS CCC-SLP Speech and Language Pathologist    JR Nicholas,  DONNIE Davis Speech and Language Pathologist     Ronnie Marques, Speech Therapy Student SLP Student                     Time Calculation:    Time Calculation- SLP     Row Name 03/10/23 1340             Time Calculation- SLP    SLP Start Time 1315  -JR      SLP Stop Time 1340  -JR      SLP Time Calculation (min) 25 min  -JR      SLP Received On 03/10/23  -JR         Untimed Charges    78963-HR Treatment Swallow Minutes 25  -JR         Total Minutes    Untimed Charges Total Minutes 25  -JR       Total Minutes 25  -JR            User Key  (r) = Recorded By, (t) = Taken By, (c) = Cosigned By    Initials Name Provider Type    Susan Hernandez SLP Speech and Language Pathologist                Therapy Charges for Today     Code Description Service Date Service Provider Modifiers Qty    83996399595 HC ST TREATMENT SWALLOW 2 3/10/2023 Susan Nicholas SLP GN 1               SLP Discharge Summary  Anticipated Discharge Disposition (SLP): unknown  Reason for Discharge: all goals and outcomes met, no further needs identified  Progress Toward Achieving Short/long Term Goals: all goals met within established timelines  Discharge Destination: other (see comments) (still in acute care)    DONNIE Bledsoe  3/10/2023

## 2023-03-10 NOTE — CASE MANAGEMENT/SOCIAL WORK
Continued Stay Note   Connie     Patient Name: James Taylor  MRN: [id number]  Today's Date: 3/10/2023    Admit Date: 3/4/2023    Plan: [facility] - Pending Precert   Discharge Plan     Row Name 03/10/23 0851       Plan    Plan [facility] - Pending Precert    Plan Comments Notified Rossy in admissions at [facility] that precert can start, therapy notes are in. Will await insurance approval.                     LOI Alvarez

## 2023-03-10 NOTE — NURSING NOTE
Report called to nurse at [facility]. Call back number left with nurse. Pt transported by family.

## 2023-03-10 NOTE — THERAPY DISCHARGE NOTE
Acute Care - Occupational Therapy Discharge Summary  [facility]     Patient Name: James Taylor  : 1980  MRN: [id number]    Today's Date: 3/10/2023                 Admit Date: 3/4/2023        OT Recommendation and Plan    Visit Dx:    ICD-10-CM ICD-9-CM   1. Intracranial bleeding (HCC)  I62.9 432.9   2. Facial droop  R29.810 781.94   3. Left arm weakness  R29.898 729.89   4. Acute nonintractable headache, unspecified headache type  R51.9 784.0   5. History of hypertension  Z86.79 V12.59   6. Cerebral edema (HCC)  G93.6 348.5   7. Midline shift of brain  R90.89 793.0   8. Dysphagia, unspecified type  R13.10 787.20   9. Impaired mobility  Z74.09 799.89   10. Decreased activities of daily living (ADL)  Z78.9 V49.89          Time Calculation- OT     Row Name 03/10/23 1330             Time Calculation- OT    OT Start Time 1225 (P)   -EC      OT Stop Time 1320 (P)   -EC      OT Time Calculation (min) 55 min (P)   -EC      Total Timed Code Minutes- OT 55 minute(s) (P)   -EC      OT Received On 03/10/23 (P)   -EC            User Key  (r) = Recorded By, (t) = Taken By, (c) = Cosigned By    Initials Name Provider Type    Kym Chacon, OT Student OT Student                   OT Rehab Goals     Row Name 03/10/23 1513             Transfer Goal 1 (OT)    Activity/Assistive Device (Transfer Goal 1, OT) toilet;bed-to-chair/chair-to-bed  -AC      Christian Level/Cues Needed (Transfer Goal 1, OT) standby assist  -AC      Time Frame (Transfer Goal 1, OT) long term goal (LTG);by discharge  -AC      Progress/Outcome (Transfer Goal 1, OT) goal not met  -AC         Dressing Goal 1 (OT)    Activity/Device (Dressing Goal 1, OT) lower body dressing  -AC      Christian/Cues Needed (Dressing Goal 1, OT) minimum assist (75% or more patient effort)  -AC      Time Frame (Dressing Goal 1, OT) long term goal (LTG);by discharge  -AC      Progress/Outcome (Dressing Goal 1, OT) goal not met  -AC         Toileting Goal 1 (OT)     Activity/Device (Toileting Goal 1, OT) toileting skills, all  -AC      [place] Level/Cues Needed (Toileting Goal 1, OT) contact guard required  -AC      Time Frame (Toileting Goal 1, OT) long term goal (LTG);by discharge  -AC      Progress/Outcome (Toileting Goal 1, OT) goal not met  -AC            User Key  (r) = Recorded By, (t) = Taken By, (c) = Cosigned By    Initials Name Provider Type Discipline    AC Ron Solis, OTR/L, CNT Occupational Therapist OT                 Outcome Measures     Row Name 03/10/23 1300 03/10/23 0915 03/09/23 1315       How much help from another person do you currently need...    Turning from your back to your side while in flat bed without using bedrails? -- 4  -TB 4  -TB    Moving from lying on back to sitting on the side of a flat bed without bedrails? -- 4  -TB 4  -TB    Moving to and from a bed to a chair (including a wheelchair)? -- 4  -TB 3  -TB    Standing up from a chair using your arms (e.g., wheelchair, bedside chair)? -- 4  -TB 4  -TB    Climbing 3-5 steps with a railing? -- 3  -TB 3  -TB    To walk in hospital room? -- 3  -TB 3  -TB    AM-PAC 6 Clicks Score (PT) -- 22  -TB 21  -TB       How much help from another is currently needed...    Putting on and taking off regular lower body clothing? 2 (P)   -EC -- --    Bathing (including washing, rinsing, and drying) 2 (P)   -EC -- --    Toileting (which includes using toilet bed pan or urinal) 3 (P)   -EC -- --    Putting on and taking off regular upper body clothing 3 (P)   -EC -- --    Taking care of personal grooming (such as brushing teeth) 3 (P)   -EC -- --    Eating meals 4 (P)   -EC -- --    AM-PAC 6 Clicks Score (OT) 17 (P)   -EC -- --       Functional Assessment    Outcome Measure Options AM-PAC 6 Clicks Daily Activity (OT) (P)   -EC AM-PAC 6 Clicks Basic Mobility (PT)  -TB AM-PAC 6 Clicks Basic Mobility (PT)  -TB          User Key  (r) = Recorded By, (t) = Taken By, (c) = Cosigned By    Initials Name Provider  Type    TB Saud Raza, PTA Physical Therapist Assistant    EC Kym Arellano, OT Student OT Student                    Therapy Charges for Today     Code Description Service Date Service Provider Modifiers Qty    70928171410  OT THERAPEUTIC ACT EA 15 MIN 3/10/2023 Ron Solis, OTR/L, CNT GO 2    [id number]  OT THER PROC EA 15 MIN 3/10/2023 Ron Solis OTR/L, CNT GO 2          OT Discharge Summary  Anticipated Discharge Disposition (OT): inpatient rehabilitation facility  Reason for Discharge: Discharge from facility  Outcomes Achieved: Refer to plan of care for updates on goals achieved  Discharge Destination: Inpatient rehabilitation facility      Ron Solis OTR/L, SLAVA  3/10/2023

## 2023-03-10 NOTE — THERAPY TREATMENT NOTE
Acute Care - Physical Therapy Treatment Note  [facility]     Patient Name: James Taylor  : 1980  MRN: [id number]  Today's Date: 3/10/2023      Visit Dx:     ICD-10-CM ICD-9-CM   1. Intracranial bleeding (HCC)  I62.9 432.9   2. Facial droop  R29.810 781.94   3. Left arm weakness  R29.898 729.89   4. Acute nonintractable headache, unspecified headache type  R51.9 784.0   5. History of hypertension  Z86.79 V12.59   6. Cerebral edema (HCC)  G93.6 348.5   7. Midline shift of brain  R90.89 793.0   8. Dysphagia, unspecified type  R13.10 787.20   9. Impaired mobility  Z74.09 799.89   10. Decreased activities of daily living (ADL)  Z78.9 V49.89     Patient Active Problem List   Diagnosis   • Intracranial bleeding (HCC)   • Malignant hypertension   • Elevated transaminase level   • Alcohol abuse     Past Medical History:   Diagnosis Date   • Asthma    • Hypertension      History reviewed. No pertinent surgical history.  PT Assessment (last 12 hours)     PT Evaluation and Treatment     Row Name 03/10/23 0850          Physical Therapy Time and Intention    Subjective Information no complaints  -TB     Document Type therapy note (daily note)  -TB     Mode of Treatment physical therapy  -TB     Comment L sided weakness  -TB     Row Name 03/10/23 0850          Pain    Pretreatment Pain Rating 0/10 - no pain  -TB     Posttreatment Pain Rating 0/10 - no pain  -TB     Row Name 03/10/23 0850          Bed Mobility    Bed Mobility scooting/bridging;supine-sit  -TB     Scooting/Bridging [place] (Bed Mobility) modified independence  -TB     Supine-Sit [place] (Bed Mobility) modified independence  -TB     Assistive Device (Bed Mobility) head of bed elevated  -TB     Row Name 03/10/23 0850          Transfers    Transfers sit-stand transfer;stand-sit transfer  -TB     Row Name 03/10/23 0850          Sit-Stand Transfer    Sit-Stand [place] (Transfers) standby assist  -TB     Row Name 03/10/23 0850          Stand-Sit  Transfer    Stand-Sit [place] (Transfers) standby assist  -TB     Row Name 03/10/23 0850          Gait/Stairs (Locomotion)    [place] Level (Gait) contact guard;standby assist  -TB     Distance in Feet (Gait) 350'  -TB     Deviations/Abnormal Patterns (Gait) left sided deviations;naren decreased;stride length decreased  -TB     Left Sided Gait Deviations heel strike decreased  -TB     Row Name 03/10/23 0850          Balance    Balance Assessment sitting static balance;sitting dynamic balance  -TB     Static Sitting Balance modified independence  -TB     Dynamic Sitting Balance modified independence  -TB     Position, Sitting Balance sitting edge of bed  -TB     Row Name 03/10/23 0850          Motor Skills    Therapeutic Exercise --  AROM BLE x15  -TB     Row Name 03/10/23 0850          Plan of Care Review    Plan of Care Reviewed With patient  -TB     Progress improving  -TB     Outcome Evaluation Pt w/ no complaints. [facility]. Bed mob Mod Ind w/ HOB elevated. Seated balance Mod Ind. Able to perform AROM BLE x15 w/o LOB. Stood SBA. Amb 350' w/ CGA/SBA. Cues needed to widen EREN and increase hip/knee flexion on L side to clear his foot. Pts balance during ambulation has improved since previous tx. Encouraged contiued use of LUE to regain strength/function.  -TB     Row Name 03/10/23 0850          Positioning and Restraints    Pre-Treatment Position in bed  -TB     Post Treatment Position bed  -TB     In Bed sitting EOB;call light within reach;encouraged to call for assist;with family/caregiver  -TB           User Key  (r) = Recorded By, (t) = Taken By, (c) = Cosigned By    Initials Name Provider Type    TB Saud Raza, PTA Physical Therapist Assistant                Physical Therapy Education     Title: PT OT SLP Therapies (In Progress)     Topic: Physical Therapy (In Progress)     Point: Mobility training (In Progress)     Learning Progress Summary            Patient Acceptance, E, NR by IBETH at 3/7/2023 0943    Comment: Bed mobility    Acceptance, E,D, NR by  at 3/6/2023 0927    Comment: Being aware of left side of body    Acceptance, E,D, DU,VU by  at 3/4/2023 1127    Comment: benefits of PT and POC, call for A OOB                   Point: Precautions (Done)     Learning Progress Summary           Patient Acceptance, E,D, DU,VU by  at 3/4/2023 1127    Comment: benefits of PT and POC, call for A OOB                               User Key     Initials Effective Dates Name Provider Type Discipline     02/03/23 -  Swapnil Reid, PT Physical Therapist PT    IBETH 02/03/23 -  Mayra Peterson, PTA Physical Therapist Assistant PT              PT Recommendation and Plan     Plan of Care Reviewed With: patient  Progress: improving  Outcome Evaluation: Pt w/ no complaints. [facility]. Bed mob Mod Ind w/ HOB elevated. Seated balance Mod Ind. Able to perform AROM BLE x15 w/o LOB. Stood SBA. Amb 350' w/ CGA/SBA. Cues needed to widen EREN and increase hip/knee flexion on L side to clear his foot. Pts balance during ambulation has improved since previous tx. Encouraged contiued use of LUE to regain strength/function.   Outcome Measures     Row Name 03/10/23 0915 03/09/23 1315 03/07/23 1410       How much help from another person do you currently need...    Turning from your back to your side while in flat bed without using bedrails? 4  -TB 4  -TB --    Moving from lying on back to sitting on the side of a flat bed without bedrails? 4  -TB 4  -TB --    Moving to and from a bed to a chair (including a wheelchair)? 4  -TB 3  -TB --    Standing up from a chair using your arms (e.g., wheelchair, bedside chair)? 4  -TB 4  -TB --    Climbing 3-5 steps with a railing? 3  -TB 3  -TB --    To walk in hospital room? 3  -TB 3  -TB --    AM-PAC 6 Clicks Score (PT) 22  -TB 21  -TB --       How much help from another is currently needed...     Putting on and taking off regular lower body clothing? -- -- 2  -AC    Bathing (including washing, rinsing, and drying) -- -- 2  -AC    Toileting (which includes using toilet bed pan or urinal) -- -- 2  -AC    Putting on and taking off regular upper body clothing -- -- 3  -AC    Taking care of personal grooming (such as brushing teeth) -- -- 3  -AC    Eating meals -- -- 3  -AC    AM-PAC 6 Clicks Score (OT) -- -- 15  -AC       Functional Assessment    Outcome Measure Options AM-PAC 6 Clicks Basic Mobility (PT)  -TB AM-PAC 6 Clicks Basic Mobility (PT)  -TB AM-PAC 6 Clicks Daily Activity (OT)  -AC          User Key  (r) = Recorded By, (t) = Taken By, (c) = Cosigned By    Initials Name Provider Type    AC Ron Solis, OTR/L, CNT Occupational Therapist    Saud Wilson PTA Physical Therapist Assistant                 Time Calculation:    PT Charges     Row Name 03/10/23 1021             Time Calculation    Start Time 0850  -TB      Stop Time 0915  -TB      Time Calculation (min) 25 min  -TB      PT Received On 03/10/23  -TB         Time Calculation- PT    Total Timed Code Minutes- PT 25 minute(s)  -TB            User Key  (r) = Recorded By, (t) = Taken By, (c) = Cosigned By    Initials Name Provider Type    Saud Wilson PTA Physical Therapist Assistant              Therapy Charges for Today     Code Description Service Date Service Provider Modifiers Qty    48813422558 HC GAIT TRAINING EA 15 MIN 3/9/2023 Saud Raza PTA GP 2    05599708001 HC GAIT TRAINING EA 15 MIN 3/10/2023 Saud Raza PTA GP 2          PT G-Codes  Outcome Measure Options: AM-PAC 6 Clicks Basic Mobility (PT)  AM-PAC 6 Clicks Score (PT): 22  AM-PAC 6 Clicks Score (OT): 17  Modified Bradford Scale: 4 - Moderately severe disability.  Unable to walk without assistance, and unable to attend to own bodily needs without assistance.    Saud Raza PTA  3/10/2023

## 2023-03-10 NOTE — PLAN OF CARE
Goal Outcome Evaluation:           Progress: no change   Pt alert and oriented x4. VSS. No c/o pain. OLIVAS. PPP. SCDS for VTE. . Tolerating reg diet. Skin intact. Voiding via urinal. NIH 3 for left sided weakness Call light within reach. Safety maintained.

## 2023-03-10 NOTE — PLAN OF CARE
Goal Outcome Evaluation:  Plan of Care Reviewed With: patient        Progress: improving  Outcome Evaluation: Pt w/ no complaints. Anxious to transfer to [facility] for continued therapy. Bed mob Mod Ind w/ HOB elevated. Seated balance Mod Ind. Able to perform AROM BLE x15 w/o LOB. Stood SBA. Amb 350' w/ CGA/SBA. Cues needed to widen EREN and increase hip/knee flexion on L side to clear his foot. Pts balance during ambulation has improved since previous tx. Encouraged contiued use of LUE to regain strength/function.

## 2023-03-10 NOTE — PROGRESS NOTES
"James Taylor  42 y.o.      Chief complaint:   Hemorrhagic CVA    Subjective:  Symptoms:  Stable.    Diet:  Adequate intake.    Activity level: Returning to normal.    Pain:  He reports no pain.      No events overnight.  Blood pressure is under better control.    Temp:  [97.5 °F (36.4 °C)-98.6 °F (37 °C)] 97.5 °F (36.4 °C)  Heart Rate:  [55-98] 60  Resp:  [16-20] 18  BP: (118-146)/() 130/79      Objective:  General Appearance:  Comfortable, well-appearing, in no acute distress and not in pain.    Vital signs: (most recent): Blood pressure 130/79, pulse 60, temperature 97.5 °F (36.4 °C), temperature source Oral, resp. rate 18, height 177.8 cm (70\"), weight 67.6 kg (149 lb), SpO2 98 %.  Vital signs are normal.  No fever.    Output: Producing urine.    HEENT: Normal HEENT exam.    Lungs:  Normal effort and normal respiratory rate.  He is not in respiratory distress.    Heart: Normal rate.  Regular rhythm.    Chest: Symmetric chest wall expansion.   Skin:  Warm and dry.    Abdomen: Abdomen is soft and non-distended.  Bowel sounds are normal.   There is no abdominal tenderness.     Pulses: Distal pulses are intact.        Neurologic Exam     Mental Status   Oriented to person, place, and time.   Attention: normal. Concentration: normal.   Level of consciousness: arousable by verbal stimuli ,  alert  Normal comprehension.     Cranial Nerves     CN II   Visual fields full to confrontation.     CN III, IV, VI   Pupils are equal, round, and reactive to light.  Extraocular motions are normal.     CN V   Facial sensation intact.     CN VII   Right facial weakness: none  Left facial weakness: central    CN VIII   CN VIII normal.     CN IX, X   CN IX normal.   CN X normal.     CN XI   CN XI normal.     CN XII   Tongue: not atrophic  Tongue deviation: left    Motor Exam   Muscle bulk: normal    Strength   Strength 5/5 except as noted. Left hemiparesis with upper and lower extremity being 4 out of 5 in all muscle groups with " ataxic movements    Right upper and lower extremity 5 out of 5 in all muscle groups     Sensory Exam   Light touch normal.     Gait, Coordination, and Reflexes Ambulating with assistance       Lab Results (last 24 hours)     Procedure Component Value Units Date/Time    Basic Metabolic Panel [[id number]]  (Abnormal) Collected: 03/09/23 0824    Specimen: Blood Updated: 03/09/23 0901     Glucose 106 mg/dL      BUN 11 mg/dL      Creatinine 0.85 mg/dL      Sodium 134 mmol/L      Potassium 3.7 mmol/L      Chloride 99 mmol/L      CO2 24.0 mmol/L      Calcium 9.4 mg/dL      BUN/Creatinine Ratio 12.9     Anion Gap 11.0 mmol/L      eGFR 111.3 mL/min/1.73     Narrative:      GFR Normal >60  Chronic Kidney Disease <60  Kidney Failure <15                Plan:   CV: Heart rate and blood pressure stable  Respiratory: Oxygen level stable  GI: Tolerating p.o.  : Adequate urine output  Neuro: Exam stable and improving  Continue with physical and Occupational Therapy.  Waiting for rehab      Intracranial bleeding (HCC)    Malignant hypertension    Elevated transaminase level    Alcohol abuse        Feliciano Boothe, APRN

## 2023-03-10 NOTE — THERAPY TREATMENT NOTE
Acute Care - Occupational Therapy Treatment Note  [facility]     Patient Name: James Taylor  : 1980  MRN: [id number]  Today's Date: 3/10/2023             Admit Date: 3/4/2023       ICD-10-CM ICD-9-CM   1. Intracranial bleeding (HCC)  I62.9 432.9   2. Facial droop  R29.810 781.94   3. Left arm weakness  R29.898 729.89   4. Acute nonintractable headache, unspecified headache type  R51.9 784.0   5. History of hypertension  Z86.79 V12.59   6. Cerebral edema (HCC)  G93.6 348.5   7. Midline shift of brain  R90.89 793.0   8. Dysphagia, unspecified type  R13.10 787.20   9. Impaired mobility  Z74.09 799.89   10. Decreased activities of daily living (ADL)  Z78.9 V49.89     Patient Active Problem List   Diagnosis   • Intracranial bleeding (HCC)   • Malignant hypertension   • Elevated transaminase level   • Alcohol abuse     Past Medical History:   Diagnosis Date   • Asthma    • Hypertension      History reviewed. No pertinent surgical history.      OT ASSESSMENT FLOWSHEET (last 12 hours)     OT Evaluation and Treatment     Row Name 03/10/23 1225                   OT Time and Intention    Subjective Information no complaints  -AC (r) EC (t) AC (c)        Document Type therapy note (daily note)  -AC (r) EC (t) AC (c)        Mode of Treatment occupational therapy  -AC (r) EC (t) AC (c)           General Information    Patient Profile Reviewed yes  -AC (r) EC (t) AC (c)        Existing Precautions/Restrictions seizures;fall  L sided weakness  -AC (r) EC (t) AC (c)           Pain Assessment    Pretreatment Pain Rating 0/10 - no pain  -AC (r) EC (t) AC (c)        Posttreatment Pain Rating 0/10 - no pain  -AC (r) EC (t) AC (c)           Cognition    Orientation Status (Cognition) oriented x 4  -AC (r) EC (t) AC (c)           Functional Mobility    Functional Mobility- Ind. Level contact guard assist  -AC (r) EC (t) AC (c)        Functional Mobility- Safety Issues step length decreased  -AC (r) EC (t) AC (c)        Functional  Mobility- Comment amb in room CGA  -AC (r) EC (t) AC (c)           Transfer Assessment/Treatment    Transfers sit-stand transfer;stand-sit transfer  -AC (r) EC (t) AC (c)           Sit-Stand Transfer    Sit-Stand [place] (Transfers) standby assist  -AC (r) EC (t) AC (c)           Stand-Sit Transfer    Stand-Sit [place] (Transfers) standby assist  -AC (r) EC (t) AC (c)           Motor Skills    Motor Skills coordination;motor control/coordination interventions  -AC (r) EC (t) AC (c)        Motor Control/Coordination Interventions gross motor coordination activities;fine motor manipulation/dexterity activities  -AC (r) EC (t) AC (c)        Therapeutic Exercise shoulder;elbow/forearm;core strength  -AC (r) EC (t) AC (c)        Comments, Therapeutic Exercise 2 sets x 10 reps L shoulder flexion, abduction, B elbow flexion & chest press, 2 lb; 10 reps standing L oblique crunches  -AC (r) EC (t) AC (c)           Balance    Balance Interventions UE activity with balance activity;dynamic reaching  -AC (r) EC (t) AC (c)           Plan of Care Review    Plan of Care Reviewed With patient;spouse  -AC (r) EC (t) AC (c)        Progress improving  -AC (r) EC (t) AC (c)        Outcome Evaluation OT tx complete. Pt A&Ox4 w/ no c/o, spouse & son in room. Pt sitting EOB eating lunch upon arrival. Pt completed LUE strengthening, balance & coordination exercises. Pt sit<>stand and amb in room for dynamic balance activities GCA. Pt demos fair standing balance, continues to demo decreased grasp,  strength, & coordination in LUE. Pt is gradually progressing toward therapy goals and is motivated for therapy despite flat affect. Pt is a good candidate for IRF.  -AC           Positioning and Restraints    Pre-Treatment Position in bed  -AC (r) EC (t) AC (c)        Post Treatment Position bed  -AC (r) EC (t) AC (c)        In Bed sitting EOB;call light within reach;encouraged to call for assist  -AC (r) EC (t) AC (c)               User Key  (r) = Recorded By, (t) = Taken By, (c) = Cosigned By    Initials Name Effective Dates    AC Will Ron ALCON, OTR/L, CNT 02/03/23 -     EC Kym Arellano, OT Student 12/12/22 -                  Occupational Therapy Education     Title: PT OT SLP Therapies (In Progress)     Topic: Occupational Therapy (Done)     Point: ADL training (Done)     Description:   Instruct learner(s) on proper safety adaptation and remediation techniques during self care or transfers.   Instruct in proper use of assistive devices.              Learning Progress Summary           Patient Eager, E, VU by PB at 3/9/2023 1049    Acceptance, E, VU,NR by PB at 3/8/2023 0819    Acceptance, E,TB,D, VU,NR by AC at 3/7/2023 1505    Acceptance, E, NR by PB at 3/6/2023 1001    Acceptance, E,D, VU,NR by LR at 3/4/2023 0951   Family Eager, E, VU by PB at 3/9/2023 1049   Significant Other Eager, E, VU by PB at 3/9/2023 1049    Acceptance, E, VU,NR by PB at 3/8/2023 0819    Acceptance, E,TB,D, VU,NR by AC at 3/7/2023 1505                   Point: Home exercise program (Done)     Description:   Instruct learner(s) on appropriate technique for monitoring, assisting and/or progressing therapeutic exercises/activities.              Learning Progress Summary           Patient Acceptance, E, VU by EC at 3/10/2023 1358    Comment: role of OT, benefits of activity, stroke recovery, body mechanics during dynamic balance    Acceptance, E,D, VU,NR by LR at 3/4/2023 0951   Family Acceptance, E, VU by EC at 3/10/2023 1358    Comment: role of OT, benefits of activity, stroke recovery, body mechanics during dynamic balance                   Point: Precautions (Done)     Description:   Instruct learner(s) on prescribed precautions during self-care and functional transfers.              Learning Progress Summary           Patient Acceptance, E, VU by EC at 3/10/2023 1358    Comment: role of OT, benefits of activity, stroke recovery, body mechanics during dynamic  balance    Eager, E, VU by PB at 3/9/2023 1049    Acceptance, E, VU,NR by PB at 3/8/2023 0819    Acceptance, E, NR by PB at 3/6/2023 1001    Acceptance, E,D, VU,NR by LR at 3/4/2023 0951   Family Acceptance, E, VU by EC at 3/10/2023 1358    Comment: role of OT, benefits of activity, stroke recovery, body mechanics during dynamic balance    Eager, E, VU by PB at 3/9/2023 1049   Significant Other Eager, E, VU by PB at 3/9/2023 1049    Acceptance, E, VU,NR by PB at 3/8/2023 0819                   Point: Body mechanics (Done)     Description:   Instruct learner(s) on proper positioning and spine alignment during self-care, functional mobility activities and/or exercises.              Learning Progress Summary           Patient Acceptance, E, VU by EC at 3/10/2023 1358    Comment: role of OT, benefits of activity, stroke recovery, body mechanics during dynamic balance    Eager, E, VU by PB at 3/9/2023 1049    Acceptance, E, VU,NR by PB at 3/8/2023 0819    Acceptance, E, NR by PB at 3/6/2023 1001    Acceptance, E,D, VU,NR by LR at 3/4/2023 0951   Family Acceptance, E, VU by EC at 3/10/2023 1358    Comment: role of OT, benefits of activity, stroke recovery, body mechanics during dynamic balance    Eager, E, VU by PB at 3/9/2023 1049   Significant Other Eager, E, VU by PB at 3/9/2023 1049    Acceptance, E, VU,NR by PB at 3/8/2023 0819                               User Key     Initials Effective Dates Name Provider Type Discipline    AC 02/03/23 -  Ron Solis, OTR/L, CNT Occupational Therapist OT    LR 11/22/22 -  Kathleen Cavazos OT Occupational Therapist OT    EC 12/12/22 -  Kym Arellano OT Student OT Student OT    PB 01/03/23 -  Clara Glaser, OT Student OT Student OT                  OT Recommendation and Plan     Plan of Care Review  Plan of Care Reviewed With: patient, significant other  Progress: improving  Outcome Evaluation: OT tx complete. Pt A&Ox4 w/ no c/o, spouse & son in room. Pt sitting EOB  eating lunch upon arrival. Pt completed LUE strengthening, balance & coordination exercises. Pt sit<>stand and amb in room for dynamic balance activities GCA. Pt demos fair standing balance, continues to demo decreased grasp,  strength, & coordination in LUE. Pt is gradually progressing toward therapy goals and is motivated for therapy despite flat affect. Pt is a good candidate for IRF.  Plan of Care Reviewed With: patient, significant other  Outcome Evaluation: OT tx complete. Pt A&Ox4 w/ no c/o, spouse & son in room. Pt sitting EOB eating lunch upon arrival. Pt completed LUE strengthening, balance & coordination exercises. Pt sit<>stand and amb in room for dynamic balance activities GCA. Pt demos fair standing balance, continues to demo decreased grasp,  strength, & coordination in LUE. Pt is gradually progressing toward therapy goals and is motivated for therapy despite flat affect. Pt is a good candidate for IRF.     Outcome Measures     Row Name 03/10/23 1300 03/10/23 0915 03/09/23 1315       How much help from another person do you currently need...    Turning from your back to your side while in flat bed without using bedrails? -- 4  -TB 4  -TB    Moving from lying on back to sitting on the side of a flat bed without bedrails? -- 4  -TB 4  -TB    Moving to and from a bed to a chair (including a wheelchair)? -- 4  -TB 3  -TB    Standing up from a chair using your arms (e.g., wheelchair, bedside chair)? -- 4  -TB 4  -TB    Climbing 3-5 steps with a railing? -- 3  -TB 3  -TB    To walk in hospital room? -- 3  -TB 3  -TB    AM-PAC 6 Clicks Score (PT) -- 22  -TB 21  -TB       How much help from another is currently needed...    Putting on and taking off regular lower body clothing? 2  -AC (r) EC (t) AC (c) -- --    Bathing (including washing, rinsing, and drying) 2  -AC (r) EC (t) AC (c) -- --    Toileting (which includes using toilet bed pan or urinal) 3  -AC (r) EC (t) AC (c) -- --    Putting on and  taking off regular upper body clothing 3  -AC (r) EC (t) AC (c) -- --    Taking care of personal grooming (such as brushing teeth) 3  -AC (r) EC (t) AC (c) -- --    Eating meals 4  -AC (r) EC (t) AC (c) -- --    AM-PAC 6 Clicks Score (OT) 17  -AC (r) EC (t) -- --       Functional Assessment    Outcome Measure Options AM-PAC 6 Clicks Daily Activity (OT)  -AC (r) EC (t) AC (c) AM-PAC 6 Clicks Basic Mobility (PT)  -TB AM-PAC 6 Clicks Basic Mobility (PT)  -TB          User Key  (r) = Recorded By, (t) = Taken By, (c) = Cosigned By    Initials Name Provider Type    Ron Wolf OTR/L, CNT Occupational Therapist    TB Saud Raza, PTA Physical Therapist Assistant    EC Kym Arellano, OT Student OT Student                Time Calculation:    Time Calculation- OT     Row Name 03/10/23 1330             Time Calculation- OT    OT Start Time 1225  -AC (r) EC (t) AC (c)      OT Stop Time 1320  -AC (r) EC (t) AC (c)      OT Time Calculation (min) 55 min  -AC (r) EC (t)      Total Timed Code Minutes- OT 55 minute(s)  -AC (r) EC (t) AC (c)      OT Received On 03/10/23  -AC (r) EC (t) AC (c)            User Key  (r) = Recorded By, (t) = Taken By, (c) = Cosigned By    Initials Name Provider Type    Ron Wolf OTR/L, CNT Occupational Therapist    EC Kym Arellano, OT Student OT Student              Therapy Charges for Today     Code Description Service Date Service Provider Modifiers Qty    50110812351  OT THERAPEUTIC ACT EA 15 MIN 3/10/2023 Ron Solis OTR/L, SLAVA GO 2    44246789145 HC OT THER PROC EA 15 MIN 3/10/2023 Ron Solis OTR/L, SLAVA GO 2               Ron Solis OTR/L, SLAVA  3/10/2023

## 2023-03-11 PROCEDURE — 92610 EVALUATE SWALLOWING FUNCTION: CPT

## 2023-03-11 PROCEDURE — 97535 SELF CARE MNGMENT TRAINING: CPT

## 2023-03-11 PROCEDURE — 92522 EVALUATE SPEECH PRODUCTION: CPT

## 2023-03-11 PROCEDURE — 97116 GAIT TRAINING THERAPY: CPT

## 2023-03-11 PROCEDURE — 1180000000 HC REHAB R&B

## 2023-03-11 PROCEDURE — 94760 N-INVAS EAR/PLS OXIMETRY 1: CPT

## 2023-03-11 PROCEDURE — 97110 THERAPEUTIC EXERCISES: CPT

## 2023-03-11 PROCEDURE — 97161 PT EVAL LOW COMPLEX 20 MIN: CPT

## 2023-03-11 PROCEDURE — 99223 1ST HOSP IP/OBS HIGH 75: CPT | Performed by: PSYCHIATRY & NEUROLOGY

## 2023-03-11 PROCEDURE — 6370000000 HC RX 637 (ALT 250 FOR IP): Performed by: PSYCHIATRY & NEUROLOGY

## 2023-03-11 PROCEDURE — 97165 OT EVAL LOW COMPLEX 30 MIN: CPT

## 2023-03-11 RX ADMIN — LISINOPRIL 10 MG: 10 TABLET ORAL at 20:48

## 2023-03-11 RX ADMIN — SERTRALINE HYDROCHLORIDE 50 MG: 50 TABLET ORAL at 07:52

## 2023-03-11 RX ADMIN — PANTOPRAZOLE SODIUM 40 MG: 40 TABLET, DELAYED RELEASE ORAL at 15:54

## 2023-03-11 RX ADMIN — SENNOSIDES AND DOCUSATE SODIUM 1 TABLET: 50; 8.6 TABLET ORAL at 07:52

## 2023-03-11 RX ADMIN — PANTOPRAZOLE SODIUM 40 MG: 40 TABLET, DELAYED RELEASE ORAL at 05:49

## 2023-03-11 RX ADMIN — DOXAZOSIN 4 MG: 4 TABLET ORAL at 07:52

## 2023-03-11 RX ADMIN — METOPROLOL TARTRATE 50 MG: 50 TABLET, FILM COATED ORAL at 07:53

## 2023-03-11 RX ADMIN — SENNOSIDES AND DOCUSATE SODIUM 1 TABLET: 50; 8.6 TABLET ORAL at 20:48

## 2023-03-11 RX ADMIN — METOPROLOL TARTRATE 50 MG: 50 TABLET, FILM COATED ORAL at 20:48

## 2023-03-11 RX ADMIN — HYDRALAZINE HYDROCHLORIDE 25 MG: 25 TABLET, FILM COATED ORAL at 13:55

## 2023-03-11 RX ADMIN — HYDRALAZINE HYDROCHLORIDE 25 MG: 25 TABLET, FILM COATED ORAL at 22:19

## 2023-03-11 RX ADMIN — HYDRALAZINE HYDROCHLORIDE 25 MG: 25 TABLET, FILM COATED ORAL at 05:49

## 2023-03-11 RX ADMIN — AMLODIPINE BESYLATE 10 MG: 10 TABLET ORAL at 07:52

## 2023-03-11 NOTE — PROGRESS NOTES
4 Eyes Skin Assessment    David Jackson is being assessed upon: Admission    I agree that Yara Christy RN, along with Lavelle Hu LPN (either 2 RN's or 1 LPN and 1 RN) have performed a thorough Head to Toe Skin Assessment on the patient. ALL assessment sites listed below have been assessed. Areas assessed by both nurses:     [x]   Head, Face, and Ears   [x]   Shoulders, Back, and Chest  [x]   Arms, Elbows, and Hands   [x]   Coccyx, Sacrum, and Ischium  [x]   Legs, Feet, and Heels    Does the Patient have Skin Breakdown?  No    Sin Prevention initiated: Yes  Wound Care Orders initiated: NA    [facility] nurse consulted for Pressure Injury (Stage 3,4, Unstageable, DTI, NWPT, and Complex wounds) and New or Established Ostomies: NA        Primary Nurse eSignature: Zack Leonard RN on 3/10/2023 at 8:03 PM      Co-Signer eSignature: Electronically signed by Christina Hough LPN on 1/11/76 at 9:53 PM CST

## 2023-03-11 NOTE — PLAN OF CARE
Problem: Discharge Planning  Goal: Discharge to home or other facility with appropriate resources  3/11/2023 0935 by Frank Wild RN  Outcome: Progressing  3/10/2023 1957 by Willie Hodge RN  Outcome: Progressing

## 2023-03-11 NOTE — PROGRESS NOTES
Speech Language Pathology  Facility/Department: [facility] 8 REHAB UNIT   CLINICAL BEDSIDE SWALLOW EVALUATION    NAME: Jabari Major  : 1980  MRN: [id number]  ADMISSION DATE: 3/10/2023  Date of Eval: 3/11/2023  Evaluating Therapist: Juan Garsia MS CCC-SLP      ADMITTING DIAGNOSIS:   has Intracerebral hemorrhage, nontraumatic ([address] on their problem list.        HISTORY OF PRESENT ILLNESS:   Per Neurology: This 43 y.o. male  with history of asthma, HTN, tobacco abuse, and ETOH abuse. He presented to [facility] ER on 3/4/23 with c/o weakness and difficulty speaking. CT of head revealed a right basal ganglia hemorrhagic stroke. He has a history of malignant hypertension that is difficult to control, this is followed by his PCP Dr. Roberto Suarez. He was admitted with ICH to neurosurgeon Dr. Shayy Birch with consult for Hospitalist. He was noted to have left sided weakness, ataxia, and dysarthria. Lab work shows triglycerides elevated at 589, HDL low at 24, VLDL elevated at 77. Glucose 110, alk phos 190, , . INR 1.22. CBC is unremarkable. CT angiogram of the neck shows patent vertebral arteries with calcified and noncalcified plaque in both carotids. CT angiogram of the head showed no major branch occlusion or aneurysm. CT of the head showed acute hemorrhage in the right basal ganglia measuring 4.7 x 1.8 cm with mild surrounding edema with a lateral ventricular shift to the left at 2.9 mm. He was placed on Cardene drip for systolic BP greater than 386. started on 10 mg of amlodipine daily as well as metoprolol 25 mg twice daily. Initially nursing was able to wean him from Cardene drip. But that drip has been restarted today because of elevated blood pressure outside goal range. Metoprolol was  increased to 50 mg p.o. twice daily and Hytrin 5 mg daily were added to his regimen by Hospitalist Dr. Cira Rooney.  Repeat CT scan of the head  on 3/5/23 shows left shift slightly increased to 4 mm with no change in the hemorrhage. patient was going through alcohol withdrawals, was having hallucinations, aggitated and confused. CIWA protocol initiated was intiated on 3/5/23. Blood pressure improving with the oral medications, now off Cardene drip. Repeat CT of head on 3/7/23 was stable. Patient was evaluated by SPT for swallow and placed on a mechanical soft diet with thin liquids. Patient's mental status has improved, he is A&O x 4, he does continue to have some dysarthria. He continues to have left sided weakness and is participating in both PT/OT. He is felt to need a stay on Rehab to work towards his goal of returning home with assist of his girlfriend. He is now felt ready to start the Rehab program.      Recent Chest Xray/CT of Chest:   No active disease is seen. This report was finalized on 03/04/2023 08:29 by Dr. Starlette Hashimoto, MD.      Current Diet level:  Current Diet : Regular        Pain:  Pain Assessment  Pain Assessment: None - Denies Pain    Reason for Referral  Abdirahman Kaplan was referred for a bedside swallow evaluation to assess the efficiency of his swallow function, identify signs and symptoms of aspiration and make recommendations regarding safe dietary consistencies, effective compensatory strategies, and safe eating environment. Impression  Dysphagia Impression : No overt s/s of aspiration observed this date with consecutive straw sips of thin liquids. Good hyolaryngeal elevation and timely swallow responses were noted. He does exhibit decreased labial strength and range of motion. He did report some left sided drooling. He is recommended to continue a regular diet with thin liquids. Medications whole with water. He was encouraged to use a lingual search/sweep during and after meals to clear food from the oral cavity.       Treatment Plan  Requires SLP Intervention: Yes     D/C Recommendations: Ongoing speech therapy is recommended during this hospitalization       Recommended Diet and Intervention  Regular diet  Thin liquids     Therapeutic Interventions: Pharyngeal exercises; Patient/Family education;Oral care;Oral motor exercises;Diet tolerance monitoring    Compensatory Swallowing Strategies  Compensatory Swallowing Strategies : Alternate solids and liquids; Check for pocketing of food on the Left;Eat/Feed slowly; Lingual sweep;Remain upright for 30-45 minutes after meals;Small bites/sips;Upright as possible for all oral intake    Treatment/Goals  Short-term Goals  Goal 1: The patient will tolerate a regular diet with thin liquids with minimal overt s/s of aspiration. Goal 2: The patient will use a lingual search/sweep to clear food from the oral cavity. Goal 3: The patient will complete daily oral-motor exercise to increase labial and lingual function and ROM/strength with minimal verbal, tactile and visual cues. Goal 4: The patient will complete pharyngeal strengthening exercises to aid in airway protection and minimize laryngeal penetration and aspiration with minimal verbal cues. Long-term Goals  Goal 1: The patient will maintain adequate hydration/nutrition with optimum safety and efficiency of swallowing function with P.O. intake without overt signs and symptoms of aspiration for the highest appropriate diet level. General  Chart Reviewed: Yes  Behavior/Cognition: Alert; Cooperative;Pleasant mood  Respiratory Status: Room air  Communication Observation: Dysarthria  Follows Directions: Simple  Dentition: Adequate  Patient Positioning: Upright in chair  Baseline Vocal Quality: Normal  Volitional Cough: Strong  Prior Dysphagia History: He denies any history of dysphagia. No history of MBSS being completed was located in the electronic medical record. Vision/Hearing  Vision  Vision: Within Functional Limits  Hearing  Hearing: Within functional limits    Oral Motor Deficits  Left labial asymmetry. He denies any decreased oral or facial sensation.  He does report left sided drooling at times. Decreased labial strength is noted. Decreased lingual strength is noted.       Prognosis  Good    Education  Patient Education Response: Verbalizes understanding             Therapy Time  SLP Individual Minutes  Time In: 1310  Time Out: 2581  Minutes: 60              3/11/2023 2:19 PM      Electronically Signed By:  Jacqui Lozada M.S. CCC-SLP  3/11/2023,2:22 PM.

## 2023-03-11 NOTE — H&P
[facility]   History and Physical        Patient:   Karishma Handley  MR#:    [id number]  Account Number:                   [id number]      Room:    [address] [place] [address]   YOB: 1980  Date of Progress Note: 3/11/2023  Time of Note                           6:39 AM  Attending Physician:  Brayan Pappas MD        Admitting diagnosis:right basal ganglia intracerebral hemorrhage    Secondary diagnoses: Hypertension, smoker, drinker, asthma, anxiety   CHIEF COMPLAINT: Left-sided weakness, ataxia and dysarthria      HISTORY OF PRESENT ILLNESS:   This 43 y.o. male  with history of asthma, HTN, tobacco abuse, and ETOH abuse. He presented to [facility] ER on 3/4/23 with c/o weakness and difficulty speaking. CT of head revealed a right basal ganglia hemorrhagic stroke. He has a history of malignant hypertension that is difficult to control, this is followed by his PCP Dr. Elizabeth Goetz. He was admitted with ICH to neurosurgeon Dr. Dominique Groves with consult for Hospitalist. He was noted to have left sided weakness, ataxia, and dysarthria. Lab work shows triglycerides elevated at 589, HDL low at 24, VLDL elevated at 77. Glucose 110, alk phos 190, , . INR 1.22. CBC is unremarkable. CT angiogram of the neck shows patent vertebral arteries with calcified and noncalcified plaque in both carotids. CT angiogram of the head showed no major branch occlusion or aneurysm. CT of the head showed acute hemorrhage in the right basal ganglia measuring 4.7 x 1.8 cm with mild surrounding edema with a lateral ventricular shift to the left at 2.9 mm. He was placed on Cardene drip for systolic BP greater than 955. started on 10 mg of amlodipine daily as well as metoprolol 25 mg twice daily. Initially nursing was able to wean him from Cardene drip. But that drip has been restarted today because of elevated blood pressure outside goal range.   Metoprolol was  increased to 50 mg p.o. twice daily and Hytrin 5 mg daily were added to his regimen by Hospitalist Dr. Marge Camejo. Repeat CT scan of the head  on 3/5/23 shows left shift slightly increased to 4 mm with no change in the hemorrhage. patient was going through alcohol withdrawals, was having hallucinations, aggitated and confused. CIWA protocol initiated was intiated on 3/5/23. Blood pressure improving with the oral medications, now off Cardene drip. Repeat CT of head on 3/7/23 was stable. Patient was evaluated by SPT for swallow and placed on a mechanical soft diet with thin liquids. Patient's mental status has improved, he is A&O x 4, he does continue to have some dysarthria. He continues to have left sided weakness and is participating in both PT/OT. He is felt to need a stay on Rehab to work towards his goal of returning home with assist of his girlfriend. He is now felt ready to start the Rehab program.    REVIEW OF SYSTEMS:  Constitutional - No fever or chills. No diaphoresis or significant fatigue. HENT -  No tinnitus or significant hearing loss. Eyes - no sudden vision change or eye pain  Respiratory - no significant shortness of breath or cough  Cardiovascular - no chest pain No palpitations or significant leg swelling  Gastrointestinal - no abdominal swelling or pain. Genitourinary - No difficulty urinating, dysuria  Musculoskeletal - no back pain or myalgia. Skin - no color change or rash  Neurologic - No seizures. No lateralizing weakness. Hematologic - no easy bruising or excessive bleeding. Psychiatric - no severe anxiety or nervousness. All other review of systems are negative. Past Medical History:  No past medical history on file. Past Surgical History:  No past surgical history on file.     Medications in Hospital:      Current Facility-Administered Medications:     hydrALAZINE (APRESOLINE) tablet 25 mg, 25 mg, Oral, Q8H, Johanna Mcpherson MD, 25 mg at 03/11/23 0549    metoprolol tartrate (LOPRESSOR) tablet 50 mg, 50 mg, Oral, Q12H, Johanna Mcpherson MD, 50 mg at 03/10/23 2123    nicotine (NICODERM CQ) 21 MG/24HR 1 patch, 1 patch, TransDERmal, Q24H, Bala Demarco MD    pantoprazole (PROTONIX) tablet 40 mg, 40 mg, Oral, BID AC, Bala Demarco MD, 40 mg at 03/11/23 0549    amLODIPine (NORVASC) tablet 10 mg, 10 mg, Oral, Daily, Bala Demarco MD    lisinopril (PRINIVIL;ZESTRIL) tablet 10 mg, 10 mg, Oral, Nightly, Bala Demarco MD, 10 mg at 03/10/23 2123    busPIRone (BUSPAR) tablet 5 mg, 5 mg, Oral, TID PRN, Bala Demarco MD    sertraline (ZOLOFT) tablet 50 mg, 50 mg, Oral, Daily, Bala Demarco MD    [START ON 3/13/2023] vitamin D (ERGOCALCIFEROL) capsule 50,000 Units, 50,000 Units, Oral, Weekly, Bala Demarco MD    doxazosin (CARDURA) tablet 4 mg, 4 mg, Oral, Daily, Bala Demarco MD    acetaminophen (TYLENOL) tablet 650 mg, 650 mg, Oral, Q4H PRN, Bala Demarco MD    bisacodyl (DULCOLAX) suppository 10 mg, 10 mg, Rectal, Daily PRN, Bala Demarco MD    sennosides-docusate sodium (SENOKOT-S) 8.6-50 MG tablet 1 tablet, 1 tablet, Oral, BID, Bala Demarco MD, 1 tablet at 03/10/23 2123    magnesium hydroxide (MILK OF MAGNESIA) 400 MG/5ML suspension 30 mL, 30 mL, Oral, Daily PRN, Bala Demarco MD    Allergies:  Patient has no known allergies. Social History:   TOBACCO:   reports that he has been smoking cigarettes. He has been smoking an average of .5 packs per day. He has never used smokeless tobacco.  ETOH:   reports current alcohol use of about 2.0 standard drinks per week. Family History:   No family history on file. Physical Exam:    Vitals: /89   Pulse 60   Temp 98.1 °F (36.7 °C) (Temporal)   Resp 16   Ht 5' 10\" (1.778 m)   Wt 156 lb 4 oz (70.9 kg)   SpO2 97%   BMI 22.42 kg/m²     Constitutional - well developed, well nourished.     Eyes - conjunctiva normal.  Pupils react to light  Ear, nose, throat -hearing intact to finger rub No scars, masses, or lesions over external nose or ears, no atrophy of tongue  Neck-symmetric, no masses noted, no jugular vein distension  Respiration- chest wall appears symmetric, good expansion,   normal effort without use of accessory muscles  Cardiovascular- RRR without m,r,g  Musculoskeletal - no significant wasting of muscles noted, no bony deformities  Extremities-no clubbing, cyanosis or edema  Skin - warm, dry, and intact. No rash, erythema, or pallor. Psychiatric - mood, affect, and behavior appear normal.      Neurological exam  Awake, alert, fluent oriented x 3 appropriate affect  Attention and concentration appear appropriate  Recent and remote memory appears unremarkable  Speech normal without dysarthria  No clear issues with language of fund of knowledge    Cranial Nerve Exam   CN II- Visual fields grossly unremarkable  CN III, IV,VI-EOMI, No nystagmus, conjugate eye movements, no ptosis  CN VII-minimal left facial asymmetry  CN VIII-Hearing intact   CN IX and X- Palate elevates in midline  CN XI-good shoulder shrug  CN XII-Tongue midline with no fasciculations or fibrillations    Motor Exam  Minimal left-sided weakness    Reflexes   2+ biceps bilaterally  2+ brachioradialis  2+patella  2+ ankle jerks  No clonus ankles  No Wayne's sign bilateral hands    Tremors- no tremors in hands or head noted    Gait  Not tested    Coordination  Finger to nose-unremarkable on the right. Mild clumsiness on the left        CBC:   Recent Labs     03/10/23  2119   WBC 8.6   HGB 10.8*   *       BMP:  No results for input(s): NA, K, CL, CO2, BUN, CREATININE, GLUCOSE in the last 72 hours. Hepatic: No results for input(s): AST, ALT, ALB, BILITOT, ALKPHOS in the last 72 hours. Lipids: No results for input(s): CHOL, HDL in the last 72 hours. Invalid input(s): LDLCALCU    INR: No results for input(s): INR in the last 72 hours. Assessment and Plan     1. Right basal ganglia hemorrhage, hypertensive-blood pressure medications, PT/OT/SLP  2. Hypertension-hydralazine, lisinopril, Lopressor, Norvasc  3.   Smoker-nicotine patch  4. GI-bowel regimen  5. Mood disorder-Zoloft  6. Hypovitaminosis D-replacement  7. DVT prophylaxis-SCDs          Patient's functional assessment  Prior to hospitalization the patient was continent of bowel and bladder    Functional assessment  All notes from reehab data were reviewed regarding the patient's functional status. Current therapy  Requires PT, OT and/or speech therapy    Anticipated discharge approximately 15 days    Anticipated discharge setting  Home with home care    No clear barriers to discharge    The patient appears to be an appropriate candidate for inpatient rehabilitation    Sufficiently stable: Patient's condition is sufficiently stable at the time of admission to allow the patient to actively participate in an intensive rehabilitation program    Close medical supervision: A rehabilitation physician, or other licensed treating physician with specialized training and experience in inpatient rehabilitation, will conduct face-to-face visits with the patient a minimum of at least 3 days per week throughout the patient's stay    This patient requires close medical supervision for the active management of the ongoing conditions and potential complications stated in the admission note    Intensive rehabilitation nursing: The patient demonstrates the need for 24-hour rehabilitation nursing care for active management of the multiple medical issues documented in the admission note    Appropriate therapy needed: The patient requires the active and ongoing therapeutic intervention of at least 2 therapeutic disciplines, one of which must be physical or occupational therapy and/or speech therapy    Intensive therapy:  The patient requires and is reasonably expected to actively participate in at least 3 hours of therapy per day at least 5 days per week, and expected to make measurable improvements that will be of practical value to improve the patient's functional capacity or adaptation to impairments. In addition, therapy treatments will begin within 36 hours from midnight of the day of the patient's admission to the inpatient rehabilitation facility    Expected duration and frequency therapy: 180 minutes per day, 5 days per week    Interdisciplinary team: The patient demonstrates the need for an interdisciplinary team for active management of the following medical issues including ataxia, motor planning, balance, disease management, elimination, endurance, family training, education, independent ADLs, pain management, precautions, range of motion, safety, strength, and transfers    I have reviewed the preadmission screening documents and concur with the findings. I believe the patient meets criteria and is sufficiently stable to allow participation in the program. This requires an intensive level of therapy, close medical supervision, and an interdisciplinary team approach provided through an individualized plan of care. I approve admitting this patient for an intensive inpatient rehabilitation program.      Juan Tucker.  Audra Farias MD

## 2023-03-11 NOTE — PROGRESS NOTES
Yogesh Julian arrived to room # [phone]. Presented with:ICH  Mental Status: Patient is oriented, alert, coherent, logical, thought processes intact, and able to concentrate and follow conversation. Vitals:    03/10/23 1833   BP: 132/77   Pulse: 63   Resp: 18   Temp: 99.1 °F (37.3 °C)   SpO2: 100%     Patient safety contract and falls prevention contract reviewed with patient Yes. Oriented Patient to room. Call light within reach. Yes.   Needs, issues or concerns expressed at this time: no.      Electronically signed by Zack Leonard RN on 3/10/2023 at 8:04 PM

## 2023-03-11 NOTE — PROGRESS NOTES
Physical Therapy EVALUATION Note  DATE:  3/11/2023  NAME:  Jim Hollis  :  1980  (43 y.o.,male)  MRN:  [id number]    HEIGHT:  Height: 5' 10\" (177.8 cm)  WEIGHT:  Weight: 156 lb 4 oz (70.9 kg)    PATIENT DIAGNOSIS(ES):    Diagnosis: RIGHT BG HEMORRHAGIC CVA; ETOH WITHDRAWAL POST CVA    Additional Pertinent Hx: HTN, ASTHMA  RESTRICTIONS/PRECAUTIONS:    Restrictions/Precautions  Restrictions/Precautions: Fall Risk, Aspiration Risk, Weight Bearing     OVERALL  ORIENTATION STATUS:     PAIN:                       GROSS ASSESSMENT        POSTURE/BALANCE          ACTIVITY TOLERANCE         BED MOBILITY  Bed mobility  Rolling to Left: Stand by assistance  Rolling to Right: Stand by assistance  Supine to Sit: Stand by assistance  Sit to Supine: Stand by assistance  Bed Mobility Comments: triplanar for all movements withotu rails        TRANSFERS  Transfers  Sit to Stand: Stand by assistance  Stand to Sit: Stand by assistance  Bed to Chair: Stand by assistance (stand pivot without assistive device)  Car Transfer: Contact guard assistance       AMBULATION 1  Ambulation  Device: No Device  Assistance: Moderate assistance, Contact guard assistance, Stand by assistance  Quality of Gait: slight decresaed left step length. decreased arm swing bilaterlaly, left greater than right. 2 instances of left lateral deviation. one requried mod assist to arrest.  second instance therapist allowed patient to deviate laterally. no imminent risk of fall. patient able to arrest lateral movement by reaching out for wall.   Distance: 500 ft x2  Comments: decreased heel strike left with fatigue  AMBULATION 2     STAIRS  Stairs  # Steps : 7  Rails: Right ascending  Assistance: Minimal assistance, Contact guard assistance  Comment: recirpocal pattern  WHEELCHAIR PROPULSION 1     WHEELCHAIR PROPULSION 2       GOALS:  Short Term Goals  Time Frame for Short Term Goals: 3-5 DAYS  Short Term Goal 1: UP/DOWN 12 STEPS 1 RAIL SBA  Short Term Goal 2: AMBULATE 500 FT FLAT SURFACES WITH NORMAL RECIPROCAL ARM SWING WITHOUT LATERAL DEVIATION SBA  Short Term Goal 3: AMBULATE OUTSIDE ON UNEVEN TERRAIN INCLUDING GRASS WITHOTU A.D. MIN A  Short Term Goal 4: PEFORM BED MOBILITY INDEP    Long Term Goals  Time Frame for Long Term Goals : 7 DAYS  Long Term Goal 1: UP/DOWN 12 STEPS 1 RAILS INDEP  Long Term Goal 2: AMBULATE 500 FT FLAT SURFACES WITH NORMAL RECIPROCAL ARM SWING WITHOUT LATERAL DEVIATION INDEP  Long Term Goal 3: AMBULATE OUTSIDE ON UNEVEN TERRAIN INCLUDING GRASS WITHOTU A.D. SBA  Long Term Goal 4: [facility] WITHOUT A.D. INDEP  HOME LIVING:                    ASSESSMENT (IMPAIRMENTS/BARRIERS):     Assessment: LEFT SIDE NEGLECT, INATTENTION. LEADS TO DECREASED LEFT ARM SWING DURING GAIT. INTERMITTENT LEFT LATERAL STAGGERIN, LOSS OF BALANCE WHICH PATIENT IS ABLE TO ARREST BY STEADYING HIMSELF ON AN OBJECT. UNABLE TO PERFORM FEET TOGETHER, EYES CLOSED STATIC BALANCE WITHOUT IMMEDIATELY LOSING BALANCE LEFT. WILL BENEFIT FROM INTENSIVE, HIGH LEVEL BALANCE INTERVENTIONS TO ALLOW FOR SAFE RETURN TO COMMUNITY AMBULATION, INVOLVEMENT.      Specific Instructions for Next Treatment: HIGH LEVEL BALANCE; AMBULATION OUTSIDE  PLAN:  Physcial Therapy Plan  General Plan:  minutes of therapy at least 5 out of 7 days a week  Specific Instructions for Next Treatment: HIGH LEVEL BALANCE; AMBULATION OUTSIDE  Current Treatment Recommendations: Strengthening, Balance training, Functional mobility training, Transfer training, Endurance training, Wheelchair mobility training, Gait training, Stair training, Home exercise program, Safety education & training, Patient/Caregiver education & training, Equipment evaluation, education, & procurement  Discharge Recommendations: Continue to assess pending progress  PATIENT REQUIRES AND IS REASONABLY EXPECTED TO ACTIVELY PARTICIPATE IN AT LEAST 3 HOURS OF INTENSIVE THERAPY PER DAY AT LEAST 5 DAYS PER WEEK, AND BE EXPECTED TO MAKE MEASURABLE IMPROVEMENT THAT WILL BE OF PRACTICAL VALUE TO IMPROVE THE PATIENT'S FUNCTIONAL CAPACITY OR ADAPTATION TO IMPAIRMENTS.    PATIENT GOAL FOR REHAB:  RETURN TO PRIOR LEVEL OF FUNCTION       IRF/OTONIEL  Roll Left and Right  Assistance Needed: Supervision or touching assistance  CARE Score: 4  Discharge Goal: Independent    Sit to Lying  Assistance Needed: Supervision or touching assistance  CARE Score: 4  Discharge Goal: Independent    Lying to Sitting on Side of Bed  Assistance Needed: Supervision or touching assistance  CARE Score: 4  Discharge Goal: Independent    Sit to Stand  Assistance Needed: Supervision or touching assistance  CARE Score: 4  Discharge Goal: Independent    Chair/Bed-to-Chair Transfer  Assistance Needed: Supervision or touching assistance  CARE Score: 4  Discharge Goal: Independent    Car Transfer  Assistance Needed: Supervision or touching assistance  CARE Score: 4  Discharge Goal: Independent    Walk 10 Feet  Assistance Needed: Supervision or touching assistance  CARE Score: 4  Discharge Goal: Independent    Walk 50 Feet with Two Turns  Assistance Needed: Supervision or touching assistance  CARE Score: 4  Discharge Goal: Independent    Walk 150 Feet  Assistance Needed: Partial/moderate assistance  CARE Score: 3  Discharge Goal: Independent    Walking 10 Feet on Uneven Surfaces  Reason if not Attempted: Not attempted due to medical condition or safety concerns  CARE Score: 88  Discharge Goal: Supervision or touching assistance    1 Step (Curb)  Assistance Needed: Supervision or touching assistance  CARE Score: 4  Discharge Goal: Independent    4 Steps  Assistance Needed: Supervision or touching assistance  CARE Score: 4  Discharge Goal: Independent    12 Steps  Assistance Needed: Supervision or touching assistance  CARE Score: 4  Discharge Goal: Independent    Wheel 50 Feet with Two Turns  Reason if not Attempted: Not attempted due to medical condition or safety concerns  CARE Score: 88  Discharge Goal: Independent    Wheel 150 Feet  Reason if not Attempted: Not attempted due to medical condition or safety concerns  CARE Score: 88  Discharge Goal: Independent      LAST TREATMENT TIME  PT Individual Minutes  Time In: 0900  Time Out: 1000  Minutes: 60

## 2023-03-11 NOTE — PROGRESS NOTES
Facility/Department: [facility] 8 REHAB UNIT  Occupational Therapy Initial Assessment     Name: Livia Ramirez  : 1980  MRN: [id number]  Date of Service: 3/11/2023    Discharge Recommendations:  Continue to assess pending progress          Patient Diagnosis(es): There were no encounter diagnoses. Past Medical History:  has no past medical history on file. Past Surgical History:  has no past surgical history on file. Treatment Diagnosis: R CVA    Assessment   Performance deficits / Impairments: Decreased functional mobility ; Decreased ADL status; Decreased ROM; Decreased strength;Decreased safe awareness;Decreased cognition;Decreased endurance;Decreased high-level IADLs;Decreased balance;Decreased coordination;Decreased fine motor control  Assessment: Patient requires increased assistance with ADLs and IADLs due to L hemiparesis and decreased balance. He requires CGA with no AD, but had one LOB with self correction. He requires skilled OT to address the above deficits and return the patient home independently.   Treatment Diagnosis: R CVA  Prognosis: Good  Decision Making: Low Complexity  Activity Tolerance  Activity Tolerance: Patient Tolerated treatment well              Plan   Occupational Therapy Plan  Current Treatment Recommendations: Strengthening, ROM, Balance training, Functional mobility training, Endurance training, Safety education & training, Patient/Caregiver education & training, Equipment evaluation, education, & procurement, Home management training, Self-Care / ADL     Restrictions  Restrictions/Precautions  Restrictions/Precautions: Fall Risk, Aspiration Risk, Weight Bearing  Lower Extremity Weight Bearing Restrictions  Right Lower Extremity Weight Bearing: Weight Bearing As Tolerated  Left Lower Extremity Weight Bearing: Weight Bearing As Tolerated    Subjective   General  Chart Reviewed: Yes  Patient assessed for rehabilitation services?: Yes  Diagnosis: R CVA     Social/Functional History  Social/Functional History  Has the patient had two or more falls in the past year or any fall with injury in the past year?: No       Objective   Functional Mobility  Functional - Mobility Device: No device  Activity: To/from bathroom  Assist Level: Contact guard assistance  Functional Mobility Comments: One LOB with self correction  Heart Rate: 60  Heart Rate Source: Monitor  BP: 135/89  MAP (Calculated): 104  Resp: 16  SpO2: 98 %  O2 Device: None (Room air)    Safety Devices  Type of Devices: Call light within reach; Left in chair (Left with s/o)       Transfers  Stand Step Transfers: Contact guard assistance  Sit to stand: Contact guard assistance  Stand to sit: Contact guard assistance  Hearing  Hearing: Within functional limits          LUE AROM (degrees)  LUE AROM : WFL  LUE General AROM: Full range of motion, but not can only use LUE for active assist.  Left Hand AROM (degrees)  Left Hand AROM: Exceptions  Left Hand General AROM: Full ROM for flexion/extension, but impaired FM control and slow oppostion  RUE AROM (degrees)  RUE AROM : WFL  Right Hand AROM (degrees)  Right Hand AROM: WFL  LUE Strength  Gross LUE Strength: Exceptions to [facility]  L Hand General: 3/5  LUE Strength Comment: Proximally 3/5, distally 3/5  RUE Strength  Gross RUE Strength: WFL       Education  Education Given To: Patient, Family  Education Provided: Role of Therapy, Plan of Care, Mobility Training, Transfer Training, ADL Function, Family Education  Education Provided Comments: S/o checked off for transfers in room and assisted with shower.   Education Method: Demonstration, Verbal  Barriers to Learning: None  Education Outcome: Verbalized understanding, Demonstrated understanding     OutComes Score           [address] needed: Supervision or touching assistance  CARE Score: 4  Discharge Goal: Independent      Toilet Transfer  Assistance needed: Supervision or touching assistance  CARE Score: 4  Discharge Goal: Independent    Balance  Sitting Balance: Supervision  Standing Balance: Contact guard assistance     Eating  Assistance Needed: Setup or clean-up assistance  CARE Score: 5  Discharge Goal: Independent    Shower/Bathe Self  Assistance Needed: Supervision or touching assistance  Comment: SBA with shower  CARE Score: 4  Discharge Goal: Independent    Upper Body Dressing  Assistance Needed: Supervision or touching assistance  Comment: VC for [employer]  CARE Score: 4  Discharge Goal: Independent      Lower Body Dressing  Assistance Needed: Supervision or touching assistance  CARE Score: 4  Discharge Goal: Independent    Putting On/Taking Off Footwear  Assistance Needed: Partial/moderate assistance  CARE Score: 3  Discharge Goal: Independent      Picking Up Object  Assistance Needed: Supervision or touching assistance  CARE Score: 4  Discharge Goal: Independent  Goals  Short Term Goals  Time Frame for Short Term Goals: 1 week  Short Term Goal 1: Supervision with clothing management/hygiene for toileting. Short Term Goal 2: Supervision with toilet transfers. Short Term Goal 3: Supervision with LB dressing. Short Term Goal 4: Supervision with ambulatory homemaking tasks. Short Term Goal 5: Patient will complete LUE exercises at least 5x to improve independence with ADLs. Long Term Goals  Time Frame for Long Term Goals : 2 weeks  Long Term Goal 1: Modified independent with bathing hygiene. Long Term Goal 2: Modified independent with toileting and toilet transfers. Long Term Goal 3: Modified independent with overall dressing. Long Term Goal 4: Modified independent with ambulatory homemaking tasks. Long Term Goal 5: Patient verabalize DME needs. Long Term Goal 6: Independent with HEP. Patient Goals   Patient goals : Return home independently.      Therapy Time   Individual Concurrent Group Co-treatment   Time In 1100         Time Out 1200         Minutes 60         Timed Code Treatment Minutes: 45 Minutes Electronically signed by Get Rollins OT on 3/11/2023 at 3:38 PM

## 2023-03-11 NOTE — PROGRESS NOTES
Comprehensive Nutrition Assessment    Type and Reason for Visit:  Initial, Positive Nutrition Screen    Nutrition Recommendations/Plan:   Continue current POC     Malnutrition Assessment:  Malnutrition Status:  No malnutrition (03/11/23 1058)    Context:  Acute Illness     Findings of the 6 clinical characteristics of malnutrition:  Energy Intake:  No significant decrease in energy intake  Weight Loss:  No significant weight loss     Body Fat Loss:  No significant body fat loss     Muscle Mass Loss:  No significant muscle mass loss    Fluid Accumulation:  No significant fluid accumulation Extremities   Strength:  Not Performed    Nutrition Assessment:    +NS for decreased weight and po intake. Pt has lost 3.6# since Dec 2022--not statistically significant. PO intake this a.m. was good with % of breakfast being eaten. Nutrition Related Findings:    hx-HTN Wound Type: Skin Tears       Current Nutrition Intake & Therapies:    Average Meal Intake: %     ADULT DIET; Regular; Low Fat/Low Chol/High Fiber/MARIKA    Anthropometric Measures:  Height: 5' 10\" (177.8 cm)  Ideal Body Weight (IBW): 166 lbs (75 kg)    Admission Body Weight: 156 lb 4 oz (70.9 kg)  Current Body Weight: 156 lb 4 oz (70.9 kg), 94.1 % IBW. Current BMI (kg/m2): 22.4  Usual Body Weight: 160 lb (72.6 kg) (12/2022)  % Weight Change (Calculated): -2.3  Weight Adjustment For: No Adjustment  BMI Categories: Normal Weight (BMI 18.5-24. 9)    Estimated Daily Nutrient Needs:  Energy Requirements Based On: Kcal/kg  Weight Used for Energy Requirements: Current  Energy (kcal/day): 5162-5683 kcals (25-30 kcals/kg)  Weight Used for Protein Requirements: Current  Protein (g/day): 71-142g  Method Used for Fluid Requirements: 1 ml/kcal  Fluid (ml/day): 4542-2485 ml    Nutrition Diagnosis:   No nutrition diagnosis at this time related to   as evidenced by      Nutrition Interventions:   Food and/or Nutrient Delivery: Continue Current Diet  Nutrition Education/Counseling: No recommendation at this time  Coordination of Nutrition Care: Continue to monitor while inpatient       Goals:     Goals: Meet at least 75% of estimated needs, PO intake 50% or greater       Nutrition Monitoring and Evaluation:   Behavioral-Environmental Outcomes: None Identified  Food/Nutrient Intake Outcomes: Food and Nutrient Intake  Physical Signs/Symptoms Outcomes: Biochemical Data, Weight, Skin, Fluid Status or Edema    Discharge Planning:    Continue current diet     Laxmi Mullins MS, RD, LD  Contact: [phone]

## 2023-03-11 NOTE — PROGRESS NOTES
Speech Language Pathology  Facility/Department: [facility] 8 REHAB UNIT  Initial Speech/Language/Cognitive Assessment    NAME: Tejas Murphy  : 1980   MRN: [id number]  ADMISSION DATE: 3/10/2023  Date of Eval: 3/11/2023   Evaluating Therapist: Nick Vines MS CCC-SLP        ADMITTING DIAGNOSIS:   has Intracerebral hemorrhage, nontraumatic ([address] on their problem list.    HISTORY OF PRESENT ILLNESS:   Per Neurology: This 43 y.o. male  with history of asthma, HTN, tobacco abuse, and ETOH abuse. He presented to [facility] ER on 3/4/23 with c/o weakness and difficulty speaking. CT of head revealed a right basal ganglia hemorrhagic stroke. He has a history of malignant hypertension that is difficult to control, this is followed by his PCP Dr. Demarcus David. He was admitted with ICH to neurosurgeon Dr. Mary Ellen Pappas with consult for Hospitalist. He was noted to have left sided weakness, ataxia, and dysarthria. Lab work shows triglycerides elevated at 589, HDL low at 24, VLDL elevated at 77. Glucose 110, alk phos 190, , . INR 1.22. CBC is unremarkable. CT angiogram of the neck shows patent vertebral arteries with calcified and noncalcified plaque in both carotids. CT angiogram of the head showed no major branch occlusion or aneurysm. CT of the head showed acute hemorrhage in the right basal ganglia measuring 4.7 x 1.8 cm with mild surrounding edema with a lateral ventricular shift to the left at 2.9 mm. He was placed on Cardene drip for systolic BP greater than 728. started on 10 mg of amlodipine daily as well as metoprolol 25 mg twice daily. Initially nursing was able to wean him from Cardene drip. But that drip has been restarted today because of elevated blood pressure outside goal range. Metoprolol was  increased to 50 mg p.o. twice daily and Hytrin 5 mg daily were added to his regimen by Hospitalist Dr. Kimani Alfaro.  Repeat CT scan of the head  on 3/5/23 shows left shift slightly increased to 4 mm with no change in the hemorrhage. patient was going through alcohol withdrawals, was having hallucinations, aggitated and confused. CIWA protocol initiated was intiated on 3/5/23. Blood pressure improving with the oral medications, now off Cardene drip. Repeat CT of head on 3/7/23 was stable. Patient was evaluated by SPT for swallow and placed on a mechanical soft diet with thin liquids. Patient's mental status has improved, he is A&O x 4, he does continue to have some dysarthria. He continues to have left sided weakness and is participating in both PT/OT. He is felt to need a stay on Rehab to work towards his goal of returning home with assist of his girlfriend. He is now felt ready to start the Rehab program.        Pain:  Pain Assessment  Pain Assessment: None - Denies Pain    Vision/ Hearing  Vision  Vision: Within Functional Limits  Hearing  Hearing: Within functional limits    Assessment:    Diagnosis: Mild dysarthria is noted at times. He did exhibit some delayed processing today. Expressive language appears within normal limits. Will continue further cognitive linguistic assessment during his next session. He is recommended to receive speech therapy services. Recommendations:  Recommendations  Requires SLP Intervention: Yes  Patient Education Response: Verbalizes understanding     D/C Recommendations: Ongoing speech therapy is recommended during this hospitalization           Goals:  Long Term Goals  Goal 1: The patient will develop functional, cognitive-linguistic-based skills and utilize compensatory strategies to  communicate wants and needs effectively to different conversational partners, maintain safety and  participate socially in functional living environment  Goal 2: The patient will demonstrate functional problem solving skills and provide appropriate solutions to problems  in order to improve safety and awareness in functional living environment.   Short Term Goals  Goal 1: The patient will complete the Cognitive Linguistic Quick Test or CLQT. Goal 2: The patient will complete higher level cognitive tasks with minimal cues. Goal 3: The patient will complete tasks for problem solving and safety awareness with miniml cues. Patient/family involved in developing goals and treatment plan: yes    Subjective:  Previous level of function and limitations: He states he was independent prior to this hospitalization. Vision  Vision: Within Functional Limits  Hearing  Hearing: Within functional limits           Objective:  Oral Motor   Labial: Left droop; Decreased seal;Decreased rate  Dentition: Natural  Lingual: Decreased strength;Decreased rate    Motor Speech  Overall Impairment Severity: Mild    Auditory Comprehension  Basic Questions: WFL  One Step Commands: WFL  Two Step Commands: WFL  Common Objects: WFL  Pictures: WFL  L/R Discrimination: WFL  Conversation: WFL    Reading Comprehension  Reading Status: Within functional limits    Expression  Primary Mode of Expression: Verbal    Verbal Expression  Initiation: WFL  Repetition: WFL  Automatic Speech: WFL  Confrontation: WFL  Divergent: WFL    Written Expression  Dominant Hand: Right  Written Expression: Within Functional Limits        Cognition:     Attention  Alternating Attention: Moderate  Divided Attention: Moderate  Selective Attention: Mild  Sustained Attention: Mild  Memory  Memory: Exceptions to [facility]  Short-term Memory:  (Appears WFL, however, will complete further assessment during therapy)  Working Memory:  (Will continue to assess during therapy)  Numeric Reasoning  Calculations: WFL  Time: WFL    Additional Assessments:  Portions of the RIPA and WAB were completed. Portions of the CLQT were also completed. The remaining subtests of the CLQT will be completed at a later date.           Prognosis:  Good    Education:  Patient Education Response: Verbalizes understanding                  Electronically signed by RASHMI Bazzi on 3/11/2023 at 2:16 PM

## 2023-03-11 NOTE — PROGRESS NOTES
[employer] Certificate    Demetris Dane (S.0.) has been cleared to assist Sandy Ladcharlette with the following activities:    Bed Transfers:  Yes     Toilet Transfers:  Yes     W/C Transfers:  Yes     Walking:  Yes         Electronically signed by Cholo Carter, PT on 3/11/2023 at 9:53 AM

## 2023-03-12 PROCEDURE — 6370000000 HC RX 637 (ALT 250 FOR IP): Performed by: PSYCHIATRY & NEUROLOGY

## 2023-03-12 PROCEDURE — 94760 N-INVAS EAR/PLS OXIMETRY 1: CPT

## 2023-03-12 PROCEDURE — 1180000000 HC REHAB R&B

## 2023-03-12 RX ADMIN — LISINOPRIL 10 MG: 10 TABLET ORAL at 21:11

## 2023-03-12 RX ADMIN — AMLODIPINE BESYLATE 10 MG: 10 TABLET ORAL at 07:47

## 2023-03-12 RX ADMIN — HYDRALAZINE HYDROCHLORIDE 25 MG: 25 TABLET, FILM COATED ORAL at 21:11

## 2023-03-12 RX ADMIN — HYDRALAZINE HYDROCHLORIDE 25 MG: 25 TABLET, FILM COATED ORAL at 13:47

## 2023-03-12 RX ADMIN — SENNOSIDES AND DOCUSATE SODIUM 1 TABLET: 50; 8.6 TABLET ORAL at 21:11

## 2023-03-12 RX ADMIN — METOPROLOL TARTRATE 50 MG: 50 TABLET, FILM COATED ORAL at 21:11

## 2023-03-12 RX ADMIN — PANTOPRAZOLE SODIUM 40 MG: 40 TABLET, DELAYED RELEASE ORAL at 05:59

## 2023-03-12 RX ADMIN — DOXAZOSIN 4 MG: 4 TABLET ORAL at 07:47

## 2023-03-12 RX ADMIN — SENNOSIDES AND DOCUSATE SODIUM 1 TABLET: 50; 8.6 TABLET ORAL at 07:47

## 2023-03-12 RX ADMIN — METOPROLOL TARTRATE 50 MG: 50 TABLET, FILM COATED ORAL at 07:47

## 2023-03-12 RX ADMIN — HYDRALAZINE HYDROCHLORIDE 25 MG: 25 TABLET, FILM COATED ORAL at 05:59

## 2023-03-12 RX ADMIN — SERTRALINE HYDROCHLORIDE 50 MG: 50 TABLET ORAL at 07:47

## 2023-03-12 NOTE — THERAPY DISCHARGE NOTE
Acute Care - Physical Therapy Discharge Summary  [facility]       Patient Name: James Taylor  : 1980  MRN: [id number]    Today's Date: 3/12/2023                 Admit Date: 3/4/2023      PT Recommendation and Plan    Visit Dx:    ICD-10-CM ICD-9-CM   1. Intracranial bleeding (HCC)  I62.9 432.9   2. Facial droop  R29.810 781.94   3. Left arm weakness  R29.898 729.89   4. Acute nonintractable headache, unspecified headache type  R51.9 784.0   5. History of hypertension  Z86.79 V12.59   6. Cerebral edema (HCC)  G93.6 348.5   7. Midline shift of brain  R90.89 793.0   8. Dysphagia, unspecified type  R13.10 787.20   9. Impaired mobility  Z74.09 799.89   10. Decreased activities of daily living (ADL)  Z78.9 V49.89        Outcome Measures     Row Name 03/10/23 1300 03/10/23 0915 23 1315       How much help from another person do you currently need...    Turning from your back to your side while in flat bed without using bedrails? -- 4  -TB 4  -TB    Moving from lying on back to sitting on the side of a flat bed without bedrails? -- 4  -TB 4  -TB    Moving to and from a bed to a chair (including a wheelchair)? -- 4  -TB 3  -TB    Standing up from a chair using your arms (e.g., wheelchair, bedside chair)? -- 4  -TB 4  -TB    Climbing 3-5 steps with a railing? -- 3  -TB 3  -TB    To walk in hospital room? -- 3  -TB 3  -TB    AM-PAC 6 Clicks Score (PT) -- 22  -TB 21  -TB       How much help from another is currently needed...    Putting on and taking off regular lower body clothing? 2  -AC (r) EC (t) AC (c) -- --    Bathing (including washing, rinsing, and drying) 2  -AC (r) EC (t) AC (c) -- --    Toileting (which includes using toilet bed pan or urinal) 3  -AC (r) EC (t) AC (c) -- --    Putting on and taking off regular upper body clothing 3  -AC (r) EC (t) AC (c) -- --    Taking care of personal grooming (such as brushing teeth) 3  -AC (r) EC (t) AC (c) -- --    Eating meals 4  -AC (r) EC (t) AC (c) -- --     AM-PAC 6 Clicks Score (OT) 17  -AC (r) EC (t) -- --       Functional Assessment    Outcome Measure Options AM-PAC 6 Clicks Daily Activity (OT)  -AC (r) EC (t) AC (c) AM-PAC 6 Clicks Basic Mobility (PT)  -TB AM-PAC 6 Clicks Basic Mobility (PT)  -TB          User Key  (r) = Recorded By, (t) = Taken By, (c) = Cosigned By    Initials Name Provider Type    AC Ron Solis, OTR/L, CNT Occupational Therapist    TB Saud Raza PTA Physical Therapist Assistant    EC Kym Arellano, OT Student OT Student                     PT Rehab Goals     Row Name 03/12/23 0752             Bed Mobility Goal 1 (PT)    Activity/Assistive Device (Bed Mobility Goal 1, PT) bed mobility activities, all  -SANTANA      Bennett Level/Cues Needed (Bed Mobility Goal 1, PT) standby assist  -SANTANA      Time Frame (Bed Mobility Goal 1, PT) 10 days  -SANTANA      Progress/Outcomes (Bed Mobility Goal 1, PT) goal not met  -SANTANA         Transfer Goal 1 (PT)    Activity/Assistive Device (Transfer Goal 1, PT) sit-to-stand/stand-to-sit  -SANTANA      Bennett Level/Cues Needed (Transfer Goal 1, PT) standby assist  -SANTANA      Time Frame (Transfer Goal 1, PT) 10 days  -SANTANA      Progress/Outcome (Transfer Goal 1, PT) goal not met  -SANTANA         Gait Training Goal 1 (PT)    Activity/Assistive Device (Gait Training Goal 1, PT) gait (walking locomotion);decrease fall risk;diminish gait deviation;improve balance and speed;decrease asymmetrical patterns  -SANTANA      Bennett Level (Gait Training Goal 1, PT) contact guard required  -SANTANA      Distance (Gait Training Goal 1, PT) 75ft  -SANTANA      Time Frame (Gait Training Goal 1, PT) 10 days  -SANTANA      Progress/Outcome (Gait Training Goal 1, PT) goal met  -SANTANA            User Key  (r) = Recorded By, (t) = Taken By, (c) = Cosigned By    Initials Name Provider Type Rob Greenberg, PTA Physical Therapist Assistant PT                    PT Discharge Summary  Anticipated Discharge Disposition (PT): inpatient  rehabilitation facility  Reason for Discharge: Discharge from facility  Outcomes Achieved: Refer to plan of care for updates on goals achieved  Discharge Destination: Inpatient rehabilitation facility      Rob Goss, PTA   3/12/2023

## 2023-03-13 LAB
ANION GAP SERPL CALCULATED.3IONS-SCNC: 11 MMOL/L (ref 7–19)
BASOPHILS ABSOLUTE: 0.1 K/UL (ref 0–0.2)
BASOPHILS RELATIVE PERCENT: 1 % (ref 0–1)
BUN BLDV-MCNC: 17 MG/DL (ref 6–20)
CALCIUM SERPL-MCNC: 9 MG/DL (ref 8.6–10)
CHLORIDE BLD-SCNC: 104 MMOL/L (ref 98–111)
CO2: 26 MMOL/L (ref 22–29)
CREAT SERPL-MCNC: 1.1 MG/DL (ref 0.5–1.2)
EOSINOPHILS ABSOLUTE: 0.4 K/UL (ref 0–0.6)
EOSINOPHILS RELATIVE PERCENT: 4.1 % (ref 0–5)
GFR SERPL CREATININE-BSD FRML MDRD: >60 ML/MIN/{1.73_M2}
GLUCOSE BLD-MCNC: 105 MG/DL (ref 74–109)
HCT VFR BLD CALC: 30.7 % (ref 42–52)
HEMOGLOBIN: 10.7 G/DL (ref 14–18)
IMMATURE GRANULOCYTES #: 0.1 K/UL
LYMPHOCYTES ABSOLUTE: 3.1 K/UL (ref 1.1–4.5)
LYMPHOCYTES RELATIVE PERCENT: 35.7 % (ref 20–40)
MCH RBC QN AUTO: 31.7 PG (ref 27–31)
MCHC RBC AUTO-ENTMCNC: 34.9 G/DL (ref 33–37)
MCV RBC AUTO: 90.8 FL (ref 80–94)
MONOCYTES ABSOLUTE: 1.1 K/UL (ref 0–0.9)
MONOCYTES RELATIVE PERCENT: 12.6 % (ref 0–10)
NEUTROPHILS ABSOLUTE: 4 K/UL (ref 1.5–7.5)
NEUTROPHILS RELATIVE PERCENT: 46 % (ref 50–65)
PDW BLD-RTO: 12.6 % (ref 11.5–14.5)
PLATELET # BLD: 175 K/UL (ref 130–400)
PMV BLD AUTO: 10.6 FL (ref 9.4–12.4)
POTASSIUM SERPL-SCNC: 3.7 MMOL/L (ref 3.5–5)
RBC # BLD: 3.38 M/UL (ref 4.7–6.1)
SODIUM BLD-SCNC: 141 MMOL/L (ref 136–145)
WBC # BLD: 8.6 K/UL (ref 4.8–10.8)

## 2023-03-13 PROCEDURE — 85025 COMPLETE CBC W/AUTO DIFF WBC: CPT

## 2023-03-13 PROCEDURE — 80048 BASIC METABOLIC PNL TOTAL CA: CPT

## 2023-03-13 PROCEDURE — 99231 SBSQ HOSP IP/OBS SF/LOW 25: CPT | Performed by: PSYCHIATRY & NEUROLOGY

## 2023-03-13 PROCEDURE — 97116 GAIT TRAINING THERAPY: CPT

## 2023-03-13 PROCEDURE — 94760 N-INVAS EAR/PLS OXIMETRY 1: CPT

## 2023-03-13 PROCEDURE — 97530 THERAPEUTIC ACTIVITIES: CPT

## 2023-03-13 PROCEDURE — 97110 THERAPEUTIC EXERCISES: CPT

## 2023-03-13 PROCEDURE — 1180000000 HC REHAB R&B

## 2023-03-13 PROCEDURE — 97129 THER IVNTJ 1ST 15 MIN: CPT

## 2023-03-13 PROCEDURE — 6370000000 HC RX 637 (ALT 250 FOR IP): Performed by: PSYCHIATRY & NEUROLOGY

## 2023-03-13 PROCEDURE — 97130 THER IVNTJ EA ADDL 15 MIN: CPT

## 2023-03-13 PROCEDURE — 36415 COLL VENOUS BLD VENIPUNCTURE: CPT

## 2023-03-13 RX ADMIN — HYDRALAZINE HYDROCHLORIDE 25 MG: 25 TABLET, FILM COATED ORAL at 13:50

## 2023-03-13 RX ADMIN — BUSPIRONE HYDROCHLORIDE 5 MG: 5 TABLET ORAL at 22:10

## 2023-03-13 RX ADMIN — AMLODIPINE BESYLATE 10 MG: 10 TABLET ORAL at 07:40

## 2023-03-13 RX ADMIN — PANTOPRAZOLE SODIUM 40 MG: 40 TABLET, DELAYED RELEASE ORAL at 16:52

## 2023-03-13 RX ADMIN — METOPROLOL TARTRATE 50 MG: 50 TABLET, FILM COATED ORAL at 07:40

## 2023-03-13 RX ADMIN — LISINOPRIL 10 MG: 10 TABLET ORAL at 22:10

## 2023-03-13 RX ADMIN — ERGOCALCIFEROL 50000 UNITS: 1.25 CAPSULE ORAL at 07:40

## 2023-03-13 RX ADMIN — SENNOSIDES AND DOCUSATE SODIUM 1 TABLET: 50; 8.6 TABLET ORAL at 07:41

## 2023-03-13 RX ADMIN — SENNOSIDES AND DOCUSATE SODIUM 1 TABLET: 50; 8.6 TABLET ORAL at 22:10

## 2023-03-13 RX ADMIN — SERTRALINE HYDROCHLORIDE 50 MG: 50 TABLET ORAL at 07:40

## 2023-03-13 RX ADMIN — METOPROLOL TARTRATE 50 MG: 50 TABLET, FILM COATED ORAL at 22:10

## 2023-03-13 RX ADMIN — HYDRALAZINE HYDROCHLORIDE 25 MG: 25 TABLET, FILM COATED ORAL at 22:14

## 2023-03-13 RX ADMIN — DOXAZOSIN 4 MG: 4 TABLET ORAL at 07:40

## 2023-03-13 RX ADMIN — PANTOPRAZOLE SODIUM 40 MG: 40 TABLET, DELAYED RELEASE ORAL at 06:14

## 2023-03-13 RX ADMIN — HYDRALAZINE HYDROCHLORIDE 25 MG: 25 TABLET, FILM COATED ORAL at 06:14

## 2023-03-13 NOTE — PROGRESS NOTES
[facility]  [facility]PATIENT SPEECH [facility]ERAPY  [facility] 8 REHAB UNIT  TIME   0900  1000  Minutes: 60      [x]Daily Note  []Progress Note    Date: 3/13/2023  Patient Name: Hattie Sullivan        MRN: [id number]    Account #: [de-identified]  : 1980  (43 y.o.)  Gender: male   Primary Provider: Radha rFy MD  Swallowing Status/Diet:  Diet: Regular diet, thin liquids      PATIENT DIAGNOSIS(ES):    Diagnosis: RIGHT BG HEMORRHAGIC CVA; ETOH WITHDRAWAL POST CVA     Additional Pertinent Hx: HTN, ASTHMA    RESTRICTIONS/PRECAUTIONS:    Restrictions/Precautions  Restrictions/Precautions: Fall Risk, Aspiration Risk, Weight Bearing          Subjective:  He is motivated to improve. He states his mother is still in the hospital and will be helping her after discharge. Objective:  Mild dysarthria is noted this morning. He denies any decreased oral or facial sensation. Left facial droop is still noted. Delayed responses and slow processing was observed during mazes. High lighters and colored pens may be used for mazes during future sessions. He still had difficulty with mazes today, however, with a red ink pen he could complete the maze more easily than Saturday's session. He completed a word search as well to determine if he is having difficulty with scanning. He was able to complete this with minimal cues. He did exhibit delayed processing. He was asked to recall and draw a complex design. He scored at 70%. Following complex written directions was at 100%. The remaining sub tests of the CLQT were completed today. The Cognitive Linguistic Quick Test (CLQT) was developed for use with adults with acquired neurological dysfunction. This individually administered test is designed to gain information about cognitive-linguistic domains, a total composite severity rating and a clock drawing severity rating.  The CLQT consists of ten tasks: Personal facts, Symbol cancellation, Confrontation naming, Clock drawing, Story retelling, Symbol trails, Generative naming, Design memory, Mazes, and Design generation. CLQT  Cognitive Domain Scoring Range Score Severity   Attention 179-125 134 Mild   Memory 140-110 139 Moderate   Executive Function 19-16 17 Moderate   Language 37-29 31 WNL   Visuospatial 81-52 55 Mild   Composite 3.4-2.5 2.8 Mild       He scored a 2.8 out of a possible 4.0 on the CLQT which is considered mild. Due to delayed processing and slow responses with higher level cognitive tasks, recommend he continue speech therapy services. Recommended Diet and Intervention  Regular diet  Thin liquids  Therapeutic Interventions: Pharyngeal exercises; Patient/Family education;Oral care;Oral motor exercises;Diet tolerance monitoring     Compensatory Swallowing Strategies  Compensatory Swallowing Strategies : Alternate solids and liquids; Check for pocketing of food on the Left;Eat/Feed slowly; Lingual sweep;Remain upright for 30-45 minutes after meals;Small bites/sips;Upright as possible for all oral intake       SHORT TERM GOAL #1:  Goal 1: The patient will complete the Cognitive Linguistic Quick Test or CLQT. SHORT TERM GOAL #2:  Goal 2: The patient will complete higher level cognitive tasks with minimal cues. SHORT TERM GOAL #3:  Goal 3: The patient will complete tasks for problem solving and safety awareness with miniml cues. Swallowing Short Term Goals  Short-term Goals  Goal 1: The patient will tolerate a regular diet with thin liquids with minimal overt s/s of aspiration. Goal 2: The patient will use a lingual search/sweep to clear food from the oral cavity. Goal 3: The patient will complete daily oral-motor exercise to increase labial and lingual function and ROM/strength with minimal verbal, tactile and visual cues.   Goal 4: The patient will complete pharyngeal strengthening exercises to aid in airway protection and minimize laryngeal penetration and aspiration with minimal verbal cues.        ASSESSMENT:  Assessment: [x]Progressing towards goals          []Not Progressing towards goals    Patient Tolerance of Treatment:   [x]Tolerated well []Tolerated fair []Required rest breaks []Fatigued    Education:  Learner:  [x]Patient          []Significant Other          []Other  Education provided regarding:  [x]Goals and POC   []Diet and swallowing precautions    []Home Exercise Program  []Progress and/or discharge information  Method of Education:  [x]Discussion          []Demonstration          []Handout          []Other  Evaluation of Education:   [x]Verbalized understanding   []Demonstrates without assistance  []Demonstrates with assistance  []Needs further instruction     []No evidence of learning                  []No family present      Plan: [x]Continue with current plan of care    []Modify current plan of care as follows:    []Discharge patient    Discharge Location:    Services/Supervision Recommended:      [x]Patient continues to require treatment by a licensed therapist to address functional deficits as outlined in the established plan of care.                   Electronically Signed By:  Everardo Og M.S., CCC-SLP  3/13/2023,7:42 AM.

## 2023-03-13 NOTE — PROGRESS NOTES
Facility/Department: [facility] 8 REHAB UNIT  Occupational Therapy Treatment Note    Name: Catia Beckford  : 1980  MRN: [id number]  Date of Service: 3/13/2023    Discharge Recommendations:  Continue to assess pending progress          Patient Diagnosis(es): There were no encounter diagnoses. Past Medical History:  has no past medical history on file. Past Surgical History:  has no past surgical history on file. Treatment Diagnosis: R CVA      Assessment   Performance deficits / Impairments: Decreased functional mobility ; Decreased ADL status; Decreased ROM; Decreased strength;Decreased safe awareness;Decreased cognition;Decreased endurance;Decreased high-level IADLs;Decreased coordination;Decreased fine motor control  Activity Tolerance  Activity Tolerance: Patient Tolerated treatment well            Plan   Occupational Therapy Plan  Specific Instructions for Next Treatment: dynamic standing acts, L GM/FM acts, PNF HEP  Current Treatment Recommendations: Strengthening, ROM, Balance training, Functional mobility training, Endurance training, Safety education & training, Patient/Caregiver education & training, Equipment evaluation, education, & procurement, Home management training, Self-Care / ADL     Restrictions  Restrictions/Precautions  Restrictions/Precautions: Fall Risk, Aspiration Risk, Weight Bearing  Lower Extremity Weight Bearing Restrictions  Right Lower Extremity Weight Bearing: Weight Bearing As Tolerated  Left Lower Extremity Weight Bearing: Weight Bearing As Tolerated    Subjective   General  Chart Reviewed: Yes  Patient assessed for rehabilitation services?: Yes  Diagnosis: R CVA  General Comment  Comments: stated being tired this session      23 1300   Pre Treatment Pain Screening   Scale Used Numeric Score   Pain at present 0   General Comment   Comments stated being tired this session         Objective   Functional Mobility  Functional - Mobility Device: No device  Activity: To/From therapy gym, Other  Assist Level: Contact guard assistance (CGA/SBA)  Functional Mobility Comments: I LOB when turning---able to self correct    Safety Devices  Type of Devices: Left in bed;Call light within reach     Transfers  Sit to stand: Stand by assistance  Stand to sit: Stand by assistance    Exercise Treatment: arm pulley--3 [id number], 2# dowel--all planes, 1 set, 20reps, cues to maintain L grasp, PNF HEP--2 [id number], all planes  Motor Control/Coordination: L FM task with moderate difficulty    Balance  Sitting Balance: Independent (~10 mins EOM)      Goals  Short Term Goals  Time Frame for Short Term Goals: 1 week  Short Term Goal 1: Supervision with clothing management/hygiene for toileting. Short Term Goal 2: Supervision with toilet transfers. Short Term Goal 3: Supervision with LB dressing. Short Term Goal 4: Supervision with ambulatory homemaking tasks. Short Term Goal 5: Patient will complete LUE exercises at least 5x to improve independence with ADLs. Long Term Goals  Time Frame for Long Term Goals : 2 weeks  Long Term Goal 1: Modified independent with bathing hygiene. Long Term Goal 2: Modified independent with toileting and toilet transfers. Long Term Goal 3: Modified independent with overall dressing. Long Term Goal 4: Modified independent with ambulatory homemaking tasks. Long Term Goal 5: Patient verabalize DME needs. Long Term Goal 6: Independent with HEP. Patient Goals   Patient goals : Return home independently.        Therapy Time   Individual Concurrent Group Co-treatment   Time In 1300         Time Out 1400         Minutes 60         Timed Code Treatment Minutes: 60 Minutes       Electronically signed by Madyson Duval OT on 3/13/2023 at 3:14 PM

## 2023-03-13 NOTE — PAYOR COMM NOTE
"REF   [id number]    [facility]  DONTE,  [phone]  OR  FAX   [phone]     James Taylor (42 y.o. Male)     Date of Birth   1980    Social Security Number       Address   [address] [place] [address]  [place] [address]    Home Phone   [phone]    MRN   [id number]       Sabianist   Quaker    Marital Status   Single                            Admission Date   3/4/23    Admission Type   Emergency    Admitting Provider   Brandon Gannon MD    Attending Provider       Department, Room/Bed   [facility] 3A, 345/1       Discharge Date   3/10/2023    Discharge Disposition   Rehab Facility or Unit (DC - External)    Discharge Destination                               Attending Provider: (none)   Allergies: No Known Allergies    Isolation: None   Infection: None   Code Status: Prior    Ht: 177.8 cm (70\")   Wt: 67.6 kg (149 lb)    Admission Cmt: None   Principal Problem: Intracranial bleeding (HCC) [I62.9]                 Active Insurance as of 3/4/2023     Primary Coverage     Payor Plan Insurance Group Employer/Plan Group    [place] [id number]     Payor Plan Address Payor Plan Phone Number Payor Plan Fax Number Effective Dates    [employer] [address] [address] [phone]  1/1/2021 - None Entered    [facility] [id number]       Subscriber Name Subscriber Birth Date Member ID       JAMES TAYLOR 1980 [id number]                 Emergency Contacts      (Rel.) Home Phone Work Phone Mobile Phone    Jesus Taylor (Mother) [phone] -- --    Alfredo Taylor (Brother) -- -- [phone]    LanceGuillermina (Sister) -- -- [phone]               Discharge Summary      Feliciano Boothe APRN at 03/10/23 1414            Date of Discharge:  3/10/2023    Discharge Diagnosis: Hemorrhagic CVA    Presenting Problem/History of Present Illness  Intracranial bleeding (HCC) [I62.9]  Intracranial bleeding (HCC) [I62.9]       Hospital Course  Patient is a 42 y.o. " male presented with trouble with speaking and with weakness on the left side.  He is brought to the emergency room where he was found to have a right basal ganglia hemorrhagic CVA.  The patient was also found to be hypertensive.  He been working with PCP in regards to his hypertension.  He was also complaining of a headache.  He denies any nausea vomiting.  The patient did go through serial imaging which did show stable appearance of the hemorrhage.  He did appear to go through alcohol withdrawal as well.  Hospitalist was consulted to follow the patient for his hypertension as well as alcohol withdrawal management.  The patient did have significant weakness in the left upper and lower extremity.  He has been working with physical and Occupational Therapy and has been making improvement.  His blood pressure is under better control.  It is felt that this on the patient is stable and suitable to be discharged to [facility] rehab for further rehabilitation to help him get over the effects of the hemorrhagic CVA.  It is recommended that he keep his blood pressure less than 150.  We will follow-up with him in 3 weeks with repeat CT scan of the head.  He will need to follow-up with his primary care doctor post hospital stay as well..      Procedures Performed         Consults:   Consults     Date and Time Order Name Status Description    3/4/2023  7:21 AM Inpatient Hospitalist Consult              Condition on Discharge: Stable    Vital Signs  Temp:  [97.4 °F (36.3 °C)-98.6 °F (37 °C)] 98 °F (36.7 °C)  Heart Rate:  [57-77] 57  Resp:  [16-20] 17  BP: (113-147)/(65-94) 113/65    Physical Exam:   Physical Exam  Vitals and nursing note reviewed.   HENT:      Head: Normocephalic.      Nose: Nose normal.      Mouth/Throat:      Mouth: Mucous membranes are moist.   Eyes:      Extraocular Movements: EOM normal.      Pupils: Pupils are equal, round, and reactive to light.   Cardiovascular:      Rate and Rhythm: Normal rate and regular  rhythm.   Pulmonary:      Effort: Pulmonary effort is normal. No respiratory distress.   Abdominal:      General: Bowel sounds are normal. There is no distension.      Palpations: Abdomen is soft.   Skin:     General: Skin is warm and dry.   Neurological:      Mental Status: He is alert and oriented to person, place, and time.      Cranial Nerves: Cranial nerve deficit present.      Motor: Weakness present.   Psychiatric:         Mood and Affect: Affect is flat.         Speech: Speech is delayed.         Behavior: Behavior is slowed.         Judgment: Judgment is not impulsive.          Neurologic Exam     Mental Status   Oriented to person, place, and time.   Attention: normal. Concentration: normal.   Level of consciousness: arousable by verbal stimuli ,  alert  Normal comprehension.     Cranial Nerves     CN II   Visual fields full to confrontation.     CN III, IV, VI   Pupils are equal, round, and reactive to light.  Extraocular motions are normal.     CN V   Facial sensation intact.     CN VII   Right facial weakness: none  Left facial weakness: central    CN VIII   CN VIII normal.     CN IX, X   CN IX normal.   CN X normal.     CN XI   CN XI normal.     CN XII   Tongue: not atrophic  Tongue deviation: left    Motor Exam   Muscle bulk: normal    Strength   Strength 5/5 except as noted. Left hemiparesis with upper and lower extremity being 4 out of 5 in all muscle groups with ataxic movements    Right upper and lower extremity 5 out of 5 in all muscle groups     Sensory Exam   Light touch normal.     Gait, Coordination, and Reflexes Ambulating with assistance          Discharge Disposition  Rehab Facility or Unit (DC - External)    Discharge Medications     Discharge Medications      New Medications      Instructions Start Date   hydrALAZINE 25 MG tablet  Commonly known as: APRESOLINE   25 mg, Oral, Every 8 Hours Scheduled      metoprolol tartrate 50 MG tablet  Commonly known as: LOPRESSOR   50 mg, Oral, Every  12 Hours Scheduled      nicotine 21 MG/24HR patch  Commonly known as: NICODERM CQ   1 patch, Transdermal, Every 24 Hours      pantoprazole 40 MG EC tablet  Commonly known as: PROTONIX   40 mg, Oral, 2 Times Daily Before Meals      terazosin 5 MG capsule  Commonly known as: HYTRIN   5 mg, Oral, Nightly         Changes to Medications      Instructions Start Date   amLODIPine 10 MG tablet  Commonly known as: NORVASC  What changed: additional instructions   10 mg, Oral, Daily   Start Date: March 11, 2023     lisinopril 10 MG tablet  Commonly known as: PRINIVILZESTRIL  What changed:   · medication strength  · how much to take  · when to take this  · additional instructions   10 mg, Oral, Nightly         Continue These Medications      Instructions Start Date   busPIRone 5 MG tablet  Commonly known as: BUSPAR   5 mg, Oral, 3 Times Daily PRN      sertraline 50 MG tablet  Commonly known as: Zoloft   50 mg, Oral, Daily, To help with mood. Take with food      vitamin D 1.25 MG (85953 UT) capsule capsule  Commonly known as: ERGOCALCIFEROL   50,000 Units, Oral, Weekly, To treat low vitamin D. Take with food.             Discharge Diet:   Diet Instructions     Diet: Regular; Thin      Discharge Diet: Regular    Fluid Consistency: Thin          Activity at Discharge:   Activity Instructions     Other Activity Restrictions      Type of Restriction: Other    Explain Other Restrictions: No driving.  No strenuous activity    Other Instructions (Specify)      Activity Instructions: No strenuous activity, no driving          Follow-up Appointments  Future Appointments   Date Time Provider Department Center   4/11/2023  2:00 PM Yudelka Taylor APRN [facility]     Additional Instructions for the Follow-ups that You Need to Schedule     Call MD With Problems / Concerns   As directed      Instructions: Worsening headache, drainage from wound, fever, confusion    Order Comments: Instructions: Worsening headache, drainage from  wound, fever, confusion          Discharge Follow-up with Specialty: jelly boothe np; 3 Weeks   As directed      Specialty: jelly boothe np    Follow Up: 3 Weeks    Follow Up Details: CT scan to be done on day of appointment with Jelly         CT Head Without Contrast   Mar 15, 2023      To be done on day of appointment with Jelly Boothe in 3 weeks    To be done on day of appointment with Jelly Boothe in 3 weeks    Order Comments: To be done on day of appointment with Jelly Boothe in 3 weeks     STEREOTACTIC GUIDANCE PROTOCOL?: No               Test Results Pending at Discharge       NAM Lopez  03/10/23  14:14 CST    Time: Discharge 35 min            Electronically signed by Jelly Boothe APRN at 03/10/23 1419       Discharge Order (From admission, onward)     Start     Ordered    03/10/23 1412  Discharge patient  Once        Expected Discharge Date: 03/10/23    Discharge Disposition: Rehab Facility or Unit (DC - External)    Physician of Record for Attribution - Please select from Treatment Team: EDWARD JOHNS [[id number]]    Review needed by CMO to determine Physician of Record: No       Question Answer Comment   Physician of Record for Attribution - Please select from Treatment Team EDWARD JOHNS    Review needed by CMO to determine Physician of Record No        03/10/23 141

## 2023-03-13 NOTE — PROGRESS NOTES
Patient:   Leno Benavidez  MR#:    [id number]   Room:    0564/333-13   YOB: 1980  Date of Progress Note: 3/13/2023  Time of Note                           11:31 AM  Consulting Physician:   Zully Haines M.D. Attending Physician:  Zully Haines MD     CHIEF COMPLAINT: Left-sided weakness, ataxia and dysarthria     Subjective  This 43 y.o. male  with history of asthma, HTN, tobacco abuse, and ETOH abuse. He presented to [facility] ER on 3/4/23 with c/o weakness and difficulty speaking. CT of head revealed a right basal ganglia hemorrhagic stroke. He has a history of malignant hypertension that is difficult to control, this is followed by his PCP Dr. Kade Carrion. He was admitted with ICH to neurosurgeon Dr. Kelli Lockett with consult for Hospitalist. He was noted to have left sided weakness, ataxia, and dysarthria. Lab work shows triglycerides elevated at 589, HDL low at 24, VLDL elevated at 77. Glucose 110, alk phos 190, , . INR 1.22. CBC is unremarkable. CT angiogram of the neck shows patent vertebral arteries with calcified and noncalcified plaque in both carotids. CT angiogram of the head showed no major branch occlusion or aneurysm. CT of the head showed acute hemorrhage in the right basal ganglia measuring 4.7 x 1.8 cm with mild surrounding edema with a lateral ventricular shift to the left at 2.9 mm. He was placed on Cardene drip for systolic BP greater than 128. started on 10 mg of amlodipine daily as well as metoprolol 25 mg twice daily. Initially nursing was able to wean him from Cardene drip. But that drip has been restarted today because of elevated blood pressure outside goal range. Metoprolol was  increased to 50 mg p.o. twice daily and Hytrin 5 mg daily were added to his regimen by Hospitalist Dr. Kirill Bowman. Repeat CT scan of the head  on 3/5/23 shows left shift slightly increased to 4 mm with no change in the hemorrhage.  patient was going through alcohol withdrawals, was having hallucinations, aggitated and confused. CIWA protocol initiated was intiated on 3/5/23. Blood pressure improving with the oral medications, now off Cardene drip. Repeat CT of head on 3/7/23 was stable. Patient was evaluated by SPT for swallow and placed on a mechanical soft diet with thin liquids. Patient's mental status has improved, he is A&O x 4, he does continue to have some dysarthria. He continues to have left sided weakness and is participating in both PT/OT. No complaints this morning  REVIEW OF SYSTEMS:  Constitutional: No fevers No chills  Neck:No stiffness  Respiratory: No shortness of breath  Cardiovascular: No chest pain No palpitations  Gastrointestinal: No abdominal pain    Genitourinary: No Dysuria  Neurological: No headache, no confusion      PHYSICAL EXAM:  BP (!) 164/87   Pulse 59   Temp 97.9 °F (36.6 °C) (Temporal)   Resp 16   Ht 5' 10\" (1.778 m)   Wt 156 lb (70.8 kg)   SpO2 100%   BMI 22.38 kg/m²     Constitutional: he appears well-developed and well-nourished. Eyes - conjunctiva normal.  Pupils react to light  Ear, nose, throat -hearing intact to voice. No scars, masses, or lesions over external nose or ears, no atrophy of tongue  Neck-symmetric, no masses noted, no jugular vein distension  Respiration- chest wall appears symmetric, good expansion,   normal effort without use of accessory muscles  Cardiovascular- RRR  Musculoskeletal - no significant wasting of muscles noted, no bony deformities, gait no gross ataxia  Extremities-no clubbing, cyanosis or edema  Skin - warm, dry, and intact. No rash, erythema, or pallor.   Psychiatric - mood, affect, and behavior appear normal.      Neurology  NEUROLOGICAL EXAM:      Mental status   Awake, alert, fluent oriented x 3 appropriate affect  Attention and concentration appear appropriate  Recent and remote memory appears unremarkable  Speech normal without dysarthria  No clear issues with language       Cranial Nerves   CN II- Visual fields grossly unremarkable  CN III, IV,VI-EOMI, No nystagmus, conjugate eye movements, no ptosis  CN VII-no facial asymmetry  CN VIII-Hearing intact    Motor function  Antigravity x 4.  Minimal left-sided weakness       Nursing/pcp notes, imaging,labs and vitals reviewed.         Lab Results   Component Value Date    WBC 8.6 03/13/2023    HGB 10.7 (L) 03/13/2023    HCT 30.7 (L) 03/13/2023    MCV 90.8 03/13/2023     03/13/2023     Lab Results   Component Value Date     03/13/2023    K 3.7 03/13/2023     03/13/2023    CO2 26 03/13/2023    BUN 17 03/13/2023    CREATININE 1.1 03/13/2023    GLUCOSE 105 03/13/2023    CALCIUM 9.0 03/13/2023    LABGLOM >60 03/13/2023   No results found for: INRNo results found for: PHENYTOIN, ESR, CRP    PT,OT and/or speech notes reviewed:  Objective   Functional Mobility  Functional - Mobility Device: No device  Activity: To/from bathroom  Assist Level: Contact guard assistance  Functional Mobility Comments: One LOB with self correction  Heart Rate: 60  Heart Rate Source: Monitor  BP: 135/89  MAP (Calculated): 104  Resp: 16  SpO2: 98 %  O2 Device: None (Room air)     Safety Devices  Type of Devices: Call light within reach;Left in chair (Left with s/o)     Transfers  Stand Step Transfers: Contact guard assistance  Sit to stand: Contact guard assistance  Stand to sit: Contact guard assistance  Hearing  Hearing: Within functional limits  Impression  Dysphagia Impression : No overt s/s of aspiration observed this date with consecutive straw sips of thin liquids. Good hyolaryngeal elevation and timely swallow responses were noted. He does exhibit decreased labial strength and range of motion. He did report some left sided drooling. He is recommended to continue a regular diet with thin liquids. Medications whole with water. He was encouraged to use a lingual search/sweep during and after meals to clear food from the oral cavity.        RECORD REVIEW: Previous medical  records, medications were reviewed at today's visit    IMPRESSION:      1. Right basal ganglia hemorrhage, hypertensive-blood pressure medications, PT/OT/SLP  2. Hypertension-hydralazine, lisinopril, Lopressor, Norvasc. Stable  3. Smoker-nicotine patch  4. GI-bowel regimen  5. Mood disorder-Zoloft  6. Hypovitaminosis D-replacement  7.   DVT prophylaxis-SCDs     EMMETTOS: Staff this week

## 2023-03-13 NOTE — PLAN OF CARE
Problem: Discharge Planning  Goal: Discharge to home or other facility with appropriate resources  3/13/2023 0927 by Ceci Hernandez RN  Outcome: Progressing  Flowsheets (Taken 3/13/2023 6306)  Discharge to home or other facility with appropriate resources: Refer to discharge planning if patient needs post-hospital services based on physician order or complex needs related to functional status, cognitive ability or social support system  3/13/2023 0200 by Yue Bonilla LPN  Outcome: Progressing

## 2023-03-13 NOTE — PATIENT CARE CONFERENCE
[facility] ACUTE INPATIENT REHABILITATION  TEAM CONFERENCE NOTE    Date: 3/15/2023  Patient Name: Sandy Jefferson        MRN: [id number]    : 1980  (43 y.o.)  Gender: male      Diagnosis: RIGHT BG HEMORRHAGIC CVA; ETOH WITHDRAWAL POST CVA      PHYSICAL THERAPY  GROSS ASSESSMENT       BED MOBILITY  Bed mobility  Rolling to Left: Independent  Rolling to Right: Independent  Supine to Sit: Independent  Sit to Supine: Independent  Scooting: Supervision  Bed Mobility Comments: triplanar for all movements withotu rails       TRANSFERS  Transfers  Sit to Stand: Supervision  Stand to Sit: Supervision  Bed to Chair: Stand by assistance, Independent  Car Transfer: Contact guard assistance  WHEELCHAIR PROPULSION     AMBULATION  Ambulation  Surface: Level tile  Device: No Device  Assistance: Stand by assistance  Quality of Gait: decreased Left knee extension and heel strike, imroved as amb progressed, deviation of path at times due to combination of pants sliding down and effort to increase armswing (difficulty coordinating with C/L LE)  Gait Deviations: Decreased arm swing, Deviated path (L UE)  Distance: 600'  Comments: frequent turns  More Ambulation?: Yes  STAIRS  Stairs  # Steps : 12  Stairs Height: 6\"  Rails: Bilateral  Assistance: Contact guard assistance, Stand by assistance  Comment: step too pattern, lead up Right, lead down Left, repeated twice 2nd attempt SBA  GOALS:  Short Term Goals  Time Frame for Short Term Goals: 3-5 DAYS  Short Term Goal 1: UP/DOWN 12 STEPS 1 RAIL SBA  Short Term Goal 2: AMBULATE 500 FT FLAT SURFACES WITH NORMAL RECIPROCAL ARM SWING WITHOUT LATERAL DEVIATION SBA  Short Term Goal 3: AMBULATE OUTSIDE ON UNEVEN TERRAIN INCLUDING GRASS WITHOTU A.D. MIN A  Short Term Goal 4: PEFORM BED MOBILITY INDEP    Long Term Goals  Time Frame for Long Term Goals : 7 DAYS  Long Term Goal 1: UP/DOWN 12 STEPS 1 RAILS INDEP  Long Term Goal 2: AMBULATE 500 FT FLAT SURFACES WITH NORMAL RECIPROCAL ARM SWING WITHOUT LATERAL DEVIATION INDEP  Long Term Goal 3: AMBULATE OUTSIDE ON UNEVEN TERRAIN INCLUDING GRASS WITHELYSE SOLER SBA  Long Term Goal 4: Rakesh SOLER INDEP    ASSESSMENT:  Assessment: Worked on engaging L UE with mobility. Pt noted to swing L UE more normally when amb to [facility] following bouts of balloon volley and ball toss. Proprioceptive issues when performing step ups with L LE leading as his foot placement on step was inconsistent and sometimes unsafe resulting in need for UE support during this activity. Mild lob on occasion with boxstep activity requiring cg@ and use of UE to steady self at wall on one occasion. SPEECH THERAPY  The CLQT was completed on 3/13/23. CLQT  Cognitive Domain Scoring Range Score Severity   Attention 179-125 134 Mild   Memory 140-110 139 Moderate   Executive Function 19-16 17 Moderate   Language 37-29 31 WNL   Visuospatial 81-52 55 Mild   Composite 3.4-2.5 2.8 Mild         He scored a 2.8 out of a possible 4.0 on the CLQT which is considered mild. Due to delayed processing and slow responses with higher level cognitive tasks, recommend he continue speech therapy services. Left facial asymmetry is still noted. Decreased control of saliva is noted at times. Today he completed trail making tests and exhibited some minimal difficulty. Will complete additional trail making tasks during a later session for increased processing speed. A word search was completed without cueing. Following complex written directions was at 100%. He was asked to recall sixteen words which were divided into four categories. He was able to recall fourteen of sixteen words with cues. With cues he was able to recall fifteen out of sixteen words. He was asked to draw a complex design from memory and had min to moderate difficulty. Providing words to complex definitions was at 100%.     No overt s/s of aspiration observed with consecutive sips of thin liquids via straw. Good hyolaryngeal elevation and timely swallow responses were noted. Clear voicing was noted. Reviewed lingual search/sweep today to be utilized during and after meals. No oral residue was noted after his noon meal. Reviewed and practiced oral motor exercises. Handout was provided for him to complete these in his room. Complex cancellation tasks were completed today and he scored at 100%. A second task for following complex written directions was completed and he scored at 100%. A complex convergent naming task was completed at 100%. Recommend continue speech therapy services for delayed processing, recall deficits and executive function deficits. Recommended Diet and Intervention  Regular diet  Thin liquids    Therapeutic Interventions: Pharyngeal exercises; Patient/Family education;Oral care;Oral motor exercises;Diet tolerance monitoring     Compensatory Swallowing Strategies  Compensatory Swallowing Strategies : Alternate solids and liquids; Check for pocketing of food on the Left;Eat/Feed slowly; Lingual sweep;Remain upright for 30-45 minutes after meals;Small bites/sips;Upright as possible for all oral intake             SHORT TERM GOAL #1:  Goal 1: The patient will complete the Cognitive Linguistic Quick Test or CLQT. Met    SHORT TERM GOAL #2:  Goal 2: The patient will complete higher level cognitive tasks with minimal cues. SHORT TERM GOAL #3:  Goal 3: The patient will complete tasks for problem solving and safety awareness with miniml cues. Swallowing Short Term Goals  Short-term Goals  Goal 1: The patient will tolerate a regular diet with thin liquids with minimal overt s/s of aspiration. Goal 2: The patient will use a lingual search/sweep to clear food from the oral cavity. Goal 3: The patient will complete daily oral-motor exercise to increase labial and lingual function and ROM/strength with minimal verbal, tactile and visual cues. Goal 4:  The patient will complete pharyngeal strengthening exercises to aid in airway protection and minimize laryngeal penetration and aspiration with minimal verbal cues. Long Term Goals: The patient will maintain adequate hydration/nutrition with optimum safety and efficiency of swallowing function with P.O. intake without overt signs and symptoms of aspiration for the highest appropriate diet level. The patient will develop functional, cognitive-linguistic-based skills and utilize compensatory strategies to  communicate wants and needs effectively to different conversational partners, maintain safety and  participate socially in functional living environment  The patient will demonstrate functional problem solving skills and provide appropriate solutions to problems  in order to improve safety and awareness in functional living environment.       STGs Met: 1    LTGs Met: 0      ELOS: 2-3 weeks    OCCUPATIONAL THERAPY  Cognitive Patterns:     Cognitive Assessment Method (CAM):  Confusion Assessment Method (CAM)  Is there evidence of an acute change in mental status from the patient's baseline?: Yes  Inattention: Behavior not present  Disorganized thinking: Behavior present, fluctuates (comes and goes, changes in severity)  Altered level of consciousness: Behavior not present  Health Literacy:  How often do you need to have someone help you when you read instructions, pamphlets, or other written material from your doctor or pharmacy?: Never        CURRENT IRF-OTONIEL SCORES  Eating: CARE Score: 5       Oral Hygiene: CARE Score: 5        Toileting: CARE Score: 5         Shower/Bathe: CARE Score: 4  Comment: SBA with shower        Upper Body Dressing: CARE Score: 4  Comment: VC for latrice-tech      Lower Body Dressing: CARE Score: 4          Footwear: CARE Score: 3          Toilet Transfers: CARE Score: 4  Comment: supervision, without AD       Picking Up Object:  CARE Score: 4          UE Functionin/11/23 1100   LUE AROM (degrees)   LUE AROM  WFL   LUE General AROM Full range of motion, but can only use LUE for active assist.   Left Hand AROM (degrees)   Left Hand AROM Exceptions   Left Hand General AROM Full ROM for flexion/extension, but impaired FM control and slow oppostion   RUE AROM (degrees)   RUE AROM  WFL   Right Hand AROM (degrees)   Right Hand AROM WFL       Pain Assessment:   No pain        STGs:  Short Term Goals  Time Frame for Short Term Goals: 1 week  Short Term Goal 1: Supervision with clothing management/hygiene for toileting. Short Term Goal 2: Supervision with toilet transfers. Short Term Goal 3: Supervision with LB dressing. Short Term Goal 4: Supervision with ambulatory homemaking tasks. Short Term Goal 5: Patient will complete LUE exercises at least 5x to improve independence with ADLs. LTGs:  Long Term Goals  Time Frame for Long Term Goals : 2 weeks  Long Term Goal 1: Modified independent with bathing hygiene. Long Term Goal 2: Modified independent with toileting and toilet transfers. Long Term Goal 3: Modified independent with overall dressing. Long Term Goal 4: Modified independent with ambulatory homemaking tasks. Long Term Goal 5: Patient verabalize DME needs. Long Term Goal 6: Independent with HEP. Assessment:  Performance deficits / Impairments: Decreased functional mobility , Decreased ADL status, Decreased ROM, Decreased strength, Decreased safe awareness, Decreased cognition, Decreased endurance, Decreased high-level IADLs, Decreased coordination, Decreased fine motor control                 NUTRITION  Current Wt: Weight: 156 lb (70.8 kg) / Body mass index is 22.38 kg/m². Admission Wt: Admission Body Weight: 156 lb 4 oz (70.9 kg)  Oral Diet Orders:   Regular; Low Fat/Low Chol/High Fiber/MARIKA  Oral Nutrition Supplement (ONS) Orders:  None  PO intake avg >75% and reported good appetite with stable wt status. No nutritional concerns.    Please see nutrition note for details. NURSING    Wounds/Incisions/Ulcers: No skin issues identified     Sin Scale Score: 19    Pain: No pain concerns to address    Consultations/Labs/X-rays:     Family Education: Family available and participating in education     Fall Risk:  Maritza Nolasco Total Score: 25    Fall in the last week?no      Other Nursing Issues: A/O x4. Left arm weakness. Cont B/B. BM 12. Left facial droop. SBA ambulates in room. Cardiac diet. SOCIAL WORK/CASE MANAGEMENT  Assessment: Not working prior, Reported to live with his mother-who is recently hospitalized- to define what his role is at home- is he caregiver for mother and what duties are necessary? Has girlfriend who is attentive and with him frequently for support    Discharge Plan   Estimated Length of Stay: 3/17/23  Destination: home with supervision  Pass: No    Services at Discharge: outpatient therapy, medical follow up  Equipment at Discharge: Will determine closer to discharge. Progress made in the prior week:  EVAL 3/11  2.   3.  4.  5.      Goals for following week:  INDEP TRANSFERS AND AMBULATION  2. Supervision with donning/doffing socks and shoes. 3.   4.   5.     Factors facilitating achievement of predicted outcomes: Motivated, Cooperative, and Pleasant    Barriers to the achievement of predicted outcomes: Limited safety awareness, Decreased endurance, Decreased sensation, Decreased proprioception, Upper extremity weakness, and Lower extremity weakness    Team Members Present at Conference:  : Zuleika Valenzuela, [address] [place] [address], OTR/L  Physical Therapist: Edil Back PT,DPT  Speech Therapist: Andrea Soliman, SLP  Nurse: Lakia Clifton RN,BSN   Nurse Manager:  Lakia Clifton RN, BSN  Dietitian:  Jessica Gillis, MS RD, LD  Rehab Director:         I approve the established interdisciplinary plan of care as documented within the medical record of [address] [place] [address].

## 2023-03-14 PROCEDURE — 99231 SBSQ HOSP IP/OBS SF/LOW 25: CPT | Performed by: PSYCHIATRY & NEUROLOGY

## 2023-03-14 PROCEDURE — 92526 ORAL FUNCTION THERAPY: CPT

## 2023-03-14 PROCEDURE — 1180000000 HC REHAB R&B

## 2023-03-14 PROCEDURE — 6370000000 HC RX 637 (ALT 250 FOR IP): Performed by: PSYCHIATRY & NEUROLOGY

## 2023-03-14 PROCEDURE — 97116 GAIT TRAINING THERAPY: CPT

## 2023-03-14 PROCEDURE — 94760 N-INVAS EAR/PLS OXIMETRY 1: CPT

## 2023-03-14 PROCEDURE — 97110 THERAPEUTIC EXERCISES: CPT

## 2023-03-14 PROCEDURE — 97129 THER IVNTJ 1ST 15 MIN: CPT

## 2023-03-14 PROCEDURE — 97130 THER IVNTJ EA ADDL 15 MIN: CPT

## 2023-03-14 PROCEDURE — 97530 THERAPEUTIC ACTIVITIES: CPT

## 2023-03-14 RX ADMIN — PANTOPRAZOLE SODIUM 40 MG: 40 TABLET, DELAYED RELEASE ORAL at 16:31

## 2023-03-14 RX ADMIN — MAGNESIUM HYDROXIDE 30 ML: 400 SUSPENSION ORAL at 13:28

## 2023-03-14 RX ADMIN — HYDRALAZINE HYDROCHLORIDE 25 MG: 25 TABLET, FILM COATED ORAL at 06:01

## 2023-03-14 RX ADMIN — METOPROLOL TARTRATE 50 MG: 50 TABLET, FILM COATED ORAL at 07:56

## 2023-03-14 RX ADMIN — METOPROLOL TARTRATE 50 MG: 50 TABLET, FILM COATED ORAL at 20:48

## 2023-03-14 RX ADMIN — DOXAZOSIN 4 MG: 4 TABLET ORAL at 07:56

## 2023-03-14 RX ADMIN — PANTOPRAZOLE SODIUM 40 MG: 40 TABLET, DELAYED RELEASE ORAL at 06:01

## 2023-03-14 RX ADMIN — AMLODIPINE BESYLATE 10 MG: 10 TABLET ORAL at 07:56

## 2023-03-14 RX ADMIN — LISINOPRIL 10 MG: 10 TABLET ORAL at 20:48

## 2023-03-14 RX ADMIN — HYDRALAZINE HYDROCHLORIDE 25 MG: 25 TABLET, FILM COATED ORAL at 13:55

## 2023-03-14 RX ADMIN — SERTRALINE HYDROCHLORIDE 50 MG: 50 TABLET ORAL at 07:56

## 2023-03-14 NOTE — PLAN OF CARE
Problem: Discharge Planning  Goal: Discharge to home or other facility with appropriate resources  3/13/2023 2218 by Jodi Brumfield LPN  Outcome: Progressing  3/13/2023 0927 by Betzaida Mulligan, RN  Outcome: Progressing  Flowsheets (Taken 3/13/2023 4052)  Discharge to home or other facility with appropriate resources: Refer to discharge planning if patient needs post-hospital services based on physician order or complex needs related to functional status, cognitive ability or social support system     Problem: Safety - Adult  Goal: Free from fall injury  3/13/2023 2218 by Jodi Brumfield LPN  Outcome: Progressing  3/13/2023 0927 by Betzaida Mulligan, RN  Outcome: Progressing

## 2023-03-14 NOTE — PROGRESS NOTES
Nutrition Assessment     Type and Reason for Visit: Reassess    Nutrition Recommendations/Plan:   Continue POC. Malnutrition Assessment:  Malnutrition Status: No malnutrition    Nutrition Assessment:  Pt remains nutritionally stable with PO intake >75% and stable wt status. He reports good appetite and no issues at this time. Estimated Daily Nutrient Needs:  Energy (kcal):  4062-4417 kcals (25-30 kcals/kg) Weight Used for Energy Requirements: Current     Protein (g):  71-142g Weight Used for Protein Requirements: Current        Fluid (ml/day):  9688-4793 ml Method Used for Fluid Requirements: 1 ml/kcal    Nutrition Related Findings:   hx-HTN Wound Type: Skin Tears    Current Nutrition Therapies:    ADULT DIET;  Regular; Low Fat/Low Chol/High Fiber/MARIKA    Anthropometric Measures:  Height: 5' 10\" (177.8 cm)  Current Body Wt: 156 lb 4 oz (70.9 kg)   BMI: 22.4    Nutrition Diagnosis:   No nutrition diagnosis at this time     Nutrition Interventions:   Food and/or Nutrient Delivery: Continue Current Diet  Nutrition Education/Counseling: No recommendation at this time  Coordination of Nutrition Care: Continue to monitor while inpatient       Goals:  Previous Goal Met: Progressing toward Goal(s)  Goals: Meet at least 75% of estimated needs, PO intake 50% or greater       Nutrition Monitoring and Evaluation:   Behavioral-Environmental Outcomes: None Identified  Food/Nutrient Intake Outcomes: Food and Nutrient Intake  Physical Signs/Symptoms Outcomes: Biochemical Data, Weight, Skin, Fluid Status or Edema    Discharge Planning:    Continue current diet     Mauro Selby MS, RDN, LD, CDCES  Contact: [phone]

## 2023-03-14 NOTE — PLAN OF CARE
Problem: Safety - Adult  Goal: Free from fall injury  3/14/2023 1054 by Eliecer Lou RN  Outcome: Progressing  3/13/2023 2218 by Angelique Rai LPN  Outcome: Progressing   Up with 1 assist

## 2023-03-14 NOTE — PROGRESS NOTES
Facility/Department: [facility] 8 REHAB UNIT  Occupational Therapy     Name: Jim Hollis  : 1980  MRN: [id number]  Date of Service: 3/14/2023    Discharge Recommendations:  Continue to assess pending progress       Patient Diagnosis(es): There were no encounter diagnoses. Past Medical History:  has no past medical history on file. Past Surgical History:  has no past surgical history on file. Treatment Diagnosis: R CVA      Assessment   Performance deficits / Impairments: Decreased functional mobility ; Decreased ADL status; Decreased ROM; Decreased strength;Decreased safe awareness;Decreased cognition;Decreased endurance;Decreased high-level IADLs;Decreased coordination;Decreased fine motor control  Treatment Diagnosis: R CVA  Prognosis: Good  Activity Tolerance  Activity Tolerance: Patient Tolerated treatment well         Plan   Occupational Therapy Plan  Specific Instructions for Next Treatment: dynamic standing acts, L GM/FM acts, PNF HEP  Current Treatment Recommendations: Strengthening, ROM, Balance training, Functional mobility training, Endurance training, Safety education & training, Patient/Caregiver education & training, Equipment evaluation, education, & procurement, Home management training, Self-Care / ADL     Restrictions  Restrictions/Precautions  Restrictions/Precautions: Fall Risk, Aspiration Risk, Weight Bearing  Lower Extremity Weight Bearing Restrictions  Right Lower Extremity Weight Bearing: Weight Bearing As Tolerated  Left Lower Extremity Weight Bearing: Weight Bearing As Tolerated    Subjective   General  Chart Reviewed: Yes  Patient assessed for rehabilitation services?: Yes  Diagnosis: R CVA  General Comment  Comments: stated being tired this session     Social/Functional History  Social/Functional History  Lives With: Parent (Mother)  Type of Home: House  Home Layout: One level  Home Access:  (1 step.)  Bathroom Shower/Tub: Tub/Shower unit (Danny)  Bathroom Toilet: Handicap height  Bathroom Equipment: Shower chair  Bathroom Accessibility: Accessible  Has the patient had two or more falls in the past year or any fall with injury in the past year?: No  ADL Assistance: [address]: Independent  Homemaking Responsibilities: Yes  Ambulation Assistance: Independent  Transfer Assistance: Independent  Active : Yes  Occupation: On disability       Objective   Functional Mobility  Functional - Mobility Device: No device  Activity: Retrieve items, Transport items, To/From therapy gym  Assist Level: Stand by assistance  Functional Mobility Comments: Laundry task in ADL apartment and laundry room. Heart Rate: 64  Heart Rate Source: Monitor  BP: (!) 135/90  Patient Position: Supine  MAP (Calculated): 105  Resp: 18  SpO2: 99 %  O2 Device: None (Room air)         Safety Devices  Type of Devices: Call light within reach; Bed alarm in place       Transfers  Sit to stand: Supervision  Stand to sit: Supervision       Motor Control/Coordination: Various LUE FM and GM tasks, cues to maintain grasp during BUE tasks. Education  Education Given To: Patient  Education Provided: Mobility Training, Transfer Training, IADL Function  Education Provided Comments: S/o checked off for transfers in room and assisted with shower.   Education Method: Demonstration, Verbal  Barriers to Learning: None  Education Outcome: Verbalized understanding, Demonstrated understanding       [address] [place] [address] needed: Supervision or touching assistance  CARE Score: 4  Discharge Goal: Independent      Toilet Transfer  Assistance needed: Supervision or touching assistance  CARE Score: 4  Discharge Goal: Independent    Balance  Sitting Balance: Independent  Standing Balance: Supervision           Eating  Assistance Needed: Setup or clean-up assistance  CARE Score: 5  Discharge Goal: Independent    Oral Hygiene  Assistance Needed: Setup or clean-up assistance  CARE Score: 5  Discharge Goal: Independent      Shower/Bathe Self  Assistance Needed: Supervision or touching assistance  Comment: SBA with shower  CARE Score: 4  Discharge Goal: Independent    Upper Body Dressing  Assistance Needed: Supervision or touching assistance  Comment: VC for lat[employer]  CARE Score: 4  Discharge Goal: Independent      Lower Body Dressing  Assistance Needed: Supervision or touching assistance  CARE Score: 4  Discharge Goal: Independent    Putting On/Taking Off Footwear  Assistance Needed: Partial/moderate assistance  CARE Score: 3  Discharge Goal: Independent      Picking Up Object  Assistance Needed: Supervision or touching assistance  CARE Score: 4  Discharge Goal: Independent    Goals  Short Term Goals  Time Frame for Short Term Goals: 1 week  Short Term Goal 1: Supervision with clothing management/hygiene for toileting. Short Term Goal 2: Supervision with toilet transfers. Short Term Goal 3: Supervision with LB dressing. Short Term Goal 4: Supervision with ambulatory homemaking tasks. Short Term Goal 5: Patient will complete LUE exercises at least 5x to improve independence with ADLs. Long Term Goals  Time Frame for Long Term Goals : 2 weeks  Long Term Goal 1: Modified independent with bathing hygiene. Long Term Goal 2: Modified independent with toileting and toilet transfers. Long Term Goal 3: Modified independent with overall dressing. Long Term Goal 4: Modified independent with ambulatory homemaking tasks. Long Term Goal 5: Patient verabalize DME needs. Long Term Goal 6: Independent with HEP. Patient Goals   Patient goals : Return home independently.        Therapy Time   Individual Concurrent Group Co-treatment   Time In 0815         Time Out 0900         Minutes 45         Timed Code Treatment Minutes: 45 Minutes       Electronically signed by KATLYN Soto on 3/14/2023 at 11:04 AM

## 2023-03-14 NOTE — PROGRESS NOTES
[facility]  INPATIENT SPEECH THERAPY  [facility] 8 REHAB UNIT      Time:  0900  1000  1300  1400      [x]Daily Note  []Progress Note    Date: 3/14/2023  Patient Name: Abdirahman Kaplan        MRN: [id number]    Account #: [de-identified]  : 1980  (43 y.o.)  Gender: male   Diet: Regular diet, thin liquids        PATIENT DIAGNOSIS(ES):    Diagnosis: RIGHT BG HEMORRHAGIC CVA; ETOH WITHDRAWAL POST CVA     Additional Pertinent Hx: HTN, ASTHMA     RESTRICTIONS/PRECAUTIONS:    Restrictions/Precautions  Restrictions/Precautions: Fall Risk, Aspiration Risk, Weight Bearing          Subjective:  He was cooperative with all therapy tasks. He is very motivated to improve and return home with his family. Objective:  Left facial asymmetry is still noted. Decreased control of saliva is noted at times. Today he completed trail making tests and exhibited some minimal difficulty. Will complete additional trail making tasks during a later session for increased processing speed. A word search was completed without cueing. Following complex written directions was at 100%. He was asked to recall sixteen words which were divided into four categories. He was able to recall fourteen of sixteen words with cues. With cues he was able to recall fifteen out of sixteen words. He was asked to draw a complex design from memory and had min to moderate difficulty. Providing words to complex definitions was at 100%. No overt s/s of aspiration observed with consecutive sips of thin liquids via straw. Good hyolaryngeal elevation and timely swallow responses were noted. Clear voicing was noted. Reviewed lingual search/sweep today to be utilized during and after meals. No oral residue was noted after his noon meal. Reviewed and practiced oral motor exercises. Handout was provided for him to complete these in his room. Complex cancellation tasks were completed today and he scored at 100%.     A second task for following complex written directions was completed and he scored at 100%. A complex convergent naming task was completed at 100%. Recommend continue speech therapy services for delayed processing, recall deficits and executive function deficits. Recommended Diet and Intervention  Regular diet  Thin liquids    Therapeutic Interventions: Pharyngeal exercises; Patient/Family education;Oral care;Oral motor exercises;Diet tolerance monitoring     Compensatory Swallowing Strategies  Compensatory Swallowing Strategies : Alternate solids and liquids; Check for pocketing of food on the Left;Eat/Feed slowly; Lingual sweep;Remain upright for 30-45 minutes after meals;Small bites/sips;Upright as possible for all oral intake             SHORT TERM GOAL #1:  Goal 1: The patient will complete the Cognitive Linguistic Quick Test or CLQT. Met    SHORT TERM GOAL #2:  Goal 2: The patient will complete higher level cognitive tasks with minimal cues. SHORT TERM GOAL #3:  Goal 3: The patient will complete tasks for problem solving and safety awareness with miniml cues. Swallowing Short Term Goals  Short-term Goals  Goal 1: The patient will tolerate a regular diet with thin liquids with minimal overt s/s of aspiration. Goal 2: The patient will use a lingual search/sweep to clear food from the oral cavity. Goal 3: The patient will complete daily oral-motor exercise to increase labial and lingual function and ROM/strength with minimal verbal, tactile and visual cues. Goal 4: The patient will complete pharyngeal strengthening exercises to aid in airway protection and minimize laryngeal penetration and aspiration with minimal verbal cues. Long Term Goals: The patient will maintain adequate hydration/nutrition with optimum safety and efficiency of swallowing function with P.O. intake without overt signs and symptoms of aspiration for the highest appropriate diet level.   The patient will develop functional, cognitive-linguistic-based skills and utilize compensatory strategies to  communicate wants and needs effectively to different conversational partners, maintain safety and  participate socially in functional living environment  The patient will demonstrate functional problem solving skills and provide appropriate solutions to problems  in order to improve safety and awareness in functional living environment. STGs Met: 1    LTGs Met: 0      ELOS: 2-3 weeks    ASSESSMENT:  Assessment: [x]Progressing towards goals          []Not Progressing towards goals    Patient Tolerance of Treatment:   [x]Tolerated well []Tolerated fair []Required rest breaks []Fatigued    Education:  Learner:  [x]Patient          []Significant Other          []Other  Education provided regarding:  [x]Goals and POC   []Diet and swallowing precautions    []Home Exercise Program  []Progress and/or discharge information  Method of Education:  [x]Discussion          []Demonstration          []Handout          []Other  Evaluation of Education:   [x]Verbalized understanding   []Demonstrates without assistance  []Demonstrates with assistance  []Needs further instruction     []No evidence of learning                  []No family present      Plan: [x]Continue with current plan of care    []Modify current plan of care as follows:    []Discharge patient    Discharge Location:    Services/Supervision Recommended:      [x]Patient continues to require treatment by a licensed therapist to address functional deficits as outlined in the established plan of care.           Electronically Signed By:  Pepper Ann M.S., CCC-SLP  3/14/2023,7:35 AM.

## 2023-03-14 NOTE — PROGRESS NOTES
Facility/Department: [facility] 8 REHAB UNIT  Occupational Therapy Treatment Note    Name: Dhiraj Mckeon  : 1980  MRN: [id number]  Date of Service: 3/14/2023    Discharge Recommendations:  Continue to assess pending progress          Patient Diagnosis(es): There were no encounter diagnoses. Past Medical History:  has no past medical history on file. Past Surgical History:  has no past surgical history on file.     Treatment Diagnosis: R CVA          Plan   Occupational Therapy Plan  Specific Instructions for Next Treatment: dynamic standing acts, L GM/FM acts, PNF HEP  Current Treatment Recommendations: Strengthening, ROM, Balance training, Functional mobility training, Endurance training, Safety education & training, Patient/Caregiver education & training, Equipment evaluation, education, & procurement, Home management training, Self-Care / ADL     Restrictions  Restrictions/Precautions  Restrictions/Precautions: Fall Risk, Aspiration Risk, Weight Bearing  Lower Extremity Weight Bearing Restrictions  Right Lower Extremity Weight Bearing: Weight Bearing As Tolerated  Left Lower Extremity Weight Bearing: Weight Bearing As Tolerated       23 1400   General   Family / Caregiver Present Yes   Pre Treatment Pain Screening   Scale Used Numeric Score   Pain at present 0         Objective   Functional Mobility  Functional - Mobility Device: No device  Activity: Retrieve items, Transport items, To/From therapy gym, Other  Assist Level: Supervision  Functional Mobility Comments: 2 minor LOB but able to self correct     [address] needed Setup or clean-up assistance   CARE Score 5   Toilet Transfer   Assistance needed Supervision or touching assistance   Comment supervision, without AD   CARE Score 4       Transfers  Stand Step Transfers: Supervision  Sit to stand: Supervision  Stand to sit: Supervision    Balance  Sitting Balance: Independent  Standing Balance: Supervision      Standing Balance  Time: ~10 mins  Activity: 1-2 handed act with mod dynamic challenges  Goals  Short Term Goals  Time Frame for Short Term Goals: 1 week  Short Term Goal 1: Supervision with clothing management/hygiene for toileting. Short Term Goal 2: Supervision with toilet transfers. Short Term Goal 3: Supervision with LB dressing. Short Term Goal 4: Supervision with ambulatory homemaking tasks. Short Term Goal 5: Patient will complete LUE exercises at least 5x to improve independence with ADLs. Long Term Goals  Time Frame for Long Term Goals : 2 weeks  Long Term Goal 1: Modified independent with bathing hygiene. Long Term Goal 2: Modified independent with toileting and toilet transfers. Long Term Goal 3: Modified independent with overall dressing. Long Term Goal 4: Modified independent with ambulatory homemaking tasks. Long Term Goal 5: Patient verabalize DME needs. Long Term Goal 6: Independent with HEP. Patient Goals   Patient goals : Return home independently.        Therapy Time   Individual Concurrent Group Co-treatment   Time In 1400         Time Out 1430         Minutes 30         Timed Code Treatment Minutes: 30 Minutes       Electronically signed by Ping Booth OT on 3/14/2023 at 3:37 PM

## 2023-03-14 NOTE — PROGRESS NOTES
Patient:   Raya Han  MR#:    [id number]   Room:    [facility]/906-68   YOB: 1980  Date of Progress Note: 3/14/2023  Time of Note                           7:29 AM  Consulting Physician:   Krupa Buenrostro M.D. Attending Physician:  Krupa Buenrostro MD     CHIEF COMPLAINT: Left-sided weakness, ataxia and dysarthria     Subjective  This 43 y.o. male  with history of asthma, HTN, tobacco abuse, and ETOH abuse. He presented to [facility] ER on 3/4/23 with c/o weakness and difficulty speaking. CT of head revealed a right basal ganglia hemorrhagic stroke. He has a history of malignant hypertension that is difficult to control, this is followed by his PCP Dr. Kobi Reyes. He was admitted with ICH to neurosurgeon Dr. Eryn Hall with consult for Hospitalist. He was noted to have left sided weakness, ataxia, and dysarthria. Lab work shows triglycerides elevated at 589, HDL low at 24, VLDL elevated at 77. Glucose 110, alk phos 190, , . INR 1.22. CBC is unremarkable. CT angiogram of the neck shows patent vertebral arteries with calcified and noncalcified plaque in both carotids. CT angiogram of the head showed no major branch occlusion or aneurysm. CT of the head showed acute hemorrhage in the right basal ganglia measuring 4.7 x 1.8 cm with mild surrounding edema with a lateral ventricular shift to the left at 2.9 mm. He was placed on Cardene drip for systolic BP greater than 071. started on 10 mg of amlodipine daily as well as metoprolol 25 mg twice daily. Initially nursing was able to wean him from Cardene drip. But that drip has been restarted today because of elevated blood pressure outside goal range. Metoprolol was  increased to 50 mg p.o. twice daily and Hytrin 5 mg daily were added to his regimen by Hospitalist Dr. Jules Acosta. Repeat CT scan of the head  on 3/5/23 shows left shift slightly increased to 4 mm with no change in the hemorrhage.  patient was going through alcohol withdrawals, was having hallucinations, aggitated and confused. CIWA protocol initiated was intiated on 3/5/23. Blood pressure improving with the oral medications, now off Cardene drip. Repeat CT of head on 3/7/23 was stable. Patient was evaluated by SPT for swallow and placed on a mechanical soft diet with thin liquids. Patient's mental status has improved, he is A&O x 4, he does continue to have some dysarthria. He continues to have left sided weakness and is participating in both PT/OT. No complaints today  REVIEW OF SYSTEMS:  Constitutional: No fevers No chills  Neck:No stiffness  Respiratory: No shortness of breath  Cardiovascular: No chest pain No palpitations  Gastrointestinal: No abdominal pain    Genitourinary: No Dysuria  Neurological: No headache, no confusion      PHYSICAL EXAM:  BP (!) 135/90   Pulse 64   Temp 97.2 °F (36.2 °C) (Temporal)   Resp 18   Ht 5' 10\" (1.778 m)   Wt 156 lb (70.8 kg)   SpO2 94%   BMI 22.38 kg/m²     Constitutional: he appears well-developed and well-nourished. Eyes - conjunctiva normal.  Pupils react to light  Ear, nose, throat -hearing intact to voice. No scars, masses, or lesions over external nose or ears, no atrophy of tongue  Neck-symmetric, no masses noted, no jugular vein distension  Respiration- chest wall appears symmetric, good expansion,   normal effort without use of accessory muscles  Cardiovascular- RRR  Musculoskeletal - no significant wasting of muscles noted, no bony deformities, gait no gross ataxia  Extremities-no clubbing, cyanosis or edema  Skin - warm, dry, and intact. No rash, erythema, or pallor.   Psychiatric - mood, affect, and behavior appear normal.      Neurology  NEUROLOGICAL EXAM:      Mental status   Awake, alert, fluent oriented x 3 appropriate affect  Attention and concentration appear appropriate  Recent and remote memory appears unremarkable  Speech normal without dysarthria  No clear issues with language       Cranial Nerves   CN II- Visual fields grossly unremarkable  CN III, IV,VI-EOMI, No nystagmus, conjugate eye movements, no ptosis  CN VII-no facial asymmetry  CN VIII-Hearing intact    Motor function  Antigravity x 4. Minimal left-sided weakness       Nursing/pcp notes, imaging,labs and vitals reviewed.          Lab Results   Component Value Date    WBC 8.6 03/13/2023    HGB 10.7 (L) 03/13/2023    HCT 30.7 (L) 03/13/2023    MCV 90.8 03/13/2023     03/13/2023     Lab Results   Component Value Date     03/13/2023    K 3.7 03/13/2023     03/13/2023    CO2 26 03/13/2023    BUN 17 03/13/2023    CREATININE 1.1 03/13/2023    GLUCOSE 105 03/13/2023    CALCIUM 9.0 03/13/2023    LABGLOM >60 03/13/2023   No results found for: INRNo results found for: PHENYTOIN, ESR, CRP    PT,OT and/or speech notes reviewed:  Objective   Functional Mobility  Functional - Mobility Device: No device  Activity: To/From therapy gym, Other  Assist Level: Contact guard assistance (CGA/SBA)  Functional Mobility Comments: I LOB when turning---able to self correct     Safety Devices  Type of Devices: Left in bed;Call light within reach  Transfers  Sit to stand: Stand by assistance  Stand to sit: Stand by assistance     Exercise Treatment: arm pulley--3 [id number], 2# dowel--all planes, 1 set, 20reps, cues to maintain L grasp, PNF HEP--2 [id number], all planes  Motor Control/Coordination: L FM task with moderate difficulty     Balance  Sitting Balance: Independent (~10 mins EOM)          03/13/23 1100   Restrictions/Precautions   Restrictions/Precautions Fall Risk;Aspiration Risk;Weight Bearing   Lower Extremity Weight Bearing Restrictions   Right Lower Extremity Weight Bearing Weight Bearing As Tolerated   Left Lower Extremity Weight Bearing Weight Bearing As Tolerated   General   Diagnosis RIGHT BG HEMORRHAGIC CVA; ETOH WITHDRAWAL POST CVA   Bed mobility   Rolling to Left Independent   Rolling to Right Independent   Supine to Sit Independent   Sit to Supine Independent Scooting Supervision   Transfers   Sit to Stand Stand by assistance; Independent   Stand to Sit Stand by assistance; Independent   Bed to Chair Stand by assistance; Independent   Ambulation   Surface Level tile   Device No Device   Assistance Stand by assistance   Quality of Gait decreased Left knee extension and heel strike, imroved as amb progressed   Distance 500   Comments No LOB, no RUE arm swing,   Stairs   # Steps  12   Stairs Height 6\"   Rails Bilateral   Assistance Contact guard assistance;Stand by assistance   Comment step too pattern, lead up Right, lead down Left, repeated twice 2nd attempt SBA   PT Exercises   Motor Control/Coordination amb with focus of exagerated left heel stike and L knee extensin during stance phase 100feet, 150 feet, 150 feet   Assessment   Assessment working on normalizing gait pattern by acccentuating left heelstrike and extended left knee, as opposed to bent knee toe walking lle,   Safety Devices   Type of Devices Left in bed;Call light within reach;Gait belt; All fall risk precautions in place; Patient at risk for falls   PT Individual Minutes   Time In 1100   Time Out 1200   Minutes 60                  RECORD REVIEW: Previous medical records, medications were reviewed at today's visit    IMPRESSION:      1. Right basal ganglia hemorrhage, hypertensive-blood pressure medications, PT/OT/SLP  2. Hypertension-hydralazine, lisinopril, Lopressor, Norvasc. Stable  3. Smoker-nicotine patch  4. GI-bowel regimen  5. Mood disorder-Zoloft  6. Hypovitaminosis D-replacement  7.   DVT prophylaxis-SCDs     ELOS: Staff tomorrow

## 2023-03-14 NOTE — PROGRESS NOTES
Physical Therapy  Name: Brendan Arreola  MRN:  [id number]  Date of service:  3/14/2023       03/14/23 1002   Restrictions/Precautions   Restrictions/Precautions Fall Risk;Aspiration Risk;Weight Bearing   Lower Extremity Weight Bearing Restrictions   Right Lower Extremity Weight Bearing Weight Bearing As Tolerated   Left Lower Extremity Weight Bearing Weight Bearing As Tolerated   General Comment   Comments sitting up in [facility]   Subjective   Subjective Pt agreeable and without complaint. Vitals   SpO2 99 %   O2 Device None (Room air)   Transfers   Sit to Stand Supervision   Stand to Sit Supervision   Ambulation   Surface Level tile   Device No Device   Assistance Stand by assistance   Quality of Gait decreased Left knee extension and heel strike, imroved as amb progressed, deviation of path at times due to combination of pants sliding down and effort to increase armswing (difficulty coordinating with C/L LE)   Gait Deviations Decreased arm swing;Deviated path  (L UE)   Distance 600'   Comments frequent turns   More Ambulation?  Yes   Ambulation 2   Surface - 2 level tile   Other Apparatus 2   (use of SPC x 2 walking in tandem with therapist behind (both holding end of canes B UE's and therapist driving arm swing from rear))   Assistance 2 Stand by assistance  (exception above with other apparatus)   Quality of Gait 2 improved arm swing and overall improved joana and quality   Distance 500'   PT Exercises   Dynamic Standing Balance Exercises standing in place working on B alternating arm swing as with gt x 15 ea, tandem stance with alternating arm swing x 10 ea, tandem stance rocking fore/aft while alternating arm swing x 10 ea; ball toss engaging L UE as much as possible with deliberate use of L hand to push ball when passing back x 1 min, ball toss/pass while sidestepping with direction changes deliberately engaging L UE x 2 min, static stand with step as needed using L UE only to volley balloon x 3 min; step ups on 6\" step x 15 ea L/R leading (able to perform without UE support when leading with R, but needs one hand support with L lead due to prorioceptive deficit); box step x 5 CW/CCW, box step at random pattern follwing vc's x 8 (sometimes in diagonal pattern)  (cg@ for boxstep with use of hand to steady self on one episode lob)   Assessment   Assessment Worked on engaging L UE with mobility. Pt noted to swing L UE more normally when amb to R Rachael Henry 23 following bouts of balloon volley and ball toss. Proprioceptive issues when performing step ups with L LE leading as his foot placement on step was inconsistent and sometimes unsafe resulting in need for UE support during this activity. Mild lob on occasion with boxstep activity requiring cg@ and use of UE to steady self at wall on one occasion.    Safety Devices   Type of Devices Left in chair;Call light within reach   PT Individual Minutes   Time In 1000   Time Out 1100   Minutes 60       Electronically signed by Katey Watkins PTA on 3/14/2023 at 12:11 PM

## 2023-03-15 PROCEDURE — 1180000000 HC REHAB R&B

## 2023-03-15 PROCEDURE — 94760 N-INVAS EAR/PLS OXIMETRY 1: CPT

## 2023-03-15 PROCEDURE — 97530 THERAPEUTIC ACTIVITIES: CPT

## 2023-03-15 PROCEDURE — 6370000000 HC RX 637 (ALT 250 FOR IP): Performed by: PSYCHIATRY & NEUROLOGY

## 2023-03-15 PROCEDURE — 99232 SBSQ HOSP IP/OBS MODERATE 35: CPT | Performed by: PSYCHIATRY & NEUROLOGY

## 2023-03-15 PROCEDURE — 97130 THER IVNTJ EA ADDL 15 MIN: CPT

## 2023-03-15 PROCEDURE — 97116 GAIT TRAINING THERAPY: CPT

## 2023-03-15 PROCEDURE — 97129 THER IVNTJ 1ST 15 MIN: CPT

## 2023-03-15 PROCEDURE — 97110 THERAPEUTIC EXERCISES: CPT

## 2023-03-15 RX ADMIN — METOPROLOL TARTRATE 50 MG: 50 TABLET, FILM COATED ORAL at 08:04

## 2023-03-15 RX ADMIN — SENNOSIDES AND DOCUSATE SODIUM 1 TABLET: 50; 8.6 TABLET ORAL at 08:04

## 2023-03-15 RX ADMIN — PANTOPRAZOLE SODIUM 40 MG: 40 TABLET, DELAYED RELEASE ORAL at 16:29

## 2023-03-15 RX ADMIN — AMLODIPINE BESYLATE 10 MG: 10 TABLET ORAL at 08:04

## 2023-03-15 RX ADMIN — LISINOPRIL 10 MG: 10 TABLET ORAL at 20:41

## 2023-03-15 RX ADMIN — METOPROLOL TARTRATE 50 MG: 50 TABLET, FILM COATED ORAL at 20:41

## 2023-03-15 RX ADMIN — PANTOPRAZOLE SODIUM 40 MG: 40 TABLET, DELAYED RELEASE ORAL at 06:00

## 2023-03-15 RX ADMIN — SERTRALINE HYDROCHLORIDE 50 MG: 50 TABLET ORAL at 08:04

## 2023-03-15 RX ADMIN — HYDRALAZINE HYDROCHLORIDE 25 MG: 25 TABLET, FILM COATED ORAL at 14:30

## 2023-03-15 RX ADMIN — DOXAZOSIN 4 MG: 4 TABLET ORAL at 08:04

## 2023-03-15 RX ADMIN — HYDRALAZINE HYDROCHLORIDE 25 MG: 25 TABLET, FILM COATED ORAL at 06:00

## 2023-03-15 RX ADMIN — HYDRALAZINE HYDROCHLORIDE 25 MG: 25 TABLET, FILM COATED ORAL at 20:41

## 2023-03-15 NOTE — PROGRESS NOTES
Patient:   Yuliana Raygoza  MR#:    [id number]   Room:    9474/229-90   YOB: 1980  Date of Progress Note: 3/15/2023  Time of Note                           8:12 AM  Consulting Physician:   Mary Ann Busby M.D. Attending Physician:  Mary Ann Busby MD     CHIEF COMPLAINT: Left-sided weakness, ataxia and dysarthria     Subjective  This 43 y.o. male  with history of asthma, HTN, tobacco abuse, and ETOH abuse. He presented to [facility] ER on 3/4/23 with c/o weakness and difficulty speaking. CT of head revealed a right basal ganglia hemorrhagic stroke. He has a history of malignant hypertension that is difficult to control, this is followed by his PCP Dr. Ubaldo Santos. He was admitted with ICH to neurosurgeon Dr. Madelyn Canales with consult for Hospitalist. He was noted to have left sided weakness, ataxia, and dysarthria. Lab work shows triglycerides elevated at 589, HDL low at 24, VLDL elevated at 77. Glucose 110, alk phos 190, , . INR 1.22. CBC is unremarkable. CT angiogram of the neck shows patent vertebral arteries with calcified and noncalcified plaque in both carotids. CT angiogram of the head showed no major branch occlusion or aneurysm. CT of the head showed acute hemorrhage in the right basal ganglia measuring 4.7 x 1.8 cm with mild surrounding edema with a lateral ventricular shift to the left at 2.9 mm. He was placed on Cardene drip for systolic BP greater than 823. started on 10 mg of amlodipine daily as well as metoprolol 25 mg twice daily. Initially nursing was able to wean him from Cardene drip. But that drip has been restarted today because of elevated blood pressure outside goal range. Metoprolol was  increased to 50 mg p.o. twice daily and Hytrin 5 mg daily were added to his regimen by Hospitalist Dr. Ludivina Greer. Repeat CT scan of the head  on 3/5/23 shows left shift slightly increased to 4 mm with no change in the hemorrhage.  patient was going through alcohol withdrawals, was having hallucinations, aggitated and confused. CIWA protocol initiated was intiated on 3/5/23. Blood pressure improving with the oral medications, now off Cardene drip. Repeat CT of head on 3/7/23 was stable. Patient was evaluated by SPT for swallow and placed on a mechanical soft diet with thin liquids. Patient's mental status has improved, he is A&O x 4, he does continue to have some dysarthria. He continues to have left sided weakness and is participating in both PT/OT. No complaints this am  REVIEW OF SYSTEMS:  Constitutional: No fevers No chills  Neck:No stiffness  Respiratory: No shortness of breath  Cardiovascular: No chest pain No palpitations  Gastrointestinal: No abdominal pain    Genitourinary: No Dysuria  Neurological: No headache, no confusion      PHYSICAL EXAM:  BP (!) 148/87   Pulse 67   Temp 98.1 °F (36.7 °C) (Temporal)   Resp 18   Ht 5' 10\" (1.778 m)   Wt 156 lb (70.8 kg)   SpO2 96%   BMI 22.38 kg/m²     Constitutional: he appears well-developed and well-nourished. Eyes - conjunctiva normal.  Pupils react to light  Ear, nose, throat -hearing intact to voice. No scars, masses, or lesions over external nose or ears, no atrophy of tongue  Neck-symmetric, no masses noted, no jugular vein distension  Respiration- chest wall appears symmetric, good expansion,   normal effort without use of accessory muscles  Cardiovascular- RRR  Musculoskeletal - no significant wasting of muscles noted, no bony deformities, gait no gross ataxia  Extremities-no clubbing, cyanosis or edema  Skin - warm, dry, and intact. No rash, erythema, or pallor.   Psychiatric - mood, affect, and behavior appear normal.      Neurology  NEUROLOGICAL EXAM:      Mental status   Awake, alert, fluent oriented x 3 appropriate affect  Attention and concentration appear appropriate  Recent and remote memory appears unremarkable  Speech normal without dysarthria  No clear issues with language       Cranial Nerves   CN II- Visual fields grossly unremarkable  CN III, IV,VI-EOMI, No nystagmus, conjugate eye movements, no ptosis  CN VII-no facial asymmetry  CN VIII-Hearing intact    Motor function  Antigravity x 4. Minimal left-sided weakness       Nursing/pcp notes, imaging,labs and vitals reviewed.          Lab Results   Component Value Date    WBC 8.6 03/13/2023    HGB 10.7 (L) 03/13/2023    HCT 30.7 (L) 03/13/2023    MCV 90.8 03/13/2023     03/13/2023     Lab Results   Component Value Date     03/13/2023    K 3.7 03/13/2023     03/13/2023    CO2 26 03/13/2023    BUN 17 03/13/2023    CREATININE 1.1 03/13/2023    GLUCOSE 105 03/13/2023    CALCIUM 9.0 03/13/2023    LABGLOM >60 03/13/2023   No results found for: INRNo results found for: PHENYTOIN, ESR, CRP    PT,OT and/or speech notes reviewed:     PHYSICAL THERAPY  GROSS ASSESSMENT       BED MOBILITY  Bed mobility  Rolling to Left: Independent  Rolling to Right: Independent  Supine to Sit: Independent  Sit to Supine: Independent  Scooting: Supervision  Bed Mobility Comments: triplanar for all movements withotu rails       TRANSFERS  Transfers  Sit to Stand: Supervision  Stand to Sit: Supervision  Bed to Chair: Stand by assistance, Independent  Car Transfer: Contact guard assistance  WHEELCHAIR PROPULSION  AMBULATION  Ambulation  Surface: Level tile  Device: No Device  Assistance: Stand by assistance  Quality of Gait: decreased Left knee extension and heel strike, imroved as amb progressed, deviation of path at times due to combination of pants sliding down and effort to increase armswing (difficulty coordinating with C/L LE)  Gait Deviations: Decreased arm swing, Deviated path (L UE)  Distance: 600'  Comments: frequent turns  More Ambulation?: Yes  STAIRS  Stairs  # Steps : 12  Stairs Height: 6\"  Rails: Bilateral  Assistance: Contact guard assistance, Stand by assistance  Comment: step too pattern, lead up Right, lead down Left, repeated twice 2nd attempt SBA  GOALS:  Short Term Goals  Time Frame for Short Term Goals: 3-5 DAYS  Short Term Goal 1: UP/DOWN 12 STEPS 1 RAIL SBA  Short Term Goal 2: AMBULATE 500 FT FLAT SURFACES WITH NORMAL RECIPROCAL ARM SWING WITHOUT LATERAL DEVIATION SBA  Short Term Goal 3: AMBULATE OUTSIDE ON UNEVEN TERRAIN INCLUDING GRASS WITHOTU A.D. MIN A  Short Term Goal 4: PEFORM BED MOBILITY INDEP     Long Term Goals  Time Frame for Long Term Goals : 7 DAYS  Long Term Goal 1: UP/DOWN 12 STEPS 1 RAILS INDEP  Long Term Goal 2: AMBULATE 500 FT FLAT SURFACES WITH NORMAL RECIPROCAL ARM SWING WITHOUT LATERAL DEVIATION INDEP  Long Term Goal 3: AMBULATE OUTSIDE ON UNEVEN TERRAIN INCLUDING GRASS WITHOTU A.D. SBA  Long Term Goal 4: PERFORM AMBULATION AND TRASNFERS IN ROOM WITHOUT A.D. INDEP     ASSESSMENT:  Assessment: Worked on engaging L UE with mobility. Pt noted to swing L UE more normally when amb to [facility] following bouts of balloon volley and ball toss. Proprioceptive issues when performing step ups with L LE leading as his foot placement on step was inconsistent and sometimes unsafe resulting in need for UE support during this activity. Mild lob on occasion with boxstep activity requiring cg@ and use of UE to steady self at wall on one occasion. SPEECH THERAPY  The CLQT was completed on 3/13/23. CLQT  Cognitive Domain Scoring Range Score Severity   Attention 179-125 134 Mild   Memory 140-110 139 Moderate   Executive Function 19-16 17 Moderate   Language 37-29 31 WNL   Visuospatial 81-52 55 Mild   Composite 3.4-2.5 2.8 Mild         He scored a 2.8 out of a possible 4.0 on the CLQT which is considered mild. Due to delayed processing and slow responses with higher level cognitive tasks, recommend he continue speech therapy services. Left facial asymmetry is still noted. Decreased control of saliva is noted at times. Today he completed trail making tests and exhibited some minimal difficulty.  Will complete additional trail making tasks during a later session for increased processing speed. A word search was completed without cueing. Following complex written directions was at 100%. He was asked to recall sixteen words which were divided into four categories. He was able to recall fourteen of sixteen words with cues. With cues he was able to recall fifteen out of sixteen words. He was asked to draw a complex design from memory and had min to moderate difficulty. Providing words to complex definitions was at 100%. No overt s/s of aspiration observed with consecutive sips of thin liquids via straw. Good hyolaryngeal elevation and timely swallow responses were noted. Clear voicing was noted. Reviewed lingual search/sweep today to be utilized during and after meals. No oral residue was noted after his noon meal. Reviewed and practiced oral motor exercises. Handout was provided for him to complete these in his room. Complex cancellation tasks were completed today and he scored at 100%. A second task for following complex written directions was completed and he scored at 100%. A complex convergent naming task was completed at 100%. Recommend continue speech therapy services for delayed processing, recall deficits and executive function deficits. Recommended Diet and Intervention  Regular diet  Thin liquids     Therapeutic Interventions: Pharyngeal exercises; Patient/Family education;Oral care;Oral motor exercises;Diet tolerance monitoring     Compensatory Swallowing Strategies  Compensatory Swallowing Strategies : Alternate solids and liquids; Check for pocketing of food on the Left;Eat/Feed slowly; Lingual sweep;Remain upright for 30-45 minutes after meals;Small bites/sips;Upright as possible for all oral intake                 SHORT TERM GOAL #1:  Goal 1: The patient will complete the Cognitive Linguistic Quick Test or CLQT.  Met     SHORT TERM GOAL #2:  Goal 2: The patient will complete higher level cognitive tasks with minimal cues. SHORT TERM GOAL #3:  Goal 3: The patient will complete tasks for problem solving and safety awareness with miniml cues. Swallowing Short Term Goals  Short-term Goals  Goal 1: The patient will tolerate a regular diet with thin liquids with minimal overt s/s of aspiration. Goal 2: The patient will use a lingual search/sweep to clear food from the oral cavity. Goal 3: The patient will complete daily oral-motor exercise to increase labial and lingual function and ROM/strength with minimal verbal, tactile and visual cues. Goal 4: The patient will complete pharyngeal strengthening exercises to aid in airway protection and minimize laryngeal penetration and aspiration with minimal verbal cues. Long Term Goals: The patient will maintain adequate hydration/nutrition with optimum safety and efficiency of swallowing function with P.O. intake without overt signs and symptoms of aspiration for the highest appropriate diet level. The patient will develop functional, cognitive-linguistic-based skills and utilize compensatory strategies to  communicate wants and needs effectively to different conversational partners, maintain safety and  participate socially in functional living environment  The patient will demonstrate functional problem solving skills and provide appropriate solutions to problems  in order to improve safety and awareness in functional living environment.         STGs Met: 1     LTGs Met: 0        ELOS: 2-3 weeks     OCCUPATIONAL THERAPY  Cognitive Patterns:  Cognitive Assessment Method (CAM):  Confusion Assessment Method (CAM)  Is there evidence of an acute change in mental status from the patient's baseline?: Yes  Inattention: Behavior not present  Disorganized thinking: Behavior present, fluctuates (comes and goes, changes in severity)  Altered level of consciousness: Behavior not present  Health Literacy:  How often do you need to have someone help you when you read instructions, pamphlets, or other written material from your doctor or pharmacy?: Never           CURRENT IRF-OTONIEL SCORES  Eating: CARE Score: 5     Oral Hygiene: CARE Score: 5         Toileting: CARE Score: 5         Shower/Bathe: CARE Score: 4  Comment: SBA with shower        Upper Body Dressing: CARE Score: 4  Comment: VC for latrice-tech       Lower Body Dressing: CARE Score: 4           Footwear: CARE Score: 3           Toilet Transfers: CARE Score: 4  Comment: supervision, without AD        Picking Up Object:  CARE Score: 4           UE Functionin/11/23 1100   LUE AROM (degrees)   LUE AROM  WFL   LUE General AROM Full range of motion, but can only use LUE for active assist.   Left Hand AROM (degrees)   Left Hand AROM Exceptions   Left Hand General AROM Full ROM for flexion/extension, but impaired FM control and slow oppostion   RUE AROM (degrees)   RUE AROM  WFL   Right Hand AROM (degrees)   Right Hand AROM WFL         Pain Assessment:   No pain      STGs:  Short Term Goals  Time Frame for Short Term Goals: 1 week  Short Term Goal 1: Supervision with clothing management/hygiene for toileting. Short Term Goal 2: Supervision with toilet transfers. Short Term Goal 3: Supervision with LB dressing. Short Term Goal 4: Supervision with ambulatory homemaking tasks. Short Term Goal 5: Patient will complete LUE exercises at least 5x to improve independence with ADLs. LTGs:  Long Term Goals  Time Frame for Long Term Goals : 2 weeks  Long Term Goal 1: Modified independent with bathing hygiene. Long Term Goal 2: Modified independent with toileting and toilet transfers. Long Term Goal 3: Modified independent with overall dressing. Long Term Goal 4: Modified independent with ambulatory homemaking tasks. Long Term Goal 5: Patient verabalize DME needs. Long Term Goal 6: Independent with HEP.      Assessment:  Performance deficits / Impairments: Decreased functional mobility , Decreased ADL status, Decreased ROM, Decreased strength, Decreased safe awareness, Decreased cognition, Decreased endurance, Decreased high-level IADLs, Decreased coordination, Decreased fine motor control                     NUTRITION  Current Wt: Weight: 156 lb (70.8 kg) / Body mass index is 22.38 kg/m². Admission Wt: Admission Body Weight: 156 lb 4 oz (70.9 kg)  Oral Diet Orders:   Regular; Low Fat/Low Chol/High Fiber/MARIKA  Oral Nutrition Supplement (ONS) Orders:  None  PO intake avg >75% and reported good appetite with stable wt status. No nutritional concerns. Please see nutrition note for details. RECORD REVIEW: Previous medical records, medications were reviewed at today's visit    IMPRESSION:      1. Right basal ganglia hemorrhage, hypertensive-blood pressure medications, PT/OT/SLP  2. Hypertension-hydralazine, lisinopril, Lopressor, Norvasc. Stable  3. Smoker-nicotine patch  4. GI-bowel regimen  5. Mood disorder-Zoloft  6. Hypovitaminosis D-replacement  7. DVT prophylaxis-SCDs     Staffing this date , mutlidisciplinary with entire team with complex decision making and planning for discharge.  More than 35 minutes spent today in total on this patient, with greater than 50% of the time on counseling and coordination of care  ELOS:3/17, Friday possibly

## 2023-03-15 NOTE — PROGRESS NOTES
"Enter Query Response Below      Query Response:     Cerebral edema clinically significant   Electronically signed by Brandon Gannon MD, 03/15/23, 3:38 PM CDT.         If applicable, please update the problem list.     Patient: James Taylor        : 1980  Account: [id number]           Admit Date: 3/4/2023        How to Respond to this query:       a. Click New Note     b. Answer query within the yellow box.                c. Update the Problem List, if applicable.      If you have any questions about this query contact me at: [email or web address]     :    42 year old admitted with right basal ganglia hemorrhagic CVA. Note per Your note \"CT scan of the brain yesterday shows some increasing surrounding edema slight increase in size of the overall hemorrhage\". Patient was placed on Cardene drip for blood pressure control. After study can cerebral edema be further clarified as:    Cerebral edema clinically significant   Cerebral edema not clinically significant  Other_______  Unable to determine    By submitting this query, we are merely seeking further clarification of documentation to accurately reflect all conditions that you are monitoring, evaluating, treating or that extend the hospitalization or utilize additional resources of care. Please utilize your independent clinical judgment when addressing the question(s) above.     This query and your response, once completed, will be entered into the legal medical record.    Sincerely,  Franchesca Adam RN CDI  Clinical Documentation Integrity Program   "

## 2023-03-15 NOTE — PROGRESS NOTES
Facility/Department: [facility] 8 REHAB UNIT  Occupational Therapy     Name: Willie Lujan  : 1980  MRN: [id number]  Date of Service: 3/15/2023    Discharge Recommendations:  Continue to assess pending progress        Patient Diagnosis(es): There were no encounter diagnoses. Past Medical History:  has no past medical history on file. Past Surgical History:  has no past surgical history on file. Treatment Diagnosis: R CVA      Assessment   Performance deficits / Impairments: Decreased functional mobility ; Decreased ADL status; Decreased ROM; Decreased strength;Decreased safe awareness;Decreased cognition;Decreased endurance;Decreased high-level IADLs;Decreased coordination;Decreased fine motor control  Treatment Diagnosis: R CVA  Prognosis: Good  Activity Tolerance  Activity Tolerance: Patient Tolerated treatment well         Plan   Occupational Therapy Plan  Specific Instructions for Next Treatment: dynamic standing acts, L GM/FM acts, PNF HEP  Current Treatment Recommendations: Strengthening, ROM, Balance training, Functional mobility training, Endurance training, Safety education & training, Patient/Caregiver education & training, Equipment evaluation, education, & procurement, Home management training, Self-Care / ADL       Restrictions  Restrictions/Precautions  Restrictions/Precautions: Fall Risk, Aspiration Risk, Weight Bearing  Lower Extremity Weight Bearing Restrictions  Right Lower Extremity Weight Bearing: Weight Bearing As Tolerated  Left Lower Extremity Weight Bearing: Weight Bearing As Tolerated      Subjective   General  Chart Reviewed: Yes  Patient assessed for rehabilitation services?: Yes  Family / Caregiver Present: Yes  Diagnosis: R CVA  General Comment  Comments: stated being tired this session       Social/Functional History  Social/Functional History  Lives With: Parent (Mother)  Type of Home: House  Home Layout: One level  Home Access:  (1 step.)  Bathroom Shower/Tub: Tub/Shower unit (Danny)  Bathroom Toilet: Handicap height  Bathroom Equipment: Shower chair  Bathroom Accessibility: Accessible  Has the patient had two or more falls in the past year or any fall with injury in the past year?: No  ADL Assistance: [address]: Independent  Homemaking Responsibilities: Yes  Ambulation Assistance: Independent  Transfer Assistance: Independent  Active : Yes  Occupation: On disability       Objective   Functional Mobility  Functional - Mobility Device: No device  Activity: To/From therapy gym, Other  Assist Level: Supervision  Functional Mobility Comments: 2 minor LOB but able to self correct  Heart Rate: 67  Heart Rate Source: Monitor  BP: (!) 148/87  Patient Position: Supine  MAP (Calculated): 107  Resp: 18  SpO2: 96 %  O2 Device: None (Room air)         Safety Devices  Type of Devices: Call light within reach; Bed alarm in place       Transfers  Sit to stand: Supervision  Stand to sit: Supervision       Exercise Treatment: Cane/wand: 2# 1 set x 15 reps, all planes. Motor Control/Coordination: LUE theraputty tasks with 5# resistance theraputty. LUE FM lock/switch board with mod difficulty. Education  Education Given To: Patient  Education Provided: Mobility Training, Transfer Training  Education Provided Comments: S/o checked off for transfers in room and assisted with shower.   Education Method: Demonstration, Verbal  Barriers to Learning: None  Education Outcome: Verbalized understanding, Demonstrated understanding       [address] [place] [address] needed: Setup or clean-up assistance  CARE Score: 5  Discharge Goal: Independent      Toilet Transfer  Assistance needed: Supervision or touching assistance  Comment: supervision, without AD  CARE Score: 4  Discharge Goal: Independent    Balance  Sitting Balance: Independent  Standing Balance: Supervision      Standing Balance  Time: ~10 mins  Activity: 1-2 handed act with mod dynamic challenges    Eating  Assistance Needed: Setup or clean-up assistance  CARE Score: 5  Discharge Goal: Independent    Oral Hygiene  Assistance Needed: Setup or clean-up assistance  CARE Score: 5  Discharge Goal: Independent      Shower/Bathe Self  Assistance Needed: Supervision or touching assistance  Comment: SBA with shower  CARE Score: 4  Discharge Goal: Independent    Upper Body Dressing  Assistance Needed: Independent  Comment: VC for latrice-tech  CARE Score: 6  Discharge Goal: Independent      Lower Body Dressing  Assistance Needed: Supervision or touching assistance  Comment: Supervision. CARE Score: 4  Discharge Goal: Independent    Putting On/Taking Off Footwear  Assistance Needed: Independent  CARE Score: 6  Discharge Goal: Independent      Picking Up Object  Assistance Needed: Supervision or touching assistance  Comment: supervision  CARE Score: 4  Discharge Goal: Independent    Goals  Short Term Goals  Time Frame for Short Term Goals: 1 week  Short Term Goal 1: Supervision with clothing management/hygiene for toileting. Short Term Goal 2: Supervision with toilet transfers. Short Term Goal 3: Supervision with LB dressing. Short Term Goal 4: Supervision with ambulatory homemaking tasks. Short Term Goal 5: Patient will complete LUE exercises at least 5x to improve independence with ADLs. Long Term Goals  Time Frame for Long Term Goals : 2 weeks  Long Term Goal 1: Modified independent with bathing hygiene. Long Term Goal 2: Modified independent with toileting and toilet transfers. Long Term Goal 3: Modified independent with overall dressing. Long Term Goal 4: Modified independent with ambulatory homemaking tasks. Long Term Goal 5: Patient verabalize DME needs. Long Term Goal 6: Independent with HEP. Patient Goals   Patient goals : Return home independently.        Therapy Time   Individual Concurrent Group Co-treatment   Time In 0815         Time Out 0900         Minutes 45         Timed Code Treatment Minutes: 45 Minutes Electronically signed by KATLYN Saunders on 3/15/2023 at 10:37 AM

## 2023-03-15 NOTE — PLAN OF CARE
Problem: Safety - Adult  Goal: Free from fall injury  3/14/2023 1903 by Nicko Acevedo RN  Outcome: Progressing  3/14/2023 1054 by Pool Anders RN  Outcome: Progressing

## 2023-03-15 NOTE — PROGRESS NOTES
"Enter Query Response Below      Query Response: Brain compression clinically significant  Electronically signed by Brandon Gannon MD, 03/15/23, 3:39 PM CDT.               If applicable, please update the problem list.     Patient: James Taylor        : 1980  Account: [id number]           Admit Date: 3/4/2023        How to Respond to this query:       a. Click New Note     b. Answer query within the yellow box.                c. Update the Problem List, if applicable.      If you have any questions about this query contact me at: [email or web address]    :    42 year old admitted with right basal ganglia hemorrhagic CVA. Note per Your note \"CT scan of the brain yesterday shows some increasing surrounding edema slight increase in size of the overall hemorrhage. Some increased midline shift.\" Placed on Cardene drip and monitored with serial imaging. Noted weakness to left upper and lower extremities. Discharged to rehab. After study can Brain compression be further clarified as:    Brain compression clinically significant  Brain compression not clinically significant  Other_______  Unable to determine    By submitting this query, we are merely seeking further clarification of documentation to accurately reflect all conditions that you are monitoring, evaluating, treating or that extend the hospitalization or utilize additional resources of care. Please utilize your independent clinical judgment when addressing the question(s) above.     This query and your response, once completed, will be entered into the legal medical record.    Sincerely,  Franchesca Adam RN CDI  Clinical Documentation Integrity Program   "

## 2023-03-15 NOTE — PROGRESS NOTES
Physical Therapy     03/15/23 1430   Restrictions/Precautions   Restrictions/Precautions Fall Risk;Aspiration Risk;Weight Bearing   Lower Extremity Weight Bearing Restrictions   Right Lower Extremity Weight Bearing Weight Bearing As Tolerated   Left Lower Extremity Weight Bearing Weight Bearing As Tolerated   General   Patient assessed for rehabilitation services?  Yes   Additional Pertinent Hx HTN, ASTHMA   General Comment   Comments Pt delivered to PT from OT   Subjective   Subjective Agrees to PTx, states hes working very hard to get better   Subjective   Subjective denies pain   Transfers   Sit to Stand Supervision   Stand to Sit Supervision   Ambulation   WB Status fwbat   Ambulation   Surface Level tile   Device No Device   Assistance Stand by assistance   Quality of Gait decreased Left knee extension and heel strike, imroved as amb progressed, deviation of path at times, lacking L arm swing   Gait Deviations Decreased arm swing   Distance 500' x2   Comments 1.5lb cuff weight strapped in L hand, multiple strategies used to attempt to increase L arm swing of his own volition, most successful was PT snaping a beat to swing to; increased consistency second walk   Ambulation 2   Surface - 2 level tile   Other Apparatus 2   (green tape on contralateral L foot & R hand, blue tape on contralateral R foot, L hand)   Assistance 2 Stand by assistance;Contact guard assistance  (CGA for 1 instance light LOB, but Pt able to self correct)   Quality of Gait 2 difficulty coordinating contralateral foot and arm swing, improved by contralateral tape colors on UE/LE   Distance 200' x2   PT Exercises   Standing Open/Closed Kinetic Chain Exercises squats at Hi-[place] table 2x10 with tactile cues to put weight through heels and reach buttocks back; Standing marching in front of mirror with green tape on contralateral L foot & R hand, blue tape on contralateral R foot, L hand x 4 minutes   Assessment   Assessment Pt able to become more consistent with L arm swing with a verbal cue of PT snapping fingers when Pt should swing the arm. Has difficulty coordinating at times, and LUE tends to stay stationary when he is focusing on RUE. Safety Devices   Type of Devices Call light within reach; Left in bed   PT Individual Minutes   Time In 1430   Time Out 1515   Minutes 45   Electronically signed by Rochelle Watts, Student PT on 3/15/2023 at 3:21 PM

## 2023-03-15 NOTE — PROGRESS NOTES
Facility/Department: [facility] 8 REHAB UNIT  Occupational Therapy Treatment Note    Name: Roshan Chin  : 1980  MRN: [id number]  Date of Service: 3/15/2023    Discharge Recommendations:  Continue to assess pending progress          Patient Diagnosis(es): There were no encounter diagnoses. Past Medical History:  has no past medical history on file. Past Surgical History:  has no past surgical history on file. Treatment Diagnosis: R CVA    Assessment   Performance deficits / Impairments: Decreased functional mobility ; Decreased ADL status; Decreased ROM; Decreased strength;Decreased safe awareness;Decreased cognition;Decreased endurance;Decreased high-level IADLs;Decreased coordination;Decreased fine motor control  Treatment Diagnosis: R CVA  Activity Tolerance  Activity Tolerance: Patient Tolerated treatment well              Plan   Occupational Therapy Plan  Specific Instructions for Next Treatment: dynamic standing acts, L GM/FM acts, PNF HEP  Current Treatment Recommendations: Strengthening, ROM, Balance training, Functional mobility training, Endurance training, Safety education & training, Patient/Caregiver education & training, Equipment evaluation, education, & procurement, Home management training, Self-Care / ADL     Restrictions  Restrictions/Precautions  Restrictions/Precautions: Fall Risk, Aspiration Risk, Weight Bearing  Lower Extremity Weight Bearing Restrictions  Right Lower Extremity Weight Bearing: Weight Bearing As Tolerated  Left Lower Extremity Weight Bearing: Weight Bearing As Tolerated    Subjective   General  Chart Reviewed: Yes  Patient assessed for rehabilitation services?: Yes  Family / Caregiver Present: Yes  Diagnosis: R CVA  General Comment  Comments: stated being tired this session     Social/Functional History  Social/Functional History  Lives With: Parent (Mother)  Type of Home: House  Home Layout: One level  Home Access:  (1 step.)  Bathroom Shower/Tub: Tub/Shower unit (Danny)  Bathroom Toilet: Handicap height  Bathroom Equipment: Shower chair  Bathroom Accessibility: Accessible  Has the patient had two or more falls in the past year or any fall with injury in the past year?: No  ADL Assistance: [address] [place] [address] Avenue: Independent  Homemaking Responsibilities: Yes  Ambulation Assistance: Independent  Transfer Assistance: Independent  Active : Yes  Occupation: On disability       Objective   Functional Mobility  Functional - Mobility Device: No device  Activity: To/From therapy gym, Other, Retrieve items, Transport items  Assist Level: Supervision  Functional Mobility Comments: No LOB during afternoon session  Heart Rate: 62  Heart Rate Source: Monitor  BP: 118/69  Patient Position: Supine  MAP (Calculated): 85  Resp: 18  SpO2: 96 %  O2 Device: None (Room air)    Safety Devices  Type of Devices: Left in chair (Left with PT)       Coordination  Movements Are Fluid And Coordinated: No  Coordination and Movement Description: Fine motor impairments;Gross motor impairments; Left UE  Quality of Movement Other  Comment: Grasping spherical objects and placing in specific location, with 3 drops and more difficulty towards end of task. Large pegs: unable to complete, able to place one peg when using R hand to place in L hand. Transfers  Sit to stand: Supervision  Stand to sit: Supervision    Exercise Treatment: Rickshaw: LUE: 7.5#, RUE: 15#, 3 sets of 15; 1 # FW on LUE: 10 reps in all planes          Education  Education Given To: Patient  Education Provided: Mobility Training, Transfer Training  Education Provided Comments: S/o checked off for transfers in room and assisted with shower.   Education Method: Demonstration, Verbal  Barriers to Learning: None  Education Outcome: Verbalized understanding, Demonstrated understanding     OutComes Score  Balance  Sitting Balance: Independent  Standing Balance: Supervision     Standing Balance  Time: ~10 mins  Activity: 1-2 handed static standing task and dynamic balance tasks.    Eating  Assistance Needed: Independent  CARE Score: 6  Discharge Goal: Independent    Goals  Short Term Goals  Time Frame for Short Term Goals: 1 week  Short Term Goal 1: Supervision with clothing management/hygiene for toileting.  Short Term Goal 2: Supervision with toilet transfers.  Short Term Goal 3: Supervision with LB dressing.  Short Term Goal 4: Supervision with ambulatory homemaking tasks.  Short Term Goal 5: Patient will complete LUE exercises at least 5x to improve independence with ADLs.  Long Term Goals  Time Frame for Long Term Goals : 2 weeks  Long Term Goal 1: Modified independent with bathing hygiene.  Long Term Goal 2: Modified independent with toileting and toilet transfers.  Long Term Goal 3: Modified independent with overall dressing.  Long Term Goal 4: Modified independent with ambulatory homemaking tasks.  Long Term Goal 5: Patient verabalize DME needs.  Long Term Goal 6: Independent with HEP.  Patient Goals   Patient goals : Return home independently.     Therapy Time   Individual Concurrent Group Co-treatment   Time In 1400         Time Out 1430         Minutes 30         Timed Code Treatment Minutes: 30 Minutes       Electronically signed by Izzy Ortiz OT on 3/15/2023 at 3:11 PM

## 2023-03-15 NOTE — PROGRESS NOTES
Physical Therapy  Name: Jim Hollis  MRN:  [id number]  Date of service:  3/15/2023       03/15/23 1100   Restrictions/Precautions   Restrictions/Precautions Fall Risk;Aspiration Risk;Weight Bearing   Lower Extremity Weight Bearing Restrictions   Right Lower Extremity Weight Bearing Weight Bearing As Tolerated   Left Lower Extremity Weight Bearing Weight Bearing As Tolerated   General Comment   Comments pt napping in bed upon entry   Subjective   Subjective Pt states he didn't sleep well last night.    Subjective   Subjective denies pain   Bed mobility   Rolling to Left Independent   Rolling to Right Independent   Supine to Sit Independent   Sit to Supine Independent   Ambulation   WB Status fwbat   Ambulation   Surface Level tile   Device No Device   Assistance Stand by assistance   Quality of Gait decreased Left knee extension and heel strike, imroved as amb progressed, deviation of path at times, lacking L arm swing   Distance 500'   Comments frequent turns   Ambulation 2   Surface - 2 level tile   Other Apparatus 2   (use of SPC L side only walking in tandem with therapist behind (both holding end of cane with therapist driving arm swing from rear))   Assistance 2 Stand by assistance  (exception above with other apparatus)   Quality of Gait 2 improved arm swing and overall improved joana and quality, less force by therapist required to maintain L arm swing once initiated   Distance 500'   Comments may try small cuff wt on L UE during gt in upcoming session   PT Exercises   Dynamic Standing Balance Exercises marching on 4\" foam x 10 with quick pace, sidestepping L and R on 4\" foam 3 x 5' ea direction; amb 100' while passing ball L<> R hand, amb 300' while passing ball behind  to hand with cues to reverse direction periodically, amb 100' while raising/lowering ball fwd with B UE's; amb 300' while volleying balloon back and forth to therapist using L arm mainly, amb 100'  juggling balloon independent of therapist from  L to R hand; OBSTACLE COURSE: cone weaves, retrieval of small cone with L hand and repositioning to different location (knee height), stepping over rubber strips (reciprocally), diagonal steps from rubber dot to rubber dot stby-cg@ (some instability upon initiation of stepping over rubber strips though able to self correct by stepping and second bout improved)   Standing Open/Closed Kinetic Chain Exercises STS on 4\" foam x 10 from hi-lo table w/o use of hands working on equal wbing B LE, speed and control   Assessment   Assessment Pt was a little sleepy at start of session and took a few minutes to get energized. Pt noted to have occasional scuff of L foot with distraction during gt (ie balloon volley). Pt requiring less force to assume L arm swing when walking in tandem with therapist and cane (see above). Pt needs encouragement to be intentional with L UE during activity, but cont to show more normal posturing and use following L UE focussed activities. Safety Devices   Type of Devices Call light within reach; Left in bed   PT Individual Minutes   Time In 1100   Time Out 1200   Minutes 60       Electronically signed by Reva Lanier PTA on 3/15/2023 at 12:19 PM

## 2023-03-15 NOTE — PROGRESS NOTES
[facility]  INPATIENT SPEECH THERAPY  [facility] 8 REHAB UNIT  TIME   0900  1000    [x]Daily Note  []Progress Note    Date: 3/15/2023  Patient Name: Nina Cleary        MRN: [id number]    Account #: [de-identified]  : 1980  (43 y.o.)  Gender: male   Primary Provider: Dianelys Avila MD  Diet: Regular diet, thin liquids        PATIENT DIAGNOSIS(ES):    Diagnosis: RIGHT BG HEMORRHAGIC CVA; ETOH WITHDRAWAL POST CVA     Additional Pertinent Hx: HTN, ASTHMA     RESTRICTIONS/PRECAUTIONS:    Restrictions/Precautions  Restrictions/Precautions: Fall Risk, Aspiration Risk, Weight Bearing           Subjective: The patient was cooperative with all therapy tasks. He states he slept fairly well last night. Objective:  A check writing task was completed at 90%. A medication management task at 80%. Complex cancellation tasks were completed at 100%. Left facial droop is still noted. No dysarthria is noted this morning. He has been given a handout with oral motor exercises. He is to complete these independently in his room. Increased processing speed is noted this date. He was asked to recall sixteen words that were divided into four categories. He was able to recall 14 out of 16 words without cues. He was able to recall the two items with cues. Dysarthria strategies were reviewed and practiced. Oral motor exercises were completed. Mental manipulation tasks were completed. Reverse order was at 100% with some repetition provided. Some delayed responses were noted. Word progression was at 90%. He has exhibited improvement. He is willing to complete a HEP (oral motor exercises and dysarthria strategies). He will not need further speech therapy services following discharge from inpatient rehab. Recommended Diet and Intervention  Regular diet  Thin liquids     Therapeutic Interventions: Pharyngeal exercises; Patient/Family education;Oral care;Oral motor exercises;Diet tolerance monitoring     Compensatory Swallowing Strategies  Compensatory Swallowing Strategies : Alternate solids and liquids;Check for pocketing of food on the Left;Eat/Feed slowly;Lingual sweep;Remain upright for 30-45 minutes after meals;Small bites/sips;Upright as possible for all oral intake         SHORT TERM GOAL #1:  Goal 1: The patient will complete the Cognitive Linguistic Quick Test or CLQT.    SHORT TERM GOAL #2:  Goal 2: The patient will complete higher level cognitive tasks with minimal cues.        Swallowing Short Term Goals  Short-term Goals  Goal 1: The patient will tolerate a regular diet with thin liquids with minimal overt s/s of aspiration.  Goal 2: The patient will use a lingual search/sweep to clear food from the oral cavity.  Goal 3: The patient will complete daily oral-motor exercise to increase labial and lingual function and ROM/strength with minimal verbal, tactile and visual cues.  Goal 4: The patient will complete pharyngeal strengthening exercises to aid in airway protection and minimize laryngeal penetration and aspiration with minimal verbal cues.        ASSESSMENT:  Assessment: [x]Progressing towards goals          []Not Progressing towards goals    Patient Tolerance of Treatment:   [x]Tolerated well []Tolerated fair []Required rest breaks []Fatigued    Education:  Learner:  [x]Patient          []Significant Other          []Other  Education provided regarding:  [x]Goals and POC   []Diet and swallowing precautions    []Home Exercise Program  []Progress and/or discharge information  Method of Education:  [x]Discussion          []Demonstration          []Handout          []Other  Evaluation of Education:   [x]Verbalized understanding   []Demonstrates without assistance  []Demonstrates with assistance  []Needs further instruction     []No evidence of learning                  []No family present      Plan: [x]Continue with current plan of care    []Modify current plan of care as follows:    []Discharge patient    Discharge  Location:    Services/Supervision Recommended:      [x]Patient continues to require treatment by a licensed therapist to address functional deficits as outlined in the established plan of care.                    Electronically Signed By:  Franky Aquino M.S., CCC-SLP  3/15/2023,7:29 AM.

## 2023-03-15 NOTE — PROGRESS NOTES
Physical Therapy     03/15/23 1430   Restrictions/Precautions   Restrictions/Precautions Fall Risk;Aspiration Risk;Weight Bearing   Lower Extremity Weight Bearing Restrictions   Right Lower Extremity Weight Bearing Weight Bearing As Tolerated   Left Lower Extremity Weight Bearing Weight Bearing As Tolerated   General   Patient assessed for rehabilitation services?  Yes   Additional Pertinent Hx HTN, ASTHMA   General Comment   Comments Pt delivered to PT from OT   Subjective   Subjective Agrees to PTx, states hes working very hard to get better   Subjective   Subjective denies pain   Transfers   Sit to Stand Supervision   Stand to Sit Supervision   Ambulation   WB Status fwbat   Ambulation   Surface Level tile   Device No Device   Assistance Stand by assistance   Quality of Gait decreased Left knee extension and heel strike, imroved as amb progressed, deviation of path at times, lacking L arm swing   Gait Deviations Decreased arm swing   Distance 500' x2   Comments 1.5lb cuff weight strapped in L hand, multiple strategies used to attempt to increase L arm swing of his own volition, most successful was PT snaping a beat to swing to; increased consistency second walk   Ambulation 2   Surface - 2 level tile   Other Apparatus 2   (green tape on contralateral L foot & R hand, blue tape on contralateral R foot, L hand)   Assistance 2 Stand by assistance;Contact guard assistance  (CGA for 1 instance light LOB, but Pt able to self correct)   Quality of Gait 2 difficulty coordinating contralateral foot and arm swing, improved by contralateral tape colors on UE/LE   Distance 200' x2   PT Exercises   Standing Open/Closed Kinetic Chain Exercises squats at Hi-[place]te[place] table 2x10 with tactile cues to put weight through heels and reach buttocks back; Standing marching in front of mirror with green tape on contralateral L foot & R hand, blue tape on contralateral R foot, L hand x 4 minutes   Assessment   Assessment Pt able to become more consistent with L arm swing with a verbal cue of PT snapping fingers when Pt should swing the arm. Has difficulty coordinating at times, and LUE tends to stay stationary when he is focusing on RUE.    Electronically signed by Treasure Tucker, Student PT on 3/15/2023 at 3:20 PM

## 2023-03-16 LAB
ANION GAP SERPL CALCULATED.3IONS-SCNC: 7 MMOL/L (ref 7–19)
BASOPHILS # BLD: 0.1 K/UL (ref 0–0.2)
BASOPHILS NFR BLD: 1.1 % (ref 0–1)
BUN SERPL-MCNC: 16 MG/DL (ref 6–20)
CALCIUM SERPL-MCNC: 9.1 MG/DL (ref 8.6–10)
CHLORIDE SERPL-SCNC: 104 MMOL/L (ref 98–111)
CO2 SERPL-SCNC: 30 MMOL/L (ref 22–29)
CREAT SERPL-MCNC: 1.1 MG/DL (ref 0.5–1.2)
EOSINOPHIL # BLD: 0.4 K/UL (ref 0–0.6)
EOSINOPHIL NFR BLD: 4.2 % (ref 0–5)
ERYTHROCYTE [DISTWIDTH] IN BLOOD BY AUTOMATED COUNT: 12.9 % (ref 11.5–14.5)
GLUCOSE SERPL-MCNC: 102 MG/DL (ref 74–109)
HCT VFR BLD AUTO: 30.2 % (ref 42–52)
HGB BLD-MCNC: 10.5 G/DL (ref 14–18)
IMM GRANULOCYTES # BLD: 0.1 K/UL
LYMPHOCYTES # BLD: 2.8 K/UL (ref 1.1–4.5)
LYMPHOCYTES NFR BLD: 26.5 % (ref 20–40)
MCH RBC QN AUTO: 31.3 PG (ref 27–31)
MCHC RBC AUTO-ENTMCNC: 34.8 G/DL (ref 33–37)
MCV RBC AUTO: 90.1 FL (ref 80–94)
MONOCYTES # BLD: 0.9 K/UL (ref 0–0.9)
MONOCYTES NFR BLD: 8.5 % (ref 0–10)
NEUTROPHILS # BLD: 6.2 K/UL (ref 1.5–7.5)
NEUTS SEG NFR BLD: 59.1 % (ref 50–65)
PLATELET # BLD AUTO: 214 K/UL (ref 130–400)
PMV BLD AUTO: 9.9 FL (ref 9.4–12.4)
POTASSIUM SERPL-SCNC: 3.8 MMOL/L (ref 3.5–5)
RBC # BLD AUTO: 3.35 M/UL (ref 4.7–6.1)
SODIUM SERPL-SCNC: 141 MMOL/L (ref 136–145)
WBC # BLD AUTO: 10.4 K/UL (ref 4.8–10.8)

## 2023-03-16 PROCEDURE — 80048 BASIC METABOLIC PNL TOTAL CA: CPT

## 2023-03-16 PROCEDURE — 1180000000 HC REHAB R&B

## 2023-03-16 PROCEDURE — 36415 COLL VENOUS BLD VENIPUNCTURE: CPT

## 2023-03-16 PROCEDURE — 97110 THERAPEUTIC EXERCISES: CPT

## 2023-03-16 PROCEDURE — 97116 GAIT TRAINING THERAPY: CPT

## 2023-03-16 PROCEDURE — 6370000000 HC RX 637 (ALT 250 FOR IP): Performed by: PSYCHIATRY & NEUROLOGY

## 2023-03-16 PROCEDURE — 97129 THER IVNTJ 1ST 15 MIN: CPT

## 2023-03-16 PROCEDURE — 99232 SBSQ HOSP IP/OBS MODERATE 35: CPT | Performed by: PSYCHIATRY & NEUROLOGY

## 2023-03-16 PROCEDURE — 97530 THERAPEUTIC ACTIVITIES: CPT

## 2023-03-16 PROCEDURE — 94760 N-INVAS EAR/PLS OXIMETRY 1: CPT

## 2023-03-16 PROCEDURE — 97130 THER IVNTJ EA ADDL 15 MIN: CPT

## 2023-03-16 PROCEDURE — 85025 COMPLETE CBC W/AUTO DIFF WBC: CPT

## 2023-03-16 RX ADMIN — LISINOPRIL 10 MG: 10 TABLET ORAL at 21:15

## 2023-03-16 RX ADMIN — HYDRALAZINE HYDROCHLORIDE 25 MG: 25 TABLET, FILM COATED ORAL at 14:39

## 2023-03-16 RX ADMIN — METOPROLOL TARTRATE 50 MG: 50 TABLET, FILM COATED ORAL at 21:15

## 2023-03-16 RX ADMIN — HYDRALAZINE HYDROCHLORIDE 25 MG: 25 TABLET, FILM COATED ORAL at 06:30

## 2023-03-16 RX ADMIN — PANTOPRAZOLE SODIUM 40 MG: 40 TABLET, DELAYED RELEASE ORAL at 15:54

## 2023-03-16 RX ADMIN — SENNOSIDES AND DOCUSATE SODIUM 1 TABLET: 50; 8.6 TABLET ORAL at 21:15

## 2023-03-16 RX ADMIN — HYDRALAZINE HYDROCHLORIDE 25 MG: 25 TABLET, FILM COATED ORAL at 21:15

## 2023-03-16 RX ADMIN — SERTRALINE HYDROCHLORIDE 50 MG: 50 TABLET ORAL at 08:01

## 2023-03-16 RX ADMIN — AMLODIPINE BESYLATE 10 MG: 10 TABLET ORAL at 08:01

## 2023-03-16 RX ADMIN — DOXAZOSIN 4 MG: 4 TABLET ORAL at 08:01

## 2023-03-16 RX ADMIN — METOPROLOL TARTRATE 50 MG: 50 TABLET, FILM COATED ORAL at 08:01

## 2023-03-16 RX ADMIN — PANTOPRAZOLE SODIUM 40 MG: 40 TABLET, DELAYED RELEASE ORAL at 06:30

## 2023-03-16 ASSESSMENT — 9 HOLE PEG TEST
TEST_RESULT: FUNCTIONAL
TESTTIME_SECONDS: 24
TEST_RESULT: IMPAIRED

## 2023-03-16 ASSESSMENT — PAIN SCALES - GENERAL: PAINLEVEL_OUTOF10: 0

## 2023-03-16 NOTE — PROGRESS NOTES
Physical Therapy  Name: Aurelia Tao  MRN:  [id number]  Date of service:  3/16/2023       03/16/23 1100   Lower Extremity Weight Bearing Restrictions   Right Lower Extremity Weight Bearing Weight Bearing As Tolerated   Left Lower Extremity Weight Bearing Weight Bearing As Tolerated   General Comment   Comments in WAY[facility] 23 post OT session   Subjective   Subjective Pt cooperative and without complaint. No new to report.    Transfers   Sit to Stand Supervision   Stand to Sit Supervision   Bed to Chair Supervision   Comment pt did stand from WAYNE Cox on one occasion with brakes not locked and leg rests not released   Ambulation   WB Status fwbat   Ambulation   Surface Level tile   Device No Device   Assistance Stand by assistance   Quality of Gait decreased Left knee extension and heel strike, improved as amb progressed, deviation of path at times, lacking L arm swing   Gait Deviations Decreased arm swing   Distance 500'   Comments snapping to provide audible cue for L arm swing, but not following cue consistently; brief standing rest with instruction to be intentional with L arm swing working on larger than normal motion working towards fine tuning and reducing amplitude when swing becomes more consistent   Ambulation 2   Surface - 2 level tile   Device 2 No device   Other Apparatus 2   (1 1/2# cuff wt rolled up grasping with L hand)   Assistance 2 Stand by assistance;Contact guard assistance   Quality of Gait 2 improved armswing L UE with wt but difficulty maintaing grasp   Gait Deviations Deviated path;Decreased arm swing   Distance 200'   Comments due to poor grasp and hold on L side, pt dropped wt 6x during this distance, but able to retrieve from floor with cg@, occasional reactive step to correct balance   PT Exercises   Dynamic Standing Balance Exercises bolster flips (end over end) being intentional with L hand use x 20, box step random pattern and direction upon cues trying to be intentional with foot placement x 2 min, toe taps on 3 large cones (cues randomly as to color and  LE) x 15 on L and 10 on R, amb in hallways with quick direction changes, turns, speed changes (difficulty with slow motion and quick stops), sidestepping on seam of suleiman 20' L/R x 2, SLS while performing fwd toe taps x 5 ea quick and slow motion, lateral toe taps x 5 ea quick and slow motion (difficulty with R LE due to L LE strength/control)   Standing Open/Closed Kinetic Chain Exercises 6\" step ups  L LE leading with minimal use of R rail x 10 fwd, x 10 lateral and step downs leading with R x 10 with use of R hand rail and blocking to L knee   Assessment   Assessment Pt noted to have difficulty coordinating placement of L foot on step with step ups and unable to maintain stability with SLS on L LE in order to peform R LE tapping ex. Arm swing L UE cont to be small though able to initiate with less cues and coordination with C/L LE improved; however, with distraction or conversation, this diminishes.    PT Individual Minutes   Time In 1100   Time Out 1200   Minutes 60       Electronically signed by Rosana Holman PTA on 3/16/2023 at 3:03 PM

## 2023-03-16 NOTE — PROGRESS NOTES
[facility]  Test for Patient [place] in the Areas of Transfers/Ambulation    Ambulation/Transfers   Independent ambulation in room with no AD: Yes     -Device Type:  No AD   Independent transfers from wheelchair to surface in room:  Yes     Bathroom Transfers  Independent transfers in patient bathroom:  Yes         Inpatient Rehabilitation Nursing and Therapies feel as though the patient qualifies for an acute rehabilitation test for independence in the areas of transfers and ambulation prior to discharging from Inpatient Rehabilitation Unit at [facility].  This test for independence in the areas of transfers and ambulation must be agreed upon by the patient's physician, nurse, and therapists.         Nurse Approval:  Electronically signed by Andrew Barrera LPN on 8/43/2005 at 2:51 PM     Physical Therapist Approval:  Electronically signed by Rita Riley PTA on 3/16/2023 at 2:30 PM      Occupational Therapist Approval: Electronically signed by Anthony Astudillo OT on 3/16/23 at 1:51 PM CDT

## 2023-03-16 NOTE — PLAN OF CARE
Problem: Discharge Planning  Goal: Discharge to home or other facility with appropriate resources  Outcome: Progressing  Flowsheets (Taken 3/16/2023 0807)  Discharge to home or other facility with appropriate resources: Refer to discharge planning if patient needs post-hospital services based on physician order or complex needs related to functional status, cognitive ability or social support system     Problem: Safety - Adult  Goal: Free from fall injury  Outcome: Progressing     Problem: Neurosensory - Adult  Goal: Achieves stable or improved neurological status  Outcome: Progressing  Flowsheets (Taken 3/16/2023 0807)  Achieves stable or improved neurological status: Assess for and report changes in neurological status     Problem: Musculoskeletal - Adult  Goal: Return mobility to safest level of function  Outcome: Progressing  Flowsheets (Taken 3/16/2023 0807)  Return Mobility to Safest Level of Function: Assess patient stability and activity tolerance for standing, transferring and ambulating with or without assistive devices

## 2023-03-16 NOTE — PROGRESS NOTES
Physical Therapy  Name: Maria Mendieta  MRN:  [id number]  Date of service:  3/16/2023       03/16/23 1345   Restrictions/Precautions   Restrictions/Precautions Aspiration Risk;Weight Bearing; Up Ad Adry   Lower Extremity Weight Bearing Restrictions   Right Lower Extremity Weight Bearing Weight Bearing As Tolerated   Left Lower Extremity Weight Bearing Weight Bearing As Tolerated   General Comment   Comments in chair post OT session   Subjective   Subjective No new complaints. Almost got a nap in, but was interrupted by OT.    Subjective   Subjective denies pain   Bed mobility   Bridging Independent   Rolling to Left Independent   Rolling to Right Independent   Supine to Sit Modified independent   Sit to Supine Independent   Transfers   Sit to Stand Independent   Stand to Sit Independent   Bed to Chair Independent   Comment pt encouraged to limit use of WC (previous instance of poor safety rising from [facility]); use of WC primarily for transportation off floor with family   Ambulation   WB Status fwbat   Ambulation   Surface Level tile   Device No Device   Assistance Stand by assistance   Quality of Gait decreased Left knee extension and heel strike, improved as amb progressed, deviation of path rarely   Gait Deviations Decreased arm swing   Distance 250'   Comments cues to drive arm swing with improved coordination gradually each session   Ambulation 2   Surface - 2 level tile   Device 2 No device   Assistance 2 Stand by assistance   Quality of Gait 2 arm swing improved on L side though only small range, improved joana   Gait Deviations Decreased arm swing   Distance 400'   Ambulation 3   Surface - 3 level tile   Device 3 No device   Assistance 3 Independent   Gait Deviations Decreased arm swing   Distance 25'   PT Exercises   Dynamic Standing Balance Exercises lunges at [place] rail x 5 ea small range, x 5 ea larger range with cg@ (cues to keep wt back over heels and prevent knee from orienting fwd beyond toes), amb 250' while volleying balloon to/from therapist being intentional with L UE, 250' while juggling balloon indep of therapist [email or web address] when recovering dropped balloon   Motor Control/Coordination slow motion marching with high knees and big arm swing 25' x 4, bkwds gt being intentional with arm swing ant/post 25' x 4   Assessment   Assessment Pt cleared for up ad marky in room, but cautioned to avoid WC use with exception of transportation off floor. Pt showing cont progress with L UE arm swing during gt (ant swing > than post swing), intentional L UE use during activities, and overall balance and amplitude of movement. Cues to drive this and cg@ on occasion during amb given secondary task with UE's.    PT Individual Minutes   Time In 1400 [facility]   Time Out 1430   Minutes 45       Electronically signed by Belinda Stinson PTA on 3/16/2023 at 2:57 PM

## 2023-03-16 NOTE — PROGRESS NOTES
Facility/Department: [facility] 8 REHAB UNIT  Occupational Therapy Treatment Note    Name: Rsohan Chin  : 1980  MRN: [id number]  Date of Service: 3/16/2023    Discharge Recommendations:  Outpatient OT  OT Equipment Recommendations  Equipment Needed: Yes  Mobility Devices: ADL Assistive Devices  ADL Assistive Devices: Shower Chair with back  Other: Patient plans to buy shower chair or borrow one. Patient Diagnosis(es): There were no encounter diagnoses. Past Medical History:  has no past medical history on file. Past Surgical History:  has no past surgical history on file. Treatment Diagnosis: R CVA    Assessment   Performance deficits / Impairments: Decreased functional mobility ; Decreased ROM; Decreased strength;Decreased cognition;Decreased high-level IADLs;Decreased coordination;Decreased fine motor control  Assessment: Patient now up ad adry in room. Discharging tommorow. Patient plans to buy shower chair to place in tub. Treatment Diagnosis: R CVA  Activity Tolerance  Activity Tolerance: Patient Tolerated treatment well              Plan   Occupational Therapy Plan  Specific Instructions for Next Treatment: dynamic standing acts, L GM/FM acts, PNF HEP  Current Treatment Recommendations: Strengthening, ROM, Functional mobility training     Restrictions  Restrictions/Precautions  Restrictions/Precautions: Aspiration Risk, Weight Bearing, Up Ad Adry  Lower Extremity Weight Bearing Restrictions  Right Lower Extremity Weight Bearing: Weight Bearing As Tolerated  Left Lower Extremity Weight Bearing: Weight Bearing As Tolerated    Subjective   General  Chart Reviewed: Yes  Patient assessed for rehabilitation services?: Yes  Family / Caregiver Present: Yes  Diagnosis: R CVA  Subjective  Subjective: Pt will discharge tomorrow. Tx focused on UE function and HEP.   General Comment  Comments: stated being tired this session     Social/Functional History  Social/Functional History  Lives With: Parent (Mother)  Type of Home: House  Home Layout: One level  Home Access:  (1 step.)  Bathroom Shower/Tub: Tub/Shower unit (Danny)  Bathroom Toilet: Handicap height  Bathroom Equipment: Shower chair  Bathroom Accessibility: Accessible  Has the patient had two or more falls in the past year or any fall with injury in the past year?: No  ADL Assistance: [address] [place] [address] Avenue: Independent  Homemaking Responsibilities: Yes  Ambulation Assistance: Independent  Transfer Assistance: Independent  Active : Yes  Occupation: On disability       Objective   Functional Mobility  Functional - Mobility Device: No device  Activity: To/From therapy gym, Other  Assist Level: Independent  Functional Mobility Comments: No LOB during afternoon session  Heart Rate: 70  Heart Rate Source: Monitor  BP: 112/64  BP Location: Left upper arm  MAP (Calculated): 80  Resp: 20  SpO2: 100 %  O2 Device: None (Room air)             Safety Devices  Type of Devices: Left in chair (Left with PT)     Tub Transfers  Tub - Transfer From: Other (ambulating with No AD.)  Tub - Transfer Type: To and From  Tub - Transfer To: Shower seat with back  Tub - Technique: Ambulating  Tub Transfers: Supervision  Tub Transfers Comments: without using GBs        Coordination  Coordination and Movement Description: Gross motor impairments; Fine motor impairments;Decreased accuracy; Decreased speed;Left UE (termination of L at times during B acts)  Quality of Movement Other  Comment: [place] placing test: able to place 10 discs with increased time with 3 drops using LUE. Activity Tolerance  Activity Tolerance: Patient tolerated treatment well     Transfers  Sit to stand: Independent  Stand to sit: Independent          Exercise Treatment: LUE: FM comprehensive hand HEP. Focusing on each individual joint of the hand. 10 reps at all joints.        Education  Education Given To: Patient  Education Provided: Home Exercise Program  Education Provided Comments: Given HEP for comprehensive hand exercises focusing on each joint. Aleady given ROM and free weight exercises. Education Method: Demonstration, Verbal  Barriers to Learning: None  Education Outcome: Verbalized understanding, Demonstrated understanding     OutComes Score           [address] needed: Independent  CARE Score: 6  Discharge Goal: Independent      Toilet Transfer  Assistance needed: Independent  Comment: supervision, without AD  CARE Score: 6  Discharge Goal: Independent    Balance  Sitting Balance: Independent  Standing Balance: Independent     Standing Balance  Time: ~10 mins  Activity: 1-2 handed static standing task and dynamic balance tasks. Eating  Assistance Needed: Independent  CARE Score: 6  Discharge Goal: Independent    Shower/Bathe Self  Assistance Needed: Independent  Comment: Independent  CARE Score: 6  Discharge Goal: Independent    Upper Body Dressing  Assistance Needed: Independent  Comment: Independent  CARE Score: 6  Discharge Goal: Independent      Lower Body Dressing  Assistance Needed: Independent  Comment: Independent  CARE Score: 6  Discharge Goal: Independent    Putting On/Taking Off Footwear  Assistance Needed: Independent  CARE Score: 6  Discharge Goal: Independent      Picking Up Object  Assistance Needed: Independent  Comment: Independent  CARE Score: 6  Discharge Goal: Independent  Goals  Short Term Goals  Time Frame for Short Term Goals: 1 week  Short Term Goal 1: MET  Short Term Goal 2: MET  Short Term Goal 3: MET  Short Term Goal 4: MET  Short Term Goal 5: MET  Long Term Goals  Time Frame for Long Term Goals : 2 weeks  Long Term Goal 1: MET  Long Term Goal 2: MET  Long Term Goal 3: MET  Long Term Goal 4: MET  Long Term Goal 5: MET  Long Term Goal 6: MET  Patient Goals   Patient goals : Return home independently.      Therapy Time   Individual Concurrent Group Co-treatment   Time In 1300         Time Out 1345         Minutes 45         Timed Code Treatment Minutes: 39 Minutes       Electronically signed by Constance Howard OT on 3/16/2023 at 3:33 PM

## 2023-03-16 NOTE — PROGRESS NOTES
Patient:   Pepito Rodriguez  MR#:    [id number]   Room:    0531/623-10   YOB: 1980  Date of Progress Note: 3/16/2023  Time of Note                           7:57 AM  Consulting Physician:   June Valencia M.D. Attending Physician:  June Valencia MD     CHIEF COMPLAINT: Left-sided weakness, ataxia and dysarthria     Subjective  This 43 y.o. male  with history of asthma, HTN, tobacco abuse, and ETOH abuse. He presented to [facility] ER on 3/4/23 with c/o weakness and difficulty speaking. CT of head revealed a right basal ganglia hemorrhagic stroke. He has a history of malignant hypertension that is difficult to control, this is followed by his PCP Dr. Kathie Khan. He was admitted with ICH to neurosurgeon Dr. Bo Gonzalez with consult for Hospitalist. He was noted to have left sided weakness, ataxia, and dysarthria. Lab work shows triglycerides elevated at 589, HDL low at 24, VLDL elevated at 77. Glucose 110, alk phos 190, , . INR 1.22. CBC is unremarkable. CT angiogram of the neck shows patent vertebral arteries with calcified and noncalcified plaque in both carotids. CT angiogram of the head showed no major branch occlusion or aneurysm. CT of the head showed acute hemorrhage in the right basal ganglia measuring 4.7 x 1.8 cm with mild surrounding edema with a lateral ventricular shift to the left at 2.9 mm. He was placed on Cardene drip for systolic BP greater than 736. started on 10 mg of amlodipine daily as well as metoprolol 25 mg twice daily. Initially nursing was able to wean him from Cardene drip. But that drip has been restarted today because of elevated blood pressure outside goal range. Metoprolol was  increased to 50 mg p.o. twice daily and Hytrin 5 mg daily were added to his regimen by Hospitalist Dr. Neda Martinez. Repeat CT scan of the head  on 3/5/23 shows left shift slightly increased to 4 mm with no change in the hemorrhage.  patient was going through alcohol withdrawals, was having hallucinations, aggitated and confused. CIWA protocol initiated was intiated on 3/5/23. Blood pressure improving with the oral medications, now off Cardene drip. Repeat CT of head on 3/7/23 was stable. Patient was evaluated by SPT for swallow and placed on a mechanical soft diet with thin liquids. Patient's mental status has improved, he is A&O x 4, he does continue to have some dysarthria. He continues to have left sided weakness and is participating in both PT/OT. No complaints today. Expecting discharge tomorrow. REVIEW OF SYSTEMS:  Constitutional: No fevers No chills  Neck:No stiffness  Respiratory: No shortness of breath  Cardiovascular: No chest pain No palpitations  Gastrointestinal: No abdominal pain    Genitourinary: No Dysuria  Neurological: No headache, no confusion      PHYSICAL EXAM:  BP (!) 164/92   Pulse 70   Temp 98.2 °F (36.8 °C) (Temporal)   Resp 20   Ht 5' 10\" (1.778 m)   Wt 156 lb (70.8 kg)   SpO2 100%   BMI 22.38 kg/m²     Constitutional: he appears well-developed and well-nourished. Eyes - conjunctiva normal.  Pupils react to light  Ear, nose, throat -hearing intact to voice. No scars, masses, or lesions over external nose or ears, no atrophy of tongue  Neck-symmetric, no masses noted, no jugular vein distension  Respiration- chest wall appears symmetric, good expansion,   normal effort without use of accessory muscles  Cardiovascular- RRR  Musculoskeletal - no significant wasting of muscles noted, no bony deformities, gait no gross ataxia  Extremities-no clubbing, cyanosis or edema  Skin - warm, dry, and intact. No rash, erythema, or pallor.   Psychiatric - mood, affect, and behavior appear normal.      Neurology  NEUROLOGICAL EXAM:      Mental status   Awake, alert, fluent oriented x 3 appropriate affect  Attention and concentration appear appropriate  Recent and remote memory appears unremarkable  Speech normal without dysarthria  No clear issues with language Cranial Nerves   CN II- Visual fields grossly unremarkable  CN III, IV,VI-EOMI, No nystagmus, conjugate eye movements, no ptosis  CN VII-no facial asymmetry  CN VIII-Hearing intact    Motor function  Antigravity x 4. Minimal left-sided weakness       Nursing/pcp notes, imaging,labs and vitals reviewed.          Lab Results   Component Value Date    WBC 10.4 03/16/2023    HGB 10.5 (L) 03/16/2023    HCT 30.2 (L) 03/16/2023    MCV 90.1 03/16/2023     03/16/2023     Lab Results   Component Value Date     03/16/2023    K 3.8 03/16/2023     03/16/2023    CO2 30 (H) 03/16/2023    BUN 16 03/16/2023    CREATININE 1.1 03/16/2023    GLUCOSE 102 03/16/2023    CALCIUM 9.1 03/16/2023    LABGLOM >60 03/16/2023   No results found for: INRNo results found for: PHENYTOIN, ESR, CRP    PT,OT and/or speech notes reviewed:     PHYSICAL THERAPY  GROSS ASSESSMENT       BED MOBILITY  Bed mobility  Rolling to Left: Independent  Rolling to Right: Independent  Supine to Sit: Independent  Sit to Supine: Independent  Scooting: Supervision  Bed Mobility Comments: triplanar for all movements withotu rails       TRANSFERS  Transfers  Sit to Stand: Supervision  Stand to Sit: Supervision  Bed to Chair: Stand by assistance, Independent  Car Transfer: Contact guard assistance  WHEELCHAIR PROPULSION  AMBULATION  Ambulation  Surface: Level tile  Device: No Device  Assistance: Stand by assistance  Quality of Gait: decreased Left knee extension and heel strike, imroved as amb progressed, deviation of path at times due to combination of pants sliding down and effort to increase armswing (difficulty coordinating with C/L LE)  Gait Deviations: Decreased arm swing, Deviated path (L UE)  Distance: 600'  Comments: frequent turns  More Ambulation?: Yes  STAIRS  Stairs  # Steps : 12  Stairs Height: 6\"  Rails: Bilateral  Assistance: Contact guard assistance, Stand by assistance  Comment: step too pattern, lead up Right, lead down Left, repeated twice 2nd attempt SBA  GOALS:  Short Term Goals  Time Frame for Short Term Goals: 3-5 DAYS  Short Term Goal 1: UP/DOWN 12 STEPS 1 RAIL SBA  Short Term Goal 2: AMBULATE 500 FT FLAT SURFACES WITH NORMAL RECIPROCAL ARM SWING WITHOUT LATERAL DEVIATION SBA  Short Term Goal 3: AMBULATE OUTSIDE ON UNEVEN TERRAIN INCLUDING GRASS WITHOTU A.D. MIN A  Short Term Goal 4: PEFORM BED MOBILITY INDEP     Long Term Goals  Time Frame for Long Term Goals : 7 DAYS  Long Term Goal 1: UP/DOWN 12 STEPS 1 RAILS INDEP  Long Term Goal 2: AMBULATE 500 FT FLAT SURFACES WITH NORMAL RECIPROCAL ARM SWING WITHOUT LATERAL DEVIATION INDEP  Long Term Goal 3: AMBULATE OUTSIDE ON UNEVEN TERRAIN INCLUDING GRASS WITHOTU A.D. SBA  Long Term Goal 4: Sima Domingo WITHOUT A.D. INDEP     ASSESSMENT:  Assessment: Worked on engaging L UE with mobility. Pt noted to swing L UE more normally when amb to [facility] following bouts of balloon volley and ball toss. Proprioceptive issues when performing step ups with L LE leading as his foot placement on step was inconsistent and sometimes unsafe resulting in need for UE support during this activity. Mild lob on occasion with boxstep activity requiring cg@ and use of UE to steady self at wall on one occasion. SPEECH THERAPY  The CLQT was completed on 3/13/23. CLQT  Cognitive Domain Scoring Range Score Severity   Attention 179-125 134 Mild   Memory 140-110 139 Moderate   Executive Function 19-16 17 Moderate   Language 37-29 31 WNL   Visuospatial 81-52 55 Mild   Composite 3.4-2.5 2.8 Mild         He scored a 2.8 out of a possible 4.0 on the CLQT which is considered mild. Due to delayed processing and slow responses with higher level cognitive tasks, recommend he continue speech therapy services. Left facial asymmetry is still noted. Decreased control of saliva is noted at times. Today he completed trail making tests and exhibited some minimal difficulty.  Will complete additional trail making tasks during a later session for increased processing speed. A word search was completed without cueing. Following complex written directions was at 100%. He was asked to recall sixteen words which were divided into four categories. He was able to recall fourteen of sixteen words with cues. With cues he was able to recall fifteen out of sixteen words. He was asked to draw a complex design from memory and had min to moderate difficulty. Providing words to complex definitions was at 100%. No overt s/s of aspiration observed with consecutive sips of thin liquids via straw. Good hyolaryngeal elevation and timely swallow responses were noted. Clear voicing was noted. Reviewed lingual search/sweep today to be utilized during and after meals. No oral residue was noted after his noon meal. Reviewed and practiced oral motor exercises. Handout was provided for him to complete these in his room. Complex cancellation tasks were completed today and he scored at 100%. A second task for following complex written directions was completed and he scored at 100%. A complex convergent naming task was completed at 100%. Recommend continue speech therapy services for delayed processing, recall deficits and executive function deficits. Recommended Diet and Intervention  Regular diet  Thin liquids     Therapeutic Interventions: Pharyngeal exercises; Patient/Family education;Oral care;Oral motor exercises;Diet tolerance monitoring     Compensatory Swallowing Strategies  Compensatory Swallowing Strategies : Alternate solids and liquids; Check for pocketing of food on the Left;Eat/Feed slowly; Lingual sweep;Remain upright for 30-45 minutes after meals;Small bites/sips;Upright as possible for all oral intake                 SHORT TERM GOAL #1:  Goal 1: The patient will complete the Cognitive Linguistic Quick Test or CLQT.  Met     SHORT TERM GOAL #2:  Goal 2: The patient will complete higher level cognitive tasks with minimal cues. SHORT TERM GOAL #3:  Goal 3: The patient will complete tasks for problem solving and safety awareness with miniml cues. Swallowing Short Term Goals  Short-term Goals  Goal 1: The patient will tolerate a regular diet with thin liquids with minimal overt s/s of aspiration. Goal 2: The patient will use a lingual search/sweep to clear food from the oral cavity. Goal 3: The patient will complete daily oral-motor exercise to increase labial and lingual function and ROM/strength with minimal verbal, tactile and visual cues. Goal 4: The patient will complete pharyngeal strengthening exercises to aid in airway protection and minimize laryngeal penetration and aspiration with minimal verbal cues. Long Term Goals: The patient will maintain adequate hydration/nutrition with optimum safety and efficiency of swallowing function with P.O. intake without overt signs and symptoms of aspiration for the highest appropriate diet level. The patient will develop functional, cognitive-linguistic-based skills and utilize compensatory strategies to  communicate wants and needs effectively to different conversational partners, maintain safety and  participate socially in functional living environment  The patient will demonstrate functional problem solving skills and provide appropriate solutions to problems  in order to improve safety and awareness in functional living environment.         STGs Met: 1     LTGs Met: 0        ELOS: 2-3 weeks     OCCUPATIONAL THERAPY  Cognitive Patterns:  Cognitive Assessment Method (CAM):  Confusion Assessment Method (CAM)  Is there evidence of an acute change in mental status from the patient's baseline?: Yes  Inattention: Behavior not present  Disorganized thinking: Behavior present, fluctuates (comes and goes, changes in severity)  Altered level of consciousness: Behavior not present  Health Literacy:  How often do you need to have someone help you when you read instructions, pamphlets, or other written material from your doctor or pharmacy?: Never           CURRENT IRF-OTONIEL SCORES  Eating: CARE Score: 5     Oral Hygiene: CARE Score: 5         Toileting: CARE Score: 5         Shower/Bathe: CARE Score: 4  Comment: SBA with shower        Upper Body Dressing: CARE Score: 4  Comment: VC for [employer]       Lower Body Dressing: CARE Score: 4           Footwear: CARE Score: 3           Toilet Transfers: CARE Score: 4  Comment: supervision, without AD        Picking Up Object:  CARE Score: 4           UE Functionin/11/23 1100   LUE AROM (degrees)   LUE AROM  WFL   LUE General AROM Full range of motion, but can only use LUE for active assist.   Left Hand AROM (degrees)   Left Hand AROM Exceptions   Left Hand General AROM Full ROM for flexion/extension, but impaired FM control and slow oppostion   RUE AROM (degrees)   RUE AROM  WFL   Right Hand AROM (degrees)   Right Hand AROM WFL         Pain Assessment:   No pain      STGs:  Short Term Goals  Time Frame for Short Term Goals: 1 week  Short Term Goal 1: Supervision with clothing management/hygiene for toileting. Short Term Goal 2: Supervision with toilet transfers. Short Term Goal 3: Supervision with LB dressing. Short Term Goal 4: Supervision with ambulatory homemaking tasks. Short Term Goal 5: Patient will complete LUE exercises at least 5x to improve independence with ADLs. LTGs:  Long Term Goals  Time Frame for Long Term Goals : 2 weeks  Long Term Goal 1: Modified independent with bathing hygiene. Long Term Goal 2: Modified independent with toileting and toilet transfers. Long Term Goal 3: Modified independent with overall dressing. Long Term Goal 4: Modified independent with ambulatory homemaking tasks. Long Term Goal 5: Patient verabalize DME needs. Long Term Goal 6: Independent with HEP.      Assessment:  Performance deficits / Impairments: Decreased functional mobility , Decreased ADL status, Decreased ROM, Decreased strength, Decreased safe awareness, Decreased cognition, Decreased endurance, Decreased high-level IADLs, Decreased coordination, Decreased fine motor control                     NUTRITION  Current Wt: Weight: 156 lb (70.8 kg) / Body mass index is 22.38 kg/m². Admission Wt: Admission Body Weight: 156 lb 4 oz (70.9 kg)  Oral Diet Orders:   Regular; Low Fat/Low Chol/High Fiber/MARIKA  Oral Nutrition Supplement (ONS) Orders:  None  PO intake avg >75% and reported good appetite with stable wt status. No nutritional concerns. Please see nutrition note for details. RECORD REVIEW: Previous medical records, medications were reviewed at today's visit    IMPRESSION:      1. Right basal ganglia hemorrhage, hypertensive-blood pressure medications, PT/OT/SLP  2. Hypertension-hydralazine, lisinopril, Lopressor, Norvasc. Stable  3. Smoker-nicotine patch  4. GI-bowel regimen  5. Mood disorder-Zoloft  6. Hypovitaminosis D-replacement  7.   DVT prophylaxis-SCDs     ELOS:3/17, Friday expected

## 2023-03-16 NOTE — PROGRESS NOTES
Occupational Therapy     03/16/23 1000   Restrictions/Precautions   Restrictions/Precautions Fall Risk;Aspiration Risk;Weight Bearing   General   Diagnosis R CVA   Subjective   Subjective Pt will discharge tomorrow. Tx focused on UE function and HEP. Coordination   Coordination and Movement Description Gross motor impairments; Fine motor impairments;Decreased accuracy; Decreased speed;Left UE  (termination of L at times during B acts)   Balance   Sitting Balance Supervision   Standing Balance Supervision   Functional Mobility   Functional - Mobility Device No device   Assist Level Stand by assistance   Transfers   Sit to stand Supervision   Stand to sit Supervision   Perception   Overall Perceptual Status Impaired   Unilateral Attention Cues to attend to left side of body;Cues to attend left visual field  (delayed left scanning and decreased awareness of L body, proprioception)   OT Exercises   Dynamic Standing Balance Exercises LUE ball bounce and catch   Motor Control/Coordination LUE GM exercises   Left Hand Strength -  (lbs)   Handle Setting 2 22.9   Left Hand Strength - Pinch (lbs)   Lateral 11   Tip 7   Palmar 3 point 6   Right Hand Strength -  (lbs)   Handle Setting 2 72.4   Right Hand Strength - Pinch (lbs)   Lateral 25   Tip 14   Palmar 3 point 16   Fine Motor Skills   Left 9-Hole Peg Test Impaired   Left 9 Hole Peg Test Time (secs)   (able to remove only)   Right 9-Hole Peg Test Functional   Right 9 Hole Peg Test Time (secs) 24   Short Term Goals   Short Term Goal 1 MET   Short Term Goal 2 MET   Short Term Goal 3 MET   Short Term Goal 4 MET   Short Term Goal 5 MET   Assessment   Performance deficits / Impairments Decreased functional mobility ; Decreased ADL status; Decreased ROM; Decreased strength;Decreased safe awareness;Decreased cognition;Decreased endurance;Decreased high-level IADLs;Decreased coordination;Decreased fine motor control   Treatment Diagnosis R CVA   Discharge Recommendations Outpatient OT   Education   Education Given To Patient   Education Provided Home Exercise Program;Safety;Transfer Training   Education Method Demonstration;Verbal;Printed Information/Hand-outs   Education Outcome Demonstrated understanding;Verbalized understanding   Occupational Therapy Plan   Current Treatment Recommendations Strengthening;ROM;Balance training;Functional mobility training; Endurance training; Safety education & training;Patient/Caregiver education & training;Equipment evaluation, education, & procurement;Home management training;Self-Care / ADL            Restrictions/Precautions   Restrictions/Precautions Fall Risk;Aspiration Risk;Weight Bearing   Functional Mobility   Device   (no device)   Activity Retrieve items;Transport items  (dynamic ambulatory act)   Assistance Level Stand by assist   Skilled Clinical Factors able to retrieve items from floor level mid stance without LOB   Sit to Stand   Assistance Level Supervision   Stand to Sit   Assistance Level Supervision   OT Exercises   Dynamic Standing Balance Exercises LUE ball bounce and catch   Motor Control/Coordination LUE GM exercises   Education   Education Given To Patient   Education Provided Home Exercise Program;Safety;Transfer Training   Education Method Demonstration;Verbal;Printed Information/Hand-outs   Education Outcome Demonstrated understanding;Verbalized understanding   Assessment   Assessment Pt's Left inattention impedes progress with LUE. Pt states he hasn't been completing UE FM ex's I'ly in his room- issued FM/GM  handouts and encouraged to incorporate into all activities.    Activity Tolerance Patient tolerated treatment well   Discharge Recommendations Outpatient OT   Short Term Goals   Short Term Goal 1 MET   Short Term Goal 2 MET   Short Term Goal 3 MET   Short Term Goal 4 MET   Short Term Goal 5 MET   Occupational Therapy Plan   Current Treatment Recommendations Strengthening;ROM;Balance training;Functional mobility training; Endurance training; Safety education & training;Patient/Caregiver education & training;Equipment evaluation, education, & procurement;Home management training;Self-Care / ADL   OT Individual Minutes   Time In 1000   Time Out 1100   Minutes 60

## 2023-03-16 NOTE — PROGRESS NOTES
[facility]  INPATIENT SPEECH [facility]ERAPY  [facility] REHAB UNIT            [x]Daily Note  []Progress Note    Date: 3/16/2023  Patient Name: Jabari Major        MRN: [id number]    Account #: [de-identified]  : 1980  (43 y.o.)  Gender: male   Primary Provider: Cindy Johnson MD  Diet: Regular diet, thin liquids        PATIENT DIAGNOSIS(ES):    Diagnosis: RIGHT BG HEMORRHAGIC CVA; ETOH WITHDRAWAL POST CVA     Additional Pertinent Hx: HTN, ASTHMA     RESTRICTIONS/PRECAUTIONS:    Restrictions/Precautions  Restrictions/Precautions: Fall Risk, Aspiration Risk, Weight Bearing       Subjective:  He was easily awakened this morning. He was cooperative with all therapy tasks. He has excellent family support. Objective: Today he completed complex cancellation tasks at 90%. He followed complex written directions at 100% accuracy. He was able to unscramble complex sentences at 100%. He was asked to draw a complex design from memory and scored at 100%. Left facial droop is still noted. No dysarthria is noted this morning. He has been given a handout with oral motor exercises. He is to complete these independently in his room. Dysarthria strategies have been reviewed. Increased processing speed is noted again this date. He has exhibited improvement. He is willing to complete a HEP (oral motor exercises and dysarthria strategies). He will not need further speech therapy services following discharge from inpatient rehab. Recommended Diet and Intervention  Regular diet  Thin liquids     Therapeutic Interventions: Pharyngeal exercises; Patient/Family education;Oral care;Oral motor exercises;Diet tolerance monitoring     Compensatory Swallowing Strategies  Compensatory Swallowing Strategies : Alternate solids and liquids; Check for pocketing of food on the Left;Eat/Feed slowly; Lingual sweep;Remain upright for 30-45 minutes after meals;Small bites/sips;Upright as possible for all oral intake         SHORT TERM GOAL #1:  Goal 1: The patient will complete the Cognitive Linguistic Quick Test or CLQT. SHORT TERM GOAL #2:  Goal 2: The patient will complete higher level cognitive tasks with minimal cues. SHORT TERM GOAL #3:  Goal 3: The patient will complete tasks for problem solving and safety awareness with miniml cues. Swallowing Short Term Goals  Short-term Goals  Goal 1: The patient will tolerate a regular diet with thin liquids with minimal overt s/s of aspiration. Goal 2: The patient will use a lingual search/sweep to clear food from the oral cavity. Goal 3: The patient will complete daily oral-motor exercise to increase labial and lingual function and ROM/strength with minimal verbal, tactile and visual cues. Goal 4: The patient will complete pharyngeal strengthening exercises to aid in airway protection and minimize laryngeal penetration and aspiration with minimal verbal cues.          ASSESSMENT:  Assessment: [x]Progressing towards goals          []Not Progressing towards goals    Patient Tolerance of Treatment:   [x]Tolerated well []Tolerated fair []Required rest breaks []Fatigued    Education:  Learner:  [x]Patient          []Significant Other          []Other  Education provided regarding:  [x]Goals and POC   []Diet and swallowing precautions    []Home Exercise Program  []Progress and/or discharge information  Method of Education:  [x]Discussion          []Demonstration          []Handout          []Other  Evaluation of Education:   [x]Verbalized understanding   []Demonstrates without assistance  []Demonstrates with assistance  []Needs further instruction     []No evidence of learning                  []No family present      Plan: []Continue with current plan of care    []Modify current plan of care as follows:    [x]Discharge patient: yes    Discharge Location: home 3/17/23            Electronically Signed By:  Erika Michaud M.S., CCC-SLP  3/16/2023,9:22 AM.

## 2023-03-17 ENCOUNTER — TELEPHONE (OUTPATIENT)
Dept: FAMILY MEDICINE CLINIC | Facility: CLINIC | Age: 43
End: 2023-03-17

## 2023-03-17 VITALS
SYSTOLIC BLOOD PRESSURE: 148 MMHG | WEIGHT: 156 LBS | DIASTOLIC BLOOD PRESSURE: 82 MMHG | TEMPERATURE: 97.7 F | HEART RATE: 56 BPM | OXYGEN SATURATION: 99 % | RESPIRATION RATE: 16 BRPM | BODY MASS INDEX: 22.33 KG/M2 | HEIGHT: 70 IN

## 2023-03-17 PROCEDURE — 99239 HOSP IP/OBS DSCHRG MGMT >30: CPT | Performed by: PSYCHIATRY & NEUROLOGY

## 2023-03-17 PROCEDURE — 6370000000 HC RX 637 (ALT 250 FOR IP): Performed by: PSYCHIATRY & NEUROLOGY

## 2023-03-17 RX ORDER — HYDRALAZINE HYDROCHLORIDE 25 MG/1
25 TABLET, FILM COATED ORAL EVERY 8 HOURS
Qty: 90 TABLET | Refills: 3 | Status: SHIPPED | OUTPATIENT
Start: 2023-03-17

## 2023-03-17 RX ORDER — METOPROLOL TARTRATE 50 MG/1
50 TABLET, FILM COATED ORAL EVERY 12 HOURS
Qty: 60 TABLET | Refills: 3 | Status: SHIPPED | OUTPATIENT
Start: 2023-03-17

## 2023-03-17 RX ORDER — ERGOCALCIFEROL 1.25 MG/1
50000 CAPSULE ORAL WEEKLY
Qty: 5 CAPSULE | Refills: 0 | Status: SHIPPED | OUTPATIENT
Start: 2023-03-20

## 2023-03-17 RX ORDER — AMLODIPINE BESYLATE 10 MG/1
10 TABLET ORAL DAILY
Qty: 30 TABLET | Refills: 3 | Status: SHIPPED | OUTPATIENT
Start: 2023-03-17

## 2023-03-17 RX ORDER — LISINOPRIL 10 MG/1
10 TABLET ORAL NIGHTLY
Qty: 30 TABLET | Refills: 3 | Status: SHIPPED | OUTPATIENT
Start: 2023-03-17

## 2023-03-17 RX ORDER — PANTOPRAZOLE SODIUM 40 MG/1
40 TABLET, DELAYED RELEASE ORAL
Qty: 60 TABLET | Refills: 3 | Status: SHIPPED | OUTPATIENT
Start: 2023-03-17

## 2023-03-17 RX ORDER — TERAZOSIN 5 MG/1
5 CAPSULE ORAL NIGHTLY
Qty: 30 CAPSULE | Refills: 3 | Status: SHIPPED | OUTPATIENT
Start: 2023-03-17

## 2023-03-17 RX ADMIN — METOPROLOL TARTRATE 50 MG: 50 TABLET, FILM COATED ORAL at 08:08

## 2023-03-17 RX ADMIN — SERTRALINE HYDROCHLORIDE 50 MG: 50 TABLET ORAL at 08:08

## 2023-03-17 RX ADMIN — AMLODIPINE BESYLATE 10 MG: 10 TABLET ORAL at 08:08

## 2023-03-17 RX ADMIN — PANTOPRAZOLE SODIUM 40 MG: 40 TABLET, DELAYED RELEASE ORAL at 06:14

## 2023-03-17 RX ADMIN — DOXAZOSIN 4 MG: 4 TABLET ORAL at 08:08

## 2023-03-17 RX ADMIN — HYDRALAZINE HYDROCHLORIDE 25 MG: 25 TABLET, FILM COATED ORAL at 06:14

## 2023-03-17 ASSESSMENT — PAIN SCALES - GENERAL
PAINLEVEL_OUTOF10: 0
PAINLEVEL_OUTOF10: 0

## 2023-03-17 NOTE — TELEPHONE ENCOUNTER
Caller: Jennifer    Relationship to patient: Other    New or established patient?  [] New  [x] Established     Date of discharge: 3/17/23    Facility discharged from: [facility]    Diagnosis/Symptoms: Intercerebral Hemorage     Length of stay (If applicable): Admitted 3/10/23

## 2023-03-17 NOTE — DISCHARGE SUMMARY
Occupational Therapy Discharge Summary         Date: 3/17/2023  Patient Name: Abdirahman Kaplan        MRN: [id number]    : 1980  (43 y.o.)  Gender: male      Diagnosis: RIGHT BG HEMORRHAGIC CVA; ETOH WITHDRAWAL POST CVA  Restrictions/Precautions  Restrictions/Precautions: Aspiration Risk, Weight Bearing, Up Ad Adry      Discharge Date: 3/17/23      UE Functioning:  L AROM WFLs--impaired FM control and L UE inattention    Home Management:  Functional Mobility  Functional - Mobility Device: No device  Activity: To/From therapy gym, Other  Assist Level: Independent  Functional Mobility Comments: No LOB during afternoon session    Adaptive Equipment/DME Status:  Bathroom Equipment: Shower chair  Recommended  shower chair with back---patient plans to buy shower chair    Pain Assessment:   No reported pain  Remaining Problems:  Decreased functional mobility ; Decreased ROM; Decreased strength; Decreased cognition; Decreased high-level IADLs; Decreased coordination; Decreased fine motor control    STGs:  Short Term Goals  Time Frame for Short Term Goals: 1 week  Short Term Goal 1: Supervision with clothing management/hygiene for toileting. Short Term Goal 2: Supervision with toilet transfers. Short Term Goal 3: Supervision with LB dressing. Short Term Goal 4: Supervision with ambulatory homemaking tasks. Short Term Goal 5: Patient will complete LUE exercises at least 5x to improve independence with ADLs. LTGs:  Long Term Goals  Time Frame for Long Term Goals : 2 weeks  Long Term Goal 1: Modified independent with bathing hygiene. Long Term Goal 2: Modified independent with toileting and toilet transfers. Long Term Goal 3: Modified independent with overall dressing. Long Term Goal 4: Modified independent with ambulatory homemaking tasks. Long Term Goal 5: Patient verabalize DME needs. Long Term Goal 6: Independent with HEP.       Discharge Setting and Recommendations:  Home with spouse and OP therapy    Discharge Care Scores    Eating: CARE Score: 6  Oral Hygiene: CARE Score: 6  Toileting: CARE Score: 6  Shower/Bathe: CARE Score: 6  Upper Body Dressing: CARE Score: 6  Lower Body Dressing: CARE Score: 6  Footwear: CARE Score: 6  Toilet Transfers: CARE Score: 6  Picking Up Object:  CARE Score: 6  Cognitive Patterns:  Cognitive Patterns  Cognitive Pattern Assessment Used: BIMS  Repetition of Three Words (First Attempt): 3  Temporal Orientation: Year: Correct  Temporal Orientation: Month:  Accurate within 5 days  Temporal Orientation: Day: Correct  Able to recall \"sock: Yes, no cue required  Able to recall \"blue\": Yes, no cue required  Able to recall \"bed\": Yes, no cue required  BIMS Summary Score: 15                 Confusion Assessment Method (CAM):  Confusion Assessment Method (CAM)  Is there evidence of an acute change in mental status from the patient's baseline?: Yes  Inattention: Behavior not present  Disorganized thinking: Behavior present, fluctuates (comes and goes, changes in severity)  Altered level of consciousness: Behavior not present  Health Literacy:  How often do you need to have someone help you when you read instructions, pamphlets, or other written material from your doctor or pharmacy?: Never    Electronically signed by Inez Frausto OT on 3/17/23 at 8:58 AM CDT

## 2023-03-17 NOTE — PLAN OF CARE
Problem: Discharge Planning  Goal: Discharge to home or other facility with appropriate resources  Outcome: Completed  Flowsheets  Taken 3/17/2023 0814 by Neal Klein LPN  Discharge to home or other facility with appropriate resources: Refer to discharge planning if patient needs post-hospital services based on physician order or complex needs related to functional status, cognitive ability or social support system  Taken 3/16/2023 2100 by Terry Page LPN  Discharge to home or other facility with appropriate resources: Refer to discharge planning if patient needs post-hospital services based on physician order or complex needs related to functional status, cognitive ability or social support system     Problem: Safety - Adult  Goal: Free from fall injury  Outcome: Completed     Problem: Neurosensory - Adult  Goal: Achieves stable or improved neurological status  Outcome: Completed  Flowsheets  Taken 3/17/2023 0814 by Neal Klein LPN  Achieves stable or improved neurological status: Assess for and report changes in neurological status  Taken 3/16/2023 2100 by Terry Page LPN  Achieves stable or improved neurological status: Assess for and report changes in neurological status     Problem: Musculoskeletal - Adult  Goal: Return mobility to safest level of function  Outcome: Completed  Flowsheets  Taken 3/17/2023 0814 by Neal Klein LPN  Return Mobility to Safest Level of Function: Assess patient stability and activity tolerance for standing, transferring and ambulating with or without assistive devices  Taken 3/16/2023 2100 by Terry Page LPN  Return Mobility to Safest Level of Function: Assess patient stability and activity tolerance for standing, transferring and ambulating with or without assistive devices     Problem: Pain  Goal: Verbalizes/displays adequate comfort level or baseline comfort level  Outcome: Completed

## 2023-03-17 NOTE — DISCHARGE INSTRUCTIONS
Patient Instructions:   Home  Therapy orders: PT and OT   Discharge lab work: none  Code status: Full Code   Activity: no lifting, Driving, or Strenuous exercise until released by neurosurgery  Diet: ADULT DIET;  Regular; Low Fat/Low Chol/High Fiber/MARIKA    Wound Care: none needed  Equipment: as per therapy

## 2023-03-17 NOTE — DISCHARGE INSTR - DIET
Good nutrition is important when healing from an illness, injury, or surgery. Follow any nutrition recommendations given to you during your hospital stay. If you were given an oral nutrition supplement while in the hospital, continue to take this supplement at home. You can take it with meals, in-between meals, and/or before bedtime. These supplements can be purchased at most local grocery stores, pharmacies, and chain [employer]-stores. If you have any questions about your diet or nutrition, call the hospital and ask for the dietitian.   Regular; low fat/low cholesterol/high fiber/no added salt

## 2023-03-17 NOTE — DISCHARGE SUMMARY
Neurology Discharge Summary     Patient Identification:  Dina Rucker is a 43 y.o. male. :  1980  Admit Date:  3/10/2023  Discharge date : 3/17/23   Attending Provider: Santos Gardner MD     Account Number: [de-identified]                                   Admission Diagnoses:   Intracerebral hemorrhage, nontraumatic [facility]) [I61.9]    Discharge Diagnoses:  Principal Problem:    Intracerebral hemorrhage, nontraumatic ([facility] [address]  Resolved Problems:    * No resolved hospital problems. *      Discharge Medications:    Current Discharge Medication List             Details   Ergocalciferol (VITAMIN D) 54898 units CAPS Take 50,000 Units by mouth once a week  Qty: 5 capsule, Refills: 0                Details   sertraline (ZOLOFT) 50 MG tablet Take 1 tablet by mouth daily  Qty: 30 tablet, Refills: 3      hydrALAZINE (APRESOLINE) 25 MG tablet Take 1 tablet by mouth in the morning and 1 tablet at noon and 1 tablet in the evening. Qty: 90 tablet, Refills: 3      lisinopril (PRINIVIL;ZESTRIL) 10 MG tablet Take 1 tablet by mouth nightly  Qty: 30 tablet, Refills: 3      terazosin (HYTRIN) 5 MG capsule Take 1 capsule by mouth nightly  Qty: 30 capsule, Refills: 3      metoprolol tartrate (LOPRESSOR) 50 MG tablet Take 1 tablet by mouth in the morning and 1 tablet in the evening. Qty: 60 tablet, Refills: 3      amLODIPine (NORVASC) 10 MG tablet Take 1 tablet by mouth daily  Qty: 30 tablet, Refills: 3      pantoprazole (PROTONIX) 40 MG tablet Take 1 tablet by mouth 2 times daily (before meals)  Qty: 60 tablet, Refills: 3           Current Discharge Medication List        STOP taking these medications       nicotine (NICODERM CQ) 21 MG/24HR Comments:   Reason for Stopping:         busPIRone (BUSPAR) 5 MG tablet Comments:   Reason for Stopping:         Cholecalciferol (VITAMIN D3) 1.25 MG (40860 UT) CAPS Comments:   Reason for Stopping:                 Consults:   none    Hospital Course:   The patient progressed well during her stay in the rehab unit. Blood pressure was adequately controlled. He had no medical complications. Ambulating up to 500 feet without a device at the time of discharge. Disposition upon discharge-improved        Discharge Instructions     Patient Instructions:   Home  Therapy orders: PT and OT   Discharge lab work: none  Code status: Full Code   Activity: no lifting, Driving, or Strenuous exercise until released by neurosurgery  Diet: ADULT DIET;  Regular; Low Fat/Low Chol/High Fiber/MARIKA    Wound Care: none needed  Equipment: as per therapy      Cindy Grossman MD, MD    At least 35 minutes were spent in discharging the patient

## 2023-03-22 ENCOUNTER — OFFICE VISIT (OUTPATIENT)
Dept: FAMILY MEDICINE CLINIC | Facility: CLINIC | Age: 43
End: 2023-03-22
Payer: MEDICAID

## 2023-03-22 VITALS
BODY MASS INDEX: 22.9 KG/M2 | HEIGHT: 70 IN | HEART RATE: 49 BPM | DIASTOLIC BLOOD PRESSURE: 75 MMHG | SYSTOLIC BLOOD PRESSURE: 120 MMHG | WEIGHT: 160 LBS | RESPIRATION RATE: 18 BRPM | TEMPERATURE: 97.2 F | OXYGEN SATURATION: 99 %

## 2023-03-22 DIAGNOSIS — I63.9 CEREBROVASCULAR ACCIDENT (CVA), UNSPECIFIED MECHANISM: ICD-10-CM

## 2023-03-22 DIAGNOSIS — F43.29 STRESS AND ADJUSTMENT REACTION: ICD-10-CM

## 2023-03-22 DIAGNOSIS — R45.4 IRRITABLE: ICD-10-CM

## 2023-03-22 DIAGNOSIS — D64.9 ANEMIA, UNSPECIFIED TYPE: Primary | ICD-10-CM

## 2023-03-22 DIAGNOSIS — F17.200 SMOKER: ICD-10-CM

## 2023-03-22 DIAGNOSIS — I10 PRIMARY HYPERTENSION: ICD-10-CM

## 2023-03-22 RX ORDER — BENZOCAINE/MENTHOL 6 MG-10 MG
1 LOZENGE MUCOUS MEMBRANE DAILY
Qty: 1 EACH | Refills: 0 | Status: SHIPPED | OUTPATIENT
Start: 2023-03-22 | End: 2023-04-21

## 2023-03-22 RX ORDER — HYDRALAZINE HYDROCHLORIDE 25 MG/1
25 TABLET, FILM COATED ORAL 3 TIMES DAILY
COMMUNITY
Start: 2023-03-17

## 2023-03-22 RX ORDER — NICOTINE 21 MG/24HR
1 PATCH, TRANSDERMAL 24 HOURS TRANSDERMAL EVERY 24 HOURS
Start: 2023-03-22

## 2023-03-22 NOTE — PROGRESS NOTES
NAM Renteria  [facility]   Family Medicine  [address]  [place] [address]  Phone: [phone]  Fax: [phone]        CC: hospital follow-up for   1.(Primary Dx) Nontraumatic subcortical hemorrhage of cerebral hemisphere, unspecified laterality (HCC) RIGHT BG HEMORRHAGIC CVA;  2.  ETOH WITHDRAWAL POST CVA        History:  James Taylor is a 42 y.o. male who presents today for transitional care management after hospitalization.      Admitted: 3/04/2023  Date of hospital discharge and transferred to [facility] 3/10/2023:   TCM call successfully made on: 3/17/2023  by Talisha Hallman Registration? Not able to be counted as TCM call.     Pt reports scheduled follow up with Neurologist March 28th, Dr. Sandoval and will have repeat CT scan.     Pt states that is doing well. He is able to ambulate normally. Reports that he is able to do all task of daily living but struggling with strength in his left hand. Pt reports decreased strength and . Pt reports that he will start physical therapy next week. Pt reports that he is drinking one beer per day.       ROS:  Review of Systems   Constitutional: Negative for fatigue, fever and unexpected weight change.   HENT: Negative for congestion, ear pain, rhinorrhea, sinus pressure, sinus pain and voice change.    Eyes: Negative for visual disturbance.   Respiratory: Negative for shortness of breath and wheezing.    Cardiovascular: Negative for chest pain and palpitations.   Gastrointestinal: Negative for abdominal pain, nausea and vomiting.   Genitourinary: Negative for dysuria and flank pain.   Musculoskeletal: Negative for back pain, myalgias and neck pain.   Skin: Negative for color change and rash.   Neurological: Positive for weakness (left hand. ). Negative for dizziness, numbness and headaches.   Psychiatric/Behavioral: Negative for behavioral problems, dysphoric mood, self-injury and sleep disturbance.       Mr. Taylor   "reports that he has been smoking cigarettes. He started smoking about 10 years ago. He has a 15.00 pack-year smoking history. He has been exposed to tobacco smoke. He has never used smokeless tobacco. He reports current alcohol use of about 4.0 standard drinks per week. He reports that he does not use drugs.      Current Outpatient Medications:   •  amLODIPine (NORVASC) 10 MG tablet, Take 1 tablet by mouth Daily., Disp: , Rfl:   •  hydrALAZINE (APRESOLINE) 25 MG tablet, Take 1 tablet by mouth 3 (Three) Times a Day., Disp: , Rfl:   •  lisinopril (PRINIVIL,ZESTRIL) 10 MG tablet, Take 1 tablet by mouth Every Night., Disp: , Rfl:   •  metoprolol tartrate (LOPRESSOR) 50 MG tablet, Take 1 tablet by mouth Every 12 (Twelve) Hours., Disp: , Rfl:   •  nicotine (NICODERM CQ) 21 MG/24HR patch, Place 1 patch on the skin as directed by provider Daily., Disp: , Rfl:   •  sertraline (Zoloft) 50 MG tablet, Take 1 tablet by mouth Daily. To help with mood. Take with food, Disp: 90 tablet, Rfl: 0  •  terazosin (HYTRIN) 5 MG capsule, Take 1 capsule by mouth Every Night., Disp: , Rfl:   •  vitamin D (ERGOCALCIFEROL) 1.25 MG (05299 UT) capsule capsule, Take 1 capsule by mouth 1 (One) Time Per Week. To treat low vitamin D. Take with food., Disp: 5 capsule, Rfl: 11  •  Blood Pressure Monitor misc, 1 Units Daily for 30 days., Disp: 1 each, Rfl: 0  •  Iron, Ferrous Sulfate, 325 (65 Fe) MG tablet, Take 325 mg by mouth Daily for 30 days., Disp: 30 tablet, Rfl: 2      OBJECTIVE:  /75 (BP Location: Right arm, Patient Position: Sitting, Cuff Size: Adult)   Pulse (!) 49   Temp 97.2 °F (36.2 °C) (Infrared)   Resp 18   Ht 177.8 cm (70\")   Wt 72.6 kg (160 lb)   SpO2 99%   BMI 22.96 kg/m²    Physical Exam  Vitals and nursing note reviewed.   Constitutional:       Appearance: Normal appearance. He is well-developed.   HENT:      Head: Normocephalic and atraumatic.      Right Ear: Tympanic membrane, ear canal and external ear normal.      " Left Ear: Tympanic membrane, ear canal and external ear normal.      Nose: Nose normal. No septal deviation, nasal tenderness or congestion.      Mouth/Throat:      Lips: Pink. No lesions.      Mouth: Mucous membranes are moist. No oral lesions.      Dentition: Normal dentition.      Pharynx: Oropharynx is clear. No pharyngeal swelling, oropharyngeal exudate or posterior oropharyngeal erythema.   Eyes:      General: Lids are normal. Vision grossly intact. No scleral icterus.        Right eye: No discharge.         Left eye: No discharge.      Extraocular Movements: Extraocular movements intact.      Conjunctiva/sclera: Conjunctivae normal.      Right eye: Right conjunctiva is not injected.      Left eye: Left conjunctiva is not injected.      Pupils: Pupils are equal, round, and reactive to light.   Neck:      Thyroid: No thyroid mass.      Trachea: Trachea normal.   Cardiovascular:      Rate and Rhythm: Normal rate and regular rhythm.      Heart sounds: Normal heart sounds. No murmur heard.    No gallop.   Pulmonary:      Effort: Pulmonary effort is normal.      Breath sounds: Normal breath sounds and air entry. No wheezing, rhonchi or rales.   Musculoskeletal:         General: No tenderness or deformity. Normal range of motion.      Left hand: Decreased strength.      Cervical back: Full passive range of motion without pain, normal range of motion and neck supple.      Thoracic back: Normal.      Right lower leg: No edema.      Left lower leg: No edema.   Skin:     General: Skin is warm and dry.      Coloration: Skin is not jaundiced.      Findings: No rash.   Neurological:      Mental Status: He is alert and oriented to person, place, and time.      Sensory: Sensation is intact.      Motor: Motor function is intact.      Coordination: Coordination is intact.      Gait: Gait is intact.      Deep Tendon Reflexes: Reflexes are normal and symmetric.   Psychiatric:         Mood and Affect: Mood and affect normal.          Behavior: Behavior normal.         Judgment: Judgment normal.         Procedures    Assessment/Plan    Diagnoses and all orders for this visit:    1. Anemia, unspecified type (Primary)  -     Iron, Ferrous Sulfate, 325 (65 Fe) MG tablet; Take 325 mg by mouth Daily for 30 days.  Dispense: 30 tablet; Refill: 2    2. Irritable    3. Stress and adjustment reaction    4. Primary hypertension  -     Blood Pressure Monitor misc; 1 Units Daily for 30 days.  Dispense: 1 each; Refill: 0    5. Cerebrovascular accident (CVA), unspecified mechanism (HCC)  -     Blood Pressure Monitor misc; 1 Units Daily for 30 days.  Dispense: 1 each; Refill: 0    6. Smoker  -     nicotine (NICODERM CQ) 21 MG/24HR patch; Place 1 patch on the skin as directed by provider Daily.        An After Visit Summary was printed and given to the patient at discharge.  Return in about 1 month (around 4/22/2023) for MUST BE 30 MIN COMPLEX CARE. Sooner if problems arise.         Patient Instructions   Begin monitoring blood pressure at home.     Use nicoderm patches but do not smoke with these.     Begin ferrous sulfate 325mg daily due to anemia.           Discussion:     I spent 60 minutes caring for Chana on this date of service. This time includes time spent by me in the following activities: preparing for the visit, reviewing tests, obtaining and/or reviewing a separately obtained history, performing a medically appropriate examination and/or evaluation, counseling and educating the patient/family/caregiver, ordering medications, tests, or procedures, documenting information in the medical record and independently interpreting results and communicating that information with the patient/family/caregiver     Casi BROWNING 3/26/2023

## 2023-03-22 NOTE — PATIENT INSTRUCTIONS
Begin monitoring blood pressure at home.     Use nicoderm patches but do not smoke with these.     Begin ferrous sulfate 325mg daily due to anemia.

## 2023-03-23 ENCOUNTER — HOSPITAL ENCOUNTER (OUTPATIENT)
Dept: OCCUPATIONAL THERAPY | Age: 43
Setting detail: THERAPIES SERIES
Discharge: HOME | End: 2023-03-23
Payer: MEDICAID

## 2023-03-23 ENCOUNTER — HOSPITAL ENCOUNTER (OUTPATIENT)
Dept: PHYSICAL THERAPY | Age: 43
Setting detail: THERAPIES SERIES
Discharge: HOME | End: 2023-03-23
Payer: MEDICAID

## 2023-03-23 VITALS — SYSTOLIC BLOOD PRESSURE: 136 MMHG | DIASTOLIC BLOOD PRESSURE: 87 MMHG | HEART RATE: 50 BPM | OXYGEN SATURATION: 100 %

## 2023-03-23 PROCEDURE — 97530 THERAPEUTIC ACTIVITIES: CPT

## 2023-03-23 PROCEDURE — 97162 PT EVAL MOD COMPLEX 30 MIN: CPT

## 2023-03-23 PROCEDURE — 97166 OT EVAL MOD COMPLEX 45 MIN: CPT

## 2023-03-23 NOTE — PROGRESS NOTES
Sitting with Back Unsupported but Feet Supported on Floor or on a Stool: Able to sit safely and securely for 2 minutes  4. Standing to Sitting: Sits safely with minimal use of hands  5. Transfers: Able to transfer safely with minor use of hands  6. Standing Unsupported with Eyes Closed: Able to stand 10 seconds safely  7. Standing Unsupported with Feet Together: Able to place feet together independently and stand 1 minute safely  8. Reach Forward with Outstretched Arm While Standing: Can reach forward 12 cm (5 inches)  9.  Object from Floor from a Standing Position: Able to  slipper but needs supervision  10. Turning to Look Behind Over Left and Right Shoulders While Standing: Looks behind from both sides and weight shifts well  11. Turn 360 Degrees: Able to turn 360 degrees safely in 4 seconds or less  12. Place Alternate Foot on Step or Stool While Standing Unsupported: Able to stand independently and safely and complete 8 steps in 20 seconds  13. Standing Unsupported One Foot in Front: Able to place foot ahead independently and hold 30 seconds  14. Standing on One Leg: Tries to lift leg unable to hold 3 seconds but remains standing independently  Starr Balance Score: 50     Current Score: 50 / 56 (Date: 3/23/2023)    Interpretation of Score: Each section is scored on a 0-4 scale, 0 representing the patient's inability to perform the task and 4 representing independence. The scores of each section are added together for a total score of 56. The higher the patient's score, the more independent the patient. Any score below 45 indicates increased risk for falls. Low risk for falls (41-46), medium risk for falls (21-40), and high risk for falls (0-20). 5 Times Sit to Stand   Current Score: 15.44 seconds (Date: 3/23/2023)    Interpretation of Score:  The 5 Times Sit to Stand Test (FTSST) measures functional lower limb muscle strength and may be useful in quantifying functional change of transitional

## 2023-03-23 NOTE — PROGRESS NOTES
difficulty gripping ADL/IADL items or performing daily tasks without modification. Patient would benefit from skilled OT services to address these functional deficits. Patient is agreeable to POC. Patient given theraputty HEP this date, along with fine motor coordination activity worksheet to begin at home. Patient verbalizes and demos good understanding. Patient continues to benefit from skilled OT services. Performance Deficits/Impairments: Decreased endurance, Decreased coordination, Decreased ADL status, Decreased strength, Decreased high-level IADLs, Decreased fine motor control    Statement of Medical Necessity: Occupational Therapy is both indicated and medically necessary as outlined in the POC to increase the likelihood of meeting the functionally related goals stated below. Patient's Activity Tolerance:      Fair/Good  Patient's rehabilitation potential/prognosis is considered to be: Good    Factors which may impact rehabilitation potential include:   transportation        GOALS     Patient Goal(s): Patient goals : Patient wants LUE strength and coordination to return for daily tasks and work tasks. Short Term Goals Completed by x4 weeks Goal Status   Patient will improve QuickDash score to <20% indicating increased (I) with ADL/IADL tasks. New   Patient will improve LUE mass  strength to 20# to increase (I) with opening containers. New   Patient will improve LUE wrist flexion/extension strength to 4+/5 to increase (I) with cooking tasks. New   Patient will improve LUE lateral pinch to 14# to increase independence opening packages. New                                           Long Term Goals Completed by x8 weeks Goal Status   Patient will improve UEFS score to >70/80 indicating increased (I) with daily routine tasks. New   Patient will improve LUE mass  strength to >35# to increase (I) with opening containers.  New   Patient will improve LUE supination/pronation strength to 4+/5 to

## 2023-03-24 ENCOUNTER — TELEPHONE (OUTPATIENT)
Dept: FAMILY MEDICINE CLINIC | Facility: CLINIC | Age: 43
End: 2023-03-24

## 2023-03-24 NOTE — TELEPHONE ENCOUNTER
Pt came by office and expressed that he was needing a Dr. Notes from the stroke for his work. Advised Ana M will be back on Tuesday

## 2023-03-24 NOTE — TELEPHONE ENCOUNTER
Caller: DAVON LUGO - GIRLFRIEND    Relationship: Other    Best call back number: [phone]    What form or medical record are you requesting:  DOCTOR'S NOTE & MEDICAL RECORD    Who is requesting this form or medical record from you:  PATIENT    How would you like to receive the form or medical records:  PICKUP    Timeframe paperwork needed:      Additional notes:  GIRLFRIEND REPORTED THAT PATIENT SUFFERED A STROKE ON 3/4/23 & REQUESTS A DOCTOR'S NOTE FOR THE STROKE & HOW LONGER RECOVERY WILL TAKE.  CALLER SAID PATIENT IS NOT CURRENTLY EMPLOYED.

## 2023-03-27 ENCOUNTER — HOSPITAL ENCOUNTER (OUTPATIENT)
Dept: PHYSICAL THERAPY | Age: 43
Setting detail: THERAPIES SERIES
Discharge: HOME | End: 2023-03-27
Payer: MEDICAID

## 2023-03-27 ENCOUNTER — HOSPITAL ENCOUNTER (OUTPATIENT)
Dept: OCCUPATIONAL THERAPY | Age: 43
Setting detail: THERAPIES SERIES
Discharge: HOME | End: 2023-03-27
Payer: MEDICAID

## 2023-03-27 VITALS — DIASTOLIC BLOOD PRESSURE: 81 MMHG | SYSTOLIC BLOOD PRESSURE: 132 MMHG | OXYGEN SATURATION: 100 % | HEART RATE: 51 BPM

## 2023-03-27 PROCEDURE — 97112 NEUROMUSCULAR REEDUCATION: CPT

## 2023-03-27 PROCEDURE — 97110 THERAPEUTIC EXERCISES: CPT

## 2023-03-27 PROCEDURE — 97530 THERAPEUTIC ACTIVITIES: CPT

## 2023-03-27 NOTE — PROGRESS NOTES
Physical Therapy: Daily Note   Patient: Osman Keane (10 y.o. male)   Examination Date:   Plan of Care/Certification Expiration Date: 23    No data recorded   :  1980 # of Visits since [facility]:   Visit count could not be calculated. Make sure you are using a visit which is associated with an episode. MRN: [id number]  CSN: [id number] Start of Care Date:   No linked episodes   Insurance: Payor: [address] / Plan: [employer] / Product Type: *No Product type* /   Insurance ID: [id number] - (Medicaid Managed) Secondary Insurance (if applicable):    Referring Physician: MD Fran Maharaj MD   PCP: Danielle Greene DO Visits to Date/Visits Approved: 2 /      No Show/Cancelled Appts:   /       Medical Diagnosis: Nontraumatic intracerebral hemorrhage in hemisphere, subcortical [I61.0] Nontraumatic subcortical hemorrhage of cerebral hemisphere  Treatment Diagnosis: Right hemorrhagic stroke with residual left extremity weakness and decreased balance. SUBJECTIVE EXAMINATION   Pain Level: Pain Screening  Patient Currently in Pain: Denies    Patient Comments: Subjective: He denies pain. He was tired after last PT/OT sessions. HEP Compliance: Fair        OBJECTIVE EXAMINATION   Restrictions:  Restrictions/Precautions: Aspiration Risk; Weight Bearing; Up Ad Adry   No data recorded No data recorded                TREATMENT     Exercises:      Treatment Reasoning    Exercise 1: 6 MWT with VS  see Functional Outcome.  Dist 1063'  Exercise 2: Supine: lower trunk rotation, 10 reps  Exercise 3: Supine: bridges BLE, single leg bridges, 10 reps  Exercise 4: Mat: Prone to all fours's 5 reps  Exercise 5: Mat: tall knee to 1/2 knee, 5 reps  Exercise 6: //, SLS, 30 sec, EO, EC; Airex: 30 sec EO, EC  Exercise 7: //, semi tandem stance 30 sec X 2; tandem stance,  Exercise 8: //, gait backward, side to side, 5 reps  Exercise 9: Step up, 6\" step, 3 way, 10 rep  Exercise 10: Standing, mini

## 2023-03-27 NOTE — PROGRESS NOTES
Occupational Therapy  Daily Treatment Note  Date: 3/27/2023  Patient Name: Selvin Medellin  :  1980  MRN: [id number]       Subjective   General  Patient assessed for rehabilitation services?: Yes  Diagnosis: I61.0 Nontraumatic subcortical hemorrhage of cerebral hemisphere  OT Visit Information  Onset Date: 23  OT Insurance Information: [employer]  Total # of Visits Approved: 20  Total # of Visits to Date: 1  Certification Period Expiration Date: 23  Progress Note Due Date: 23      Treatment Activities:       Exercises  Exercise CPTs: Therapeutic exercise (CPT 17296)  OT Exercise 1: Green Theraputty  strengthening sheet 1-4 exercises  OT Exercise 2: LUE wrist flexion/extension with 1# dumbbell 1 set x10 reps  OT Exercise 3: LUE supination/pronation with 1# dumbbell 1 set x10 reps  OT Exercise 4: LUE ulnar/radial deviation with 1# dumbbell 1 set x10 reps  OT Exercise 5: Digigrip (red) x1 minute in LUE  OT Exercise 6: Clothespins activity (yellow only)  OT Exercise 7: velcro activity (#1 pronation/supination 1x side to side), (wrist flex/ext 2x's up and back). OT Exercise 8: Picking up marbles & beans activity  Add additional exercise rows?: Yes  OT Exercise 9: Nuts and bolts activity (unscrewed largest bolt)  OT Exercise 10: Small colored peg board (only able to take pegs out of pegboard). Assessment   Performance deficits / Impairments: Decreased endurance;Decreased coordination;Decreased ADL status; Decreased strength;Decreased high-level IADLs;Decreased fine motor control  Assessment: Pt tolerated tx well. Pt's LUE fatigues quickly which makes most tasks very taxing. Pt is very motivated and pushes through with all activities. Pt continues to benefit from skilled OT services.   Treatment Diagnosis: M62.81 muscle weakness, R27.8 impaired coordination  Prognosis: Good  REQUIRES OT FOLLOW-UP: Yes  Timed Code Treatment Minutes: 56 Minutes       Plan   Occupational Therapy

## 2023-03-28 ENCOUNTER — TELEPHONE (OUTPATIENT)
Dept: NEUROSURGERY | Facility: CLINIC | Age: 43
End: 2023-03-28

## 2023-03-28 ENCOUNTER — OFFICE VISIT (OUTPATIENT)
Dept: NEUROSURGERY | Facility: CLINIC | Age: 43
End: 2023-03-28
Payer: MEDICAID

## 2023-03-28 ENCOUNTER — HOSPITAL ENCOUNTER (OUTPATIENT)
Dept: CT IMAGING | Facility: HOSPITAL | Age: 43
Discharge: HOME | End: 2023-03-28
Admitting: NURSE PRACTITIONER
Payer: MEDICAID

## 2023-03-28 VITALS
WEIGHT: 160 LBS | BODY MASS INDEX: 22.9 KG/M2 | DIASTOLIC BLOOD PRESSURE: 72 MMHG | HEIGHT: 70 IN | SYSTOLIC BLOOD PRESSURE: 119 MMHG

## 2023-03-28 DIAGNOSIS — F17.210 CIGARETTE SMOKER: ICD-10-CM

## 2023-03-28 DIAGNOSIS — I61.0 NONTRAUMATIC SUBCORTICAL HEMORRHAGE OF CEREBRAL HEMISPHERE, UNSPECIFIED LATERALITY: Primary | ICD-10-CM

## 2023-03-28 DIAGNOSIS — I62.9 INTRACRANIAL BLEEDING: ICD-10-CM

## 2023-03-28 PROCEDURE — 1160F RVW MEDS BY RX/DR IN RCRD: CPT | Performed by: NURSE PRACTITIONER

## 2023-03-28 PROCEDURE — 99213 OFFICE O/P EST LOW 20 MIN: CPT | Performed by: NURSE PRACTITIONER

## 2023-03-28 PROCEDURE — 1159F MED LIST DOCD IN RCRD: CPT | Performed by: NURSE PRACTITIONER

## 2023-03-28 PROCEDURE — 3078F DIAST BP <80 MM HG: CPT | Performed by: NURSE PRACTITIONER

## 2023-03-28 PROCEDURE — 70450 CT HEAD/BRAIN W/O DYE: CPT

## 2023-03-28 PROCEDURE — 3074F SYST BP LT 130 MM HG: CPT | Performed by: NURSE PRACTITIONER

## 2023-03-28 NOTE — TELEPHONE ENCOUNTER
Caller: Sidra Tompkins    Relationship to patient: Emergency Contact    Best call back number: [phone]    Patient is needing:     PATIENT'S S/O CALLED TO ASK IF A LETTER COULD BE WRITTEN THAT STATES PATIENT IS UNABLE TO WORK D/T STROKE, SO THAT THEY COULD GET FOOD STAMPS.    SIDRA WILLING TO COME TO OFFICE TO  LETTER.    PLEASE CALL TO ADVISE

## 2023-03-28 NOTE — PROGRESS NOTES
"    Chief complaint:   Chief Complaint   Patient presents with   • Stroke     Pt here for 3wk h/s f/u. Pt states he is currently in OP/PT 1st visit last Thursday.        Subjective     HPI: This is a 42-year-old male gentleman who presented to the hospital with left-sided weakness and difficulty speaking on March 4 2023.  The patient was found to have a right basal ganglia hemorrhagic CVA along with significant hypertension.  The patient was able to make improvements with his weakness.  His blood pressure was better managed in the hospital with a goal of keeping it less than 150 systolic.  His blood pressure today is under good control.  The patient was able to go to [facility] for further treatment.  When the patient was discharged on March 10 he was 4 out of 5 in all muscle groups on the left with ataxic movements.  He had a repeat CT scan of the head today and is here in follow-up.  The patient was only in [facility] rehab for about a week.  He is now doing outpatient therapy at [facility].  The patient denies any headaches.  Denies any vision changes.  Denies any nausea vomiting.  Denies any numbness or tingling.  He does still have some weakness in his left side and he does feel like his walking is slightly off still but he does like it is improving.      Review of Systems   Eyes: Negative for visual disturbance.   Musculoskeletal: Negative.    Neurological: Positive for weakness. Negative for dizziness, seizures, numbness and headaches.   Psychiatric/Behavioral: Negative.          Objective      Vital Signs  /72   Ht 177.8 cm (70\")   Wt 72.6 kg (160 lb)   BMI 22.96 kg/m²     Physical Exam  Constitutional:       Appearance: He is well-developed.   HENT:      Head: Normocephalic.   Eyes:      General: No visual field deficit.     Extraocular Movements: EOM normal.      Pupils: Pupils are equal, round, and reactive to light.   Pulmonary:      Effort: Pulmonary effort is normal.   Musculoskeletal:         " General: Normal range of motion.      Cervical back: Normal range of motion.   Skin:     General: Skin is warm.   Neurological:      Mental Status: He is alert and oriented to person, place, and time.      GCS: GCS eye subscore is 4. GCS verbal subscore is 5. GCS motor subscore is 6.      Cranial Nerves: Cranial nerve deficit and facial asymmetry present.      Sensory: No sensory deficit.      Motor: Weakness present.      Gait: Gait abnormal.      Deep Tendon Reflexes: Reflexes are normal and symmetric.   Psychiatric:         Speech: Speech normal.         Behavior: Behavior normal.         Thought Content: Thought content normal.         Neurologic Exam     Mental Status   Oriented to person, place, and time.   Attention: normal. Concentration: normal.   Speech: speech is normal   Level of consciousness: alert  Normal comprehension.     Cranial Nerves     CN II   Visual fields full to confrontation.     CN III, IV, VI   Pupils are equal, round, and reactive to light.  Extraocular motions are normal.     CN V   Facial sensation intact.     CN VII   Facial expression full, symmetric.   Left facial weakness: central    CN VIII   CN VIII normal.     CN IX, X   CN IX normal.   CN X normal.     CN XI   CN XI normal.     CN XII   CN XII normal.   Tongue deviation: left    Motor Exam   Muscle bulk: normal  Right arm pronator drift: absent  Left arm pronator drift: present    Strength   Strength 5/5 except as noted.   Left  4 out of 5.  All other muscle groups are 5 out of 5.     Sensory Exam   Light touch normal.     Gait, Coordination, and Reflexes     Reflexes   Reflexes 2+ except as noted. Slight ataxic gait       Imaging review: CT scan of the head that was done on March 28, 2023 was compared to imaging that was done on March 10, 2023 shows decrease in the overall size of the hemorrhagic CVA.  There is still significant cerebral edema and some mass effect and midline shift but this does appear to be  stable.    March 28, 2023      March 10, 2023        Assessment/Plan: Dr. Gannon did review the CT scans of his head.  We will follow-up with the patient in 6 weeks with repeat imaging of his head.  If there is still persistent edema we may need to consider getting an MRI with and without contrast of the brain.  We will also make him an appointment to come back and see Dr. Gannon at the next available appointment.  He was told to call us if any further problems or concerns.    Patient is a nonsmoker  The patient's Body mass index is 22.96 kg/m².. BMI is within normal parameters. No follow-up required.    Diagnoses and all orders for this visit:    1. Nontraumatic subcortical hemorrhage of cerebral hemisphere, unspecified laterality (HCC) (Primary)  -     CT Head Without Contrast; Future    2. Body mass index (BMI) of 22.0 to 22.9 in adult    3. Cigarette smoker        I discussed the patients findings and my recommendations with patient  Feliciano Boothe, NAM  03/28/23  12:06 CDT

## 2023-03-30 ENCOUNTER — HOSPITAL ENCOUNTER (OUTPATIENT)
Dept: OCCUPATIONAL THERAPY | Age: 43
Setting detail: THERAPIES SERIES
Discharge: HOME | End: 2023-03-30
Payer: MEDICAID

## 2023-03-30 ENCOUNTER — HOSPITAL ENCOUNTER (OUTPATIENT)
Dept: PHYSICAL THERAPY | Age: 43
Setting detail: THERAPIES SERIES
Discharge: HOME | End: 2023-03-30
Payer: MEDICAID

## 2023-03-30 PROCEDURE — 97110 THERAPEUTIC EXERCISES: CPT

## 2023-03-30 PROCEDURE — 97032 APPL MODALITY 1+ESTIM EA 15: CPT

## 2023-03-30 NOTE — PROGRESS NOTES
Daily Treatment Note  Date: 3/30/2023  Patient Name: Dorette Prader  :  1980  MRN: [id number]       Subjective   General  Chart Reviewed: Yes  Patient assessed for rehabilitation services?: Yes  Self reported health status[de-identified] Good  History obtained from[de-identified] Patient, Chart Review  Family/Caregiver Present: No  Diagnosis: I61.0 Nontraumatic subcortical hemorrhage of cerebral hemisphere  Referring Provider (secondary): Camila Bloom MD  OT Visit Information  Onset Date: 23  OT Insurance Information: [employer]  Total # of Visits Approved: 20  Total # of Visits to Date: 2  Certification Period Expiration Date: 23  Progress Note Due Date: 23  Subjective  Dominant Hand: : Right      Treatment Activities:    Exercises: Therapeutic exercise (CPT 55175)   Treatment Reasoning    OT Exercise 1: Green Theraputty  strengthening sheet 1-4 exercises  OT Exercise 2: LUE wrist flexion/extension with 1# dumbbell 1 set x10 reps  OT Exercise 3: LUE supination/pronation with 1# dumbbell 1 set x10 reps  OT Exercise 4: LUE ulnar/radial deviation with 1# dumbbell 1 set x10 reps  OT Exercise 5: Digigrip (red) x1 minute in LUE  OT Exercise 6: Clothespins activity (yellow only)  OT Exercise 7: velcro activity (#1 pronation/supination 1x side to side), (wrist flex/ext 2x's up and back). OT Exercise 8: Picking up marbles & beans activity  OT Exercise 9: Nuts and bolts activity (unscrewed largest bolt)  OT Exercise 10: Small colored peg board (only able to take pegs out of pegboard).   OT Exercise 11: Omnicycle BUE x5 minutes lowest resistance                        Electric Stimulation, manual:   (CPT [id number]) Treatment Reasoning    Patient Position: Seated  E-stim location: Left, Forearm  E-stim type: NMES  E-stim via: 2 Electrode Pads  E-stim Parameters: 10/10 cycle, 35 rate, 2.0 ramp, 8 minutes  Post treatment skin assessment: Intact  Limitations addressed: Joint mobility  Therapist provided: Verbal cuing, Manual

## 2023-03-30 NOTE — PROGRESS NOTES
VS.  Long Term Goal 4: Patient will improve functional balance as demonstrated by improvement of R Single Leg Stance for 0 to 30 sec. Long Term Goal 5: Patient will increase functonal LE strength as demonstrated by improvement of 5 X sit to stand from 15 sec to <12 sec. Patient Goals   Patient Goals :  To walk better    Plan:    Physcial Therapy Plan  Plan weeks: 6-8 weeks  Current Treatment Recommendations: Strengthening, ROM, Balance training, Functional mobility training, Transfer training, ADL/Self-care training, IADL training, Manual, Neuromuscular re-education, Gait training, Endurance training, Home exercise program, Positioning, Therapeutic activities      Therapy Time:   Individual Concurrent Group Co-treatment   Time In [id number]         Time Out [address], PTA   Electronically signed by Ronel Chu PTA on 3/30/2023 at 10:52 AM

## 2023-03-31 ENCOUNTER — TELEMEDICINE (OUTPATIENT)
Dept: FAMILY MEDICINE CLINIC | Facility: CLINIC | Age: 43
End: 2023-03-31
Payer: MEDICAID

## 2023-03-31 VITALS — BODY MASS INDEX: 22.9 KG/M2 | WEIGHT: 160 LBS | HEIGHT: 70 IN | RESPIRATION RATE: 18 BRPM

## 2023-03-31 DIAGNOSIS — F10.10 ALCOHOL ABUSE: ICD-10-CM

## 2023-03-31 DIAGNOSIS — I61.0 NONTRAUMATIC SUBCORTICAL HEMORRHAGE OF CEREBRAL HEMISPHERE, UNSPECIFIED LATERALITY: Primary | ICD-10-CM

## 2023-03-31 DIAGNOSIS — I62.9 INTRACRANIAL BLEEDING: ICD-10-CM

## 2023-03-31 DIAGNOSIS — F17.210 CIGARETTE SMOKER: ICD-10-CM

## 2023-03-31 NOTE — PROGRESS NOTES
NAM Renteria  [facility]   Family Medicine  [address]  [place] [address]  Phone: [phone]  Fax: [phone]     James Taylor is a 42 y.o. male.   : 1980    This visit is conducted today via video visit 2' covid-19 epidemic.  You have chosen to receive care through a telehealth visit.  Do you consent to use a video/audio connection for your medical care today? Yes    Pt located at Home and provider located at home office.     CC:   Chief Complaint   Patient presents with   • Letter for Aid        HPI: James Taylor is a 42 y.o. male that is an established patient. He  is here for evaluation of the above complaint.    Pt request letter for court stating that he is unable to work due to recent CVA. Going to PT twice per week. Follow up scans, appointment with specialist such as neurology, not able to drive at this point due to left sided weakness. Left arm numbness/ left hand weakness. Reports having repeat CT scan that are slightly worse than previously recorded.     The following portions of the patient's history were reviewed and updated as appropriate: allergies, current medications, past family history, past medical history, past social history, past surgical history and problem list.  Past Medical History:   Diagnosis Date   • Asthma    • Hypertension      Family History   Problem Relation Age of Onset   • Anxiety disorder Mother    • Diabetes Mother    • Hyperlipidemia Mother    • Cancer Father    • Hyperlipidemia Father    • Colon cancer Neg Hx    • Colon polyps Neg Hx      Social History     Socioeconomic History   • Marital status: Single   Tobacco Use   • Smoking status: Every Day     Packs/day: 1.00     Years: 15.00     Pack years: 15.00     Types: Cigarettes     Start date: 2012     Passive exposure: Current   • Smokeless tobacco: Never   Vaping Use   • Vaping Use: Never used   Substance and Sexual Activity   • Alcohol use: Yes     Alcohol/week:  "4.0 standard drinks     Types: 4 Cans of beer per week     Comment: on the weekends   • Drug use: No   • Sexual activity: Yes     Partners: Female     Birth control/protection: Condom, Same-sex partner     Review of Systems   Constitutional: Negative for appetite change, diaphoresis, fatigue and fever.   HENT: Negative for ear pain, hearing loss, mouth sores, sinus pressure, sneezing, sore throat and voice change.    Eyes: Negative for discharge, itching and visual disturbance.   Respiratory: Negative for cough, shortness of breath and wheezing.    Cardiovascular: Negative for chest pain and palpitations.   Gastrointestinal: Negative for abdominal pain, diarrhea and vomiting.   Musculoskeletal: Negative for arthralgias, back pain and myalgias.   Skin: Negative for rash and wound.   Neurological: Positive for weakness (left sided ). Negative for dizziness, seizures, numbness and headache.   Hematological: Negative for adenopathy. Does not bruise/bleed easily.   Psychiatric/Behavioral: Negative for agitation, dysphoric mood, sleep disturbance and depressed mood. The patient is not nervous/anxious.      Resp 18   Ht 177.8 cm (70\")   Wt 72.6 kg (160 lb)   BMI 22.96 kg/m²   Physical Exam  Vitals and nursing note reviewed.   Constitutional:       Appearance: Normal appearance. He is well-developed.   HENT:      Head: Normocephalic and atraumatic.      Mouth/Throat:      Lips: Pink. No lesions.   Eyes:      General: Lids are normal. Vision grossly intact.      Conjunctiva/sclera: Conjunctivae normal.      Right eye: Right conjunctiva is not injected.      Left eye: Left conjunctiva is not injected.   Pulmonary:      Effort: Pulmonary effort is normal.   Musculoskeletal:         General: Normal range of motion.      Cervical back: Full passive range of motion without pain, normal range of motion and neck supple.   Skin:     General: Skin is dry.   Neurological:      Mental Status: He is alert and oriented to person, " place, and time.      Motor: Motor function is intact.   Psychiatric:         Mood and Affect: Mood and affect normal.         Judgment: Judgment normal.           Assessment and Plan:   Diagnoses and all orders for this visit:    1. Nontraumatic subcortical hemorrhage of cerebral hemisphere, unspecified laterality (HCC) (Primary)    2. Intracranial bleeding (HCC)    3. Body mass index (BMI) of 22.0 to 22.9 in adult    4. Cigarette smoker    5. Alcohol abuse          There are no Patient Instructions on file for this visit.    Discussion:     I spent 30 minutes caring for Cameron on this date of service. This time includes time spent by me in the following activities: preparing for the visit, reviewing tests, obtaining and/or reviewing a separately obtained history, performing a medically appropriate examination and/or evaluation, counseling and educating the patient/family/caregiver, ordering medications, tests, or procedures, documenting information in the medical record and independently interpreting results and communicating that information with the patient/family/caregiver     Follow up: 1 month.   Casi Skinner, APRN  3/31/2023  13:03 CDT

## 2023-04-04 ENCOUNTER — HOSPITAL ENCOUNTER (OUTPATIENT)
Dept: PHYSICAL THERAPY | Age: 43
Setting detail: THERAPIES SERIES
Discharge: HOME | End: 2023-04-04
Payer: MEDICAID

## 2023-04-04 ENCOUNTER — HOSPITAL ENCOUNTER (OUTPATIENT)
Dept: OCCUPATIONAL THERAPY | Age: 43
Setting detail: THERAPIES SERIES
Discharge: HOME | End: 2023-04-04
Payer: MEDICAID

## 2023-04-04 VITALS — SYSTOLIC BLOOD PRESSURE: 157 MMHG | DIASTOLIC BLOOD PRESSURE: 91 MMHG | OXYGEN SATURATION: 96 % | HEART RATE: 60 BPM

## 2023-04-04 PROCEDURE — 97110 THERAPEUTIC EXERCISES: CPT

## 2023-04-04 PROCEDURE — 97530 THERAPEUTIC ACTIVITIES: CPT

## 2023-04-04 NOTE — PROGRESS NOTES
Occupational Therapy Plan  Plan Weeks: x8 weeks  Specific Instructions for Next Treatment: Begin ther ex and review progression of HEP  Current Treatment Recommendations: Strengthening, Safety education & training, Patient/Caregiver education & training, Endurance training, Equipment evaluation, education, & procurement, Neuromuscular re-education, Manual Therapy:  STM, Home management training, Self-Care / ADL, Coordination training, Return to work related activity, Modalities     Goals  Short Term Goals  Time Frame for Short Term Goals: x4 weeks  Short Term Goal 1: Patient will improve QuickDash score to <20% indicating increased (I) with ADL/IADL tasks. Short Term Goal 2: Patient will improve LUE mass  strength to 20# to increase (I) with opening containers. Short Term Goal 3: Patient will improve LUE wrist flexion/extension strength to 4+/5 to increase (I) with cooking tasks. Short Term Goal 4: Patient will improve LUE lateral pinch to 14# to increase independence opening packages. Long Term Goals  Time Frame for Long Term Goals : x8 weeks  Long Term Goal 1: Patient will improve UEFS score to >70/80 indicating increased (I) with daily routine tasks. Long Term Goal 2: Patient will improve LUE mass  strength to >35# to increase (I) with opening containers. Long Term Goal 3: Patient will improve LUE supination/pronation strength to 4+/5 to increase (I) with laundry tasks. Long Term Goal 4: Patient will improve LUE 3 pt pinch strength to 12# to increase (I) with picking up money. Long Term Goal 5: Patient will be (I) with HEP by discharge. Patient Goals   Patient goals : Patient wants LUE strength and coordination to return for daily tasks and work tasks.     Therapy Time   Individual Concurrent Group Co-treatment   Time In 1016         Time Out 1101         Minutes 45         Timed Code Treatment Minutes: 45 Minutes    Electronically signed by KATLYN Lynn on 4/4/2023 at 11:23 AM

## 2023-04-04 NOTE — PROGRESS NOTES
reps  Exercise 11: Balance [address], 4 square, 5 reps  Exercise 12: Balance [address], Renita Mojica, X 2                          Pt Education:         ASSESSMENT     Assessment: Assessment: He denies pain and feels his condtion is improving. He has difficulty with step length and symmetry and with SLS and karaoka. He was able to make gait corrections with cuing while on treadmill. He remains well motivated. Body Structures, Functions, Activity Limitations Requiring Skilled Therapeutic Intervention: Decreased functional mobility , Decreased ADL status, Decreased ROM, Decreased tolerance to work activity, Decreased balance, Decreased endurance, Decreased strength, Decreased high-level IADLs    Post-Treatment Pain Level: Activity Tolerance: Patient tolerated treatment well    Therapy Prognosis: Good       GOALS   Patient Goals : To walk better  Short Term Goals Completed by 3-4 weeks Current Status Goal Status   Patient will demonstrate improved functional balance as demonstrated by improvement of Starr Balance scale fro 50/56 to 52/56. Patient will improve dynamic standing balance as demonstrated by improvement of functional reach from 6.5 to 8 inches. Patient will improve community ambulation ability as demonstrated by improvement of 6 Minue Walk Test from 987' to 1100' with stable VS.       Patient will improve functional balance as demonstrated by improvement of R Single Leg Stance for 0 to 10 sec. Patient will increase functonal LE strength as demonstrated by improvement of 5 X sit to stand from 15 sec to 12 sec. Long Term Goals Completed by 6-8 Current Status Goal Status   Patient will demonstrate improved functional balance as demonstrated by improvement of Starr Balance scale fro 50/56 to 54/56. Patient will improve dynamic standing balance as demonstrated by improvement of functional reach from 6.5 to 10 inches.        Patient will

## 2023-04-06 ENCOUNTER — APPOINTMENT (OUTPATIENT)
Dept: OCCUPATIONAL THERAPY | Age: 43
End: 2023-04-06
Payer: MEDICAID

## 2023-04-06 ENCOUNTER — APPOINTMENT (OUTPATIENT)
Dept: PHYSICAL THERAPY | Age: 43
End: 2023-04-06
Payer: MEDICAID

## 2023-04-11 ENCOUNTER — OFFICE VISIT (OUTPATIENT)
Dept: GASTROENTEROLOGY | Facility: CLINIC | Age: 43
End: 2023-04-11
Payer: MEDICAID

## 2023-04-11 VITALS
TEMPERATURE: 97.5 F | BODY MASS INDEX: 22.62 KG/M2 | HEART RATE: 69 BPM | HEIGHT: 70 IN | DIASTOLIC BLOOD PRESSURE: 80 MMHG | OXYGEN SATURATION: 99 % | WEIGHT: 158 LBS | SYSTOLIC BLOOD PRESSURE: 122 MMHG

## 2023-04-11 DIAGNOSIS — Z71.6 TOBACCO ABUSE COUNSELING: ICD-10-CM

## 2023-04-11 DIAGNOSIS — F10.10 ETOH ABUSE: ICD-10-CM

## 2023-04-11 DIAGNOSIS — K76.0 FATTY LIVER: ICD-10-CM

## 2023-04-11 DIAGNOSIS — R79.89 ELEVATED LIVER FUNCTION TESTS: Primary | ICD-10-CM

## 2023-04-11 PROCEDURE — 3079F DIAST BP 80-89 MM HG: CPT | Performed by: CLINICAL NURSE SPECIALIST

## 2023-04-11 PROCEDURE — 1159F MED LIST DOCD IN RCRD: CPT | Performed by: CLINICAL NURSE SPECIALIST

## 2023-04-11 PROCEDURE — 3074F SYST BP LT 130 MM HG: CPT | Performed by: CLINICAL NURSE SPECIALIST

## 2023-04-11 PROCEDURE — 99213 OFFICE O/P EST LOW 20 MIN: CPT | Performed by: CLINICAL NURSE SPECIALIST

## 2023-04-11 PROCEDURE — 1160F RVW MEDS BY RX/DR IN RCRD: CPT | Performed by: CLINICAL NURSE SPECIALIST

## 2023-04-11 NOTE — PROGRESS NOTES
James Taylor  1980 4/11/2023  Chief Complaint   Patient presents with   • GI Problem      Fatty Liver and elevated liver functions         HPI    James Taylor is a  42 y.o. male here for a follow up visit for fatty liver secondary to ETOH. When I last saw him he was drinking approx a 6 pack per day now he states he is down to 3 per day. His ultrasound was consistent with fatty liver F2 Metavir score. Labs for viral liver disease or autoimmune were all negative. He has no associated symptoms. Unfortunately since I have seen him last he has had a hemorrhagic stroke and now has some left sided weakness. While admitted noted say he did go through withdrawal. He is aware that he needs to consider rehab however his physical therapy and recovery from stroke have taken precedence.   3/8/23 ALT 60, AST 49, T bili 1.0, ALKP 110. ALT 80    Past Medical History:   Diagnosis Date   • Asthma    • Hypertension      History reviewed. No pertinent surgical history.    Outpatient Medications Marked as Taking for the 4/11/23 encounter (Office Visit) with Yudelka Taylor APRN   Medication Sig Dispense Refill   • amLODIPine (NORVASC) 10 MG tablet Take 1 tablet by mouth Daily.     • Blood Pressure Monitor misc 1 Units Daily for 30 days. 1 each 0   • hydrALAZINE (APRESOLINE) 25 MG tablet Take 1 tablet by mouth 3 (Three) Times a Day.     • Iron, Ferrous Sulfate, 325 (65 Fe) MG tablet Take 325 mg by mouth Daily for 30 days. 30 tablet 2   • lisinopril (PRINIVIL,ZESTRIL) 10 MG tablet Take 1 tablet by mouth Every Night.     • metoprolol tartrate (LOPRESSOR) 50 MG tablet Take 1 tablet by mouth Every 12 (Twelve) Hours.     • nicotine (NICODERM CQ) 21 MG/24HR patch Place 1 patch on the skin as directed by provider Daily.     • sertraline (Zoloft) 50 MG tablet Take 1 tablet by mouth Daily. To help with mood. Take with food 90 tablet 0   • terazosin (HYTRIN) 5 MG capsule Take 1 capsule by mouth Every Night.     • vitamin D  (ERGOCALCIFEROL) 1.25 MG (53072 UT) capsule capsule Take 1 capsule by mouth 1 (One) Time Per Week. To treat low vitamin D. Take with food. 5 capsule 11       No Known Allergies    Social History     Socioeconomic History   • Marital status: Single   Tobacco Use   • Smoking status: Every Day     Packs/day: 1.00     Years: 15.00     Pack years: 15.00     Types: Cigarettes     Start date: 4/2/2012     Passive exposure: Current   • Smokeless tobacco: Never   Vaping Use   • Vaping Use: Never used   Substance and Sexual Activity   • Alcohol use: Yes     Alcohol/week: 4.0 standard drinks     Types: 4 Cans of beer per week     Comment: on the weekends   • Drug use: No   • Sexual activity: Yes     Partners: Female     Birth control/protection: Condom, Same-sex partner       Family History   Problem Relation Age of Onset   • Anxiety disorder Mother    • Diabetes Mother    • Hyperlipidemia Mother    • Cancer Father    • Hyperlipidemia Father    • Colon cancer Neg Hx    • Colon polyps Neg Hx        Review of Systems   Constitutional: Negative for activity change, appetite change, chills, diaphoresis, fatigue, fever and unexpected weight change.   HENT: Negative for ear pain, hearing loss, mouth sores, sore throat, trouble swallowing and voice change.    Eyes: Negative.    Respiratory: Negative for cough, choking, shortness of breath and wheezing.    Cardiovascular: Negative for chest pain and palpitations.   Gastrointestinal: Negative for abdominal pain, blood in stool, constipation, diarrhea, nausea and vomiting.   Endocrine: Negative for cold intolerance and heat intolerance.   Genitourinary: Negative for decreased urine volume, dysuria, frequency, hematuria and urgency.   Musculoskeletal: Negative for back pain, gait problem and myalgias.   Skin: Negative for color change, pallor and rash.   Allergic/Immunologic: Negative for food allergies and immunocompromised state.   Neurological: Negative for dizziness, tremors,  "seizures, syncope, weakness, light-headedness, numbness and headaches.   Hematological: Negative for adenopathy. Does not bruise/bleed easily.   Psychiatric/Behavioral: Negative for agitation and confusion. The patient is not nervous/anxious.    All other systems reviewed and are negative.      /80   Pulse 69   Temp 97.5 °F (36.4 °C) (Temporal)   Ht 177.8 cm (70\")   Wt 71.7 kg (158 lb)   SpO2 99%   BMI 22.67 kg/m²   Body mass index is 22.67 kg/m².    Physical Exam  Constitutional:       Appearance: He is well-developed.   HENT:      Head: Normocephalic and atraumatic.   Eyes:      Pupils: Pupils are equal, round, and reactive to light.   Neck:      Trachea: No tracheal deviation.   Cardiovascular:      Rate and Rhythm: Normal rate and regular rhythm.      Heart sounds: Normal heart sounds. No murmur heard.    No friction rub. No gallop.   Pulmonary:      Effort: Pulmonary effort is normal. No respiratory distress.      Breath sounds: Normal breath sounds. No wheezing or rales.   Chest:      Chest wall: No tenderness.   Abdominal:      General: Bowel sounds are normal. There is no distension.      Palpations: Abdomen is soft. Abdomen is not rigid.      Tenderness: There is no abdominal tenderness. There is no guarding or rebound.   Musculoskeletal:         General: No tenderness or deformity. Normal range of motion.      Cervical back: Normal range of motion and neck supple.   Skin:     General: Skin is warm and dry.      Coloration: Skin is not pale.      Findings: No rash.   Neurological:      Mental Status: He is alert and oriented to person, place, and time.      Motor: Weakness (left side) present.      Deep Tendon Reflexes: Reflexes are normal and symmetric.   Psychiatric:         Behavior: Behavior normal.         Thought Content: Thought content normal.         Judgment: Judgment normal.         ASSESSMENT AND PLAN    BMI is within normal parameters. No other follow-up for BMI " required.    Diagnoses and all orders for this visit:    1. Elevated liver function tests (Primary)  -     Comprehensive Metabolic Panel; Future  -     CBC & Differential; Future    2. ETOH abuse    3. Fatty liver  -     US Liver; Future    4. Tobacco abuse counseling    I discussed how fatty liver can lead to cirrhosis, disability and pre-mature death.  How it also can be a sign of increased risk for cardiovascular disease.  I discussed the importance of getting rid of fat in the liver by controlling lipids and glucose, avoiding etoh helps, and gradual weight loss to ideal body weight is very important.     Numbers are down from recent hospitalization. Will follow in November  Will continue to support. He is aware of the need for a rehab and/or AA    There are no Patient Instructions on file for this visit.  Yudelka Taylor, APRN  15:14 CDT  4/11/2023    Obesity, Adult  Obesity is the condition of having too much total body fat. Being overweight or obese means that your weight is greater than what is considered healthy for your body size. Obesity is determined by a measurement called BMI. BMI is an estimate of body fat and is calculated from height and weight. For adults, a BMI of 30 or higher is considered obese.  Obesity can eventually lead to other health concerns and major illnesses, including:  · Stroke.  · Coronary artery disease (CAD).  · Type 2 diabetes.  · Some types of cancer, including cancers of the colon, breast, uterus, and gallbladder.  · Osteoarthritis.  · High blood pressure (hypertension).  · High cholesterol.  · Sleep apnea.  · Gallbladder stones.  · Infertility problems.  What are the causes?  The main cause of obesity is taking in (consuming) more calories than your body uses for energy. Other factors that contribute to this condition may include:  · Being born with genes that make you more likely to become obese.  · Having a medical condition that causes obesity. These conditions  include:  ¨ Hypothyroidism.  ¨ Polycystic ovarian syndrome (PCOS).  ¨ Binge-eating disorder.  ¨ Cushing syndrome.  · Taking certain medicines, such as steroids, antidepressants, and seizure medicines.  · Not being physically active (sedentary lifestyle).  · Living where there are limited places to exercise safely or buy healthy foods.  · Not getting enough sleep.  What increases the risk?  The following factors may increase your risk of this condition:  · Having a family history of obesity.  · Being a woman of -American descent.  · Being a man of  descent.  What are the signs or symptoms?  Having excessive body fat is the main symptom of this condition.  How is this diagnosed?  This condition may be diagnosed based on:  · Your symptoms.  · Your medical history.  · A physical exam. Your health care provider may measure:  ¨ Your BMI. If you are an adult with a BMI between 25 and less than 30, you are considered overweight. If you are an adult with a BMI of 30 or higher, you are considered obese.  ¨ The distances around your hips and your waist (circumferences). These may be compared to each other to help diagnose your condition.  ¨ Your skinfold thickness. Your health care provider may gently pinch a fold of your skin and measure it.  How is this treated?  Treatment for this condition often includes changing your lifestyle. Treatment may include some or all of the following:  · Dietary changes. Work with your health care provider and a dietitian to set a weight-loss goal that is healthy and reasonable for you. Dietary changes may include eating:  ¨ Smaller portions. A portion size is the amount of a particular food that is healthy for you to eat at one time. This varies from person to person.  ¨ Low-calorie or low-fat options.  ¨ More whole grains, fruits, and vegetables.  · Regular physical activity. This may include aerobic activity (cardio) and strength training.  · Medicine to help you lose weight.  Your health care provider may prescribe medicine if you are unable to lose 1 pound a week after 6 weeks of eating more healthily and doing more physical activity.  · Surgery. Surgical options may include gastric banding and gastric bypass. Surgery may be done if:  ¨ Other treatments have not helped to improve your condition.  ¨ You have a BMI of 40 or higher.  ¨ You have life-threatening health problems related to obesity.  Follow these instructions at home:     Eating and drinking     · Follow recommendations from your health care provider about what you eat and drink. Your health care provider may advise you to:  ¨ Limit fast foods, sweets, and processed snack foods.  ¨ Choose low-fat options, such as low-fat milk instead of whole milk.  ¨ Eat 5 or more servings of fruits or vegetables every day.  ¨ Eat at home more often. This gives you more control over what you eat.  ¨ Choose healthy foods when you eat out.  ¨ Learn what a healthy portion size is.  ¨ Keep low-fat snacks on hand.  ¨ Avoid sugary drinks, such as soda, fruit juice, iced tea sweetened with sugar, and flavored milk.  ¨ Eat a healthy breakfast.  · Drink enough water to keep your urine clear or pale yellow.  · Do not go without eating for long periods of time (do not fast) or follow a fad diet. Fasting and fad diets can be unhealthy and even dangerous.  Physical Activity   · Exercise regularly, as told by your health care provider. Ask your health care provider what types of exercise are safe for you and how often you should exercise.  · Warm up and stretch before being active.  · Cool down and stretch after being active.  · Rest between periods of activity.  Lifestyle   · Limit the time that you spend in front of your TV, computer, or video game system.  · Find ways to reward yourself that do not involve food.  · Limit alcohol intake to no more than 1 drink a day for nonpregnant women and 2 drinks a day for men. One drink equals 12 oz of beer, 5 oz  of wine, or 1½ oz of hard liquor.  General instructions   · Keep a weight loss journal to keep track of the food you eat and how much you exercise you get.  · Take over-the-counter and prescription medicines only as told by your health care provider.  · Take vitamins and supplements only as told by your health care provider.  · Consider joining a support group. Your health care provider may be able to recommend a support group.  · Keep all follow-up visits as told by your health care provider. This is important.  Contact a health care provider if:  · You are unable to meet your weight loss goal after 6 weeks of dietary and lifestyle changes.  This information is not intended to replace advice given to you by your health care provider. Make sure you discuss any questions you have with your health care provider.  Document Released: 01/25/2006 Document Revised: 05/22/2017 Document Reviewed: 10/05/2016  [employer] Interactive Patient Education © 2017 [employer]      IF YOU SMOKE OR USE TOBACCO PLEASE READ THE FOLLOWING:    Why is smoking bad for me?  Smoking increases the risk of heart disease, lung disease, vascular disease, stroke, and cancer.     If you smoke, STOP!    If you would like more information on quitting smoking, please visit the [employer] website: www.[employer]/Mandaeate/healthier-together/smoke   This link will provide additional resources including the QUIT line and the Beat the Pack support groups.     For more information:    Quit Now [place]  1-800-QUIT-NOW  https://[place].quitlogix.org/en-[place]/

## 2023-04-17 ENCOUNTER — HOSPITAL ENCOUNTER (OUTPATIENT)
Dept: OCCUPATIONAL THERAPY | Age: 43
Setting detail: THERAPIES SERIES
Discharge: HOME | End: 2023-04-17
Payer: MEDICAID

## 2023-04-17 ENCOUNTER — HOSPITAL ENCOUNTER (OUTPATIENT)
Dept: PHYSICAL THERAPY | Age: 43
Setting detail: THERAPIES SERIES
Discharge: HOME | End: 2023-04-17
Payer: MEDICAID

## 2023-04-17 PROCEDURE — 97110 THERAPEUTIC EXERCISES: CPT

## 2023-04-17 NOTE — PROGRESS NOTES
functional balance as demonstrated by improvement of R Single Leg Stance for 0 to 30 sec. Long Term Goal 5: Patient will increase functonal LE strength as demonstrated by improvement of 5 X sit to stand from 15 sec to <12 sec. Patient Goals   Patient Goals :  To walk better    Plan:    Physcial Therapy Plan  Plan weeks: 6-8 weeks  Current Treatment Recommendations: Strengthening, ROM, Balance training, Functional mobility training, Transfer training, ADL/Self-care training, IADL training, Manual, Neuromuscular re-education, Gait training, Endurance training, Home exercise program, Positioning, Therapeutic activities  Timed Code Treatment Minutes: 47 Minutes     Therapy Time   Individual Concurrent Group Co-treatment   Time In 3059         Time Out 1055         Minutes 47         Timed Code Treatment Minutes: 47 Minutes    Electronically signed by Doyle Graham PTA on 4/17/2023 at 12:11 PM

## 2023-04-21 ENCOUNTER — HOSPITAL ENCOUNTER (OUTPATIENT)
Dept: OCCUPATIONAL THERAPY | Age: 43
Setting detail: THERAPIES SERIES
Discharge: HOME | End: 2023-04-21
Payer: MEDICAID

## 2023-04-21 ENCOUNTER — HOSPITAL ENCOUNTER (OUTPATIENT)
Dept: PHYSICAL THERAPY | Age: 43
Setting detail: THERAPIES SERIES
Discharge: HOME | End: 2023-04-21
Payer: MEDICAID

## 2023-04-21 VITALS — OXYGEN SATURATION: 97 % | HEART RATE: 57 BPM | SYSTOLIC BLOOD PRESSURE: 138 MMHG | DIASTOLIC BLOOD PRESSURE: 86 MMHG

## 2023-04-21 PROCEDURE — 97110 THERAPEUTIC EXERCISES: CPT

## 2023-04-21 PROCEDURE — 97530 THERAPEUTIC ACTIVITIES: CPT

## 2023-04-21 ASSESSMENT — 9 HOLE PEG TEST
TEST_RESULT: IMPAIRED
TESTTIME_SECONDS: 53.43
TEST_RESULT: FUNCTIONAL
TESTTIME_SECONDS: 21.86

## 2023-04-21 NOTE — PROGRESS NOTES
Occupational Therapy Reassessment  Date: 2023  Patient Name: Candy Vicente  :  1980  MRN: [id number]       Subjective   General  Chart Reviewed: Yes  Patient assessed for rehabilitation services?: Yes  Self reported health status[de-identified] Good  History obtained from[de-identified] Patient, Chart Review  Family/Caregiver Present: No  Diagnosis: I61.0 Nontraumatic subcortical hemorrhage of cerebral hemisphere  Referring Provider (secondary): Parul Anna MD  Follows Commands: Within Functional Limits  OT Visit Information  Onset Date: 23  OT Insurance Information: [employer]  Total # of Visits Approved: 8  Total # of Visits to Date: 8  Certification Period Expiration Date: 23  Progress Note Due Date: 23  Subjective  Dominant Hand: : Right      Treatment Activities:    Exercises: Therapeutic exercise (CPT 79119)   Treatment Reasoning    OT Exercise 1: Blue theraputty  strengthening sheet 1-4 exercises  OT Exercise 2: LUE wrist flexion/extension with 3# dumbbell 1 set x10 reps  OT Exercise 3: LUE supination/pronation with 3# dumbbell 1 set x10 reps  OT Exercise 4: LUE ulnar/radial deviation with 3# dumbbell 1 set x10 reps  OT Exercise 5: Digigrip (green) x1 minute in LUE  OT Exercise 6: Clothespins activity (red, green, blue and black). OT Exercise 7: velcro activity (#1 pronation/supination 1x side to side), (wrist flex/ext 2x's up and back). OT Exercise 8: Picking up marbles & beans activity  OT Exercise 9: Nuts and bolts activity  OT Exercise 10: Small colored peg board  OT Exercise 11: Omnicycle BUE x5 minutes at 3 bars (could up go to 4th bar next visit)  OT Exercise 12: Finger extension with purple rubber band x 1 minute.   OT Exercise 13: Picking up bingo chips, quarters, dimes, pennies, nickels, paper clips with LUE on tabletop without towel (stored in small bingo container in cabinet)                      Outpatient Rehab Objectives (UE):  Left Strength  Right Strength      LUE Strength  Gross

## 2023-04-21 NOTE — PROGRESS NOTES
> 20 seconds = impaired mobility; > 30 seconds = very high fall risk. Additional scorin.1 seconds in vestibular patients = increased fall risk; > 13.5 seconds in elderly = Increased fall risk)      Functional Reach   Forward : 11    Interpretation of Score: Functional Reach Test (FRT) is a clinical outcome measure and assessment tool for ascertaining dynamic balance in one simple task. 10/25 cm or greater Low risk of falls; 6/15cm to 10/25cm Risk of falling is 2x greater than normal; 6/15cm or less Risk of falling is 4x greater than normal; Unwilling to reach Risk of falling is 8x greater than normal Shelby Gonzalez, 1990). 6 Minute Walk Test   Pre-Test Vitals: Heart Rate: 57; O2 Saturation - Before walk: 97     Post-Test Vitals: Heart Rate: 56  O2 Saturation - After walk: 100     Distance: 1162 ft.; 354.18 meters    TREATMENT     Exercises:      Treatment Reasoning    Exercise 1: 6 MWT results in Functional Outcomes section  Exercise 2: Supine: lower trunk rotation, 10 reps  not today  Exercise 3: Supine: bridges BLE, single leg bridges, 10 rep EACH  not today  Exercise 4: Mat: Prone to all fours's 10 reps  not today  Exercise 5: Mat: tall knee to 1/2 knee, 10 reps ea  not today  Exercise 6: //, SLS, 30 sec, EO, EC; Airex: 30 sec EO, EC         balance perterbations on green foam pads EO x 30 sec, EC x 1 min,     narrow HIRA on 1 green foam pad x 1 min  not today  Exercise 7: //, semi tandem stance 30 sec X 2; tandem stance, 30 sec x 1          toe tapping 4 inch step x 15 each, toe tapping orange cone x 15-no hands for both  Exercise 8: //, gait backward, side to side, 5 reps  not today  Exercise 9: Step up, 6\" step, 3 way, 15 reps  Exercise 10: Standing, full squat with floor touch to overhead reach, heel raise, hip abd, hip ext.  15 reps  not today  Exercise 11: Balance [address], 4 square, 5 reps WITH BALL TOSS                  cone weaves with ball toss x 3 not today due to [place] being used  Exercise 12:

## 2023-04-24 ENCOUNTER — APPOINTMENT (OUTPATIENT)
Dept: PHYSICAL THERAPY | Age: 43
End: 2023-04-24
Payer: MEDICAID

## 2023-04-24 ENCOUNTER — APPOINTMENT (OUTPATIENT)
Dept: OCCUPATIONAL THERAPY | Age: 43
End: 2023-04-24
Payer: MEDICAID

## 2023-04-27 ENCOUNTER — APPOINTMENT (OUTPATIENT)
Dept: OCCUPATIONAL THERAPY | Age: 43
End: 2023-04-27
Payer: MEDICAID

## 2023-04-27 ENCOUNTER — OFFICE VISIT (OUTPATIENT)
Dept: FAMILY MEDICINE CLINIC | Facility: CLINIC | Age: 43
End: 2023-04-27
Payer: MEDICAID

## 2023-04-27 ENCOUNTER — HOSPITAL ENCOUNTER (OUTPATIENT)
Dept: PHYSICAL THERAPY | Age: 43
Setting detail: THERAPIES SERIES
Discharge: HOME | End: 2023-04-27
Payer: MEDICAID

## 2023-04-27 VITALS
HEIGHT: 70 IN | HEART RATE: 66 BPM | BODY MASS INDEX: 22.48 KG/M2 | RESPIRATION RATE: 18 BRPM | OXYGEN SATURATION: 99 % | SYSTOLIC BLOOD PRESSURE: 120 MMHG | WEIGHT: 157 LBS | TEMPERATURE: 97.6 F | DIASTOLIC BLOOD PRESSURE: 60 MMHG

## 2023-04-27 DIAGNOSIS — R23.8 BLISTERS OF MULTIPLE SITES: ICD-10-CM

## 2023-04-27 DIAGNOSIS — S90.822A BLISTER OF LEFT FOOT, INITIAL ENCOUNTER: ICD-10-CM

## 2023-04-27 DIAGNOSIS — S90.821A BLISTER OF RIGHT FOOT, INITIAL ENCOUNTER: ICD-10-CM

## 2023-04-27 DIAGNOSIS — F10.29 ALCOHOL DEPENDENCE WITH UNSPECIFIED ALCOHOL-INDUCED DISORDER: Primary | ICD-10-CM

## 2023-04-27 DIAGNOSIS — R26.9 ABNORMAL GAIT: ICD-10-CM

## 2023-04-27 DIAGNOSIS — I61.0 NONTRAUMATIC SUBCORTICAL HEMORRHAGE OF CEREBRAL HEMISPHERE, UNSPECIFIED LATERALITY: ICD-10-CM

## 2023-04-27 PROCEDURE — 97110 THERAPEUTIC EXERCISES: CPT

## 2023-04-27 RX ORDER — NALTREXONE HYDROCHLORIDE 50 MG/1
50 TABLET, FILM COATED ORAL DAILY
Qty: 30 TABLET | Refills: 1 | Status: SHIPPED | OUTPATIENT
Start: 2023-04-27 | End: 2023-05-27

## 2023-04-27 NOTE — PROGRESS NOTES
Physical Therapy  Daily Treatment Note  Date: 2023  Patient Name: Davina Elizalde  MRN: [id number]     :   1980    Subjective:   PT Visit Information  PT Insurance Information: [employer]   Policy#: [id number]    Ref#: [id number]  Total # of Visits Approved: 10  Total # of Visits to Date: 9  Plan of Care/Certification Expiration Date: 23  Progress Note Due Date: 23  Referring Provider (secondary): Eddy Bowman MD  Subjective: Patient denies pain or issues since previous session    Pain Screening  Patient Currently in Pain: Denies       Treatment Activities:   Exercises  Exercise 1: Treadmill, 3-5 min, slight elevation, concentrate on step symmetry x 5 mins 1.0 incline, 1.4 mph  Exercise 2: Supine: lower trunk rotation, 10 reps  Exercise 3: Supine: bridges BLE x 15, single leg bridges, 10 rep EACH  Exercise 4: Mat: Prone to all fours's 15 reps  Exercise 5: Mat: tall knee to 1/2 knee, 10 reps ea  Exercise 6: //, SLS, 30 sec, EO, EC; Airex: 30 sec EO, EC         balance perterbations on green foam pads EO x 30 sec, EC x 1 min,     narrow HIRA on 1 green foam pad x 1 min  Exercise 7: //, semi tandem stance 30 sec X 2; tandem stance, 30 sec x 2        toe tapping 6 inch step x 15 each, toe tapping orange cone x 15  Exercise 8: //, gait backward, side to side, 5 reps; in [place] today x 3 reps  Exercise 9: Step up, 6\" step, 3 way, 15 reps  Exercise 10: Standing, full squat with floor touch to overhead reach, heel raise, hip abd, hip ext. 15 reps  Exercise 11: Balance [place] [address], 4 square, 5 reps WITH BALL TOSS                  cone weaves with ball toss x 3  Exercise 12: Balance [place] [address], Leotha Heys, X 3  Exercise 13: : HEP ISSUED     Assessment:   Conditions Requiring Skilled Therapeutic Intervention  Body Structures, Functions, Activity Limitations Requiring Skilled Therapeutic Intervention: Decreased functional mobility ; Decreased ADL status; Decreased ROM; Decreased tolerance to work

## 2023-04-27 NOTE — PATIENT INSTRUCTIONS
Begin natrexone 50 mg daily.     Wash feet with dial antibacterial soap, apply mupercin ointment at bedtime.     We will refer to podiatry and wound care.

## 2023-04-27 NOTE — PROGRESS NOTES
NAM Renteria  [facility]   Family Medicine  [address]  [place] [address]  Phone: [phone]  Fax: [phone]                  History:  James Taylor is a 42 y.o. male who presents today as an established patient here for evaluation of the above complaint and management of chronic conditions. An adult female accompanies him today. Last seen 03/22/2023 for hospital follow-up.    4/27/2023:  Since his last visit, he continues attending physical therapy and states left hand and arm weakness is improving. He also notes the feeling returning to his left hand. Mr. Taylor also feels his face is improving as is the drooling he experiences upon lying on the left side. Patient confirms being able to chew on the left side and denies dysphagia.     Regarding alcohol consumption, he reports drinking three 24-ounce beers per day.    Mr. Taylor complains of blisters on the bilateral feet that have been present for at least 1 month. The patient states the blisters appeared about 2 weeks following discharge from the hospital and after beginning physical therapy. He endorses pain while ambulating in socks without shoes and while barefoot. Pertinent negatives include body aches and fever. The adult female states he has been wearing slides more than his tennis shoes.    Currently taking Norvasc 10 mg at bedtime, metoprolol 50 mg twice per day, lisinopril 10 mg daily, sertraline 50 mg daily, Hytrin 5 mg at bedtime, and hydralazine 25 mg three times per day.      3/22/2023  1.(Primary Dx) Nontraumatic subcortical hemorrhage of cerebral hemisphere, unspecified laterality (HCC) RIGHT BG HEMORRHAGIC CVA;  2.  ETOH WITHDRAWAL POST CVA    Admitted: 3/04/2023  [facility] and [facility]:   TCM call successfully made on: 3/17/2023  by Talisha Duran? Not able to be counted as TCM call.     Pt reports scheduled follow up with Neurologist  March 28th, Dr. Sandoval and will have repeat CT scan.     Pt states that is doing well. He is able to ambulate normally. Reports that he is able to do all task of daily living but struggling with strength in his left hand. Pt reports decreased strength and . Pt reports that he will start physical therapy next week. Pt reports that he is drinking one beer per day.       ROS:  Review of Systems   Constitutional: Negative for fatigue, fever and unexpected weight change.   HENT: Negative for congestion, ear pain, rhinorrhea, sinus pressure, sinus pain and voice change.    Eyes: Negative for visual disturbance.   Respiratory: Negative for shortness of breath and wheezing.    Cardiovascular: Negative for chest pain and palpitations.   Gastrointestinal: Negative for abdominal pain, nausea and vomiting.   Genitourinary: Negative for dysuria and flank pain.   Musculoskeletal: Negative for back pain, myalgias and neck pain.   Skin: Positive for wound. Negative for color change and rash.   Neurological: Positive for weakness (left hand. ). Negative for dizziness, numbness and headaches.   Psychiatric/Behavioral: Negative for behavioral problems, dysphoric mood, self-injury and sleep disturbance.       Mr. Taylor  reports that he has been smoking cigarettes. He started smoking about 11 years ago. He has a 15.00 pack-year smoking history. He has been exposed to tobacco smoke. He has never used smokeless tobacco. He reports current alcohol use of about 4.0 standard drinks per week. He reports that he does not use drugs.      Current Outpatient Medications:   •  amLODIPine (NORVASC) 10 MG tablet, Take 1 tablet by mouth Daily., Disp: , Rfl:   •  hydrALAZINE (APRESOLINE) 25 MG tablet, Take 1 tablet by mouth 3 (Three) Times a Day., Disp: , Rfl:   •  lisinopril (PRINIVIL,ZESTRIL) 10 MG tablet, Take 1 tablet by mouth Every Night., Disp: , Rfl:   •  metoprolol tartrate (LOPRESSOR) 50 MG tablet, Take 1 tablet by mouth Every 12 (Twelve)  "Hours., Disp: , Rfl:   •  sertraline (Zoloft) 50 MG tablet, Take 1 tablet by mouth Daily. To help with mood. Take with food, Disp: 90 tablet, Rfl: 0  •  terazosin (HYTRIN) 5 MG capsule, Take 1 capsule by mouth Every Night., Disp: , Rfl:   •  vitamin D (ERGOCALCIFEROL) 1.25 MG (32480 UT) capsule capsule, Take 1 capsule by mouth 1 (One) Time Per Week. To treat low vitamin D. Take with food., Disp: 5 capsule, Rfl: 11  •  mupirocin (BACTROBAN) 2 % ointment, Apply 1 application topically to the appropriate area as directed 2 (Two) Times a Day for 30 days., Disp: 60 g, Rfl: 0  •  naltrexone (DEPADE) 50 MG tablet, Take 1 tablet by mouth Daily for 30 days., Disp: 30 tablet, Rfl: 1      OBJECTIVE:  /60 (BP Location: Right arm, Patient Position: Sitting, Cuff Size: Adult)   Pulse 66   Temp 97.6 °F (36.4 °C) (Infrared)   Resp 18   Ht 177.8 cm (70\")   Wt 71.2 kg (157 lb)   SpO2 99%   BMI 22.53 kg/m²    Physical Exam  Vitals and nursing note reviewed.   Constitutional:       Appearance: Normal appearance. He is well-developed.   HENT:      Head: Normocephalic and atraumatic.      Right Ear: Tympanic membrane, ear canal and external ear normal.      Left Ear: Tympanic membrane, ear canal and external ear normal.      Nose: Nose normal. No septal deviation, nasal tenderness or congestion.      Mouth/Throat:      Lips: Pink. No lesions.      Mouth: Mucous membranes are moist. No oral lesions.      Dentition: Normal dentition.      Pharynx: Oropharynx is clear. No pharyngeal swelling, oropharyngeal exudate or posterior oropharyngeal erythema.   Eyes:      General: Lids are normal. Vision grossly intact. No scleral icterus.        Right eye: No discharge.         Left eye: No discharge.      Extraocular Movements: Extraocular movements intact.      Conjunctiva/sclera: Conjunctivae normal.      Right eye: Right conjunctiva is not injected.      Left eye: Left conjunctiva is not injected.      Pupils: Pupils are equal, " round, and reactive to light.   Neck:      Thyroid: No thyroid mass.      Trachea: Trachea normal.   Cardiovascular:      Rate and Rhythm: Normal rate and regular rhythm.      Heart sounds: Normal heart sounds. No murmur heard.    No gallop.   Pulmonary:      Effort: Pulmonary effort is normal.      Breath sounds: Normal breath sounds and air entry. No wheezing, rhonchi or rales.   Musculoskeletal:         General: No tenderness or deformity. Normal range of motion.      Left hand: Decreased strength.      Cervical back: Full passive range of motion without pain, normal range of motion and neck supple.      Thoracic back: Normal.      Right lower leg: No edema.      Left lower leg: No edema.   Skin:     General: Skin is warm and dry.      Coloration: Skin is not jaundiced.      Findings: Wound present. No rash.      Comments: Blisters on the bilateral feet. No evidence of infection.    Neurological:      Mental Status: He is alert and oriented to person, place, and time.      Sensory: Sensation is intact.      Motor: Motor function is intact.      Coordination: Coordination is intact.      Gait: Gait is intact.      Deep Tendon Reflexes: Reflexes are normal and symmetric.   Psychiatric:         Mood and Affect: Mood and affect normal.         Behavior: Behavior normal.         Judgment: Judgment normal.       Comprehensive Metabolic Panel (04/11/2023 14:33)    Procedures    Assessment/Plan    Diagnoses and all orders for this visit:    1. Alcohol dependence with unspecified alcohol-induced disorder (Primary)  -     naltrexone (DEPADE) 50 MG tablet; Take 1 tablet by mouth Daily for 30 days.  Dispense: 30 tablet; Refill: 1    2. Nontraumatic subcortical hemorrhage of cerebral hemisphere, unspecified laterality    3. Blisters of multiple sites  -     mupirocin (BACTROBAN) 2 % ointment; Apply 1 application topically to the appropriate area as directed 2 (Two) Times a Day for 30 days.  Dispense: 60 g; Refill: 0  -      Ambulatory Referral to Podiatry  -     Ambulatory Referral to Wound Clinic    4. Blister of left foot, initial encounter  -     Ambulatory Referral to Podiatry  -     Ambulatory Referral to Wound Clinic    5. Blister of right foot, initial encounter  -     Ambulatory Referral to Podiatry  -     Ambulatory Referral to Wound Clinic    6. Abnormal gait  -     Ambulatory Referral to Podiatry  -     Ambulatory Referral to Wound Clinic       Alcohol dependence  - Mr. Taylor reports he currently drinks 72 ounces of beer per night and would like help to quit. He will begin naltrexone 50 mg daily and follow-up in 1 month for reevaluation. Otherwise, he will contact the office if he finds cravings are not reduced or if he experiences any adverse effects with its use.     Blisters of the bilateral feet  - For blisters, he will wash his feet with Dial antibacterial pump soap, apply mupirocin ointment as prescribed, and allow the feet to air dry over night without socks. Counseled on the importance of keeping the wounds clean and dry as much as possible, advised to avoid bar soap due to risk of left over bacteria, advised to avoid steroid creams due to risk of skin thinning, and advised to cover the feet when there is a chance of getting them dirty. We also discussed referral to Wound Care and Podiatry to further evaluate as shoe inserts may be beneficial for him as might wider fitting shoes. He is amenable to proceed with these referrals. Patient wears size 11.5 shoes. Permission for pictures of the wounds to track progress verbally obtained.    Abnormal gait  - Ambulatory referrals for Wound Care and Podiatry have been placed. Patient pending scheduling.   An After Visit Summary was printed and given to the patient at discharge.  Return in about 4 weeks (around 5/25/2023). Sooner if problems arise.         Patient Instructions   Begin natrexone 50 mg daily.     Wash feet with dial antibacterial soap, apply mupercin ointment at  bedtime.     We will refer to podiatry and wound care.       Discussion:     I spent 30 minutes caring for Chana on this date of service. This time includes time spent by me in the following activities: preparing for the visit, reviewing tests, obtaining and/or reviewing a separately obtained history, performing a medically appropriate examination and/or evaluation, counseling and educating the patient/family/caregiver, ordering medications, tests, or procedures, documenting information in the medical record and independently interpreting results and communicating that information with the patient/family/caregiver     Casi BROWNING 4/29/2023     Transcribed from ambient dictation for NAM Renteria by Goldie Felder.  04/27/23   17:47 CDT    Patient or patient representative verbalized consent to the visit recording.

## 2023-04-28 ENCOUNTER — HOSPITAL ENCOUNTER (OUTPATIENT)
Dept: CT IMAGING | Facility: HOSPITAL | Age: 43
Discharge: HOME | End: 2023-04-28
Payer: MEDICAID

## 2023-04-28 DIAGNOSIS — I61.0 NONTRAUMATIC SUBCORTICAL HEMORRHAGE OF CEREBRAL HEMISPHERE, UNSPECIFIED LATERALITY: ICD-10-CM

## 2023-04-28 PROCEDURE — 70450 CT HEAD/BRAIN W/O DYE: CPT

## 2023-05-01 ENCOUNTER — HOSPITAL ENCOUNTER (OUTPATIENT)
Dept: OCCUPATIONAL THERAPY | Age: 43
Setting detail: THERAPIES SERIES
Discharge: HOME | End: 2023-05-01
Payer: MEDICAID

## 2023-05-01 ENCOUNTER — HOSPITAL ENCOUNTER (OUTPATIENT)
Dept: PHYSICAL THERAPY | Age: 43
Setting detail: THERAPIES SERIES
Discharge: HOME | End: 2023-05-01
Payer: MEDICAID

## 2023-05-01 ENCOUNTER — OFFICE VISIT (OUTPATIENT)
Dept: WOUND CARE | Facility: HOSPITAL | Age: 43
End: 2023-05-01
Payer: MEDICAID

## 2023-05-01 PROCEDURE — 97112 NEUROMUSCULAR REEDUCATION: CPT

## 2023-05-01 PROCEDURE — 97110 THERAPEUTIC EXERCISES: CPT

## 2023-05-01 PROCEDURE — 97530 THERAPEUTIC ACTIVITIES: CPT

## 2023-05-01 PROCEDURE — G0463 HOSPITAL OUTPT CLINIC VISIT: HCPCS

## 2023-05-01 NOTE — PROGRESS NOTES
Single Leg Stance for 0 to 10 sec. STG 4 Current Status[de-identified] :  16 sec  STG Goal 4 Status[de-identified] Met  Short Term Goal 5: Patient will increase functonal LE strength as demonstrated by improvement of 5 X sit to stand from 15 sec to 12 sec. STG 5 Current Status[de-identified] 5 Times Sit to Stand: 15 sec, in progress  :  17sec  STG Goal 5 Status[de-identified] Not Met  Long Term Goals  Time Frame for Long Term Goals : 6-8  Long Term Goal 1: Patient will demonstrate improved functional balance as demonstrated by improvement of Starr Balance scale fro 50/56 to 54/56. LTG 1 Current Status[de-identified] Starr/56  MET   : 56/56  LTG Goal 1 Status[de-identified] Met  Long Term Goal 2: Patient will improve dynamic standing balance as demonstrated by improvement of functional reach from 6.5 to 10 inches. LTG 2 Current Status[de-identified] FR: 11 inches: MET   :  11.5\"  LTG Goal 2 Status[de-identified] Met  Long Term Goal 3: Patient will improve community ambulation ability as demonstrated by improvement of 6 Minue Walk Test from 987' to 1300' with stable VS.  LTG 3 Current Status[de-identified] 6 MWT: 1162', in progress        :  1,122'  LTG Goal 3 Status[de-identified] Not Met  Long Term Goal 4: Patient will improve functional balance as demonstrated by improvement of R Single Leg Stance for 0 to 30 sec. LTG 4 Current Status[de-identified] R SLS 10 sec   :  16sec  LTG Goal 4 Status[de-identified] Not Met  Long Term Goal 5: Patient will increase functonal LE strength as demonstrated by improvement of 5 X sit to stand from 15 sec to <12 sec. LTG 5 Current Status[de-identified] :  17sec  LTG Goal 5 Status[de-identified] Not Met  Patient Goals   Patient Goals :  To walk better    Plan:    Physcial Therapy Plan  Additional Comments: D/C from skilled therapy      Therapy Time:   Individual Concurrent Group Co-treatment   Time In 1003         Time Out [phone] Tylor Perrin, PTA   Electronically signed by Julia Duke PTA on 2023 at 2:55 PM

## 2023-05-01 NOTE — PROGRESS NOTES
Occupational Therapy  Daily Treatment Note  Date: 2023  Patient Name: Crystal King  :  1980  MRN: [id number]       Subjective   General  Patient assessed for rehabilitation services?: Yes  Diagnosis: I61.0 Nontraumatic subcortical hemorrhage of cerebral hemisphere  OT Visit Information  Onset Date: 23  OT Insurance Information: [employer]  Total # of Visits Approved: 10  Total # of Visits to Date: 9  Certification Period Expiration Date: 23  Progress Note Due Date: 23  Progress Note Counter: Approved for 2 additional visits from 23-23. Treatment Activities:       Exercises  Exercise CPTs: Therapeutic exercise (CPT 99588)  OT Exercise 1: Blue theraputty  strengthening sheet 1-4 exercises  OT Exercise 2: LUE wrist flexion/extension with 3# dumbbell 1 set x10 reps  OT Exercise 3: LUE supination/pronation with 3# dumbbell 1 set x10 reps  OT Exercise 4: LUE ulnar/radial deviation with 3# dumbbell 1 set x10 reps  OT Exercise 5: Digigrip (green) x1 minute in LUE  OT Exercise 6: Clothespins activity (red, green, blue and black). OT Exercise 7: velcro activity (#1 pronation/supination 1x side to side), (wrist flex/ext 2x's up and back). OT Exercise 8: Picking up marbles & beans activity  OT Exercise 9: Nuts and bolts activity  OT Exercise 10: Small colored peg board  OT Exercise 11: Omnicycle BUE x5 minutes at 3 bars (could up go to 4th bar next visit)  OT Exercise 12: Finger extension with purple rubber band x 1 minute. OT Exercise 13: Picking up bingo chips, quarters, dimes, pennies, nickels, paper clips with LUE on tabletop without towel (stored in small bingo container in cabinet)         Assessment   Performance deficits / Impairments: Decreased endurance;Decreased coordination;Decreased ADL status; Decreased strength;Decreased high-level IADLs;Decreased fine motor control  Assessment: Patient participated in OT services with no s/s of adverse reactions.  Patient

## 2023-05-02 ENCOUNTER — TELEPHONE (OUTPATIENT)
Dept: FAMILY MEDICINE CLINIC | Facility: CLINIC | Age: 43
End: 2023-05-02
Payer: MEDICAID

## 2023-05-02 NOTE — TELEPHONE ENCOUNTER
Caller: James Taylor    Relationship: Self    Best call back number: [phone]    What is the best time to reach you: ANYTIME    Who are you requesting to speak with (clinical staff, provider,  specific staff member): CLINICAL    What was the call regarding: PATIENT WENT AND SAW THE WOUND CARE DOCTOR YESTERDAY. PATIENT WAS TOLD THAT ONE OF THE BLOOD PRESSURE MEDICATIONS HE IS ON MAY BE CAUSING THE BLISTERS ON HIS FEET. PLEASE CALL BACK TO DISCUSS.     Do you require a callback: YES

## 2023-05-05 ENCOUNTER — APPOINTMENT (OUTPATIENT)
Dept: PHYSICAL THERAPY | Age: 43
End: 2023-05-05
Payer: MEDICAID

## 2023-05-05 ENCOUNTER — HOSPITAL ENCOUNTER (OUTPATIENT)
Dept: OCCUPATIONAL THERAPY | Age: 43
Setting detail: THERAPIES SERIES
Discharge: HOME | End: 2023-05-05
Payer: MEDICAID

## 2023-05-05 PROCEDURE — 97530 THERAPEUTIC ACTIVITIES: CPT

## 2023-05-05 NOTE — PROGRESS NOTES
Occupational Therapy Discharge  Date: 2023  Patient Name: Elaina Bunch  :  1980  MRN: [id number]       Subjective   General  Chart Reviewed: Yes  Patient assessed for rehabilitation services?: Yes  Self reported health status[de-identified] Good  History obtained from[de-identified] Patient, Chart Review  Family/Caregiver Present: No  Diagnosis: I61.0 Nontraumatic subcortical hemorrhage of cerebral hemisphere  Referring Provider (secondary): Zofia Weldon MD  OT Visit Information  Onset Date: 23  OT Insurance Information: [employer]  Total # of Visits Approved: 10  Total # of Visits to Date: 10  Certification Period Expiration Date: 23  Progress Note Due Date: 23  Subjective  Dominant Hand: : Right       Outcome Measure(s) Completed:   QuickDASH  Open a tight or new jar:  Moderate Difficulty  Do heavy household chores (e.g., wash walls, floors): Mild Difficulty  Marcia a shopping bag or briefcase: Mild Difficulty  Wash your back: Mild Difficulty  Use a knife to cut food: Mild Difficulty  Recreational activities in which you take some force or impact through your arm, shoulder, or hand (e.g., golf, hammering, tennis, etc.): Mild Difficulty  During the past week, to what extent has your arm, shoulder or hand problem interfered with your normal social activities with family, friends, neighbors or groups?: Moderately  During the past week, were you limited in your work or other regular daily activities as a result of your arm, shoulder or hand problem?: Moderately Limited  Arm, shoulder or hand pain: Mild  Tingling (pins and needles) in your arm, shoulder or hand: None  During the past week, how much difficulty have you had sleeping because of the pain in your arm, shoulder or hand?: No Difficulty  QuickDASH Total Score: 23  QuickDASH Disability/Symptom Score : 27.27 %  QUICKDASH Disability Index: 20-39%  QUICKDASH CMS Modifier: CJ     Total Disability/Symptom Score: 27.27 % / 100 (Date: 2023)    Interpretation of - Continue amlodipine 2.5 mg QHS

## 2023-05-08 ENCOUNTER — APPOINTMENT (OUTPATIENT)
Dept: PHYSICAL THERAPY | Age: 43
End: 2023-05-08
Payer: MEDICAID

## 2023-05-08 ENCOUNTER — OFFICE VISIT (OUTPATIENT)
Dept: WOUND CARE | Facility: HOSPITAL | Age: 43
End: 2023-05-08
Payer: MEDICAID

## 2023-05-08 ENCOUNTER — APPOINTMENT (OUTPATIENT)
Dept: OCCUPATIONAL THERAPY | Age: 43
End: 2023-05-08
Payer: MEDICAID

## 2023-05-08 PROCEDURE — G0463 HOSPITAL OUTPT CLINIC VISIT: HCPCS

## 2023-05-11 ENCOUNTER — OFFICE VISIT (OUTPATIENT)
Dept: NEUROSURGERY | Facility: CLINIC | Age: 43
End: 2023-05-11
Payer: MEDICAID

## 2023-05-11 VITALS — BODY MASS INDEX: 21.62 KG/M2 | HEIGHT: 70 IN | WEIGHT: 151 LBS

## 2023-05-11 DIAGNOSIS — I61.0 NONTRAUMATIC SUBCORTICAL HEMORRHAGE OF CEREBRAL HEMISPHERE, UNSPECIFIED LATERALITY: Primary | ICD-10-CM

## 2023-05-11 DIAGNOSIS — F17.210 CIGARETTE SMOKER: ICD-10-CM

## 2023-05-11 PROCEDURE — 1159F MED LIST DOCD IN RCRD: CPT | Performed by: NURSE PRACTITIONER

## 2023-05-11 PROCEDURE — 99213 OFFICE O/P EST LOW 20 MIN: CPT | Performed by: NURSE PRACTITIONER

## 2023-05-11 PROCEDURE — 1160F RVW MEDS BY RX/DR IN RCRD: CPT | Performed by: NURSE PRACTITIONER

## 2023-05-11 RX ORDER — FERROUS SULFATE 325(65) MG
TABLET ORAL
COMMUNITY
Start: 2023-05-05

## 2023-05-11 NOTE — PROGRESS NOTES
"    Chief complaint:   Chief Complaint   Patient presents with   • Nontraumatic subcortical hemorrhage of cerebral hemisphere,     Pt here for wk f/u. Pt states since his last visit he has been doing well.        Subjective     HPI: This is a 42-year-old male gentleman who presented to the hospital with left-sided weakness and difficulty speaking on March 4 2023.  The patient was found to have a right basal ganglia hemorrhagic CVA along with significant hypertension.  The patient was able to make improvements with his weakness.  His blood pressure was better managed in the hospital with a goal of keeping it less than 150 systolic.  His blood pressure today is under good control.  The patient was able to go to [facility] rehab for further treatment.  When the patient was discharged on March 10 he was 4 out of 5 in all muscle groups on the left with ataxic movements.    At his last office appointment the patient was doing well.  He did still have weakness in his left  and still had an ataxic gait.  Patient says that since his last office appointment he has finished up his outpatient therapy and does feel like he is doing even better than the last time he was here.  His walking is improved.  His thinking and movements are improved as well.  Denies any headaches or nausea vomiting.  Denies any vision changes.    Review of Systems   Eyes: Negative for visual disturbance.   Musculoskeletal: Negative.  Negative for gait problem.   Neurological: Positive for weakness. Negative for dizziness, seizures, numbness and headaches.   Psychiatric/Behavioral: Negative.          Objective      Vital Signs  Ht 177.8 cm (70\")   Wt 68.5 kg (151 lb)   BMI 21.67 kg/m²     Physical Exam  Constitutional:       Appearance: He is well-developed.   HENT:      Head: Normocephalic.   Eyes:      General: No visual field deficit.     Extraocular Movements: EOM normal.      Pupils: Pupils are equal, round, and reactive to light.   Pulmonary:      " Effort: Pulmonary effort is normal.   Musculoskeletal:         General: Normal range of motion.      Cervical back: Normal range of motion.   Skin:     General: Skin is warm.   Neurological:      Mental Status: He is alert and oriented to person, place, and time.      GCS: GCS eye subscore is 4. GCS verbal subscore is 5. GCS motor subscore is 6.      Cranial Nerves: Cranial nerve deficit and facial asymmetry present.      Sensory: No sensory deficit.      Motor: Weakness present.      Gait: Gait normal.      Deep Tendon Reflexes: Reflexes are normal and symmetric.   Psychiatric:         Speech: Speech normal.         Behavior: Behavior normal.         Thought Content: Thought content normal.         Neurologic Exam     Mental Status   Oriented to person, place, and time.   Attention: normal. Concentration: normal.   Speech: speech is normal   Level of consciousness: alert  Normal comprehension.     Cranial Nerves     CN II   Visual fields full to confrontation.     CN III, IV, VI   Pupils are equal, round, and reactive to light.  Extraocular motions are normal.     CN V   Facial sensation intact.     CN VII   Facial expression full, symmetric.   Left facial weakness: central    CN VIII   CN VIII normal.     CN IX, X   CN IX normal.   CN X normal.     CN XI   CN XI normal.     CN XII   CN XII normal.   Tongue deviation: left    Motor Exam   Muscle bulk: normal  Right arm pronator drift: absent  Left arm pronator drift: present    Strength   Strength 5/5 except as noted.   Left  4 out of 5.  All other muscle groups are 5 out of 5.     Sensory Exam   Light touch normal.     Gait, Coordination, and Reflexes     Reflexes   Reflexes 2+ except as noted.       Imaging review: CT scan of the head that was on April 28, 2023 shows decreasing intraparenchymal hematoma as well as decreasing vasogenic edema.  The right to left midline shift is also improving as well.    April 28, 2023      March 28, 2023       March 10,  2023      Assessment/Plan: Patient is doing well from a hemorrhagic stroke recovery.  His scan is improving.  I did review it with Dr. Gannon.  We will see him back in 2 months with a repeat CT scan of the head.  He was told that he could start to engage in driving at this time.  He was told to continue work with his PCP in regards to his blood pressure. his questions and concerns were addressed.    Patient is a smoker. Smoking cessation classes given to the patient  The patient's Body mass index is 21.67 kg/m².. BMI is within normal parameters. No follow-up required.    Diagnoses and all orders for this visit:    1. Nontraumatic subcortical hemorrhage of cerebral hemisphere, unspecified laterality (Primary)  -     CT Head Without Contrast; Future    2. Cigarette smoker    3. Body mass index (BMI) of 21.0 to 21.9 in adult        I discussed the patients findings and my recommendations with patient  Feliciano Boothe, APRN  05/11/23  11:46 CDT

## 2023-05-12 ENCOUNTER — APPOINTMENT (OUTPATIENT)
Dept: OCCUPATIONAL THERAPY | Age: 43
End: 2023-05-12
Payer: MEDICAID

## 2023-05-12 ENCOUNTER — APPOINTMENT (OUTPATIENT)
Dept: PHYSICAL THERAPY | Age: 43
End: 2023-05-12
Payer: MEDICAID

## 2023-05-15 ENCOUNTER — APPOINTMENT (OUTPATIENT)
Dept: PHYSICAL THERAPY | Age: 43
End: 2023-05-15
Payer: MEDICAID

## 2023-05-15 ENCOUNTER — APPOINTMENT (OUTPATIENT)
Dept: OCCUPATIONAL THERAPY | Age: 43
End: 2023-05-15
Payer: MEDICAID

## 2023-05-16 ENCOUNTER — OFFICE VISIT (OUTPATIENT)
Dept: WOUND CARE | Facility: HOSPITAL | Age: 43
End: 2023-05-16
Payer: MEDICAID

## 2023-05-16 PROCEDURE — G0463 HOSPITAL OUTPT CLINIC VISIT: HCPCS

## 2023-05-19 ENCOUNTER — APPOINTMENT (OUTPATIENT)
Dept: OCCUPATIONAL THERAPY | Age: 43
End: 2023-05-19
Payer: MEDICAID

## 2023-05-19 ENCOUNTER — APPOINTMENT (OUTPATIENT)
Dept: PHYSICAL THERAPY | Age: 43
End: 2023-05-19
Payer: MEDICAID

## 2023-05-22 ENCOUNTER — APPOINTMENT (OUTPATIENT)
Dept: OCCUPATIONAL THERAPY | Age: 43
End: 2023-05-22
Payer: MEDICAID

## 2023-05-22 ENCOUNTER — APPOINTMENT (OUTPATIENT)
Dept: PHYSICAL THERAPY | Age: 43
End: 2023-05-22
Payer: MEDICAID

## 2023-05-23 ENCOUNTER — OFFICE VISIT (OUTPATIENT)
Dept: WOUND CARE | Facility: HOSPITAL | Age: 43
End: 2023-05-23
Payer: MEDICAID

## 2023-05-25 ENCOUNTER — APPOINTMENT (OUTPATIENT)
Dept: PHYSICAL THERAPY | Age: 43
End: 2023-05-25
Payer: MEDICAID

## 2023-05-25 ENCOUNTER — APPOINTMENT (OUTPATIENT)
Dept: OCCUPATIONAL THERAPY | Age: 43
End: 2023-05-25
Payer: MEDICAID

## 2023-05-30 ENCOUNTER — OFFICE VISIT (OUTPATIENT)
Dept: WOUND CARE | Facility: HOSPITAL | Age: 43
End: 2023-05-30

## 2023-05-30 ENCOUNTER — APPOINTMENT (OUTPATIENT)
Dept: OCCUPATIONAL THERAPY | Age: 43
End: 2023-05-30
Payer: MEDICAID

## 2023-05-30 ENCOUNTER — APPOINTMENT (OUTPATIENT)
Dept: PHYSICAL THERAPY | Age: 43
End: 2023-05-30
Payer: MEDICAID

## 2023-05-30 PROCEDURE — G0463 HOSPITAL OUTPT CLINIC VISIT: HCPCS

## 2023-06-05 ENCOUNTER — OFFICE VISIT (OUTPATIENT)
Dept: FAMILY MEDICINE CLINIC | Facility: CLINIC | Age: 43
End: 2023-06-05
Payer: MEDICAID

## 2023-06-05 VITALS
DIASTOLIC BLOOD PRESSURE: 82 MMHG | WEIGHT: 149 LBS | HEIGHT: 70 IN | RESPIRATION RATE: 18 BRPM | BODY MASS INDEX: 21.33 KG/M2 | SYSTOLIC BLOOD PRESSURE: 112 MMHG

## 2023-06-05 DIAGNOSIS — I10 PRIMARY HYPERTENSION: Primary | ICD-10-CM

## 2023-06-05 DIAGNOSIS — F17.200 SMOKER: ICD-10-CM

## 2023-06-05 DIAGNOSIS — F10.29 ALCOHOL DEPENDENCE WITH UNSPECIFIED ALCOHOL-INDUCED DISORDER: ICD-10-CM

## 2023-06-05 RX ORDER — ADHESIVE BANDAGE 3/4"
BANDAGE TOPICAL
Qty: 1 EACH | Refills: 0 | Status: SHIPPED | OUTPATIENT
Start: 2023-06-05

## 2023-06-05 NOTE — PATIENT INSTRUCTIONS
Take blood pressure once/day, different times of the day, write down and bring the log back  Keep appt with the neurosurgeon  Continue to work on smoking cessation  Let us know if you would like a referral for counseling for alcohol cessation as well

## 2023-06-05 NOTE — PROGRESS NOTES
NAM Whitman  [facility]   Family Medicine  [address]  [place] [address]  Phone: [phone]  Fax: [phone]         Chief Complaint:  Chief Complaint   Patient presents with    Follow-up     Patient states that he is here for a FU visit.         History:  James Taylor is a 42 y.o. male that is an established patient. He  is here for evaluation of the above complaint and management of chronic conditions.    HPI     James Taylor is here in return from 3-. He is here with an adult female identified as his girlfriend. He has since had an increase in his left hand strength, no longer has PT. He has a normal gait and speech.  His follow up with neurosurgery (with CT) is July 11, 2023 He discontinued Lisinopril, due to the wound care provider suggesting it could help the blisters on his feet, which since resolved. He continues with Norvasc 10mg daily, Metoprolol 50mg tid, Hytrin at hs. He does not have a blood pressure cuff to check BP at home. BP today wnl at 112/82.      He tried the Naloxone 50mg daily for help with alcohol cravings, stated it didn't help at all. We discussed referral to counseling or help with finding 12 Step programs, and he declined that at this time.     As unchanged from 3-:  Mr. Taylor  reports that he has been smoking cigarettes. He started smoking about 11 years ago. He has a 15.00 pack-year smoking history. He has been exposed to tobacco smoke. He has never used smokeless tobacco. He reports current alcohol use of about 4.0 standard drinks per week. He reports that he does not use drugs.            ROS:  Review of Systems   HENT: Negative.     Eyes: Negative.    Respiratory: Negative.     Cardiovascular: Negative.    Gastrointestinal: Negative.    Endocrine: Negative.    Genitourinary: Negative.    Musculoskeletal: Negative.    Skin: Negative.    Allergic/Immunologic: Negative.    Neurological:  Positive for weakness.   Hematological:  "Negative.    Psychiatric/Behavioral: Negative.         reports that he has been smoking cigarettes. He started smoking about 11 years ago. He has a 15.00 pack-year smoking history. He has been exposed to tobacco smoke. He has never used smokeless tobacco. He reports current alcohol use of about 4.0 standard drinks per week. He reports that he does not use drugs.    Current Outpatient Medications   Medication Instructions    amLODIPine (NORVASC) 10 mg, Oral, Daily    Blood Pressure Monitoring (Blood Pressure Cuff) misc Take blood pressure once/day, different times of the day and write down    ferrous sulfate 325 (65 FE) MG tablet No dose, route, or frequency recorded.    hydrALAZINE (APRESOLINE) 25 mg, Oral, 3 Times Daily    lisinopril (PRINIVIL,ZESTRIL) 10 mg, Oral, Nightly    metoprolol tartrate (LOPRESSOR) 50 mg, Oral, Every 12 Hours Scheduled    sertraline (ZOLOFT) 50 mg, Oral, Daily, To help with mood. Take with food    terazosin (HYTRIN) 5 mg, Oral, Nightly    vitamin D (ERGOCALCIFEROL) 50,000 Units, Oral, Weekly, To treat low vitamin D. Take with food.       OBJECTIVE:  /82 (BP Location: Left arm, Patient Position: Sitting, Cuff Size: Adult)   Resp 18   Ht 177.8 cm (70\")   Wt 67.6 kg (149 lb)   BMI 21.38 kg/m²    Physical Exam  Constitutional:       Appearance: Normal appearance.   HENT:      Head: Normocephalic and atraumatic.      Right Ear: Tympanic membrane, ear canal and external ear normal.      Left Ear: Tympanic membrane, ear canal and external ear normal.      Nose: Nose normal.      Mouth/Throat:      Mouth: Mucous membranes are moist.      Pharynx: Oropharynx is clear.   Eyes:      Extraocular Movements: Extraocular movements intact.      Conjunctiva/sclera: Conjunctivae normal.      Pupils: Pupils are equal, round, and reactive to light.   Cardiovascular:      Rate and Rhythm: Normal rate and regular rhythm.      Pulses: Normal pulses.      Heart sounds: Normal heart sounds.   Pulmonary: "      Effort: Pulmonary effort is normal.      Breath sounds: Normal breath sounds.   Abdominal:      General: Bowel sounds are normal.      Palpations: Abdomen is soft.   Musculoskeletal:         General: Normal range of motion.      Left hand: Decreased strength.      Cervical back: Normal range of motion and neck supple.      Comments: Mild decreased strength, left hand   Skin:     General: Skin is warm and dry.      Capillary Refill: Capillary refill takes 2 to 3 seconds.   Neurological:      General: No focal deficit present.      Mental Status: He is alert and oriented to person, place, and time.   Psychiatric:         Mood and Affect: Mood normal.         Behavior: Behavior normal.         Thought Content: Thought content normal.       Procedures    Assessment/Plan:     Diagnoses and all orders for this visit:    1. Primary hypertension (Primary)  -     Blood Pressure Monitoring (Blood Pressure Cuff) misc; Take blood pressure once/day, different times of the day and write down  Dispense: 1 each; Refill: 0    2. Smoker    3. Alcohol dependence with unspecified alcohol-induced disorder      BMI is within normal parameters. No other follow-up for BMI required.    An After Visit Summary was printed and given to the patient at discharge.  Return in about 2 months (around 8/5/2023).       Patient Instructions   Take blood pressure once/day, different times of the day, write down and bring the log back  Keep appt with the neurosurgeon  Continue to work on smoking cessation  Let us know if you would like a referral for counseling for alcohol cessation as well        I spent 30 minutes caring for Nada on this date of service. This time includes time spent by me in the following activities: preparing for the visit, reviewing tests, obtaining and/or reviewing a separately obtained history, counseling and educating the patient/family/caregiver, ordering medications, tests, or procedures, referring and communicating with  other health care professionals, and documenting information in the medical record     Tosin BROWNING 6/5/2023   Electronically signed.

## 2023-06-06 ENCOUNTER — OFFICE VISIT (OUTPATIENT)
Dept: WOUND CARE | Facility: HOSPITAL | Age: 43
End: 2023-06-06
Payer: MEDICAID

## 2023-06-06 PROCEDURE — G0463 HOSPITAL OUTPT CLINIC VISIT: HCPCS

## 2023-06-12 DIAGNOSIS — R45.4 IRRITABLE: ICD-10-CM

## 2023-06-12 DIAGNOSIS — F43.29 STRESS AND ADJUSTMENT REACTION: ICD-10-CM

## 2023-06-12 NOTE — TELEPHONE ENCOUNTER
Rx Refill Note  Requested Prescriptions     Pending Prescriptions Disp Refills    sertraline (ZOLOFT) 50 MG tablet [Pharmacy Med Name: SERTRALINE HCL 50 MG TABLET] 90 tablet 0     Sig: TAKE 1 TABLET BY MOUTH DAILY. TO HELP WITH MOOD. TAKE WITH FOOD      Last office visit with prescribing clinician: Visit date not found   Last telemedicine visit with prescribing clinician: 3/31/2023   Next office visit with prescribing clinician: Visit date not found                         Would you like a call back once the refill request has been completed: [] Yes [] No    If the office needs to give you a call back, can they leave a voicemail: [] Yes [] No    Wendi Lara MA  06/12/23, 10:18 CDT

## 2023-06-20 ENCOUNTER — TELEPHONE (OUTPATIENT)
Dept: FAMILY MEDICINE CLINIC | Facility: CLINIC | Age: 43
End: 2023-06-20

## 2023-06-20 NOTE — TELEPHONE ENCOUNTER
Caller: Sidra Tompkins    Relationship: Emergency Contact    Best call back number: [phone]     What is the best time to reach you: ANY    Who are you requesting to speak with (clinical staff, provider,  specific staff member): CLINICAL    What was the call regarding:     PATIENT'S SIGNIFICANT OTHER WOULD LIKE TO CONFIRM DISABILITY PAPERWORK WAS RECEIVED.     Is it okay if the provider responds through MyChart: NO

## 2023-06-29 RX ORDER — AMLODIPINE BESYLATE 10 MG/1
TABLET ORAL
Qty: 30 TABLET | Refills: 3 | Status: SHIPPED | OUTPATIENT
Start: 2023-06-29

## 2023-07-13 ENCOUNTER — LAB REQUISITION (OUTPATIENT)
Dept: LAB | Facility: HOSPITAL | Age: 43
End: 2023-07-13
Payer: MEDICAID

## 2023-07-13 PROCEDURE — 87070 CULTURE OTHR SPECIMN AEROBIC: CPT | Performed by: PODIATRIST

## 2023-07-13 PROCEDURE — 87205 SMEAR GRAM STAIN: CPT | Performed by: PODIATRIST

## 2023-07-13 PROCEDURE — 87075 CULTR BACTERIA EXCEPT BLOOD: CPT | Performed by: PODIATRIST

## 2023-07-13 PROCEDURE — 87176 TISSUE HOMOGENIZATION CULTR: CPT | Performed by: PODIATRIST

## 2023-07-17 LAB
BACTERIA SPEC AEROBE CULT: NORMAL
GRAM STN SPEC: NORMAL

## 2023-07-18 LAB — BACTERIA SPEC ANAEROBE CULT: NORMAL

## 2023-07-21 ENCOUNTER — TELEPHONE (OUTPATIENT)
Dept: NEUROSURGERY | Facility: CLINIC | Age: 43
End: 2023-07-21
Payer: MEDICAID

## 2023-07-21 ENCOUNTER — HOSPITAL ENCOUNTER (OUTPATIENT)
Dept: PHYSICAL THERAPY | Age: 43
Setting detail: THERAPIES SERIES
End: 2023-07-21
Payer: MEDICAID

## 2023-07-21 NOTE — TELEPHONE ENCOUNTER
After review of the patient's chart it is noted that he will need physical therapy regarding his gait/balance and if the patient wants to do occupational for his hand then we can add an order for the OT as well.    I did call Renae who states the patient was very adamant that he did not want to do any physical therapy but did want OT for his hand.  She then stated that his insurance is not going to approve but 2 or 3 visits of any type of therapy b/c he has used his therapy visits up previously for the same diagnosis.      I will forward this to Feliciano for his review when he returns to the clinic next week and let Renae as well as the patient know what he advises.    RAJ ANDERSON [facility]  PHYSICIAN LEAD  DR EDWARD JOHNS  [facility] [place] NEUROSURGERY

## 2023-07-21 NOTE — TELEPHONE ENCOUNTER
PATIENT: BRET LEON    CALLER: JUDY KIM FROM [facility] CALLED ABOUT A REFERRAL SENT TO THEM FOR PHYSICAL THERAPY FOR BRET LEON. HOWEVER, SHE STATES THAT THE REFERRAL NEEDS TO BE CHANGED TO OCCUPATIONAL THERAPY DUE TO IT BEING FOR PATIENT'S HAND. SHE REQUESTS THAT THE REFERRAL BE UPDATED TO INCLUDE THAT DX - CAN KEEP DX, BUT NEEDS ADDITIONAL DX FOR HAND.    PLEASE FAX OVER UPDATED REFERRAL ASAP.    FAX # [phone]    CALL BACK # [phone]

## 2023-07-24 ENCOUNTER — LAB (OUTPATIENT)
Dept: LAB | Facility: HOSPITAL | Age: 43
End: 2023-07-24
Payer: MEDICAID

## 2023-07-24 DIAGNOSIS — R79.89 ELEVATED LIVER FUNCTION TESTS: ICD-10-CM

## 2023-07-24 DIAGNOSIS — R29.898 HAND WEAKNESS: ICD-10-CM

## 2023-07-24 DIAGNOSIS — F10.29 ALCOHOL DEPENDENCE WITH UNSPECIFIED ALCOHOL-INDUCED DISORDER: ICD-10-CM

## 2023-07-24 DIAGNOSIS — I61.0 NONTRAUMATIC SUBCORTICAL HEMORRHAGE OF CEREBRAL HEMISPHERE, UNSPECIFIED LATERALITY: Primary | ICD-10-CM

## 2023-07-24 LAB
ALBUMIN SERPL-MCNC: 4.3 G/DL (ref 3.5–5.2)
ALBUMIN/GLOB SERPL: 1.7 G/DL
ALP SERPL-CCNC: 97 U/L (ref 39–117)
ALT SERPL W P-5'-P-CCNC: 85 U/L (ref 1–41)
ANION GAP SERPL CALCULATED.3IONS-SCNC: 16.1 MMOL/L (ref 5–15)
AST SERPL-CCNC: 100 U/L (ref 1–40)
BILIRUB SERPL-MCNC: 0.6 MG/DL (ref 0–1.2)
BUN SERPL-MCNC: 19 MG/DL (ref 6–20)
BUN/CREAT SERPL: 14.1 (ref 7–25)
CALCIUM SPEC-SCNC: 9.3 MG/DL (ref 8.6–10.5)
CHLORIDE SERPL-SCNC: 101 MMOL/L (ref 98–107)
CO2 SERPL-SCNC: 21.9 MMOL/L (ref 22–29)
CREAT SERPL-MCNC: 1.35 MG/DL (ref 0.76–1.27)
DEPRECATED RDW RBC AUTO: 43.2 FL (ref 37–54)
EGFRCR SERPLBLD CKD-EPI 2021: 67.2 ML/MIN/1.73
ERYTHROCYTE [DISTWIDTH] IN BLOOD BY AUTOMATED COUNT: 13 % (ref 12.3–15.4)
GLOBULIN UR ELPH-MCNC: 2.6 GM/DL
GLUCOSE SERPL-MCNC: 88 MG/DL (ref 65–99)
HCT VFR BLD AUTO: 42.7 % (ref 37.5–51)
HGB BLD-MCNC: 14.5 G/DL (ref 13–17.7)
MCH RBC QN AUTO: 30.9 PG (ref 26.6–33)
MCHC RBC AUTO-ENTMCNC: 34 G/DL (ref 31.5–35.7)
MCV RBC AUTO: 90.9 FL (ref 79–97)
PLATELET # BLD AUTO: 142 10*3/MM3 (ref 140–450)
PMV BLD AUTO: 10.8 FL (ref 6–12)
POTASSIUM SERPL-SCNC: 4.2 MMOL/L (ref 3.5–5.2)
PROT SERPL-MCNC: 6.9 G/DL (ref 6–8.5)
RBC # BLD AUTO: 4.7 10*6/MM3 (ref 4.14–5.8)
SODIUM SERPL-SCNC: 139 MMOL/L (ref 136–145)
WBC NRBC COR # BLD: 6.54 10*3/MM3 (ref 3.4–10.8)

## 2023-07-24 PROCEDURE — 36415 COLL VENOUS BLD VENIPUNCTURE: CPT

## 2023-07-24 PROCEDURE — 80053 COMPREHEN METABOLIC PANEL: CPT

## 2023-07-24 PROCEDURE — 85027 COMPLETE CBC AUTOMATED: CPT

## 2023-07-24 NOTE — TELEPHONE ENCOUNTER
Per Feliciano: ok for patient to have OT & PT    I called [facility] to let them know but had to leave a VM.    RAJ ANDERSON [facility]  PHYSICIAN LEAD  DR EDWARD JOHNS  [facility] [place] [facility]UROSURGERY

## 2023-07-24 NOTE — TELEPHONE ENCOUNTER
Renae called back and she is aware we will be sending over the OT order later today and she will contact the patient.    .navin

## 2023-07-26 ENCOUNTER — OFFICE VISIT (OUTPATIENT)
Dept: WOUND CARE | Facility: HOSPITAL | Age: 43
End: 2023-07-26
Payer: MEDICAID

## 2023-07-26 PROCEDURE — G0463 HOSPITAL OUTPT CLINIC VISIT: HCPCS

## 2023-07-27 ENCOUNTER — HOSPITAL ENCOUNTER (OUTPATIENT)
Dept: OCCUPATIONAL THERAPY | Age: 43
Setting detail: THERAPIES SERIES
Discharge: HOME | End: 2023-07-27
Payer: MEDICAID

## 2023-07-27 PROCEDURE — 97110 THERAPEUTIC EXERCISES: CPT

## 2023-07-27 PROCEDURE — 97165 OT EVAL LOW COMPLEX 30 MIN: CPT

## 2023-07-27 ASSESSMENT — 9 HOLE PEG TEST
TESTTIME_SECONDS: 19.64
TEST_RESULT: IMPAIRED
TESTTIME_SECONDS: 27
TEST_RESULT: FUNCTIONAL

## 2023-07-27 NOTE — PROGRESS NOTES
(degrees)  LUE AROM : WFL RUE AROM (degrees)  RUE AROM : WNL       Left Strength  Right Strength      LUE Strength  Gross LUE Strength: Exceptions to [facility]  L Shoulder Flex: 3+/5  L Shoulder Ext: 4/5  L Shoulder ABduction: 3+/5  L Shoulder ADduction: 4+/5  L Shoulder Int Rotation: 4-/5  L Shoulder Ext Rotation: 3+/5  L Shoulder Horiz ABduction: 4/5  L Shoulder Horiz ADduction: 4+/5  L Elbow Flex: 3+/5  L Elbow Ext: 3+/5  L Forearm Pron: 5/5  L Forearm Sup: 4+/5  L Wrist Flex: 4+/5  L Wrist Ext: 4-/5    RUE Strength  Gross RUE Strength: WNL          Hand Dominance: Right   Left  Right     Strength  Handle Setting 2: 24.67  Trial 1: 28  Trial 2: 22  Trial 3: 24  Average: 24.67 Trial 1: 45  Trial 2: 51  Trial 3: 55  Average: 50.33   Pinch Strength (if applicable) Left Hand Strength - Lateral Pinch (lbs)  Trial 1: 16  Trial 2: 13  Trial 3: 15  Average: 14.67  Left Hand Strength - Matthews (lbs)  Trial 1: 11  Trial 2: 9  Trial 3: 10  Average: 10  Left Hand Strength - Tip (lbs)  Trial 1: 7  Trial 2: 9  Trial 3: 9  Average: 8.33 Right Hand Strength - Lateral Pinch (lbs)  Trial 1: 23  Trial 2: 26  Trial 3: 27  Average: 25.33  Right Hand Strength - Matthews (lbs)  Trial 1: 18  Trial 2: 17  Trial 3: 18  Average: 17.67  Right Hand Strength - Tip  (lbs)  Trial 1: 13  Trial 2: 13  Trial 3: 14  Average: 13.33       Fine Motor Skills:   Fine Motor Skills  Left 9-Hole Peg Test: Impaired  Left 9 Hole Peg Test Time (secs): 27  Right 9-Hole Peg Test: Functional  Right 9 Hole Peg Test Time (secs): 19.64     Outcome Measure(s) Completed:   QuickDASH  Open a tight or new jar: Moderate Difficulty  Do heavy household chores (e.g., wash walls, floors): Moderate Difficulty  Marcia a shopping bag or briefcase: Moderate Difficulty  Wash your back: Moderate Difficulty  Use a knife to cut food:  Moderate Difficulty  Recreational activities in which you take some force or impact through your arm, shoulder, or hand (e.g., golf, hammering, tennis,

## 2023-07-28 DIAGNOSIS — R79.89 ELEVATED LIVER FUNCTION TESTS: ICD-10-CM

## 2023-07-28 DIAGNOSIS — F10.29 ALCOHOL DEPENDENCE WITH UNSPECIFIED ALCOHOL-INDUCED DISORDER: Primary | ICD-10-CM

## 2023-07-28 RX ORDER — NALTREXONE HYDROCHLORIDE 50 MG/1
50 TABLET, FILM COATED ORAL DAILY
Qty: 30 TABLET | Refills: 2 | Status: SHIPPED | OUTPATIENT
Start: 2023-07-28

## 2023-07-28 NOTE — PROGRESS NOTES
Pt will be started on oral naltrexone 50mg daily to help with patients ETOH dependence. Uptodate was reviewed before starting medication due to hx of elevated LFT. Pt LFT are less than 3X the normal limit, thus indicating naltrexone therapy for treatment of condition.

## 2023-07-31 ENCOUNTER — HOSPITAL ENCOUNTER (OUTPATIENT)
Dept: OCCUPATIONAL THERAPY | Age: 43
Setting detail: THERAPIES SERIES
Discharge: HOME | End: 2023-07-31
Payer: MEDICAID

## 2023-08-02 ENCOUNTER — OFFICE VISIT (OUTPATIENT)
Dept: WOUND CARE | Facility: HOSPITAL | Age: 43
End: 2023-08-02
Payer: MEDICAID

## 2023-08-02 ENCOUNTER — APPOINTMENT (OUTPATIENT)
Dept: OCCUPATIONAL THERAPY | Age: 43
End: 2023-08-02
Payer: MEDICAID

## 2023-08-02 PROCEDURE — G0463 HOSPITAL OUTPT CLINIC VISIT: HCPCS

## 2023-08-04 ENCOUNTER — HOSPITAL ENCOUNTER (OUTPATIENT)
Dept: OCCUPATIONAL THERAPY | Age: 43
Setting detail: THERAPIES SERIES
Discharge: HOME | End: 2023-08-04
Payer: MEDICAID

## 2023-08-04 PROCEDURE — 97110 THERAPEUTIC EXERCISES: CPT

## 2023-08-04 NOTE — PROGRESS NOTES
Daily Treatment Note  Date: 2023  Patient Name: Francesca Hernández  :  1980  MRN: [id number]       Subjective   General  Chart Reviewed: Yes  Patient assessed for rehabilitation services?: Yes  Self reported health status[de-identified] Good  History obtained from[de-identified] Patient, Chart Review  Family/Caregiver Present: No  Diagnosis: I61.0 Nontraumatic subcortical hemorrhage of cerebral hemisphere; R29.898 Hand weakness  Referring Provider (secondary): ASHLEY Hedrick  OT Visit Information  Onset Date: 23  OT Insurance Information: [employer]  Total # of Visits Approved: 6  Total # of Visits to Date: 2  Certification Period Expiration Date: 24  Progress Note Due Date: 23  Progress Note Counter: 6 visits approved (9 already used) evaluation included in visits. 20 visits total allowed per year.   Subjective  Dominant Hand: : Right      Treatment Activities:    Exercises:     Treatment Reasoning    OT Exercise 1: Blue theraputty pinching with thumb and each finger individually length of putty  OT Exercise 2: LUE green digigrip x2 minutes with 1 minute rest break inbetween  OT Exercise 3: LUE blue rubberband digit extension x2 minutes with 1 minute rest break inbetween  OT Exercise 5: Omnicycle in gym x7 minutes intensity 5.4  OT Exercise 7: I's, T's, & Y's tband exercises (establish appropriate color) x10 each  OT Exercise 8: LUE elbow flexion/extension with tband (establish appropriate color) x10 each  OT Exercise 9: Nuts and bolts activity  OT Exercise 10: Small colored peg board  OT Exercise 11: PNF D1 &D2 flexion/extension with tbands (red for up and blue for down exercises) x10 reps each  OT Exercise 12: Elbow flexion/extension with green tband x20 reps each  OT Exercise 13: Serratus punch with green tband x10 reps  OT Exercise 14: Scapular row with green tband x10 reps     LUE weakness, deficits in FM and GM coordination                        Assessment   Assessment: Patient participated in OT

## 2023-08-08 ENCOUNTER — HOSPITAL ENCOUNTER (OUTPATIENT)
Dept: OCCUPATIONAL THERAPY | Age: 43
Setting detail: THERAPIES SERIES
Discharge: HOME | End: 2023-08-08
Payer: MEDICAID

## 2023-08-08 PROCEDURE — 97110 THERAPEUTIC EXERCISES: CPT

## 2023-08-08 ASSESSMENT — PAIN DESCRIPTION - ORIENTATION: ORIENTATION: LEFT

## 2023-08-08 ASSESSMENT — PAIN DESCRIPTION - PAIN TYPE: TYPE: CHRONIC PAIN

## 2023-08-08 ASSESSMENT — PAIN SCALES - GENERAL: PAINLEVEL_OUTOF10: 8

## 2023-08-08 ASSESSMENT — PAIN DESCRIPTION - LOCATION: LOCATION: FOOT

## 2023-08-08 ASSESSMENT — PAIN DESCRIPTION - DESCRIPTORS: DESCRIPTORS: ACHING

## 2023-08-08 NOTE — PROGRESS NOTES
Daily Treatment Note  Date: 2023  Patient Name: Bri Rodriguez  :  1980  MRN: [id number]       Subjective   General  Chart Reviewed: Yes  Patient assessed for rehabilitation services?: Yes  Self reported health status[de-identified] Good  History obtained from[de-identified] Patient, Chart Review  Family/Caregiver Present: No  Diagnosis: I61.0 Nontraumatic subcortical hemorrhage of cerebral hemisphere; R29.898 Hand weakness  Referring Provider (secondary): Dulcie Cheadle, APRN  OT Visit Information  Onset Date: 23  OT Insurance Information: [employer]  Total # of Visits Approved: 6  Total # of Visits to Date: 3  Certification Period Expiration Date: 24  Progress Note Due Date: 23  Progress Note Counter: 6 visits approved (9 already used) evaluation included in visits. 20 visits total allowed per year.   Subjective  Dominant Hand: : Right  Pain Assessment  Pain Level: 8  Pain Location: Foot  Pain Orientation: Left  Pain Descriptors: Aching  Pain Type: Chronic pain     Treatment Activities:    Exercises:     Treatment Reasoning    OT Exercise 1: Blue theraputty pinching with thumb and each finger individually length of putty  OT Exercise 2: LUE green digigrip x2 minutes with 1 minute rest break inbetween  OT Exercise 3: LUE blue rubberband digit extension x2 minutes with 1 minute rest break inbetween  OT Exercise 5: Omnicycle in gym x7 minutes intensity 5.4  OT Exercise 7: I's, T's, & Y's tband exercises (establish appropriate color) x10 each  OT Exercise 8: LUE elbow flexion/extension with tband (establish appropriate color) x10 each  OT Exercise 9: Nuts and bolts activity  OT Exercise 10: Small colored peg board  OT Exercise 11: PNF D1 &D2 flexion/extension with tbands (red for up and blue for down exercises) x10 reps each  OT Exercise 12: Elbow flexion/extension with green tband x20 reps each  OT Exercise 13: Serratus punch with green tband x10 reps  OT Exercise 14: Scapular row with green tband x10 reps

## 2023-08-09 ENCOUNTER — OFFICE VISIT (OUTPATIENT)
Dept: WOUND CARE | Facility: HOSPITAL | Age: 43
End: 2023-08-09
Payer: MEDICAID

## 2023-08-09 PROCEDURE — G0463 HOSPITAL OUTPT CLINIC VISIT: HCPCS

## 2023-08-11 ENCOUNTER — HOSPITAL ENCOUNTER (OUTPATIENT)
Dept: OCCUPATIONAL THERAPY | Age: 43
Setting detail: THERAPIES SERIES
Discharge: HOME | End: 2023-08-11
Payer: MEDICAID

## 2023-08-11 PROCEDURE — 97110 THERAPEUTIC EXERCISES: CPT

## 2023-08-11 ASSESSMENT — PAIN DESCRIPTION - LOCATION: LOCATION: FOOT

## 2023-08-11 ASSESSMENT — PAIN SCALES - GENERAL: PAINLEVEL_OUTOF10: 6

## 2023-08-11 ASSESSMENT — PAIN DESCRIPTION - DESCRIPTORS: DESCRIPTORS: ACHING

## 2023-08-11 ASSESSMENT — PAIN DESCRIPTION - PAIN TYPE: TYPE: CHRONIC PAIN

## 2023-08-11 ASSESSMENT — PAIN DESCRIPTION - ORIENTATION: ORIENTATION: LEFT

## 2023-08-11 NOTE — PROGRESS NOTES
Term Goals : x8 weeks  Long Term Goal 1: Patient will improve UEFS score to >60/80 indicating increased (I) with daily routine tasks. LTG Goal 1 Status[de-identified] New  Long Term Goal 2: Patient will improve LUE mass  strength to >40# to increase (I) with opening containers. LTG Goal 2 Status[de-identified] New  Long Term Goal 3: Patient will improve LUE elbow flexion/extension strength to 4+/5 to increase (I) with laundry tasks. LTG Goal 3 Status[de-identified] New  Long Term Goal 4: Patient will improve LUE 3 pt pinch strength to 15# to increase (I) using door keys. LTG Goal 4 Status[de-identified] New  Long Term Goal 5: Patient will be (I) with HEP by discharge. LTG Goal 5 Status[de-identified] New  Additional Goals?: Yes  Long Term Goal 6: Patient will improve LUE shoulder flexion strength to 4+/5 to increase (I) with work tasks.   LTG Goal 6 Status[de-identified] New    Therapy Time   Individual Concurrent Group Co-treatment   Time In 0903         Time Out 0958         Minutes 55         Timed Code Treatment Minutes: [address], OT Electronically signed by Severiano Kindler, MOT, OTR/L, CHAGO on 8/11/2023 at 11:05 AM

## 2023-08-14 ENCOUNTER — HOSPITAL ENCOUNTER (OUTPATIENT)
Dept: OCCUPATIONAL THERAPY | Age: 43
Setting detail: THERAPIES SERIES
Discharge: HOME | End: 2023-08-14
Payer: MEDICAID

## 2023-08-14 PROCEDURE — 97110 THERAPEUTIC EXERCISES: CPT

## 2023-08-14 NOTE — PROGRESS NOTES
Daily Treatment Note  Date: 2023  Patient Name: Segundo Mujica  :  1980  MRN: [id number]       Subjective   General  Chart Reviewed: Yes  Patient assessed for rehabilitation services?: Yes  Self reported health status[de-identified] Good  History obtained from[de-identified] Patient, Chart Review  Family/Caregiver Present: No  Diagnosis: I61.0 Nontraumatic subcortical hemorrhage of cerebral hemisphere; R29.898 Hand weakness  Referring Provider (secondary): ASHLEY Sharma  OT Visit Information  Onset Date: 23  OT Insurance Information: [employer]  Total # of Visits Approved: 6  Total # of Visits to Date: 5  Certification Period Expiration Date: 24  Progress Note Due Date: 23  Progress Note Counter: 6 visits approved (9 already used) evaluation included in visits. 20 visits total allowed per year.  (6 approved by insurance- will need to auth for more visits)  Subjective  Dominant Hand: : Right      Treatment Activities:    Exercises:     Treatment Reasoning    OT Exercise 1: Blue theraputty pinching with thumb and each finger individually length of putty  OT Exercise 2: LUE green digigrip x2 minutes with 1 minute rest break inbetween  OT Exercise 3: LUE blue rubberband digit extension x2 minutes with 1 minute rest break inbetween  OT Exercise 5: Omnicycle in gym x7 minutes intensity 5.4  OT Exercise 7: I's, T's, & Y's tband exercises (I's blue tband, T's & Y's ) x10 each  OT Exercise 8: LUE elbow flexion/extension with tband (green tband) x10 each  OT Exercise 9: Nuts and bolts activity  OT Exercise 10: Small colored peg board  OT Exercise 11: PNF D1 &D2 flexion/extension with tbands (red for up and blue for down exercises) x10 reps each  OT Exercise 12: Elbow flexion/extension with green tband x20 reps each  OT Exercise 13: Serratus punch with green tband x10 reps  OT Exercise 14: Scapular row with green tband x10 reps                             Assessment   Performance deficits / Impairments: Decreased

## 2023-08-16 ENCOUNTER — OFFICE VISIT (OUTPATIENT)
Dept: FAMILY MEDICINE CLINIC | Facility: CLINIC | Age: 43
End: 2023-08-16
Payer: MEDICAID

## 2023-08-16 ENCOUNTER — OFFICE VISIT (OUTPATIENT)
Dept: WOUND CARE | Facility: HOSPITAL | Age: 43
End: 2023-08-16
Payer: MEDICAID

## 2023-08-16 VITALS
BODY MASS INDEX: 22.3 KG/M2 | OXYGEN SATURATION: 98 % | HEART RATE: 100 BPM | WEIGHT: 155.8 LBS | DIASTOLIC BLOOD PRESSURE: 90 MMHG | SYSTOLIC BLOOD PRESSURE: 150 MMHG | RESPIRATION RATE: 16 BRPM | HEIGHT: 70 IN

## 2023-08-16 DIAGNOSIS — F10.29 ALCOHOL DEPENDENCE WITH UNSPECIFIED ALCOHOL-INDUCED DISORDER: Primary | ICD-10-CM

## 2023-08-16 DIAGNOSIS — S90.821D BLISTER OF RIGHT FOOT, SUBSEQUENT ENCOUNTER: ICD-10-CM

## 2023-08-16 DIAGNOSIS — I10 PRIMARY HYPERTENSION: ICD-10-CM

## 2023-08-16 DIAGNOSIS — F17.200 SMOKER: ICD-10-CM

## 2023-08-16 DIAGNOSIS — R45.4 IRRITABLE: ICD-10-CM

## 2023-08-16 DIAGNOSIS — I61.0 NONTRAUMATIC SUBCORTICAL HEMORRHAGE OF CEREBRAL HEMISPHERE, UNSPECIFIED LATERALITY: ICD-10-CM

## 2023-08-16 DIAGNOSIS — R79.89 ELEVATED LIVER FUNCTION TESTS: ICD-10-CM

## 2023-08-16 PROCEDURE — G0463 HOSPITAL OUTPT CLINIC VISIT: HCPCS

## 2023-08-16 RX ORDER — AMLODIPINE BESYLATE 10 MG/1
10 TABLET ORAL NIGHTLY
Qty: 30 TABLET | Refills: 2
Start: 2023-08-16

## 2023-08-16 NOTE — PROGRESS NOTES
NAM Renteria  [facility]   Family Medicine  [address]  [place] [address]  Phone: [phone]  Fax: [phone]                  History:  James Taylor is a 42 y.o. male who presents today as an established patient here for evaluation of the above complaint and management of chronic conditions. An adult female accompanies him today. Last seen 03/22/2023 for hospital follow-up.      ETOH  8/16/2023: down one beer per day (24 ounce can) taking naltrexone and cravings for alcohol have improved.   7/20/2023: still having issues with sensation of left hand and decreased strength. Physical therapy will restart tomorrow per Feliciano BROWNING. 3 -24 ounce beers per day.     HTN:   8/16/2023: Pt reports that he forgot to take his b/p medication before coming to his appointment.  Pt reports also having physical therapy this morning.   7/20/2023: pt has not been able to obtain blood pressure cuff. States that his insurance ran out. Pt stopped lisinopril due to questionable relation to blisters on both feet per wound care. Pt reports that ulcers to bilateral feet are still present.     Diarrhea:   8/16/2023: Resolved.   7/20/2023: Diarrhea ? daily.  Related to medication or cva. Pt reports that stools are oily. Black stools, could be related to ferrous sulfate.       ROS:  Review of Systems   Constitutional:  Negative for fatigue, fever and unexpected weight change.   HENT:  Negative for congestion, ear pain, rhinorrhea, sinus pressure, sinus pain and voice change.    Eyes:  Negative for visual disturbance.   Respiratory:  Negative for shortness of breath and wheezing.    Cardiovascular:  Negative for chest pain and palpitations.   Gastrointestinal:  Negative for abdominal pain, diarrhea, nausea and vomiting.   Genitourinary:  Negative for dysuria and flank pain.   Musculoskeletal:  Negative for back pain, myalgias and neck pain.   Skin:  Positive for wound (is going to wound  "care). Negative for color change and rash.   Neurological:  Positive for weakness (left hand. improving). Negative for dizziness, numbness and headaches.   Psychiatric/Behavioral:  Negative for behavioral problems, dysphoric mood, self-injury and sleep disturbance.      Mr. Taylor  reports that he has been smoking cigarettes. He started smoking about 11 years ago. He has a 15.00 pack-year smoking history. He has been exposed to tobacco smoke. He has never used smokeless tobacco. He reports current alcohol use of about 4.0 standard drinks per week. He reports that he does not use drugs.      Current Outpatient Medications:     amLODIPine (NORVASC) 10 MG tablet, Take 1 tablet by mouth Every Night., Disp: 30 tablet, Rfl: 2    Blood Pressure Monitoring (Blood Pressure Cuff) misc, Take blood pressure once/day, different times of the day and write down, Disp: 1 each, Rfl: 0    hydrALAZINE (APRESOLINE) 25 MG tablet, Take 1 tablet by mouth 3 (Three) Times a Day., Disp: , Rfl:     losartan (COZAAR) 25 MG tablet, Take 1 tablet by mouth Every Night., Disp: 30 tablet, Rfl: 5    metoprolol tartrate (LOPRESSOR) 50 MG tablet, Take 1 tablet by mouth Every 12 (Twelve) Hours., Disp: , Rfl:     naltrexone (DEPADE) 50 MG tablet, Take 1 tablet by mouth Daily., Disp: 30 tablet, Rfl: 2    sertraline (ZOLOFT) 50 MG tablet, TAKE 1 TABLET BY MOUTH DAILY. TO HELP WITH MOOD. TAKE WITH FOOD, Disp: 90 tablet, Rfl: 0    terazosin (HYTRIN) 5 MG capsule, Take 1 capsule by mouth Every Night., Disp: , Rfl:     vitamin D (ERGOCALCIFEROL) 1.25 MG (07235 UT) capsule capsule, Take 1 capsule by mouth 1 (One) Time Per Week. To treat low vitamin D. Take with food., Disp: 5 capsule, Rfl: 11    ferrous sulfate 325 (65 FE) MG tablet, , Disp: , Rfl:       OBJECTIVE:  /90 (BP Location: Right arm, Patient Position: Sitting, Cuff Size: Adult)   Pulse 100   Resp 16   Ht 177.8 cm (70\")   Wt 70.7 kg (155 lb 12.8 oz)   SpO2 98%   BMI 22.35 kg/mý  "   Physical Exam  Vitals and nursing note reviewed.   Constitutional:       Appearance: Normal appearance. He is well-developed.   HENT:      Head: Normocephalic and atraumatic.      Right Ear: Tympanic membrane, ear canal and external ear normal.      Left Ear: Tympanic membrane, ear canal and external ear normal.      Nose: Nose normal. No septal deviation, nasal tenderness or congestion.      Mouth/Throat:      Lips: Pink. No lesions.      Mouth: Mucous membranes are moist. No oral lesions.      Dentition: Normal dentition.      Pharynx: Oropharynx is clear. No pharyngeal swelling, oropharyngeal exudate or posterior oropharyngeal erythema.   Eyes:      General: Lids are normal. Vision grossly intact. No scleral icterus.        Right eye: No discharge.         Left eye: No discharge.      Extraocular Movements: Extraocular movements intact.      Conjunctiva/sclera: Conjunctivae normal.      Right eye: Right conjunctiva is not injected.      Left eye: Left conjunctiva is not injected.      Pupils: Pupils are equal, round, and reactive to light.   Neck:      Thyroid: No thyroid mass.      Trachea: Trachea normal.   Cardiovascular:      Rate and Rhythm: Normal rate and regular rhythm.      Heart sounds: Normal heart sounds. No murmur heard.    No gallop.   Pulmonary:      Effort: Pulmonary effort is normal.      Breath sounds: Normal breath sounds and air entry. No wheezing, rhonchi or rales.   Musculoskeletal:         General: No tenderness or deformity. Normal range of motion.      Left hand: Decreased strength.      Cervical back: Full passive range of motion without pain, normal range of motion and neck supple.      Thoracic back: Normal.      Right lower leg: No edema.      Left lower leg: No edema.   Skin:     General: Skin is warm and dry.      Coloration: Skin is not jaundiced.      Findings: Wound present. No rash.      Comments:  Two blisters noted to right foot, one has been lanced, and other is still  present nickel size. Non draining. Left foot ulcer noted to left great toe.    Neurological:      Mental Status: He is alert and oriented to person, place, and time.      Sensory: Sensation is intact.      Motor: Motor function is intact.      Coordination: Coordination is intact.      Gait: Gait is intact.      Deep Tendon Reflexes: Reflexes are normal and symmetric.   Psychiatric:         Mood and Affect: Mood and affect normal.         Behavior: Behavior normal.         Judgment: Judgment normal.     Comprehensive Metabolic Panel (04/11/2023 14:33)    Procedures    Assessment/Plan    Diagnoses and all orders for this visit:    1. Alcohol dependence with unspecified alcohol-induced disorder (Primary)    2. Elevated liver function tests  -     Comprehensive metabolic panel; Future    3. Primary hypertension  -     amLODIPine (NORVASC) 10 MG tablet; Take 1 tablet by mouth Every Night.  Dispense: 30 tablet; Refill: 2    4. Irritable    5. Smoker    6. Blister of right foot, subsequent encounter    7. Nontraumatic subcortical hemorrhage of cerebral hemisphere, unspecified laterality         Return in about 8 weeks (around 10/11/2023). Sooner if problems arise.         Patient Instructions   Follow up 2 months.     Have cmp drawn before next appointment.         Discussion:     I spent 30 minutes caring for Chana on this date of service. This time includes time spent by me in the following activities: preparing for the visit, reviewing tests, obtaining and/or reviewing a separately obtained history, performing a medically appropriate examination and/or evaluation, counseling and educating the patient/family/caregiver, ordering medications, tests, or procedures, documenting information in the medical record and independently interpreting results and communicating that information with the patient/family/caregiver     Casi BROWNING 8/18/2023     Transcribed from ambient dictation for Casi Skinner,  APRN by Goldie Felder.  04/27/23   17:47 CDT    Patient or patient representative verbalized consent to the visit recording.

## 2023-08-18 ENCOUNTER — HOSPITAL ENCOUNTER (OUTPATIENT)
Dept: OCCUPATIONAL THERAPY | Age: 43
Setting detail: THERAPIES SERIES
Discharge: HOME | End: 2023-08-18
Payer: MEDICAID

## 2023-08-18 PROBLEM — R45.4 IRRITABLE: Status: ACTIVE | Noted: 2023-08-18

## 2023-08-18 PROBLEM — S90.821A BLISTER OF RIGHT FOOT: Status: ACTIVE | Noted: 2023-08-18

## 2023-08-18 PROBLEM — F17.200 SMOKER: Status: ACTIVE | Noted: 2023-03-28

## 2023-08-18 PROBLEM — R79.89 ELEVATED LIVER FUNCTION TESTS: Status: ACTIVE | Noted: 2023-08-18

## 2023-08-18 PROBLEM — F10.29 ALCOHOL DEPENDENCE WITH UNSPECIFIED ALCOHOL-INDUCED DISORDER: Status: ACTIVE | Noted: 2023-08-18

## 2023-08-18 PROCEDURE — 97110 THERAPEUTIC EXERCISES: CPT

## 2023-08-18 PROCEDURE — 97530 THERAPEUTIC ACTIVITIES: CPT

## 2023-08-18 NOTE — PROGRESS NOTES
Occupational Therapy Daily Treatment Note/Reassessment  Date: 2023  Patient Name: Nick Kevin  :  1980  MRN: [id number]       Subjective   General  Chart Reviewed: Yes  Patient assessed for rehabilitation services?: Yes  Self reported health status[de-identified] Good  History obtained from[de-identified] Patient, Chart Review  Family/Caregiver Present: No  Diagnosis: I61.0 Nontraumatic subcortical hemorrhage of cerebral hemisphere; R29.898 Hand weakness  Referring Provider (secondary): ASHLEY Hernandez  OT Visit Information  Onset Date: 23  OT Insurance Information: [employer]  Total # of Visits Approved: 6  Total # of Visits to Date: 6  Certification Period Expiration Date: 24  Progress Note Due Date: 23  Progress Note Counter: 6 visits approved (9 already used) evaluation included in visits. 20 visits total allowed per year.  (6 approved by insurance- will need to auth for more visits if needed)  Subjective  Dominant Hand: : Right      Treatment Activities:    Exercises:     Treatment Reasoning    OT Exercise 1: Blue theraputty pinching with thumb and each finger individually length of putty  OT Exercise 2: LUE green digigrip x2 minutes with 1 minute rest break inbetween  OT Exercise 3: LUE blue rubberband digit extension x2 minutes with 1 minute rest break inbetween  OT Exercise 4: Purdue pegboard  OT Exercise 5: Dynaflex thumb circumducation ball (all 4 directions x10 each)  OT Exercise 6: Green theraputty with beans  OT Exercise 7: clothespins activity  OT Exercise 8: Velcro board (1# key forsupination/pronation and wrist flexion/extension, 2# key for thumb rotation)  OT Exercise 9: Nuts and bolts activity  OT Exercise 10: Small colored peg board                             Assessment   Performance deficits / Impairments: Decreased endurance;Decreased high-level IADLs;Decreased coordination;Decreased fine motor control;Decreased strength  Assessment: Patient participated in OT session this date

## 2023-08-21 ENCOUNTER — TELEPHONE (OUTPATIENT)
Dept: FAMILY MEDICINE CLINIC | Facility: CLINIC | Age: 43
End: 2023-08-21
Payer: MEDICAID

## 2023-08-21 ENCOUNTER — TELEPHONE (OUTPATIENT)
Dept: NEUROSURGERY | Facility: CLINIC | Age: 43
End: 2023-08-21
Payer: MEDICAID

## 2023-08-21 ENCOUNTER — APPOINTMENT (OUTPATIENT)
Dept: OCCUPATIONAL THERAPY | Age: 43
End: 2023-08-21
Payer: MEDICAID

## 2023-08-21 NOTE — TELEPHONE ENCOUNTER
Caller: Sidra Tompkins    Relationship: Emergency Contact    Best call back number: [phone]     What form or medical record are you requesting: LETTER STATING THAT HE HAS HAD A STROKE AND CAN'T WORK RIGHT NOW    Who is requesting this form or medical record from you: SIGNIFICANT OTHER     How would you like to receive the form or medical records (pick-up, mail, fax): S/O WILL COME BY THE OFFICE AND PICK IT UP    Timeframe paperwork needed: ASAP    Additional notes: CALL S/O WHEN ITS READY TO BE PICKED UP

## 2023-08-21 NOTE — TELEPHONE ENCOUNTER
Placed a call to inform Sidra I have received her message and I have checked notes and pts last off work miroslavause says until next appt in July which was pts last time he was seen in our office and Feliciano didn't say anything in the notes about taking pt off work his last visit. Sidra stated well if bob can't take him off work then I will ask his other doctor to because he can't work.  Informed Sidra I will send Feliciano a message to see if he will take pt off work and give her a call back tomorrow. Ended call with Sidra understanding and acknowledgement.

## 2023-08-21 NOTE — TELEPHONE ENCOUNTER
CHIKIS LUGO CALLED ON BEHALF OF BERT LEON. SHE STATES THAT BRET NEEDS A LETTER STATING THE HE CANNOT WORK FOR NOW, POST STROKE, IN ORDER TO APPLY FOR DISABILITY. 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    PLEASE PROVIDE THE PATIENT THE PROPER PAPERWORK, OR OTHERWISE ADVISE THE PATIENT. THANKS.    PATIENT CALL BACK: [phone]

## 2023-08-22 NOTE — PLAN OF CARE
"Hourly rounding complete. Patient states \"get the fuck out of here.\"   " [address] TREATMENT PLAN      Benjamin Lala    : 1980  Acct #: [de-identified]  MRN: [id number]   PHYSICIAN:  Patito Manuel MD  Primary Problem    Patient Active Problem List   Diagnosis    Intracerebral hemorrhage, nontraumatic [facility])       Rehabilitation Diagnosis:     Intracerebral hemorrhage, nontraumatic ([facility] [address] [I61.9]       ADMIT DATE:3/10/2023   CARE PLAN     NURSING:  Benjamin Lala while on this unit will:     [x] Be continent of bowel and bladder      [x] Have an adequate number of bowel movements   [x] Urinate with no urinary retention >300ml in bladder   [] Complete bladder protocol with guadarrama removal   [x] Maintain O2 SATs at ___%   [x] Have pain managed while on ARU        [x] Be pain free by discharge    [x] Have no skin breakdown while on ARU   [x] Have improved skin integrity via wound measurements   [] Have no signs/symptoms of infection at the wound site  [x] Be free from injury during hospitalization   [x] Complete education with patient/family with understanding demonstrated for:  [] Adjustment   [] Other:   Nursing interventions may include bowel/bladder training, education for medical assistive devices, medication education, O2 saturation management, energy conservation, stress management techniques, fall prevention, alarms protocol, seating and positioning, skin/wound care, pressure relief instruction,dressing changes,  infection protection, DVT prophylaxis, and/or assistance with in room safety with transfers to bed, toilet, wheelchair, shower as well as bathroom activities and hygiene.      Patient/caregiver education for:   [] Disease/sustained injury/management      [] Medication Use   [] Surgical intervention   [] Safety   [] Body mechanics and or joint protection   [] Health maintenance       IRF-OTONIEL  Bladder and Bowel Continence  Bladder Continence: Always continent  Bowel Continence: Always continent       PHYSICAL THERAPY:  Goals:                  Short Term Goals  Time Frame for Short Term Goals: 3-5 DAYS  Short Term Goal 1: UP/DOWN 12 STEPS 1 RAIL SBA  Short Term Goal 2: AMBULATE 500 FT FLAT SURFACES WITH NORMAL RECIPROCAL ARM SWING WITHOUT LATERAL DEVIATION SBA  Short Term Goal 3: AMBULATE OUTSIDE ON UNEVEN TERRAIN INCLUDING GRASS WITHOTU A.D. MIN A  Short Term Goal 4: PEFORM BED MOBILITY INDEP            Long Term Goals  Time Frame for Long Term Goals : 7 DAYS  Long Term Goal 1: UP/DOWN 12 STEPS 1 RAILS INDEP  Long Term Goal 2: AMBULATE 500 FT FLAT SURFACES WITH NORMAL RECIPROCAL ARM SWING WITHOUT LATERAL DEVIATION INDEP  Long Term Goal 3: AMBULATE OUTSIDE ON UNEVEN TERRAIN INCLUDING GRASS WITHOTU A.D. SBA  Long Term Goal 4: PERFORM AMBULATION AND TRASNFERS IN ROOM WITHOUT A.D. INDEP  These goals were reviewed with this patient at the time of assessment and Yuliana Raygoza is in agreement.      Plan of Care: Frequency:  [] 5 days per week, 90 minutes per day                             [x]  5 days per week, 60 minutes per day               Current Treatment Recommendations: Strengthening, Balance training, Functional mobility training, Transfer training, Endurance training, Wheelchair mobility training, Gait training, Stair training, Home exercise program, Safety education & training, Patient/Caregiver education & training, Equipment evaluation, education, & procurement    IRF-OTONIEL  Roll Left and Right  Assistance Needed: Supervision or touching assistance  CARE Score: 4  Discharge Goal: Independent  Sit to Lying  Assistance Needed: Supervision or touching assistance  CARE Score: 4  Discharge Goal: Independent  Lying to Sitting on Side of Bed  Assistance Needed: Supervision or touching assistance  CARE Score: 4  Discharge Goal: Independent  Sit to Stand  Assistance Needed: Supervision or touching assistance  CARE Score: 4  Discharge Goal: Independent  Chair/Bed-to-Chair Transfer  Assistance Needed: Supervision or touching assistance  CARE Score: 4  Discharge Goal: Independent  Car Transfer  Assistance Needed: Supervision or touching assistance  CARE Score: 4  Discharge Goal: Independent  Walk 10 Feet  Assistance Needed: Supervision or touching assistance  CARE Score: 4  Discharge Goal: Independent  Walk 50 Feet with Two Turns  Assistance Needed: Supervision or touching assistance  CARE Score: 4  Discharge Goal: Independent  Walk 150 Feet  Assistance Needed: Partial/moderate assistance  CARE Score: 3  Discharge Goal: Independent  Walking 10 Feet on Uneven Surfaces  Reason if not Attempted: Not attempted due to medical condition or safety concerns  CARE Score: 88  Discharge Goal: Supervision or touching assistance  1 Step (Curb)  Assistance Needed: Supervision or touching assistance  CARE Score: 4  Discharge Goal: Independent  4 Steps  Assistance Needed: Supervision or touching assistance  CARE Score: 4  Discharge Goal: Independent  12 Steps  Assistance Needed: Supervision or touching assistance  CARE Score: 4  Discharge Goal: Independent  Wheel 50 Feet with Two Turns  Reason if not Attempted: Not attempted due to medical condition or safety concerns  CARE Score: 88  Discharge Goal: Independent  Wheel 150 Feet  Reason if not Attempted: Not attempted due to medical condition or safety concerns  CARE Score: 88  Discharge Goal: Independent    OCCUPATIONAL THERAPY:  Goals:             Short Term Goals  Time Frame for Short Term Goals: 1 week  Short Term Goal 1: Supervision with clothing management/hygiene for toileting. Short Term Goal 2: Supervision with toilet transfers. Short Term Goal 3: Supervision with LB dressing. Short Term Goal 4: Supervision with ambulatory homemaking tasks. Short Term Goal 5: Patient will complete LUE exercises at least 5x to improve independence with ADLs. :  Long Term Goals  Time Frame for Long Term Goals : 2 weeks  Long Term Goal 1: Modified independent with bathing hygiene. Long Term Goal 2: Modified independent with toileting and toilet transfers.   Long Term Goal 3: Modified independent with overall dressing. Long Term Goal 4: Modified independent with ambulatory homemaking tasks. Long Term Goal 5: Patient verabalize DME needs. Long Term Goal 6: Independent with HEP.  :    These goals were reviewed with this patient at the time of assessment and Boy Franca is in agreement    Plan of Care: Frequency:   [] 5 days per week, 90 minutes per day     [x] 5 days per week, 60 minutes per day                Occupational Therapy Plan  Current Treatment Recommendations: Strengthening, ROM, Balance training, Functional mobility training, Endurance training, Safety education & training, Patient/Caregiver education & training, Equipment evaluation, education, & procurement, Home management training, Self-Care / ADL            IRF-OTONIEL  Eating  Assistance Needed: Setup or clean-up assistance  CARE Score: 5  Discharge Goal: Independent  Oral Hygiene  Assistance Needed: Setup or clean-up assistance  CARE Score: 5  Discharge Goal: [address] needed: Supervision or touching assistance  CARE Score: 4  Discharge Goal: Independent  Shower/Bathe Self  Assistance Needed: Supervision or touching assistance  Comment: SBA with shower  CARE Score: 4  Discharge Goal: Independent  Upper Body Dressing  Assistance Needed: Supervision or touching assistance  Comment: VC for [employer]  CARE Score: 4  Discharge Goal: Independent  Lower Body Dressing  Assistance Needed: Supervision or touching assistance  CARE Score: 4  Discharge Goal: Independent  Putting On/Taking Off Footwear  Assistance Needed: Partial/moderate assistance  CARE Score: 3  Discharge Goal: Independent  Toilet Transfer  Assistance needed: Supervision or touching assistance  CARE Score: 4  Discharge Goal: Independent  Picking Up Object  Assistance Needed: Supervision or touching assistance  CARE Score: 4  Discharge Goal: [address]  How often do you need to have someone help you when you read instructions, pamphlets, or other written material from your doctor or pharmacy?: Never  Confusion Assessment Method (CAM)  Is there evidence of an acute change in mental status from the patient's baseline?: Yes  Inattention: Behavior not present  Disorganized thinking: Behavior present, fluctuates (comes and goes, changes in severity)  Altered level of consciousness: Behavior not present  Cognitive Patterns  Cognitive Pattern Assessment Used: BIMS  Repetition of Three Words (First Attempt): 3  Temporal Orientation: Year: Correct  Temporal Orientation: Month: Accurate within 5 days  Temporal Orientation: Day: Correct  Able to recall \"sock: Yes, no cue required  Able to recall \"blue\": Yes, no cue required  Able to recall \"bed\": Yes, no cue required  BIMS Summary Score: 15  Treatments may include IADL retraining, strengthening, safety education and training, patient/caregiver education and training, equipment evaluation/ training/procurement, neuromuscular reeducation, wheelchair mobility training. SPEECH THERAPY:   Plan of Care and Goals:   LTG Goal 1: The patient will develop functional, cognitive-linguistic-based skills and utilize compensatory strategies to  communicate wants and needs effectively to different conversational partners, maintain safety and  participate socially in functional living environment                      Short Term Goals  Goal 1: The patient will complete the Cognitive Linguistic Quick Test or CLQT. Goal 2: The patient will complete higher level cognitive tasks with minimal cues. Goal 3: The patient will complete tasks for problem solving and safety awareness with miniml cues. Short-term Goals  Goal 1: The patient will tolerate a regular diet with thin liquids with minimal overt s/s of aspiration. Goal 2: The patient will use a lingual search/sweep to clear food from the oral cavity.   Goal 3: The patient will complete daily oral-motor exercise to increase labial and lingual function and ROM/strength with minimal verbal, tactile and visual cues. Goal 4: The patient will complete pharyngeal strengthening exercises to aid in airway protection and minimize laryngeal penetration and aspiration with minimal verbal cues. IRF-OTONIEL  Hearing, Speech, and Vision  Expression of Ideas and Wants: Without difficulty  Understanding Verbal and Non-Verbal Content: Understands  Ability to Hear: Adequate  Ability to See in Adequate Light: Adequate  Cognitive Patterns  Cognitive Pattern Assessment Used: BIMS  Repetition of Three Words (First Attempt): 3  Temporal Orientation: Year: Correct  Temporal Orientation: Month: Accurate within 5 days  Temporal Orientation: Day: Correct  Able to recall \"sock: Yes, no cue required  Able to recall \"blue\": Yes, no cue required  Able to recall \"bed\": Yes, no cue required  BIMS Summary Score: 15          Plan of Care:  Frequency:   [x] 5 days per week, 60 minutes per day                            [] Not appropriate for Speech Therapy  Treatments may include speech/language/communication therapy, cognitive training, group therapy, education, and/or dysphagia therapy based on the above goals. These goals were reviewed with this patient at the time of assessment and David Hassan is in agreement. CASE MANAGEMENT:  Goals:   Assist patient/family with discharge planning, patient/family counseling,  and coordination with insurance during ARU stay. Patient Goals: get back to my usual and go home soon               Activities Prior to Admit:                         Willie Lujan will be seen a minimum of 3 hours of therapy per day/a minimum of 5 out of 7 days per week. [] In this rare instance due to the nature of this patient's medical involvement, this patient will be seen 15 hours per week (900 minutes within a 7 day period).     Treatments may include therapeutic exercises, gait training, neuromuscular re-ed, transfer training, community reintegration, bed mobility, w/c mobility and training, self care, home mgmt, cognitive training, energy conservation,dysphagia tx, speech/language/communication therapy, group therapy, and patient/family education. In addition, dietician/nutritionist may monitor calorie count as well as intake and collaboratively work with SLP on dietary upgrades. Neuropsychology/Psychology may evaluate and provide necessary support. Medical issues being managed closely and that require 24 hour availability of a physician:   [] Swallowing Precautions  [] Bowel/Bladder Fx  [] Weight bearing precautions   [] Wound Care    [x] Pain Mgmt   [x] Infection Protection   [x] DVT Prophylaxis   [] Fall Precautions  [] Fluid/Electrolyte/Nutrition Balance   [] Voice Protection   [] Respiratory  [] Other:    Medical Prognosis: [] Good  [x] Fair    [] Guarded   Total expected IRF days:  15  Anticipated discharge destination:    [] Home Independently   [x] Home with supervision    []SNF     [] Other                                           Physician anticipated functional outcomes:  Ambulate household distances independently with assistive device. IPOC brief synthesis: Acute inpatient rehabilitation with occupational therapy,  physical therapy, and speech therapy, 180 minutes, 5 every 7   days will address basic and advancing mobility with self-care   instruction and adaptive equipment training. Caregiver education will   be offered. Expected length of stay prior to the supervised level of   functional discharge to home with home care is 15 days. Assessment and Plan:     1. Right basal ganglia hemorrhage, hypertensive-blood pressure medications, PT/OT/SLP  2. Hypertension-hydralazine, lisinopril, Lopressor, Norvasc  3. Smoker-nicotine patch  4. GI-bowel regimen  5. Mood disorder-Zoloft  6. Hypovitaminosis D-replacement  7.   DVT prophylaxis-SCDs             Case Mgmt: Electronically signed by CHYNA Bello on 3/13/2023 at 9:40 AM      OT: Electronically signed by Fadia Delarosa OT on 3/11/23 at 3:36 PM CST     PT:Electronically signed by Edil Back PT on 3/11/23 at 3:13 PM CST      RN: Electronically signed by Kimberley Sahu RN on 3/11/23 at 3:15 PM CST     ST:  Electronically Signed By:  Radha Franks M.S., CCC-SLP  3/11/2023,2:25 PM.

## 2023-08-22 NOTE — TELEPHONE ENCOUNTER
Placed a call to inform Sidra pts work excuse will be upfront if she would like to come pick it up for pt. Ended call with Sidra understanding and acknowledgement.

## 2023-08-23 ENCOUNTER — OFFICE VISIT (OUTPATIENT)
Dept: WOUND CARE | Facility: HOSPITAL | Age: 43
End: 2023-08-23
Payer: MEDICAID

## 2023-08-23 PROCEDURE — G0463 HOSPITAL OUTPT CLINIC VISIT: HCPCS

## 2023-08-30 ENCOUNTER — OFFICE VISIT (OUTPATIENT)
Dept: WOUND CARE | Facility: HOSPITAL | Age: 43
End: 2023-08-30
Payer: MEDICAID

## 2023-08-30 PROCEDURE — G0463 HOSPITAL OUTPT CLINIC VISIT: HCPCS

## 2023-09-11 ENCOUNTER — OFFICE VISIT (OUTPATIENT)
Dept: WOUND CARE | Facility: HOSPITAL | Age: 43
End: 2023-09-11
Payer: MEDICAID

## 2023-09-11 PROCEDURE — G0463 HOSPITAL OUTPT CLINIC VISIT: HCPCS

## 2023-09-26 ENCOUNTER — TELEPHONE (OUTPATIENT)
Dept: NEUROSURGERY | Facility: CLINIC | Age: 43
End: 2023-09-26
Payer: MEDICAID

## 2023-09-26 NOTE — TELEPHONE ENCOUNTER
Left a vm for patient letting him know the letter is printed and he can stop by the  at his convenience to pick it up.

## 2023-09-26 NOTE — TELEPHONE ENCOUNTER
Caller: James Taylor    Relationship: Self    Best call back number: [phone]    What form or medical record are you requesting: Letter from Feliciano Boothe APRN (08/22/2023)  - PATIENT NEEDS THIS LETTER PRINTED, HE CAN'T GET IT VIA [employer], SIGNED, AND HE'LL PICKUP ASAP IN OFFICE AS IT'S NEEDED FOR HIS LONG TERM DISABILITY    Who is requesting this form or medical record from you: LTD    How would you like to receive the form or medical records (pick-up, mail, fax): IN-PERSON PICKUP REQUESTED ON 9/26, C/B TO PATIENT WHEN READY FOR PICKUP.     Additional notes: ADVISED PATIENT CAN TAKE UP TO 2 BUSINESS DAYS, BUT OUR OFFICE WOULD CALL HIM WHEN READY. THANK YOU.

## 2023-10-02 RX ORDER — AMLODIPINE BESYLATE 10 MG/1
TABLET ORAL
Qty: 90 TABLET | Refills: 1 | Status: SHIPPED | OUTPATIENT
Start: 2023-10-02

## 2023-10-02 NOTE — TELEPHONE ENCOUNTER
Requested Prescriptions     Pending Prescriptions Disp Refills    amLODIPine (NORVASC) 10 MG tablet [Pharmacy Med Name: AMLODIPINE BESYLATE 10 MG TAB] 90 tablet 1     Sig: TAKE 1 TABLET BY MOUTH EVERY DAY       Last Office Visit: Visit date not found  Next Office Visit: Visit date not found  Last Medication Refill: 6/29/2023 with 3 [facility] patient

## 2023-10-03 NOTE — TELEPHONE ENCOUNTER
Requested Prescriptions     Pending Prescriptions Disp Refills    metoprolol tartrate (LOPRESSOR) 50 MG tablet [Pharmacy Med Name: METOPROLOL TARTRATE 50 MG TAB] 180 tablet 1     Sig: TAKE 1 TABLET BY MOUTH IN THE MORNING AND IN THE EVENING       Last Office Visit: Visit date not found  Next Office Visit: Visit date not found  Last Medication Refill: 3/17/2023 with 3 [facility] patient 3/10/2023

## 2023-10-04 RX ORDER — METOPROLOL TARTRATE 50 MG/1
TABLET, FILM COATED ORAL
Qty: 180 TABLET | Refills: 1 | Status: SHIPPED | OUTPATIENT
Start: 2023-10-04

## 2023-10-16 ENCOUNTER — LAB (OUTPATIENT)
Dept: LAB | Facility: HOSPITAL | Age: 43
End: 2023-10-16
Payer: MEDICAID

## 2023-10-16 DIAGNOSIS — R79.89 ELEVATED LIVER FUNCTION TESTS: ICD-10-CM

## 2023-10-16 LAB
ALBUMIN SERPL-MCNC: 4.3 G/DL (ref 3.5–5.2)
ALBUMIN/GLOB SERPL: 1.2 G/DL
ALP SERPL-CCNC: 100 U/L (ref 39–117)
ALT SERPL W P-5'-P-CCNC: 33 U/L (ref 1–41)
ANION GAP SERPL CALCULATED.3IONS-SCNC: 11 MMOL/L (ref 5–15)
AST SERPL-CCNC: 63 U/L (ref 1–40)
BILIRUB SERPL-MCNC: 0.8 MG/DL (ref 0–1.2)
BUN SERPL-MCNC: 12 MG/DL (ref 6–20)
BUN/CREAT SERPL: 10.7 (ref 7–25)
CALCIUM SPEC-SCNC: 9.7 MG/DL (ref 8.6–10.5)
CHLORIDE SERPL-SCNC: 101 MMOL/L (ref 98–107)
CO2 SERPL-SCNC: 27 MMOL/L (ref 22–29)
CREAT SERPL-MCNC: 1.12 MG/DL (ref 0.76–1.27)
EGFRCR SERPLBLD CKD-EPI 2021: 83.6 ML/MIN/1.73
GLOBULIN UR ELPH-MCNC: 3.5 GM/DL
GLUCOSE SERPL-MCNC: 112 MG/DL (ref 65–99)
POTASSIUM SERPL-SCNC: 3.6 MMOL/L (ref 3.5–5.2)
PROT SERPL-MCNC: 7.8 G/DL (ref 6–8.5)
SODIUM SERPL-SCNC: 139 MMOL/L (ref 136–145)

## 2023-10-16 PROCEDURE — 36415 COLL VENOUS BLD VENIPUNCTURE: CPT

## 2023-10-16 PROCEDURE — 80053 COMPREHEN METABOLIC PANEL: CPT

## 2023-10-17 ENCOUNTER — OFFICE VISIT (OUTPATIENT)
Dept: FAMILY MEDICINE CLINIC | Facility: CLINIC | Age: 43
End: 2023-10-17
Payer: MEDICAID

## 2023-10-17 VITALS
HEIGHT: 70 IN | RESPIRATION RATE: 18 BRPM | WEIGHT: 159.25 LBS | SYSTOLIC BLOOD PRESSURE: 140 MMHG | TEMPERATURE: 96.5 F | OXYGEN SATURATION: 98 % | HEART RATE: 62 BPM | DIASTOLIC BLOOD PRESSURE: 95 MMHG | BODY MASS INDEX: 22.8 KG/M2

## 2023-10-17 DIAGNOSIS — I69.319 COGNITIVE DEFICIT S/P CVA (CEREBROVASCULAR ACCIDENT): ICD-10-CM

## 2023-10-17 DIAGNOSIS — R29.898 LEFT HAND WEAKNESS: ICD-10-CM

## 2023-10-17 DIAGNOSIS — R29.898 LEFT LEG WEAKNESS: ICD-10-CM

## 2023-10-17 DIAGNOSIS — R29.898 LEFT ARM WEAKNESS: ICD-10-CM

## 2023-10-17 DIAGNOSIS — F10.29 ALCOHOL DEPENDENCE WITH UNSPECIFIED ALCOHOL-INDUCED DISORDER: Primary | ICD-10-CM

## 2023-10-17 DIAGNOSIS — I10 PRIMARY HYPERTENSION: ICD-10-CM

## 2023-10-17 PROCEDURE — 3077F SYST BP >= 140 MM HG: CPT | Performed by: NURSE PRACTITIONER

## 2023-10-17 PROCEDURE — 99214 OFFICE O/P EST MOD 30 MIN: CPT | Performed by: NURSE PRACTITIONER

## 2023-10-17 PROCEDURE — 3080F DIAST BP >= 90 MM HG: CPT | Performed by: NURSE PRACTITIONER

## 2023-10-17 RX ORDER — TRIAMCINOLONE ACETONIDE 1 MG/G
CREAM TOPICAL
COMMUNITY
Start: 2023-08-09

## 2023-10-17 NOTE — PROGRESS NOTES
NAM Renteria  [facility]   Family Medicine  [address]  [place] [address]  Phone: [phone]  Fax: [phone]       History:  James Taylor is a 43 y.o. male who presents today as an established patient here for evaluation of the above complaint and management of chronic conditions. An adult female accompanies him today. Last seen 03/22/2023 for hospital follow-up.      ETOH  10/17/2023 24 ounce can per day. The patient is currently utilizing naltrexone. The patient is down to one 24 ounces can of alcoholic beverages. He feels like he is doing great on his medication. The patient was let go from physical therapy; he feels like he is going backwards.   8/16/2023: down one beer per day (24 ounce can) taking naltrexone and cravings for alcohol have improved.   7/20/2023: still having issues with sensation of left hand and decreased strength. Physical therapy will restart tomorrow per Feliciano BROWNING. 3 -24 ounce beers per day.     HTN:   10/17/2023: His blood pressure is 140/90 mmHg today. He is currently utilizing amlodipine 10 mg and losartan 25 mg at nighttime. Sometimes he forgets to take his medications before bedtime.   8/16/2023: Pt reports that he forgot to take his b/p medication before coming to his appointment.  Pt reports also having physical therapy this morning.   7/20/2023: pt has not been able to obtain blood pressure cuff. States that his insurance ran out. Pt stopped lisinopril due to questionable relation to blisters on both feet per wound care. Pt reports that ulcers to bilateral feet are still present.       ROS:  Review of Systems   Constitutional:  Negative for fatigue, fever and unexpected weight change.   HENT:  Negative for congestion, ear pain, rhinorrhea, sinus pressure, sinus pain and voice change.    Eyes:  Negative for visual disturbance.   Respiratory:  Negative for shortness of breath and wheezing.    Cardiovascular:  Negative for  chest pain and palpitations.   Gastrointestinal:  Negative for abdominal pain, diarrhea, nausea and vomiting.   Genitourinary:  Negative for dysuria and flank pain.   Musculoskeletal:  Negative for back pain, myalgias and neck pain.   Skin:  Negative for color change, rash and wound.   Neurological:  Positive for weakness (left hand. improving). Negative for dizziness, numbness and headaches.   Psychiatric/Behavioral:  Negative for behavioral problems, dysphoric mood, self-injury and sleep disturbance.        Mr. Taylor  reports that he has been smoking cigarettes. He started smoking about 11 years ago. He has a 15.00 pack-year smoking history. He has been exposed to tobacco smoke. He has never used smokeless tobacco. He reports current alcohol use of about 4.0 standard drinks of alcohol per week. He reports that he does not use drugs.      Current Outpatient Medications:     amLODIPine (NORVASC) 10 MG tablet, Take 1 tablet by mouth Every Night., Disp: 30 tablet, Rfl: 2    hydrALAZINE (APRESOLINE) 25 MG tablet, Take 1 tablet by mouth 3 (Three) Times a Day., Disp: , Rfl:     losartan (COZAAR) 25 MG tablet, Take 1 tablet by mouth Every Night., Disp: 30 tablet, Rfl: 5    metoprolol tartrate (LOPRESSOR) 50 MG tablet, Take 1 tablet by mouth Every 12 (Twelve) Hours., Disp: , Rfl:     naltrexone (DEPADE) 50 MG tablet, Take 1 tablet by mouth Daily., Disp: 30 tablet, Rfl: 2    sertraline (ZOLOFT) 50 MG tablet, TAKE 1 TABLET BY MOUTH DAILY. TO HELP WITH MOOD. TAKE WITH FOOD, Disp: 90 tablet, Rfl: 0    terazosin (HYTRIN) 5 MG capsule, Take 1 capsule by mouth Every Night., Disp: , Rfl:     triamcinolone (KENALOG) 0.1 % cream, , Disp: , Rfl:     Blood Pressure Monitoring (Blood Pressure Cuff) misc, Take blood pressure once/day, different times of the day and write down (Patient not taking: Reported on 10/17/2023), Disp: 1 each, Rfl: 0      OBJECTIVE:  /95 (BP Location: Left arm, Patient Position: Sitting, Cuff Size: Adult)  "  Pulse 62   Temp 96.5 °F (35.8 °C) (Infrared)   Resp 18   Ht 177.8 cm (70\")   Wt 72.2 kg (159 lb 4 oz)   SpO2 98%   BMI 22.85 kg/m²    Physical Exam  Vitals and nursing note reviewed.   Constitutional:       Appearance: Normal appearance. He is well-developed.   HENT:      Head: Normocephalic and atraumatic.      Right Ear: Tympanic membrane, ear canal and external ear normal.      Left Ear: Tympanic membrane, ear canal and external ear normal.      Nose: Nose normal. No septal deviation, nasal tenderness or congestion.      Mouth/Throat:      Lips: Pink. No lesions.      Mouth: Mucous membranes are moist. No oral lesions.      Dentition: Normal dentition.      Pharynx: Oropharynx is clear. No pharyngeal swelling, oropharyngeal exudate or posterior oropharyngeal erythema.   Eyes:      General: Lids are normal. Vision grossly intact. No scleral icterus.        Right eye: No discharge.         Left eye: No discharge.      Extraocular Movements: Extraocular movements intact.      Conjunctiva/sclera: Conjunctivae normal.      Right eye: Right conjunctiva is not injected.      Left eye: Left conjunctiva is not injected.      Pupils: Pupils are equal, round, and reactive to light.   Neck:      Thyroid: No thyroid mass.      Trachea: Trachea normal.   Cardiovascular:      Rate and Rhythm: Normal rate and regular rhythm.      Heart sounds: Normal heart sounds. No murmur heard.     No gallop.   Pulmonary:      Effort: Pulmonary effort is normal.      Breath sounds: Normal breath sounds and air entry. No wheezing, rhonchi or rales.   Musculoskeletal:         General: No tenderness or deformity. Normal range of motion.      Left hand: Decreased strength.      Cervical back: Full passive range of motion without pain, normal range of motion and neck supple.      Thoracic back: Normal.      Right lower leg: No edema.      Left lower leg: No edema.   Skin:     General: Skin is warm and dry.      Coloration: Skin is not " jaundiced.      Findings: Wound present. No rash.      Comments:  Two blisters noted to right foot, one has been lanced, and other is still present nickel size. Non draining. Left foot ulcer noted to left great toe.    Neurological:      Mental Status: He is alert and oriented to person, place, and time.      Sensory: Sensation is intact.      Motor: Motor function is intact.      Coordination: Coordination is intact.      Gait: Gait is intact.      Deep Tendon Reflexes: Reflexes are normal and symmetric.   Psychiatric:         Mood and Affect: Mood and affect normal.         Behavior: Behavior normal.         Judgment: Judgment normal.       Comprehensive Metabolic Panel (04/11/2023 14:33)    Procedures    Assessment/Plan    Diagnoses and all orders for this visit:    1. Alcohol dependence with unspecified alcohol-induced disorder (Primary)  -     Ambulatory Referral to Physical Therapy Evaluate and treat    2. Primary hypertension  -     Ambulatory Referral to Physical Therapy Evaluate and treat    3. Left hand weakness  -     Ambulatory Referral to Physical Therapy Evaluate and treat    4. Left leg weakness  -     Ambulatory Referral to Physical Therapy Evaluate and treat    5. Left arm weakness  -     Ambulatory Referral to Physical Therapy Evaluate and treat    6. Cognitive deficit S/P CVA (cerebrovascular accident)  -     Ambulatory Referral to Physical Therapy Evaluate and treat       1. Alcohol dependence with unspecified alcohol-induced disorder  - Ambulatory Referral to Physical Therapy Evaluate and treat.  - The patient will follow-up in 3 months.    Return in about 3 months (around 1/17/2024). Sooner if problems arise.         There are no Patient Instructions on file for this visit.    Discussion:     I spent 30 minutes caring for Chana on this date of service. This time includes time spent by me in the following activities: preparing for the visit, reviewing tests, obtaining and/or reviewing a  separately obtained history, performing a medically appropriate examination and/or evaluation, counseling and educating the patient/family/caregiver, ordering medications, tests, or procedures, documenting information in the medical record and independently interpreting results and communicating that information with the patient/family/caregiver     Casi BROWNING 10/22/2023     Transcribed from ambient dictation for NAM Renteria by Vanda Suarez.  04/27/23   17:47 CDT    Patient or patient representative verbalized consent to the visit recording.

## 2023-11-03 ENCOUNTER — HOSPITAL ENCOUNTER (OUTPATIENT)
Dept: ULTRASOUND IMAGING | Facility: HOSPITAL | Age: 43
Discharge: HOME | End: 2023-11-03
Admitting: CLINICAL NURSE SPECIALIST
Payer: MEDICAID

## 2023-11-03 DIAGNOSIS — K76.0 FATTY LIVER: ICD-10-CM

## 2023-11-03 PROCEDURE — 76705 ECHO EXAM OF ABDOMEN: CPT

## 2023-11-07 ENCOUNTER — TELEPHONE (OUTPATIENT)
Dept: FAMILY MEDICINE CLINIC | Facility: CLINIC | Age: 43
End: 2023-11-07

## 2023-11-07 ENCOUNTER — OFFICE VISIT (OUTPATIENT)
Dept: NEUROSURGERY | Facility: CLINIC | Age: 43
End: 2023-11-07
Payer: MEDICAID

## 2023-11-07 VITALS
WEIGHT: 154.2 LBS | HEIGHT: 70 IN | DIASTOLIC BLOOD PRESSURE: 90 MMHG | BODY MASS INDEX: 22.07 KG/M2 | SYSTOLIC BLOOD PRESSURE: 142 MMHG

## 2023-11-07 DIAGNOSIS — I61.0 NONTRAUMATIC SUBCORTICAL HEMORRHAGE OF CEREBRAL HEMISPHERE, UNSPECIFIED LATERALITY: Primary | ICD-10-CM

## 2023-11-07 DIAGNOSIS — F17.200 SMOKER: ICD-10-CM

## 2023-11-07 PROCEDURE — 3077F SYST BP >= 140 MM HG: CPT | Performed by: NEUROLOGICAL SURGERY

## 2023-11-07 PROCEDURE — 99213 OFFICE O/P EST LOW 20 MIN: CPT | Performed by: NEUROLOGICAL SURGERY

## 2023-11-07 PROCEDURE — 1160F RVW MEDS BY RX/DR IN RCRD: CPT | Performed by: NEUROLOGICAL SURGERY

## 2023-11-07 PROCEDURE — 1159F MED LIST DOCD IN RCRD: CPT | Performed by: NEUROLOGICAL SURGERY

## 2023-11-07 PROCEDURE — 3080F DIAST BP >= 90 MM HG: CPT | Performed by: NEUROLOGICAL SURGERY

## 2023-11-07 NOTE — PATIENT INSTRUCTIONS
PATIENT TO CONTINUE TO FOLLOW UP WITH HIS PRIMARY CARE PROVIDER FOR YEARLY PHYSICAL EXAMS TO ENSURE COMPLETE HEALTH MAINTENANCE    Steps to Quit Smoking  Smoking tobacco is the leading cause of preventable death. It can affect almost every organ in the body. Smoking puts you and those around you at risk for developing many serious chronic diseases.  Quitting smoking can be very challenging. Do not get discouraged if you are not successful the first time. Some people need to make many attempts to quit before they achieve long-term success. Do your best to stick to your quit plan, and talk with your health care provider if you have any questions or concerns.  How do I get ready to quit?  When you decide to quit smoking, create a plan to help you succeed. Before you quit:  Pick a date to quit. Set a date within the next 2 weeks to give you time to prepare.  Write down the reasons why you are quitting. Keep this list in places where you will see it often.  Tell your family, friends, and co-workers that you are quitting. Support from people you are close to can make quitting easier.  Talk with your health care provider about your options for quitting smoking.  Find out what treatment options are covered by your health insurance.  Identify people, places, things, and activities that make you want to smoke (triggers). Avoid them.  What first steps can I take to quit smoking?  Throw away all cigarettes at home, at work, and in your car.  Throw away smoking accessories, such as ashtrays and lighters.  Clean your car. Make sure to empty the ashtray.  Clean your home, including curtains and carpets.  What strategies can I use to quit smoking?  Talk with your health care provider about combining strategies, such as taking medicines while you are also receiving in-person counseling. Using these two strategies together makes you more likely to succeed in quitting than if you used either strategy on its own.  If you are pregnant  or breastfeeding, talk with your health care provider about finding counseling or other support strategies to quit smoking. Do not take medicine to help you quit smoking unless your health care provider tells you to.  Quit right away  Quit smoking completely, instead of gradually reducing how much you smoke over a period of time. Stopping smoking right away may be more successful than gradually quitting.  Attend in-person counseling to help you build problem-solving skills. You are more likely to succeed in quitting if you attend counseling sessions regularly. Even short sessions of 10 minutes can be effective.  Take medicine  You may take medicines to help you quit smoking. Some medicines require a prescription. You can also purchase over-the-counter medicines. Medicines may have nicotine in them to replace the nicotine in cigarettes. Medicines may:  Help to stop cravings.  Help to relieve withdrawal symptoms.  Your health care provider may recommend:  Nicotine patches, gum, or lozenges.  Nicotine inhalers or sprays.  Non-nicotine medicine that you take by mouth.  Find resources  Find resources and support systems that can help you quit smoking and remain smoke-free after you quit. These resources are most helpful when you use them often. They include:  Online chats with a counselor.  Telephone quitlines.  Printed self-help materials.  Support groups or group counseling.  Text messaging programs.  Mobile phone apps or applications. Use apps that can help you stick to your quit plan by providing reminders, tips, and encouragement. Examples of free services include Quit Guide from the CDC and smokefree.gov    What can I do to make it easier to quit?    Reach out to your family and friends for support and encouragement. Call telephone quitlines, such as 1-800-QUIT-NOW, reach out to support groups, or work with a counselor for support.  Ask people who smoke to avoid smoking around you.  Avoid places that trigger you to  smoke, such as bars, parties, or smoke-break areas at work.  Spend time with people who do not smoke.  Lessen the stress in your life. Stress can be a smoking trigger for some people. To lessen stress, try:  Exercising regularly.  Doing deep-breathing exercises.  Doing yoga.  Meditating.  What benefits will I see if I quit smoking?  Over time, you should start to see positive results, such as:  Improved sense of smell and taste.  Decreased coughing and sore throat.  Slower heart rate.  Lower blood pressure.  Clearer and healthier skin.  The ability to breathe more easily.  Fewer sick days.  Summary  Quitting smoking can be very challenging. Do not get discouraged if you are not successful the first time. Some people need to make many attempts to quit before they achieve long-term success.  When you decide to quit smoking, create a plan to help you succeed.  Quit smoking right away, not slowly over a period of time.  Find resources and support systems that can help you quit smoking and remain smoke-free after you quit.  This information is not intended to replace advice given to you by your health care provider. Make sure you discuss any questions you have with your health care provider.  Document Revised: 12/09/2022 Document Reviewed: 12/09/2022  [employer] Patient Education © 2023 [employer]  Tobacco Use Disorder  Tobacco use disorder (TUD) occurs when a person craves, seeks, and uses tobacco, regardless of the consequences. This disorder can cause problems with mental and physical health. It can affect your ability to have healthy relationships. It can also keep you from meeting your responsibilities at work, home, or school.  Tobacco products contain a dangerous chemical called nicotine. Nicotine triggers hormones that make the body feel stimulated and works on areas of the brain that make a person feel good. These effects can make the person depend on nicotine, which makes it hard to quit tobacco.  Tobacco may  be:  Smoked as a cigarette or cigar.  Inhaled using vaping devices, such as e-cigarettes.  Smoked in a pipe or hookah.  Chewed as smokeless tobacco.  Inhaled into the nostrils as snuff.  What are the causes?  This condition is caused by using nicotine. Nicotine causes changes in your brain that make you want more and more. This is called addiction. This can make it hard to stop using tobacco once you start.  What are the signs or symptoms?  Symptoms of TUD may include:  Being unable to stop your tobacco use even after planned quit attempts.  Spending an abnormal amount of time getting or using tobacco.  Craving tobacco.  Tobacco use that:  Interferes with your work, school, or home life.  Interferes with your personal and social relationships.  Makes you give up activities that you once enjoyed or found important.  Using tobacco even though you know that it is:  Dangerous or bad for your health or someone else's health.  Causing problems in your life.  Needing more and more of the substance to get the same effect (developing tolerance).  Using the substance to avoid unpleasant symptoms if you do not use the substance (withdrawal).  How is this diagnosed?  This condition may be diagnosed based on:  Your current and past tobacco use. Your health care provider may ask questions about how your tobacco use affects your life.  A physical exam.  You may be diagnosed with TUD if you have at least two symptoms within a 12-month period.  How is this treated?  This condition is treated by stopping tobacco use. Many people are unable to quit on their own and need help. Treatment may include:  Nicotine replacement therapy (NRT). NRT provides nicotine without the other harmful chemicals in tobacco. NRT gradually lowers the dosage of nicotine in the body and reduces withdrawal symptoms. NRT is available as:  Over-the-counter gums, lozenges, and skin patches.  Prescription mouth inhalers and nasal sprays.  Medicine that acts on the  brain to reduce cravings and withdrawal symptoms.  A type of talk therapy that examines your triggers for tobacco use, how to avoid them, and how to cope with cravings (behavioral therapy).  Hypnosis. This may help with withdrawal symptoms.  Joining a support group for people coping with TUD.  The best treatment for TUD is usually a combination of medicine, talk therapy, and support groups. Recovery can be a long process. Many people start using tobacco again after stopping (relapse). If you relapse, it does not mean that treatment will not work.  Follow these instructions at home:    Lifestyle  Do not use any products that contain nicotine or tobacco. These products include cigarettes, chewing tobacco, and vaping devices, such as e-cigarettes. If you need help quitting, ask your health care provider.  Avoid things that trigger tobacco use as much as you can. Triggers include people and situations that usually cause you to use tobacco.  Avoid drinks that contain caffeine, including coffee. These may worsen some withdrawal symptoms.  Find ways to manage stress. Wanting to smoke may cause stress, and stress can make you want to smoke. Relaxation techniques such as deep breathing, meditation, and yoga may help.  Attend support groups as needed. These groups are an important part of long-term recovery for many people.  General instructions  Take over-the-counter and prescription medicines only as told by your health care provider.  Check with your health care provider before taking any new prescription or over-the-counter medicines.  Decide on a friend, family member, or smoking quit-line (such as 1-800-QUIT-NOW in the [place] that you can call or text when you feel the urge to smoke or when you need help coping with cravings.  Keep all follow-up visits. This is important.  Contact a health care provider if:  You are not able to take your medicines as told by your health care provider.  Your symptoms get worse, even with  treatment.  Summary  Tobacco use disorder (TUD) occurs when a person craves, seeks, and uses tobacco regardless of the consequences.  This condition may be diagnosed based on your current and past tobacco use and a physical exam.  Many people are unable to quit on their own and need help. Recovery can be a long process.  The most effective treatment for TUD is usually a combination of medicine, talk therapy, and support groups.  This information is not intended to replace advice given to you by your health care provider. Make sure you discuss any questions you have with your health care provider.  Document Revised: 12/13/2022 Document Reviewed: 12/13/2022  [employer]vier Patient Education © 2023 [employer]

## 2023-11-07 NOTE — TELEPHONE ENCOUNTER
Caller: Sidra Tompkins    Relationship: Emergency Contact    Best call back number: [phone]     What is the best time to reach you: SOON PLEASE     Who are you requesting to speak with (clinical staff, provider,  specific staff member): PROVIDER OR CLINICAL STAFF       What was the call regarding: PATIENTS SIGNIFICANT OTHER REQUESTING A CALL BACK TO VERIFY WHETHER OR NOT PROVIDER HAS RECEIVED  PAPERWORK ON PATIENTS DISABILITY     Is it okay if the provider responds through [facility]: PREFERS A CALL BACK

## 2023-11-07 NOTE — PROGRESS NOTES
SUBJECTIVE:  Patient ID: James Taylor is a 43 y.o. male is here today for follow-up.    Chief Complaint: Hemorrhagic stroke  Chief Complaint   Patient presents with    Follow-up     Pt states left side weakness and headaches. Pt will start PT tomorrow.        HPI  43-year-old gentleman that we took care of in the hospital in March 2023 for a right basal ganglia hemorrhagic stroke.  He is here in follow-up.  He is working diligently this summer with physical therapy trying to improve his left-sided strength and coordination.  He is about to start a third round of physical therapy tomorrow.  At this point he did make gains through the summer but recently seems to have plateaued.  He also is complaining of some numbness and tingling in the left leg that at times is uncomfortable.  He denies any low back pain associated with this.  He is working with his primary care doctor on his medical issues.  He is on antihypertensives and is checking his blood pressure at home.  He does continue to work on smoking cessation.    The following portions of the patient's history were reviewed and updated as appropriate: allergies, current medications, past family history, past medical history, past social history, past surgical history and problem list.    OBJECTIVE:    Review of Systems   Musculoskeletal:  Positive for gait problem.   Neurological:  Positive for weakness and numbness.   All other systems reviewed and are negative.         Physical Exam  Eyes:      Extraocular Movements: EOM normal.      Pupils: Pupils are equal, round, and reactive to light.   Neurological:      Mental Status: He is oriented to person, place, and time.      Coordination: Finger-Nose-Finger Test normal.   Psychiatric:         Speech: Speech normal.         Neurologic Exam     Mental Status   Oriented to person, place, and time.   Attention: normal.   Speech: speech is normal   Level of consciousness: alert  Knowledge: good.     Cranial Nerves     CN II    Visual fields full to confrontation.     CN III, IV, VI   Pupils are equal, round, and reactive to light.  Extraocular motions are normal.     CN V   Facial sensation intact.     CN VII   Facial expression full, symmetric.     CN VIII   CN VIII normal.     CN IX, X   CN IX normal.   CN X normal.     CN XI   CN XI normal.     CN XII   CN XII normal.     Motor Exam   Muscle bulk: normal  Overall muscle tone: normal  Right arm pronator drift: absent  Left arm pronator drift: absent    Strength   Strength 5/5 except as noted. Left  3+ out of 5.  Left bicep 5 out of 5, left tricep 4 out of 5    Left quadriceps 4-5, hamstrings 4 out of 5, iliopsoas 3+ out of 5, dorsiflexion plantarflexion 3 out of 5.     Sensory Exam   Light touch normal.   Pinprick normal.     Gait, Coordination, and Reflexes     Gait  Gait: (Stable gait but ataxic at times.)    Coordination   Finger to nose coordination: normal    Tremor   Resting tremor: absent  Intention tremor: absent  Action tremor: absent    Reflexes   Reflexes 2+ except as noted.       Independent Review of Radiographic Studies:       ASSESSMENT/PLAN:  The patient is still working with physical therapy trying to improve his left-sided strength and coordination.  His exam is essentially stable compared to the last time we saw him.  I did encourage him to continue to participate in the therapy.  I tried to encourage him that improvement after a stroke like this can continue to be seen in 6 to 18 months after the event.  At this point certainly he is unable to work and the job that he had before given his left sided deficits from the stroke.  We will see him in follow-up in about 3 months.      1. Nontraumatic subcortical hemorrhage of cerebral hemisphere, unspecified laterality    2. Smoker    3. BMI 22.0-22.9, adult        The patient's Body mass index is 22.13 kg/m².. BMI is within normal limits.  No follow-up necessary.    Return in about 3 months (around 2/7/2024) for  FOLLOW UP WKAYCEE (15 MINS).      Brandon Gannon MD           improvements in strength and coordination after stroke like this can be seen 6 to 18 months after the event.

## 2023-11-08 ENCOUNTER — TREATMENT (OUTPATIENT)
Dept: PHYSICAL THERAPY | Facility: CLINIC | Age: 43
End: 2023-11-08
Payer: MEDICAID

## 2023-11-08 DIAGNOSIS — R26.2 IMPAIRED AMBULATION: ICD-10-CM

## 2023-11-08 DIAGNOSIS — I69.30 H/O: STROKE WITH RESIDUAL EFFECTS: Primary | ICD-10-CM

## 2023-11-08 DIAGNOSIS — M62.81 LEFT-SIDED MUSCLE WEAKNESS: ICD-10-CM

## 2023-11-08 NOTE — PROGRESS NOTES
Physical Therapy Initial Evaluation and Plan of Care  [address] [place] [address]    Patient: James Taylor   : 1980  Referring practitioner: Casi Zaidi*  Date of Initial Visit: 2023  Today's Date: 2023  Patient seen for 1 sessions    Visit Diagnoses:    ICD-10-CM ICD-9-CM   1. H/O: stroke with residual effects  I69.30 438.9   2. Left-sided muscle weakness  M62.81 728.87   3. Impaired ambulation  R26.2 719.7     Past Medical History:   Diagnosis Date    Asthma     Headache     Hypertension      No past surgical history on file.      SUBJECTIVE     Subjective Evaluation    History of Present Illness  Mechanism of injury: Pt had a stroke in 2023, and he is experiencing residual left sided weakness. This is his third round of physical therapy, with his first two bouts taking place at [facility]. He did get stronger with this, but the weakness seemed to return each time, despite keeping up with his HEP. His main complaints are difficulty with standing for long periods of time (20-25 minute limit right now) and walking longer distances (longer than 1 block). He would like to be able to stand or walk for at least an hour or two before requiring rest breaks. He has difficulty opening jars.       Patient Occupation: disability Quality of life: good    Pain  No pain reported    Social Support  Lives in: one-story house  Lives with: parents    Treatments  Previous treatment: physical therapy  Patient Goals  Patient goals for therapy: increased strength  Patient goal: be able to stand or walk for 1-2 hours before requiring rest breaks, would like to be able to jog/run again       The patient living arrangement includes home with family member. Their home accessibility includes 1-story house/ trailer.     Outcome Measure:     PT G-Codes  Outcome Measure Options: 6 Minute Walk Test  6MWT: 954 ft 7 in         OBJECTIVE      Objective          Ambulation     Ambulation: Stairs   Ascend stairs: independent  Pattern: reciprocal  Railings: without rails  Descend stairs: independent  Pattern: non-reciprocal (leading with LLE, sideways pattern)  Railings: without rails    Observational Gait     Additional Observational Gait Details  Decreased arm swing on L   L heel scuff           LE MMT   HIP Strength L Strength R   flexion 4+ 5   extension     ABD     ADD      IR     ER          KNEE     flexion 4+ 4, difficulty initiating   extension 5 4+         ANKLE     dorsiflexion 5 5   plantarflexion     eversion     inversion     *pain         UE MMT   SHOULDER Strength L Strength R   flexion 4 4+   extension 4- 4+   ABD 4-* 4+   ADD     ER     IR          ELBOW     flexion 4- 5   extension 4- 4+   pronation     supination     *pain    Male  strength (pounds)  AGE Right Hand* RH Norms Left Hand LH Norms   40-44 59 lbs 117+20.7 35 lbs  112+18.7   (JAZLYN Liao et al; Hand Dynometer: Effects of trials and sessions.  Perpetual and Motor Skills 61:195-8, 1985)  Key  * = Dominant hand  > = Intervention      Vision assessment: normal.    Reflexes: DTR 3 out of 4 of the patellar left; 2/4 elsewhere     Tone: LUE is normal and LLE is hypertonic    Midline orientation: Midline      BALANCE TESTING  Romberg                           stable for 30 sec eyes open and stable for 30 secs eyes closed  Sharpened Romberg       stable for 30 sec eyes open  Single Leg Stance Right   lost balance after 10 secs eyes open  Single Leg Stance Left      lost balance after 5 secs eyes open    Therapy Education/Self Care [id number]   Education offered today Nature of condition, POC moving forward   Medbride Code    Ongoing HEP   Will assign at next visit    Timed Minutes        Total Timed Treatment:     0   mins  Total Time of Visit:            43   mins    ASSESSMENT/PLAN     GOALS:    Goals                                          Progress Note due by 12/8/23                                                       Recert due by 2/5/24   STG by: 6 weeks  Comments Date Status         Able to stand at least 60 minutes before rest      Increase L hand  strength to within 10lbs of R hand  IE: R: 59 lbs, L: 35 lbs     Normalized gait pattern as demonstrated by increased LUE arm swing and no L heel scuffing             LTG by: 12 weeks      Improve 6MWT by at least 200 ft  IE: 954 ft 7 in      Gross left hip MMT 4+/5       Able to walk down stairs with reciprocal gait pattern using rail for balance only      Able to jog for at least 10 minutes       Independent with HEP for progress strengthening                  Assessment & Plan       Assessment  Impairments: abnormal coordination, abnormal gait, abnormal muscle firing, abnormal muscle tone, abnormal or restricted ROM, activity intolerance, impaired physical strength and lacks appropriate home exercise program   Functional limitations: carrying objects, lifting, walking, pulling, pushing, standing and reaching overhead   Assessment details: James is a 42 y/o male presenting with residual left sided deficits resulting from a stroke in March of 2023. He demonstrates weakness throughout his L UE and LE, and this weakness prevents him from standing greater than 20-25 minutes and walking more than a block at a time before requiring rest breaks. He has potential to increase function to move toward PLOF. His LLE also demonstrates some increased tone. The Pt would benefit from skilled PTx to decrease fall risk, improve functional mobility, progress toward goals, and increase overall quality of life.      Plan  Therapy options: will be seen for skilled therapy services  Planned modality interventions: dry needling and low level laser therapy  Planned therapy interventions: abdominal trunk stabilization, ADL retraining, balance/weight-bearing training, body mechanics training, fine motor coordination training, flexibility, functional ROM exercises,  gait training, home exercise program, IADL retraining, joint mobilization, neuromuscular re-education, manual therapy, motor coordination training, soft tissue mobilization, strengthening, stretching, therapeutic activities and transfer training  Frequency: 2x week  Duration in weeks: 12  Plan details: Obtain hip extension and abduction MMTs. Begin with functional strengthening and endurance training. Move toward functional tasks as appropriate.         SIGNATURE: Vidya De La Garza PT, [place] License #: [id number]    Electronically Signed on 11/8/2023    Initial Certification  Certification Period: 11/8/2023 through 2/5/2024  I certify that the therapy services are furnished while this patient is under my care.  The services outlined above are required by this patient, and will be reviewed every 90 days.     PHYSICIAN: Casi Skinner APRN (NPI: [id number])    Signature: _______________________________________DATE: _________    Please sign and return via fax to [phone].   [email or web address]          [address] [place] [address] Chris John. [id number]  [phone]

## 2023-11-13 ENCOUNTER — TELEPHONE (OUTPATIENT)
Dept: FAMILY MEDICINE CLINIC | Facility: CLINIC | Age: 43
End: 2023-11-13

## 2023-11-13 NOTE — TELEPHONE ENCOUNTER
Has found out he has cyst on liver, and is having stomach problems and is hurting so bad to point of not eating.

## 2023-11-13 NOTE — TELEPHONE ENCOUNTER
Caller: Sidra Tompkins    Relationship: Emergency Contact    Best call back number: [phone]     What is the best time to reach you: ANYTIME    Who are you requesting to speak with (clinical staff, provider,  specific staff member): CLINICAL    What was the call regarding: SIDRA STATES PATIENT IS HAVING SOME ISSUES AND THEY ARE WANTING TO KNOW WHAT THEY NEED TO DO. SHE WOULDN'T GO INTO SPECIFICS. PLEASE ADVISE.     Is it okay if the provider responds through [employer]: YES

## 2023-11-14 ENCOUNTER — HOSPITAL ENCOUNTER (INPATIENT)
Facility: HOSPITAL | Age: 43
LOS: 2 days | Discharge: HOME | DRG: 439 | End: 2023-11-17
Attending: EMERGENCY MEDICINE | Admitting: STUDENT IN AN ORGANIZED HEALTH CARE EDUCATION/TRAINING PROGRAM
Payer: MEDICAID

## 2023-11-14 ENCOUNTER — NURSE TRIAGE (OUTPATIENT)
Dept: CALL CENTER | Facility: HOSPITAL | Age: 43
End: 2023-11-14
Payer: MEDICAID

## 2023-11-14 ENCOUNTER — APPOINTMENT (OUTPATIENT)
Dept: CT IMAGING | Facility: HOSPITAL | Age: 43
DRG: 439 | End: 2023-11-14
Payer: MEDICAID

## 2023-11-14 DIAGNOSIS — K85.20 ALCOHOL-INDUCED ACUTE PANCREATITIS, UNSPECIFIED COMPLICATION STATUS: Primary | ICD-10-CM

## 2023-11-14 LAB
ALBUMIN SERPL-MCNC: 4.5 G/DL (ref 3.5–5.2)
ALBUMIN/GLOB SERPL: 1.2 G/DL
ALP SERPL-CCNC: 149 U/L (ref 39–117)
ALT SERPL W P-5'-P-CCNC: 161 U/L (ref 1–41)
ANION GAP SERPL CALCULATED.3IONS-SCNC: 10 MMOL/L (ref 5–15)
ANISOCYTOSIS BLD QL: ABNORMAL
AST SERPL-CCNC: 263 U/L (ref 1–40)
BACTERIA UR QL AUTO: ABNORMAL /HPF
BILIRUB SERPL-MCNC: 2.3 MG/DL (ref 0–1.2)
BILIRUB UR QL STRIP: ABNORMAL
BUN SERPL-MCNC: 10 MG/DL (ref 6–20)
BUN/CREAT SERPL: 9.4 (ref 7–25)
CALCIUM SPEC-SCNC: 9.7 MG/DL (ref 8.6–10.5)
CHLORIDE SERPL-SCNC: 95 MMOL/L (ref 98–107)
CLARITY UR: CLEAR
CO2 SERPL-SCNC: 28 MMOL/L (ref 22–29)
COLOR UR: ABNORMAL
CREAT SERPL-MCNC: 1.06 MG/DL (ref 0.76–1.27)
DEPRECATED RDW RBC AUTO: 36.9 FL (ref 37–54)
EGFRCR SERPLBLD CKD-EPI 2021: 89.3 ML/MIN/1.73
EOSINOPHIL # BLD MANUAL: 0.36 10*3/MM3 (ref 0–0.4)
EOSINOPHIL NFR BLD MANUAL: 4 % (ref 0.3–6.2)
ERYTHROCYTE [DISTWIDTH] IN BLOOD BY AUTOMATED COUNT: 12.5 % (ref 12.3–15.4)
ETHANOL UR QL: <0.01 %
GLOBULIN UR ELPH-MCNC: 3.7 GM/DL
GLUCOSE SERPL-MCNC: 117 MG/DL (ref 65–99)
GLUCOSE UR STRIP-MCNC: NEGATIVE MG/DL
GRAN CASTS URNS QL MICRO: ABNORMAL /LPF
HCT VFR BLD AUTO: 45 % (ref 37.5–51)
HGB BLD-MCNC: 16.3 G/DL (ref 13–17.7)
HGB UR QL STRIP.AUTO: NEGATIVE
HYALINE CASTS UR QL AUTO: ABNORMAL /LPF
KETONES UR QL STRIP: NEGATIVE
LEUKOCYTE ESTERASE UR QL STRIP.AUTO: ABNORMAL
LIPASE SERPL-CCNC: 1044 U/L (ref 13–60)
LYMPHOCYTES # BLD MANUAL: 3.03 10*3/MM3 (ref 0.7–3.1)
LYMPHOCYTES NFR BLD MANUAL: 1 % (ref 5–12)
MCH RBC QN AUTO: 29.4 PG (ref 26.6–33)
MCHC RBC AUTO-ENTMCNC: 36.2 G/DL (ref 31.5–35.7)
MCV RBC AUTO: 81.2 FL (ref 79–97)
MONOCYTES # BLD: 0.09 10*3/MM3 (ref 0.1–0.9)
MUCOUS THREADS URNS QL MICRO: ABNORMAL /HPF
NEUTROPHILS # BLD AUTO: 5.44 10*3/MM3 (ref 1.7–7)
NEUTROPHILS NFR BLD MANUAL: 61 % (ref 42.7–76)
NITRITE UR QL STRIP: POSITIVE
PH UR STRIP.AUTO: <=5 [PH] (ref 5–8)
PLATELET # BLD AUTO: 81 10*3/MM3 (ref 140–450)
PMV BLD AUTO: 11.6 FL (ref 6–12)
POLYCHROMASIA BLD QL SMEAR: ABNORMAL
POTASSIUM SERPL-SCNC: 3.2 MMOL/L (ref 3.5–5.2)
PROT SERPL-MCNC: 8.2 G/DL (ref 6–8.5)
PROT UR QL STRIP: ABNORMAL
RBC # BLD AUTO: 5.54 10*6/MM3 (ref 4.14–5.8)
RBC # UR STRIP: ABNORMAL /HPF
REF LAB TEST METHOD: ABNORMAL
SMALL PLATELETS BLD QL SMEAR: ABNORMAL
SODIUM SERPL-SCNC: 133 MMOL/L (ref 136–145)
SP GR UR STRIP: >1.03 (ref 1–1.03)
SQUAMOUS #/AREA URNS HPF: ABNORMAL /HPF
TARGETS BLD QL SMEAR: ABNORMAL
UROBILINOGEN UR QL STRIP: ABNORMAL
VARIANT LYMPHS NFR BLD MANUAL: 3 % (ref 0–5)
VARIANT LYMPHS NFR BLD MANUAL: 31 % (ref 19.6–45.3)
WBC # UR STRIP: ABNORMAL /HPF
WBC MORPH BLD: NORMAL
WBC NRBC COR # BLD: 8.91 10*3/MM3 (ref 3.4–10.8)

## 2023-11-14 PROCEDURE — 81001 URINALYSIS AUTO W/SCOPE: CPT | Performed by: NURSE PRACTITIONER

## 2023-11-14 PROCEDURE — 85007 BL SMEAR W/DIFF WBC COUNT: CPT | Performed by: NURSE PRACTITIONER

## 2023-11-14 PROCEDURE — 83690 ASSAY OF LIPASE: CPT | Performed by: NURSE PRACTITIONER

## 2023-11-14 PROCEDURE — 25010000002 ONDANSETRON PER 1 MG: Performed by: NURSE PRACTITIONER

## 2023-11-14 PROCEDURE — 25810000003 SODIUM CHLORIDE 0.9 % SOLUTION: Performed by: NURSE PRACTITIONER

## 2023-11-14 PROCEDURE — 85025 COMPLETE CBC W/AUTO DIFF WBC: CPT | Performed by: NURSE PRACTITIONER

## 2023-11-14 PROCEDURE — 25510000001 IOPAMIDOL 61 % SOLUTION: Performed by: NURSE PRACTITIONER

## 2023-11-14 PROCEDURE — 25010000002 HYDROMORPHONE PER 4 MG: Performed by: NURSE PRACTITIONER

## 2023-11-14 PROCEDURE — 36415 COLL VENOUS BLD VENIPUNCTURE: CPT

## 2023-11-14 PROCEDURE — 80053 COMPREHEN METABOLIC PANEL: CPT | Performed by: NURSE PRACTITIONER

## 2023-11-14 PROCEDURE — 99285 EMERGENCY DEPT VISIT HI MDM: CPT

## 2023-11-14 PROCEDURE — 82077 ASSAY SPEC XCP UR&BREATH IA: CPT | Performed by: NURSE PRACTITIONER

## 2023-11-14 PROCEDURE — 74177 CT ABD & PELVIS W/CONTRAST: CPT

## 2023-11-14 RX ORDER — ONDANSETRON 2 MG/ML
4 INJECTION INTRAMUSCULAR; INTRAVENOUS ONCE
Status: COMPLETED | OUTPATIENT
Start: 2023-11-14 | End: 2023-11-14

## 2023-11-14 RX ORDER — SODIUM CHLORIDE 0.9 % (FLUSH) 0.9 %
10 SYRINGE (ML) INJECTION AS NEEDED
Status: DISCONTINUED | OUTPATIENT
Start: 2023-11-14 | End: 2023-11-17 | Stop reason: HOSPADM

## 2023-11-14 RX ORDER — HYDROMORPHONE HYDROCHLORIDE 1 MG/ML
0.5 INJECTION, SOLUTION INTRAMUSCULAR; INTRAVENOUS; SUBCUTANEOUS ONCE
Status: COMPLETED | OUTPATIENT
Start: 2023-11-14 | End: 2023-11-14

## 2023-11-14 RX ADMIN — HYDROMORPHONE HYDROCHLORIDE 0.5 MG: 1 INJECTION, SOLUTION INTRAMUSCULAR; INTRAVENOUS; SUBCUTANEOUS at 20:05

## 2023-11-14 RX ADMIN — ONDANSETRON 4 MG: 2 INJECTION INTRAMUSCULAR; INTRAVENOUS at 20:06

## 2023-11-14 RX ADMIN — SODIUM CHLORIDE 1000 ML: 9 INJECTION, SOLUTION INTRAVENOUS at 22:38

## 2023-11-14 RX ADMIN — IOPAMIDOL 100 ML: 612 INJECTION, SOLUTION INTRAVENOUS at 22:15

## 2023-11-14 NOTE — TELEPHONE ENCOUNTER
"Reason for Disposition   Patient sounds very sick or weak to the triager    Additional Information   Negative: Shock suspected (e.g., cold/pale/clammy skin, too weak to stand, low BP, rapid pulse)   Negative: Difficult to awaken or acting confused (e.g., disoriented, slurred speech)   Negative: Passed out (i.e., lost consciousness, collapsed and was not responding)   Negative: Sounds like a life-threatening emergency to the triager   Negative: Chest pain   Negative: Pain is mainly in upper abdomen  (if needed ask: \"is it mainly above the belly button?\")   Negative: Followed an abdomen (stomach) injury   Negative: Abdomen bloating or swelling are main symptoms   Negative: [1] SEVERE pain (e.g., excruciating) AND [2] present > 1 hour   Negative: [1] SEVERE pain AND [2] age > 60 years   Negative: [1] Vomiting AND [2] contains red blood or black (\"coffee ground\") material  (Exception: Few red streaks in vomit that only happened once.)   Negative: Blood in bowel movements  (Exception: Blood on surface of BM with constipation.)   Negative: Black or tarry bowel movements  (Exception: Chronic-unchanged black-grey BMs AND is taking iron pills or Pepto-Bismol.)   Negative: [1] Unable to urinate (or only a few drops) > 4 hours AND [2] bladder feels very full (e.g., palpable bladder or strong urge to urinate)   Negative: [1] Vomiting AND [2] contains bile (green color)   Negative: [1] Pain in the scrotum or testicle AND [2] present > 1 hour    Answer Assessment - Initial Assessment Questions  1. LOCATION: \"Where does it hurt?\"       All over abd   2. RADIATION: \"Does the pain shoot anywhere else?\" (e.g., chest, back)      no  3. ONSET: \"When did the pain begin?\" (Minutes, hours or days ago)       yesterday  4. SUDDEN: \"Gradual or sudden onset?\"      gradual  5. PATTERN \"Does the pain come and go, or is it constant?\"     - If it comes and goes: \"How long does it last?\" \"Do you have pain now?\"      (Note: Comes and goes means the " "pain is intermittent. It goes away completely between bouts.)     - If constant: \"Is it getting better, staying the same, or getting worse?\"       (Note: Constant means the pain never goes away completely; most serious pain is constant and gets worse.)       constant  6. SEVERITY: \"How bad is the pain?\"  (e.g., Scale 1-10; mild, moderate, or severe)     - MILD (1-3): Doesn't interfere with normal activities, abdomen soft and not tender to touch.      - MODERATE (4-7): Interferes with normal activities or awakens from sleep, abdomen tender to touch.      - SEVERE (8-10): Excruciating pain, doubled over, unable to do any normal activities.        moderate  7. RECURRENT SYMPTOM: \"Have you ever had this type of stomach pain before?\" If Yes, ask: \"When was the last time?\" and \"What happened that time?\"       no  8. CAUSE: \"What do you think is causing the stomach pain?\"      Cyst on liver  9. RELIEVING/AGGRAVATING FACTORS: \"What makes it better or worse?\" (e.g., antacids, bending or twisting motion, bowel movement)      no  10. OTHER SYMPTOMS: \"Do you have any other symptoms?\" (e.g., back pain, diarrhea, fever, urination pain, vomiting)        nausea    Protocols used: Abdominal Pain - Male-ADULT-AH    "

## 2023-11-14 NOTE — TELEPHONE ENCOUNTER
Girlfriend calling. He has cyst on liver, was diagnosed, he is having abd pain, has not been able to eat much at all,vomiting yesterday, what can I give him for pain. Says hurts all time, stabbing pain,cannot take tylenol or ibuprofen, told her if pain is increasing only thing I can tell her is instruct go to ER per guidelines.

## 2023-11-15 ENCOUNTER — TELEPHONE (OUTPATIENT)
Dept: PHYSICAL THERAPY | Facility: CLINIC | Age: 43
End: 2023-11-15

## 2023-11-15 ENCOUNTER — APPOINTMENT (OUTPATIENT)
Dept: ULTRASOUND IMAGING | Facility: HOSPITAL | Age: 43
DRG: 439 | End: 2023-11-15
Payer: MEDICAID

## 2023-11-15 PROBLEM — K85.90 ACUTE PANCREATITIS: Status: ACTIVE | Noted: 2023-11-15

## 2023-11-15 LAB
ALBUMIN SERPL-MCNC: 3.2 G/DL (ref 3.5–5.2)
ALBUMIN/GLOB SERPL: 1.1 G/DL
ALP SERPL-CCNC: 101 U/L (ref 39–117)
ALT SERPL W P-5'-P-CCNC: 94 U/L (ref 1–41)
ANION GAP SERPL CALCULATED.3IONS-SCNC: 8 MMOL/L (ref 5–15)
AST SERPL-CCNC: 125 U/L (ref 1–40)
BILIRUB SERPL-MCNC: 1.1 MG/DL (ref 0–1.2)
BUN SERPL-MCNC: 7 MG/DL (ref 6–20)
BUN/CREAT SERPL: 8 (ref 7–25)
CALCIUM SPEC-SCNC: 8.5 MG/DL (ref 8.6–10.5)
CHLORIDE SERPL-SCNC: 99 MMOL/L (ref 98–107)
CO2 SERPL-SCNC: 24 MMOL/L (ref 22–29)
CREAT SERPL-MCNC: 0.88 MG/DL (ref 0.76–1.27)
D-LACTATE SERPL-SCNC: 1 MMOL/L (ref 0.5–2)
DEPRECATED RDW RBC AUTO: 37.6 FL (ref 37–54)
EGFRCR SERPLBLD CKD-EPI 2021: 109.4 ML/MIN/1.73
ERYTHROCYTE [DISTWIDTH] IN BLOOD BY AUTOMATED COUNT: 12.6 % (ref 12.3–15.4)
GLOBULIN UR ELPH-MCNC: 2.9 GM/DL
GLUCOSE SERPL-MCNC: 116 MG/DL (ref 65–99)
HCT VFR BLD AUTO: 33.7 % (ref 37.5–51)
HGB BLD-MCNC: 12.1 G/DL (ref 13–17.7)
MAGNESIUM SERPL-MCNC: 1.6 MG/DL (ref 1.6–2.6)
MCH RBC QN AUTO: 29.4 PG (ref 26.6–33)
MCHC RBC AUTO-ENTMCNC: 35.9 G/DL (ref 31.5–35.7)
MCV RBC AUTO: 82 FL (ref 79–97)
PHOSPHATE SERPL-MCNC: 2.4 MG/DL (ref 2.5–4.5)
PLATELET # BLD AUTO: 57 10*3/MM3 (ref 140–450)
PMV BLD AUTO: 11.3 FL (ref 6–12)
POTASSIUM SERPL-SCNC: 3.6 MMOL/L (ref 3.5–5.2)
POTASSIUM SERPL-SCNC: 3.6 MMOL/L (ref 3.5–5.2)
PROT SERPL-MCNC: 6.1 G/DL (ref 6–8.5)
RBC # BLD AUTO: 4.11 10*6/MM3 (ref 4.14–5.8)
SODIUM SERPL-SCNC: 131 MMOL/L (ref 136–145)
TRIGL SERPL-MCNC: 74 MG/DL (ref 0–150)
WBC NRBC COR # BLD: 8.48 10*3/MM3 (ref 3.4–10.8)

## 2023-11-15 PROCEDURE — 85027 COMPLETE CBC AUTOMATED: CPT | Performed by: HOSPITALIST

## 2023-11-15 PROCEDURE — 87040 BLOOD CULTURE FOR BACTERIA: CPT | Performed by: NURSE PRACTITIONER

## 2023-11-15 PROCEDURE — 83735 ASSAY OF MAGNESIUM: CPT | Performed by: HOSPITALIST

## 2023-11-15 PROCEDURE — 83605 ASSAY OF LACTIC ACID: CPT | Performed by: NURSE PRACTITIONER

## 2023-11-15 PROCEDURE — 25010000002 CEFTRIAXONE PER 250 MG: Performed by: NURSE PRACTITIONER

## 2023-11-15 PROCEDURE — 80053 COMPREHEN METABOLIC PANEL: CPT | Performed by: HOSPITALIST

## 2023-11-15 PROCEDURE — 25010000002 THIAMINE PER 100 MG: Performed by: HOSPITALIST

## 2023-11-15 PROCEDURE — 84100 ASSAY OF PHOSPHORUS: CPT | Performed by: HOSPITALIST

## 2023-11-15 PROCEDURE — 76705 ECHO EXAM OF ABDOMEN: CPT

## 2023-11-15 PROCEDURE — 36415 COLL VENOUS BLD VENIPUNCTURE: CPT | Performed by: HOSPITALIST

## 2023-11-15 PROCEDURE — 25810000003 LACTATED RINGERS PER 1000 ML: Performed by: HOSPITALIST

## 2023-11-15 PROCEDURE — 84478 ASSAY OF TRIGLYCERIDES: CPT | Performed by: HOSPITALIST

## 2023-11-15 PROCEDURE — 84132 ASSAY OF SERUM POTASSIUM: CPT | Performed by: STUDENT IN AN ORGANIZED HEALTH CARE EDUCATION/TRAINING PROGRAM

## 2023-11-15 PROCEDURE — 25010000002 LABETALOL 5 MG/ML SOLUTION: Performed by: STUDENT IN AN ORGANIZED HEALTH CARE EDUCATION/TRAINING PROGRAM

## 2023-11-15 PROCEDURE — 63710000001 DIPHENHYDRAMINE PER 50 MG: Performed by: EMERGENCY MEDICINE

## 2023-11-15 PROCEDURE — 0 HYDROMORPHONE 1 MG/ML SOLUTION: Performed by: NURSE PRACTITIONER

## 2023-11-15 RX ORDER — POTASSIUM CHLORIDE 750 MG/1
40 CAPSULE, EXTENDED RELEASE ORAL EVERY 4 HOURS
Qty: 8 CAPSULE | Refills: 0 | Status: COMPLETED | OUTPATIENT
Start: 2023-11-15 | End: 2023-11-15

## 2023-11-15 RX ORDER — LORAZEPAM 1 MG/1
1 TABLET ORAL EVERY 6 HOURS
Qty: 8 TABLET | Refills: 0 | Status: COMPLETED | OUTPATIENT
Start: 2023-11-16 | End: 2023-11-17

## 2023-11-15 RX ORDER — DIPHENHYDRAMINE HCL 25 MG
25 CAPSULE ORAL ONCE
Status: COMPLETED | OUTPATIENT
Start: 2023-11-15 | End: 2023-11-15

## 2023-11-15 RX ORDER — HEPARIN SODIUM 5000 [USP'U]/ML
5000 INJECTION, SOLUTION INTRAVENOUS; SUBCUTANEOUS EVERY 8 HOURS SCHEDULED
Status: DISCONTINUED | OUTPATIENT
Start: 2023-11-15 | End: 2023-11-15

## 2023-11-15 RX ORDER — TERAZOSIN 5 MG/1
5 CAPSULE ORAL NIGHTLY
Status: DISCONTINUED | OUTPATIENT
Start: 2023-11-15 | End: 2023-11-17 | Stop reason: HOSPADM

## 2023-11-15 RX ORDER — AMLODIPINE BESYLATE 10 MG/1
10 TABLET ORAL NIGHTLY
Status: DISCONTINUED | OUTPATIENT
Start: 2023-11-15 | End: 2023-11-17 | Stop reason: HOSPADM

## 2023-11-15 RX ORDER — LORAZEPAM 2 MG/ML
2 INJECTION INTRAMUSCULAR
Status: DISCONTINUED | OUTPATIENT
Start: 2023-11-15 | End: 2023-11-17 | Stop reason: HOSPADM

## 2023-11-15 RX ORDER — SODIUM CHLORIDE 0.9 % (FLUSH) 0.9 %
10 SYRINGE (ML) INJECTION AS NEEDED
Status: DISCONTINUED | OUTPATIENT
Start: 2023-11-15 | End: 2023-11-15 | Stop reason: SDUPTHER

## 2023-11-15 RX ORDER — THIAMINE HYDROCHLORIDE 100 MG/ML
200 INJECTION, SOLUTION INTRAMUSCULAR; INTRAVENOUS EVERY 8 HOURS SCHEDULED
Status: DISCONTINUED | OUTPATIENT
Start: 2023-11-15 | End: 2023-11-17 | Stop reason: HOSPADM

## 2023-11-15 RX ORDER — LOSARTAN POTASSIUM 50 MG/1
25 TABLET ORAL NIGHTLY
Status: DISCONTINUED | OUTPATIENT
Start: 2023-11-15 | End: 2023-11-17 | Stop reason: HOSPADM

## 2023-11-15 RX ORDER — METOPROLOL TARTRATE 50 MG/1
50 TABLET, FILM COATED ORAL EVERY 12 HOURS SCHEDULED
Status: DISCONTINUED | OUTPATIENT
Start: 2023-11-15 | End: 2023-11-17 | Stop reason: HOSPADM

## 2023-11-15 RX ORDER — NICOTINE 21 MG/24HR
1 PATCH, TRANSDERMAL 24 HOURS TRANSDERMAL EVERY 24 HOURS
Status: DISCONTINUED | OUTPATIENT
Start: 2023-11-15 | End: 2023-11-17 | Stop reason: HOSPADM

## 2023-11-15 RX ORDER — ACETAMINOPHEN 325 MG/1
650 TABLET ORAL EVERY 6 HOURS PRN
Status: DISCONTINUED | OUTPATIENT
Start: 2023-11-15 | End: 2023-11-17 | Stop reason: HOSPADM

## 2023-11-15 RX ORDER — BISACODYL 10 MG
10 SUPPOSITORY, RECTAL RECTAL DAILY PRN
Status: DISCONTINUED | OUTPATIENT
Start: 2023-11-15 | End: 2023-11-17 | Stop reason: HOSPADM

## 2023-11-15 RX ORDER — SODIUM CHLORIDE 0.9 % (FLUSH) 0.9 %
10 SYRINGE (ML) INJECTION EVERY 12 HOURS SCHEDULED
Status: DISCONTINUED | OUTPATIENT
Start: 2023-11-15 | End: 2023-11-17 | Stop reason: HOSPADM

## 2023-11-15 RX ORDER — ONDANSETRON 2 MG/ML
4 INJECTION INTRAMUSCULAR; INTRAVENOUS EVERY 6 HOURS PRN
Status: DISCONTINUED | OUTPATIENT
Start: 2023-11-15 | End: 2023-11-17 | Stop reason: HOSPADM

## 2023-11-15 RX ORDER — PANTOPRAZOLE SODIUM 40 MG/1
40 TABLET, DELAYED RELEASE ORAL
Status: DISCONTINUED | OUTPATIENT
Start: 2023-11-15 | End: 2023-11-17 | Stop reason: HOSPADM

## 2023-11-15 RX ORDER — POLYETHYLENE GLYCOL 3350 17 G/17G
17 POWDER, FOR SOLUTION ORAL DAILY PRN
Status: DISCONTINUED | OUTPATIENT
Start: 2023-11-15 | End: 2023-11-17 | Stop reason: HOSPADM

## 2023-11-15 RX ORDER — AMOXICILLIN 250 MG
2 CAPSULE ORAL 2 TIMES DAILY
Status: DISCONTINUED | OUTPATIENT
Start: 2023-11-15 | End: 2023-11-17 | Stop reason: HOSPADM

## 2023-11-15 RX ORDER — HYDRALAZINE HYDROCHLORIDE 25 MG/1
25 TABLET, FILM COATED ORAL 3 TIMES DAILY
Status: DISCONTINUED | OUTPATIENT
Start: 2023-11-15 | End: 2023-11-17 | Stop reason: HOSPADM

## 2023-11-15 RX ORDER — LORAZEPAM 1 MG/1
2 TABLET ORAL
Status: DISCONTINUED | OUTPATIENT
Start: 2023-11-15 | End: 2023-11-17 | Stop reason: HOSPADM

## 2023-11-15 RX ORDER — FOLIC ACID 1 MG/1
1 TABLET ORAL DAILY
Status: DISCONTINUED | OUTPATIENT
Start: 2023-11-15 | End: 2023-11-17 | Stop reason: HOSPADM

## 2023-11-15 RX ORDER — LABETALOL HYDROCHLORIDE 5 MG/ML
20 INJECTION, SOLUTION INTRAVENOUS EVERY 4 HOURS PRN
Status: DISCONTINUED | OUTPATIENT
Start: 2023-11-15 | End: 2023-11-17 | Stop reason: HOSPADM

## 2023-11-15 RX ORDER — SODIUM CHLORIDE 9 MG/ML
40 INJECTION, SOLUTION INTRAVENOUS AS NEEDED
Status: DISCONTINUED | OUTPATIENT
Start: 2023-11-15 | End: 2023-11-17 | Stop reason: HOSPADM

## 2023-11-15 RX ORDER — HYDROMORPHONE HYDROCHLORIDE 1 MG/ML
0.5 INJECTION, SOLUTION INTRAMUSCULAR; INTRAVENOUS; SUBCUTANEOUS
Status: DISCONTINUED | OUTPATIENT
Start: 2023-11-15 | End: 2023-11-17 | Stop reason: HOSPADM

## 2023-11-15 RX ORDER — LORAZEPAM 1 MG/1
2 TABLET ORAL EVERY 6 HOURS
Qty: 16 TABLET | Refills: 0 | Status: DISPENSED | OUTPATIENT
Start: 2023-11-15 | End: 2023-11-16

## 2023-11-15 RX ORDER — OXYCODONE HYDROCHLORIDE 5 MG/1
5 TABLET ORAL EVERY 4 HOURS PRN
Status: DISCONTINUED | OUTPATIENT
Start: 2023-11-15 | End: 2023-11-17 | Stop reason: HOSPADM

## 2023-11-15 RX ORDER — LORAZEPAM 1 MG/1
1 TABLET ORAL
Status: DISCONTINUED | OUTPATIENT
Start: 2023-11-15 | End: 2023-11-17 | Stop reason: HOSPADM

## 2023-11-15 RX ORDER — LORAZEPAM 2 MG/ML
1 INJECTION INTRAMUSCULAR
Status: DISCONTINUED | OUTPATIENT
Start: 2023-11-15 | End: 2023-11-17 | Stop reason: HOSPADM

## 2023-11-15 RX ORDER — BISACODYL 5 MG/1
5 TABLET, DELAYED RELEASE ORAL DAILY PRN
Status: DISCONTINUED | OUTPATIENT
Start: 2023-11-15 | End: 2023-11-17 | Stop reason: HOSPADM

## 2023-11-15 RX ORDER — SODIUM CHLORIDE, SODIUM LACTATE, POTASSIUM CHLORIDE, CALCIUM CHLORIDE 600; 310; 30; 20 MG/100ML; MG/100ML; MG/100ML; MG/100ML
100 INJECTION, SOLUTION INTRAVENOUS CONTINUOUS
Status: DISCONTINUED | OUTPATIENT
Start: 2023-11-15 | End: 2023-11-17

## 2023-11-15 RX ADMIN — SODIUM CHLORIDE, POTASSIUM CHLORIDE, SODIUM LACTATE AND CALCIUM CHLORIDE 150 ML/HR: 600; 310; 30; 20 INJECTION, SOLUTION INTRAVENOUS at 06:44

## 2023-11-15 RX ADMIN — LABETALOL HYDROCHLORIDE 20 MG: 5 INJECTION INTRAVENOUS at 11:38

## 2023-11-15 RX ADMIN — FOLIC ACID 1 MG: 1 TABLET ORAL at 09:01

## 2023-11-15 RX ADMIN — POTASSIUM CHLORIDE 40 MEQ: 10 CAPSULE, COATED, EXTENDED RELEASE ORAL at 16:02

## 2023-11-15 RX ADMIN — HYDROMORPHONE HYDROCHLORIDE 1 MG: 1 INJECTION, SOLUTION INTRAMUSCULAR; INTRAVENOUS; SUBCUTANEOUS at 01:37

## 2023-11-15 RX ADMIN — LORAZEPAM 2 MG: 1 TABLET ORAL at 17:31

## 2023-11-15 RX ADMIN — POTASSIUM CHLORIDE 40 MEQ: 10 CAPSULE, COATED, EXTENDED RELEASE ORAL at 11:39

## 2023-11-15 RX ADMIN — TERAZOSIN HYDROCHLORIDE 5 MG: 5 CAPSULE ORAL at 21:27

## 2023-11-15 RX ADMIN — LORAZEPAM 2 MG: 1 TABLET ORAL at 06:27

## 2023-11-15 RX ADMIN — THIAMINE HYDROCHLORIDE 200 MG: 100 INJECTION, SOLUTION INTRAMUSCULAR; INTRAVENOUS at 21:27

## 2023-11-15 RX ADMIN — SERTRALINE HYDROCHLORIDE 50 MG: 50 TABLET, FILM COATED ORAL at 09:02

## 2023-11-15 RX ADMIN — METOPROLOL TARTRATE 50 MG: 50 TABLET, FILM COATED ORAL at 09:01

## 2023-11-15 RX ADMIN — HYDRALAZINE HYDROCHLORIDE 25 MG: 25 TABLET, FILM COATED ORAL at 16:02

## 2023-11-15 RX ADMIN — SODIUM CHLORIDE, POTASSIUM CHLORIDE, SODIUM LACTATE AND CALCIUM CHLORIDE 150 ML/HR: 600; 310; 30; 20 INJECTION, SOLUTION INTRAVENOUS at 19:37

## 2023-11-15 RX ADMIN — LOSARTAN POTASSIUM 25 MG: 50 TABLET, FILM COATED ORAL at 21:27

## 2023-11-15 RX ADMIN — LORAZEPAM 2 MG: 1 TABLET ORAL at 11:39

## 2023-11-15 RX ADMIN — Medication 10 ML: at 09:02

## 2023-11-15 RX ADMIN — AMLODIPINE BESYLATE 10 MG: 10 TABLET ORAL at 21:27

## 2023-11-15 RX ADMIN — CEFTRIAXONE 1000 MG: 1 INJECTION, POWDER, FOR SOLUTION INTRAMUSCULAR; INTRAVENOUS at 01:34

## 2023-11-15 RX ADMIN — PANTOPRAZOLE SODIUM 40 MG: 40 TABLET, DELAYED RELEASE ORAL at 06:01

## 2023-11-15 RX ADMIN — HYDRALAZINE HYDROCHLORIDE 25 MG: 25 TABLET, FILM COATED ORAL at 21:27

## 2023-11-15 RX ADMIN — SODIUM CHLORIDE, POTASSIUM CHLORIDE, SODIUM LACTATE AND CALCIUM CHLORIDE 150 ML/HR: 600; 310; 30; 20 INJECTION, SOLUTION INTRAVENOUS at 12:21

## 2023-11-15 RX ADMIN — HYDRALAZINE HYDROCHLORIDE 25 MG: 25 TABLET, FILM COATED ORAL at 09:01

## 2023-11-15 RX ADMIN — THIAMINE HYDROCHLORIDE 200 MG: 100 INJECTION, SOLUTION INTRAMUSCULAR; INTRAVENOUS at 13:30

## 2023-11-15 RX ADMIN — DIPHENHYDRAMINE HYDROCHLORIDE 25 MG: 25 CAPSULE ORAL at 02:40

## 2023-11-15 RX ADMIN — NICOTINE 1 PATCH: 14 PATCH, EXTENDED RELEASE TRANSDERMAL at 06:28

## 2023-11-15 RX ADMIN — THIAMINE HYDROCHLORIDE 200 MG: 100 INJECTION, SOLUTION INTRAMUSCULAR; INTRAVENOUS at 06:01

## 2023-11-15 RX ADMIN — METOPROLOL TARTRATE 50 MG: 50 TABLET, FILM COATED ORAL at 21:27

## 2023-11-15 RX ADMIN — Medication 10 ML: at 21:28

## 2023-11-15 NOTE — PAYOR COMM NOTE
"James Taylor (43 y.o. Male)  [id number]  admit 11/14  [facility] phone    fax          Date of Birth   1980    Social Security Number       Address   [address] [place] [address]  [place] [address]    Home Phone   [phone]    MRN   [id number]       Hindu   Religious    Marital Status   Single                            Admission Date   11/14/23    Admission Type   Emergency    Admitting Provider   Angie Reilly MD    Attending Provider   Flory Hernandez MD    Department, Room/Bed   [facility] 3C, 396/1       Discharge Date       Discharge Disposition       Discharge Destination                                 Attending Provider: Flory Hernandez MD    Allergies: No Known Allergies    Isolation: None   Infection: None   Code Status: CPR    Ht: 177.8 cm (70\")   Wt: 72.1 kg (159 lb)    Admission Cmt: None   Principal Problem: Acute pancreatitis [K85.90]                   Active Insurance as of 11/14/2023       Primary Coverage       Payor Plan Insurance Group Employer/Plan Group    [place] [id number]       Payor Plan Address Payor Plan Phone Number Payor Plan Fax Number Effective Dates    [employer] [address] [address] [phone]  1/1/2021 - None Entered    [facility], located within [facility] [address]         Subscriber Name Subscriber Birth Date Member ID       JAMES TAYLOR 1980 [id number]                     Emergency Contacts        (Rel.) Home Phone Work Phone Mobile Phone    Jesus Taylor (Mother) [phone] -- --    Alfredo Taylor (Brother) -- -- [phone]    LucasGuillermina hyman (Sister) -- -- [phone]    Sidra Tompkins (Significant Other) -- -- [phone]                 History & Physical        Angie Reilly MD at 11/15/23 0540              [facility]  HISTORY AND PHYSICAL    Date of Admission: 11/14/2023  Primary Care Physician: Casi Skinner " NAM Royal    Subjective   Primary Historian: patient    Chief Complaint: Abdominal pain    Abdominal Pain      43 years old man with a history of hypertension, tobacco abuse and alcohol abuse, depression, 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  In ER he was found with slightly elevated bilirubin and transaminase and lipase, CT abdomen and pelvis showing acute pancreatitis.  The patient had 1 L normal saline IV bolus and Dilaudid for pain, ceftriaxone for possible UTI.  Gallbladder ultrasound was done-preliminary report-no cholecystitis or choledocholithiasis.    I examined the patient in the emergency room, currently feeling better with minimal abdominal pain, no abdominal distention, no nausea or vomiting.  Denies prior episodes of pancreatitis.  Patient admitted drinking beer daily, last alcohol intake was on Saturday.        Review of Systems   HENT: Negative.     Eyes: Negative.    Respiratory: Negative.     Cardiovascular: Negative.    Gastrointestinal:  Positive for abdominal pain.   Endocrine: Negative.    Genitourinary: Negative.    Musculoskeletal: Negative.    Skin: Negative.    Allergic/Immunologic: Negative.    Neurological:  Positive for weakness.        Left hemiparesis posthemorrhagic stroke   Hematological: Negative.    Psychiatric/Behavioral: Negative.        Otherwise complete ROS reviewed and negative except as mentioned in the HPI.    Past Medical History:   Past Medical History:   Diagnosis Date    Asthma     Headache     Hypertension      Past Surgical History:History reviewed. No pertinent surgical history.  Social History:  reports that he has been smoking cigarettes. He started smoking about 11 years ago. He has a 15.00 pack-year smoking history. He has been exposed to tobacco smoke. He has never used smokeless tobacco. He reports  "current alcohol use of about 4.0 standard drinks of alcohol per week. He reports that he does not use drugs.    Family History: family history includes Anxiety disorder in his mother; Cancer in his father; Diabetes in his mother; Hyperlipidemia in his father and mother.         Allergies:  No Known Allergies    Medications:  Prior to Admission medications    Medication Sig Start Date End Date Taking? Authorizing Provider   amLODIPine (NORVASC) 10 MG tablet Take 1 tablet by mouth Every Night. 8/16/23   Casi Skinner APRN   Blood Pressure Monitoring (Blood Pressure Cuff) misc Take blood pressure once/day, different times of the day and write down 6/5/23   Tosin Newton APRN   hydrALAZINE (APRESOLINE) 25 MG tablet Take 1 tablet by mouth 3 (Three) Times a Day. 3/17/23   ProviderJohnie MD   losartan (COZAAR) 25 MG tablet Take 1 tablet by mouth Every Night. 7/20/23   Casi Skinner APRN   metoprolol tartrate (LOPRESSOR) 50 MG tablet Take 1 tablet by mouth Every 12 (Twelve) Hours. 3/10/23   Feliciano Boothe APRN   naltrexone (DEPADE) 50 MG tablet Take 1 tablet by mouth Daily. 7/28/23   Casi Skinner APRN   sertraline (ZOLOFT) 50 MG tablet TAKE 1 TABLET BY MOUTH DAILY. TO HELP WITH MOOD. TAKE WITH FOOD 6/12/23   Beka Arrieta,    terazosin (HYTRIN) 5 MG capsule Take 1 capsule by mouth Every Night. 3/10/23   Feliciano Boothe APRN   triamcinolone (KENALOG) 0.1 % cream  8/9/23 11/15/23  ProviderJohnie MD     I have utilized all available immediate resources to obtain, update, or review the patient's current medications (including all prescriptions, over-the-counter products, herbals, cannabis/cannabidiol products, and vitamin/mineral/dietary (nutritional) supplements).    Objective     Vital Signs: BP (!) 184/111   Pulse 97   Temp 98.6 °F (37 °C) (Temporal)   Resp 18   Ht 177.8 cm (70\")   Wt 68.9 kg (152 lb)   SpO2 98%   BMI 21.81 kg/m²   Physical " Exam  Vitals reviewed.   Constitutional:       Appearance: He is normal weight.   HENT:      Head: Normocephalic and atraumatic.      Right Ear: External ear normal.      Left Ear: External ear normal.      Nose: Nose normal.      Mouth/Throat:      Mouth: Mucous membranes are moist.      Pharynx: Oropharynx is clear.   Eyes:      Extraocular Movements: Extraocular movements intact.      Conjunctiva/sclera: Conjunctivae normal.      Pupils: Pupils are equal, round, and reactive to light.   Cardiovascular:      Rate and Rhythm: Normal rate and regular rhythm.      Pulses: Normal pulses.      Heart sounds: Normal heart sounds.   Pulmonary:      Effort: Pulmonary effort is normal.      Breath sounds: Normal breath sounds.   Abdominal:      General: Abdomen is flat. Bowel sounds are normal.      Palpations: Abdomen is soft.      Tenderness: There is abdominal tenderness.      Comments: Mild tenderness to deep palpation on the right upper quadrant   Musculoskeletal:         General: Normal range of motion.      Cervical back: Normal range of motion and neck supple.   Skin:     General: Skin is warm.      Capillary Refill: Capillary refill takes less than 2 seconds.   Neurological:      Mental Status: He is alert and oriented to person, place, and time. Mental status is at baseline.      Motor: Weakness present.      Comments: Left hemiparesis-history of basal ganglia hemorrhagic stroke   Psychiatric:         Mood and Affect: Mood normal.         Behavior: Behavior normal.         Thought Content: Thought content normal.         Judgment: Judgment normal.              Results Reviewed:  Lab Results (last 24 hours)       Procedure Component Value Units Date/Time    Lactic Acid, Plasma [[id number]]  (Normal) Collected: 11/15/23 0130    Specimen: Blood Updated: 11/15/23 0203     Lactate 1.0 mmol/L     Blood Culture - Blood, Arm, Right [[id number]] Collected: 11/15/23 0130    Specimen: Blood from Arm, Right Updated: 11/15/23  0146    Blood Culture - Blood, Arm, Right [[id number]] Collected: 11/15/23 0130    Specimen: Blood from Arm, Right Updated: 11/15/23 0145    CBC & Differential [[id number]]  (Abnormal) Collected: 11/14/23 2004    Specimen: Blood Updated: 11/14/23 2118    Narrative:      The following orders were created for panel order CBC & Differential.  Procedure                               Abnormality         Status                     ---------                               -----------         ------                     CBC Auto Differential[[id number]]        Abnormal            Final result                 Please view results for these tests on the individual orders.    CBC Auto Differential [[id number]]  (Abnormal) Collected: 11/14/23 2004    Specimen: Blood Updated: 11/14/23 2118     WBC 8.91 10*3/mm3      RBC 5.54 10*6/mm3      Hemoglobin 16.3 g/dL      Hematocrit 45.0 %      MCV 81.2 fL      MCH 29.4 pg      MCHC 36.2 g/dL      RDW 12.5 %      RDW-SD 36.9 fl      MPV 11.6 fL      Platelets 81 10*3/mm3     Manual Differential [[id number]]  (Abnormal) Collected: 11/14/23 2004    Specimen: Blood Updated: 11/14/23 2118     Neutrophil % 61.0 %      Lymphocyte % 31.0 %      Monocyte % 1.0 %      Eosinophil % 4.0 %      Atypical Lymphocyte % 3.0 %      Neutrophils Absolute 5.44 10*3/mm3      Lymphocytes Absolute 3.03 10*3/mm3      Monocytes Absolute 0.09 10*3/mm3      Eosinophils Absolute 0.36 10*3/mm3      Anisocytosis Mod/2+     Polychromasia Slight/1+     Target Cells Large/3+     WBC Morphology Normal     Platelet Estimate Decreased    Lipase [[id number]]  (Abnormal) Collected: 11/14/23 2004    Specimen: Blood Updated: 11/14/23 2048     Lipase 1,044 U/L     Comprehensive Metabolic Panel [[id number]]  (Abnormal) Collected: 11/14/23 2004    Specimen: Blood Updated: 11/14/23 2042     Glucose 117 mg/dL      BUN 10 mg/dL      Creatinine 1.06 mg/dL      Sodium 133 mmol/L      Potassium 3.2 mmol/L      Chloride 95 mmol/L      CO2 28.0  mmol/L      Calcium 9.7 mg/dL      Total Protein 8.2 g/dL      Albumin 4.5 g/dL      ALT (SGPT) 161 U/L      AST (SGOT) 263 U/L      Alkaline Phosphatase 149 U/L      Total Bilirubin 2.3 mg/dL      Globulin 3.7 gm/dL      A/G Ratio 1.2 g/dL      BUN/Creatinine Ratio 9.4     Anion Gap 10.0 mmol/L      eGFR 89.3 mL/min/1.73     Narrative:      GFR Normal >60  Chronic Kidney Disease <60  Kidney Failure <15      Ethanol [[id number]] Collected: 11/14/23 2004    Specimen: Blood Updated: 11/14/23 2037     Ethanol % <0.010 %     Narrative:      Not for legal purposes. Chain of Custody not followed.     Urinalysis With Microscopic If Indicated (No Culture) - Urine, Clean Catch [[id number]]  (Abnormal) Collected: 11/14/23 1942    Specimen: Urine, Clean Catch Updated: 11/14/23 2030     Color, UA [place]     Appearance, UA Clear     pH, UA <=5.0     Specific Gravity, UA >1.030     Glucose, UA Negative     Ketones, UA Negative     Bilirubin, UA Small (1+)     Blood, UA Negative     Protein, UA 30 mg/dL (1+)     Leuk Esterase, UA Small (1+)     Nitrite, UA Positive     Urobilinogen, UA 1.0 E.U./dL    Narrative:      Dipstick results may be inaccurate due to color interference.    Urinalysis, Microscopic Only - Urine, Clean Catch [[id number]]  (Abnormal) Collected: 11/14/23 1942    Specimen: Urine, Clean Catch Updated: 11/14/23 2030     RBC, UA 0-2 /HPF      WBC, UA 0-2 /HPF      Bacteria, UA Trace /HPF      Squamous Epithelial Cells, UA 0-2 /HPF      Hyaline Casts, UA 0-2 /LPF      Granular Casts, UA 0-2 /LPF      Mucus, UA Small/1+ /HPF      Methodology Manual Light Microscopy          Imaging Results (Last 24 Hours)       Procedure Component Value Units Date/Time     Gallbladder [[id number]] Resulted: 11/15/23 0320     Updated: 11/15/23 0320    CT Abdomen Pelvis With Contrast [[id number]] Collected: 11/15/23 0034     Updated: 11/15/23 0046    Narrative:      EXAMINATION:  CT ABDOMEN PELVIS W CONTRAST-  11/14/2023 9:41 PM CST      HISTORY: Epigastric abdominal pain.     TECHNIQUE: Spiral CT was performed of the abdomen and pelvis with IV  contrast. Multiplanar images were reconstructed.     DLP: 191 mGy-cm. Automated dosage reduction technique was utilized to  reduce patient dose.     COMPARISON: 3/5/2023.     LUNG BASES: The lung bases are clear.     LIVER AND SPLEEN: There is fatty infiltration of the liver. There are no  dense gallstones. The spleen is unremarkable.     PANCREAS: There is stranding of the fat around the pancreas. The  pancreas enhances normally. There is fluid density in the anterior  pararenal spaces bilaterally.     KIDNEYS AND ADRENALS: The kidneys and adrenal glands demonstrate no  focal abnormality. The urinary bladder is not well distended.     BOWEL: There is thickening of the gastric wall. There is under  distention of the stomach. There is mild thickening of the wall of the  hepatic flexure of the colon. There is under distention of portions of  the colon. There is thickening of the wall of the rectum and anus versus  artifact of under distention.     OTHER: There is atheromatous disease of the aortoiliac vessels. There is  free fluid in the right paracolic gutter. There is small to moderate  free fluid in the pelvis. The prostate is prominent in size measuring  4.9 cm transversely. There are degenerative changes of the spine and  hips. There is subchondral cystic change in the femoral head/neck  junction on the right side. 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          Impression:      1. There is stranding of the fat around the pancreas with fluid in the  anterior pararenal spaces bilaterally. The pancreas enhances normally.  The findings are consistent with acute pancreatitis. Wall thickening of  the stomach and nearby hepatic flexure of the colon which may be  secondarily inflamed due to the pancreatitis.  2. Small amount of fluid in the  right paracolic gutter. Small to  moderate amount of free fluid in the pelvis. These findings are likely  related to the pancreatic inflammation.  3. Fatty infiltration of the liver.  4. Wall thickening of the rectum and anus versus artifact of under  distention. Short segment colitis is in the differential. Neoplasm less  likely.  5. Atheromatous disease of the aortoiliac vessels, mild.  6. Degenerative changes of the visualized spine and hips. Suggestion of  cam type CATRACHITA anatomy of both hips.        The full report of this exam was immediately signed and available to the  emergency room. The patient is currently in the emergency room.     This report was signed and finalized on 11/15/2023 12:43 AM CST by Dr. Claus Bhagat MD.             I have personally reviewed and interpreted the radiology studies and ECG obtained at time of admission.     Assessment / Plan   Assessment:   Active Hospital Problems    Diagnosis     **Acute pancreatitis        Treatment Plan  The patient will be admitted to my service here at [facility].     -Acute noncomplicated alcohol induced pancreatitis.  Symptoms started 2 days ago with abdominal pain.  History of alcohol abuse, on naltrexone.    Start clear liquid diet  Pain management    Will repeat triglyceride level-last triglyceride level in March was 589    -Alcohol abuse  No evidence of alcohol withdrawal at this time  Placed on CIWA protocol  I educated the patient to quit drinking alcohol    -Tobacco abuse, said she smokes about 7 cigarettes/day  Smoking cessation provided    -History of right basal ganglia hemorrhagic stroke in March 2023 with residual left hemiparesis  Outpatient PT and OT    -Hypertension, apparently not well controlled  Resume antihypertensive medication    -History of depression-on Zoloft.    -Patient is not septic and denies dysuria, doubt urinary tract infection.  We will not continue antibiotic treatment    -Hypokalemia, not replaced so far  in ER  Electrolyte replacement per protocol    -Bilateral lower extremity sequential compressive devices for DVT prophylaxis  Thrombocytopenia suspect secondary to alcoholic liver disease  Monitor CBC  No evidence of bleeding    -Abnormal LFTs with slightly elevated bilirubin and transaminase, suspect secondary to alcoholic liver disease.  Monitor LFTs.      Medical Decision Making  Number and Complexity of problems: 3      Conditions and Status        stable     MDM Data  External documents reviewed: yes  Cardiac tracing (EKG, telemetry) interpretation: yes  Radiology interpretation: yes  Labs reviewed: yes     Discussed with: ER provider and significant other present at bedside     Care Planning  Shared decision making: yes  Code status and discussions: full code    Disposition  Social Determinants of Health that impact treatment or disposition: no  Estimated length of stay is 2 days.     I confirmed that the patient's advanced care plan is present, code status is documented, and a surrogate decision maker is listed in the patient's medical record.      The patient was seen and examined by me on 11/15/2023 at 5:20 AM.    Electronically signed by Angie Reilly MD, 11/15/23, 05:40 CST.               Electronically signed by Angie Reilly MD at 11/15/23 0554          Emergency Department Notes        Geovanni Mendoza DO at 11/15/23 0106          Subjective   History of Present Illness  Patient is a 42 yo male who presents to the ER with complaints of epigastric abdominal pain. He states he has had sharp stabbing pain for the past few days. He admits he drinks beer daily however has been unable to drink since the pain began. He denies any vomiting or diarrhea.  He has had no recorded fevers.  He states he recently had an ultrasound of his liver which revealed a cyst.  Past medical history significant for asthma, hypertension, alcohol abuse        Review of Systems   Constitutional: Negative.   Negative for fever.   HENT: Negative.  Negative for congestion.    Eyes: Negative.    Respiratory: Negative.  Negative for cough and shortness of breath.    Cardiovascular: Negative.  Negative for chest pain.   Gastrointestinal:  Positive for abdominal pain and nausea. Negative for constipation, diarrhea and vomiting.   Genitourinary: Negative.  Negative for dysuria.   Musculoskeletal: Negative.  Negative for back pain.   Skin: Negative.  Negative for rash.   All other systems reviewed and are negative.      Past Medical History:   Diagnosis Date    Asthma     Headache     Hypertension        No Known Allergies    History reviewed. No pertinent surgical history.    Family History   Problem Relation Age of Onset    Anxiety disorder Mother     Diabetes Mother     Hyperlipidemia Mother     Cancer Father     Hyperlipidemia Father     Colon cancer Neg Hx     Colon polyps Neg Hx        Social History     Socioeconomic History    Marital status: Single   Tobacco Use    Smoking status: Every Day     Packs/day: 1.00     Years: 15.00     Additional pack years: 0.00     Total pack years: 15.00     Types: Cigarettes     Start date: 4/2/2012     Passive exposure: Current    Smokeless tobacco: Never    Tobacco comments:     AVS   Vaping Use    Vaping Use: Never used   Substance and Sexual Activity    Alcohol use: Yes     Alcohol/week: 4.0 standard drinks of alcohol     Types: 4 Cans of beer per week     Comment: on the weekends    Drug use: No    Sexual activity: Yes     Partners: Female     Birth control/protection: Condom, Same-sex partner           Objective   Physical Exam  Vitals and nursing note reviewed.   Constitutional:       General: He is in acute distress.      Appearance: He is well-developed. He is not diaphoretic.   HENT:      Head: Normocephalic and atraumatic.      Nose: Nose normal.   Eyes:      General: No scleral icterus.     Conjunctiva/sclera: Conjunctivae normal.      Pupils: Pupils are equal, round, and  reactive to light.   Neck:      Thyroid: No thyromegaly.      Vascular: No JVD.   Cardiovascular:      Rate and Rhythm: Normal rate and regular rhythm.      Heart sounds: Normal heart sounds. No murmur heard.  Pulmonary:      Effort: Pulmonary effort is normal. No respiratory distress.      Breath sounds: Normal breath sounds. No wheezing or rales.   Chest:      Chest wall: No tenderness.   Abdominal:      General: Bowel sounds are normal. There is no distension.      Palpations: Abdomen is soft. There is no mass.      Tenderness: There is abdominal tenderness in the epigastric area. There is no guarding or rebound.   Musculoskeletal:         General: Normal range of motion.      Cervical back: Normal range of motion and neck supple.   Lymphadenopathy:      Cervical: No cervical adenopathy.   Skin:     General: Skin is warm and dry.      Coloration: Skin is not pale.      Findings: No erythema or rash.   Neurological:      General: No focal deficit present.      Mental Status: He is alert and oriented to person, place, and time.      Cranial Nerves: No cranial nerve deficit.      Coordination: Coordination normal.      Deep Tendon Reflexes: Reflexes are normal and symmetric.   Psychiatric:         Mood and Affect: Mood normal.         Behavior: Behavior normal.         Thought Content: Thought content normal.         Judgment: Judgment normal.         Procedures          ED Course  ED Course as of 11/15/23 0147   Wed Nov 15, 2023   0143 Discussed with Dr. Reilly. Requests gallbladder ultrasound as well prior to admission due to elevated bilirubin.  [TW]   0146 Work up remains pending. This will be a turn over for Dr. Mendoza. [TW]      ED Course User Index  [TW] Diana Gutierrez APRN                                           Medical Decision Making  Patient is a 44 yo male who presents to the ER with complaints of epigastric abdominal pain. He states he has had sharp stabbing pain for the past few days. He  admits he drinks beer daily however has been unable to drink since the pain began. He denies any vomiting or diarrhea.  He has had no recorded fevers.  He states he recently had an ultrasound of his liver which revealed a cyst.  Past medical history significant for asthma, hypertension, alcohol abuse  Differential diagnosis: Pancreatitis, GERD, cholecystitis, and other    Pt stable in EC - NAD att.  US with no acute findings per [facility].  D/w Dr. Reilly - will admit for further mgmt.    Amount and/or Complexity of Data Reviewed  Labs: ordered.  Radiology: ordered.    Risk  Prescription drug management.        Final diagnoses:   Alcohol-induced acute pancreatitis, unspecified complication status       ED Disposition  ED Disposition       ED Disposition   Decision to Admit    Condition   --    Comment   --               No follow-up provider specified.       Medication List      No changes were made to your prescriptions during this visit.            Geovanni Mendoza DO  11/15/23 0525      Electronically signed by Geovanni Mendoza DO at 11/15/23 0525       Vital Signs (last day)       Date/Time Temp Temp src Pulse Resp BP Patient Position SpO2    11/15/23 0755 97.5 (36.4) Oral 84 14 187/108 Lying 98    11/15/23 0636 97.9 (36.6) Oral 93 16 190/110 Sitting 99    11/15/23 0339 -- -- -- -- 184/111 -- --    11/14/23 1843 98.6 (37) Temporal 97 18 185/125 Sitting 98          Current Facility-Administered Medications   Medication Dose Route Frequency Provider Last Rate Last Admin    acetaminophen (TYLENOL) tablet 650 mg  650 mg Oral Q6H PRN Angie Reilly MD        amLODIPine (NORVASC) tablet 10 mg  10 mg Oral Nightly Angie Reilly MD        sennosides-docusate (PERICOLACE) 8.6-50 MG per tablet 2 tablet  2 tablet Oral BID Angie Reilly MD        And    polyethylene glycol (MIRALAX) packet 17 g  17 g Oral Daily PRN Angie Reilly MD        And    bisacodyl (DULCOLAX) EC tablet 5 mg  5 mg  Oral Daily PRN Angie Reilly MD        And    bisacodyl (DULCOLAX) suppository 10 mg  10 mg Rectal Daily PRN Angie Reilly MD        Calcium Replacement - Follow Nurse / BPA Driven Protocol   Does not apply PRN Angie Reilly MD        folic acid (FOLVITE) tablet 1 mg  1 mg Oral Daily Angie Reilly MD   1 mg at 11/15/23 0901    hydrALAZINE (APRESOLINE) tablet 25 mg  25 mg Oral TID Angie Reilly MD   25 mg at 11/15/23 0901    HYDROmorphone (DILAUDID) injection 0.5 mg  0.5 mg Intravenous Q2H PRN Angie Reilly MD        labetalol (NORMODYNE,TRANDATE) injection 20 mg  20 mg Intravenous Q4H PRN Flory Hernandez MD        lactated ringers infusion  150 mL/hr Intravenous Continuous Angie Reilly  mL/hr at 11/15/23 0644 150 mL/hr at 11/15/23 0644    LORazepam (ATIVAN) tablet 1 mg  1 mg Oral Q1H PRN Angie Reilly MD        Or    LORazepam (ATIVAN) injection 1 mg  1 mg Intravenous Q1H PRN Angie Reilly MD        Or    LORazepam (ATIVAN) tablet 2 mg  2 mg Oral Q1H PRN Angie Reilly MD        Or    LORazepam (ATIVAN) injection 2 mg  2 mg Intravenous Q1H PRN Angie Reilly MD        Or    LORazepam (ATIVAN) injection 2 mg  2 mg Intravenous Q15 Min PRN Angie Reilly MD        Or    LORazepam (ATIVAN) injection 2 mg  2 mg Intramuscular Q15 Min PRN Angie Reilly MD        LORazepam (ATIVAN) tablet 2 mg  2 mg Oral Q6H Angie Reilly MD   2 mg at 11/15/23 0627    Followed by    [START ON 11/16/2023] LORazepam (ATIVAN) tablet 1 mg  1 mg Oral Q6H Angie Reilly MD        losartan (COZAAR) tablet 25 mg  25 mg Oral Nightly Angie Reilly MD        Magnesium Standard Dose Replacement - Follow Nurse / BPA Driven Protocol   Does not apply PRN Angie Reilly MD        metoprolol tartrate (LOPRESSOR) tablet 50 mg  50 mg Oral Q12H Angie Reilly MD   50 mg at 11/15/23 0901    nicotine (NICODERM CQ) 14 MG/24HR patch 1 patch  1 patch  Transdermal Q24H Angie Reilly MD   1 patch at 11/15/23 0628    nicotine polacrilex (NICORETTE) gum 2 mg  2 mg Mouth/Throat Q1H PRN Angie Reilly MD        ondansetron (ZOFRAN) injection 4 mg  4 mg Intravenous Q6H PRN Angie Reilly MD        oxyCODONE (ROXICODONE) immediate release tablet 5 mg  5 mg Oral Q4H PRN Angie Reilly MD        pantoprazole (PROTONIX) EC tablet 40 mg  40 mg Oral Q AM Angie Reilly MD   40 mg at 11/15/23 0601    Phosphorus Replacement - Follow Nurse / BPA Driven Protocol   Does not apply PRN Angie Reilly MD        Potassium Replacement - Follow Nurse / BPA Driven Protocol   Does not apply PRN Angie Reilly MD        sertraline (ZOLOFT) tablet 50 mg  50 mg Oral Daily Angie Reilly MD   50 mg at 11/15/23 0902    sodium chloride 0.9 % flush 10 mL  10 mL Intravenous PRN Diana Gutierrez APRN        sodium chloride 0.9 % flush 10 mL  10 mL Intravenous Q12H Angie Reilly MD   10 mL at 11/15/23 0902    sodium chloride 0.9 % infusion 40 mL  40 mL Intravenous PRN Angie Reilly MD        terazosin (HYTRIN) capsule 5 mg  5 mg Oral Nightly Angie Reilly MD        thiamine (B-1) injection 200 mg  200 mg Intravenous Q8H Angie Reilly MD   200 mg at 11/15/23 0601    Followed by    [START ON 11/20/2023] thiamine (VITAMIN B-1) tablet 100 mg  100 mg Oral Daily Angie Reilly MD            Physician Progress Notes (last 24 hours)        Flory Hernandez MD at 11/15/23 1007          Patient was admitted after midnight.  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  Patient is currently treated with IV fluids.  Clear liquid diet was initiated.    Overnight blood pressure remains elevated.  Patient usually takes his blood pressure medications at  night.  Will initiate as needed antihypertensive medications.    Pain is moderately controlled on current regimen according to patient.    Vital signs reviewed:  Gen: A&Ox3, NAD  CV: RRR w/o m/r/g  Chest: CTA bilateral, normal respiratory effort  Abdomen: Soft, epigastric tenderness, bowel sounds positive  Skin: Warm and dry    Continue current management.  Consult patient on alcohol use disorder.  We will consult case management to provide resources.    Carol Hernandez M.D.  This document has been electronically signed by Flory Hernandez MD on November 15, 2023 10:13 CST  Part of this note may be an electronic transcription/translation of spoken language to printed text, using a dragon dictation system.          Electronically signed by Flory Hernandez MD at 11/15/23 1014

## 2023-11-15 NOTE — PROGRESS NOTES
Patient was admitted after midnight.  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  Patient is currently treated with IV fluids.  Clear liquid diet was initiated.    Overnight blood pressure remains elevated.  Patient usually takes his blood pressure medications at night.  Will initiate as needed antihypertensive medications.    Pain is moderately controlled on current regimen according to patient.    Vital signs reviewed:  Gen: A&Ox3, NAD  CV: RRR w/o m/r/g  Chest: CTA bilateral, normal respiratory effort  Abdomen: Soft, epigastric tenderness, bowel sounds positive  Skin: Warm and dry    Continue current management.  Consult patient on alcohol use disorder.  We will consult case management to provide resources.    Carol Hernandez M.D.  This document has been electronically signed by Flory Hernandez MD on November 15, 2023 10:13 CST  Part of this note may be an electronic transcription/translation of spoken language to printed text, using a dragon dictation system.

## 2023-11-15 NOTE — H&P
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  In ER he was found with slightly elevated bilirubin and transaminase and lipase, CT abdomen and pelvis showing acute pancreatitis.  The patient had 1 L normal saline IV bolus and Dilaudid for pain, ceftriaxone for possible UTI.  Gallbladder ultrasound was done-preliminary report-no cholecystitis or choledocholithiasis.    I examined the patient in the emergency room, currently feeling better with minimal abdominal pain, no abdominal distention, no nausea or vomiting.  Denies prior episodes of pancreatitis.  Patient admitted drinking beer daily, last alcohol intake was on Saturday.        Review of Systems   HENT: Negative.     Eyes: Negative.    Respiratory: Negative.     Cardiovascular: Negative.    Gastrointestinal:  Positive for abdominal pain.   Endocrine: Negative.    Genitourinary: Negative.    Musculoskeletal: Negative.    Skin: Negative.    Allergic/Immunologic: Negative.    Neurological:  Positive for weakness.        Left hemiparesis posthemorrhagic stroke   Hematological: Negative.    Psychiatric/Behavioral: Negative.        Otherwise complete ROS reviewed and negative except as mentioned in the HPI.    Past Medical History:   Past Medical History:   Diagnosis Date    Asthma     Headache     Hypertension      Past Surgical History:History reviewed. No pertinent surgical history.  Social History:  reports that he has been smoking cigarettes. He  started smoking about 11 years ago. He has a 15.00 pack-year smoking history. He has been exposed to tobacco smoke. He has never used smokeless tobacco. He reports current alcohol use of about 4.0 standard drinks of alcohol per week. He reports that he does not use drugs.    Family History: family history includes Anxiety disorder in his mother; Cancer in his father; Diabetes in his mother; Hyperlipidemia in his father and mother.         Allergies:  No Known Allergies    Medications:  Prior to Admission medications    Medication Sig Start Date End Date Taking? Authorizing Provider   amLODIPine (NORVASC) 10 MG tablet Take 1 tablet by mouth Every Night. 8/16/23   Casi Skinner APRN   Blood Pressure Monitoring (Blood Pressure Cuff) misc Take blood pressure once/day, different times of the day and write down 6/5/23   Tosin Newton APRN   hydrALAZINE (APRESOLINE) 25 MG tablet Take 1 tablet by mouth 3 (Three) Times a Day. 3/17/23   ProviderJohnie MD   losartan (COZAAR) 25 MG tablet Take 1 tablet by mouth Every Night. 7/20/23   Casi Skinner APRN   metoprolol tartrate (LOPRESSOR) 50 MG tablet Take 1 tablet by mouth Every 12 (Twelve) Hours. 3/10/23   Feliciano Boothe APRN   naltrexone (DEPADE) 50 MG tablet Take 1 tablet by mouth Daily. 7/28/23   Casi Skinner APRN   sertraline (ZOLOFT) 50 MG tablet TAKE 1 TABLET BY MOUTH DAILY. TO HELP WITH MOOD. TAKE WITH FOOD 6/12/23   Beka Arrieta,    terazosin (HYTRIN) 5 MG capsule Take 1 capsule by mouth Every Night. 3/10/23   Feliciano Boothe APRN   triamcinolone (KENALOG) 0.1 % cream  8/9/23 11/15/23  ProviderJohnie MD     I have utilized all available immediate resources to obtain, update, or review the patient's current medications (including all prescriptions, over-the-counter products, herbals, cannabis/cannabidiol products, and vitamin/mineral/dietary (nutritional) supplements).    Objective     Vital Signs:  "BP (!) 184/111   Pulse 97   Temp 98.6 °F (37 °C) (Temporal)   Resp 18   Ht 177.8 cm (70\")   Wt 68.9 kg (152 lb)   SpO2 98%   BMI 21.81 kg/m²   Physical Exam  Vitals reviewed.   Constitutional:       Appearance: He is normal weight.   HENT:      Head: Normocephalic and atraumatic.      Right Ear: External ear normal.      Left Ear: External ear normal.      Nose: Nose normal.      Mouth/Throat:      Mouth: Mucous membranes are moist.      Pharynx: Oropharynx is clear.   Eyes:      Extraocular Movements: Extraocular movements intact.      Conjunctiva/sclera: Conjunctivae normal.      Pupils: Pupils are equal, round, and reactive to light.   Cardiovascular:      Rate and Rhythm: Normal rate and regular rhythm.      Pulses: Normal pulses.      Heart sounds: Normal heart sounds.   Pulmonary:      Effort: Pulmonary effort is normal.      Breath sounds: Normal breath sounds.   Abdominal:      General: Abdomen is flat. Bowel sounds are normal.      Palpations: Abdomen is soft.      Tenderness: There is abdominal tenderness.      Comments: Mild tenderness to deep palpation on the right upper quadrant   Musculoskeletal:         General: Normal range of motion.      Cervical back: Normal range of motion and neck supple.   Skin:     General: Skin is warm.      Capillary Refill: Capillary refill takes less than 2 seconds.   Neurological:      Mental Status: He is alert and oriented to person, place, and time. Mental status is at baseline.      Motor: Weakness present.      Comments: Left hemiparesis-history of basal ganglia hemorrhagic stroke   Psychiatric:         Mood and Affect: Mood normal.         Behavior: Behavior normal.         Thought Content: Thought content normal.         Judgment: Judgment normal.              Results Reviewed:  Lab Results (last 24 hours)       Procedure Component Value Units Date/Time    Lactic Acid, Plasma [[id number]]  (Normal) Collected: 11/15/23 0130    Specimen: Blood Updated: 11/15/23 " 0203     Lactate 1.0 mmol/L     Blood Culture - Blood, Arm, Right [[id number]] Collected: 11/15/23 0130    Specimen: Blood from Arm, Right Updated: 11/15/23 0146    Blood Culture - Blood, Arm, Right [[id number]] Collected: 11/15/23 0130    Specimen: Blood from Arm, Right Updated: 11/15/23 0145    CBC & Differential [[id number]]  (Abnormal) Collected: 11/14/23 2004    Specimen: Blood Updated: 11/14/23 2118    Narrative:      The following orders were created for panel order CBC & Differential.  Procedure                               Abnormality         Status                     ---------                               -----------         ------                     CBC Auto Differential[[id number]]        Abnormal            Final result                 Please view results for these tests on the individual orders.    CBC Auto Differential [[id number]]  (Abnormal) Collected: 11/14/23 2004    Specimen: Blood Updated: 11/14/23 2118     WBC 8.91 10*3/mm3      RBC 5.54 10*6/mm3      Hemoglobin 16.3 g/dL      Hematocrit 45.0 %      MCV 81.2 fL      MCH 29.4 pg      MCHC 36.2 g/dL      RDW 12.5 %      RDW-SD 36.9 fl      MPV 11.6 fL      Platelets 81 10*3/mm3     Manual Differential [[id number]]  (Abnormal) Collected: 11/14/23 2004    Specimen: Blood Updated: 11/14/23 2118     Neutrophil % 61.0 %      Lymphocyte % 31.0 %      Monocyte % 1.0 %      Eosinophil % 4.0 %      Atypical Lymphocyte % 3.0 %      Neutrophils Absolute 5.44 10*3/mm3      Lymphocytes Absolute 3.03 10*3/mm3      Monocytes Absolute 0.09 10*3/mm3      Eosinophils Absolute 0.36 10*3/mm3      Anisocytosis Mod/2+     Polychromasia Slight/1+     Target Cells Large/3+     WBC Morphology Normal     Platelet Estimate Decreased    Lipase [[id number]]  (Abnormal) Collected: 11/14/23 2004    Specimen: Blood Updated: 11/14/23 2048     Lipase 1,044 U/L     Comprehensive Metabolic Panel [[id number]]  (Abnormal) Collected: 11/14/23 2004    Specimen: Blood Updated: 11/14/23 2042      Glucose 117 mg/dL      BUN 10 mg/dL      Creatinine 1.06 mg/dL      Sodium 133 mmol/L      Potassium 3.2 mmol/L      Chloride 95 mmol/L      CO2 28.0 mmol/L      Calcium 9.7 mg/dL      Total Protein 8.2 g/dL      Albumin 4.5 g/dL      ALT (SGPT) 161 U/L      AST (SGOT) 263 U/L      Alkaline Phosphatase 149 U/L      Total Bilirubin 2.3 mg/dL      Globulin 3.7 gm/dL      A/G Ratio 1.2 g/dL      BUN/Creatinine Ratio 9.4     Anion Gap 10.0 mmol/L      eGFR 89.3 mL/min/1.73     Narrative:      GFR Normal >60  Chronic Kidney Disease <60  Kidney Failure <15      Ethanol [[id number]] Collected: 11/14/23 2004    Specimen: Blood Updated: 11/14/23 2037     Ethanol % <0.010 %     Narrative:      Not for legal purposes. Chain of Custody not followed.     Urinalysis With Microscopic If Indicated (No Culture) - Urine, Clean Catch [[id number]]  (Abnormal) Collected: 11/14/23 1942    Specimen: Urine, Clean Catch Updated: 11/14/23 2030     Color, UA [place]     Appearance, UA Clear     pH, UA <=5.0     Specific Gravity, UA >1.030     Glucose, UA Negative     Ketones, UA Negative     Bilirubin, UA Small (1+)     Blood, UA Negative     Protein, UA 30 mg/dL (1+)     Leuk Esterase, UA Small (1+)     Nitrite, UA Positive     Urobilinogen, UA 1.0 E.U./dL    Narrative:      Dipstick results may be inaccurate due to color interference.    Urinalysis, Microscopic Only - Urine, Clean Catch [[id number]]  (Abnormal) Collected: 11/14/23 1942    Specimen: Urine, Clean Catch Updated: 11/14/23 2030     RBC, UA 0-2 /HPF      WBC, UA 0-2 /HPF      Bacteria, UA Trace /HPF      Squamous Epithelial Cells, UA 0-2 /HPF      Hyaline Casts, UA 0-2 /LPF      Granular Casts, UA 0-2 /LPF      Mucus, UA Small/1+ /HPF      Methodology Manual Light Microscopy          Imaging Results (Last 24 Hours)       Procedure Component Value Units Date/Time    US Gallbladder [[id number]] Resulted: 11/15/23 0320     Updated: 11/15/23 0320    CT Abdomen Pelvis With Contrast  [[id number]] Collected: 11/15/23 0034     Updated: 11/15/23 0046    Narrative:      EXAMINATION:  CT ABDOMEN PELVIS W CONTRAST-  11/14/2023 9:41 PM CST     HISTORY: Epigastric abdominal pain.     TECHNIQUE: Spiral CT was performed of the abdomen and pelvis with IV  contrast. Multiplanar images were reconstructed.     DLP: 191 mGy-cm. Automated dosage reduction technique was utilized to  reduce patient dose.     COMPARISON: 3/5/2023.     LUNG BASES: The lung bases are clear.     LIVER AND SPLEEN: There is fatty infiltration of the liver. There are no  dense gallstones. The spleen is unremarkable.     PANCREAS: There is stranding of the fat around the pancreas. The  pancreas enhances normally. There is fluid density in the anterior  pararenal spaces bilaterally.     KIDNEYS AND ADRENALS: The kidneys and adrenal glands demonstrate no  focal abnormality. The urinary bladder is not well distended.     BOWEL: There is thickening of the gastric wall. There is under  distention of the stomach. There is mild thickening of the wall of the  hepatic flexure of the colon. There is under distention of portions of  the colon. There is thickening of the wall of the rectum and anus versus  artifact of under distention.     OTHER: There is atheromatous disease of the aortoiliac vessels. There is  free fluid in the right paracolic gutter. There is small to moderate  free fluid in the pelvis. The prostate is prominent in size measuring  4.9 cm transversely. There are degenerative changes of the spine and  hips. There is subchondral cystic change in the femoral head/neck  junction on the right side. 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          Impression:      1. There is stranding of the fat around the pancreas with fluid in the  anterior pararenal spaces bilaterally. The pancreas enhances normally.  The findings are consistent with acute pancreatitis. Wall  thickening of  the stomach and nearby hepatic flexure of the colon which may be  secondarily inflamed due to the pancreatitis.  2. Small amount of fluid in the right paracolic gutter. Small to  moderate amount of free fluid in the pelvis. These findings are likely  related to the pancreatic inflammation.  3. Fatty infiltration of the liver.  4. Wall thickening of the rectum and anus versus artifact of under  distention. Short segment colitis is in the differential. Neoplasm less  likely.  5. Atheromatous disease of the aortoiliac vessels, mild.  6. Degenerative changes of the visualized spine and hips. Suggestion of  cam type CATRACHITA anatomy of both hips.        The full report of this exam was immediately signed and available to the  emergency room. The patient is currently in the emergency room.     This report was signed and finalized on 11/15/2023 12:43 AM CST by Dr. Claus Bhagat MD.             I have personally reviewed and interpreted the radiology studies and ECG obtained at time of admission.     Assessment / Plan   Assessment:   Active Hospital Problems    Diagnosis     **Acute pancreatitis        Treatment Plan  The patient will be admitted to my service here at [facility].     -Acute noncomplicated alcohol induced pancreatitis.  Symptoms started 2 days ago with abdominal pain.  History of alcohol abuse, on naltrexone.    Start clear liquid diet  Pain management    Will repeat triglyceride level-last triglyceride level in March was 589    -Alcohol abuse  No evidence of alcohol withdrawal at this time  Placed on CIWA protocol  I educated the patient to quit drinking alcohol    -Tobacco abuse, said she smokes about 7 cigarettes/day  Smoking cessation provided    -History of right basal ganglia hemorrhagic stroke in March 2023 with residual left hemiparesis  Outpatient PT and OT    -Hypertension, apparently not well controlled  Resume antihypertensive medication    -History of depression-on  Zoloft.    -Patient is not septic and denies dysuria, doubt urinary tract infection.  We will not continue antibiotic treatment    -Hypokalemia, not replaced so far in ER  Electrolyte replacement per protocol    -Bilateral lower extremity sequential compressive devices for DVT prophylaxis  Thrombocytopenia suspect secondary to alcoholic liver disease  Monitor CBC  No evidence of bleeding    -Abnormal LFTs with slightly elevated bilirubin and transaminase, suspect secondary to alcoholic liver disease.  Monitor LFTs.      Medical Decision Making  Number and Complexity of problems: 3      Conditions and Status        stable     MDM Data  External documents reviewed: yes  Cardiac tracing (EKG, telemetry) interpretation: yes  Radiology interpretation: yes  Labs reviewed: yes     Discussed with: ER provider and significant other present at bedside     Care Planning  Shared decision making: yes  Code status and discussions: full code    Disposition  Social Determinants of Health that impact treatment or disposition: no  Estimated length of stay is 2 days.     I confirmed that the patient's advanced care plan is present, code status is documented, and a surrogate decision maker is listed in the patient's medical record.      The patient was seen and examined by me on 11/15/2023 at 5:20 AM.    Electronically signed by Angie Reilly MD, 11/15/23, 05:40 CST.

## 2023-11-15 NOTE — PLAN OF CARE
Goal Outcome Evaluation:  Plan of Care Reviewed With: patient        Progress: no change  Outcome Evaluation: Pt denies pain so far this shift; up ad daryl; voiding; IVF continue; tolerating diet; safety maintained.

## 2023-11-15 NOTE — CASE MANAGEMENT/SOCIAL WORK
Continued Stay Note   [place]     Patient Name: James Taylor  MRN: [id number]  Today's Date: 11/15/2023    Admit Date: 11/14/2023    Plan: Home   Discharge Plan       Row Name 11/15/23 1029       Plan    Plan Home    Patient/Family in Agreement with Plan yes    Final Discharge Disposition Code 01 - home or self-care    Final Note Pt is being discharged home today. Provided pt chemical dependency packet.                   Discharge Codes    No documentation.                 Expected Discharge Date and Time       Expected Discharge Date Expected Discharge Time    Nov 18, 2023               FRANKIE Peng

## 2023-11-15 NOTE — ED PROVIDER NOTES
Subjective   History of Present Illness  Patient is a 44 yo male who presents to the ER with complaints of epigastric abdominal pain. He states he has had sharp stabbing pain for the past few days. He admits he drinks beer daily however has been unable to drink since the pain began. He denies any vomiting or diarrhea.  He has had no recorded fevers.  He states he recently had an ultrasound of his liver which revealed a cyst.  Past medical history significant for asthma, hypertension, alcohol abuse        Review of Systems   Constitutional: Negative.  Negative for fever.   HENT: Negative.  Negative for congestion.    Eyes: Negative.    Respiratory: Negative.  Negative for cough and shortness of breath.    Cardiovascular: Negative.  Negative for chest pain.   Gastrointestinal:  Positive for abdominal pain and nausea. Negative for constipation, diarrhea and vomiting.   Genitourinary: Negative.  Negative for dysuria.   Musculoskeletal: Negative.  Negative for back pain.   Skin: Negative.  Negative for rash.   All other systems reviewed and are negative.      Past Medical History:   Diagnosis Date    Asthma     Headache     Hypertension        No Known Allergies    History reviewed. No pertinent surgical history.    Family History   Problem Relation Age of Onset    Anxiety disorder Mother     Diabetes Mother     Hyperlipidemia Mother     Cancer Father     Hyperlipidemia Father     Colon cancer Neg Hx     Colon polyps Neg Hx        Social History     Socioeconomic History    Marital status: Single   Tobacco Use    Smoking status: Every Day     Packs/day: 1.00     Years: 15.00     Additional pack years: 0.00     Total pack years: 15.00     Types: Cigarettes     Start date: 4/2/2012     Passive exposure: Current    Smokeless tobacco: Never    Tobacco comments:     AVS   Vaping Use    Vaping Use: Never used   Substance and Sexual Activity    Alcohol use: Yes     Alcohol/week: 4.0 standard drinks of alcohol     Types: 4 Cans  of beer per week     Comment: on the weekends    Drug use: No    Sexual activity: Yes     Partners: Female     Birth control/protection: Condom, Same-sex partner           Objective   Physical Exam  Vitals and nursing note reviewed.   Constitutional:       General: He is in acute distress.      Appearance: He is well-developed. He is not diaphoretic.   HENT:      Head: Normocephalic and atraumatic.      Nose: Nose normal.   Eyes:      General: No scleral icterus.     Conjunctiva/sclera: Conjunctivae normal.      Pupils: Pupils are equal, round, and reactive to light.   Neck:      Thyroid: No thyromegaly.      Vascular: No JVD.   Cardiovascular:      Rate and Rhythm: Normal rate and regular rhythm.      Heart sounds: Normal heart sounds. No murmur heard.  Pulmonary:      Effort: Pulmonary effort is normal. No respiratory distress.      Breath sounds: Normal breath sounds. No wheezing or rales.   Chest:      Chest wall: No tenderness.   Abdominal:      General: Bowel sounds are normal. There is no distension.      Palpations: Abdomen is soft. There is no mass.      Tenderness: There is abdominal tenderness in the epigastric area. There is no guarding or rebound.   Musculoskeletal:         General: Normal range of motion.      Cervical back: Normal range of motion and neck supple.   Lymphadenopathy:      Cervical: No cervical adenopathy.   Skin:     General: Skin is warm and dry.      Coloration: Skin is not pale.      Findings: No erythema or rash.   Neurological:      General: No focal deficit present.      Mental Status: He is alert and oriented to person, place, and time.      Cranial Nerves: No cranial nerve deficit.      Coordination: Coordination normal.      Deep Tendon Reflexes: Reflexes are normal and symmetric.   Psychiatric:         Mood and Affect: Mood normal.         Behavior: Behavior normal.         Thought Content: Thought content normal.         Judgment: Judgment normal.         Procedures            ED Course  ED Course as of 11/15/23 1408   Wed Nov 15, 2023   0143 Discussed with Dr. Reilly. Requests gallbladder ultrasound as well prior to admission due to elevated bilirubin.  [TW]   0146 Work up remains pending. This will be a turn over for Dr. Mendoza. [TW]      ED Course User Index  [TW] Diana Gutierrez, NAM                                           Medical Decision Making  Patient is a 44 yo male who presents to the ER with complaints of epigastric abdominal pain. He states he has had sharp stabbing pain for the past few days. He admits he drinks beer daily however has been unable to drink since the pain began. He denies any vomiting or diarrhea.  He has had no recorded fevers.  He states he recently had an ultrasound of his liver which revealed a cyst.  Past medical history significant for asthma, hypertension, alcohol abuse  Differential diagnosis: Pancreatitis, GERD, cholecystitis, and other    Pt stable in EC - NAD att.  US with no acute findings per [facility].  D/w Dr. Reilly - will admit for further mgmt.    Problems Addressed:  Alcohol-induced acute pancreatitis, unspecified complication status: complicated acute illness or injury    Amount and/or Complexity of Data Reviewed  Labs: ordered.  Radiology: ordered.    Risk  Prescription drug management.  Decision regarding hospitalization.        Final diagnoses:   Alcohol-induced acute pancreatitis, unspecified complication status       ED Disposition  ED Disposition       ED Disposition   Decision to Admit    Condition   --    Comment   Level of Care: Med/Surg [1]   Diagnosis: Acute pancreatitis [577.0.ICD-9-CM]   Certification: I Certify That Inpatient Hospital Services Are Medically Necessary For Greater Than 2 Midnights                 No follow-up provider specified.       Medication List      No changes were made to your prescriptions during this visit.            Geovanni Mendoza DO  11/15/23 0525       Diana Gutierrez,  APRN  11/15/23 1401

## 2023-11-15 NOTE — TELEPHONE ENCOUNTER
Caller: Sidra Tompkins    Relationship: Emergency Contact    What was the call regarding: PATIENT WILL NOT BE AT TODAY'S APPT.  HE IS CURRENTLY IN HOSPITAL.

## 2023-11-16 PROBLEM — D69.6 THROMBOCYTOPENIA: Status: ACTIVE | Noted: 2023-11-16

## 2023-11-16 LAB
ALBUMIN SERPL-MCNC: 2.7 G/DL (ref 3.5–5.2)
ALBUMIN/GLOB SERPL: 1 G/DL
ALP SERPL-CCNC: 87 U/L (ref 39–117)
ALT SERPL W P-5'-P-CCNC: 63 U/L (ref 1–41)
ANION GAP SERPL CALCULATED.3IONS-SCNC: 4 MMOL/L (ref 5–15)
AST SERPL-CCNC: 58 U/L (ref 1–40)
BILIRUB SERPL-MCNC: 0.9 MG/DL (ref 0–1.2)
BUN SERPL-MCNC: 5 MG/DL (ref 6–20)
BUN/CREAT SERPL: 4.9 (ref 7–25)
CALCIUM SPEC-SCNC: 8.5 MG/DL (ref 8.6–10.5)
CHLORIDE SERPL-SCNC: 101 MMOL/L (ref 98–107)
CO2 SERPL-SCNC: 27 MMOL/L (ref 22–29)
CREAT SERPL-MCNC: 1.03 MG/DL (ref 0.76–1.27)
DEPRECATED RDW RBC AUTO: 38.5 FL (ref 37–54)
EGFRCR SERPLBLD CKD-EPI 2021: 92.4 ML/MIN/1.73
ERYTHROCYTE [DISTWIDTH] IN BLOOD BY AUTOMATED COUNT: 12.8 % (ref 12.3–15.4)
GLOBULIN UR ELPH-MCNC: 2.8 GM/DL
GLUCOSE SERPL-MCNC: 126 MG/DL (ref 65–99)
HCT VFR BLD AUTO: 30.8 % (ref 37.5–51)
HGB BLD-MCNC: 11 G/DL (ref 13–17.7)
LIPASE SERPL-CCNC: 389 U/L (ref 13–60)
MAGNESIUM SERPL-MCNC: 1.6 MG/DL (ref 1.6–2.6)
MCH RBC QN AUTO: 29.6 PG (ref 26.6–33)
MCHC RBC AUTO-ENTMCNC: 35.7 G/DL (ref 31.5–35.7)
MCV RBC AUTO: 82.8 FL (ref 79–97)
PLATELET # BLD AUTO: 53 10*3/MM3 (ref 140–450)
PMV BLD AUTO: 11.1 FL (ref 6–12)
POTASSIUM SERPL-SCNC: 3.8 MMOL/L (ref 3.5–5.2)
POTASSIUM SERPL-SCNC: 4 MMOL/L (ref 3.5–5.2)
PROT SERPL-MCNC: 5.5 G/DL (ref 6–8.5)
RBC # BLD AUTO: 3.72 10*6/MM3 (ref 4.14–5.8)
SODIUM SERPL-SCNC: 132 MMOL/L (ref 136–145)
WBC NRBC COR # BLD: 8.46 10*3/MM3 (ref 3.4–10.8)

## 2023-11-16 PROCEDURE — 85027 COMPLETE CBC AUTOMATED: CPT | Performed by: HOSPITALIST

## 2023-11-16 PROCEDURE — 84132 ASSAY OF SERUM POTASSIUM: CPT | Performed by: STUDENT IN AN ORGANIZED HEALTH CARE EDUCATION/TRAINING PROGRAM

## 2023-11-16 PROCEDURE — 36415 COLL VENOUS BLD VENIPUNCTURE: CPT | Performed by: HOSPITALIST

## 2023-11-16 PROCEDURE — 25810000003 LACTATED RINGERS PER 1000 ML: Performed by: NURSE PRACTITIONER

## 2023-11-16 PROCEDURE — 25010000002 THIAMINE PER 100 MG: Performed by: HOSPITALIST

## 2023-11-16 PROCEDURE — 83735 ASSAY OF MAGNESIUM: CPT | Performed by: HOSPITALIST

## 2023-11-16 PROCEDURE — 83690 ASSAY OF LIPASE: CPT | Performed by: NURSE PRACTITIONER

## 2023-11-16 PROCEDURE — 80053 COMPREHEN METABOLIC PANEL: CPT | Performed by: HOSPITALIST

## 2023-11-16 PROCEDURE — 25810000003 LACTATED RINGERS PER 1000 ML: Performed by: HOSPITALIST

## 2023-11-16 RX ORDER — POTASSIUM CHLORIDE 750 MG/1
40 CAPSULE, EXTENDED RELEASE ORAL EVERY 4 HOURS
Status: COMPLETED | OUTPATIENT
Start: 2023-11-16 | End: 2023-11-16

## 2023-11-16 RX ADMIN — LORAZEPAM 1 MG: 1 TABLET ORAL at 12:11

## 2023-11-16 RX ADMIN — METOPROLOL TARTRATE 50 MG: 50 TABLET, FILM COATED ORAL at 21:28

## 2023-11-16 RX ADMIN — SERTRALINE HYDROCHLORIDE 50 MG: 50 TABLET, FILM COATED ORAL at 08:44

## 2023-11-16 RX ADMIN — Medication 10 ML: at 21:29

## 2023-11-16 RX ADMIN — THIAMINE HYDROCHLORIDE 200 MG: 100 INJECTION, SOLUTION INTRAMUSCULAR; INTRAVENOUS at 15:24

## 2023-11-16 RX ADMIN — PANTOPRAZOLE SODIUM 40 MG: 40 TABLET, DELAYED RELEASE ORAL at 06:03

## 2023-11-16 RX ADMIN — LORAZEPAM 1 MG: 1 TABLET ORAL at 17:47

## 2023-11-16 RX ADMIN — DOCUSATE SODIUM 50 MG AND SENNOSIDES 8.6 MG 2 TABLET: 8.6; 5 TABLET, FILM COATED ORAL at 21:29

## 2023-11-16 RX ADMIN — SODIUM CHLORIDE, POTASSIUM CHLORIDE, SODIUM LACTATE AND CALCIUM CHLORIDE 100 ML/HR: 600; 310; 30; 20 INJECTION, SOLUTION INTRAVENOUS at 21:28

## 2023-11-16 RX ADMIN — METOPROLOL TARTRATE 50 MG: 50 TABLET, FILM COATED ORAL at 08:45

## 2023-11-16 RX ADMIN — AMLODIPINE BESYLATE 10 MG: 10 TABLET ORAL at 21:28

## 2023-11-16 RX ADMIN — LOSARTAN POTASSIUM 25 MG: 50 TABLET, FILM COATED ORAL at 21:28

## 2023-11-16 RX ADMIN — HYDRALAZINE HYDROCHLORIDE 25 MG: 25 TABLET, FILM COATED ORAL at 21:29

## 2023-11-16 RX ADMIN — THIAMINE HYDROCHLORIDE 200 MG: 100 INJECTION, SOLUTION INTRAMUSCULAR; INTRAVENOUS at 21:28

## 2023-11-16 RX ADMIN — POTASSIUM CHLORIDE 40 MEQ: 10 CAPSULE, COATED, EXTENDED RELEASE ORAL at 02:02

## 2023-11-16 RX ADMIN — Medication 10 ML: at 08:45

## 2023-11-16 RX ADMIN — SODIUM CHLORIDE, POTASSIUM CHLORIDE, SODIUM LACTATE AND CALCIUM CHLORIDE 150 ML/HR: 600; 310; 30; 20 INJECTION, SOLUTION INTRAVENOUS at 12:14

## 2023-11-16 RX ADMIN — HYDRALAZINE HYDROCHLORIDE 25 MG: 25 TABLET, FILM COATED ORAL at 08:44

## 2023-11-16 RX ADMIN — TERAZOSIN HYDROCHLORIDE 5 MG: 5 CAPSULE ORAL at 21:28

## 2023-11-16 RX ADMIN — HYDRALAZINE HYDROCHLORIDE 25 MG: 25 TABLET, FILM COATED ORAL at 15:23

## 2023-11-16 RX ADMIN — NICOTINE 1 PATCH: 14 PATCH, EXTENDED RELEASE TRANSDERMAL at 06:05

## 2023-11-16 RX ADMIN — SODIUM CHLORIDE, POTASSIUM CHLORIDE, SODIUM LACTATE AND CALCIUM CHLORIDE 150 ML/HR: 600; 310; 30; 20 INJECTION, SOLUTION INTRAVENOUS at 04:23

## 2023-11-16 RX ADMIN — THIAMINE HYDROCHLORIDE 200 MG: 100 INJECTION, SOLUTION INTRAMUSCULAR; INTRAVENOUS at 06:08

## 2023-11-16 RX ADMIN — FOLIC ACID 1 MG: 1 TABLET ORAL at 08:44

## 2023-11-16 RX ADMIN — LORAZEPAM 1 MG: 1 TABLET ORAL at 06:05

## 2023-11-16 RX ADMIN — POTASSIUM CHLORIDE 40 MEQ: 10 CAPSULE, COATED, EXTENDED RELEASE ORAL at 06:03

## 2023-11-16 NOTE — PLAN OF CARE
Goal Outcome Evaluation:  Plan of Care Reviewed With: patient        Progress: no change  Outcome Evaluation: Patient denies pain so far this shift; up ad daryl; voiding; IVF continue; diet advanced to Full liquids, tolerating so far; safety maintained.

## 2023-11-16 NOTE — PROGRESS NOTES
[facility] Medicine Services  INPATIENT PROGRESS NOTE    Patient Name: James Taylor  Date of Admission: 11/14/2023  Today's Date: 11/16/23  Length of Stay: 1  Primary Care Physician: Casi Skinner APRN    Subjective   Chief Complaint: Epigastric abdominal pain  HPI   Mr. Taylor presented to [facility] ER 11/14/2023 with epigastric and right upper quadrant abdominal pain described as sharp radiating to the upper quadrant starting on Sunday, 11/13/2023.  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  Patient admits to drinking beer daily but unable to drink beer since onset of epigastric pain.  Denies vomiting or diarrhea.  Lipase 1044.  Urinalysis positive nitrates, negative WBC, trace bacteria.  CT abdomen noted stranding of the fat around the pancreas with fluid in the anterior pararenal spaces bilaterally.  Pancreas enhances normally.  Findings consistent with acute pancreatitis.  Wall thickening of the stomach and nearby hepatic flexure of the colon may be secondarily Flaim due to pancreatic Tho.  Small amount of fluid) colic gutter.  Small to moderate amount of free fluid pelvis.  Findings related to pancreatic inflammation.  Wall thickening of the rectum and anus versus artifact under distention.  Short segment of colitis differential.  Ultrasound of the gallbladder no evidence of gallstones.  Hepatic steatosis.  Subcentimeter cyst left lobe which is stable.  Dilaudid IV, Zofran, Rocephin given in ER.    Today  Sitting up in bed.  No oxygen in use.  Significant other in room.  Reports right upper quadrant abdominal pain level 2-3 out of 5.  He tolerated clear liquids this morning and did report some abdominal pain afterwards.  Will increase to full liquid diet.  Continue IV fluids.  Lipase down to 389 today.  WBC normal.      Review of Systems    Constitutional:  Positive for appetite change. Negative for chills, fatigue and fever.   HENT:  Negative for congestion and trouble swallowing.    Eyes:  Negative for photophobia and visual disturbance.   Respiratory:  Negative for cough, shortness of breath and wheezing.    Cardiovascular:  Negative for palpitations and leg swelling.   Gastrointestinal:  Positive for abdominal pain (Epigastric, right upper quadrant). Negative for blood in stool, constipation, diarrhea, nausea and vomiting.   Endocrine: Negative for cold intolerance, heat intolerance and polyuria.   Genitourinary:  Negative for dysuria, frequency and urgency.   Musculoskeletal:  Negative for gait problem.   Skin:  Negative for color change, pallor, rash and wound.   Allergic/Immunologic: Negative for immunocompromised state.   Neurological:  Positive for weakness. Negative for light-headedness.   Hematological:  Negative for adenopathy. Does not bruise/bleed easily.   Psychiatric/Behavioral:  Negative for agitation, behavioral problems and confusion.       All pertinent negatives and positives are as above. All other systems have been reviewed and are negative unless otherwise stated.     Objective    Temp:  [98 °F (36.7 °C)-98.6 °F (37 °C)] 98 °F (36.7 °C)  Heart Rate:  [77-95] 83  Resp:  [16-18] 18  BP: (113-160)/(62-97) 145/88  Physical Exam  Vitals and nursing note reviewed.   Constitutional:       Comments: Sitting up in bed.  No oxygen use.  Significant other, Jelena in room.   HENT:      Head: Normocephalic and atraumatic.      Nose: No congestion or rhinorrhea.      Mouth/Throat:      Pharynx: Oropharynx is clear. No oropharyngeal exudate or posterior oropharyngeal erythema.   Eyes:      Extraocular Movements: Extraocular movements intact.      Pupils: Pupils are equal, round, and reactive to light.   Cardiovascular:      Rate and Rhythm: Normal rate and regular rhythm.      Heart sounds: No murmur heard.  Pulmonary:      Breath sounds: No  wheezing, rhonchi or rales.      Comments: No oxygen use.  Abdominal:      Tenderness: There is abdominal tenderness (Epigastric, right upper quadrant).   Musculoskeletal:         General: No swelling or tenderness.      Cervical back: Normal range of motion and neck supple.   Skin:     General: Skin is warm and dry.   Neurological:      Mental Status: He is alert.      Comments: Left hemiparesis status post stroke   Psychiatric:         Mood and Affect: Mood normal.         Behavior: Behavior normal.         Thought Content: Thought content normal.         Judgment: Judgment normal.       Results Review:  I have reviewed the labs, radiology results, and diagnostic studies.    Laboratory Data:   Results from last 7 days   Lab Units 11/16/23  0619 11/15/23  1240 11/14/23 2004   WBC 10*3/mm3 8.46 8.48 8.91   HEMOGLOBIN g/dL 11.0* 12.1* 16.3   HEMATOCRIT % 30.8* 33.7* 45.0   PLATELETS 10*3/mm3 53* 57* 81*        Results from last 7 days   Lab Units 11/16/23  1110 11/16/23 0619 11/15/23  2029 11/15/23  0943 11/14/23 2004   SODIUM mmol/L  --  132*  --  131* 133*   POTASSIUM mmol/L 4.0 3.8 3.6 3.6 3.2*   CHLORIDE mmol/L  --  101  --  99 95*   CO2 mmol/L  --  27.0  --  24.0 28.0   BUN mg/dL  --  5*  --  7 10   CREATININE mg/dL  --  1.03  --  0.88 1.06   CALCIUM mg/dL  --  8.5*  --  8.5* 9.7   BILIRUBIN mg/dL  --  0.9  --  1.1 2.3*   ALK PHOS U/L  --  87  --  101 149*   ALT (SGPT) U/L  --  63*  --  94* 161*   AST (SGOT) U/L  --  58*  --  125* 263*   GLUCOSE mg/dL  --  126*  --  116* 117*     Culture Data:   Blood Culture   Date Value Ref Range Status   11/15/2023 No growth at 24 hours  Preliminary   11/15/2023 No growth at 24 hours  Preliminary     Imaging Results (All)       Procedure Component Value Units Date/Time    US Gallbladder [[id number]] Collected: 11/15/23 0607     Updated: 11/15/23 0615    Narrative:      EXAMINATION: US GALLBLADDER-     11/15/2023 3:20 AM CST     HISTORY: abdominal pain- assess for CBD  stones     The additional examination of the upper abdomen for gallbladder is  performed.     There is no previous study for comparison. Correlation made with  sonography of the liver dated 11/3/2023 and CT scan of the abdomen dated  11/14/2023.     The gallbladder is moderately distended. No gallstones are noted. The  gallbladder wall measures less than 2 mm in thickness. Common bile duct  measures 5 mm in diameter.     There is a tiny hypoechoic nodule in the left lobe, at the floor of the  gallbladder, measuring 8 mm in diameter. There is good through  transmission. It wasn't noted on the previous study dated 11/3/2023  without significant interval change. Moderate diffuse increased  echogenicity of the liver parenchyma suggesting fatty infiltration  similar to the previous study. There is normal hepatopetal portal venous  circulation.     Pancreas is not optimally visualized due to significant superimposed  bowel gas.     Normal right kidney is seen. No mass or hydronephrosis.     Normal abdominal aorta and IVC.       Impression:      1. No evidence of gallstones.  2. Hepatic steatosis. A subcentimeter cyst in the left lobe which is  stable.        This report was signed and finalized on 11/15/2023 6:12 AM CST by Dr. Heath Valente MD.       CT Abdomen Pelvis With Contrast [[id number]] Collected: 11/15/23 0034     Updated: 11/15/23 0046    Narrative:      EXAMINATION:  CT ABDOMEN PELVIS W CONTRAST-  11/14/2023 9:41 PM CST     HISTORY: Epigastric abdominal pain.     TECHNIQUE: Spiral CT was performed of the abdomen and pelvis with IV  contrast. Multiplanar images were reconstructed.     DLP: 191 mGy-cm. Automated dosage reduction technique was utilized to  reduce patient dose.     COMPARISON: 3/5/2023.     LUNG BASES: The lung bases are clear.     LIVER AND SPLEEN: There is fatty infiltration of the liver. There are no  dense gallstones. The spleen is unremarkable.     PANCREAS: There is stranding of the fat  around the pancreas. The  pancreas enhances normally. There is fluid density in the anterior  pararenal spaces bilaterally.     KIDNEYS AND ADRENALS: The kidneys and adrenal glands demonstrate no  focal abnormality. The urinary bladder is not well distended.     BOWEL: There is thickening of the gastric wall. There is under  distention of the stomach. There is mild thickening of the wall of the  hepatic flexure of the colon. There is under distention of portions of  the colon. There is thickening of the wall of the rectum and anus versus  artifact of under distention.     OTHER: There is atheromatous disease of the aortoiliac vessels. There is  free fluid in the right paracolic gutter. There is small to moderate  free fluid in the pelvis. The prostate is prominent in size measuring  4.9 cm transversely. There are degenerative changes of the spine and  hips. There is subchondral cystic change in the femoral head/neck  junction on the right side. 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          Impression:      1. There is stranding of the fat around the pancreas with fluid in the  anterior pararenal spaces bilaterally. The pancreas enhances normally.  The findings are consistent with acute pancreatitis. Wall thickening of  the stomach and nearby hepatic flexure of the colon which may be  secondarily inflamed due to the pancreatitis.  2. Small amount of fluid in the right paracolic gutter. Small to  moderate amount of free fluid in the pelvis. These findings are likely  related to the pancreatic inflammation.  3. Fatty infiltration of the liver.  4. Wall thickening of the rectum and anus versus artifact of under  distention. Short segment colitis is in the differential. Neoplasm less  likely.  5. Atheromatous disease of the aortoiliac vessels, mild.  6. Degenerative changes of the visualized spine and hips. Suggestion of  cam type CATRACHITA anatomy of both  hips.        The full report of this exam was immediately signed and available to the  emergency room. The patient is currently in the emergency room.     This report was signed and finalized on 11/15/2023 12:43 AM CST by Dr. Claus Bhagat MD.             I have reviewed the patient's current medications.     Scheduled medications:  amLODIPine, 10 mg, Oral, Nightly  folic acid, 1 mg, Oral, Daily  hydrALAZINE, 25 mg, Oral, TID  LORazepam, 1 mg, Oral, Q6H  losartan, 25 mg, Oral, Nightly  metoprolol tartrate, 50 mg, Oral, Q12H  nicotine, 1 patch, Transdermal, Q24H  pantoprazole, 40 mg, Oral, Q AM  senna-docusate sodium, 2 tablet, Oral, BID  sertraline, 50 mg, Oral, Daily  sodium chloride, 10 mL, Intravenous, Q12H  terazosin, 5 mg, Oral, Nightly  thiamine (B-1) IV, 200 mg, Intravenous, Q8H   Followed by  [START ON 11/20/2023] thiamine, 100 mg, Oral, Daily     I have reviewed the patient's current medications.     Assessment/Plan   Assessment  Active Hospital Problems    Diagnosis     **Acute pancreatitis     Smoker     Alcohol abuse     Primary hypertension      Treatment Plan  1.  Acute noncomplicated alcohol induced pancreatitis.  Symptoms started on 11/13 with epigastric pain, sharp, not improved.  Lipase 1044.  CT abdomen consistent with acute pancreatitis.  Ultrasound gallbladder negative.  Patient on clear liquids and will advance to full liquid diet.  Continue IV fluids at 150 mL/h.  Dilaudid IV for severe pain, oxycodone for moderate pain.  Lipase 389 today.    2.  Alcohol use.  Patient reports daily beer consumption.  Unable to drink any beer after epigastric pain started.  CIWA protocol.  No signs of shaking or tremors.    3.  Primary hypertension.  Blood pressure elevated 185/125 in ER.  Blood pressure 145/88, 119/55.  Amlodipine and metoprolol continued.    4.  Tobacco use.  Nicotine patch.  Discussed smoking cessation.    5.  Hypokalemia.  Potassium 3.2 on admission.  Replaced.  Potassium 3.8 follow-up  potassium 4 today.    6.  Abnormal urinalysis positive nitrates.  Negative WBC, trace bacteria.  Rocephin given in ER.  Doubt UTI.  Patient denies dysuria, frequency or urgency.    7.  Thrombocytopenia suspect secondary to alcohol use.  Platelets 81,000 on admission, 53,000 today.  Repeat CBC in AM.    8.  History of right basal ganglia hemorrhagic stroke March 2023 with left residual hemiparesis.  Physical therapy and Occupational Therapy.    9.  SCDs for deep vein thrombosis prophylaxis.  No Lovenox due to thrombocytopenia    Medical Decision Making  Number and Complexity of problems: 7  Acute noncomplicated alcohol induced pancreatitis: Acute, high complexity, stable  Alcohol use: Chronic, moderate complexity, stable  Primary hypertension: Chronic, moderate complexity, improved  Tobacco use: Chronic, low complexity, stable  Hypokalemia: Acute, moderate complexity, at baseline  Thrombocytopenia: Suspect acute on chronic, moderate complexity, not at baseline  History of right basal ganglia hemorrhagic stroke with left residual hemiparesis: Chronic, moderate complexity, at baseline    Differential Diagnosis: None    Conditions and Status        Condition is improving.     [facility] Data  External documents reviewed: PCP office visit 10/17/2023 reviewed  Cardiac tracing (EKG, telemetry) interpretation: None  Radiology interpretation: Radiology interpretation ultrasound gallbladder CT abdomen reviewed  Labs reviewed:   CMP 11/16/2023.  Repeat CMP in AM.  CBC 11/16/2023.  Repeat CBC in AM.  Lipase 1044, 389  Blood cultures no growth at 24 hours    Any tests that were considered but not ordered: None     Decision rules/scores evaluated (example [id number], [place], etc): None     Discussed with: Dr. Hernandez, patient, and significant other Jelena.     Care Planning  Shared decision making: Dr. Hernandez, patient, and significant other Jelena.  Patient agrees to clear liquid diet, continue IV fluids, SCDs, oral and IV pain  medicines.  Code status and discussions: Full code  Patient surrogate decision maker is his significant other, Jelena    Disposition  Social Determinants of Health that impact treatment or disposition: None  I expect the patient to be discharged to home in 1-2 days.     Electronically signed by NAM Thomas, 11/16/23, 11:47 CST.     The above documentation resulted from a face-to-face encounter by me Darlyn BROWNING, [facility].

## 2023-11-16 NOTE — PLAN OF CARE
Problem: Adult Inpatient Plan of Care  Goal: Plan of Care Review  Outcome: Ongoing, Progressing  Flowsheets (Taken 11/16/2023 0449)  Progress: no change  Plan of Care Reviewed With: patient  Outcome Evaluation: Pt resting through shift. No c/o pain. Up ad daryl to bathroom to void and BM. IVF infusing. K+: 3.6, po pills x4 given, another dose to give this AM. Clear liquids. Safety maintained.

## 2023-11-17 ENCOUNTER — READMISSION MANAGEMENT (OUTPATIENT)
Dept: CALL CENTER | Facility: HOSPITAL | Age: 43
End: 2023-11-17
Payer: MEDICAID

## 2023-11-17 VITALS
DIASTOLIC BLOOD PRESSURE: 98 MMHG | BODY MASS INDEX: 22.76 KG/M2 | WEIGHT: 159 LBS | HEART RATE: 81 BPM | RESPIRATION RATE: 18 BRPM | SYSTOLIC BLOOD PRESSURE: 149 MMHG | OXYGEN SATURATION: 99 % | HEIGHT: 70 IN | TEMPERATURE: 98.3 F

## 2023-11-17 LAB
ALBUMIN SERPL-MCNC: 3 G/DL (ref 3.5–5.2)
ALBUMIN/GLOB SERPL: 1 G/DL
ALP SERPL-CCNC: 88 U/L (ref 39–117)
ALT SERPL W P-5'-P-CCNC: 51 U/L (ref 1–41)
ANION GAP SERPL CALCULATED.3IONS-SCNC: 7 MMOL/L (ref 5–15)
AST SERPL-CCNC: 37 U/L (ref 1–40)
BASOPHILS # BLD AUTO: 0.01 10*3/MM3 (ref 0–0.2)
BASOPHILS NFR BLD AUTO: 0.1 % (ref 0–1.5)
BILIRUB SERPL-MCNC: 1.2 MG/DL (ref 0–1.2)
BUN SERPL-MCNC: 3 MG/DL (ref 6–20)
BUN/CREAT SERPL: 3.5 (ref 7–25)
CALCIUM SPEC-SCNC: 8.5 MG/DL (ref 8.6–10.5)
CHLORIDE SERPL-SCNC: 94 MMOL/L (ref 98–107)
CO2 SERPL-SCNC: 27 MMOL/L (ref 22–29)
CREAT SERPL-MCNC: 0.85 MG/DL (ref 0.76–1.27)
DEPRECATED RDW RBC AUTO: 38.3 FL (ref 37–54)
EGFRCR SERPLBLD CKD-EPI 2021: 110.6 ML/MIN/1.73
EOSINOPHIL # BLD AUTO: 0.17 10*3/MM3 (ref 0–0.4)
EOSINOPHIL NFR BLD AUTO: 1.9 % (ref 0.3–6.2)
ERYTHROCYTE [DISTWIDTH] IN BLOOD BY AUTOMATED COUNT: 12.9 % (ref 12.3–15.4)
GLOBULIN UR ELPH-MCNC: 3.1 GM/DL
GLUCOSE SERPL-MCNC: 103 MG/DL (ref 65–99)
HCT VFR BLD AUTO: 31.5 % (ref 37.5–51)
HGB BLD-MCNC: 11.5 G/DL (ref 13–17.7)
LYMPHOCYTES # BLD AUTO: 2.36 10*3/MM3 (ref 0.7–3.1)
LYMPHOCYTES NFR BLD AUTO: 26.4 % (ref 19.6–45.3)
MCH RBC QN AUTO: 29.8 PG (ref 26.6–33)
MCHC RBC AUTO-ENTMCNC: 36.5 G/DL (ref 31.5–35.7)
MCV RBC AUTO: 81.6 FL (ref 79–97)
MONOCYTES # BLD AUTO: 0.47 10*3/MM3 (ref 0.1–0.9)
MONOCYTES NFR BLD AUTO: 5.3 % (ref 5–12)
NEUTROPHILS NFR BLD AUTO: 5.91 10*3/MM3 (ref 1.7–7)
NEUTROPHILS NFR BLD AUTO: 66 % (ref 42.7–76)
PLATELET # BLD AUTO: 62 10*3/MM3 (ref 140–450)
PMV BLD AUTO: 11.7 FL (ref 6–12)
POTASSIUM SERPL-SCNC: 3.7 MMOL/L (ref 3.5–5.2)
PROT SERPL-MCNC: 6.1 G/DL (ref 6–8.5)
RBC # BLD AUTO: 3.86 10*6/MM3 (ref 4.14–5.8)
SODIUM SERPL-SCNC: 128 MMOL/L (ref 136–145)
WBC NRBC COR # BLD AUTO: 8.95 10*3/MM3 (ref 3.4–10.8)

## 2023-11-17 PROCEDURE — 25010000002 THIAMINE PER 100 MG: Performed by: HOSPITALIST

## 2023-11-17 PROCEDURE — 85025 COMPLETE CBC W/AUTO DIFF WBC: CPT | Performed by: NURSE PRACTITIONER

## 2023-11-17 PROCEDURE — 80053 COMPREHEN METABOLIC PANEL: CPT | Performed by: NURSE PRACTITIONER

## 2023-11-17 RX ADMIN — LORAZEPAM 1 MG: 1 TABLET ORAL at 00:13

## 2023-11-17 RX ADMIN — HYDRALAZINE HYDROCHLORIDE 25 MG: 25 TABLET, FILM COATED ORAL at 09:12

## 2023-11-17 RX ADMIN — THIAMINE HYDROCHLORIDE 200 MG: 100 INJECTION, SOLUTION INTRAMUSCULAR; INTRAVENOUS at 06:44

## 2023-11-17 RX ADMIN — SERTRALINE HYDROCHLORIDE 50 MG: 50 TABLET, FILM COATED ORAL at 09:12

## 2023-11-17 RX ADMIN — Medication 10 ML: at 09:13

## 2023-11-17 RX ADMIN — METOPROLOL TARTRATE 50 MG: 50 TABLET, FILM COATED ORAL at 09:12

## 2023-11-17 RX ADMIN — NICOTINE 1 PATCH: 14 PATCH, EXTENDED RELEASE TRANSDERMAL at 07:13

## 2023-11-17 RX ADMIN — FOLIC ACID 1 MG: 1 TABLET ORAL at 09:12

## 2023-11-17 RX ADMIN — PANTOPRAZOLE SODIUM 40 MG: 40 TABLET, DELAYED RELEASE ORAL at 06:43

## 2023-11-17 NOTE — PLAN OF CARE
Goal Outcome Evaluation:  Plan of Care Reviewed With: patient        Progress: no change  Outcome Evaluation: VSS. No c/o pain this shift, mild tenderness with palpation of abd. Tolerating full liquids. Safety maintained.

## 2023-11-17 NOTE — OUTREACH NOTE
Prep Survey      Flowsheet Row Responses   [facility] patient discharged from? [facility]   Is LACE score < 7 ? No   Eligibility [facility]   Date of Admission 11/14/23   Date of Discharge 11/17/23   Discharge Disposition Home or Self Care   Discharge diagnosis Acute pancreatitis   Does the patient have one of the following disease processes/diagnoses(primary or secondary)? Other   Does the patient have Home health ordered? No   Is there a DME ordered? No   Prep survey completed? Yes            Vida BORDEN - Registered Nurse

## 2023-11-17 NOTE — DISCHARGE SUMMARY
[facility] Medicine Services  DISCHARGE SUMMARY     Date of Admission: 11/14/2023  Date of Discharge:  11/17/2023  Primary Care Physician: Casi Skinner APRN    Presenting Problem/History of Present Illness:  Abdominal pain    Final Discharge Diagnoses:  Active Hospital Problems    Diagnosis     **Acute pancreatitis     Thrombocytopenia     Smoker     Alcohol abuse     Primary hypertension      Consults: None    Procedures Performed: None    Pertinent Test Results:       Imaging Results (All)       Procedure Component Value Units Date/Time    US Gallbladder [[id number]] Collected: 11/15/23 0607     Updated: 11/15/23 0615    Narrative:      EXAMINATION: US GALLBLADDER-     11/15/2023 3:20 AM CST     HISTORY: abdominal pain- assess for CBD stones     The additional examination of the upper abdomen for gallbladder is  performed.     There is no previous study for comparison. Correlation made with  sonography of the liver dated 11/3/2023 and CT scan of the abdomen dated  11/14/2023.     The gallbladder is moderately distended. No gallstones are noted. The  gallbladder wall measures less than 2 mm in thickness. Common bile duct  measures 5 mm in diameter.     There is a tiny hypoechoic nodule in the left lobe, at the floor of the  gallbladder, measuring 8 mm in diameter. There is good through  transmission. It wasn't noted on the previous study dated 11/3/2023  without significant interval change. Moderate diffuse increased  echogenicity of the liver parenchyma suggesting fatty infiltration  similar to the previous study. There is normal hepatopetal portal venous  circulation.     Pancreas is not optimally visualized due to significant superimposed  bowel gas.     Normal right kidney is seen. No mass or hydronephrosis.     Normal abdominal aorta and IVC.       Impression:      1. No evidence of gallstones.  2. Hepatic steatosis. A subcentimeter cyst in the left lobe  which is  stable.        This report was signed and finalized on 11/15/2023 6:12 AM CST by Dr. Heath Valente MD.       CT Abdomen Pelvis With Contrast [[id number]] Collected: 11/15/23 0034     Updated: 11/15/23 0046    Narrative:      EXAMINATION:  CT ABDOMEN PELVIS W CONTRAST-  11/14/2023 9:41 PM CST     HISTORY: Epigastric abdominal pain.     TECHNIQUE: Spiral CT was performed of the abdomen and pelvis with IV  contrast. Multiplanar images were reconstructed.     DLP: 191 mGy-cm. Automated dosage reduction technique was utilized to  reduce patient dose.     COMPARISON: 3/5/2023.     LUNG BASES: The lung bases are clear.     LIVER AND SPLEEN: There is fatty infiltration of the liver. There are no  dense gallstones. The spleen is unremarkable.     PANCREAS: There is stranding of the fat around the pancreas. The  pancreas enhances normally. There is fluid density in the anterior  pararenal spaces bilaterally.     KIDNEYS AND ADRENALS: The kidneys and adrenal glands demonstrate no  focal abnormality. The urinary bladder is not well distended.     BOWEL: There is thickening of the gastric wall. There is under  distention of the stomach. There is mild thickening of the wall of the  hepatic flexure of the colon. There is under distention of portions of  the colon. There is thickening of the wall of the rectum and anus versus  artifact of under distention.     OTHER: There is atheromatous disease of the aortoiliac vessels. There is  free fluid in the right paracolic gutter. There is small to moderate  free fluid in the pelvis. The prostate is prominent in size measuring  4.9 cm transversely. There are degenerative changes of the spine and  hips. There is subchondral cystic change in the femoral head/neck  junction on the right side. 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          Impression:      1. There is stranding of the fat around the pancreas  with fluid in the  anterior pararenal spaces bilaterally. The pancreas enhances normally.  The findings are consistent with acute pancreatitis. Wall thickening of  the stomach and nearby hepatic flexure of the colon which may be  secondarily inflamed due to the pancreatitis.  2. Small amount of fluid in the right paracolic gutter. Small to  moderate amount of free fluid in the pelvis. These findings are likely  related to the pancreatic inflammation.  3. Fatty infiltration of the liver.  4. Wall thickening of the rectum and anus versus artifact of under  distention. Short segment colitis is in the differential. Neoplasm less  likely.  5. Atheromatous disease of the aortoiliac vessels, mild.  6. Degenerative changes of the visualized spine and hips. Suggestion of  cam type CATRACHITA anatomy of both hips.        The full report of this exam was immediately signed and available to the  emergency room. The patient is currently in the emergency room.     This report was signed and finalized on 11/15/2023 12:43 AM CST by Dr. Claus Bhagat MD.             LAB RESULTS:      Lab 11/17/23  0851 11/16/23  0619 11/15/23  1240 11/15/23  0130 11/14/23 2004   WBC 8.95 8.46 8.48  --  8.91   HEMOGLOBIN 11.5* 11.0* 12.1*  --  16.3   HEMATOCRIT 31.5* 30.8* 33.7*  --  45.0   PLATELETS 62* 53* 57*  --  81*   NEUTROS ABS 5.91  --   --   --  5.44   LYMPHS ABS 2.36  --   --   --   --    MONOS ABS 0.47  --   --   --   --    EOS ABS 0.17  --   --   --  0.36   MCV 81.6 82.8 82.0  --  81.2   LACTATE  --   --   --  1.0  --          Lab 11/17/23  0851 11/16/23  1110 11/16/23  0619 11/15/23  2029 11/15/23  0943 11/14/23 2004   SODIUM 128*  --  132*  --  131* 133*   POTASSIUM 3.7 4.0 3.8 3.6 3.6 3.2*   CHLORIDE 94*  --  101  --  99 95*   CO2 27.0  --  27.0  --  24.0 28.0   ANION GAP 7.0  --  4.0*  --  8.0 10.0   BUN 3*  --  5*  --  7 10   CREATININE 0.85  --  1.03  --  0.88 1.06   EGFR 110.6  --  92.4  --  109.4 89.3   GLUCOSE 103*  --  126*  --   116* 117*   CALCIUM 8.5*  --  8.5*  --  8.5* 9.7   MAGNESIUM  --   --  1.6  --  1.6  --    PHOSPHORUS  --   --   --   --  2.4*  --          Lab 11/17/23  0851 11/16/23  0619 11/15/23  0943 11/14/23 2004   TOTAL PROTEIN 6.1 5.5* 6.1 8.2   ALBUMIN 3.0* 2.7* 3.2* 4.5   GLOBULIN 3.1 2.8 2.9 3.7   ALT (SGPT) 51* 63* 94* 161*   AST (SGOT) 37 58* 125* 263*   BILIRUBIN 1.2 0.9 1.1 2.3*   ALK PHOS 88 87 101 149*   LIPASE  --  389*  --  1,044*             Lab 11/15/23  0943   TRIGLYCERIDES 74             Brief Urine Lab Results  (Last result in the past 365 days)        Color   Clarity   Blood   Leuk Est   Nitrite   Protein   CREAT   Urine HCG        11/14/23 1942 Orange   Clear   Negative   Small (1+)   Positive   30 mg/dL (1+)                 Microbiology Results (last 10 days)       Procedure Component Value - Date/Time    Blood Culture - Blood, Arm, Right [[id number]]  (Normal) Collected: 11/15/23 0130    Lab Status: Preliminary result Specimen: Blood from Arm, Right Updated: 11/17/23 0200     Blood Culture No growth at 2 days    Blood Culture - Blood, Arm, Right [[id number]]  (Normal) Collected: 11/15/23 0130    Lab Status: Preliminary result Specimen: Blood from Arm, Right Updated: 11/17/23 0145     Blood Culture No growth at 2 days          Hospital Course: Mr. Taylor presented to [facility] ER 11/14/2023 with epigastric and right upper quadrant abdominal pain described as sharp radiating to the upper quadrant starting on Sunday, 11/13/2023.  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  Patient admits to drinking beer daily but unable to drink beer since onset of epigastric pain.  Denies vomiting or diarrhea.  Lipase 1044.  Urinalysis positive nitrates, negative WBC, trace bacteria.  CT abdomen noted stranding of the fat around the pancreas with fluid in the anterior pararenal spaces bilaterally.  Pancreas  enhances normally.  Findings consistent with acute pancreatitis.  Wall thickening of the stomach and nearby hepatic flexure of the colon may be secondarily inflammation due to pancreatitis.  Small amount of fluid) colic gutter.  Small to moderate amount of free fluid pelvis.  Findings related to pancreatic inflammation.  Wall thickening of the rectum and anus versus artifact under distention.  Short segment of colitis differential.  Ultrasound of the gallbladder no evidence of gallstones.  Hepatic steatosis.  Subcentimeter cyst left lobe which is stable.  Dilaudid IV, Zofran, Rocephin given in ER.     He was admitted with acute noncomplicated alcohol induced pancreatitis.  Symptoms started 11/13 with epigastric pain sharp not improved.  Lipase 1044.  CT scan of the abdomen consistent with acute pancreatitis.  Ultrasound gallbladder negative.  Patient was given Dilaudid IV for severe pain, oxycodone for moderate pain.  Patient was started on clear liquid diet and advanced to full liquids.  He hydrated with IV fluids.  Lipase trended down to 389 and patient was advanced to GI bland soft diet.    CIWA protocol initiated for alcohol use.  Patient reports daily beer consumption.  He reports he has not been able to drink any beer since epigastric pain started.  No signs of alcohol withdrawal, no shaking or tremors noted.    Amlodipine, metoprolol, hydralazine and losartan continued for primary hypertension.  Blood pressure 149/84.    Nicotine patch ordered for tobacco use.  Discussed smoking cessation.    Potassium 3.2 on admission and replaced.  Follow-up potassium 3.8.  Potassium 3.7 at discharge.  Sodium 128 suspect dilutional effect from high rate IV fluids.    Urinalysis abnormal with positive nitrates, negative WBC, trace bacteria.  Rocephin given in ER.  Patient denied dysuria, frequency or urgency.  Doubt UTI.  Antibiotics not continued.    Thrombocytopenia secondary to alcohol use.  Platelets 81,000 on admission  "trended down to 53,000 on 11/16.  Platelets improving up to 62,000 at discharge.    Patient has history of right basal ganglia hemorrhagic stroke March 2023 with left residual hemiparesis.  Physical therapy and Occupational Therapy consulted.    SCDs ordered for deep vein thrombosis prophylaxis.  No Lovenox ordered due to thrombocytopenia.    On 11/17/2023, he is stable for discharge.  Patient denies abdominal pain today and is tolerating GI bland soft diet.  Patient will follow-up with NAM Meza in 1 week to follow-up blood pressure after hospitalization.  Keep scheduled appointment with NAM Vasquez with gastroenterology on 11/28/2023.    Physical Exam on Discharge:  /98 (BP Location: Right arm, Patient Position: Lying)   Pulse 81   Temp 98.3 °F (36.8 °C) (Oral)   Resp 18   Ht 177.8 cm (70\")   Wt 72.1 kg (159 lb)   SpO2 99%   BMI 22.81 kg/m²   Physical Exam  Vitals and nursing note reviewed.   Constitutional:       Comments: Sitting up in bed.  No oxygen use.  No visitors in room.   HENT:      Head: Normocephalic and atraumatic.      Nose: No congestion.      Mouth/Throat:      Pharynx: Oropharynx is clear. No oropharyngeal exudate or posterior oropharyngeal erythema.   Eyes:      Extraocular Movements: Extraocular movements intact.      Pupils: Pupils are equal, round, and reactive to light.   Cardiovascular:      Rate and Rhythm: Normal rate and regular rhythm.      Heart sounds: No murmur heard.  Pulmonary:      Breath sounds: No wheezing, rhonchi or rales.      Comments: No oxygen use.  Abdominal:      Palpations: Abdomen is soft.      Tenderness: There is no abdominal tenderness.   Genitourinary:     Comments: Voiding.  Musculoskeletal:         General: No swelling or tenderness.      Cervical back: Normal range of motion and neck supple.   Skin:     General: Skin is warm and dry.   Neurological:      General: No focal deficit present.      Mental Status: He is alert and " oriented to person, place, and time.   Psychiatric:         Mood and Affect: Mood normal.         Behavior: Behavior normal.         Thought Content: Thought content normal.         Judgment: Judgment normal.       Condition on Discharge: Stable for discharge    Discharge Disposition:  Home or Self Care    Discharge Medications:     Discharge Medications        Continue These Medications        Instructions Start Date   amLODIPine 10 MG tablet  Commonly known as: NORVASC   10 mg, Oral, Nightly      hydrALAZINE 25 MG tablet  Commonly known as: APRESOLINE   25 mg, Oral, 3 Times Daily      losartan 25 MG tablet  Commonly known as: COZAAR   25 mg, Oral, Nightly      metoprolol tartrate 50 MG tablet  Commonly known as: LOPRESSOR   50 mg, Oral, Every 12 Hours Scheduled      naltrexone 50 MG tablet  Commonly known as: DEPADE   50 mg, Oral, Daily      sertraline 50 MG tablet  Commonly known as: ZOLOFT   50 mg, Oral, Daily, To help with mood. Take with food      terazosin 5 MG capsule  Commonly known as: HYTRIN   5 mg, Oral, Nightly             Discharge Diet:   Diet Instructions       Diet: Gastrointestinal Diets; Low Irritant; Regular Texture (IDDSI 7); Thin (IDDSI 0)      Discharge Diet: Gastrointestinal Diets    Gastrointestinal Diet: Low Irritant    Texture: Regular Texture (IDDSI 7)    Fluid Consistency: Thin (IDDSI 0)            Activity at Discharge:   Activity Instructions       Activity as Tolerated            Discharge instructions:  1.  For recurrent epigastric pain seek medical attention.  2.  Discussed alcohol cessation  3.  Discussed smoking cessation  4.  Follow-up with NAM Meza in 1 week to follow-up blood pressure after hospitalization  5.  Keep scheduled appointment NAM Vasquez on 11/28/2023    Follow-up Appointments:   Future Appointments   Date Time Provider Department Center   11/21/2023 11:30 AM Casi Skinner APRN [facility]  [facility] P[facility]   11/28/2023  2:00 PM Brandon  NAM Todd [facility]   12/4/2023  3:30 PM Raimundo Monaco, PTA [facility]   12/13/2023  2:00 PM Corazon Diamond, PTA [facility]   12/20/2023  2:00 PM Raimundo Monaco, PTA [facility]   12/27/2023  1:15 PM Raimundo Monaco, PTA [facility]   1/17/2024  4:15 PM Casi Skinner APRN [facility] PAD   2/7/2024  9:45 AM Feliciano Boothe APRN [facility] PAD     Test Results Pending at Discharge: None    Electronically signed by NAM Thomas, 11/17/23, 09:33 CST.    Time: 35 minutes.  Discussed with Dr. Hernandez and patient.    The above documentation resulted from a face-to-face encounter by me Darlyn BROWNING, [facility].

## 2023-11-18 NOTE — PAYOR COMM NOTE
"[place] [facility] 11-17-23    James Taylor (43 y.o. Male)       Date of Birth   1980    Social Security Number       Address   [address]  [place] [address]    Home Phone   [phone]    MRN   [id number]       Yarsanism   Mormonism    Marital Status   Single                            Admission Date   11/14/23    Admission Type   Emergency    Admitting Provider   Flory Hernandez MD    Attending Provider       Department, Room/Bed   [facility] 3C, 396/1       Discharge Date   11/17/2023    Discharge Disposition   Home or Self Care    Discharge Destination                                 Attending Provider: (none)   Allergies: No Known Allergies    Isolation: None   Infection: None   Code Status: Prior    Ht: 177.8 cm (70\")   Wt: 72.1 kg (159 lb)    Admission Cmt: None   Principal Problem: Acute pancreatitis [K85.90]                   Active Insurance as of 11/14/2023       Primary Coverage       Payor Plan Insurance Group Employer/Plan Group    [place] [id number]       Payor Plan Address Payor Plan Phone Number Payor Plan Fax Number Effective Dates    [employer] [address] [address] [phone]  1/1/2021 - None Entered    [facility] [id number]         Subscriber Name Subscriber Birth Date Member ID       JAMES TAYLOR 1980 [id number]                     Emergency Contacts        (Rel.) Home Phone Work Phone Mobile Phone    Jesus Taylor (Mother) [phone] -- --    Alfredo Taylor (Brother) -- -- [phone]    Guillermina Lucas (Sister) -- -- [phone]    Sidra Tompkins (Significant Other) -- -- [phone]                 Discharge Summary        Darlyn Meyer, APRN at 11/17/23 0718       Attestation signed by Flory Hernandez MD at 11/17/23 1820    This visit was performed by both a physician and an APC. I performed all aspects of the MDM as documented.    Electronically signed by Flory Hernandez MD, 11/17/2023, 18:20 CST.                 "         [facility] Medicine Services  DISCHARGE SUMMARY     Date of Admission: 11/14/2023  Date of Discharge:  11/17/2023  Primary Care Physician: Casi Skinner APRN    Presenting Problem/History of Present Illness:  Abdominal pain    Final Discharge Diagnoses:  Active Hospital Problems    Diagnosis     **Acute pancreatitis     Thrombocytopenia     Smoker     Alcohol abuse     Primary hypertension      Consults: None    Procedures Performed: None    Pertinent Test Results:       Imaging Results (All)       Procedure Component Value Units Date/Time    US Gallbladder [[id number]] Collected: 11/15/23 0607     Updated: 11/15/23 0615    Narrative:      EXAMINATION: US GALLBLADDER-     11/15/2023 3:20 AM CST     HISTORY: abdominal pain- assess for CBD stones     The additional examination of the upper abdomen for gallbladder is  performed.     There is no previous study for comparison. Correlation made with  sonography of the liver dated 11/3/2023 and CT scan of the abdomen dated  11/14/2023.     The gallbladder is moderately distended. No gallstones are noted. The  gallbladder wall measures less than 2 mm in thickness. Common bile duct  measures 5 mm in diameter.     There is a tiny hypoechoic nodule in the left lobe, at the floor of the  gallbladder, measuring 8 mm in diameter. There is good through  transmission. It wasn't noted on the previous study dated 11/3/2023  without significant interval change. Moderate diffuse increased  echogenicity of the liver parenchyma suggesting fatty infiltration  similar to the previous study. There is normal hepatopetal portal venous  circulation.     Pancreas is not optimally visualized due to significant superimposed  bowel gas.     Normal right kidney is seen. No mass or hydronephrosis.     Normal abdominal aorta and IVC.       Impression:      1. No evidence of gallstones.  2. Hepatic steatosis. A subcentimeter cyst in the left lobe  which is  stable.        This report was signed and finalized on 11/15/2023 6:12 AM CST by Dr. Heath Valente MD.       CT Abdomen Pelvis With Contrast [[id number]] Collected: 11/15/23 0034     Updated: 11/15/23 0046    Narrative:      EXAMINATION:  CT ABDOMEN PELVIS W CONTRAST-  11/14/2023 9:41 PM CST     HISTORY: Epigastric abdominal pain.     TECHNIQUE: Spiral CT was performed of the abdomen and pelvis with IV  contrast. Multiplanar images were reconstructed.     DLP: 191 mGy-cm. Automated dosage reduction technique was utilized to  reduce patient dose.     COMPARISON: 3/5/2023.     LUNG BASES: The lung bases are clear.     LIVER AND SPLEEN: There is fatty infiltration of the liver. There are no  dense gallstones. The spleen is unremarkable.     PANCREAS: There is stranding of the fat around the pancreas. The  pancreas enhances normally. There is fluid density in the anterior  pararenal spaces bilaterally.     KIDNEYS AND ADRENALS: The kidneys and adrenal glands demonstrate no  focal abnormality. The urinary bladder is not well distended.     BOWEL: There is thickening of the gastric wall. There is under  distention of the stomach. There is mild thickening of the wall of the  hepatic flexure of the colon. There is under distention of portions of  the colon. There is thickening of the wall of the rectum and anus versus  artifact of under distention.     OTHER: There is atheromatous disease of the aortoiliac vessels. There is  free fluid in the right paracolic gutter. There is small to moderate  free fluid in the pelvis. The prostate is prominent in size measuring  4.9 cm transversely. There are degenerative changes of the spine and  hips. There is subchondral cystic change in the femoral head/neck  junction on the right side. 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          Impression:      1. There is stranding of the fat around the pancreas  with fluid in the  anterior pararenal spaces bilaterally. The pancreas enhances normally.  The findings are consistent with acute pancreatitis. Wall thickening of  the stomach and nearby hepatic flexure of the colon which may be  secondarily inflamed due to the pancreatitis.  2. Small amount of fluid in the right paracolic gutter. Small to  moderate amount of free fluid in the pelvis. These findings are likely  related to the pancreatic inflammation.  3. Fatty infiltration of the liver.  4. Wall thickening of the rectum and anus versus artifact of under  distention. Short segment colitis is in the differential. Neoplasm less  likely.  5. Atheromatous disease of the aortoiliac vessels, mild.  6. Degenerative changes of the visualized spine and hips. Suggestion of  cam type CATRACHITA anatomy of both hips.        The full report of this exam was immediately signed and available to the  emergency room. The patient is currently in the emergency room.     This report was signed and finalized on 11/15/2023 12:43 AM CST by Dr. Claus Bhagat MD.             LAB RESULTS:      Lab 11/17/23  0851 11/16/23  0619 11/15/23  1240 11/15/23  0130 11/14/23 2004   WBC 8.95 8.46 8.48  --  8.91   HEMOGLOBIN 11.5* 11.0* 12.1*  --  16.3   HEMATOCRIT 31.5* 30.8* 33.7*  --  45.0   PLATELETS 62* 53* 57*  --  81*   NEUTROS ABS 5.91  --   --   --  5.44   LYMPHS ABS 2.36  --   --   --   --    MONOS ABS 0.47  --   --   --   --    EOS ABS 0.17  --   --   --  0.36   MCV 81.6 82.8 82.0  --  81.2   LACTATE  --   --   --  1.0  --          Lab 11/17/23  0851 11/16/23  1110 11/16/23  0619 11/15/23  2029 11/15/23  0943 11/14/23 2004   SODIUM 128*  --  132*  --  131* 133*   POTASSIUM 3.7 4.0 3.8 3.6 3.6 3.2*   CHLORIDE 94*  --  101  --  99 95*   CO2 27.0  --  27.0  --  24.0 28.0   ANION GAP 7.0  --  4.0*  --  8.0 10.0   BUN 3*  --  5*  --  7 10   CREATININE 0.85  --  1.03  --  0.88 1.06   EGFR 110.6  --  92.4  --  109.4 89.3   GLUCOSE 103*  --  126*  --   116* 117*   CALCIUM 8.5*  --  8.5*  --  8.5* 9.7   MAGNESIUM  --   --  1.6  --  1.6  --    PHOSPHORUS  --   --   --   --  2.4*  --          Lab 11/17/23  0851 11/16/23  0619 11/15/23  0943 11/14/23 2004   TOTAL PROTEIN 6.1 5.5* 6.1 8.2   ALBUMIN 3.0* 2.7* 3.2* 4.5   GLOBULIN 3.1 2.8 2.9 3.7   ALT (SGPT) 51* 63* 94* 161*   AST (SGOT) 37 58* 125* 263*   BILIRUBIN 1.2 0.9 1.1 2.3*   ALK PHOS 88 87 101 149*   LIPASE  --  389*  --  1,044*             Lab 11/15/23  0943   TRIGLYCERIDES 74             Brief Urine Lab Results  (Last result in the past 365 days)        Color   Clarity   Blood   Leuk Est   Nitrite   Protein   CREAT   Urine HCG        11/14/23 1942 Orange   Clear   Negative   Small (1+)   Positive   30 mg/dL (1+)                 Microbiology Results (last 10 days)       Procedure Component Value - Date/Time    Blood Culture - Blood, Arm, Right [[id number]]  (Normal) Collected: 11/15/23 0130    Lab Status: Preliminary result Specimen: Blood from Arm, Right Updated: 11/17/23 0200     Blood Culture No growth at 2 days    Blood Culture - Blood, Arm, Right [[id number]]  (Normal) Collected: 11/15/23 0130    Lab Status: Preliminary result Specimen: Blood from Arm, Right Updated: 11/17/23 0145     Blood Culture No growth at 2 days          Hospital Course: Mr. Taylor presented to [facility] ER [facility]/14/2023 with epigastric and right upper quadrant abdominal pain described as sharp radiating to the upper quadrant starting on Sunday, 11/13/2023.  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  Patient admits to drinking beer daily but unable to drink beer since onset of epigastric pain.  Denies vomiting or diarrhea.  Lipase 1044.  Urinalysis positive nitrates, negative WBC, trace bacteria.  CT abdomen noted stranding of the fat around the pancreas with fluid in the anterior pararenal spaces bilaterally.  Pancreas  enhances normally.  Findings consistent with acute pancreatitis.  Wall thickening of the stomach and nearby hepatic flexure of the colon may be secondarily inflammation due to pancreatitis.  Small amount of fluid) colic gutter.  Small to moderate amount of free fluid pelvis.  Findings related to pancreatic inflammation.  Wall thickening of the rectum and anus versus artifact under distention.  Short segment of colitis differential.  Ultrasound of the gallbladder no evidence of gallstones.  Hepatic steatosis.  Subcentimeter cyst left lobe which is stable.  Dilaudid IV, Zofran, Rocephin given in ER.     He was admitted with acute noncomplicated alcohol induced pancreatitis.  Symptoms started 11/13 with epigastric pain sharp not improved.  Lipase 1044.  CT scan of the abdomen consistent with acute pancreatitis.  Ultrasound gallbladder negative.  Patient was given Dilaudid IV for severe pain, oxycodone for moderate pain.  Patient was started on clear liquid diet and advanced to full liquids.  He hydrated with IV fluids.  Lipase trended down to 389 and patient was advanced to GI bland soft diet.    CIWA protocol initiated for alcohol use.  Patient reports daily beer consumption.  He reports he has not been able to drink any beer since epigastric pain started.  No signs of alcohol withdrawal, no shaking or tremors noted.    Amlodipine, metoprolol, hydralazine and losartan continued for primary hypertension.  Blood pressure 149/84.    Nicotine patch ordered for tobacco use.  Discussed smoking cessation.    Potassium 3.2 on admission and replaced.  Follow-up potassium 3.8.  Potassium 3.7 at discharge.  Sodium 128 suspect dilutional effect from high rate IV fluids.    Urinalysis abnormal with positive nitrates, negative WBC, trace bacteria.  Rocephin given in ER.  Patient denied dysuria, frequency or urgency.  Doubt UTI.  Antibiotics not continued.    Thrombocytopenia secondary to alcohol use.  Platelets 81,000 on admission  "trended down to 53,000 on 11/16.  Platelets improving up to 62,000 at discharge.    Patient has history of right basal ganglia hemorrhagic stroke March 2023 with left residual hemiparesis.  Physical therapy and Occupational Therapy consulted.    SCDs ordered for deep vein thrombosis prophylaxis.  No Lovenox ordered due to thrombocytopenia.    On 11/17/2023, he is stable for discharge.  Patient denies abdominal pain today and is tolerating GI bland soft diet.  Patient will follow-up with NAM Meza in 1 week to follow-up blood pressure after hospitalization.  Keep scheduled appointment with NAM Vasquez with gastroenterology on 11/28/2023.    Physical Exam on Discharge:  /98 (BP Location: Right arm, Patient Position: Lying)   Pulse 81   Temp 98.3 °F (36.8 °C) (Oral)   Resp 18   Ht 177.8 cm (70\")   Wt 72.1 kg (159 lb)   SpO2 99%   BMI 22.81 kg/m²   Physical Exam  Vitals and nursing note reviewed.   Constitutional:       Comments: Sitting up in bed.  No oxygen use.  No visitors in room.   HENT:      Head: Normocephalic and atraumatic.      Nose: No congestion.      Mouth/Throat:      Pharynx: Oropharynx is clear. No oropharyngeal exudate or posterior oropharyngeal erythema.   Eyes:      Extraocular Movements: Extraocular movements intact.      Pupils: Pupils are equal, round, and reactive to light.   Cardiovascular:      Rate and Rhythm: Normal rate and regular rhythm.      Heart sounds: No murmur heard.  Pulmonary:      Breath sounds: No wheezing, rhonchi or rales.      Comments: No oxygen use.  Abdominal:      Palpations: Abdomen is soft.      Tenderness: There is no abdominal tenderness.   Genitourinary:     Comments: Voiding.  Musculoskeletal:         General: No swelling or tenderness.      Cervical back: Normal range of motion and neck supple.   Skin:     General: Skin is warm and dry.   Neurological:      General: No focal deficit present.      Mental Status: He is alert and " oriented to person, place, and time.   Psychiatric:         Mood and Affect: Mood normal.         Behavior: Behavior normal.         Thought Content: Thought content normal.         Judgment: Judgment normal.       Condition on Discharge: Stable for discharge    Discharge Disposition:  Home or Self Care    Discharge Medications:     Discharge Medications        Continue These Medications        Instructions Start Date   amLODIPine 10 MG tablet  Commonly known as: NORVASC   10 mg, Oral, Nightly      hydrALAZINE 25 MG tablet  Commonly known as: APRESOLINE   25 mg, Oral, 3 Times Daily      losartan 25 MG tablet  Commonly known as: COZAAR   25 mg, Oral, Nightly      metoprolol tartrate 50 MG tablet  Commonly known as: LOPRESSOR   50 mg, Oral, Every 12 Hours Scheduled      naltrexone 50 MG tablet  Commonly known as: DEPADE   50 mg, Oral, Daily      sertraline 50 MG tablet  Commonly known as: ZOLOFT   50 mg, Oral, Daily, To help with mood. Take with food      terazosin 5 MG capsule  Commonly known as: HYTRIN   5 mg, Oral, Nightly             Discharge Diet:   Diet Instructions       Diet: Gastrointestinal Diets; Low Irritant; Regular Texture (IDDSI 7); Thin (IDDSI 0)      Discharge Diet: Gastrointestinal Diets    Gastrointestinal Diet: Low Irritant    Texture: Regular Texture (IDDSI 7)    Fluid Consistency: Thin (IDDSI 0)            Activity at Discharge:   Activity Instructions       Activity as Tolerated            Discharge instructions:  1.  For recurrent epigastric pain seek medical attention.  2.  Discussed alcohol cessation  3.  Discussed smoking cessation  4.  Follow-up with NAM Meza in 1 week to follow-up blood pressure after hospitalization  5.  Keep scheduled appointment NAM Vasquez on 11/28/2023    Follow-up Appointments:   Future Appointments   Date Time Provider Department Center   11/21/2023 11:30 AM Casi Skinner APRN [facility]  PAD PAD   11/28/2023  2:00 PM Brandon  NAM Todd [facility] PAD PAD   12/4/2023  3:30 PM Raimundo Monaco, PTA [facility] PAD   12/13/2023  2:00 PM Corazon Diamond, PTA [facility] PAD   12/20/2023  2:00 PM Raimundo Monaco, PTA [facility] PAD   12/27/2023  1:15 PM Raimundo Monaco, PTA [facility] PAD   1/17/2024  4:15 PM Casi Skinner APRN [facility] PAD PAD   2/7/2024  9:45 AM Feliciano Boothe APRN [facility] NS PAD PAD     Test Results Pending at Discharge: None    Electronically signed by NAM Thomas, 11/17/23, 09:33 CST.    Time: 35 minutes.  Discussed with Dr. Hernandez and patient.    The above documentation resulted from a face-to-face encounter by me Darlyn BROWNING, [facility].           Electronically signed by Flory Hernandez MD at 11/17/23 4570

## 2023-11-20 ENCOUNTER — TRANSITIONAL CARE MANAGEMENT TELEPHONE ENCOUNTER (OUTPATIENT)
Dept: CALL CENTER | Facility: HOSPITAL | Age: 43
End: 2023-11-20
Payer: MEDICAID

## 2023-11-20 LAB
BACTERIA SPEC AEROBE CULT: NORMAL
BACTERIA SPEC AEROBE CULT: NORMAL

## 2023-11-20 NOTE — OUTREACH NOTE
"Call Center TCM Note      Flowsheet Row Responses   [facility] patient discharged from? [place]   Does the patient have one of the following disease processes/diagnoses(primary or secondary)? Other   TCM attempt successful? Yes   Call start time 1158   Call end time 1159   Discharge diagnosis Acute pancreatitis   Person spoke with today (if not patient) and relationship girlfriend -Staci   Is the patient taking all medications as directed (includes completed medication regime)? Yes   Medication comments No changes   Comments Appt with Darryn BROWNING 11/21 @ 11:30   Does the patient have an appointment with their PCP within 7-14 days of discharge? Yes   Psychosocial issues? No   Did the patient receive a copy of their discharge instructions? Yes   Nursing interventions Reviewed instructions with patient   What is the patient's perception of their health status since discharge? Improving   Is the patient/caregiver able to teach back signs and symptoms related to disease process for when to call PCP? Yes   Is the patient/caregiver able to teach back signs and symptoms related to disease process for when to call 911? Yes   Is the patient/caregiver able to teach back the hierarchy of who to call/visit for symptoms/problems? PCP, Specialist, Home health nurse, Urgent Care, ED, 911 Yes   Additional teach back comments States he is doing \"fine\".   TCM call completed? Yes   Call end time 1159            Jaja Collado LPN    11/20/2023, 12:00 CST        "

## 2023-11-21 ENCOUNTER — OFFICE VISIT (OUTPATIENT)
Dept: FAMILY MEDICINE CLINIC | Facility: CLINIC | Age: 43
End: 2023-11-21
Payer: MEDICAID

## 2023-11-21 VITALS
SYSTOLIC BLOOD PRESSURE: 140 MMHG | TEMPERATURE: 97.6 F | BODY MASS INDEX: 22.24 KG/M2 | DIASTOLIC BLOOD PRESSURE: 90 MMHG | WEIGHT: 155.38 LBS | HEIGHT: 70 IN | OXYGEN SATURATION: 100 % | HEART RATE: 63 BPM | RESPIRATION RATE: 18 BRPM

## 2023-11-21 DIAGNOSIS — G89.29 CHRONIC BACK PAIN, UNSPECIFIED BACK LOCATION, UNSPECIFIED BACK PAIN LATERALITY: ICD-10-CM

## 2023-11-21 DIAGNOSIS — K59.00 CONSTIPATION, UNSPECIFIED CONSTIPATION TYPE: ICD-10-CM

## 2023-11-21 DIAGNOSIS — Z09 HOSPITAL DISCHARGE FOLLOW-UP: Primary | ICD-10-CM

## 2023-11-21 DIAGNOSIS — E87.1 HYPONATREMIA: ICD-10-CM

## 2023-11-21 DIAGNOSIS — F10.20 ALCOHOLISM: ICD-10-CM

## 2023-11-21 DIAGNOSIS — K85.20 ALCOHOL-INDUCED ACUTE PANCREATITIS, UNSPECIFIED COMPLICATION STATUS: ICD-10-CM

## 2023-11-21 DIAGNOSIS — M54.9 CHRONIC BACK PAIN, UNSPECIFIED BACK LOCATION, UNSPECIFIED BACK PAIN LATERALITY: ICD-10-CM

## 2023-11-21 RX ORDER — GABAPENTIN 300 MG/1
CAPSULE ORAL
Qty: 90 CAPSULE | Refills: 0 | Status: SHIPPED | OUTPATIENT
Start: 2023-11-21

## 2023-11-21 NOTE — PROGRESS NOTES
NAM Renteria  [facility]   Family Medicine  [address]  [place] [address]  Phone: [phone]  Fax: [phone]        CC: hospital follow-up for     History:  James Taylor is a 43 y.o. male who presents today for transitional care management after hospitalization.    Date of Discharge: 11/17/2023  TCM call successfully made on: 11/20/2023 by Jaja Collado LPN      Alcoholism.  11/21/2023 Pt reports that one day his stomach started hurting and it started getting worse. He laid down for 2 days in pain. He did not go to the hospital, but on the third day, it rolled to his side, and it got worse. He went to the hospital on the third day. For the first 2 days, he could not sleep, and his stomach started to hurt. He probably increased his drinking. He did have a couple of beers. He went to 2 beers a day before his symptoms started. He did not drink anything when he was hurting. He had 1 beer a day before he started hurting. He has back pain. He discontinued taking naltrexone. It did not cut his cravings. He has thought about going to rehab. He has not had a drink since he left the hospital.     Abdominal pain.  He is still having abdominal pain. He has not had a bowel movement since last Wednesday. The adult female gave him 1 capful of MiraLAX. He had diarrhea on the first day last Wednesday. It has been a long time since he has been to AA meetings. He does not have a sponsor. He has not tried gabapentin.    Hypertension.  11/21/2023: He has not taken his blood pressure medication today. He is still taking Zoloft.    Mr. Taylor is utilizing Norvasc at bedtime, Apresoline 3 times daily, losartan 25 mg at bedtime, metoprolol 2 times daily.     ROS:  Review of Systems   Constitutional:  Negative for fatigue, fever and unexpected weight change.   HENT:  Negative for congestion, ear pain, rhinorrhea, sinus pressure, sinus pain and voice change.    Eyes:  Negative for visual  "disturbance.   Respiratory:  Negative for shortness of breath and wheezing.    Cardiovascular:  Negative for chest pain and palpitations.   Gastrointestinal:  Positive for abdominal distention and constipation. Negative for abdominal pain, nausea and vomiting.   Genitourinary:  Negative for dysuria and flank pain.   Musculoskeletal:  Negative for back pain, myalgias and neck pain.   Skin:  Negative for color change and rash.   Neurological:  Negative for dizziness, weakness, numbness and headaches.   Psychiatric/Behavioral:  Negative for behavioral problems, dysphoric mood, self-injury and sleep disturbance.        Mr. Taylor  reports that he has been smoking cigarettes. He started smoking about 11 years ago. He has a 15.00 pack-year smoking history. He has been exposed to tobacco smoke. He has never used smokeless tobacco. He reports current alcohol use of about 4.0 standard drinks of alcohol per week. He reports that he does not use drugs.      Current Outpatient Medications:     amLODIPine (NORVASC) 10 MG tablet, Take 1 tablet by mouth Every Night., Disp: 30 tablet, Rfl: 2    hydrALAZINE (APRESOLINE) 25 MG tablet, Take 1 tablet by mouth 3 (Three) Times a Day., Disp: , Rfl:     losartan (COZAAR) 25 MG tablet, Take 1 tablet by mouth Every Night., Disp: 30 tablet, Rfl: 5    metoprolol tartrate (LOPRESSOR) 50 MG tablet, Take 1 tablet by mouth Every 12 (Twelve) Hours., Disp: , Rfl:     sertraline (ZOLOFT) 50 MG tablet, TAKE 1 TABLET BY MOUTH DAILY. TO HELP WITH MOOD. TAKE WITH FOOD, Disp: 90 tablet, Rfl: 0    terazosin (HYTRIN) 5 MG capsule, Take 1 capsule by mouth Every Night., Disp: , Rfl:     gabapentin (NEURONTIN) 300 MG capsule, TID prn alcohol withdrawal/ chronic back pain., Disp: 90 capsule, Rfl: 0      OBJECTIVE:  /90 (BP Location: Right arm, Patient Position: Sitting, Cuff Size: Adult)   Pulse 63   Temp 97.6 °F (36.4 °C) (Infrared)   Resp 18   Ht 177.8 cm (70\")   Wt 70.5 kg (155 lb 6 oz)   SpO2 " 100%   BMI 22.29 kg/m²    Physical Exam  Vitals and nursing note reviewed.   Constitutional:       Appearance: Normal appearance. He is well-developed.   HENT:      Head: Normocephalic and atraumatic.      Right Ear: Tympanic membrane, ear canal and external ear normal.      Left Ear: Tympanic membrane, ear canal and external ear normal.      Nose: Nose normal. No septal deviation, nasal tenderness or congestion.      Mouth/Throat:      Lips: Pink. No lesions.      Mouth: Mucous membranes are moist. No oral lesions.      Dentition: Normal dentition.      Pharynx: Oropharynx is clear. No pharyngeal swelling, oropharyngeal exudate or posterior oropharyngeal erythema.   Eyes:      General: Lids are normal. Vision grossly intact. No scleral icterus.        Right eye: No discharge.         Left eye: No discharge.      Extraocular Movements: Extraocular movements intact.      Conjunctiva/sclera: Conjunctivae normal.      Right eye: Right conjunctiva is not injected.      Left eye: Left conjunctiva is not injected.      Pupils: Pupils are equal, round, and reactive to light.   Neck:      Thyroid: No thyroid mass.      Trachea: Trachea normal.   Cardiovascular:      Rate and Rhythm: Normal rate and regular rhythm.      Heart sounds: Normal heart sounds. No murmur heard.     No gallop.   Pulmonary:      Effort: Pulmonary effort is normal.      Breath sounds: Normal breath sounds and air entry. No wheezing, rhonchi or rales.   Musculoskeletal:         General: No tenderness or deformity. Normal range of motion.      Cervical back: Full passive range of motion without pain, normal range of motion and neck supple.      Thoracic back: Normal.      Right lower leg: No edema.      Left lower leg: No edema.   Skin:     General: Skin is warm and dry.      Coloration: Skin is not jaundiced.      Findings: No rash.   Neurological:      Mental Status: He is alert and oriented to person, place, and time.      Sensory: Sensation is  intact.      Motor: Motor function is intact.      Coordination: Coordination is intact.      Gait: Gait is intact.      Deep Tendon Reflexes: Reflexes are normal and symmetric.   Psychiatric:         Mood and Affect: Mood and affect normal.         Behavior: Behavior normal.         Judgment: Judgment normal.         Procedures    Assessment/Plan    Diagnoses and all orders for this visit:    1. Hospital discharge follow-up (Primary)    2. Alcohol-induced acute pancreatitis, unspecified complication status    3. Chronic back pain, unspecified back location, unspecified back pain laterality  -     gabapentin (NEURONTIN) 300 MG capsule; TID prn alcohol withdrawal/ chronic back pain.  Dispense: 90 capsule; Refill: 0    4. Alcoholism  -     gabapentin (NEURONTIN) 300 MG capsule; TID prn alcohol withdrawal/ chronic back pain.  Dispense: 90 capsule; Refill: 0  -     Comprehensive metabolic panel; Future  -     CBC No Differential; Future    5. Hyponatremia  -     Comprehensive metabolic panel; Future  -     CBC No Differential; Future    6. Constipation, unspecified constipation type      1.  Alcoholism.  I will recheck his sodium level. He has an appointment with Yudelka Taylor, gastroenterology, on 11/28/2023. He will probably need a colonoscopy once his pancreatitis is healed. He was encouraged to stop drinking alcohol. He was recommended to go to inpatient.     2. Abdominal pain.  He was given samples of MiraLAX. He was given a prescription for gabapentin 300 mg. He was recommended to take 3 or 4 capfuls of MiraLAX in Gatorade.    An After Visit Summary was printed and given to the patient at discharge.  Return in about 4 weeks (around 12/19/2023). Sooner if problems arise.           There are no Patient Instructions on file for this visit.    Discussion:     I spent 30 minutes caring for Chana on this date of service. This time includes time spent by me in the following activities: preparing for the visit,  reviewing tests, obtaining and/or reviewing a separately obtained history, performing a medically appropriate examination and/or evaluation, counseling and educating the patient/family/caregiver, ordering medications, tests, or procedures, documenting information in the medical record, independently interpreting results and communicating that information with the patient/family/caregiver, and care coordination     Casi BROWNING 11/27/2023     Transcribed from ambient dictation for NAM Renteria by Vanda Suarez, Quality .  11/21/23   14:31 CST    Patient or patient representative verbalized consent to the visit recording.  I have personally performed the services described in this document as transcribed by the above individual, and it is both accurate and complete.

## 2023-11-27 ENCOUNTER — LAB (OUTPATIENT)
Dept: LAB | Facility: HOSPITAL | Age: 43
End: 2023-11-27
Payer: MEDICAID

## 2023-11-27 DIAGNOSIS — E87.1 HYPONATREMIA: ICD-10-CM

## 2023-11-27 DIAGNOSIS — F10.20 ALCOHOLISM: ICD-10-CM

## 2023-11-27 DIAGNOSIS — R79.89 ELEVATED LIVER FUNCTION TESTS: ICD-10-CM

## 2023-11-27 LAB
ALBUMIN SERPL-MCNC: 3.8 G/DL (ref 3.5–5.2)
ALBUMIN/GLOB SERPL: 1.2 G/DL
ALP SERPL-CCNC: 125 U/L (ref 39–117)
ALT SERPL W P-5'-P-CCNC: 26 U/L (ref 1–41)
ANION GAP SERPL CALCULATED.3IONS-SCNC: 8 MMOL/L (ref 5–15)
AST SERPL-CCNC: 22 U/L (ref 1–40)
BASOPHILS # BLD AUTO: 0.08 10*3/MM3 (ref 0–0.2)
BASOPHILS NFR BLD AUTO: 0.8 % (ref 0–1.5)
BILIRUB SERPL-MCNC: 0.3 MG/DL (ref 0–1.2)
BUN SERPL-MCNC: 12 MG/DL (ref 6–20)
BUN/CREAT SERPL: 12.5 (ref 7–25)
CALCIUM SPEC-SCNC: 9.1 MG/DL (ref 8.6–10.5)
CHLORIDE SERPL-SCNC: 100 MMOL/L (ref 98–107)
CO2 SERPL-SCNC: 30 MMOL/L (ref 22–29)
CREAT SERPL-MCNC: 0.96 MG/DL (ref 0.76–1.27)
DEPRECATED RDW RBC AUTO: 42.1 FL (ref 37–54)
EGFRCR SERPLBLD CKD-EPI 2021: 100.6 ML/MIN/1.73
EOSINOPHIL # BLD AUTO: 0.5 10*3/MM3 (ref 0–0.4)
EOSINOPHIL NFR BLD AUTO: 4.8 % (ref 0.3–6.2)
ERYTHROCYTE [DISTWIDTH] IN BLOOD BY AUTOMATED COUNT: 13.6 % (ref 12.3–15.4)
GLOBULIN UR ELPH-MCNC: 3.3 GM/DL
GLUCOSE SERPL-MCNC: 105 MG/DL (ref 65–99)
HCT VFR BLD AUTO: 35.1 % (ref 37.5–51)
HGB BLD-MCNC: 11.7 G/DL (ref 13–17.7)
IMM GRANULOCYTES # BLD AUTO: 0.08 10*3/MM3 (ref 0–0.05)
IMM GRANULOCYTES NFR BLD AUTO: 0.8 % (ref 0–0.5)
LYMPHOCYTES # BLD AUTO: 2.85 10*3/MM3 (ref 0.7–3.1)
LYMPHOCYTES NFR BLD AUTO: 27.5 % (ref 19.6–45.3)
MCH RBC QN AUTO: 28.5 PG (ref 26.6–33)
MCHC RBC AUTO-ENTMCNC: 33.3 G/DL (ref 31.5–35.7)
MCV RBC AUTO: 85.4 FL (ref 79–97)
MONOCYTES # BLD AUTO: 0.59 10*3/MM3 (ref 0.1–0.9)
MONOCYTES NFR BLD AUTO: 5.7 % (ref 5–12)
NEUTROPHILS NFR BLD AUTO: 6.25 10*3/MM3 (ref 1.7–7)
NEUTROPHILS NFR BLD AUTO: 60.4 % (ref 42.7–76)
NRBC BLD AUTO-RTO: 0 /100 WBC (ref 0–0.2)
PLATELET # BLD AUTO: 244 10*3/MM3 (ref 140–450)
PMV BLD AUTO: 9.5 FL (ref 6–12)
POTASSIUM SERPL-SCNC: 3.4 MMOL/L (ref 3.5–5.2)
PROT SERPL-MCNC: 7.1 G/DL (ref 6–8.5)
RBC # BLD AUTO: 4.11 10*6/MM3 (ref 4.14–5.8)
SODIUM SERPL-SCNC: 138 MMOL/L (ref 136–145)
WBC NRBC COR # BLD AUTO: 10.35 10*3/MM3 (ref 3.4–10.8)

## 2023-11-27 PROCEDURE — 80053 COMPREHEN METABOLIC PANEL: CPT

## 2023-11-27 PROCEDURE — 36415 COLL VENOUS BLD VENIPUNCTURE: CPT

## 2023-11-27 PROCEDURE — 83690 ASSAY OF LIPASE: CPT | Performed by: CLINICAL NURSE SPECIALIST

## 2023-11-27 PROCEDURE — 85025 COMPLETE CBC W/AUTO DIFF WBC: CPT

## 2023-11-28 ENCOUNTER — OFFICE VISIT (OUTPATIENT)
Dept: GASTROENTEROLOGY | Facility: CLINIC | Age: 43
End: 2023-11-28
Payer: MEDICAID

## 2023-11-28 VITALS
WEIGHT: 165 LBS | SYSTOLIC BLOOD PRESSURE: 138 MMHG | HEART RATE: 67 BPM | OXYGEN SATURATION: 98 % | DIASTOLIC BLOOD PRESSURE: 80 MMHG | TEMPERATURE: 98.2 F | BODY MASS INDEX: 23.62 KG/M2 | HEIGHT: 70 IN

## 2023-11-28 DIAGNOSIS — K76.0 FATTY LIVER: ICD-10-CM

## 2023-11-28 DIAGNOSIS — F10.10 ETOH ABUSE: ICD-10-CM

## 2023-11-28 DIAGNOSIS — R10.13 EPIGASTRIC PAIN: ICD-10-CM

## 2023-11-28 DIAGNOSIS — K85.20 ALCOHOL-INDUCED ACUTE PANCREATITIS, UNSPECIFIED COMPLICATION STATUS: ICD-10-CM

## 2023-11-28 DIAGNOSIS — I61.9 STROKE, HEMORRHAGIC: ICD-10-CM

## 2023-11-28 DIAGNOSIS — Z71.6 TOBACCO ABUSE COUNSELING: ICD-10-CM

## 2023-11-28 DIAGNOSIS — R93.5 ABNORMAL CT OF THE ABDOMEN: Primary | ICD-10-CM

## 2023-11-28 DIAGNOSIS — R79.89 ELEVATED LIVER FUNCTION TESTS: ICD-10-CM

## 2023-11-28 LAB — LIPASE SERPL-CCNC: 51 U/L (ref 13–60)

## 2023-11-28 PROCEDURE — 3075F SYST BP GE 130 - 139MM HG: CPT | Performed by: CLINICAL NURSE SPECIALIST

## 2023-11-28 PROCEDURE — 99214 OFFICE O/P EST MOD 30 MIN: CPT | Performed by: CLINICAL NURSE SPECIALIST

## 2023-11-28 PROCEDURE — 1159F MED LIST DOCD IN RCRD: CPT | Performed by: CLINICAL NURSE SPECIALIST

## 2023-11-28 PROCEDURE — 1160F RVW MEDS BY RX/DR IN RCRD: CPT | Performed by: CLINICAL NURSE SPECIALIST

## 2023-11-28 PROCEDURE — 3079F DIAST BP 80-89 MM HG: CPT | Performed by: CLINICAL NURSE SPECIALIST

## 2023-11-28 RX ORDER — OMEPRAZOLE 20 MG/1
20 CAPSULE, DELAYED RELEASE ORAL DAILY
Qty: 30 CAPSULE | Refills: 11 | Status: SHIPPED | OUTPATIENT
Start: 2023-11-28

## 2023-11-28 NOTE — PROGRESS NOTES
James Taylor  1980 11/28/2023  Chief Complaint   Patient presents with    GI Problem     Liver fu     Subjective   HPI  James Taylor is a 43 y.o. male who presents with a complaint for follow up after recent hospitalization for pancreatitis. He was diagnosed with ETOH pancreatitis after presenting with acute onset of epigastric pain severe sharp stabbing. He has a long hx of ETOH and I have seen him prior for elevated LFTs secondary to his persistent use. He was diagnosed with fatty liver and advised to quit drinking at that time. He says he has quit x1 week for now. He is not going to AA. I have again suggested he do this for better chance of quitting. He agrees. Lipase was 1,044 int he ER on 11/14/23. It has declined to 389 on 11/16/23. He says his pain is some better.    He has also had some constipation new onset for him. Will go up to 3 days without a BM. No bleeding. No fever chills or sweats. No lower abdominal pain. Moderate for him. He has tried Miralax up to once per day not persistent however. No certain triggers. CT noted rectal wall thickening. No known family hx for colon cancer.     Reflux: indigestion with burning epigastric mild to moderate. No triggers. He does not take anything for this. He denies any hematemesis. No melena.   He had a CT scan with incidental findings of wall thickening of the stomach. As noted below.     CT abdomen pelvis with contrast IMPRESSION:  1. There is stranding of the fat around the pancreas with fluid in the  anterior pararenal spaces bilaterally. The pancreas enhances normally.  The findings are consistent with acute pancreatitis. Wall thickening of  the stomach and nearby hepatic flexure of the colon which may be  secondarily inflamed due to the pancreatitis.  2. Small amount of fluid in the right paracolic gutter. Small to  moderate amount of free fluid in the pelvis. These findings are likely  related to the pancreatic inflammation.  3. Fatty infiltration of  the liver.  4. Wall thickening of the rectum and anus versus artifact of under  distention. Short segment colitis is in the differential. Neoplasm less  likely.  5. Atheromatous disease of the aortoiliac vessels, mild.  6. Degenerative changes of the visualized spine and hips. Suggestion of  cam type CATRACHITA anatomy of both hips.        The full report of this exam was immediately signed and available to the  emergency room. The patient is currently in the emergency room.     This report was signed and finalized on 11/15/2023 12:43 AM CST by Dr. Claus Bhagat MD.  Past Medical History:   Diagnosis Date    Asthma     Headache     Hypertension      History reviewed. No pertinent surgical history.    Outpatient Medications Marked as Taking for the 11/28/23 encounter (Office Visit) with Yudelka Taylor APRN   Medication Sig Dispense Refill    amLODIPine (NORVASC) 10 MG tablet Take 1 tablet by mouth Every Night. 30 tablet 2    gabapentin (NEURONTIN) 300 MG capsule TID prn alcohol withdrawal/ chronic back pain. 90 capsule 0    hydrALAZINE (APRESOLINE) 25 MG tablet Take 1 tablet by mouth 3 (Three) Times a Day.      losartan (COZAAR) 25 MG tablet Take 1 tablet by mouth Every Night. 30 tablet 5    metoprolol tartrate (LOPRESSOR) 50 MG tablet Take 1 tablet by mouth Every 12 (Twelve) Hours.      sertraline (ZOLOFT) 50 MG tablet TAKE 1 TABLET BY MOUTH DAILY. TO HELP WITH MOOD. TAKE WITH FOOD 90 tablet 0    terazosin (HYTRIN) 5 MG capsule Take 1 capsule by mouth Every Night.       No Known Allergies  Social History     Socioeconomic History    Marital status: Single   Tobacco Use    Smoking status: Every Day     Packs/day: 1.00     Years: 15.00     Additional pack years: 0.00     Total pack years: 15.00     Types: Cigarettes     Start date: 4/2/2012     Passive exposure: Current    Smokeless tobacco: Never    Tobacco comments:     AVS   Vaping Use    Vaping Use: Never used   Substance and Sexual Activity    Alcohol use: Yes      Alcohol/week: 4.0 standard drinks of alcohol     Types: 4 Cans of beer per week     Comment: on the weekends    Drug use: No    Sexual activity: Yes     Partners: Female     Birth control/protection: Condom, Same-sex partner     Family History   Problem Relation Age of Onset    Anxiety disorder Mother     Diabetes Mother     Hyperlipidemia Mother     Cancer Father     Hyperlipidemia Father     Colon cancer Neg Hx     Colon polyps Neg Hx      Health Maintenance   Topic Date Due    COVID-19 Vaccine (1) Never done    ANNUAL PHYSICAL  Never done    INFLUENZA VACCINE  Never done    Pneumococcal Vaccine 0-64 (1 - PCV) 04/27/2024 (Originally 9/2/1986)    TDAP/TD VACCINES (1 - Tdap) 04/27/2024 (Originally 9/2/1999)    HEPATITIS C SCREENING  Completed     Review of Systems   Constitutional:  Negative for activity change, appetite change, chills, diaphoresis, fatigue, fever and unexpected weight change.   HENT:  Negative for ear pain, hearing loss, mouth sores, sore throat, trouble swallowing and voice change.    Eyes: Negative.    Respiratory:  Negative for cough, choking, shortness of breath and wheezing.    Cardiovascular:  Negative for chest pain and palpitations.   Gastrointestinal:  Positive for constipation. Negative for abdominal pain, blood in stool, diarrhea, nausea and vomiting.   Endocrine: Negative for cold intolerance and heat intolerance.   Genitourinary:  Negative for decreased urine volume, dysuria, frequency, hematuria and urgency.   Musculoskeletal:  Negative for back pain, gait problem and myalgias.   Skin:  Negative for color change, pallor and rash.   Allergic/Immunologic: Negative for food allergies and immunocompromised state.   Neurological:  Negative for dizziness, tremors, seizures, syncope, weakness, light-headedness, numbness and headaches.   Hematological:  Negative for adenopathy. Does not bruise/bleed easily.   Psychiatric/Behavioral:  Negative for agitation and confusion. The patient is  "not nervous/anxious.    All other systems reviewed and are negative.    Objective   Vitals:    11/28/23 1404   BP: 138/80   BP Location: Left arm   Pulse: 67   Temp: 98.2 °F (36.8 °C)   TempSrc: Temporal   SpO2: 98%   Weight: 74.8 kg (165 lb)   Height: 177.8 cm (70\")     Body mass index is 23.68 kg/m².  Physical Exam  Constitutional:       Appearance: He is well-developed.   HENT:      Head: Normocephalic and atraumatic.   Eyes:      Pupils: Pupils are equal, round, and reactive to light.   Neck:      Trachea: No tracheal deviation.   Cardiovascular:      Rate and Rhythm: Normal rate and regular rhythm.      Heart sounds: Normal heart sounds. No murmur heard.     No friction rub. No gallop.   Pulmonary:      Effort: Pulmonary effort is normal. No respiratory distress.      Breath sounds: Normal breath sounds. No wheezing or rales.   Chest:      Chest wall: No tenderness.   Abdominal:      General: Bowel sounds are normal. There is no distension.      Palpations: Abdomen is soft. Abdomen is not rigid.      Tenderness: There is no abdominal tenderness. There is no guarding or rebound.   Musculoskeletal:         General: No tenderness or deformity. Normal range of motion.      Cervical back: Normal range of motion and neck supple.   Skin:     General: Skin is warm and dry.      Coloration: Skin is not pale.      Findings: No rash.   Neurological:      Mental Status: He is alert and oriented to person, place, and time.      Deep Tendon Reflexes: Reflexes are normal and symmetric.   Psychiatric:         Behavior: Behavior normal.         Thought Content: Thought content normal.         Judgment: Judgment normal.       Assessment & Plan   Diagnoses and all orders for this visit:    1. Abnormal CT of the abdomen (Primary)  -     Case Request; Standing  -     Case Request  -     polyethylene glycol (GoLYTELY) 236 g solution; Take as directed by office instructions.  Dispense: 4000 mL; Refill: 0  -     Case Request; " Standing  -     Case Request    2. Elevated liver function tests    3. ETOH abuse    4. Fatty liver    5. Tobacco abuse counseling    6. Alcohol-induced acute pancreatitis, unspecified complication status  -     Lipase    7. Stroke, hemorrhagic    8. Epigastric pain  -     omeprazole (priLOSEC) 20 MG capsule; Take 1 capsule by mouth Daily.  Dispense: 30 capsule; Refill: 11    Other orders  -     Cancel: Implement Anesthesia Orders Day of Procedure; Standing  -     Cancel: Obtain Informed Consent; Standing  -     Follow Anesthesia Guidelines / Protocol; Future  -     Obtain Informed Consent; Future  -     Cancel: Verify Bowel Prep Was Successful; Standing  -     Implement Anesthesia Orders Day of Procedure; Standing  -     Obtain Informed Consent; Standing  -     Follow Anesthesia Guidelines / Protocol; Future  -     Obtain Informed Consent; Future  -     Verify Bowel Prep Was Successful; Standing    Long discussion with Mr Taylor regarding his ETOH abuse and the effects. He is high risk for recurrent pancreatitis as well as chronic pancreatitis if he continues drinking ETOH. He is aware. I will repeat Lipase today, have added to already drawn labs.  Started Prilosec  I discussed non pharmaceutical treatment of gerd.  This includes gradually losing weight to achieve ideal body wt., elevation of the head of bed by 4-6 inches, nothing to eat or drink 3 hours prior to lying down, avoiding tight clothing, stress reduction, tobacco cessation, reduction of alcohol intake, and dietary restrictions (avoiding caffeine, coffee, fatty foods, mints, chocolate, spicy foods and tomato based sauces as much as possible).    I discussed how fatty liver can lead to cirrhosis, disability and pre-mature death.  How it also can be a sign of increased risk for cardiovascular disease.  I discussed the importance of getting rid of fat in the liver by controlling lipids and glucose, avoiding etoh helps, and gradual weight loss to ideal body  weight is very important.     To follow up with me closely and try to get involved with AA. I will print the meeting locations for him so that he can pick one close to his residence.       ESOPHAGOGASTRODUODENOSCOPY WITH ANESTHESIA (N/A), COLONOSCOPY WITH ANESTHESIA (N/A)  Part of this note may be an electronic transcription/translation of spoken language to printed text using the Dragon Dictation System.  Body mass index is 23.68 kg/m².  Return in about 2 months (around 1/28/2024).    BMI is within normal parameters. No other follow-up for BMI required.      All risks, benefits, alternatives, and indications of colonoscopy and/or Endoscopy procedure have been discussed with the patient. Risks to include perforation of the colon requiring possible surgery or colostomy, risk of bleeding from biopsies or removal of colon tissue, possibility of missing a colon polyp or cancer, or adverse drug reaction.  Benefits to include the diagnosis and management of disease of the colon and rectum. Alternatives to include barium enema, radiographic evaluation, lab testing or no intervention. Pt verbalizes understanding and agrees.     Yudelka Taylor, APRN  11/28/2023  16:16 CST          If you smoke or use tobacco, 4 minutes reading provided  Steps to Quit Smoking  Smoking tobacco can be harmful to your health and can affect almost every organ in your body. Smoking puts you, and those around you, at risk for developing many serious chronic diseases. Quitting smoking is difficult, but it is one of the best things that you can do for your health. It is never too late to quit.  What are the benefits of quitting smoking?  When you quit smoking, you lower your risk of developing serious diseases and conditions, such as:  Lung cancer or lung disease, such as COPD.  Heart disease.  Stroke.  Heart attack.  Infertility.  Osteoporosis and bone fractures.  Additionally, symptoms such as coughing, wheezing, and shortness of breath may  get better when you quit. You may also find that you get sick less often because your body is stronger at fighting off colds and infections. If you are pregnant, quitting smoking can help to reduce your chances of having a baby of low birth weight.  How do I get ready to quit?  When you decide to quit smoking, create a plan to make sure that you are successful. Before you quit:  Pick a date to quit. Set a date within the next two weeks to give you time to prepare.  Write down the reasons why you are quitting. Keep this list in places where you will see it often, such as on your bathroom mirror or in your car or wallet.  Identify the people, places, things, and activities that make you want to smoke (triggers) and avoid them. Make sure to take these actions:  Throw away all cigarettes at home, at work, and in your car.  Throw away smoking accessories, such as ashtrays and lighters.  Clean your car and make sure to empty the ashtray.  Clean your home, including curtains and carpets.  Tell your family, friends, and coworkers that you are quitting. Support from your loved ones can make quitting easier.  Talk with your health care provider about your options for quitting smoking.  Find out what treatment options are covered by your health insurance.  What strategies can I use to quit smoking?  Talk with your healthcare provider about different strategies to quit smoking. Some strategies include:  Quitting smoking altogether instead of gradually lessening how much you smoke over a period of time. Research shows that quitting “cold turkey” is more successful than gradually quitting.  Attending in-person counseling to help you build problem-solving skills. You are more likely to have success in quitting if you attend several counseling sessions. Even short sessions of 10 minutes can be effective.  Finding resources and support systems that can help you to quit smoking and remain smoke-free after you quit. These resources  are most helpful when you use them often. They can include:  Online chats with a counselor.  Telephone quitlines.  Printed self-help materials.  Support groups or group counseling.  Text messaging programs.  Mobile phone applications.  Taking medicines to help you quit smoking. (If you are pregnant or breastfeeding, talk with your health care provider first.) Some medicines contain nicotine and some do not. Both types of medicines help with cravings, but the medicines that include nicotine help to relieve withdrawal symptoms. Your health care provider may recommend:  Nicotine patches, gum, or lozenges.  Nicotine inhalers or sprays.  Non-nicotine medicine that is taken by mouth.  Talk with your health care provider about combining strategies, such as taking medicines while you are also receiving in-person counseling. Using these two strategies together makes you more likely to succeed in quitting than if you used either strategy on its own.  If you are pregnant or breastfeeding, talk with your health care provider about finding counseling or other support strategies to quit smoking. Do not take medicine to help you quit smoking unless told to do so by your health care provider.  What things can I do to make it easier to quit?  Quitting smoking might feel overwhelming at first, but there is a lot that you can do to make it easier. Take these important actions:  Reach out to your family and friends and ask that they support and encourage you during this time. Call telephone quitlines, reach out to support groups, or work with a counselor for support.  Ask people who smoke to avoid smoking around you.  Avoid places that trigger you to smoke, such as bars, parties, or smoke-break areas at work.  Spend time around people who do not smoke.  Lessen stress in your life, because stress can be a smoking trigger for some people. To lessen stress, try:  Exercising regularly.  Deep-breathing  exercises.  Yoga.  Meditating.  Performing a body scan. This involves closing your eyes, scanning your body from head to toe, and noticing which parts of your body are particularly tense. Purposefully relax the muscles in those areas.  Download or purchase mobile phone or tablet apps (applications) that can help you stick to your quit plan by providing reminders, tips, and encouragement. There are many free apps, such as QuitGuide from the CDC (Centers for Disease Control and Prevention). You can find other support for quitting smoking (smoking cessation) through smokefree.gov and other websites.  How will I feel when I quit smoking?  Within the first 24 hours of quitting smoking, you may start to feel some withdrawal symptoms. These symptoms are usually most noticeable 2-3 days after quitting, but they usually do not last beyond 2-3 weeks. Changes or symptoms that you might experience include:  Mood swings.  Restlessness, anxiety, or irritation.  Difficulty concentrating.  Dizziness.  Strong cravings for sugary foods in addition to nicotine.  Mild weight gain.  Constipation.  Nausea.  Coughing or a sore throat.  Changes in how your medicines work in your body.  A depressed mood.  Difficulty sleeping (insomnia).  After the first 2-3 weeks of quitting, you may start to notice more positive results, such as:  Improved sense of smell and taste.  Decreased coughing and sore throat.  Slower heart rate.  Lower blood pressure.  Clearer skin.  The ability to breathe more easily.  Fewer sick days.  Quitting smoking is very challenging for most people. Do not get discouraged if you are not successful the first time. Some people need to make many attempts to quit before they achieve long-term success. Do your best to stick to your quit plan, and talk with your health care provider if you have any questions or concerns.  This information is not intended to replace advice given to you by your health care provider. Make sure you  discuss any questions you have with your health care provider.  Document Released: 12/12/2002 Document Revised: 08/15/2017 Document Reviewed: 05/03/2016  [employer] Interactive Patient Education © 2017 [employer]

## 2023-11-28 NOTE — H&P (VIEW-ONLY)
James Taylor  1980 11/28/2023  Chief Complaint   Patient presents with    GI Problem     Liver fu     Subjective   HPI  James Taylor is a 43 y.o. male who presents with a complaint for follow up after recent hospitalization for pancreatitis. He was diagnosed with ETOH pancreatitis after presenting with acute onset of epigastric pain severe sharp stabbing. He has a long hx of ETOH and I have seen him prior for elevated LFTs secondary to his persistent use. He was diagnosed with fatty liver and advised to quit drinking at that time. He says he has quit x1 week for now. He is not going to AA. I have again suggested he do this for better chance of quitting. He agrees. Lipase was 1,044 int he ER on 11/14/23. It has declined to 389 on 11/16/23. He says his pain is some better.    He has also had some constipation new onset for him. Will go up to 3 days without a BM. No bleeding. No fever chills or sweats. No lower abdominal pain. Moderate for him. He has tried Miralax up to once per day not persistent however. No certain triggers. CT noted rectal wall thickening. No known family hx for colon cancer.     Reflux: indigestion with burning epigastric mild to moderate. No triggers. He does not take anything for this. He denies any hematemesis. No melena.   He had a CT scan with incidental findings of wall thickening of the stomach. As noted below.     CT abdomen pelvis with contrast IMPRESSION:  1. There is stranding of the fat around the pancreas with fluid in the  anterior pararenal spaces bilaterally. The pancreas enhances normally.  The findings are consistent with acute pancreatitis. Wall thickening of  the stomach and nearby hepatic flexure of the colon which may be  secondarily inflamed due to the pancreatitis.  2. Small amount of fluid in the right paracolic gutter. Small to  moderate amount of free fluid in the pelvis. These findings are likely  related to the pancreatic inflammation.  3. Fatty infiltration of  the liver.  4. Wall thickening of the rectum and anus versus artifact of under  distention. Short segment colitis is in the differential. Neoplasm less  likely.  5. Atheromatous disease of the aortoiliac vessels, mild.  6. Degenerative changes of the visualized spine and hips. Suggestion of  cam type CATRACHITA anatomy of both hips.        The full report of this exam was immediately signed and available to the  emergency room. The patient is currently in the emergency room.     This report was signed and finalized on 11/15/2023 12:43 AM CST by Dr. Claus Bhagat MD.  Past Medical History:   Diagnosis Date    Asthma     Headache     Hypertension      History reviewed. No pertinent surgical history.    Outpatient Medications Marked as Taking for the 11/28/23 encounter (Office Visit) with Yudelka Taylor APRN   Medication Sig Dispense Refill    amLODIPine (NORVASC) 10 MG tablet Take 1 tablet by mouth Every Night. 30 tablet 2    gabapentin (NEURONTIN) 300 MG capsule TID prn alcohol withdrawal/ chronic back pain. 90 capsule 0    hydrALAZINE (APRESOLINE) 25 MG tablet Take 1 tablet by mouth 3 (Three) Times a Day.      losartan (COZAAR) 25 MG tablet Take 1 tablet by mouth Every Night. 30 tablet 5    metoprolol tartrate (LOPRESSOR) 50 MG tablet Take 1 tablet by mouth Every 12 (Twelve) Hours.      sertraline (ZOLOFT) 50 MG tablet TAKE 1 TABLET BY MOUTH DAILY. TO HELP WITH MOOD. TAKE WITH FOOD 90 tablet 0    terazosin (HYTRIN) 5 MG capsule Take 1 capsule by mouth Every Night.       No Known Allergies  Social History     Socioeconomic History    Marital status: Single   Tobacco Use    Smoking status: Every Day     Packs/day: 1.00     Years: 15.00     Additional pack years: 0.00     Total pack years: 15.00     Types: Cigarettes     Start date: 4/2/2012     Passive exposure: Current    Smokeless tobacco: Never    Tobacco comments:     AVS   Vaping Use    Vaping Use: Never used   Substance and Sexual Activity    Alcohol use: Yes      Alcohol/week: 4.0 standard drinks of alcohol     Types: 4 Cans of beer per week     Comment: on the weekends    Drug use: No    Sexual activity: Yes     Partners: Female     Birth control/protection: Condom, Same-sex partner     Family History   Problem Relation Age of Onset    Anxiety disorder Mother     Diabetes Mother     Hyperlipidemia Mother     Cancer Father     Hyperlipidemia Father     Colon cancer Neg Hx     Colon polyps Neg Hx      Health Maintenance   Topic Date Due    COVID-19 Vaccine (1) Never done    ANNUAL PHYSICAL  Never done    INFLUENZA VACCINE  Never done    Pneumococcal Vaccine 0-64 (1 - PCV) 04/27/2024 (Originally 9/2/1986)    TDAP/TD VACCINES (1 - Tdap) 04/27/2024 (Originally 9/2/1999)    HEPATITIS C SCREENING  Completed     Review of Systems   Constitutional:  Negative for activity change, appetite change, chills, diaphoresis, fatigue, fever and unexpected weight change.   HENT:  Negative for ear pain, hearing loss, mouth sores, sore throat, trouble swallowing and voice change.    Eyes: Negative.    Respiratory:  Negative for cough, choking, shortness of breath and wheezing.    Cardiovascular:  Negative for chest pain and palpitations.   Gastrointestinal:  Positive for constipation. Negative for abdominal pain, blood in stool, diarrhea, nausea and vomiting.   Endocrine: Negative for cold intolerance and heat intolerance.   Genitourinary:  Negative for decreased urine volume, dysuria, frequency, hematuria and urgency.   Musculoskeletal:  Negative for back pain, gait problem and myalgias.   Skin:  Negative for color change, pallor and rash.   Allergic/Immunologic: Negative for food allergies and immunocompromised state.   Neurological:  Negative for dizziness, tremors, seizures, syncope, weakness, light-headedness, numbness and headaches.   Hematological:  Negative for adenopathy. Does not bruise/bleed easily.   Psychiatric/Behavioral:  Negative for agitation and confusion. The patient is  "not nervous/anxious.    All other systems reviewed and are negative.    Objective   Vitals:    11/28/23 1404   BP: 138/80   BP Location: Left arm   Pulse: 67   Temp: 98.2 °F (36.8 °C)   TempSrc: Temporal   SpO2: 98%   Weight: 74.8 kg (165 lb)   Height: 177.8 cm (70\")     Body mass index is 23.68 kg/m².  Physical Exam  Constitutional:       Appearance: He is well-developed.   HENT:      Head: Normocephalic and atraumatic.   Eyes:      Pupils: Pupils are equal, round, and reactive to light.   Neck:      Trachea: No tracheal deviation.   Cardiovascular:      Rate and Rhythm: Normal rate and regular rhythm.      Heart sounds: Normal heart sounds. No murmur heard.     No friction rub. No gallop.   Pulmonary:      Effort: Pulmonary effort is normal. No respiratory distress.      Breath sounds: Normal breath sounds. No wheezing or rales.   Chest:      Chest wall: No tenderness.   Abdominal:      General: Bowel sounds are normal. There is no distension.      Palpations: Abdomen is soft. Abdomen is not rigid.      Tenderness: There is no abdominal tenderness. There is no guarding or rebound.   Musculoskeletal:         General: No tenderness or deformity. Normal range of motion.      Cervical back: Normal range of motion and neck supple.   Skin:     General: Skin is warm and dry.      Coloration: Skin is not pale.      Findings: No rash.   Neurological:      Mental Status: He is alert and oriented to person, place, and time.      Deep Tendon Reflexes: Reflexes are normal and symmetric.   Psychiatric:         Behavior: Behavior normal.         Thought Content: Thought content normal.         Judgment: Judgment normal.       Assessment & Plan   Diagnoses and all orders for this visit:    1. Abnormal CT of the abdomen (Primary)  -     Case Request; Standing  -     Case Request  -     polyethylene glycol (GoLYTELY) 236 g solution; Take as directed by office instructions.  Dispense: 4000 mL; Refill: 0  -     Case Request; " Standing  -     Case Request    2. Elevated liver function tests    3. ETOH abuse    4. Fatty liver    5. Tobacco abuse counseling    6. Alcohol-induced acute pancreatitis, unspecified complication status  -     Lipase    7. Stroke, hemorrhagic    8. Epigastric pain  -     omeprazole (priLOSEC) 20 MG capsule; Take 1 capsule by mouth Daily.  Dispense: 30 capsule; Refill: 11    Other orders  -     Cancel: Implement Anesthesia Orders Day of Procedure; Standing  -     Cancel: Obtain Informed Consent; Standing  -     Follow Anesthesia Guidelines / Protocol; Future  -     Obtain Informed Consent; Future  -     Cancel: Verify Bowel Prep Was Successful; Standing  -     Implement Anesthesia Orders Day of Procedure; Standing  -     Obtain Informed Consent; Standing  -     Follow Anesthesia Guidelines / Protocol; Future  -     Obtain Informed Consent; Future  -     Verify Bowel Prep Was Successful; Standing    Long discussion with Mr Taylor regarding his ETOH abuse and the effects. He is high risk for recurrent pancreatitis as well as chronic pancreatitis if he continues drinking ETOH. He is aware. I will repeat Lipase today, have added to already drawn labs.  Started Prilosec  I discussed non pharmaceutical treatment of gerd.  This includes gradually losing weight to achieve ideal body wt., elevation of the head of bed by 4-6 inches, nothing to eat or drink 3 hours prior to lying down, avoiding tight clothing, stress reduction, tobacco cessation, reduction of alcohol intake, and dietary restrictions (avoiding caffeine, coffee, fatty foods, mints, chocolate, spicy foods and tomato based sauces as much as possible).    I discussed how fatty liver can lead to cirrhosis, disability and pre-mature death.  How it also can be a sign of increased risk for cardiovascular disease.  I discussed the importance of getting rid of fat in the liver by controlling lipids and glucose, avoiding etoh helps, and gradual weight loss to ideal body  weight is very important.     To follow up with me closely and try to get involved with AA. I will print the meeting locations for him so that he can pick one close to his residence.       ESOPHAGOGASTRODUODENOSCOPY WITH ANESTHESIA (N/A), COLONOSCOPY WITH ANESTHESIA (N/A)  Part of this note may be an electronic transcription/translation of spoken language to printed text using the Dragon Dictation System.  Body mass index is 23.68 kg/m².  Return in about 2 months (around 1/28/2024).    BMI is within normal parameters. No other follow-up for BMI required.      All risks, benefits, alternatives, and indications of colonoscopy and/or Endoscopy procedure have been discussed with the patient. Risks to include perforation of the colon requiring possible surgery or colostomy, risk of bleeding from biopsies or removal of colon tissue, possibility of missing a colon polyp or cancer, or adverse drug reaction.  Benefits to include the diagnosis and management of disease of the colon and rectum. Alternatives to include barium enema, radiographic evaluation, lab testing or no intervention. Pt verbalizes understanding and agrees.     Yudelka Taylor, APRN  11/28/2023  16:16 CST          If you smoke or use tobacco, 4 minutes reading provided  Steps to Quit Smoking  Smoking tobacco can be harmful to your health and can affect almost every organ in your body. Smoking puts you, and those around you, at risk for developing many serious chronic diseases. Quitting smoking is difficult, but it is one of the best things that you can do for your health. It is never too late to quit.  What are the benefits of quitting smoking?  When you quit smoking, you lower your risk of developing serious diseases and conditions, such as:  Lung cancer or lung disease, such as COPD.  Heart disease.  Stroke.  Heart attack.  Infertility.  Osteoporosis and bone fractures.  Additionally, symptoms such as coughing, wheezing, and shortness of breath may  get better when you quit. You may also find that you get sick less often because your body is stronger at fighting off colds and infections. If you are pregnant, quitting smoking can help to reduce your chances of having a baby of low birth weight.  How do I get ready to quit?  When you decide to quit smoking, create a plan to make sure that you are successful. Before you quit:  Pick a date to quit. Set a date within the next two weeks to give you time to prepare.  Write down the reasons why you are quitting. Keep this list in places where you will see it often, such as on your bathroom mirror or in your car or wallet.  Identify the people, places, things, and activities that make you want to smoke (triggers) and avoid them. Make sure to take these actions:  Throw away all cigarettes at home, at work, and in your car.  Throw away smoking accessories, such as ashtrays and lighters.  Clean your car and make sure to empty the ashtray.  Clean your home, including curtains and carpets.  Tell your family, friends, and coworkers that you are quitting. Support from your loved ones can make quitting easier.  Talk with your health care provider about your options for quitting smoking.  Find out what treatment options are covered by your health insurance.  What strategies can I use to quit smoking?  Talk with your healthcare provider about different strategies to quit smoking. Some strategies include:  Quitting smoking altogether instead of gradually lessening how much you smoke over a period of time. Research shows that quitting “cold turkey” is more successful than gradually quitting.  Attending in-person counseling to help you build problem-solving skills. You are more likely to have success in quitting if you attend several counseling sessions. Even short sessions of 10 minutes can be effective.  Finding resources and support systems that can help you to quit smoking and remain smoke-free after you quit. These resources  are most helpful when you use them often. They can include:  Online chats with a counselor.  Telephone quitlines.  Printed self-help materials.  Support groups or group counseling.  Text messaging programs.  Mobile phone applications.  Taking medicines to help you quit smoking. (If you are pregnant or breastfeeding, talk with your health care provider first.) Some medicines contain nicotine and some do not. Both types of medicines help with cravings, but the medicines that include nicotine help to relieve withdrawal symptoms. Your health care provider may recommend:  Nicotine patches, gum, or lozenges.  Nicotine inhalers or sprays.  Non-nicotine medicine that is taken by mouth.  Talk with your health care provider about combining strategies, such as taking medicines while you are also receiving in-person counseling. Using these two strategies together makes you more likely to succeed in quitting than if you used either strategy on its own.  If you are pregnant or breastfeeding, talk with your health care provider about finding counseling or other support strategies to quit smoking. Do not take medicine to help you quit smoking unless told to do so by your health care provider.  What things can I do to make it easier to quit?  Quitting smoking might feel overwhelming at first, but there is a lot that you can do to make it easier. Take these important actions:  Reach out to your family and friends and ask that they support and encourage you during this time. Call telephone quitlines, reach out to support groups, or work with a counselor for support.  Ask people who smoke to avoid smoking around you.  Avoid places that trigger you to smoke, such as bars, parties, or smoke-break areas at work.  Spend time around people who do not smoke.  Lessen stress in your life, because stress can be a smoking trigger for some people. To lessen stress, try:  Exercising regularly.  Deep-breathing  exercises.  Yoga.  Meditating.  Performing a body scan. This involves closing your eyes, scanning your body from head to toe, and noticing which parts of your body are particularly tense. Purposefully relax the muscles in those areas.  Download or purchase mobile phone or tablet apps (applications) that can help you stick to your quit plan by providing reminders, tips, and encouragement. There are many free apps, such as QuitGuide from the CDC (Centers for Disease Control and Prevention). You can find other support for quitting smoking (smoking cessation) through smokefree.gov and other websites.  How will I feel when I quit smoking?  Within the first 24 hours of quitting smoking, you may start to feel some withdrawal symptoms. These symptoms are usually most noticeable 2-3 days after quitting, but they usually do not last beyond 2-3 weeks. Changes or symptoms that you might experience include:  Mood swings.  Restlessness, anxiety, or irritation.  Difficulty concentrating.  Dizziness.  Strong cravings for sugary foods in addition to nicotine.  Mild weight gain.  Constipation.  Nausea.  Coughing or a sore throat.  Changes in how your medicines work in your body.  A depressed mood.  Difficulty sleeping (insomnia).  After the first 2-3 weeks of quitting, you may start to notice more positive results, such as:  Improved sense of smell and taste.  Decreased coughing and sore throat.  Slower heart rate.  Lower blood pressure.  Clearer skin.  The ability to breathe more easily.  Fewer sick days.  Quitting smoking is very challenging for most people. Do not get discouraged if you are not successful the first time. Some people need to make many attempts to quit before they achieve long-term success. Do your best to stick to your quit plan, and talk with your health care provider if you have any questions or concerns.  This information is not intended to replace advice given to you by your health care provider. Make sure you  discuss any questions you have with your health care provider.  Document Released: 12/12/2002 Document Revised: 08/15/2017 Document Reviewed: 05/03/2016  [employer] Interactive Patient Education © 2017 [employer]

## 2023-12-01 ENCOUNTER — TREATMENT (OUTPATIENT)
Dept: PHYSICAL THERAPY | Facility: CLINIC | Age: 43
End: 2023-12-01
Payer: MEDICAID

## 2023-12-01 ENCOUNTER — TELEPHONE (OUTPATIENT)
Dept: GASTROENTEROLOGY | Facility: CLINIC | Age: 43
End: 2023-12-01
Payer: MEDICAID

## 2023-12-01 DIAGNOSIS — R26.2 IMPAIRED AMBULATION: ICD-10-CM

## 2023-12-01 DIAGNOSIS — R79.89 ELEVATED LIVER FUNCTION TESTS: ICD-10-CM

## 2023-12-01 DIAGNOSIS — I69.30 H/O: STROKE WITH RESIDUAL EFFECTS: Primary | ICD-10-CM

## 2023-12-01 DIAGNOSIS — K85.20 ALCOHOL-INDUCED ACUTE PANCREATITIS, UNSPECIFIED COMPLICATION STATUS: Primary | ICD-10-CM

## 2023-12-01 DIAGNOSIS — M62.81 LEFT-SIDED MUSCLE WEAKNESS: ICD-10-CM

## 2023-12-01 NOTE — PROGRESS NOTES
Physical Therapy Treatment Note  [address] [place] [address]    Patient: James Taylor Jr.                                                 Visit Date: 2023  :     1980    Referring practitioner:    Casi Zaidi*  Date of Initial Visit:          Type: THERAPY  Noted: 2023    Patient seen for 2 sessions    Visit Diagnoses:    ICD-10-CM ICD-9-CM   1. H/O: stroke with residual effects  I69.30 438.9   2. Left-sided muscle weakness  M62.81 728.87   3. Impaired ambulation  R26.2 719.7     SUBJECTIVE     Subjective: Pt reports no pain today, noting he has been doing well. States he is able to walk about 10 minutes before he needs to rest, as his legs will feel very tired L>R. Notes when he raises his L arm over his head it will feel tight in his shoulder. Says his  is still weak on his L hand and uses his R for most things.       PAIN: 0/10         OBJECTIVE     Objective     Therapeutic Exercises    05849 Units Comments   Kiran sh IR stretch with towel 3x20s    Pec major/minor stretch B UE 3x20s                   Timed Minutes 10 min      Neuromuscular Reeducation     [id number] Comments   SLS 3x20s   3 way step ups x10 ea Decreased eccentric control in L LE   Banded hip flex 2x10 R band   Banded knee flex 2x20 R band       Timed Minutes 30 min      Gait Training          [id number]   Task/Terrain Asst AD Comments   200 ft N/A N/A No shuffling on L foot               Timed Minutes 5 min        Therapy Education/Self Care [id number]   Education offered today HEP   Medbride Code    Ongoing HEP   Pec minor/major stretches on wall  -3x20s  4x/wk    Banded hip flex  -R 2x10  4x/wk    Banded ankle DF  -R 2x10  4x/wk    Sit to stands  -2x10  4x/wk   Timed Minutes        Total Timed Treatment:     45   mins  Total Time of Visit:             45   mins         ASSESSMENT/PLAN     GOALS        Goals                                          Progress  Note due by 12/8/23                                                      Recert due by 2/5/24   STG by: 6 weeks  Comments Date Status             Able to stand at least 60 minutes before rest         Increase L hand  strength to within 10lbs of R hand  IE: R: 59 lbs, L: 35 lbs       Normalized gait pattern as demonstrated by increased LUE arm swing and no L heel scuffing                    LTG by: 12 weeks         Improve 6MWT by at least 200 ft  IE: 954 ft 7 in        Gross left hip MMT 4+/5          Able to walk down stairs with reciprocal gait pattern using rail for balance only         Able to jog for at least 10 minutes          Independent with HEP for progress strengthening  Performs and understands HEP  12/1 ongoing                 Assessment/Plan     ASSESSMENT:   Pt reported no pain today, noting he has been able to ambulate for 10-15 minutes before his B LE begin to fatigue. He presented with some decreased L LE strength and stability, as he required min/mod UE support during SLS and step ups. He reported improved mobility and decreased tightness following treatment.    PLAN:   Obtain hip extension and abduction MMTs. Begin with functional strengthening and endurance training. Move toward functional tasks as appropriate     SIGNATURE: Benjamin Bhagat PTA, [place] License #: [id number]  Electronically Signed on 12/1/2023        [address]  [place] [address]  [phone]

## 2023-12-01 NOTE — TELEPHONE ENCOUNTER
Referral and records faxed to Dr. Saravia office for review. Pt aware that their office will call him to schedule the appt and that they look at records on Fridays.

## 2023-12-04 ENCOUNTER — TREATMENT (OUTPATIENT)
Dept: PHYSICAL THERAPY | Facility: CLINIC | Age: 43
End: 2023-12-04
Payer: MEDICAID

## 2023-12-04 DIAGNOSIS — M62.81 LEFT-SIDED MUSCLE WEAKNESS: ICD-10-CM

## 2023-12-04 DIAGNOSIS — I69.30 H/O: STROKE WITH RESIDUAL EFFECTS: Primary | ICD-10-CM

## 2023-12-04 DIAGNOSIS — R26.2 IMPAIRED AMBULATION: ICD-10-CM

## 2023-12-04 PROCEDURE — 97112 NEUROMUSCULAR REEDUCATION: CPT | Performed by: PHYSICAL THERAPIST

## 2023-12-04 PROCEDURE — 97110 THERAPEUTIC EXERCISES: CPT | Performed by: PHYSICAL THERAPIST

## 2023-12-04 RX ORDER — OMEPRAZOLE 20 MG/1
20 CAPSULE, DELAYED RELEASE ORAL DAILY
COMMUNITY

## 2023-12-04 RX ORDER — GABAPENTIN 300 MG/1
300 CAPSULE ORAL 3 TIMES DAILY
COMMUNITY

## 2023-12-04 RX ORDER — POLYETHYLENE GLYCOL 3350 17 G/17G
17 POWDER, FOR SOLUTION ORAL DAILY PRN
COMMUNITY

## 2023-12-04 RX ORDER — LOSARTAN POTASSIUM 25 MG/1
25 TABLET ORAL DAILY
COMMUNITY

## 2023-12-04 NOTE — PROGRESS NOTES
Physical Therapy Treatment Note  [address] [place] [address]    Patient: James Taylor Jr.                                                 Visit Date: 2023  :     1980    Referring practitioner:    Casi Zaidi*  Date of Initial Visit:          Type: THERAPY  Noted: 2023    Patient seen for 3 sessions    Visit Diagnoses:    ICD-10-CM ICD-9-CM   1. H/O: stroke with residual effects  I69.30 438.9   2. Left-sided muscle weakness  M62.81 728.87   3. Impaired ambulation  R26.2 719.7     SUBJECTIVE     Subjective: Pt reports he has some pain in his L foot today that started this morning, and it has gotten worse throughout the day.States it is on the bottom of his toes and hurt more when he is standing or walking.      PAIN: 7/10         OBJECTIVE     Objective     Therapeutic Exercises    48363 Units Comments   Kiran sh IR stretch with towel 3x20s    Pec major/minor stretch B UE 3x30s                   Timed Minutes 10 min      Neuromuscular Reeducation     50034 Comments   SLS 3x20s   3 way step ups x10 ea Decreased eccentric control in L LE   Banded hip flex 2x15 R band   Hor sh ABD 2x15 R band       Timed Minutes 30 min      Gait Training          72831   Task/Terrain Asst AD Comments   200 ft N/A N/A No shuffling on L foot               Timed Minutes HELD TODAY        Therapy Education/Self Care 27814   Education offered today HEP   Medbride Code    Ongoing HEP   Pec minor/major stretches on wall  -3x20s  4x/wk    Banded hip flex  -R 2x10  4x/wk    Banded ankle DF  -R 2x10  4x/wk    Sit to stands  -2x10  4x/wk   Timed Minutes        Total Timed Treatment:     45   mins  Total Time of Visit:             45   mins         ASSESSMENT/PLAN     GOALS        Goals                                          Progress Note due by 23                                                      Recert due by 24   STG by: 6 weeks   Comments Date Status             Able to stand at least 60 minutes before rest Can walk 20-30 minutes before needing a break 12/4     Increase L hand  strength to within 10lbs of R hand  IE: R: 59 lbs, L: 35 lbs       Normalized gait pattern as demonstrated by increased LUE arm swing and no L heel scuffing                    LTG by: 12 weeks         Improve 6MWT by at least 200 ft  IE: 954 ft 7 in        Gross left hip MMT 4+/5          Able to walk down stairs with reciprocal gait pattern using rail for balance only         Able to jog for at least 10 minutes          Independent with HEP for progress strengthening  Performs and understands HEP  12/1 ongoing                 Assessment/Plan     ASSESSMENT:   Pt reported some pain in his L toes today, noting the pain started this morning and increases with standing/ambulation. He has seen improvement with reaching behind his Lx as he is not as tight in the shoulders. Pt was able to perform exercises today but required some rest breaks d/t pain in his L toes. Had gait training withheld this date d/t increased pain during ambulation.     PLAN:   Obtain hip extension and abduction MMTs. Begin with functional strengthening and endurance training. Move toward functional tasks as appropriate     SIGNATURE: Benjamin Bhagat PTA, [place] License #: [id number]  Electronically Signed on 12/4/2023        [address] [place] [address]  [phone]

## 2023-12-13 ENCOUNTER — ANESTHESIA (OUTPATIENT)
Dept: GASTROENTEROLOGY | Facility: HOSPITAL | Age: 43
End: 2023-12-13
Payer: MEDICAID

## 2023-12-13 ENCOUNTER — ANESTHESIA EVENT (OUTPATIENT)
Dept: GASTROENTEROLOGY | Facility: HOSPITAL | Age: 43
End: 2023-12-13
Payer: MEDICAID

## 2023-12-13 ENCOUNTER — HOSPITAL ENCOUNTER (OUTPATIENT)
Facility: HOSPITAL | Age: 43
Setting detail: HOSPITAL OUTPATIENT SURGERY
Discharge: HOME | End: 2023-12-13
Attending: INTERNAL MEDICINE | Admitting: INTERNAL MEDICINE
Payer: MEDICAID

## 2023-12-13 ENCOUNTER — TELEPHONE (OUTPATIENT)
Dept: GASTROENTEROLOGY | Facility: CLINIC | Age: 43
End: 2023-12-13
Payer: MEDICAID

## 2023-12-13 VITALS
TEMPERATURE: 97 F | SYSTOLIC BLOOD PRESSURE: 133 MMHG | HEART RATE: 68 BPM | HEIGHT: 70 IN | RESPIRATION RATE: 20 BRPM | DIASTOLIC BLOOD PRESSURE: 89 MMHG | OXYGEN SATURATION: 100 % | BODY MASS INDEX: 24.62 KG/M2 | WEIGHT: 172 LBS

## 2023-12-13 DIAGNOSIS — R93.5 ABNORMAL CT OF THE ABDOMEN: ICD-10-CM

## 2023-12-13 DIAGNOSIS — R10.13 EPIGASTRIC PAIN: ICD-10-CM

## 2023-12-13 PROCEDURE — 25010000002 PROPOFOL 10 MG/ML EMULSION

## 2023-12-13 PROCEDURE — 87081 CULTURE SCREEN ONLY: CPT | Performed by: INTERNAL MEDICINE

## 2023-12-13 PROCEDURE — 25810000003 SODIUM CHLORIDE 0.9 % SOLUTION: Performed by: ANESTHESIOLOGY

## 2023-12-13 RX ORDER — PROPOFOL 10 MG/ML
VIAL (ML) INTRAVENOUS AS NEEDED
Status: DISCONTINUED | OUTPATIENT
Start: 2023-12-13 | End: 2023-12-13 | Stop reason: SURG

## 2023-12-13 RX ORDER — SODIUM CHLORIDE 9 MG/ML
500 INJECTION, SOLUTION INTRAVENOUS CONTINUOUS PRN
Status: DISCONTINUED | OUTPATIENT
Start: 2023-12-13 | End: 2023-12-13 | Stop reason: HOSPADM

## 2023-12-13 RX ORDER — LIDOCAINE HYDROCHLORIDE 10 MG/ML
0.5 INJECTION, SOLUTION EPIDURAL; INFILTRATION; INTRACAUDAL; PERINEURAL ONCE AS NEEDED
Status: DISCONTINUED | OUTPATIENT
Start: 2023-12-13 | End: 2023-12-13 | Stop reason: HOSPADM

## 2023-12-13 RX ORDER — LIDOCAINE HYDROCHLORIDE 20 MG/ML
INJECTION, SOLUTION EPIDURAL; INFILTRATION; INTRACAUDAL; PERINEURAL AS NEEDED
Status: DISCONTINUED | OUTPATIENT
Start: 2023-12-13 | End: 2023-12-13 | Stop reason: SURG

## 2023-12-13 RX ORDER — SODIUM CHLORIDE 0.9 % (FLUSH) 0.9 %
10 SYRINGE (ML) INJECTION AS NEEDED
Status: DISCONTINUED | OUTPATIENT
Start: 2023-12-13 | End: 2023-12-13 | Stop reason: HOSPADM

## 2023-12-13 RX ADMIN — PROPOFOL INJECTABLE EMULSION 700 MG: 10 INJECTION, EMULSION INTRAVENOUS at 13:00

## 2023-12-13 RX ADMIN — SODIUM CHLORIDE 500 ML: 9 INJECTION, SOLUTION INTRAVENOUS at 11:37

## 2023-12-13 RX ADMIN — LIDOCAINE HYDROCHLORIDE 50 MG: 20 INJECTION, SOLUTION EPIDURAL; INFILTRATION; INTRACAUDAL; PERINEURAL at 13:00

## 2023-12-13 NOTE — ANESTHESIA PREPROCEDURE EVALUATION
Anesthesia Evaluation     Patient summary reviewed   no history of anesthetic complications:   NPO Solid Status: > 8 hours             Airway   Mallampati: II  Dental      Pulmonary - negative pulmonary ROS   (+) a smoker,  Cardiovascular - negative cardio ROS  Exercise tolerance: excellent (>7 METS)    (+) hypertension      Neuro/Psych- negative ROS  (+) CVA residual symptoms  GI/Hepatic/Renal/Endo - negative ROS   (+) GERD    Musculoskeletal     Abdominal    Substance History      OB/GYN          Other                    Anesthesia Plan    ASA 3     MAC       Anesthetic plan, risks, benefits, and alternatives have been provided, discussed and informed consent has been obtained with: patient.    CODE STATUS:

## 2023-12-13 NOTE — ANESTHESIA POSTPROCEDURE EVALUATION
"Patient: James Taylor Jr.    Procedure Summary       Date: 12/13/23 Room / Location: [facility] ENDOSCOPY 6 / [facility] ENDOSCOPY    Anesthesia Start: 1300 Anesthesia Stop: 1334    Procedures:       ESOPHAGOGASTRODUODENOSCOPY WITH ANESTHESIA      COLONOSCOPY WITH ANESTHESIA Diagnosis:       Abnormal CT of the abdomen      (Abnormal CT of the abdomen [R93.5])    Surgeons: Cee Brody MD Provider: Jackson Olvera CRNA    Anesthesia Type: MAC ASA Status: 3            Anesthesia Type: MAC    Vitals  No vitals data found for the desired time range.          Post Anesthesia Care and Evaluation    Patient location during evaluation: PHASE II  Patient participation: complete - patient participated  Level of consciousness: awake and alert  Pain management: adequate    Airway patency: patent  Anesthetic complications: No anesthetic complications  PONV Status: none  Cardiovascular status: acceptable  Respiratory status: acceptable  Hydration status: acceptable    Comments: Blood pressure (!) 186/97, pulse 68, temperature 97 °F (36.1 °C), temperature source Temporal, resp. rate 18, height 177.8 cm (70\"), weight 78 kg (172 lb), SpO2 100%.    No anesthesia care post op    "

## 2023-12-14 LAB — UREASE TISS QL: NEGATIVE

## 2023-12-19 ENCOUNTER — OFFICE VISIT (OUTPATIENT)
Dept: FAMILY MEDICINE CLINIC | Facility: CLINIC | Age: 43
End: 2023-12-19
Payer: MEDICAID

## 2023-12-19 ENCOUNTER — LAB (OUTPATIENT)
Dept: LAB | Facility: HOSPITAL | Age: 43
End: 2023-12-19
Payer: MEDICAID

## 2023-12-19 VITALS
WEIGHT: 182.13 LBS | DIASTOLIC BLOOD PRESSURE: 80 MMHG | TEMPERATURE: 98 F | OXYGEN SATURATION: 100 % | SYSTOLIC BLOOD PRESSURE: 140 MMHG | HEIGHT: 70 IN | RESPIRATION RATE: 18 BRPM | HEART RATE: 63 BPM | BODY MASS INDEX: 26.07 KG/M2

## 2023-12-19 DIAGNOSIS — M79.675 PAIN OF TOE OF LEFT FOOT: ICD-10-CM

## 2023-12-19 DIAGNOSIS — E87.1 HYPONATREMIA: ICD-10-CM

## 2023-12-19 DIAGNOSIS — M10.9 URIC ACID ARTHROPATHY: ICD-10-CM

## 2023-12-19 DIAGNOSIS — I10 PRIMARY HYPERTENSION: Chronic | ICD-10-CM

## 2023-12-19 DIAGNOSIS — F10.20 ALCOHOLISM: Primary | ICD-10-CM

## 2023-12-19 LAB
ALBUMIN SERPL-MCNC: 3.7 G/DL (ref 3.5–5.2)
ALBUMIN/GLOB SERPL: 1.3 G/DL
ALP SERPL-CCNC: 104 U/L (ref 39–117)
ALT SERPL W P-5'-P-CCNC: 15 U/L (ref 1–41)
ANION GAP SERPL CALCULATED.3IONS-SCNC: 7 MMOL/L (ref 5–15)
AST SERPL-CCNC: 19 U/L (ref 1–40)
BILIRUB SERPL-MCNC: 0.3 MG/DL (ref 0–1.2)
BUN SERPL-MCNC: 13 MG/DL (ref 6–20)
BUN/CREAT SERPL: 13.5 (ref 7–25)
CALCIUM SPEC-SCNC: 9.1 MG/DL (ref 8.6–10.5)
CHLORIDE SERPL-SCNC: 106 MMOL/L (ref 98–107)
CO2 SERPL-SCNC: 29 MMOL/L (ref 22–29)
CREAT SERPL-MCNC: 0.96 MG/DL (ref 0.76–1.27)
EGFRCR SERPLBLD CKD-EPI 2021: 100.6 ML/MIN/1.73
GLOBULIN UR ELPH-MCNC: 2.9 GM/DL
GLUCOSE SERPL-MCNC: 142 MG/DL (ref 65–99)
POTASSIUM SERPL-SCNC: 4.3 MMOL/L (ref 3.5–5.2)
PROT SERPL-MCNC: 6.6 G/DL (ref 6–8.5)
SODIUM SERPL-SCNC: 142 MMOL/L (ref 136–145)
URATE SERPL-MCNC: 6.1 MG/DL (ref 3.4–7)

## 2023-12-19 PROCEDURE — 1159F MED LIST DOCD IN RCRD: CPT | Performed by: NURSE PRACTITIONER

## 2023-12-19 PROCEDURE — 36415 COLL VENOUS BLD VENIPUNCTURE: CPT

## 2023-12-19 PROCEDURE — 99214 OFFICE O/P EST MOD 30 MIN: CPT | Performed by: NURSE PRACTITIONER

## 2023-12-19 PROCEDURE — 84550 ASSAY OF BLOOD/URIC ACID: CPT

## 2023-12-19 PROCEDURE — 3079F DIAST BP 80-89 MM HG: CPT | Performed by: NURSE PRACTITIONER

## 2023-12-19 PROCEDURE — 3077F SYST BP >= 140 MM HG: CPT | Performed by: NURSE PRACTITIONER

## 2023-12-19 PROCEDURE — 1160F RVW MEDS BY RX/DR IN RCRD: CPT | Performed by: NURSE PRACTITIONER

## 2023-12-19 PROCEDURE — 80053 COMPREHEN METABOLIC PANEL: CPT

## 2023-12-19 RX ORDER — METHYLPREDNISOLONE 4 MG/1
TABLET ORAL
Qty: 21 TABLET | Refills: 0 | Status: SHIPPED | OUTPATIENT
Start: 2023-12-19

## 2023-12-19 RX ORDER — AMLODIPINE BESYLATE 10 MG/1
10 TABLET ORAL DAILY
Qty: 30 TABLET | Refills: 5
Start: 2023-12-19

## 2023-12-19 RX ORDER — METOPROLOL SUCCINATE 50 MG/1
50 TABLET, EXTENDED RELEASE ORAL DAILY
Qty: 30 TABLET | Refills: 2 | Status: SHIPPED | OUTPATIENT
Start: 2023-12-19

## 2023-12-19 RX ORDER — LOSARTAN POTASSIUM 25 MG/1
25 TABLET ORAL DAILY
Qty: 30 TABLET | Refills: 5 | Status: SHIPPED | OUTPATIENT
Start: 2023-12-19

## 2023-12-19 NOTE — PROGRESS NOTES
Casi Skinner APRALCON  [facility]   Family Medicine  [address]  [place] [address]  Phone: [phone]  Fax: [phone]         Chief Complaint:  Chief Complaint   Patient presents with    Follow-up     4-week         History:  James Taylor Jr. is a 43 y.o. male that is an established patient. He  is here for management of chronic conditions.    HPI       1.  Alcoholism.  12/19/2023: Pt reports that he has not had an alcoholic beverage since his last appointment. He states that his abdominal pain associated with pancreatitis has resolved.   11/21/2023: I will recheck his sodium level. He has an appointment with Yudelka Taylor, gastroenterology, on 11/28/2023. He will probably need a colonoscopy once his pancreatitis is healed. He was encouraged to stop drinking alcohol. He was recommended to go to inpatient.      2. Abdominal pain.  12/19/2023: resolved.   11/21/2023: He was given samples of MiraLAX. He was given a prescription for gabapentin 300 mg. He was recommended to take 3 or 4 capfuls of MiraLAX in Gatorade.     3. HTN  12/19/2023: Pt reports that he has difficulty remembering to take his night time medications. Request that he be able to take these in the morning. B/p slightly elevated today at 140/80.     4. Pain toe/ foot  12/19/2023: Pt reports that he has had increased pain and erythema to right great toe and middle toes. States that it hurts for the sheets to touch his toes.        ROS:  Review of Systems   Constitutional:  Negative for fatigue, fever and unexpected weight change.   HENT:  Negative for congestion, ear pain, rhinorrhea, sinus pressure, sinus pain and voice change.    Eyes:  Negative for visual disturbance.   Respiratory:  Negative for shortness of breath and wheezing.    Cardiovascular:  Negative for chest pain and palpitations.   Gastrointestinal:  Negative for abdominal pain, nausea and vomiting.   Genitourinary:  Negative for dysuria and flank  "pain.   Musculoskeletal:  Negative for back pain, myalgias and neck pain.        Foot pain   Skin:  Negative for color change and rash.   Neurological:  Negative for dizziness, weakness, numbness and headaches.   Psychiatric/Behavioral:  Negative for behavioral problems, dysphoric mood, self-injury and sleep disturbance.         reports that he has been smoking cigarettes. He started smoking about 11 years ago. He has a 15.00 pack-year smoking history. He has been exposed to tobacco smoke. He has never used smokeless tobacco. He reports that he does not currently use alcohol after a past usage of about 4.0 standard drinks of alcohol per week. He reports that he does not use drugs.    Current Outpatient Medications   Medication Instructions    amLODIPine (NORVASC) 10 mg, Oral, Daily    gabapentin (NEURONTIN) 300 MG capsule TID prn alcohol withdrawal/ chronic back pain.    losartan (COZAAR) 25 mg, Oral, Daily    methylPREDNISolone (MEDROL) 4 MG dose pack Take as directed on package instructions.    metoprolol succinate XL (TOPROL XL) 50 mg, Oral, Daily    omeprazole (PRILOSEC) 20 mg, Oral, Daily    sertraline (ZOLOFT) 50 mg, Oral, Daily, To help with mood. Take with food    terazosin (HYTRIN) 5 mg, Oral, Nightly       OBJECTIVE:  /80 (BP Location: Right arm, Patient Position: Sitting, Cuff Size: Adult)   Pulse 63   Temp 98 °F (36.7 °C) (Infrared)   Resp 18   Ht 177.8 cm (70\")   Wt 82.6 kg (182 lb 2 oz)   SpO2 100%   BMI 26.13 kg/m²    Physical Exam  Vitals and nursing note reviewed.   Constitutional:       Appearance: Normal appearance. He is well-developed.   HENT:      Head: Normocephalic and atraumatic.      Right Ear: Tympanic membrane, ear canal and external ear normal.      Left Ear: Tympanic membrane, ear canal and external ear normal.      Nose: Nose normal. No septal deviation, nasal tenderness or congestion.      Mouth/Throat:      Lips: Pink. No lesions.      Mouth: Mucous membranes are moist. " No oral lesions.      Dentition: Normal dentition.      Pharynx: Oropharynx is clear. No pharyngeal swelling, oropharyngeal exudate or posterior oropharyngeal erythema.   Eyes:      General: Lids are normal. Vision grossly intact. No scleral icterus.        Right eye: No discharge.         Left eye: No discharge.      Extraocular Movements: Extraocular movements intact.      Conjunctiva/sclera: Conjunctivae normal.      Right eye: Right conjunctiva is not injected.      Left eye: Left conjunctiva is not injected.      Pupils: Pupils are equal, round, and reactive to light.   Neck:      Thyroid: No thyroid mass.      Trachea: Trachea normal.   Cardiovascular:      Rate and Rhythm: Normal rate and regular rhythm.      Heart sounds: Normal heart sounds. No murmur heard.     No gallop.   Pulmonary:      Effort: Pulmonary effort is normal.      Breath sounds: Normal breath sounds and air entry. No wheezing, rhonchi or rales.   Musculoskeletal:         General: No tenderness or deformity. Normal range of motion.      Cervical back: Full passive range of motion without pain, normal range of motion and neck supple.      Thoracic back: Normal.      Right lower leg: No edema.      Left lower leg: No edema.        Feet:    Skin:     General: Skin is warm and dry.      Coloration: Skin is not jaundiced.      Findings: No rash.   Neurological:      Mental Status: He is alert and oriented to person, place, and time.      Sensory: Sensation is intact.      Motor: Motor function is intact.      Coordination: Coordination is intact.      Gait: Gait is intact.      Deep Tendon Reflexes: Reflexes are normal and symmetric.   Psychiatric:         Mood and Affect: Mood and affect normal.         Behavior: Behavior normal.         Judgment: Judgment normal.         Procedures    Assessment/Plan:     Diagnoses and all orders for this visit:    1. Alcoholism (Primary)    2. Pain of toe of left foot  -     Uric acid; Future    3.  Hyponatremia  Comments:  will repeat labs.  Orders:  -     Comprehensive metabolic panel; Future    4. Primary hypertension  Comments:  moving HTN meds to morning dosing.  Orders:  -     losartan (COZAAR) 25 MG tablet; Take 1 tablet by mouth Daily.  Dispense: 30 tablet; Refill: 5  -     amLODIPine (NORVASC) 10 MG tablet; Take 1 tablet by mouth Daily.  Dispense: 30 tablet; Refill: 5    5. Uric acid arthropathy  Comments:  Will start on medrol dose pack.  Orders:  -     methylPREDNISolone (MEDROL) 4 MG dose pack; Take as directed on package instructions.  Dispense: 21 tablet; Refill: 0    Other orders  -     metoprolol succinate XL (Toprol XL) 50 MG 24 hr tablet; Take 1 tablet by mouth Daily.  Dispense: 30 tablet; Refill: 2           An After Visit Summary was printed and given to the patient at discharge.  Return in about 4 weeks (around 1/16/2024).       There are no Patient Instructions on file for this visit.      Discussion:     I spent 30 minutes caring for [place] on this date of service. This time includes time spent by me in the following activities: preparing for the visit, reviewing tests, obtaining and/or reviewing a separately obtained history, performing a medically appropriate examination and/or evaluation, counseling and educating the patient/family/caregiver, ordering medications, tests, or procedures, documenting information in the medical record, independently interpreting results and communicating that information with the patient/family/caregiver, and care coordination     Casi BROWNING 12/22/2023   Electronically signed.

## 2023-12-20 PROBLEM — K29.30 CHRONIC SUPERFICIAL GASTRITIS WITHOUT BLEEDING: Status: ACTIVE | Noted: 2023-12-20

## 2023-12-20 PROBLEM — Z12.11 COLON CANCER SCREENING: Status: ACTIVE | Noted: 2023-12-20

## 2023-12-22 PROBLEM — F10.20 ALCOHOLISM: Status: ACTIVE | Noted: 2023-12-22

## 2023-12-27 ENCOUNTER — TREATMENT (OUTPATIENT)
Dept: PHYSICAL THERAPY | Facility: CLINIC | Age: 43
End: 2023-12-27
Payer: MEDICAID

## 2023-12-27 DIAGNOSIS — R26.2 IMPAIRED AMBULATION: ICD-10-CM

## 2023-12-27 DIAGNOSIS — I69.30 H/O: STROKE WITH RESIDUAL EFFECTS: Primary | ICD-10-CM

## 2023-12-27 DIAGNOSIS — M62.81 LEFT-SIDED MUSCLE WEAKNESS: ICD-10-CM

## 2023-12-27 NOTE — PROGRESS NOTES
Physical Therapy Treatment Note and 30 Day Progress Note  [address] [place] [address]    Patient: James Taylor Jr.                                                 Visit Date: 2023  :     1980    Referring practitioner:    Casi Zaidi*  Date of Initial Visit:          Type: THERAPY  Noted: 2023    Patient seen for 4 sessions    Visit Diagnoses:    ICD-10-CM ICD-9-CM   1. H/O: stroke with residual effects  I69.30 438.9   2. Left-sided muscle weakness  M62.81 728.87   3. Impaired ambulation  R26.2 719.7     SUBJECTIVE     Subjective:He reports he will have to leave early today. He reports gout in his L foot       PAIN: 4/10         OBJECTIVE     Objective     Therapeutic Exercises    58332 Units Comments   Assessed B  strength rung#2     MMT L hip all planes      Ascend/descend stair model both sides x 1 with HR and CGA     L  LE Shuttle Press x 5 min 5 bands     L LE LAQ's on Nautilus #13 x 15     Each individual finger flexion with yellow Digiflex x 10     Timed Minutes 35        Therapy Education/Self Care 02432   Education offered today    Medbride Code    Ongoing HEP   Pec minor/major stretches on wall  -3x20s  4x/wk     Banded hip flex  -R 2x10  4x/wk     Banded ankle DF  -R 2x10  4x/wk     Sit to stands  -2x10  4x/wk   Timed Minutes        Total Timed Treatment:     35   mins  Total Time of Visit:             35   mins         ASSESSMENT/PLAN     GOALS  Goals                                          Progress Note due by 24                                                      Recert due by 24   STG by: 6 weeks  Comments Date Status   Able to stand at least 60 minutes before rest Deferred due to time constraints   Ongoing   Increase L hand  strength to within 10lbs of R hand  L hand 39 R 76   Ongoing   Normalized gait pattern as demonstrated by increased LUE arm swing and no L heel  scuffing  Absent L UE swing and decreased L SL,WS, and HS 12/27  Ongoing   LTG by: 12 weeks       Improve 6MWT by at least 200 ft  Deferred due to time constraints 12/27  Ongoing   Gross left hip MMT 4+/5  Extension and flexion 4-/5, abduction 3+/5 12/27  Ongoing   Able to walk down stairs with reciprocal gait pattern using rail for balance only Step to pattern 12/27  Ongoing   Able to jog for at least 10 minutes  Deferred today 12/27  Ongoing   Independent with HEP for progress strengthening Reinforced today 12/27 Ongoing       Assessment/Plan     ASSESSMENT:   I deferred the 6 MWT, jogging, and standing for 60 minutes goals today as he needed to leave early to  his child which truncated our session time. In addition today was his third treatment session following the initial evaluation on 11/8. In regards to his mobility he is relativity safe and functional but exhibits some L side weakness.     PLAN:   Continue to work on his overall L strength and functional mobility. Consider utilizing the Neurocom as well.     SIGNATURE: Raimundo Monaco [facility] [place] License #: [id number]  Electronically Signed on 12/27/2023        [address] [place] [address]. [address]  [phone]

## 2024-01-01 NOTE — PROGRESS NOTES
Progress Note Addendum      Patient: James Taylor Jr.           : 1980  Visit Date: 2023  Referring practitioner: Casi Zaidi*  Date of Initial Visit: Type: THERAPY  Noted: 2023  Patient seen for 4 sessions  Visit Diagnoses:    ICD-10-CM ICD-9-CM   1. H/O: stroke with residual effects  I69.30 438.9   2. Left-sided muscle weakness  M62.81 728.87   3. Impaired ambulation  R26.2 719.7          Clinical Progress: improved  Home Program Compliance: Yes  Progress toward previous goals: Partially Met  Prognosis to achieve goals: good    Objective     Assessment & Plan       Assessment  Impairments: abnormal coordination, abnormal gait, abnormal muscle firing, abnormal muscle tone, abnormal or restricted ROM, activity intolerance, impaired physical strength and lacks appropriate home exercise program   Functional limitations: carrying objects, lifting, walking, pulling, pushing, standing and reaching overhead     Plan  Therapy options: will be seen for skilled therapy services  Planned modality interventions: dry needling and low level laser therapy  Planned therapy interventions: abdominal trunk stabilization, ADL retraining, balance/weight-bearing training, body mechanics training, fine motor coordination training, flexibility, functional ROM exercises, gait training, home exercise program, IADL retraining, joint mobilization, neuromuscular re-education, manual therapy, motor coordination training, soft tissue mobilization, strengthening, stretching, therapeutic activities and transfer training  Frequency: 2x week  Duration in weeks: 12  Treatment plan discussed with: PTA        I reviewed the treatment and goals with Raimundo Monaco PTA and agree with the POC.    SIGNATURE: Mark Bullard PT, License #: [id number]  Electronically Signed on 2024

## 2024-01-02 ENCOUNTER — TREATMENT (OUTPATIENT)
Dept: PHYSICAL THERAPY | Facility: CLINIC | Age: 44
End: 2024-01-02
Payer: MEDICAID

## 2024-01-02 DIAGNOSIS — R26.2 IMPAIRED AMBULATION: ICD-10-CM

## 2024-01-02 DIAGNOSIS — I69.30 H/O: STROKE WITH RESIDUAL EFFECTS: Primary | ICD-10-CM

## 2024-01-02 DIAGNOSIS — M62.81 LEFT-SIDED MUSCLE WEAKNESS: ICD-10-CM

## 2024-01-02 NOTE — PROGRESS NOTES
Physical Therapy Treatment Note  [address] [place] [address]    Patient: James Taylor Jr.                                                 Visit Date: 2024  :     1980    Referring practitioner:    Casi Zaidi*  Date of Initial Visit:          Type: THERAPY  Noted: 2023    Patient seen for 5 sessions    Visit Diagnoses:    ICD-10-CM ICD-9-CM   1. H/O: stroke with residual effects  I69.30 438.9   2. Left-sided muscle weakness  M62.81 728.87   3. Impaired ambulation  R26.2 719.7     SUBJECTIVE     Subjective:Still having L foot pain      PAIN: 3/10         OBJECTIVE     Objective     Therapeutic Exercises    68397 Units Comments   B LE Shuttle Press 8 bands x 5 min     L  LE Shuttle Press x 5 min 5 bands      L LE LAQ's on Nautilus #10 2 x 15      R SL L clamshells with green T band 3 x 10      Each L individual finger flexion with yellow Digiflex x 12     Seated wring outs with B Tbar vertical and horizontal x 10 each          Timed Minutes 40        Therapy Education/Self Care 32886   Education offered today    Medbride Code    Ongoing HEP   Pec minor/major stretches on wall  -3x20s  4x/wk     Banded hip flex  -R 2x10  4x/wk     Banded ankle DF  -R 2x10  4x/wk     Sit to stands  -2x10  4x/wk   Timed Minutes        Total Timed Treatment:     40   mins  Total Time of Visit:             40   mins         ASSESSMENT/PLAN     GOALS  Goals                                          Progress Note due by 24                                                      Recert due by 24   STG by: 6 weeks  Comments Date Status   Able to stand at least 60 minutes before rest Deferred due to time constraints   Ongoing   Increase L hand  strength to within 10lbs of R hand  L hand 39 R 76   Ongoing   Normalized gait pattern as demonstrated by increased LUE arm swing and no L heel scuffing  Absent L UE swing and decreased L  SL,WS, and HS 12/27  Ongoing   LTG by: 12 weeks         Improve 6MWT by at least 200 ft  Deferred due to time constraints 12/27  Ongoing   Gross left hip MMT 4+/5  Extension and flexion 4-/5, abduction 3+/5 12/27  Ongoing   Able to walk down stairs with reciprocal gait pattern using rail for balance only Step to pattern 12/27  Ongoing   Able to jog for at least 10 minutes  Deferred today 12/27  Ongoing   Independent with HEP for progress strengthening Reinforced today 12/27 Ongoing       Assessment/Plan     ASSESSMENT:   His last session was a progress note; therefore, there are no significant changes in regards to his POC goals.    PLAN:   Continue to work on his overall L strength and functional mobility. Consider utilizing the Neurocom as well.      SIGNATURE: Raimundo Monaco [facility], [place] License #: [id number]  Electronically Signed on 1/2/2024        [facility] [place] [address]  [phone]

## 2024-01-09 ENCOUNTER — TREATMENT (OUTPATIENT)
Dept: PHYSICAL THERAPY | Facility: CLINIC | Age: 44
End: 2024-01-09
Payer: MEDICAID

## 2024-01-09 DIAGNOSIS — I69.30 H/O: STROKE WITH RESIDUAL EFFECTS: Primary | ICD-10-CM

## 2024-01-09 DIAGNOSIS — M62.81 LEFT-SIDED MUSCLE WEAKNESS: ICD-10-CM

## 2024-01-09 DIAGNOSIS — R26.2 IMPAIRED AMBULATION: ICD-10-CM

## 2024-01-09 NOTE — PROGRESS NOTES
Physical Therapy Treatment Note  [address] [place] [address]    Patient: James Taylor Jr.                                                 Visit Date: 2024  :     1980    Referring practitioner:    Casi Zaidi*  Date of Initial Visit:          Type: THERAPY  Noted: 2023    Patient seen for 6 sessions    Visit Diagnoses:    ICD-10-CM ICD-9-CM   1. H/O: stroke with residual effects  I69.30 438.9   2. Left-sided muscle weakness  M62.81 728.87   3. Impaired ambulation  R26.2 719.7     SUBJECTIVE     Subjective:No new complaints      PAIN: 2/10         OBJECTIVE     Objective       Therapeutic Exercises    45851 Units Comments   B LE Shuttle Press 8 bands x 6 min      L  LE Shuttle Press x 6 min 5 bands      Resisted gait with TRX Rip Trainer x 40 ft x 6     Seated wring outs with B Tbar vertical and horizontal x 15 each                Timed Minutes 30      Neuromuscular Reeducation     35659 Comments   Step overs and lateral step overs with hurdles     In and outs in agility ladder    Stepping strategy in agility ladder            Timed Minutes 11      Therapy Education/Self Care 32152   Education offered today    Medbride Code    Ongoing HEP   Pec minor/major stretches on wall  -3x20s  4x/wk     Banded hip flex  -R 2x10  4x/wk     Banded ankle DF  -R 2x10  4x/wk     Sit to stands  -2x10  4x/wk   Timed Minutes        Total Timed Treatment:     41   mins  Total Time of Visit:             41   mins         ASSESSMENT/PLAN     GOALS  Goals                                          Progress Note due by 24                                                      Recert due by 24   STG by: 6 weeks  Comments Date Status   Able to stand at least 60 minutes before rest Deferred due to time constraints   Ongoing   Increase L hand  strength to within 10lbs of R hand  L hand 39 R 76   Ongoing   Normalized gait pattern  as demonstrated by increased LUE arm swing and no L heel scuffing  Absent L UE swing and decreased L SL,WS, and HS 12/27  Ongoing   LTG by: 12 weeks         Improve 6MWT by at least 200 ft  Deferred due to time constraints 12/27  Ongoing   Gross left hip MMT 4+/5  Extension and flexion 4-/5, abduction 3+/5 12/27  Ongoing   Able to walk down stairs with reciprocal gait pattern using rail for balance only Step to pattern 12/27  Ongoing   Able to jog for at least 10 minutes  Deferred today 12/27  Ongoing   Independent with HEP for progress strengthening Reinforced today 12/27 Ongoing       Assessment/Plan     ASSESSMENT:   He did exhibit any obvious gait deviations today and today was the first session we introduced some agility activities.     PLAN:   Continue to work on his overall L strength and functional mobility     SIGNATURE: Raimundo Monaco PTA, [place] License #: [id number]  Electronically Signed on 1/9/2024        [address] [place] [address]. [address]  [phone]

## 2024-01-10 ENCOUNTER — TELEPHONE (OUTPATIENT)
Dept: GASTROENTEROLOGY | Age: 44
End: 2024-01-10

## 2024-01-10 NOTE — TELEPHONE ENCOUNTER
Patient is requesting a return call from the office in regards to questions he has about his upcoming EGD procedure on 1/17. Please return his call.    Thank you!

## 2024-01-16 ENCOUNTER — TELEPHONE (OUTPATIENT)
Dept: PHYSICAL THERAPY | Facility: CLINIC | Age: 44
End: 2024-01-16

## 2024-01-17 ENCOUNTER — ANESTHESIA EVENT (OUTPATIENT)
Dept: ENDOSCOPY | Age: 44
End: 2024-01-17
Payer: MEDICAID

## 2024-01-17 ENCOUNTER — ANESTHESIA (OUTPATIENT)
Dept: ENDOSCOPY | Age: 44
End: 2024-01-17
Payer: MEDICAID

## 2024-01-17 ENCOUNTER — HOSPITAL ENCOUNTER (OUTPATIENT)
Age: 44
Setting detail: OUTPATIENT SURGERY
Discharge: HOME | End: 2024-01-17
Attending: INTERNAL MEDICINE | Admitting: INTERNAL MEDICINE
Payer: MEDICAID

## 2024-01-17 ENCOUNTER — OFFICE VISIT (OUTPATIENT)
Dept: FAMILY MEDICINE CLINIC | Facility: CLINIC | Age: 44
End: 2024-01-17
Payer: MEDICAID

## 2024-01-17 VITALS
WEIGHT: 191 LBS | DIASTOLIC BLOOD PRESSURE: 87 MMHG | HEART RATE: 72 BPM | TEMPERATURE: 97.6 F | BODY MASS INDEX: 27.35 KG/M2 | RESPIRATION RATE: 16 BRPM | HEIGHT: 70 IN | SYSTOLIC BLOOD PRESSURE: 131 MMHG | OXYGEN SATURATION: 95 %

## 2024-01-17 VITALS
HEIGHT: 70 IN | RESPIRATION RATE: 19 BRPM | BODY MASS INDEX: 27.79 KG/M2 | TEMPERATURE: 98.7 F | WEIGHT: 194.13 LBS | DIASTOLIC BLOOD PRESSURE: 75 MMHG | OXYGEN SATURATION: 92 % | SYSTOLIC BLOOD PRESSURE: 134 MMHG | HEART RATE: 76 BPM

## 2024-01-17 DIAGNOSIS — R09.89 DECREASED PEDAL PULSES: ICD-10-CM

## 2024-01-17 DIAGNOSIS — F10.20 ALCOHOLISM: Primary | ICD-10-CM

## 2024-01-17 DIAGNOSIS — G89.29 CHRONIC BACK PAIN, UNSPECIFIED BACK LOCATION, UNSPECIFIED BACK PAIN LATERALITY: ICD-10-CM

## 2024-01-17 DIAGNOSIS — G62.9 NEUROPATHY: ICD-10-CM

## 2024-01-17 DIAGNOSIS — K86.1 CHRONIC PANCREATITIS, UNSPECIFIED PANCREATITIS TYPE: ICD-10-CM

## 2024-01-17 DIAGNOSIS — M54.9 CHRONIC BACK PAIN, UNSPECIFIED BACK LOCATION, UNSPECIFIED BACK PAIN LATERALITY: ICD-10-CM

## 2024-01-17 PROCEDURE — 99214 OFFICE O/P EST MOD 30 MIN: CPT | Performed by: NURSE PRACTITIONER

## 2024-01-17 PROCEDURE — 82787 IGG 1 2 3 OR 4 EACH: CPT

## 2024-01-17 PROCEDURE — 7100000011 HC PHASE II RECOVERY - ADDTL 15 MIN: Performed by: INTERNAL MEDICINE

## 2024-01-17 PROCEDURE — 6360000002 HC RX W HCPCS: Performed by: NURSE ANESTHETIST, CERTIFIED REGISTERED

## 2024-01-17 PROCEDURE — 1159F MED LIST DOCD IN RCRD: CPT | Performed by: NURSE PRACTITIONER

## 2024-01-17 PROCEDURE — 3700000000 HC ANESTHESIA ATTENDED CARE: Performed by: INTERNAL MEDICINE

## 2024-01-17 PROCEDURE — 2500000003 HC RX 250 WO HCPCS: Performed by: NURSE ANESTHETIST, CERTIFIED REGISTERED

## 2024-01-17 PROCEDURE — 2580000003 HC RX 258: Performed by: INTERNAL MEDICINE

## 2024-01-17 PROCEDURE — 1160F RVW MEDS BY RX/DR IN RCRD: CPT | Performed by: NURSE PRACTITIONER

## 2024-01-17 PROCEDURE — 3075F SYST BP GE 130 - 139MM HG: CPT | Performed by: NURSE PRACTITIONER

## 2024-01-17 PROCEDURE — 43259 EGD US EXAM DUODENUM/JEJUNUM: CPT | Performed by: INTERNAL MEDICINE

## 2024-01-17 PROCEDURE — 2709999900 HC NON-CHARGEABLE SUPPLY: Performed by: INTERNAL MEDICINE

## 2024-01-17 PROCEDURE — 3078F DIAST BP <80 MM HG: CPT | Performed by: NURSE PRACTITIONER

## 2024-01-17 PROCEDURE — 3609018500 HC EGD US SCOPE W/ADJACENT STRUCTURES: Performed by: INTERNAL MEDICINE

## 2024-01-17 PROCEDURE — 7100000010 HC PHASE II RECOVERY - FIRST 15 MIN: Performed by: INTERNAL MEDICINE

## 2024-01-17 PROCEDURE — 3700000001 HC ADD 15 MINUTES (ANESTHESIA): Performed by: INTERNAL MEDICINE

## 2024-01-17 RX ORDER — DEXMEDETOMIDINE HYDROCHLORIDE 100 UG/ML
INJECTION, SOLUTION INTRAVENOUS PRN
Status: DISCONTINUED | OUTPATIENT
Start: 2024-01-17 | End: 2024-01-17 | Stop reason: SDUPTHER

## 2024-01-17 RX ORDER — PROPOFOL 10 MG/ML
INJECTION, EMULSION INTRAVENOUS PRN
Status: DISCONTINUED | OUTPATIENT
Start: 2024-01-17 | End: 2024-01-17 | Stop reason: SDUPTHER

## 2024-01-17 RX ORDER — LIDOCAINE HYDROCHLORIDE 20 MG/ML
INJECTION, SOLUTION EPIDURAL; INFILTRATION; INTRACAUDAL; PERINEURAL PRN
Status: DISCONTINUED | OUTPATIENT
Start: 2024-01-17 | End: 2024-01-17 | Stop reason: SDUPTHER

## 2024-01-17 RX ORDER — SODIUM CHLORIDE, SODIUM LACTATE, POTASSIUM CHLORIDE, CALCIUM CHLORIDE 600; 310; 30; 20 MG/100ML; MG/100ML; MG/100ML; MG/100ML
INJECTION, SOLUTION INTRAVENOUS CONTINUOUS
Status: DISCONTINUED | OUTPATIENT
Start: 2024-01-17 | End: 2024-01-17 | Stop reason: HOSPADM

## 2024-01-17 RX ORDER — GABAPENTIN 300 MG/1
CAPSULE ORAL
Qty: 90 CAPSULE | Refills: 2 | Status: SHIPPED | OUTPATIENT
Start: 2024-01-17

## 2024-01-17 RX ORDER — METOPROLOL TARTRATE 50 MG/1
TABLET, FILM COATED ORAL
COMMUNITY
Start: 2024-01-11

## 2024-01-17 RX ADMIN — PROPOFOL 120 MG: 10 INJECTION, EMULSION INTRAVENOUS at 10:27

## 2024-01-17 RX ADMIN — LIDOCAINE HYDROCHLORIDE 100 MG: 20 INJECTION, SOLUTION EPIDURAL; INFILTRATION; INTRACAUDAL; PERINEURAL at 10:22

## 2024-01-17 RX ADMIN — PROPOFOL 120 MG: 10 INJECTION, EMULSION INTRAVENOUS at 10:25

## 2024-01-17 RX ADMIN — DEXMEDETOMIDINE HYDROCHLORIDE 10 MCG: 100 INJECTION, SOLUTION, CONCENTRATE INTRAVENOUS at 10:23

## 2024-01-17 RX ADMIN — SODIUM CHLORIDE, POTASSIUM CHLORIDE, SODIUM LACTATE AND CALCIUM CHLORIDE: 600; 310; 30; 20 INJECTION, SOLUTION INTRAVENOUS at 09:55

## 2024-01-17 RX ADMIN — PROPOFOL 50 MG: 10 INJECTION, EMULSION INTRAVENOUS at 10:30

## 2024-01-17 RX ADMIN — PROPOFOL 50 MG: 10 INJECTION, EMULSION INTRAVENOUS at 10:34

## 2024-01-17 ASSESSMENT — PAIN - FUNCTIONAL ASSESSMENT: PAIN_FUNCTIONAL_ASSESSMENT: 0-10

## 2024-01-17 NOTE — OP NOTE
Referring/Primary Care Provider: Sherita Prince APRN - NP,     Date of Procedure: 01/17/24    Procedure:   1. EGD with Endoscopic Ultrasound     Indications:   1. Pancreatitis in November presumably due to ETOH use. No suggestion of biliary pancreatitis.     Anesthesia:  Sedation was administered by anesthesia who monitored the patient during the procedure.    Procedure:   After reviewing the patient's chart, H&P, medications, obtaining informed consent, and discussing risks benefits and alternatives to the procedure the patient was placed in the left lateral decubitus position. A oblique viewing Olympus 140 Linear EUS scope was lubricated and inserted through the mouth into the oropharynx. Under indirect visualization, the upper esophagus was intubated. The scope was advanced to the level of the third portion of duodenum with limited views of the esophageal mucosa. Findings and maneuvers are listed in impression below.     The patient tolerated the procedure well. There were no immediate complications.    Findings:   Endoscopic Finding:   - endoscopic evaluation of the upper GI tract was normal    Endosonographic Findings:  - The celiac axis and associated vascular structures was identified and examined.  No concerning or malignant lymphadenopathy was identified.     - Limited views of the left lobe of the liver revealed no biliary dilation or focal hepatic mass.     - The EUS scope was advanced to the duodenal bulb    - Pancreas: no MPD dilation. There is lobularity and inflammatory changes suggestive of resolving pancreatitis.     Estimated Blood Loss: minimal    IMPRESSION:  No obvious structural changes to cause pancreatitis.     RECOMMENDATIONS:   - Total avoidance of alcohol   - Check IGG4 today  - Follow-up with Dr Reid as planned.      The results were discussed with the patient and family. A copy of the images obtained were given to the patient.     Kimani Machado MD  01/17/24  10:44 AM

## 2024-01-17 NOTE — PROGRESS NOTES
NAM Renteria  [facility]   Family Medicine  [address]  [place] [address]  Phone: [phone]  Fax: [phone]         Chief Complaint:  Chief Complaint   Patient presents with    Follow-up     3-month        History:  James Taylor Jr. is a 43 y.o. male that is an established patient. He  is here for management of chronic conditions.    HPI       1.  Alcoholism.  1/17/2024: Pt reports no ETOH since before christmas and is doing well with sobriety.   12/19/2023: Pt reports that he has not had an alcoholic beverage since his last appointment. He states that his abdominal pain associated with pancreatitis has resolved.   11/21/2023: I will recheck his sodium level. He has an appointment with Yudelka Taylor, gastroenterology, on 11/28/2023. He will probably need a colonoscopy once his pancreatitis is healed. He was encouraged to stop drinking alcohol. He was recommended to go to inpatient.      2. Abdominal pain/ chronic pancreatitis.   1/17/2024: The adult female states the pt underwent EGD with ultrasound to evaluate possible common bile duct stone. She reports that no stone was found but the pancrease was noted to be swollen and inflamed.   12/19/2023: resolved.   11/21/2023: He was given samples of MiraLAX. He was given a prescription for gabapentin 300 mg. He was recommended to take 3 or 4 capfuls of MiraLAX in Gatorade.     3. HTN  1/17/2024: He has been utilizing his blood pressure medication in the morning and notes increased compliance since beginning this regimen.   12/19/2023: Pt reports that he has difficulty remembering to take his night time medications. Request that he be able to take these in the morning. B/p slightly elevated today at 140/80.     4. Pain toe/ foot  1/17/2024: His toe problems have not resolved. It is still bothering him. He has completed the round of steroids. The tissue under his toe is always sensitive and it hurts for him to  ambulate. The patient reports that his legs hurt since his stroke.  He admits that he has gained weight. His feet get better when he utilizes gabapentin, but it does not seem to make them better.  12/19/2023: Pt reports that he has had increased pain and erythema to right great toe and middle toes. States that it hurts for the sheets to touch his toes.        ROS:  Review of Systems   Constitutional:  Negative for fatigue, fever and unexpected weight change.   HENT:  Negative for congestion, ear pain, rhinorrhea, sinus pressure, sinus pain and voice change.    Eyes:  Negative for visual disturbance.   Respiratory:  Negative for shortness of breath and wheezing.    Cardiovascular:  Negative for chest pain and palpitations.   Gastrointestinal:  Negative for abdominal pain, nausea and vomiting.   Genitourinary:  Negative for dysuria and flank pain.   Musculoskeletal:  Negative for back pain, myalgias and neck pain.        Foot pain   Skin:  Negative for color change and rash.   Neurological:  Negative for dizziness, weakness, numbness and headaches.   Psychiatric/Behavioral:  Negative for behavioral problems, dysphoric mood, self-injury and sleep disturbance.         reports that he has been smoking cigarettes. He started smoking about 11 years ago. He has a 15.00 pack-year smoking history. He has been exposed to tobacco smoke. He has never used smokeless tobacco. He reports that he does not currently use alcohol after a past usage of about 4.0 standard drinks of alcohol per week. He reports that he does not use drugs.    Current Outpatient Medications   Medication Instructions    amLODIPine (NORVASC) 10 mg, Oral, Daily    gabapentin (NEURONTIN) 300 MG capsule TID prn alcohol withdrawal/ chronic back pain/ neuropathy.    losartan (COZAAR) 25 mg, Oral, Daily    metoprolol tartrate (LOPRESSOR) 50 MG tablet TAKE 1 TABLET BY MOUTH TWICE A DAY IN THE MORNING AND IN THE EVENING    omeprazole (PRILOSEC) 20 mg, Oral, Daily     "sertraline (ZOLOFT) 50 mg, Oral, Daily, To help with mood. Take with food    terazosin (HYTRIN) 5 mg, Oral, Nightly       OBJECTIVE:  /75 (BP Location: Left arm, Patient Position: Sitting, Cuff Size: Adult)   Pulse 76   Temp 98.7 °F (37.1 °C) (Infrared)   Resp 19   Ht 177.8 cm (70\")   Wt 88.1 kg (194 lb 2 oz)   SpO2 92%   BMI 27.85 kg/m²    Physical Exam  Vitals and nursing note reviewed.   Constitutional:       Appearance: Normal appearance. He is well-developed.   HENT:      Head: Normocephalic and atraumatic.      Right Ear: Tympanic membrane, ear canal and external ear normal.      Left Ear: Tympanic membrane, ear canal and external ear normal.      Nose: Nose normal. No septal deviation, nasal tenderness or congestion.      Mouth/Throat:      Lips: Pink. No lesions.      Mouth: Mucous membranes are moist. No oral lesions.      Dentition: Normal dentition.      Pharynx: Oropharynx is clear. No pharyngeal swelling, oropharyngeal exudate or posterior oropharyngeal erythema.   Eyes:      General: Lids are normal. Vision grossly intact. No scleral icterus.        Right eye: No discharge.         Left eye: No discharge.      Extraocular Movements: Extraocular movements intact.      Conjunctiva/sclera: Conjunctivae normal.      Right eye: Right conjunctiva is not injected.      Left eye: Left conjunctiva is not injected.      Pupils: Pupils are equal, round, and reactive to light.   Neck:      Thyroid: No thyroid mass.      Trachea: Trachea normal.   Cardiovascular:      Rate and Rhythm: Normal rate and regular rhythm.      Pulses:           Dorsalis pedis pulses are 1+ on the right side and 1+ on the left side.      Heart sounds: Normal heart sounds. No murmur heard.     No gallop.   Pulmonary:      Effort: Pulmonary effort is normal.      Breath sounds: Normal breath sounds and air entry. No wheezing, rhonchi or rales.   Musculoskeletal:         General: No tenderness or deformity. Normal range of " motion.      Cervical back: Full passive range of motion without pain, normal range of motion and neck supple.      Thoracic back: Normal.      Right lower leg: No edema.      Left lower leg: No edema.        Feet:    Skin:     General: Skin is warm and dry.      Coloration: Skin is not jaundiced.      Findings: No rash.   Neurological:      Mental Status: He is alert and oriented to person, place, and time.      Sensory: Sensation is intact.      Motor: Motor function is intact.      Coordination: Coordination is intact.      Gait: Gait is intact.      Deep Tendon Reflexes: Reflexes are normal and symmetric.   Psychiatric:         Mood and Affect: Mood and affect normal.         Behavior: Behavior normal.         Judgment: Judgment normal.         Procedures    Assessment/Plan:     Diagnoses and all orders for this visit:    1. Alcoholism (Primary)  -     gabapentin (NEURONTIN) 300 MG capsule; TID prn alcohol withdrawal/ chronic back pain/ neuropathy.  Dispense: 90 capsule; Refill: 2    2. Chronic pancreatitis, unspecified pancreatitis type    3. Decreased pedal pulses  -     US Ankle / Brachial Indices Extremity Complete; Future    4. Neuropathy  -     EMG & Nerve Conduction Test; Future  -     gabapentin (NEURONTIN) 300 MG capsule; TID prn alcohol withdrawal/ chronic back pain/ neuropathy.  Dispense: 90 capsule; Refill: 2    5. Chronic back pain, unspecified back location, unspecified back pain laterality  -     gabapentin (NEURONTIN) 300 MG capsule; TID prn alcohol withdrawal/ chronic back pain/ neuropathy.  Dispense: 90 capsule; Refill: 2             An After Visit Summary was printed and given to the patient at discharge.  Return in about 3 months (around 4/17/2024).       Patient Instructions   We will schedule aletha and EMG test to determine if you have neuropathy of the lower extremities.     Try to keep feet warm.           Discussion:     I spent 30 minutes caring for [place] on this date of service. This time  includes time spent by me in the following activities: preparing for the visit, reviewing tests, obtaining and/or reviewing a separately obtained history, performing a medically appropriate examination and/or evaluation, counseling and educating the patient/family/caregiver, ordering medications, tests, or procedures, documenting information in the medical record, independently interpreting results and communicating that information with the patient/family/caregiver, and care coordination     Casi BROWNING 1/19/2024   Electronically signed.    Transcribed from ambient dictation for NAM Renteria by V[employer] .  01/18/24   10:28 CST    Patient or patient representative verbalized consent to the visit recording.  I have personally performed the services described in this document as transcribed by the above individual, and it is both accurate and complete.

## 2024-01-17 NOTE — PATIENT INSTRUCTIONS
We will schedule aletha and EMG test to determine if you have neuropathy of the lower extremities.     Try to keep feet warm.

## 2024-01-17 NOTE — ANESTHESIA POSTPROCEDURE EVALUATION
Department of Anesthesiology  Postprocedure Note    Patient: David Clancy  MRN: [id number]  YOB: 1980  Date of evaluation: 1/17/2024    Procedure Summary     Date: 01/17/24 Room / Location: [place] [address] / [facility]    Anesthesia Start: 1019 Anesthesia Stop:     Procedure: EGD ESOPHAGOGASTRODUODENOSCOPY ULTRASOUND Diagnosis:       Chronic recurrent pancreatitis (HCC)      (Chronic recurrent pancreatitis (HCC) [K86.1])    Surgeons: Kimani Machado MD Responsible Provider: Jai Ortiz APRN - CRNA    Anesthesia Type: MAC ASA Status: 2          Anesthesia Type: No value filed.    Didier Phase I: Didier Score: 10    Didier Phase II:      Anesthesia Post Evaluation    Patient location during evaluation: bedside  Level of consciousness: awake and alert  Pain score: 0  Airway patency: patent  Nausea & Vomiting: no nausea and no vomiting  Cardiovascular status: blood pressure returned to baseline and hemodynamically stable  Respiratory status: acceptable  Hydration status: euvolemic  Pain management: adequate        No notable events documented.

## 2024-01-17 NOTE — ANESTHESIA PRE PROCEDURE
K 3.8 03/16/2023 05:55 AM     03/16/2023 05:55 AM    CO2 30 03/16/2023 05:55 AM    BUN 16 03/16/2023 05:55 AM    CREATININE 1.1 03/16/2023 05:55 AM    LABGLOM >60 03/16/2023 05:55 AM    GLUCOSE 102 03/16/2023 05:55 AM    CALCIUM 9.1 03/16/2023 05:55 AM       POC Tests: No results for input(s): \"POCGLU\", \"POCNA\", \"POCK\", \"POCCL\", \"POCBUN\", \"POCHEMO\", \"POCHCT\" in the last 72 hours.    Coags: No results found for: \"PROTIME\", \"INR\", \"APTT\"    HCG (If Applicable): No results found for: \"PREGTESTUR\", \"PREGSERUM\", \"HCG\", \"HCGQUANT\"     ABGs: No results found for: \"PHART\", \"PO2ART\", \"KHZ2USE\", \"EZH8QBF\", \"BEART\", \"E4FNLANI\"     Type & Screen (If Applicable):  No results found for: \"LABABO\", \"LABRH\"    Drug/Infectious Status (If Applicable):  No results found for: \"HIV\", \"HEPCAB\"    COVID-19 Screening (If Applicable): No results found for: \"COVID19\"        Anesthesia Evaluation  Patient summary reviewed and Nursing notes reviewed  Airway: Mallampati: II  TM distance: >3 FB   Neck ROM: full  Mouth opening: > = 3 FB   Dental:          Pulmonary: breath sounds clear to auscultation  (+) asthma:                            Cardiovascular:  Exercise tolerance: good (>4 METS),   (+) hypertension:,         Rhythm: regular  Rate: normal                    Neuro/Psych:   Negative Neuro/Psych ROS  (+) CVA:,             GI/Hepatic/Renal: Neg GI/Hepatic/Renal ROS            Endo/Other: Negative Endo/Other ROS                    Abdominal:       Abdomen: soft.      Vascular: negative vascular ROS.         Other Findings:           Anesthesia Plan      MAC     ASA 2       Induction: intravenous.      Anesthetic plan and risks discussed with patient.      Plan discussed with CRNA and attending.                    Jai Ortiz, APRN - CRNA   1/17/2024

## 2024-01-17 NOTE — DISCHARGE INSTRUCTIONS
RECOMMENDATIONS:   - Total avoidance of alcohol   - Check IGG4 today  - Follow-up with Dr Reid as planned.     Upper GI Endoscopy: What to Expect at Home  Your Recovery  You had an upper GI endoscopy. Your doctor used a thin, lighted tube that bends to look at the inside of your esophagus, your stomach, and the first part of the small intestine, called the duodenum.    How can you care for yourself at home?  Activity   Rest as much as you need to after you go home.  You should be able to go back to your usual activities the day after the test.  Due to anesthesia, no driving or operating equipment for 24 hours.  Diet   Follow your doctor's directions for eating after the test.  Drink plenty of fluids (unless your doctor has told you not to).  Medications   If you have a sore throat the day after the test, use an over-the-counter spray to numb your throat.  When should you call for help?   Call 911 anytime you think you may need emergency care. For example, call if:    You passed out (lost consciousness).     You have trouble breathing.     You pass maroon or bloody stools.   Call your doctor now or seek immediate medical care if:    You have pain that does not get better after your take pain medicine.     You have new or worse belly pain.     You have blood in your stools.     You are sick to your stomach and cannot keep fluids down.     You have a fever.     You cannot pass stools or gas.   Watch closely for changes in your health, and be sure to contact your doctor if:    Your throat still hurts after a day or two.     You do not get better as expected.   Endoscopic Ultrasound (Oral): What to Expect At Home  Your Recovery  After you have an endoscopic ultrasound--a test to look for problems in the stomach, liver, gallbladder, and other organs--you will stay until the sedation begins to wear off. You will be able to go home after your doctor or nurse checks to make sure you are not having any problems.  You may

## 2024-01-17 NOTE — H&P
Patient Name: Daivd Clancy  : 1980  MRN: [id number]  DATE: 24    Allergies: No Known Allergies     ENDOSCOPY  History and Physical    Procedure:    [] Diagnostic Colonoscopy       [] Screening Colonoscopy  [x] EGD      [] ERCP      [x] EUS       [] Other    [x] Previous office notes/History and Physical reviewed from the patients chart. Please see EMR for further details of HPI. I have examined the patient's status immediately prior to the procedure and:      Indications/HPI:    [x]Acute pancreatitis in November     Anesthesia:   [x] MAC [] Moderate Sedation   [] General   [] None     ROS: 12 pt Review of Symptoms was negative unless mentioned above    Medications:   Prior to Admission medications    Medication Sig Start Date End Date Taking? Authorizing Provider   gabapentin (NEURONTIN) 300 MG capsule Take 1 capsule by mouth 3 times daily.    ProviderRiley MD   losartan (COZAAR) 25 MG tablet Take 1 tablet by mouth daily    ProviderRiley MD   omeprazole (PRILOSEC) 20 MG delayed release capsule Take 1 capsule by mouth daily    ProviderRiley MD   metoprolol tartrate (LOPRESSOR) 50 MG tablet TAKE 1 TABLET BY MOUTH IN THE MORNING AND IN THE EVENING 10/4/23   Isai Coleman MD   amLODIPine (NORVASC) 10 MG tablet TAKE 1 TABLET BY MOUTH EVERY DAY 10/2/23   Isai Coleman MD   sertraline (ZOLOFT) 50 MG tablet Take 1 tablet by mouth daily 3/17/23   Isai Coleman MD   hydrALAZINE (APRESOLINE) 25 MG tablet Take 1 tablet by mouth in the morning and 1 tablet at noon and 1 tablet in the evening. 3/17/23   Isai Coleman MD   lisinopril (PRINIVIL;ZESTRIL) 10 MG tablet Take 1 tablet by mouth nightly 3/17/23   Isai Coleman MD   terazosin (HYTRIN) 5 MG capsule Take 1 capsule by mouth nightly 3/17/23   Isai Coleman MD   pantoprazole (PROTONIX) 40 MG tablet Take 1 tablet by mouth 2 times daily (before meals) 3/17/23   Isai Coleman MD   Ergocalciferol (VITAMIN D) 10874 units CAPS Take

## 2024-01-18 LAB — IGG4 SER-MCNC: 14 MG/DL (ref 1–123)

## 2024-01-19 PROBLEM — G62.9 NEUROPATHY: Status: ACTIVE | Noted: 2024-01-19

## 2024-01-19 PROBLEM — M54.9 CHRONIC BACK PAIN: Status: ACTIVE | Noted: 2024-01-19

## 2024-01-19 PROBLEM — K86.1 CHRONIC PANCREATITIS: Status: ACTIVE | Noted: 2024-01-19

## 2024-01-19 PROBLEM — R09.89 DECREASED PEDAL PULSES: Status: ACTIVE | Noted: 2024-01-19

## 2024-01-19 PROBLEM — G89.29 CHRONIC BACK PAIN: Status: ACTIVE | Noted: 2024-01-19

## 2024-01-23 ENCOUNTER — TREATMENT (OUTPATIENT)
Dept: PHYSICAL THERAPY | Facility: CLINIC | Age: 44
End: 2024-01-23
Payer: MEDICAID

## 2024-01-23 DIAGNOSIS — I69.30 H/O: STROKE WITH RESIDUAL EFFECTS: Primary | ICD-10-CM

## 2024-01-23 NOTE — PROGRESS NOTES
"                                                                Physical Therapy Treatment Note, 30 Day Progress Note, and 90 Day Recertification Note  [address] [place] [address]    Patient: James Taylor Jr.                                                 Visit Date: 2024  :     1980    Referring practitioner:    Casi Zaidi*  Date of Initial Visit:          Type: THERAPY  Noted: 2023    Patient seen for 7 sessions    Visit Diagnoses:    ICD-10-CM ICD-9-CM   1. H/O: stroke with residual effects  I69.30 438.9     SUBJECTIVE     Subjective:He reports L LE pain today      PAIN: 8/10         OBJECTIVE     Objective     Therapeutic Exercises    07550 Units Comments   B  strength see goals table     B unilateral hip ext in standing with red T band x 10     B unilateral hip abd in standing with red T band x 10     B unilateral step ups with 4\" step x 15      Standing mid rows then vertical rows with TRX Rip Trainer x 20 each     B unilateral shoulder flexion with Body Blade x 1 min each     Walking with #20 vest x 680 ft     B pec and thoracic stretches in sitting with 1/2 roller          Timed Minutes 30      Neuromuscular Reeducation     18628 Comments   RS/NS EO/EC static surface, airex foam, theradisks                     Timed Minutes 10           Therapy Education/Self Care 60021   Education offered today    Medbride Code    Ongoing HEP   Pec minor/major stretches on wall  -3x20s  4x/wk     Banded hip flex  -R 2x10  4x/wk     Banded ankle DF  -R 2x10  4x/wk     Sit to stands  -2x10  4x/wk   Timed Minutes        Total Timed Treatment:     40   mins  Total Time of Visit:             40   mins         ASSESSMENT/PLAN     GOALS  Goals                                          Progress Note due by 24                                                      Recert due by 24   STG by: 6 weeks  Comments Date Status   Able to stand at least 60 minutes before rest 25 minutes today " performing walking with #20 vest and other therex in standing 1/23  Ongoing   Increase L hand  strength to within 10lbs of R hand  L 25 hand  R 80 1/23  Ongoing   Normalized gait pattern as demonstrated by increased LUE arm swing and no L heel scuffing  Absent L UE swing and decreased L SL,WS, and HS 1/23  Ongoing   LTG by: 12 weeks        Improve 6MWT by at least 200 ft  Deferred due to time constraints   Ongoing   Gross left hip MMT 4+/5  Extension and flexion 4-/5, abduction 3+/5 1/23  Ongoing   Able to walk down stairs with reciprocal gait pattern using rail for balance only Step to pattern 1/23  Ongoing   Able to jog for at least 10 minutes  Deferred today 1/23  Ongoing   Independent with [facility] for progress strengthening Reinforced today 1/23 Ongoing       Assessment/Plan     ASSESSMENT:   Today was the first session in which I had him stand for twenty five minutes with no rest. Today his L  strength tested nineteen pounds weaker which is a bit of a concern. Today was also the first session we utilized the weighted vest.     PLAN:   Continue to work on his overall L strength and functional mobility     SIGNATURE: Raimundo Monaco PTA, [place] License #: [id number]  Electronically Signed on 1/23/2024        [address] [place] [address]. [address]  [phone]

## 2024-01-29 ENCOUNTER — OFFICE VISIT (OUTPATIENT)
Dept: GASTROENTEROLOGY | Facility: CLINIC | Age: 44
End: 2024-01-29
Payer: MEDICAID

## 2024-01-29 VITALS
DIASTOLIC BLOOD PRESSURE: 80 MMHG | BODY MASS INDEX: 28.03 KG/M2 | HEART RATE: 76 BPM | TEMPERATURE: 98 F | HEIGHT: 70 IN | OXYGEN SATURATION: 99 % | WEIGHT: 195.8 LBS | SYSTOLIC BLOOD PRESSURE: 138 MMHG

## 2024-01-29 DIAGNOSIS — K76.0 FATTY LIVER: ICD-10-CM

## 2024-01-29 DIAGNOSIS — R79.89 ELEVATED LIVER FUNCTION TESTS: Primary | ICD-10-CM

## 2024-01-29 DIAGNOSIS — Z78.9 NONSMOKER: ICD-10-CM

## 2024-01-29 DIAGNOSIS — K85.20 ALCOHOL-INDUCED ACUTE PANCREATITIS, UNSPECIFIED COMPLICATION STATUS: ICD-10-CM

## 2024-01-29 PROCEDURE — 1159F MED LIST DOCD IN RCRD: CPT | Performed by: CLINICAL NURSE SPECIALIST

## 2024-01-29 PROCEDURE — 3079F DIAST BP 80-89 MM HG: CPT | Performed by: CLINICAL NURSE SPECIALIST

## 2024-01-29 PROCEDURE — 99214 OFFICE O/P EST MOD 30 MIN: CPT | Performed by: CLINICAL NURSE SPECIALIST

## 2024-01-29 PROCEDURE — 3075F SYST BP GE 130 - 139MM HG: CPT | Performed by: CLINICAL NURSE SPECIALIST

## 2024-01-29 PROCEDURE — 1160F RVW MEDS BY RX/DR IN RCRD: CPT | Performed by: CLINICAL NURSE SPECIALIST

## 2024-01-29 NOTE — PROGRESS NOTES
James Taylor Jr.  1980 1/29/2024  Chief Complaint   Patient presents with    GI Problem     2 mth fu     Subjective   HPI  James Taylor Jr. is a 43 y.o. male who presents with a complaint of ETOH pancreatitis. Fatty liver course constant ongoing. ETOH is his risk factor.   He has stopped drinking alcohol for  approx 1 month. He is not attending AA. He has had EUS with Dr Harley Saravia on 1/17/24 no structural changes to cause pancreatitis. He is overall feeling better. His weight is up and he is eating ok.   He has had his endo/colon as well noted below. We have discussed these results.     Past Medical History:   Diagnosis Date    Asthma     Headache     Hypertension     Stroke      Past Surgical History:   Procedure Laterality Date    COLONOSCOPY N/A 12/13/2023    normal exam    ENDOSCOPY N/A 12/13/2023    Erythematous mucosa in the antrum bx normal, otherwise normal    UPPER ENDOSCOPIC ULTRASOUND W/ FNA  01/17/2024    No obvious structural changes to cause pancreatitis. - Dr. Saravia       Outpatient Medications Marked as Taking for the 1/29/24 encounter (Office Visit) with Yudelka Taylor APRN   Medication Sig Dispense Refill    amLODIPine (NORVASC) 10 MG tablet Take 1 tablet by mouth Daily. 30 tablet 5    gabapentin (NEURONTIN) 300 MG capsule TID prn alcohol withdrawal/ chronic back pain/ neuropathy. 90 capsule 2    losartan (COZAAR) 25 MG tablet Take 1 tablet by mouth Daily. 30 tablet 5    metoprolol tartrate (LOPRESSOR) 50 MG tablet TAKE 1 TABLET BY MOUTH TWICE A DAY IN THE MORNING AND IN THE EVENING      omeprazole (priLOSEC) 20 MG capsule Take 1 capsule by mouth Daily. 30 capsule 11    sertraline (ZOLOFT) 50 MG tablet TAKE 1 TABLET BY MOUTH DAILY. TO HELP WITH MOOD. TAKE WITH FOOD 90 tablet 0    terazosin (HYTRIN) 5 MG capsule Take 1 capsule by mouth Every Night.       No Known Allergies  Social History     Socioeconomic History    Marital status: Single   Tobacco Use    Smoking status: Every Day      Packs/day: 1.00     Years: 15.00     Additional pack years: 0.00     Total pack years: 15.00     Types: Cigarettes     Start date: 4/2/2012     Passive exposure: Current    Smokeless tobacco: Never    Tobacco comments:     AVS   Vaping Use    Vaping Use: Never used   Substance and Sexual Activity    Alcohol use: Not Currently     Alcohol/week: 4.0 standard drinks of alcohol     Types: 4 Cans of beer per week     Comment: recently stopped    Drug use: No    Sexual activity: Yes     Partners: Female     Birth control/protection: Condom, Same-sex partner     Family History   Problem Relation Age of Onset    Anxiety disorder Mother     Diabetes Mother     Hyperlipidemia Mother     Cancer Father     Hyperlipidemia Father     Colon cancer Neg Hx     Colon polyps Neg Hx      Health Maintenance   Topic Date Due    COVID-19 Vaccine (1) Never done    ANNUAL PHYSICAL  Never done    INFLUENZA VACCINE  Never done    Pneumococcal Vaccine 0-64 (1 of 2 - PCV) 04/27/2024 (Originally 9/2/1986)    TDAP/TD VACCINES (1 - Tdap) 04/27/2024 (Originally 9/2/1999)    BMI FOLLOWUP  10/17/2024    COLORECTAL CANCER SCREENING  12/13/2033    HEPATITIS C SCREENING  Completed     Review of Systems   Constitutional:  Negative for activity change, appetite change, chills, diaphoresis, fatigue, fever and unexpected weight change.   HENT:  Negative for ear pain, hearing loss, mouth sores, sore throat, trouble swallowing and voice change.    Eyes: Negative.    Respiratory:  Negative for cough, choking, shortness of breath and wheezing.    Cardiovascular:  Negative for chest pain and palpitations.   Gastrointestinal:  Negative for abdominal pain, blood in stool, constipation, diarrhea, nausea and vomiting.   Endocrine: Negative for cold intolerance and heat intolerance.   Genitourinary:  Negative for decreased urine volume, dysuria, frequency, hematuria and urgency.   Musculoskeletal:  Negative for back pain, gait problem and myalgias.   Skin:  Negative  "for color change, pallor and rash.   Allergic/Immunologic: Negative for food allergies and immunocompromised state.   Neurological:  Negative for dizziness, tremors, seizures, syncope, weakness, light-headedness, numbness and headaches.   Hematological:  Negative for adenopathy. Does not bruise/bleed easily.   Psychiatric/Behavioral:  Negative for agitation and confusion. The patient is not nervous/anxious.    All other systems reviewed and are negative.    Objective   Vitals:    01/29/24 1411   BP: 138/80   BP Location: Left arm   Pulse: 76   Temp: 98 °F (36.7 °C)   TempSrc: Temporal   SpO2: 99%   Weight: 88.8 kg (195 lb 12.8 oz)   Height: 177.8 cm (70\")     Body mass index is 28.09 kg/m².  Physical Exam  Constitutional:       Appearance: He is well-developed.   HENT:      Head: Normocephalic and atraumatic.   Eyes:      Pupils: Pupils are equal, round, and reactive to light.   Neck:      Trachea: No tracheal deviation.   Cardiovascular:      Rate and Rhythm: Normal rate and regular rhythm.      Heart sounds: Normal heart sounds. No murmur heard.     No friction rub. No gallop.   Pulmonary:      Effort: Pulmonary effort is normal. No respiratory distress.      Breath sounds: Normal breath sounds. No wheezing or rales.   Chest:      Chest wall: No tenderness.   Abdominal:      General: Bowel sounds are normal. There is no distension.      Palpations: Abdomen is soft. Abdomen is not rigid.      Tenderness: There is no abdominal tenderness. There is no guarding or rebound.   Musculoskeletal:         General: No tenderness or deformity. Normal range of motion.      Cervical back: Normal range of motion and neck supple.   Skin:     General: Skin is warm and dry.      Coloration: Skin is not pale.      Findings: No rash.   Neurological:      Mental Status: He is alert and oriented to person, place, and time.      Deep Tendon Reflexes: Reflexes are normal and symmetric.   Psychiatric:         Behavior: Behavior normal.    "      Thought Content: Thought content normal.         Judgment: Judgment normal.       Assessment & Plan   Diagnoses and all orders for this visit:    1. Elevated liver function tests (Primary)    2. Alcohol-induced acute pancreatitis, unspecified complication status    3. Fatty liver  -     US Liver; Future  -     Comprehensive Metabolic Panel; Future  -     CBC & Differential; Future    4. Nonsmoker    Will continue to monitor and follow yearly for fatty liver   NO ETOH  Low fat diet  Discussed pancreatitis and fatty liver  I discussed how fatty liver can lead to cirrhosis, disability and pre-mature death.  How it also can be a sign of increased risk for cardiovascular disease.  I discussed the importance of getting rid of fat in the liver by controlling lipids and glucose, avoiding etoh helps, and gradual weight loss to ideal body weight is very important.       Part of this note may be an electronic transcription/translation of spoken language to printed text using the Dragon Dictation System.  Body mass index is 28.09 kg/m².  Return in about 45 weeks (around 12/9/2024).           All risks, benefits, alternatives, and indications of colonoscopy and/or Endoscopy procedure have been discussed with the patient. Risks to include perforation of the colon requiring possible surgery or colostomy, risk of bleeding from biopsies or removal of colon tissue, possibility of missing a colon polyp or cancer, or adverse drug reaction.  Benefits to include the diagnosis and management of disease of the colon and rectum. Alternatives to include barium enema, radiographic evaluation, lab testing or no intervention. Pt verbalizes understanding and agrees.     Yudelka Taylor, APRN  1/29/2024  14:41 CST          If you smoke or use tobacco, 4 minutes reading provided  Steps to Quit Smoking  Smoking tobacco can be harmful to your health and can affect almost every organ in your body. Smoking puts you, and those around you, at  risk for developing many serious chronic diseases. Quitting smoking is difficult, but it is one of the best things that you can do for your health. It is never too late to quit.  What are the benefits of quitting smoking?  When you quit smoking, you lower your risk of developing serious diseases and conditions, such as:  Lung cancer or lung disease, such as COPD.  Heart disease.  Stroke.  Heart attack.  Infertility.  Osteoporosis and bone fractures.  Additionally, symptoms such as coughing, wheezing, and shortness of breath may get better when you quit. You may also find that you get sick less often because your body is stronger at fighting off colds and infections. If you are pregnant, quitting smoking can help to reduce your chances of having a baby of low birth weight.  How do I get ready to quit?  When you decide to quit smoking, create a plan to make sure that you are successful. Before you quit:  Pick a date to quit. Set a date within the next two weeks to give you time to prepare.  Write down the reasons why you are quitting. Keep this list in places where you will see it often, such as on your bathroom mirror or in your car or wallet.  Identify the people, places, things, and activities that make you want to smoke (triggers) and avoid them. Make sure to take these actions:  Throw away all cigarettes at home, at work, and in your car.  Throw away smoking accessories, such as ashtrays and lighters.  Clean your car and make sure to empty the ashtray.  Clean your home, including curtains and carpets.  Tell your family, friends, and coworkers that you are quitting. Support from your loved ones can make quitting easier.  Talk with your health care provider about your options for quitting smoking.  Find out what treatment options are covered by your health insurance.  What strategies can I use to quit smoking?  Talk with your healthcare provider about different strategies to quit smoking. Some strategies  include:  Quitting smoking altogether instead of gradually lessening how much you smoke over a period of time. Research shows that quitting “cold turkey” is more successful than gradually quitting.  Attending in-person counseling to help you build problem-solving skills. You are more likely to have success in quitting if you attend several counseling sessions. Even short sessions of 10 minutes can be effective.  Finding resources and support systems that can help you to quit smoking and remain smoke-free after you quit. These resources are most helpful when you use them often. They can include:  Online chats with a counselor.  Telephone quitlines.  Printed self-help materials.  Support groups or group counseling.  Text messaging programs.  Mobile phone applications.  Taking medicines to help you quit smoking. (If you are pregnant or breastfeeding, talk with your health care provider first.) Some medicines contain nicotine and some do not. Both types of medicines help with cravings, but the medicines that include nicotine help to relieve withdrawal symptoms. Your health care provider may recommend:  Nicotine patches, gum, or lozenges.  Nicotine inhalers or sprays.  Non-nicotine medicine that is taken by mouth.  Talk with your health care provider about combining strategies, such as taking medicines while you are also receiving in-person counseling. Using these two strategies together makes you more likely to succeed in quitting than if you used either strategy on its own.  If you are pregnant or breastfeeding, talk with your health care provider about finding counseling or other support strategies to quit smoking. Do not take medicine to help you quit smoking unless told to do so by your health care provider.  What things can I do to make it easier to quit?  Quitting smoking might feel overwhelming at first, but there is a lot that you can do to make it easier. Take these important actions:  Reach out to your family  and friends and ask that they support and encourage you during this time. Call telephone quitlines, reach out to support groups, or work with a counselor for support.  Ask people who smoke to avoid smoking around you.  Avoid places that trigger you to smoke, such as bars, parties, or smoke-break areas at work.  Spend time around people who do not smoke.  Lessen stress in your life, because stress can be a smoking trigger for some people. To lessen stress, try:  Exercising regularly.  Deep-breathing exercises.  Yoga.  Meditating.  Performing a body scan. This involves closing your eyes, scanning your body from head to toe, and noticing which parts of your body are particularly tense. Purposefully relax the muscles in those areas.  Download or purchase mobile phone or tablet apps (applications) that can help you stick to your quit plan by providing reminders, tips, and encouragement. There are many free apps, such as QuitGuide from the CDC (Centers for Disease Control and Prevention). You can find other support for quitting smoking (smoking cessation) through smokefree.gov and other websites.  How will I feel when I quit smoking?  Within the first 24 hours of quitting smoking, you may start to feel some withdrawal symptoms. These symptoms are usually most noticeable 2-3 days after quitting, but they usually do not last beyond 2-3 weeks. Changes or symptoms that you might experience include:  Mood swings.  Restlessness, anxiety, or irritation.  Difficulty concentrating.  Dizziness.  Strong cravings for sugary foods in addition to nicotine.  Mild weight gain.  Constipation.  Nausea.  Coughing or a sore throat.  Changes in how your medicines work in your body.  A depressed mood.  Difficulty sleeping (insomnia).  After the first 2-3 weeks of quitting, you may start to notice more positive results, such as:  Improved sense of smell and taste.  Decreased coughing and sore throat.  Slower heart rate.  Lower blood  pressure.  Clearer skin.  The ability to breathe more easily.  Fewer sick days.  Quitting smoking is very challenging for most people. Do not get discouraged if you are not successful the first time. Some people need to make many attempts to quit before they achieve long-term success. Do your best to stick to your quit plan, and talk with your health care provider if you have any questions or concerns.  This information is not intended to replace advice given to you by your health care provider. Make sure you discuss any questions you have with your health care provider.  Document Released: 12/12/2002 Document Revised: 08/15/2017 Document Reviewed: 05/03/2016  [employer] Interactive Patient Education © 2017 [employer]

## 2024-01-30 ENCOUNTER — TREATMENT (OUTPATIENT)
Dept: PHYSICAL THERAPY | Facility: CLINIC | Age: 44
End: 2024-01-30
Payer: MEDICAID

## 2024-01-30 DIAGNOSIS — I69.30 H/O: STROKE WITH RESIDUAL EFFECTS: Primary | ICD-10-CM

## 2024-01-30 NOTE — PROGRESS NOTES
Physical Therapy Treatment Note  [address] [place] [address]    Patient: James Taylor Jr.                                                 Visit Date: 2024  :     1980    Referring practitioner:    Casi Zaidi*  Date of Initial Visit:          Type: THERAPY  Noted: 2023    Patient seen for 8 sessions    Visit Diagnoses:    ICD-10-CM ICD-9-CM   1. H/O: stroke with residual effects  I69.30 438.9     SUBJECTIVE     Subjective:Denies falls or new issues still having L LE pain      PAIN: 5/10         OBJECTIVE     Objective     Therapeutic Exercises    04255 Units Comments   Unicam seat#5 res 2 6 min     B LE Shuttle Press 6 bands x 6 min     B  unilateral LE Shuttle Press 4 bands x 6 min each     6 MWT   981 ft   L DF stretches with pink bolster     Timed Minutes 40        Therapy Education/Self Care 29683   Education offered today    Mirae Code    Ongoing HEP   Pec minor/major stretches on wall  -3x20s  4x/wk     Banded hip flex  -R 2x10  4x/wk     Banded ankle DF  -R 2x10  4x/wk     Sit to stands  -2x10  4x/wk   Timed Minutes        Total Timed Treatment:     40   mins  Total Time of Visit:             40   mins         ASSESSMENT/PLAN     GOALS  Goals                                          Progress Note due by 24                                                      Recert due by 24   STG by: 6 weeks  Comments Date Status   Able to stand at least 60 minutes before rest 25 minutes today performing walking with #20 vest and other therex in standing   Ongoing   Increase L hand  strength to within 10lbs of R hand  L 25 hand  R 80   Ongoing   Normalized gait pattern as demonstrated by increased LUE arm swing and no L heel scuffing  Absent L UE swing and decreased L SL,WS, and HS   Ongoing   LTG by: 12 weeks         Improve 6MWT by at least 200 ft  981 ft    Ongoing   Gross left hip MMT 4+/5   Extension and flexion 4-/5, abduction 3+/5 1/23  Ongoing   Able to walk down stairs with reciprocal gait pattern using rail for balance only Step to pattern 1/23  Ongoing   Able to jog for at least 10 minutes  Deferred today 1/23  Ongoing   Independent with HEP for progress strengthening Reinforced today 1/23 Ongoing       Assessment/Plan     ASSESSMENT:   His last session was a progress note; therefore, there were no significant changes in regards to his POC goals. We focused on strength and overall endurance today.    PLAN:     Continue to work on his overall L strength and functional mobility     SIGNATURE: Raimundo Monaco PTA, [place] License #: [id number]  Electronically Signed on 1/30/2024        [address] [place] [address] [place] [address]  [phone]

## 2024-02-06 ENCOUNTER — TREATMENT (OUTPATIENT)
Dept: PHYSICAL THERAPY | Facility: CLINIC | Age: 44
End: 2024-02-06
Payer: MEDICAID

## 2024-02-06 DIAGNOSIS — R26.2 IMPAIRED AMBULATION: ICD-10-CM

## 2024-02-06 DIAGNOSIS — I69.30 H/O: STROKE WITH RESIDUAL EFFECTS: Primary | ICD-10-CM

## 2024-02-06 DIAGNOSIS — M62.81 LEFT-SIDED MUSCLE WEAKNESS: ICD-10-CM

## 2024-02-06 NOTE — PROGRESS NOTES
Physical Therapy Treatment Note  [address] [place] [address]    Patient: James Taylor Jr.                                                 Visit Date: 2024  :     1980    Referring practitioner:    Casi Zaidi*  Date of Initial Visit:          Type: THERAPY  Noted: 2023    Patient seen for 9 sessions    Visit Diagnoses:    ICD-10-CM ICD-9-CM   1. H/O: stroke with residual effects  I69.30 438.9   2. Left-sided muscle weakness  M62.81 728.87   3. Impaired ambulation  R26.2 719.7     SUBJECTIVE     Subjective: Denies new c/c, falls/near falls. He reports he has an apt with his neurologist tomorrow and is supposed to receive a test to assess circulation in his feet d/t c/o N/T in bottom of feet (PM > AM).    PAIN: 4/10 (L leg)     OBJECTIVE     Objective     Therapeutic Exercises    35782 Units Comments   Walking for endurance outside  96%, 93 bpm > 99% 80 bpm; 6 laps around cul-de-sac   STS  x10  Added RIP  for L glut activation   Shuttle press (5 cords)  2 x 10     B unilateral shuttle press (5 cords) 3 x 10 ea         Timed Minutes 30     Therapy Education/Self Care 10223   Education offered today AFO for L    Jeanette Code    Ongoing HEP   Pec minor/major stretches on wall  -3x20s  4x/wk     Banded hip flex  -R 2x10  4x/wk     Banded ankle DF  -R 2x10  4x/wk     Sit to stands  -2x10  4x/wk   Timed Minutes      Total Timed Treatment:     30   mins  Total Time of Visit:             35   mins         ASSESSMENT/PLAN     GOALS  Goals                                      Progress Note due by 24                                                          Recert due by 24   STG by: 6 weeks  Comments Date Status   Able to stand at least 60 minutes before rest 25 minutes today performing walking with #20 vest and other therex in standing   Ongoing   Increase L hand  strength to within 10lbs of R hand  L 25  hand  R 80 1/23  Ongoing   Normalized gait pattern as demonstrated by increased LUE arm swing and no L heel scuffing  Absent L UE swing and decreased L SL,WS, and HS 1/23  Ongoing   LTG by: 12 weeks         Improve 6MWT by at least 200 ft  981 ft  1/30  Ongoing   Gross left hip MMT 4+/5  Extension and flexion 4-/5, abduction 3+/5 1/23  Ongoing   Able to walk down stairs with reciprocal gait pattern using rail for balance only Step to pattern 1/23  Ongoing   Able to jog for at least 10 minutes  Amb 6 laps around cul-de-sac for 10 min without break today, not appropriate for jogging at this time.  2/6  Ongoing   Independent with HEP for progress strengthening Reinforced today 1/23 Ongoing     Assessment/Plan     ASSESSMENT: Patient presents 10 min late to the clinic this session, therefore truncating full tx session. Prioritized endurance today with increased distances and sets. He tolerated ambulating for 10 min without break well, though did demonstrate s/s of increased fatigue by end of session. He may benefit from L AFO to decrease fatigue and fall risk as well as improve gait mechanics.     PLAN: Continue to progress LE strength (L > R) endurance, may consider pursuing L AFO to decrease fall risk and mm fatigue with increased distances.     SIGNATURE: Corazon Diamond PTA, [place] License #: [id number]  Electronically Signed on 2/6/2024        [place] [address]  [phone]

## 2024-02-07 ENCOUNTER — OFFICE VISIT (OUTPATIENT)
Dept: NEUROSURGERY | Facility: CLINIC | Age: 44
End: 2024-02-07
Payer: MEDICAID

## 2024-02-07 VITALS — BODY MASS INDEX: 27.57 KG/M2 | HEIGHT: 70 IN | WEIGHT: 192.6 LBS

## 2024-02-07 DIAGNOSIS — I61.0 NONTRAUMATIC SUBCORTICAL HEMORRHAGE OF CEREBRAL HEMISPHERE, UNSPECIFIED LATERALITY: Primary | ICD-10-CM

## 2024-02-07 DIAGNOSIS — E66.3 OVERWEIGHT WITH BODY MASS INDEX (BMI) OF 27 TO 27.9 IN ADULT: ICD-10-CM

## 2024-02-07 DIAGNOSIS — F17.200 SMOKER: ICD-10-CM

## 2024-02-07 PROCEDURE — 99213 OFFICE O/P EST LOW 20 MIN: CPT | Performed by: NURSE PRACTITIONER

## 2024-02-07 PROCEDURE — 1160F RVW MEDS BY RX/DR IN RCRD: CPT | Performed by: NURSE PRACTITIONER

## 2024-02-07 PROCEDURE — 1159F MED LIST DOCD IN RCRD: CPT | Performed by: NURSE PRACTITIONER

## 2024-02-07 NOTE — PROGRESS NOTES
"    Chief complaint:   Chief Complaint   Patient presents with    Follow-up     Pt reports to office for symtpom recheck pt states still having trouble left side.  Numbness down to foot and in left hand        Subjective     HPI: This is a 43-year-old male gentleman who sustained a right basal ganglia hemorrhagic stroke in March 2023.  He has been working with therapy since his stroke and has made improvements.  At his last office appointment he did feel like the patient had plateaued with his therapy.  He was still complaining of numbness and tingling in his left leg that was uncomfortable at times.  He is not complaining of any back pain.  He is continue to manage his blood pressure with his primary care doctor.  He did still have weakness in his left upper and lower extremity.  He was also noted that he still had an ataxic gait.  He has continued to work with therapy but does also feel like he is kind of plateaued at this time.  He is still having trouble with his left side.  He is going to be obtaining a DARINEL and EMG of the left lower extremity    Review of Systems   Musculoskeletal: Negative.    Neurological:  Positive for weakness and numbness.   All other systems reviewed and are negative.        Objective      Vital Signs  Ht 177.8 cm (70\")   Wt 87.4 kg (192 lb 9.6 oz)   BMI 27.64 kg/m²     Physical Exam  Constitutional:       Appearance: He is well-developed.   HENT:      Head: Normocephalic.   Eyes:      General: No visual field deficit.     Extraocular Movements: EOM normal.      Pupils: Pupils are equal, round, and reactive to light.   Pulmonary:      Effort: Pulmonary effort is normal.   Musculoskeletal:         General: Normal range of motion.      Cervical back: Normal range of motion.   Skin:     General: Skin is warm.   Neurological:      Mental Status: He is alert and oriented to person, place, and time.      GCS: GCS eye subscore is 4. GCS verbal subscore is 5. GCS motor subscore is 6.      " Cranial Nerves: Cranial nerve deficit and facial asymmetry present.      Sensory: No sensory deficit.      Motor: Weakness present.      Gait: Gait abnormal.      Deep Tendon Reflexes: Reflexes are normal and symmetric.      Comments: Slight ataxic gait   Psychiatric:         Speech: Speech normal.         Behavior: Behavior normal.         Thought Content: Thought content normal.         Neurologic Exam     Mental Status   Oriented to person, place, and time.   Attention: normal. Concentration: normal.   Speech: speech is normal   Level of consciousness: alert  Normal comprehension.     Cranial Nerves     CN II   Visual fields full to confrontation.     CN III, IV, VI   Pupils are equal, round, and reactive to light.  Extraocular motions are normal.     CN V   Facial sensation intact.     CN VII   Facial expression full, symmetric.   Left facial weakness: central    CN VIII   CN VIII normal.     CN IX, X   CN IX normal.   CN X normal.     CN XI   CN XI normal.     CN XII   CN XII normal.   Tongue deviation: left    Motor Exam   Muscle bulk: normal  Right arm pronator drift: absent  Left arm pronator drift: present    Strength   Strength 5/5 except as noted.   Left upper extremity 4 out of 5 in all muscle groups    Left dorsiflexion 3- out of 5  Left EHL 2 out of 5  Left plantarflexion, quadriceps, hamstrings, and psoas 5 out of 5     Sensory Exam   Light touch normal.     Gait, Coordination, and Reflexes     Reflexes   Reflexes 2+ except as noted.       Imaging review: No new imaging        Assessment/Plan: The patient is still having issues on the left side.  His left leg does seem to be his biggest issue.  He is here to be gone for DARINEL and EMG of the left lower extremity.  I will follow-up with him after this testing is completed for reevaluation and make recommendation at that time.    Patient is a smoker. Smoking cessation classes given to the patient  The patient's Body mass index is 27.64 kg/m².. BMI is  within normal parameters. No follow-up required.    Diagnoses and all orders for this visit:    1. Nontraumatic subcortical hemorrhage of cerebral hemisphere, unspecified laterality (Primary)    2. Smoker    3. Overweight with body mass index (BMI) of 27 to 27.9 in adult        I discussed the patients findings and my recommendations with patient  Feliciano Boothe, APRN  02/07/24  10:07 CST

## 2024-02-07 NOTE — PATIENT INSTRUCTIONS
"DASH Eating Plan  DASH stands for Dietary Approaches to Stop Hypertension. The DASH eating plan is a healthy eating plan that has been shown to:  Reduce high blood pressure (hypertension).  Reduce your risk for type 2 diabetes, heart disease, and stroke.  Help with weight loss.  What are tips for following this plan?  Reading food labels  Check food labels for the amount of salt (sodium) per serving. Choose foods with less than 5 percent of the Daily Value of sodium. Generally, foods with less than 300 milligrams (mg) of sodium per serving fit into this eating plan.  To find whole grains, look for the word \"whole\" as the first word in the ingredient list.  Shopping  Buy products labeled as \"low-sodium\" or \"no salt added.\"  Buy fresh foods. Avoid canned foods and pre-made or frozen meals.  Cooking  Avoid adding salt when cooking. Use salt-free seasonings or herbs instead of table salt or sea salt. Check with your health care provider or pharmacist before using salt substitutes.  Do not dorman foods. Cook foods using healthy methods such as baking, boiling, grilling, roasting, and broiling instead.  Cook with heart-healthy oils, such as olive, canola, avocado, soybean, or sunflower oil.  Meal planning    Eat a balanced diet that includes:  4 or more servings of fruits and 4 or more servings of vegetables each day. Try to fill one-half of your plate with fruits and vegetables.  6-8 servings of whole grains each day.  Less than 6 oz (170 g) of lean meat, poultry, or fish each day. A 3-oz (85-g) serving of meat is about the same size as a deck of cards. One egg equals 1 oz (28 g).  2-3 servings of low-fat dairy each day. One serving is 1 cup (237 mL).  1 serving of nuts, seeds, or beans 5 times each week.  2-3 servings of heart-healthy fats. Healthy fats called omega-3 fatty acids are found in foods such as walnuts, flaxseeds, fortified milks, and eggs. These fats are also found in cold-water fish, such as sardines, salmon, " and mackerel.  Limit how much you eat of:  Canned or prepackaged foods.  Food that is high in trans fat, such as some fried foods.  Food that is high in saturated fat, such as fatty meat.  Desserts and other sweets, sugary drinks, and other foods with added sugar.  Full-fat dairy products.  Do not salt foods before eating.  Do not eat more than 4 egg yolks a week.  Try to eat at least 2 vegetarian meals a week.  Eat more home-cooked food and less restaurant, buffet, and fast food.  Lifestyle  When eating at a restaurant, ask that your food be prepared with less salt or no salt, if possible.  If you drink alcohol:  Limit how much you use to:  0-1 drink a day for women who are not pregnant.  0-2 drinks a day for men.  Be aware of how much alcohol is in your drink. In the [place] one drink equals one 12 oz bottle of beer (355 mL), one 5 oz glass of wine (148 mL), or one 1½ oz glass of hard liquor (44 mL).  General information  Avoid eating more than 2,300 mg of salt a day. If you have hypertension, you may need to reduce your sodium intake to 1,500 mg a day.  Work with your health care provider to maintain a healthy body weight or to lose weight. Ask what an ideal weight is for you.  Get at least 30 minutes of exercise that causes your heart to beat faster (aerobic exercise) most days of the week. Activities may include walking, swimming, or biking.  Work with your health care provider or dietitian to adjust your eating plan to your individual calorie needs.  What foods should I eat?  Fruits  All fresh, dried, or frozen fruit. Canned fruit in natural juice (without added sugar).  Vegetables  Fresh or frozen vegetables (raw, steamed, roasted, or grilled). Low-sodium or reduced-sodium tomato and vegetable juice. Low-sodium or reduced-sodium tomato sauce and tomato paste. Low-sodium or reduced-sodium canned vegetables.  Grains  Whole-grain or whole-wheat bread. Whole-grain or whole-wheat pasta. Brown rice. Oatmeal. Quinoa.  Bulgur. Whole-grain and low-sodium cereals. Teetee bread. Low-fat, low-sodium crackers. Whole-wheat flour tortillas.  Meats and other proteins  Skinless chicken or turkey. Ground chicken or turkey. Pork with fat trimmed off. Fish and seafood. Egg whites. Dried beans, peas, or lentils. Unsalted nuts, nut butters, and seeds. Unsalted canned beans. Lean cuts of beef with fat trimmed off. Low-sodium, lean precooked or cured meat, such as sausages or meat loaves.  Dairy  Low-fat (1%) or fat-free (skim) milk. Reduced-fat, low-fat, or fat-free cheeses. Nonfat, low-sodium ricotta or cottage cheese. Low-fat or nonfat yogurt. Low-fat, low-sodium cheese.  Fats and oils  Soft margarine without trans fats. Vegetable oil. Reduced-fat, low-fat, or light mayonnaise and salad dressings (reduced-sodium). Canola, safflower, olive, avocado, soybean, and sunflower oils. Avocado.  Seasonings and condiments  Herbs. Spices. Seasoning mixes without salt.  Other foods  Unsalted popcorn and pretzels. Fat-free sweets.  The items listed above may not be a complete list of foods and beverages you can eat. Contact a dietitian for more information.  What foods should I avoid?  Fruits  Canned fruit in a light or heavy syrup. Fried fruit. Fruit in cream or butter sauce.  Vegetables  Creamed or fried vegetables. Vegetables in a cheese sauce. Regular canned vegetables (not low-sodium or reduced-sodium). Regular canned tomato sauce and paste (not low-sodium or reduced-sodium). Regular tomato and vegetable juice (not low-sodium or reduced-sodium). Pickles. Olives.  Grains  Baked goods made with fat, such as croissants, muffins, or some breads. Dry pasta or rice meal packs.  Meats and other proteins  Fatty cuts of meat. Ribs. Fried meat. Cornelius. Bologna, salami, and other precooked or cured meats, such as sausages or meat loaves. Fat from the back of a pig (fatback). Bratwurst. Salted nuts and seeds. Canned beans with added salt. Canned or smoked fish.  Whole eggs or egg yolks. Chicken or turkey with skin.  Dairy  Whole or 2% milk, cream, and half-and-half. Whole or full-fat cream cheese. Whole-fat or sweetened yogurt. Full-fat cheese. Nondairy creamers. Whipped toppings. Processed cheese and cheese spreads.  Fats and oils  Butter. Stick margarine. Lard. Shortening. Ghee. Cornelius fat. Tropical oils, such as coconut, palm kernel, or palm oil.  Seasonings and condiments  Onion salt, garlic salt, seasoned salt, table salt, and sea salt. Worcestershire sauce. Tartar sauce. Barbecue sauce. Teriyaki sauce. Soy sauce, including reduced-sodium. Steak sauce. Canned and packaged gravies. Fish sauce. Oyster sauce. Cocktail sauce. Store-bought horseradish. Ketchup. Mustard. Meat flavorings and tenderizers. Bouillon cubes. Hot sauces. Pre-made or packaged marinades. Pre-made or packaged taco seasonings. Relishes. Regular salad dressings.  Other foods  Salted popcorn and pretzels.  The items listed above may not be a complete list of foods and beverages you should avoid. Contact a dietitian for more information.  Where to find more information  National Heart, Lung, and Blood [place]: www.nhlbi.nih.gov  American Heart Association: www.heart.org  Academy of Nutrition and Dietetics: www.eatright.org  National Kidney Foundation: www.kidney.org  Summary  The DASH eating plan is a healthy eating plan that has been shown to reduce high blood pressure (hypertension). It may also reduce your risk for type 2 diabetes, heart disease, and stroke.  When on the DASH eating plan, aim to eat more fresh fruits and vegetables, whole grains, lean proteins, low-fat dairy, and heart-healthy fats.  With the DASH eating plan, you should limit salt (sodium) intake to 2,300 mg a day. If you have hypertension, you may need to reduce your sodium intake to 1,500 mg a day.  Work with your health care provider or dietitian to adjust your eating plan to your individual calorie needs.  This information is not  "intended to replace advice given to you by your health care provider. Make sure you discuss any questions you have with your health care provider.  Document Revised: 11/20/2020 Document Reviewed: 11/20/2020  [employer] Patient Education © 2023 [employer]  BMI for Adults  Body mass index (BMI) is a number found using a person's weight and height. BMI can help tell how much of a person's weight is made up of fat. BMI does not measure body fat directly. It is used instead of tests that directly measure body fat, which can be difficult and expensive.  What are BMI measurements used for?  BMI is useful to:  Find out if your weight puts you at higher risk for medical problems.  Help recommend changes, such as in diet and exercise. This can help you reach a healthy weight. BMI screening can be done again to see if these changes are working.  How is BMI calculated?  Your height and weight are measured. The BMI is found from those numbers. This can be done with U[place] or metric measurements. Note that charts and online BMI calculators are available to help you find your BMI quickly and easily without doing these calculations.  To calculate your BMI in [place] measurements:  Measure your weight in pounds (lb).  Multiply the number of pounds by 703.  So, for an adult who weighs 150 lb, multiply that number by 703: 150 x 703, which equals 105,450.  Measure your height in inches. Then multiply that number by itself to get a measurement called \"inches squared.\"  So, for an adult who is 70 inches tall, the \"inches squared\" measurement is 70 inches x 70 inches, which equals 4,900 inches squared.  Divide the total from step 2 (number of lb x 703) by the total from step 3 (inches squared): 105,450 ÷ 4,900 = 21.5. This is your BMI.  To calculate your BMI in metric measurements:    Measure your weight in kilograms (kg).  For this example, the weight is 70 kg.  Measure your height in meters (m). Then multiply that number by itself to get a " "measurement called \"meters squared.\"  So, for an adult who is 1.75 m tall, the \"meters squared\" measurement is 1.75 m x 1.75 m, which equals 3.1 meters squared.  Divide the number of kilograms (your weight) by the meters squared number. In this example: 70 ÷ 3.1 = 22.6. This is your BMI.  What do the results mean?  BMI charts are used to see if you are underweight, normal weight, overweight, or obese. The following guidelines will be used:  Underweight: BMI less than 18.5.  Normal weight: BMI between 18.5 and 24.9.  Overweight: BMI between 25 and 29.9.  Obese: BMI of 30 or above.  BMI is a tool and cannot diagnose a condition. Talk with your health care provider about what your BMI means for you. Keep these notes in mind:  Weight includes fat and muscle. Someone with a muscular build, such as an athlete, may have a BMI that is higher than 24.9. In cases like these, BMI is not a correct measure of body fat.  If you have a BMI of 25 or higher, your provider may need to do more testing to find out if excess body fat is the cause.  BMI is measured the same way for males and females. Females usually have more body fat than males of the same height and weight.  Where to find more information  For more information about BMI, including tools to quickly find your BMI, go to:  Centers for Disease Control and Prevention: cdc.gov  American Heart Association: heart.org  National Heart, Lung, and Blood [place]: nhlbi.nih.gov  This information is not intended to replace advice given to you by your health care provider. Make sure you discuss any questions you have with your health care provider.  Document Revised: 09/07/2023 Document Reviewed: 08/31/2023  Elsevier Patient Education © 2023 [employer].    "

## 2024-02-13 ENCOUNTER — TELEPHONE (OUTPATIENT)
Dept: PHYSICAL THERAPY | Facility: OTHER | Age: 44
End: 2024-02-13
Payer: MEDICAID

## 2024-02-20 ENCOUNTER — TREATMENT (OUTPATIENT)
Dept: PHYSICAL THERAPY | Facility: CLINIC | Age: 44
End: 2024-02-20
Payer: MEDICAID

## 2024-02-20 DIAGNOSIS — R26.2 IMPAIRED AMBULATION: ICD-10-CM

## 2024-02-20 DIAGNOSIS — M62.81 LEFT-SIDED MUSCLE WEAKNESS: ICD-10-CM

## 2024-02-20 DIAGNOSIS — I69.30 H/O: STROKE WITH RESIDUAL EFFECTS: Primary | ICD-10-CM

## 2024-02-20 NOTE — Clinical Note
PROGRESS NOTE -- contin 1-2x/week -- improving -- compliant with HEP as he remembers it, bolstered with medbridge code today

## 2024-02-20 NOTE — PROGRESS NOTES
"                                                                Physical Therapy Treatment Note and 30 Day Progress Note  [address] [place] [address]    Patient: James Taylor Jr.                                                 Visit Date: 2024  :     1980    Referring practitioner:    Casi Zaidi*  Date of Initial Visit:          Type: THERAPY  Noted: 2023    Patient seen for 10 sessions    Visit Diagnoses:    ICD-10-CM ICD-9-CM   1. H/O: stroke with residual effects  I69.30 438.9   2. Impaired ambulation  R26.2 719.7   3. Left-sided muscle weakness  M62.81 728.87     SUBJECTIVE     Subjective: Denies falls/near falls. Reports he still has numbness in L LE. He reports he has an NCV scheduled for 3/6/2024. He reports he feels about 20% improved with his walking, \"Just can't get rid of that limp.\"     PAIN: 4/10 (L leg)     OBJECTIVE     Objective     Therapeutic Activities    04507 Comments   6 MWT outside around cul-de-sac See goals table   Stair navigation  See goals       Timed Minutes 15     Gait Training          99380   Task/Terrain Asst AD Comments   Amb lap around clinic with ace wrap for DF assist sup N/A Decreased WS onto L LE remain, though increased endurance and decreased fall risk.   Gait analysis   See goals         Timed Minutes 10     Therapeutic Exercises    12333 Units Comments   B hip MMT  See goals    Seated toe raises   See goals   STS from table  D/C'd d/t compensations   Glut bridges, supine x10    Seated march x10    Bolstered and reviewed HEP          Timed Minutes 10     Therapy Education/Self Care [id number]   Education offered today AFO for L, HEP, POC   Medbride Code [id number]   Ongoing HEP   Date: 2024  Prepared by: Corazon Diamond    Exercises  - Putty Squeezes  - 1-2 x daily - 7 x weekly - 2 sets - 10 reps  - Doorway Pec Stretch at 90 Degrees Abduction  - 1-2 x daily - 7 x weekly - 2 sets - 10 reps  - Seated March  - 1-2 x daily - 7 x weekly - 2 sets - " 10 reps  - Beginner Bridge  - 1-2 x daily - 7 x weekly - 2 sets - 10 reps  - Seated Toe Raise  - 1-2 x daily - 7 x weekly - 2 sets - 10 reps   Timed Minutes 10     Total Timed Treatment:     45  mins  Total Time of Visit:             45   mins         ASSESSMENT/PLAN     GOALS  Goals                                      Progress Note due by 3/21/24                                                          Recert due by 4/22/24   STG by: 6 weeks  Comments Date Status   Able to stand at least 60 minutes before rest Able to stand for 10-15 min before needing seated rest break.  2/20  Ongoing   Increase L hand  strength to within 10lbs of R hand  L 25 hand  R 80 on 1/23/24  L 5 lbs R 40 lbs on 2/20/2024 2/20  Ongoing   Normalized gait pattern as demonstrated by increased LUE arm swing and no L heel scuffing  Absent L UE swing and decreased L SL,WS, and HS on 1/23/24  Decreased WS onto L LE remain, though increased endurance and decreased fall risk with use of ace wrap for DF assist 2/20  Ongoing   LTG by: 12 weeks         Improve 6MWT by at least 200 ft  954 ft 7 in on 11/8/2023  981 ft on 1/23/2024  1312 ft on 2/20/2024 (inc by 358 ft) 2/20 MET   Gross left hip MMT 4+/5  Ext and flex 4-/5, abd 3+/5 on 1/23/24  Flex: 4/5 3+/5 R ABD 3-/5 L glute 3-/5 R glute L ABD 4/5 2/20  Ongoing   Able to walk down stairs with reciprocal gait pattern using rail for balance only Ascend: reciprocal step  Descend: every other step was step-to though alternating reciprocal, HR for balance only  2/20 improving   Able to jog for at least 10 minutes  Amb >1300 ft in 6 min on incline outside today, deferred jogging not yet appropriate 2/20  Ongoing   Independent with HEP for progress strengthening Pt reports he will walk as much as he can.  2/20 Ongoing     Assessment/Plan     ASSESSMENT: He reports feeling about 20% improvements since his initial evaluation. He met one of his goals today for 6 MWT and is improving with his endurance. He  continues to require B hip strength and  strength. He would likely benefit from L AFO to assist with DF and decrease fatigue and therefore decrease fall risk and was encouraged to contact his physician to begin this process. He would benefit from continued skilled PT to address these deficits.     PLAN: Continue to progress LE strength (L > R), f/u on L AFO to decrease fall risk and mm fatigue with increased distances. Continue to progress and bolster HEP as appropriate.     SIGNATURE: Corazon Diamond PTA, [place] License #: [id number]  Electronically Signed on 2/20/2024        [address] [place] [address] [place] [address]  [phone]

## 2024-02-21 NOTE — PROGRESS NOTES
Progress Note Addendum      Patient: James Taylor Jr.           : 1980  Visit Date: 2024  Referring practitioner: Casi Zaidi*  Date of Initial Visit: Type: THERAPY  Noted: 2023  Patient seen for 10 sessions  Visit Diagnoses:    ICD-10-CM ICD-9-CM   1. H/O: stroke with residual effects  I69.30 438.9   2. Impaired ambulation  R26.2 719.7   3. Left-sided muscle weakness  M62.81 728.87          Clinical Progress: improved  Home Program Compliance: Yes  Progress toward previous goals: Partially Met  Prognosis to achieve goals: good    Objective   See PTA note for goals.    Assessment & Plan       Assessment  Impairments: abnormal coordination, abnormal gait, abnormal muscle firing, abnormal muscle tone, abnormal or restricted ROM, activity intolerance, impaired physical strength and lacks appropriate home exercise program   Functional limitations: carrying objects, lifting, walking, pulling, pushing, standing and reaching overhead     Plan  Therapy options: will be seen for skilled therapy services  Planned modality interventions: dry needling and low level laser therapy  Planned therapy interventions: abdominal trunk stabilization, ADL retraining, balance/weight-bearing training, body mechanics training, fine motor coordination training, flexibility, functional ROM exercises, gait training, home exercise program, IADL retraining, joint mobilization, neuromuscular re-education, manual therapy, motor coordination training, soft tissue mobilization, strengthening, stretching, therapeutic activities and transfer training  Frequency: 2x week  Duration in weeks: 12  Treatment plan discussed with: PTA        I reviewed the treatment and goals with Corazon Diamond PTA and agree with the POC.    SIGNATURE: Vidya De La Garza PT DPT, License #: [id number]    Electronically Signed on 2024

## 2024-02-27 ENCOUNTER — TREATMENT (OUTPATIENT)
Dept: PHYSICAL THERAPY | Facility: CLINIC | Age: 44
End: 2024-02-27
Payer: MEDICAID

## 2024-02-27 DIAGNOSIS — I69.30 H/O: STROKE WITH RESIDUAL EFFECTS: Primary | ICD-10-CM

## 2024-02-27 DIAGNOSIS — R26.2 IMPAIRED AMBULATION: ICD-10-CM

## 2024-02-27 DIAGNOSIS — M62.81 LEFT-SIDED MUSCLE WEAKNESS: ICD-10-CM

## 2024-02-27 NOTE — PROGRESS NOTES
Physical Therapy Treatment Note  [address] [place] [address]    Patient: James Taylor Jr.                                                 Visit Date: 2024  :     1980    Referring practitioner:    Casi Zaidi*  Date of Initial Visit:          Type: THERAPY  Noted: 2023    Patient seen for 11 sessions    Visit Diagnoses:    ICD-10-CM ICD-9-CM   1. H/O: stroke with residual effects  I69.30 438.9   2. Impaired ambulation  R26.2 719.7   3. Left-sided muscle weakness  M62.81 728.87     SUBJECTIVE     Subjective: He reports his pain and balance is worse in the AM.     PAIN: 4-5/10 (L leg)     OBJECTIVE     Objective     Neuromuscular Reeducation     97749 Comments   B SLS toe taps on curb outside  2 x 10   Standing marches  Attempted -- pain in L foot    Walking outside over various terrains Up/down hill, over tree roots, bckwd up grassy hill with tree roots and water drains. Lateral walking down hill with 180 deg turn and other LE leading residential down.        Timed Minutes 15     Therapeutic Exercises    09791 Units Comments   B step ups on curb outside 2 x 10    B lateral step ups on curb outside 2 x 10  Rest after this d/t increased LE pain and fatigue   Resisted lateral stepping against RIP  2 x 10     Walking for endurance around cul-de-sac outside  x5 No rest breaks   Seated marches  x15    Finalized comprehensive HEP and reviewed with pt  See education table        Timed Minutes 30     Therapy Education/Self Care [id number]   Education offered today    [facility] Code [id number]   Ongoing HEP   Date: 2024  Prepared by: Corazon Diamond    Exercises  - Putty Squeezes  - 1-2 x daily - 7 x weekly - 2 sets - 10 reps  - Doorway Pec Stretch at 90 Degrees Abduction  - 1-2 x daily - 7 x weekly - 2 sets - 10 reps  - Seated March  - 1-2 x daily - 7 x weekly - 2 sets - 10 reps  - Beginner Bridge  - 1-2 x daily - 7 x weekly - 2  sets - 10 reps  - Step Up  - 1-2 x daily - 7 x weekly - 2 sets - 10 reps  - Standing Toe Raises at Chair  - 1-2 x daily - 7 x weekly - 2 sets - 10 reps   Timed Minutes      Total Timed Treatment:     45  mins  Total Time of Visit:             45   mins         ASSESSMENT/PLAN     GOALS  Goals                                      Progress Note due by 3/21/24                                                          Recert due by 4/22/24   STG by: 6 weeks  Comments Date Status   Able to stand at least 60 minutes before rest Several rest breaks needed today, espcially >10 min  2/27  Ongoing   Increase L hand  strength to within 10lbs of R hand  L 25 hand  R 80 on 1/23/24  L 5 lbs R 40 lbs on 2/20/2024 2/20  Ongoing   Normalized gait pattern as demonstrated by increased LUE arm swing and no L heel scuffing  Absent L UE swing and decreased L SL,WS, and HS on 1/23/24  Decreased WS onto L LE remain, though increased endurance and decreased fall risk with use of ace wrap for DF assist 2/20  Ongoing   LTG by: 12 weeks         Improve 6MWT by at least 200 ft  954 ft 7 in on 11/8/2023  981 ft on 1/23/2024  1312 ft on 2/20/2024 (inc by 358 ft) 2/20 MET   Gross left hip MMT 4+/5  Ext and flex 4-/5, abd 3+/5 on 1/23/24  Flex: 4/5 3+/5 R ABD 3-/5 L glute 3-/5 R glute L ABD 4/5 2/20  Ongoing   Able to walk down stairs with reciprocal gait pattern using rail for balance only Ascend: reciprocal step  Descend: every other step was step-to though alternating reciprocal, HR for balance only  2/20 improving   Able to jog for at least 10 minutes  Amb >1300 ft in 6 min on incline outside today, deferred jogging not yet appropriate 2/20  Ongoing   Independent with HEP for progress strengthening Pt reports he will walk as much as he can.  2/20 Ongoing     Assessment/Plan     ASSESSMENT: Patient fatigued fairly quick today with exercises and required several seated rest breaks. He reports he has requested an AFO from his physician and was  told a referral would be provided, but has not yet heard back from this. He was encouraged to f/u with his physician to see if there was anything else he needed to do to accomplish this. HEP was bolstered as he has only one more approved visit from insurance. He did not demonstrate any LOB with any balance tasks today, though fatigue was more of an issue. D/t this, gait mechanics were less controlled, thus increasing his fall risk.     PLAN: Place POC on hold and cancel all remaining apt, if appropriate. Pt to pursue AFO and may return under new referral for gait and balance deficits.     SIGNATURE: Corazon Diamond PTA, [place] License #: [id number]  Electronically Signed on 2/27/2024        [address] [place] [address]  [phone]

## 2024-03-05 ENCOUNTER — TELEPHONE (OUTPATIENT)
Dept: FAMILY MEDICINE CLINIC | Facility: CLINIC | Age: 44
End: 2024-03-05

## 2024-03-05 ENCOUNTER — TREATMENT (OUTPATIENT)
Dept: PHYSICAL THERAPY | Facility: CLINIC | Age: 44
End: 2024-03-05
Payer: MEDICAID

## 2024-03-05 DIAGNOSIS — I69.30 H/O: STROKE WITH RESIDUAL EFFECTS: Primary | ICD-10-CM

## 2024-03-05 DIAGNOSIS — M62.81 LEFT-SIDED MUSCLE WEAKNESS: ICD-10-CM

## 2024-03-05 DIAGNOSIS — R26.2 IMPAIRED AMBULATION: ICD-10-CM

## 2024-03-05 NOTE — TELEPHONE ENCOUNTER
Caller: Sidra Tompkins    Relationship: Emergency Contact    Best call back number: [phone]     What is the best time to reach you: ANYTIME    Who are you requesting to speak with (clinical staff, provider,  specific staff member): CLINICAL    What was the call regarding: PATIENT GIRL FRIEND STATES SHE WOULD LIKE A CALL BACK REGARDING PATIENT REFERRALS ORDERS.       Arava Counseling:  Patient counseled regarding adverse effects of Arava including but not limited to nausea, vomiting, abnormalities in liver function tests. Patients may develop mouth sores, rash, diarrhea, and abnormalities in blood counts. The patient understands that monitoring is required including LFTs and blood counts.  There is a rare possibility of scarring of the liver and lung problems that can occur when taking methotrexate. Persistent nausea, loss of appetite, pale stools, dark urine, cough, and shortness of breath should be reported immediately. Patient advised to discontinue Arava treatment and consult with a physician prior to attempting conception. The patient will have to undergo a treatment to eliminate Arava from the body prior to conception.

## 2024-03-05 NOTE — PROGRESS NOTES
Physical Therapy Treatment Note  [address] [place] [address]    Patient: James Taylor Jr.                                                 Visit Date: 3/5/2024  :     1980    Referring practitioner:    Casi Zaidi*  Date of Initial Visit:          Type: THERAPY  Noted: 2023    Patient seen for 12 sessions    Visit Diagnoses:    ICD-10-CM ICD-9-CM   1. H/O: stroke with residual effects  I69.30 438.9   2. Impaired ambulation  R26.2 719.7   3. Left-sided muscle weakness  M62.81 728.87     SUBJECTIVE     Subjective:He reports L LE pain      PAIN: 4/10         OBJECTIVE     Objective     Therapeutic Exercises    68851 Units Comments   B LE Shuttle Press with eccentric focus 7 bands x 5 min     B unilateral LE Shuttle Press with eccentric focus 4 bands x 5 min each     ML with TRX Rip Sherando      UBE res 2 seat#6 3 min fwd/bwd                    Timed Minutes 30      Neuromuscular Reeducation     73959 Comments   Standing on Fitter L 1 nuts and bolts, shape match.    Standing on theradisks multi-size bead pickups                Timed Minutes 23        Therapy Education/Self Care 57035   Education offered today    [employer] Code    Ongoing [facility]   [id number]   Date: 2024  Prepared by: Corazon Diamond     Exercises  - Putty Squeezes  - 1-2 x daily - 7 x weekly - 2 sets - 10 reps  - Doorway Pec Stretch at 90 Degrees Abduction  - 1-2 x daily - 7 x weekly - 2 sets - 10 reps  - Seated March  - 1-2 x daily - 7 x weekly - 2 sets - 10 reps  - Beginner Bridge  - 1-2 x daily - 7 x weekly - 2 sets - 10 reps  - Step Up  - 1-2 x daily - 7 x weekly - 2 sets - 10 reps  - Standing Toe Raises at Chair  - 1-2 x daily - 7 x weekly - 2 sets - 10 reps   Timed Minutes        Total Timed Treatment:     53   mins  Total Time of Visit:             53   mins         ASSESSMENT/PLAN     GOALS  Goals                                      Progress Note due by  3/21/24                                                          Recert due by 4/22/24   STG by: 6 weeks  Comments Date Status   Able to stand at least 60 minutes before rest Several rest breaks needed today, espcially >10 min  2/27  Ongoing   Increase L hand  strength to within 10lbs of R hand  L 25 hand  R 80 on 1/23/24  L 5 lbs R 40 lbs on 2/20/2024 2/20  Ongoing   Normalized gait pattern as demonstrated by increased LUE arm swing and no L heel scuffing  Absent L UE swing and decreased L SL,WS, and HS on 1/23/24  Decreased WS onto L LE remain, though increased endurance and decreased fall risk with use of ace wrap for DF assist 2/20  Ongoing   LTG by: 12 weeks         Improve 6MWT by at least 200 ft  954 ft 7 in on 11/8/2023  981 ft on 1/23/2024  1312 ft on 2/20/2024 (inc by 358 ft) 2/20 MET   Gross left hip MMT 4+/5  Ext and flex 4-/5, abd 3+/5 on 1/23/24  Flex: 4/5 3+/5 R ABD 3-/5 L glute 3-/5 R glute L ABD 4/5 2/20  Ongoing   Able to walk down stairs with reciprocal gait pattern using rail for balance only Ascend: reciprocal step  Descend: every other step was step-to though alternating reciprocal, HR for balance only  2/20 improving   Able to jog for at least 10 minutes  Amb >1300 ft in 6 min on incline outside today, deferred jogging not yet appropriate 2/20  Ongoing   Independent with HEP for progress strengthening Reinforced today 3/5 Ongoing       Assessment/Plan     ASSESSMENT:   He hasn't heard anything about his AFO and I checked current orders in the system and there are none entered at this time. I advised him to contact his PCP and speak with them about this.     PLAN:   I completed an authorization survey and we will need to follow up in regards to his authorizations.     SIGNATURE: Raimundo Monaco PTA, [place] License #: [id number]  Electronically Signed on 3/5/2024        [address] [place] [address]. [address]  [phone]

## 2024-03-06 ENCOUNTER — HOSPITAL ENCOUNTER (OUTPATIENT)
Dept: ULTRASOUND IMAGING | Facility: HOSPITAL | Age: 44
Discharge: HOME | End: 2024-03-06
Payer: MEDICAID

## 2024-03-06 ENCOUNTER — HOSPITAL ENCOUNTER (OUTPATIENT)
Dept: NEUROLOGY | Facility: HOSPITAL | Age: 44
Discharge: HOME | End: 2024-03-06
Payer: MEDICAID

## 2024-03-06 DIAGNOSIS — G62.9 NEUROPATHY: ICD-10-CM

## 2024-03-06 DIAGNOSIS — R09.89 DECREASED PEDAL PULSES: ICD-10-CM

## 2024-03-06 PROCEDURE — 95885 MUSC TST DONE W/NERV TST LIM: CPT

## 2024-03-06 PROCEDURE — 93923 UPR/LXTR ART STDY 3+ LVLS: CPT

## 2024-03-06 PROCEDURE — 95909 NRV CNDJ TST 5-6 STUDIES: CPT

## 2024-03-14 ENCOUNTER — OFFICE VISIT (OUTPATIENT)
Dept: NEUROSURGERY | Facility: CLINIC | Age: 44
End: 2024-03-14
Payer: MEDICAID

## 2024-03-14 ENCOUNTER — HOSPITAL ENCOUNTER (OUTPATIENT)
Dept: GENERAL RADIOLOGY | Facility: HOSPITAL | Age: 44
Discharge: HOME | End: 2024-03-14
Admitting: NURSE PRACTITIONER
Payer: MEDICAID

## 2024-03-14 VITALS — HEIGHT: 70 IN | BODY MASS INDEX: 27.49 KG/M2 | WEIGHT: 192 LBS

## 2024-03-14 DIAGNOSIS — I61.0 NONTRAUMATIC SUBCORTICAL HEMORRHAGE OF CEREBRAL HEMISPHERE, UNSPECIFIED LATERALITY: Primary | ICD-10-CM

## 2024-03-14 DIAGNOSIS — G89.29 CHRONIC LEFT SHOULDER PAIN: ICD-10-CM

## 2024-03-14 DIAGNOSIS — M25.512 CHRONIC LEFT SHOULDER PAIN: ICD-10-CM

## 2024-03-14 DIAGNOSIS — R29.898 HAND WEAKNESS: ICD-10-CM

## 2024-03-14 DIAGNOSIS — F17.200 SMOKER: ICD-10-CM

## 2024-03-14 DIAGNOSIS — E66.3 OVERWEIGHT WITH BODY MASS INDEX (BMI) OF 27 TO 27.9 IN ADULT: ICD-10-CM

## 2024-03-14 PROCEDURE — 73030 X-RAY EXAM OF SHOULDER: CPT

## 2024-03-14 PROCEDURE — 1160F RVW MEDS BY RX/DR IN RCRD: CPT | Performed by: NURSE PRACTITIONER

## 2024-03-14 PROCEDURE — 1159F MED LIST DOCD IN RCRD: CPT | Performed by: NURSE PRACTITIONER

## 2024-03-14 PROCEDURE — 99213 OFFICE O/P EST LOW 20 MIN: CPT | Performed by: NURSE PRACTITIONER

## 2024-03-14 NOTE — PROGRESS NOTES
"    Chief complaint:   Chief Complaint   Patient presents with    Nontraumatic subcortical hemorrhage of cerebral hemisphere,     Pt here for f/u. Pt states since last office visit he's had some weakness in his L hand and foot.         Subjective     HPI: This is a 43-year-old male gentleman who sustained a right basal ganglia hemorrhagic CVA in March 2023.  He has been working with physical therapy to try and help improve the symptoms that he was having on the left side.  The patient was still complaining of numbness and tingling at his last appointment in his left lower extremity along with weakness in his left upper and lower extremity.  He was also still having difficulty with of his gait.  He comes in today after having an EMG and ABIs completed.  He says that he is still complaining of weakness and some trouble with his gait and numbness.  He does not like is made any progress since he was here at his last appointment.    Review of Systems   Musculoskeletal:  Positive for arthralgias and myalgias.   Neurological:  Positive for weakness and numbness.         Objective      Vital Signs  Ht 177.8 cm (70\")   Wt 87.1 kg (192 lb)   BMI 27.55 kg/m²     Physical Exam  Constitutional:       Appearance: He is well-developed.   HENT:      Head: Normocephalic.   Eyes:      Extraocular Movements: EOM normal.      Pupils: Pupils are equal, round, and reactive to light.   Pulmonary:      Effort: Pulmonary effort is normal.   Musculoskeletal:         General: Normal range of motion.      Cervical back: Normal range of motion.   Skin:     General: Skin is warm.   Neurological:      Mental Status: He is alert and oriented to person, place, and time.      GCS: GCS eye subscore is 4. GCS verbal subscore is 5. GCS motor subscore is 6.      Cranial Nerves: No cranial nerve deficit.      Sensory: No sensory deficit.      Motor: Motor strength is normal.     Gait: Gait is intact. Gait normal.      Deep Tendon Reflexes: Reflexes are " normal and symmetric.   Psychiatric:         Speech: Speech normal.         Behavior: Behavior normal.         Thought Content: Thought content normal.         Neurologic Exam     Mental Status   Oriented to person, place, and time.   Attention: normal. Concentration: normal.   Speech: speech is normal   Level of consciousness: alert  Normal comprehension.     Cranial Nerves     CN II   Visual fields full to confrontation.     CN III, IV, VI   Pupils are equal, round, and reactive to light.  Extraocular motions are normal.     CN V   Facial sensation intact.     CN VII   Facial expression full, symmetric.     CN VIII   CN VIII normal.     CN IX, X   CN IX normal.   CN X normal.     CN XI   CN XI normal.     CN XII   CN XII normal.     Motor Exam   Muscle bulk: normal    Strength   Strength 5/5 throughout.     Sensory Exam   Light touch normal.     Gait, Coordination, and Reflexes     Gait  Gait: normal    Reflexes   Reflexes 2+ except as noted.       Imaging review: EMG of the lower extremity was normal.    ABIs were normal        Assessment/Plan: The test that was done on the patient was normal.  Unfortunately this is still likely related to the stroke that he sustained a year ago.  Although he has made a lot of improvements since his stroke he still is dealing with some of the deficits related to the stroke.  He would like to continue with therapy at this time.  I will send another order for therapy over to [facility].  I will also send him for an x-ray of his left shoulder.  At this time we will need to see him on an as-needed basis.  He was told to call us with any further problems or concerns.    Patient is a nonsmoker  The patient's Body mass index is 27.55 kg/m².. BMI is above normal parameters. Recommendations include: educational material and nutrition counseling    Diagnoses and all orders for this visit:    1. Nontraumatic subcortical hemorrhage of cerebral hemisphere, unspecified laterality (Primary)  -      Ambulatory Referral to Physical Therapy Evaluate and treat, Neuro; Strengthening, ROM, Stretching; Full weight bearing    2. Chronic left shoulder pain  -     XR shoulder 2+ vw left; Future    3. Hand weakness    4. Overweight with body mass index (BMI) of 27 to 27.9 in adult    5. Smoker        I discussed the patients findings and my recommendations with patient  Feliciano Boothe, NAM  03/14/24  16:03 CDT

## 2024-03-15 DIAGNOSIS — I10 PRIMARY HYPERTENSION: Primary | ICD-10-CM

## 2024-03-15 NOTE — TELEPHONE ENCOUNTER
Rx Refill Note  Requested Prescriptions     Pending Prescriptions Disp Refills    metoprolol succinate XL (TOPROL-XL) 50 MG 24 hr tablet [Pharmacy Med Name: METOPROLOL SUCC ER 50 MG TAB] 90 tablet 0     Sig: TAKE 1 TABLET BY MOUTH EVERY DAY      Last office visit with prescribing clinician: 1/17/2024   Last telemedicine visit with prescribing clinician: Visit date not found   Next office visit with prescribing clinician: 4/9/2024                         Would you like a call back once the refill request has been completed: [] Yes [] No    If the office needs to give you a call back, can they leave a voicemail: [] Yes [] No    Wendi Lara MA  03/15/24, 08:43 CDT

## 2024-03-18 ENCOUNTER — TELEPHONE (OUTPATIENT)
Dept: FAMILY MEDICINE CLINIC | Facility: CLINIC | Age: 44
End: 2024-03-18

## 2024-03-18 NOTE — TELEPHONE ENCOUNTER
Caller: James Taylor Jr.    Relationship: Self  [phone] OR MAY LEAVE A MESSAGE  [phone]  Best call back number:      Caller requesting test results: XR SHOULDER, ORDER BY YANDEL BURRIS.  UNABLE TO WT.     What test was performed: XR SHOULDER, WANTS RESULTS

## 2024-03-19 RX ORDER — METOPROLOL SUCCINATE 50 MG/1
50 TABLET, EXTENDED RELEASE ORAL DAILY
Qty: 90 TABLET | Refills: 0 | Status: SHIPPED | OUTPATIENT
Start: 2024-03-19

## 2024-03-20 ENCOUNTER — TELEPHONE (OUTPATIENT)
Dept: NEUROSURGERY | Facility: CLINIC | Age: 44
End: 2024-03-20
Payer: MEDICAID

## 2024-03-20 DIAGNOSIS — G89.29 CHRONIC LEFT SHOULDER PAIN: Primary | ICD-10-CM

## 2024-03-20 DIAGNOSIS — M25.512 CHRONIC LEFT SHOULDER PAIN: Primary | ICD-10-CM

## 2024-03-20 NOTE — TELEPHONE ENCOUNTER
----- Message from NAM Licona sent at 3/18/2024  7:05 AM CDT -----  He needs to go to Ortho for shoulder, has degeneration and spurring  ----- Message -----  From: Interface, [employer] [place] [employer]  Sent: 3/14/2024   4:21 PM CDT  To: NAM Licona

## 2024-03-20 NOTE — TELEPHONE ENCOUNTER
Placed a call to inform pt of recommendations and imaging results. Pt was not around at the time pts S/O listed as an A/C stated [facility].

## 2024-03-26 ENCOUNTER — TREATMENT (OUTPATIENT)
Dept: PHYSICAL THERAPY | Facility: CLINIC | Age: 44
End: 2024-03-26
Payer: MEDICAID

## 2024-03-26 DIAGNOSIS — I69.30 H/O: STROKE WITH RESIDUAL EFFECTS: Primary | ICD-10-CM

## 2024-03-26 DIAGNOSIS — R26.2 IMPAIRED AMBULATION: ICD-10-CM

## 2024-03-26 DIAGNOSIS — M62.81 LEFT-SIDED MUSCLE WEAKNESS: ICD-10-CM

## 2024-03-26 NOTE — PROGRESS NOTES
Physical Therapy Initial Evaluation and Plan of Care  [address] [place] [address]    Patient: James Taylor Jr.   : 1980  Referring practitioner: NAM Licona  Date of Initial Visit: 3/26/2024  Today's Date: 3/26/2024  Patient seen for 1 sessions    Visit Diagnoses:    ICD-10-CM ICD-9-CM   1. H/O: stroke with residual effects  I69.30 438.9   2. Impaired ambulation  R26.2 719.7   3. Left-sided muscle weakness  M62.81 728.87     Past Medical History:   Diagnosis Date    Asthma     Headache     Hypertension     Stroke      Past Surgical History:   Procedure Laterality Date    COLONOSCOPY N/A 2023    normal exam    ENDOSCOPY N/A 2023    Erythematous mucosa in the antrum bx normal, otherwise normal    UPPER ENDOSCOPIC ULTRASOUND W/ FNA  2024    No obvious structural changes to cause pancreatitis. - Dr. Saravia         SUBJECTIVE     Subjective Evaluation    History of Present Illness  Mechanism of injury: Pt returns to [facility] for a second round of rehab following stroke last year, during his first round of rehab, he ran out of insurance. He continues to have weakness in left upper and lower extremities, as well as the numbness in his L hand and L leg from mid shin down. He has difficulty opening jars and carrying heavy objects for any amount of time. He is still having to descend stairs sideways.     Pain  No pain reported    Social Support  Lives with: parents    Hand dominance: right    Treatments  Previous treatment: physical therapy  Patient Goals  Patient goals for therapy: improved balance and increased strength  Patient goal: using L hand more         Outcome Measure:   Quick DASH  PT G-Codes  Outcome Measure Options: FGA (Functional Gait Assessment), Quick DASH  Quick DASH Score: 68.18  FGA Total Score: 21    OBJECTIVE     Objective          UE MMT   SHOULDER Strength L Strength R   flexion 3+* 4+   extension 4 4   ABD  3+ 4   ADD     ER     IR          ELBOW     flexion 4+ 5   extension 4 4+   pronation     supination          WRIST     flexion 4+ 4+   extension 4 4+        *pain    LE MMT   HIP Strength L Strength R   flexion 4+ 4   extension 4- 4   ABD 4 4+   ADD      IR     ER          KNEE     flexion 4 4+   extension 4+ 5         ANKLE     dorsiflexion 4 in available range 5   plantarflexion     eversion     inversion     *pain    Lacks 22 deg DF on L    Male  strength (pounds)  AGE Right Hand RH Norms Left Hand LH Norms   40-44 54 lbs 117+20.7 18 lbs 112+18.7   (JAZLYN Liao et al; Hand Dynometer: Effects of trials and sessions.  Perpetual and Motor Skills 61:195-8, 1985)  Key  * = Dominant hand  > = Intervention      Therapy Education/Self Care 20647   Education offered today POC moving forward   [employer] Code    Ongoing HEP   Access Code: [email or web address]  URL: [email or web address]  Date: 03/26/2024  Prepared by: Vidya De La Garza    Exercises  - Standing Hip Abduction with Counter Support  - 1 x daily - 7 x weekly - 3 sets - 10 reps  - Standing Hip Extension with Counter Support  - 1 x daily - 7 x weekly - 3 sets - 10 reps  - Marching with Resistance  - 1 x daily - 7 x weekly - 3 sets - 10 reps  - Mini Squat with Counter Support  - 1 x daily - 7 x weekly - 3 sets - 10 reps  - Seated Toe Raise  - 1 x daily - 7 x weekly - 3 sets - 10 reps - 5 hold   Timed Minutes        Total Timed Treatment:     0   mins  Total Time of Visit:            45   mins    ASSESSMENT/PLAN     GOALS:  Goals                                      Progress Note due by 3/21/24                                                          Recert due by 4/22/24   STG by: 6 weeks  Comments Date Status   Able to stand at least 30 minutes before rest      Increase L hand  strength to within 10lbs of R hand       Normalized gait pattern as demonstrated by increased LUE arm swing and no L heel scuffing       LTG by: 12 weeks      Improve FGA to 25/30 or greater to  demonstrate decreased fall risk       Gross left hip MMT 4+/5       Able to walk down stairs with reciprocal gait pattern using rail for balance only      QuickDASH score improved by at least 10 points      Independent with [facility] for progress strengthening          Assessment & Plan       Assessment  Impairments: abnormal coordination, abnormal gait, abnormal muscle firing, abnormal muscle tone, abnormal or restricted ROM, activity intolerance, impaired physical strength and lacks appropriate home exercise program   Functional limitations: carrying objects, lifting, walking, pulling, pushing, standing, reaching behind back and reaching overhead   Assessment details: James is a 44 y/o male presenting with residual left sided deficits resulting from a stroke in March of 2023. He demonstrates weakness throughout his L UE and LE.At this time, his primary complaint is lack of function in his left arm, as he is currently unable to use it for opening jars and many other ADL tasks. This is complicated by a L shoulder issues that he is under treatment for with another doctor at this time. Based on FGA score, he is still somewhat of a fall risk a this time as well. The Pt would benefit from skilled PTx to decrease fall risk, improve functional mobility, progress toward goals, and increase overall quality of life.    Prognosis: good    Plan  Therapy options: will be seen for skilled therapy services  Planned modality interventions: dry needling and low level laser therapy  Planned therapy interventions: abdominal trunk stabilization, ADL retraining, balance/weight-bearing training, body mechanics training, fine motor coordination training, flexibility, functional ROM exercises, gait training, home exercise program, IADL retraining, joint mobilization, neuromuscular re-education, manual therapy, motor coordination training, soft tissue mobilization, strengthening, stretching, therapeutic activities and transfer  training  Frequency: 2x week  Duration in weeks: 12  Plan details: Increased focus on L upper extremity strength and function at this, while incorporating LE strengthening as appropriate. Add upper extremity exercises to his HEP at next visit.         SIGNATURE: Vidya De La Garza PT [facility], [place] License #: [id number]    Electronically Signed on 3/26/2024    Initial Certification  Certification Period: 3/26/2024 through 6/23/2024  I certify that the therapy services are furnished while this patient is under my care.  The services outlined above are required by this patient, and will be reviewed every 90 days.     PHYSICIAN: Feliciano Boothe APRN (NPI: [id number])    Signature: _______________________________________DATE: _________    Please sign and return via fax to [phone].   @[email or web address]          [facility] [place]. [address]  [phone]

## 2024-04-03 ENCOUNTER — TRANSCRIBE ORDERS (OUTPATIENT)
Dept: PHYSICAL THERAPY | Facility: CLINIC | Age: 44
End: 2024-04-03
Payer: MEDICAID

## 2024-04-04 ENCOUNTER — TREATMENT (OUTPATIENT)
Dept: PHYSICAL THERAPY | Facility: CLINIC | Age: 44
End: 2024-04-04
Payer: MEDICAID

## 2024-04-04 DIAGNOSIS — I69.30 H/O: STROKE WITH RESIDUAL EFFECTS: Primary | ICD-10-CM

## 2024-04-04 DIAGNOSIS — R26.2 IMPAIRED AMBULATION: ICD-10-CM

## 2024-04-04 DIAGNOSIS — M62.81 LEFT-SIDED MUSCLE WEAKNESS: ICD-10-CM

## 2024-04-04 NOTE — PROGRESS NOTES
Physical Therapy Treatment Note  [address] [place] [address]    Patient: James Taylor Jr.                                                 Visit Date: 2024  :     1980    Referring practitioner:    NAM Licona  Date of Initial Visit:          Type: THERAPY  Noted: 3/26/2024    Patient seen for 2 sessions    Visit Diagnoses:    ICD-10-CM ICD-9-CM   1. H/O: stroke with residual effects  I69.30 438.9   2. Impaired ambulation  R26.2 719.7   3. Left-sided muscle weakness  M62.81 728.87     SUBJECTIVE     Subjective: He reports he is feeling okay today. He reports he was a little sore after the last session.       PAIN: 3-10 in his L foot up into the lower leg.          OBJECTIVE     Objective       Gait Training          75041   Task/Terrain Asst AD Comments   Nutcracker walk       Hellen hellen walk with 2 SC            Timed Minutes 15       Neuromuscular Reeducation     26220 Comments   Backward walk, tandem walk and change in speed walk     Walking with vertical, horizontal head movents    Horizontal head movement NS, tandem stance    NS EO/EC on therasdisks     B tandem stance on theradisks and air ex            Timed Minutes 30        Therapy Education/Self Care 24188   Education offered today    Medbride Code    Ongoing HEP   Access Code: [email or web address]  URL: [email or web address]  Date: 2024  Prepared by: Vidya De La Garza     Exercises  - Standing Hip Abduction with Counter Support  - 1 x daily - 7 x weekly - 3 sets - 10 reps  - Standing Hip Extension with Counter Support  - 1 x daily - 7 x weekly - 3 sets - 10 reps  - Marching with Resistance  - 1 x daily - 7 x weekly - 3 sets - 10 reps  - Mini Squat with Counter Support  - 1 x daily - 7 x weekly - 3 sets - 10 reps  - Seated Toe Raise  - 1 x daily - 7 x weekly - 3 sets - 10 reps - 5 hold   Timed Minutes        Total Timed Treatment:     45   mins  Total Time of Visit:              45   mins         ASSESSMENT/PLAN     GOALS  Goals                                      Progress Note due by 3/21/24                                                          Recert due by 4/22/24   STG by: 6 weeks  Comments Date Status   Able to stand at least 30 minutes before rest         Increase L hand  strength to within 10lbs of R hand          Normalized gait pattern as demonstrated by increased LUE arm swing and no L heel scuffing          LTG by: 12 weeks         Improve FGA to 25/30 or greater to demonstrate decreased fall risk          Gross left hip MMT 4+/5          Able to walk down stairs with reciprocal gait pattern using rail for balance only         QuickDASH score improved by at least 10 points         Independent with [facility] for progress strengthening             Assessment/Plan     ASSESSMENT:   Today was the first treatment since the initial evaluation so no goals have been met at this time. We focused on improving his gait especially in regard to B UE swing. We also worked on improving his balance.    PLAN:   Consider utilizing the wobble board and Neurocom during his next session.     SIGNATURE: Raimundo Monaco PTA, [place] License #: [id number]  Electronically Signed on 4/4/2024        Anuel Valdes  [place] [address]  [phone]

## 2024-04-09 ENCOUNTER — TREATMENT (OUTPATIENT)
Dept: PHYSICAL THERAPY | Facility: CLINIC | Age: 44
End: 2024-04-09
Payer: MEDICAID

## 2024-04-09 ENCOUNTER — OFFICE VISIT (OUTPATIENT)
Dept: FAMILY MEDICINE CLINIC | Facility: CLINIC | Age: 44
End: 2024-04-09
Payer: MEDICAID

## 2024-04-09 VITALS
HEIGHT: 70 IN | SYSTOLIC BLOOD PRESSURE: 138 MMHG | DIASTOLIC BLOOD PRESSURE: 80 MMHG | HEART RATE: 87 BPM | TEMPERATURE: 98.2 F | OXYGEN SATURATION: 100 % | RESPIRATION RATE: 19 BRPM | WEIGHT: 193 LBS | BODY MASS INDEX: 27.63 KG/M2

## 2024-04-09 DIAGNOSIS — I69.30 H/O: STROKE WITH RESIDUAL EFFECTS: Primary | ICD-10-CM

## 2024-04-09 DIAGNOSIS — I10 PRIMARY HYPERTENSION: ICD-10-CM

## 2024-04-09 DIAGNOSIS — R53.83 OTHER FATIGUE: ICD-10-CM

## 2024-04-09 DIAGNOSIS — M62.81 LEFT-SIDED MUSCLE WEAKNESS: ICD-10-CM

## 2024-04-09 DIAGNOSIS — K86.1 CHRONIC PANCREATITIS, UNSPECIFIED PANCREATITIS TYPE: ICD-10-CM

## 2024-04-09 DIAGNOSIS — Z91.81 AT MODERATE RISK FOR FALL: ICD-10-CM

## 2024-04-09 DIAGNOSIS — F10.20 ALCOHOLISM: Primary | ICD-10-CM

## 2024-04-09 DIAGNOSIS — R26.2 IMPAIRED AMBULATION: ICD-10-CM

## 2024-04-09 PROCEDURE — 99214 OFFICE O/P EST MOD 30 MIN: CPT | Performed by: NURSE PRACTITIONER

## 2024-04-09 PROCEDURE — 3079F DIAST BP 80-89 MM HG: CPT | Performed by: NURSE PRACTITIONER

## 2024-04-09 PROCEDURE — 1159F MED LIST DOCD IN RCRD: CPT | Performed by: NURSE PRACTITIONER

## 2024-04-09 PROCEDURE — 3075F SYST BP GE 130 - 139MM HG: CPT | Performed by: NURSE PRACTITIONER

## 2024-04-09 PROCEDURE — 1160F RVW MEDS BY RX/DR IN RCRD: CPT | Performed by: NURSE PRACTITIONER

## 2024-04-09 NOTE — LETTER
April 9, 2024    James Taylor Jr.  [address] [place] [address]      To whom it may concern,     Mr. 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 He is still experiencing, numbness, tingling of left lower extremity along with weakness in his left upper extremity. These symptoms decrease his ability ambulate and increase his risk for injury.  Improvement of symptoms have been slow and he is still under the care of physical therapy to improve symptoms. Due to the above symptoms he is unable to maintain employment at this time.       Please reach out with any additional questions,         Casi Skinner, APRN

## 2024-04-09 NOTE — PROGRESS NOTES
" NAM Renteria  [facility]   Family Medicine  [address]  [place] [address]  Phone: [phone]  Fax: [phone]         Chief Complaint:  Chief Complaint   Patient presents with    Medication Check    letter-paperwork        History:  James Taylor Jr. is a 43 y.o. male that is an established patient. He  is here for management of chronic conditions.    HPI     The patient is a 43-year-old male who presents today for followup. He is accompanied by his girlfriend, son, and niece.    1.  Alcoholism.  4/9/2024: Pt denies any ETOH use.   1/17/2024: Pt reports no ETOH since before christmas and is doing well with sobriety.   12/19/2023: Pt reports that he has not had an alcoholic beverage since his last appointment. He states that his abdominal pain associated with pancreatitis has resolved.   11/21/2023: I will recheck his sodium level. He has an appointment with Yudelka Taylor, gastroenterology, on 11/28/2023. He will probably need a colonoscopy once his pancreatitis is healed. He was encouraged to stop drinking alcohol. He was recommended to go to inpatient.      2. Abdominal pain/ chronic pancreatitis.   4/9/2024: His abdominal pain and chronic pancreatitis are currently well-controlled. His next gastroenterology followup is scheduled with NAM Vasquez, on 12/09/2024. He inquires about completing an abdominal MRI for monitoring of his pancreatitis. He last followed up with NAM Vasquez, on 01/29/2024, and liver ultrasound, CMP, and CBC were ordered, to be completed in approximately 11/2024. Currently, he denies significant abdominal pain though occasionally experiences \"side\" pain with coughing. He denies any issues with urination or bowel movements.   1/17/2024: The adult female states the pt underwent EGD with ultrasound to evaluate possible common bile duct stone. She reports that no stone was found but the pancrease was noted to be swollen and " inflamed.   12/19/2023: resolved.   11/21/2023: He was given samples of MiraLAX. He was given a prescription for gabapentin 300 mg. He was recommended to take 3 or 4 capfuls of MiraLAX in [facility].     3. HTN  4/9/2024: 138/80. He reports that his blood pressure has been well-controlled at home and denies difficulties with his anti-hypertensive medications. He is taking losartan 25 mg daily, metoprolol 50 mg daily, terazosin 5 mg at bedtime, and amlodipine 10 mg daily.   1/17/2024: He has been utilizing his blood pressure medication in the morning and notes increased compliance since beginning this regimen.   12/19/2023: Pt reports that he has difficulty remembering to take his night time medications. Request that he be able to take these in the morning. B/p slightly elevated today at 140/80.     4. Pain toe/ foot  4/9/2024: EMG/NVCS WNL. States that he has reestablished with PT. States that it was suggested that he get a shoe incert from [facility].   The patient has a history of hemorrhagic right basal ganglia cerebrovascular accident in 03/2023. He reported left  foot/toe pain at his last followup. Since his last visit, he completed EMG/NCV and bilateral lower extremity ultrasound with ankle/brachial indices, which yielded normal results. He states that neurology indicated his pain may be muscular in nature and may take time to resolve. He reports persistent inability to flex his left foot/ toes. Physical therapy at [facility] has attempted to work on this; however, his pain is exacerbated by physical therapy and continues after physical therapy sessions. He denies that a nerve stimulator was utilized during his physical therapy sessions. His girlfriend inquires about the possibility of chiropractic treatment or a TENS unit for the patient. Chart review indicates Vidya De La Garza, PT, DPT, prescribed him exercises and planned dry needling, low-level laser therapy, abdominal trunk stabilization, and fine motor  coordination training interventions. Corazon Diamond PTA, recommended he try an LAFO with arch support to assist with dorsiflexion and decrease fatigue. His girlfriend notes that despite multiple attempts to secure a referral to [facility] to obtain an AFO, they have not received a referral, which is required due to his Medicaid. His next physical therapy session is scheduled on 04/12/2024. He is attempting to obtain disability benefits and requests a note for the child support office verifying that he cannot work currently.  He is experiencing persistent numbness/tingling of the left lower extremity along with weakness of his left upper extremity. These symptoms decrease his ability to ambulate and increase his risk for injury. Improvement of his symptoms has been slow, and he is still under the care of physical therapy to improve symptoms.)         1/17/2024: His toe problems have not resolved. It is still bothering him. He has completed the round of steroids. The tissue under his toe is always sensitive and it hurts for him to ambulate. The patient reports that his legs hurt since his stroke.  He admits that he has gained weight. His feet get better when he utilizes gabapentin, but it does not seem to make them better.  12/19/2023: Pt reports that he has had increased pain and erythema to right great toe and middle toes. States that it hurts for the sheets to touch his toes.          ROS:  Review of Systems   Constitutional:  Negative for fatigue, fever and unexpected weight change.   HENT:  Negative for congestion, ear pain, rhinorrhea, sinus pressure, sinus pain and voice change.    Eyes:  Negative for visual disturbance.   Respiratory:  Negative for shortness of breath and wheezing.    Cardiovascular:  Negative for chest pain and palpitations.   Gastrointestinal:  Negative for abdominal pain, nausea and vomiting.   Genitourinary:  Negative for dysuria and flank pain.   Musculoskeletal:  Negative for back  "pain, myalgias and neck pain.        Foot pain   Skin:  Negative for color change and rash.   Neurological:  Negative for dizziness, weakness, numbness and headaches.   Psychiatric/Behavioral:  Negative for behavioral problems, dysphoric mood, self-injury and sleep disturbance.         reports that he has been smoking cigarettes. He started smoking about 12 years ago. He has a 15.1 pack-year smoking history. He has been exposed to tobacco smoke. He has never used smokeless tobacco. He reports that he does not currently use alcohol after a past usage of about 4.0 standard drinks of alcohol per week. He reports that he does not use drugs.    Current Outpatient Medications   Medication Instructions    amLODIPine (NORVASC) 10 mg, Oral, Daily    gabapentin (NEURONTIN) 300 MG capsule TID prn alcohol withdrawal/ chronic back pain/ neuropathy.    losartan (COZAAR) 25 mg, Oral, Daily    metoprolol succinate XL (TOPROL-XL) 50 mg, Oral, Daily    omeprazole (PRILOSEC) 20 mg, Oral, Daily    sertraline (ZOLOFT) 50 mg, Oral, Daily, To help with mood. Take with food    terazosin (HYTRIN) 5 mg, Oral, Nightly       OBJECTIVE:  /80 (BP Location: Left arm, Patient Position: Sitting, Cuff Size: Adult)   Pulse 87   Temp 98.2 °F (36.8 °C) (Infrared)   Resp 19   Ht 177.8 cm (70\")   Wt 87.5 kg (193 lb)   SpO2 100%   BMI 27.69 kg/m²    Physical Exam  Vitals and nursing note reviewed.   Constitutional:       Appearance: Normal appearance. He is well-developed.   HENT:      Head: Normocephalic and atraumatic.      Right Ear: Tympanic membrane, ear canal and external ear normal.      Left Ear: Tympanic membrane, ear canal and external ear normal.      Nose: Nose normal. No septal deviation, nasal tenderness or congestion.      Mouth/Throat:      Lips: Pink. No lesions.      Mouth: Mucous membranes are moist. No oral lesions.      Dentition: Normal dentition.      Pharynx: Oropharynx is clear. No pharyngeal swelling, oropharyngeal " exudate or posterior oropharyngeal erythema.   Eyes:      General: Lids are normal. Vision grossly intact. No scleral icterus.        Right eye: No discharge.         Left eye: No discharge.      Extraocular Movements: Extraocular movements intact.      Conjunctiva/sclera: Conjunctivae normal.      Right eye: Right conjunctiva is not injected.      Left eye: Left conjunctiva is not injected.      Pupils: Pupils are equal, round, and reactive to light.   Neck:      Thyroid: No thyroid mass.      Trachea: Trachea normal.   Cardiovascular:      Rate and Rhythm: Normal rate and regular rhythm.      Pulses:           Dorsalis pedis pulses are 1+ on the right side and 1+ on the left side.      Heart sounds: Normal heart sounds. No murmur heard.     No gallop.   Pulmonary:      Effort: Pulmonary effort is normal.      Breath sounds: Normal breath sounds and air entry. No wheezing, rhonchi or rales.   Musculoskeletal:         General: No tenderness or deformity. Normal range of motion.      Cervical back: Full passive range of motion without pain, normal range of motion and neck supple.      Thoracic back: Normal.      Right lower leg: No edema.      Left lower leg: No edema.        Feet:    Skin:     General: Skin is warm and dry.      Coloration: Skin is not jaundiced.      Findings: No rash.   Neurological:      Mental Status: He is alert and oriented to person, place, and time.      Sensory: Sensation is intact.      Motor: Motor function is intact.      Coordination: Coordination is intact.      Gait: Gait is intact.      Deep Tendon Reflexes: Reflexes are normal and symmetric.   Psychiatric:         Mood and Affect: Mood and affect normal.         Behavior: Behavior normal.         Judgment: Judgment normal.         Procedures    Assessment/Plan:     Diagnoses and all orders for this visit:    1. Alcoholism (Primary)    2. Primary hypertension    3. Chronic pancreatitis, unspecified pancreatitis type  -     Amylase;  Future  -     Lipase; Future  -     Urinalysis With Culture If Indicated -  -     CBC & Differential  -     Comprehensive Metabolic Panel  -     Sedimentation Rate; Future    4. At moderate risk for fall    5. Other fatigue  -     Ambulatory Referral For Orthotics      1. Hypertension  He will continue his current medications.    2. History of cerebrovascular accident, foot/toe and left hand pain  A referral to  Clinic  was provided. If physical therapy recommends a TENS unit, he will notify this practice, and a TENS unit will be ordered. A note was provided verifying that he cannot maintain employment currently due to medical issues.    3. Chronic pancreatitis  Fasting laboratory studies have been ordered.         An After Visit Summary was printed and given to the patient at discharge.  No follow-ups on file.       There are no Patient Instructions on file for this visit.      Discussion:     I spent 32 minutes caring for [place] on this date of service. This time includes time spent by me in the following activities: preparing for the visit, reviewing tests, obtaining and/or reviewing a separately obtained history, performing a medically appropriate examination and/or evaluation, counseling and educating the patient/family/caregiver, ordering medications, tests, or procedures, documenting information in the medical record, independently interpreting results and communicating that information with the patient/family/caregiver, and care coordination     Casi BROWNING 4/15/2024   Electronically signed.      Patient or patient representative verbalized consent to the visit recording.  I have personally performed the services described in this document as transcribed by the above individual, and it is both accurate and complete.     Transcribed from ambient dictation for NAM Renteria by Radhika Velasquez.  04/09/24   18:34 CDT    Patient or patient representative verbalized consent to the  visit recording.  I have personally performed the services described in this document as transcribed by the above individual, and it is both accurate and complete.

## 2024-04-09 NOTE — PROGRESS NOTES
Physical Therapy Treatment Note  [address] [place] [address]    Patient: James Taylor Jr.                                                 Visit Date: 2024  :     1980    Referring practitioner:    NAM Licona  Date of Initial Visit:          Type: THERAPY  Noted: 2023    Type: THERAPY  Noted: 3/26/2024    Patient seen for 13 sessions    Visit Diagnoses:    ICD-10-CM ICD-9-CM   1. H/O: stroke with residual effects  I69.30 438.9   2. Impaired ambulation  R26.2 719.7   3. Left-sided muscle weakness  M62.81 728.87     SUBJECTIVE     Subjective: Pt. Reports he feels the same. He reports he was sore after last session in his L leg.       PAIN: 4/10 in his L foot.          OBJECTIVE     Objective   Neuromuscular Reeducation     17040 Comments   Limits of instability on Neurocom    Rhythmic weight shift fwd/bwd and L to R on Neurocom    Fwd/bwd WS on wobble board    Fwd/bwd WS on 1/2 foam on both sides         Timed Minutes 45          Therapy Education/Self Care 86524   Education offered today     Medbride Code    Ongoing HEP   Access Code: [email or web address]  URL: [email or web address]  Date: 2024  Prepared by: Vidya De La Garza     Exercises  - Standing Hip Abduction with Counter Support  - 1 x daily - 7 x weekly - 3 sets - 10 reps  - Standing Hip Extension with Counter Support  - 1 x daily - 7 x weekly - 3 sets - 10 reps  - Marching with Resistance  - 1 x daily - 7 x weekly - 3 sets - 10 reps  - Mini Squat with Counter Support  - 1 x daily - 7 x weekly - 3 sets - 10 reps  - Seated Toe Raise  - 1 x daily - 7 x weekly - 3 sets - 10 reps - 5 hold   Timed Minutes        Total Timed Treatment:     45   mins  Total Time of Visit:             45   mins         ASSESSMENT/PLAN     GOALS  Goals                                      Progress Note due by 3/21/24                                                          Recert due by  4/22/24   STG by: 6 weeks  Comments Date Status   Able to stand at least 30 minutes before rest  Stood the entire session  4/9  ongoing   Increase L hand  strength to within 10lbs of R hand          Normalized gait pattern as demonstrated by increased LUE arm swing and no L heel scuffing          LTG by: 12 weeks         Improve FGA to 25/30 or greater to demonstrate decreased fall risk          Gross left hip MMT 4+/5          Able to walk down stairs with reciprocal gait pattern using rail for balance only         QuickDASH score improved by at least 10 points         Independent with HEP for progress strengthening             Assessment/Plan     ASSESSMENT:   Assessed balance and weight shift on the Neurocom. Noticed pt. had trouble putting weight through his toes and heels. Added fwd/bwd to today's session weight shifting exercises to improve putting even weight through his toes and heels, in which he responded well but started having pain in his L toes.     PLAN:   Assess his long term response to today's session. Consider initiating strengthening exercises.     SIGNATURE: Raimundo Monaco PTA [place] License #: [id number]    Electronically Signed on 4/9/2024        [address] [place] [address] Keisha  [place] [address]  [phone]

## 2024-04-12 ENCOUNTER — TELEPHONE (OUTPATIENT)
Dept: PHYSICAL THERAPY | Facility: CLINIC | Age: 44
End: 2024-04-12

## 2024-04-12 NOTE — TELEPHONE ENCOUNTER
Caller: James Taylor Jr.    Relationship: Self    What was the call regarding: PATIENT WILL NOT BE AT TODAY'S APPT DUE TO A DEATH IN THE FAMILY

## 2024-04-15 ENCOUNTER — TREATMENT (OUTPATIENT)
Dept: PHYSICAL THERAPY | Facility: CLINIC | Age: 44
End: 2024-04-15
Payer: MEDICAID

## 2024-04-15 DIAGNOSIS — R26.2 IMPAIRED AMBULATION: ICD-10-CM

## 2024-04-15 DIAGNOSIS — M62.81 LEFT-SIDED MUSCLE WEAKNESS: ICD-10-CM

## 2024-04-15 DIAGNOSIS — I69.30 H/O: STROKE WITH RESIDUAL EFFECTS: Primary | ICD-10-CM

## 2024-04-15 PROCEDURE — 97110 THERAPEUTIC EXERCISES: CPT | Performed by: PHYSICAL THERAPIST

## 2024-04-15 NOTE — PROGRESS NOTES
Physical Therapy Treatment Note  [address] [place] [address]    Patient: James Taylor Jr.                                                 Visit Date: 4/15/2024  :     1980    Referring practitioner:    NAM Licona  Date of Initial Visit:          Type: THERAPY  Noted: 3/26/2024    Patient seen for 4 sessions    Visit Diagnoses:    ICD-10-CM ICD-9-CM   1. H/O: stroke with residual effects  I69.30 438.9   2. Impaired ambulation  R26.2 719.7   3. Left-sided muscle weakness  M62.81 728.87     SUBJECTIVE     Subjective: Pt. reports he is feeling alright today. He was very sore after the last session in his legs and L foot.       PAIN: 3-4/10 in the L LE and foot.          OBJECTIVE     Objective      Therapeutic Exercises    66904 Units Comments   Wring outs with blue bar 2x10     Individual finger flexion with yellow digi flex x10     Button board     Peg board     Purdue peg board     L shld flexion with #3 2x10  Tried #2 first but was to easy   L shld ABD with #2 2x10     Putting beads and pills back into the bottles     Picking up toothpicks with tweezers     Putting nails, dice, and change back into the container          Timed Minutes 43        Therapy Education/Self Care 49103   Education offered today     [employer] Code    Ongoing HEP   Access Code: [email or web address]  URL: [email or web address]  Date: 2024  Prepared by: Vidya De La Garza     Exercises  - Standing Hip Abduction with Counter Support  - 1 x daily - 7 x weekly - 3 sets - 10 reps  - Standing Hip Extension with Counter Support  - 1 x daily - 7 x weekly - 3 sets - 10 reps  - Marching with Resistance  - 1 x daily - 7 x weekly - 3 sets - 10 reps  - Mini Squat with Counter Support  - 1 x daily - 7 x weekly - 3 sets - 10 reps  - Seated Toe Raise  - 1 x daily - 7 x weekly - 3 sets - 10 reps - 5 hold   Timed Minutes        Total Timed Treatment:     43   mins  Total Time  of Visit:             43   mins         ASSESSMENT/PLAN     GOALS        Goals                                      Progress Note due by 3/21/24                                                          Recert due by 4/22/24   STG by: 6 weeks  Comments Date Status   Able to stand at least 30 minutes before rest  Stood the entire session  4/9  ongoing   Increase L hand  strength to within 10lbs of R hand   addressed with  strength exercises today  4/15 ongoing    Normalized gait pattern as demonstrated by increased LUE arm swing and no L heel scuffing          LTG by: 12 weeks         Improve FGA to 25/30 or greater to demonstrate decreased fall risk          Gross left hip MMT 4+/5          Able to walk down stairs with reciprocal gait pattern using rail for balance only         QuickDASH score improved by at least 10 points         Independent with HEP for progress strengthening             Assessment/Plan     ASSESSMENT:   He had difficulty during the button board using just his L UE but was able to do it when using B UEs. No increase in pain with today's exercises but his hand started going numb during a few of the exercises. During L shoulder ABD he started getting fatigued at the end and activated his UT.     PLAN:   Assess his long term response to today's session. Consider starting strengthening exercises for the LE, as well as, resuming exercises for gait.     SIGNATURE: Tracey Gtz PTA Student.   Electronically Signed on 4/15/2024    The clinical instructor and/or supervising staff, Raimundo Monaco PTA, was present in clinic guiding the visit by approving, concurring, and confirming the skilled judgement for all services rendered.    Signature:  Raimundo Monaco PTA, [place] License #: [id number]  Electronically signed on 4/15/2024         [address] [place] [address] [place] [address]  [phone]

## 2024-04-18 ENCOUNTER — TELEPHONE (OUTPATIENT)
Dept: PHYSICAL THERAPY | Facility: CLINIC | Age: 44
End: 2024-04-18

## 2024-04-19 DIAGNOSIS — M62.81 MUSCLE WEAKNESS AFFECTING MOVEMENT OF FOOT: ICD-10-CM

## 2024-04-19 DIAGNOSIS — G62.9 NEUROPATHY: Primary | ICD-10-CM

## 2024-04-20 NOTE — DISCHARGE SUMMARY
Physical Therapy DISCHARGE Note  DATE:  3/20/2023  NAME:  Shaneka Mckeon  :  1980  (43 y.o.,male)  MRN:  [id number]    HEIGHT:  Height: 5' 10\" (177.8 cm)  WEIGHT:  Weight: 156 lb (70.8 kg)    PATIENT DIAGNOSIS(ES):    Diagnosis: RIGHT BG HEMORRHAGIC CVA; ETOH WITHDRAWAL POST CVA    Additional Pertinent Hx: HTN, ASTHMA  RESTRICTIONS/PRECAUTIONS:    Restrictions/Precautions  Restrictions/Precautions: Aspiration Risk, Weight Bearing, Up Ad Adry     OVERALL  ORIENTATION STATUS:     PAIN:  Pain Level: 0                    GROSS ASSESSMENT        POSTURE/BALANCE          ACTIVITY TOLERANCE  Activity Tolerance  Activity Tolerance: Patient tolerated treatment well      BED MOBILITY  Bed mobility  Bridging: Independent  Rolling to Left: Independent  Rolling to Right: Independent  Supine to Sit: Modified independent  Sit to Supine: Independent  Scooting: Supervision  Bed Mobility Comments: triplanar for all movements withotu rails        TRANSFERS  Transfers  Sit to Stand: Independent  Stand to Sit: Independent  Bed to Chair: Independent  Car Transfer: Contact guard assistance  Comment: pt encouraged to limit use of WC (previous instance of poor safety rising from [facility]); use of WC primarily for transportation off floor with family       AMBULATION 1  Ambulation  Surface: Level tile  Device: No Device  Assistance: Stand by assistance  Quality of Gait: decreased Left knee extension and heel strike, improved as amb progressed, deviation of path rarely  Gait Deviations: Decreased arm swing  Distance: 250'  Comments: cues to drive arm swing with improved coordination gradually each session  More Ambulation?: Yes  AMBULATION 2  Ambulation 2  Surface - 2: level tile  Device 2: No device  Other Apparatus 2:  (1 # cuff wt rolled up grasping with L hand)  Assistance 2: Stand by assistance  Quality of Gait 2: arm swing improved on L side though only small range, improved joana  Gait Deviations: Decreased arm swing  Distance: No

## 2024-04-24 ENCOUNTER — TELEPHONE (OUTPATIENT)
Dept: ORTHOPEDICS | Facility: OTHER | Age: 44
End: 2024-04-24
Payer: MEDICAID

## 2024-05-06 ENCOUNTER — TREATMENT (OUTPATIENT)
Dept: PHYSICAL THERAPY | Facility: CLINIC | Age: 44
End: 2024-05-06
Payer: MEDICAID

## 2024-05-06 DIAGNOSIS — R26.2 IMPAIRED AMBULATION: ICD-10-CM

## 2024-05-06 DIAGNOSIS — M62.81 LEFT-SIDED MUSCLE WEAKNESS: ICD-10-CM

## 2024-05-06 DIAGNOSIS — I69.30 H/O: STROKE WITH RESIDUAL EFFECTS: Primary | ICD-10-CM

## 2024-05-06 PROCEDURE — 97112 NEUROMUSCULAR REEDUCATION: CPT

## 2024-05-06 PROCEDURE — 97116 GAIT TRAINING THERAPY: CPT

## 2024-05-06 PROCEDURE — 97530 THERAPEUTIC ACTIVITIES: CPT

## 2024-05-31 ENCOUNTER — TREATMENT (OUTPATIENT)
Dept: PHYSICAL THERAPY | Facility: CLINIC | Age: 44
End: 2024-05-31
Payer: MEDICAID

## 2024-05-31 DIAGNOSIS — M67.912 DISORDER OF LEFT ROTATOR CUFF: ICD-10-CM

## 2024-05-31 DIAGNOSIS — M25.512 CHRONIC LEFT SHOULDER PAIN: Primary | ICD-10-CM

## 2024-05-31 DIAGNOSIS — G89.29 CHRONIC LEFT SHOULDER PAIN: Primary | ICD-10-CM

## 2024-05-31 DIAGNOSIS — M19.012 ARTHRITIS OF LEFT ACROMIOCLAVICULAR JOINT: ICD-10-CM

## 2024-05-31 DIAGNOSIS — I69.30 H/O: STROKE WITH RESIDUAL EFFECTS: ICD-10-CM

## 2024-05-31 NOTE — PROGRESS NOTES
Physical Therapy Initial Evaluation and Plan of Care  [address] [place] [address]    Patient: James Taylor Jr.               : 1980  Visit Date: 2024  Referring practitioner: Roberto Carlos Gillespie MD  Date of Initial Visit: 2024  Patient seen for 1 sessions    Visit Diagnoses:    ICD-10-CM ICD-9-CM   1. Chronic left shoulder pain  M25.512 719.41    G89.29 338.29   2. Arthritis of left acromioclavicular joint  M19.012 716.91   3. Disorder of left rotator cuff  M67.912 726.10   4. H/O: stroke with residual effects  I69.30 438.9     Past Medical History:   Diagnosis Date    Asthma     Headache     Hypertension     Stroke      Past Surgical History:   Procedure Laterality Date    COLONOSCOPY N/A 2023    normal exam    ENDOSCOPY N/A 2023    Erythematous mucosa in the antrum bx normal, otherwise normal    UPPER ENDOSCOPIC ULTRASOUND W/ FNA  2024    No obvious structural changes to cause pancreatitis. - Dr. Saravia         SUBJECTIVE     Subjective Evaluation    History of Present Illness  Mechanism of injury: Pt has difficulty and pain with raising his left arm. This started a few months ago, after the stroke. He recalls no specific JOSE A, and it has slowly progressed over time to the point he is now seeking care for this. He reports that it has worsened since he can't really use the muscles in his left arm well since the stroke     Quality of life: good    Pain  Current pain ratin  At best pain ratin  At worst pain rating: 10  Quality: dull ache  Relieving factors: rest  Aggravating factors: movement  Progression: no change    Hand dominance: right    Diagnostic Tests  Abnormal x-ray: IMPRESSION: Moderate AC joint arthrosis and mild degenerative spurring of the acromion process and greater tuberosity of the humerus. No fracture or acute osseous findings.    Patient Goals  Patient goals for therapy: decreased pain, increased strength and increased  motion  Patient goal: raise his hand better, reaching into cabinets, etc       Outcome Measure:     PT G-Codes  Outcome Measure Options: Quick DASH  Quick DASH Score: 47.73    OBJECTIVE     Objective     POSTURAL ASSESSMENT/OBSERVATIONS:   forward head rounded shoulders    PALPATION:  LEFT: supraspinatus (tender) biceps belly (tender)      UE ROM and MMT Left Right   SHOULDER AROM PROM MMT AROM PROM MMT   Flexion 81 deg 144 2+* 141  4+   Extension 39  3+* 47  4   ABD 98* 168 2-* 145  4+   ER CTJ  3+* Superior scapula  4   IR Mid lumbar  4 TLJ  4            *pain     SPECIAL TESTS  LEFT: Empty can (negative) Full can (negative) Erwin/Andreas (negative) Neer's (positive) Drop arm test (negative) lift off (negative) belly press (negative) AC sheer (positive)       Therapy Education/Self Care [id number]   Education offered today Nature of condition, POC moving forward, HEP provided and trained    Medbride Code    Ongoing HEP   Access Code: [email or web address]  URL: [email or web address]  Date: 05/31/2024  Prepared by: Vidya De La Garza    Exercises  - Supine Shoulder Flexion Extension AAROM with Dowel  - 1 x daily - 7 x weekly - 3 sets - 10 reps  - Standing Shoulder External Rotation AAROM with Dowel  - 1 x daily - 7 x weekly - 3 sets - 10 reps  - Shoulder Scaption AAROM with Dowel  - 1 x daily - 7 x weekly - 3 sets - 10 reps  - Seated Scapular Retraction  - 1 x daily - 7 x weekly - 3 sets - 10 reps - 5 hold   Timed Minutes        Total Timed Treatment:     0   mins  Total Time of Visit:            45   mins    ASSESSMENT/PLAN     GOALS:  Goals                                                    Progress Note due by 6/30/24                                                                Recert due by 8/28/24   STG by: 4 weeks Comments Date Status   Decrease subjective pain to 2/10       Improve postural awareness to decrease L impingement                LTG by: 8 weeks       Improve L shoulder flexion to at least symmetrical with R  shoulder to improve ability to reach overhead for home tasks       Able to resume household and work activities without exacerbation of symptoms      Gross shoulder MMT at 4+      Understands improved ergonomics for work and HEP for flexibility and stability       Improve QuickDASH score to 40 or less       Reports no pain except occasional minor twinges no more than 1/10         Assessment & Plan       Assessment  Impairments: abnormal muscle firing, abnormal or restricted ROM, activity intolerance, impaired physical strength, lacks appropriate home exercise program and pain with function   Functional limitations: carrying objects, lifting, pulling, pushing, uncomfortable because of pain, moving in bed, reaching behind back, reaching overhead and unable to perform repetitive tasks   Assessment details: Pt is a 44 y/o male presenting with L shoulder pain and impaired AROM that started a few months ago. He has difficulty with all household tasks and especially with reaching overhead for getting anything out of cabinets, etc. His condition is complicated by left sided weakness caused by CVA, though he does not present with any subluxation of the GHJ. Since the stroke, his posture and L sided strength have both suffered, resulting in greater stress to the L shoulder and exacerbating his symptoms. He does present with S&S consistent with L ACJ arthritic changes, as well as differentially L supraspinatus tear or tendonitis. The Pt would benefit from skilled PTx to decrease pain, improve functional mobility, progress toward goals, and increase overall quality of life.    Prognosis: good    Plan  Therapy options: will be seen for skilled therapy services  Planned modality interventions: cryotherapy, dry needling, low level laser therapy, iontophoresis, TENS, electrical stimulation/Russian stimulation, high voltage pulsed current (pain management), high voltage pulsed current (spasm management), hydrotherapy and  ultrasound  Planned therapy interventions: ADL retraining, body mechanics training, fine motor coordination training, flexibility, functional ROM exercises, home exercise program, IADL retraining, joint mobilization, manual therapy, motor coordination training, neuromuscular re-education, postural training, soft tissue mobilization, strengthening, stretching and therapeutic activities  Frequency: 2x week  Duration in weeks: 12  Treatment plan discussed with: patient  Plan details: Work toward increasing LUE ROM and strength, while also addressing postural strain that further impairs his ROM. Progress HEP for same.         SIGNATURE: Vidya De La Garza PT [facility], [place] License #: [id number]    Electronically Signed on 5/31/2024      Initial Certification  Certification Period: 5/31/2024 through 8/28/2024  I certify that the therapy services are furnished while this patient is under my care.  The services outlined above are required by this patient, and will be reviewed every 90 days.     PHYSICIAN: Roberto Carlos Gillespie MD (NPI: [id number])    Signature____________________________________________DATE: _________     Please sign and return via fax to [phone].   [email or web address]          [facility] [place]. [address]  [phone]

## 2024-06-26 ENCOUNTER — TREATMENT (OUTPATIENT)
Dept: PHYSICAL THERAPY | Facility: CLINIC | Age: 44
End: 2024-06-26
Payer: MEDICAID

## 2024-06-26 DIAGNOSIS — M67.912 DISORDER OF LEFT ROTATOR CUFF: ICD-10-CM

## 2024-06-26 DIAGNOSIS — G89.29 CHRONIC LEFT SHOULDER PAIN: Primary | ICD-10-CM

## 2024-06-26 DIAGNOSIS — M19.012 ARTHRITIS OF LEFT ACROMIOCLAVICULAR JOINT: ICD-10-CM

## 2024-06-26 DIAGNOSIS — I69.30 H/O: STROKE WITH RESIDUAL EFFECTS: ICD-10-CM

## 2024-06-26 DIAGNOSIS — M25.512 CHRONIC LEFT SHOULDER PAIN: Primary | ICD-10-CM

## 2024-06-26 NOTE — PROGRESS NOTES
Physical Therapy Treatment Note  [address] [place] [address]    Patient: James Taylor Jr.                                                 Visit Date: 2024  :     1980    Referring practitioner:    Roberto Carlos Gillespie MD  Date of Initial Visit:          Type: THERAPY  Noted: 2024    Patient seen for 2 sessions    Visit Diagnoses:    ICD-10-CM ICD-9-CM   1. Chronic left shoulder pain  M25.512 719.41    G89.29 338.29   2. Arthritis of left acromioclavicular joint  M19.012 716.91   3. Disorder of left rotator cuff  M67.912 726.10   4. H/O: stroke with residual effects  I69.30 438.9     SUBJECTIVE     Subjective: States he still has pain in his shoulder when raising it over his head. Notes his  on his L hand is still very weak compared to his R hand.      PAIN: 0/10     OBJECTIVE     Objective     Therapeutic Exercises    38120 Units Comments   AAROM into flex w/ 2# bar x10    Wrist flex/EXT w/ red t-bar X20 ea    L wrist supination/pronation w/ R t-bar X20 ea    Hand/finger squeezes w/ R/Y/B ball X20 ea         Timed Minutes 18     Manual Therapy     67637  Comments   R sh PROM    R sh LAD    R AC joint mobs    IASTM to L shoulder globally w/ Powerboost L1 contoured    STM to R shoulder blobally    Timed Minutes 23        Therapy Education/Self Care [id number]   Education offered today Nature of condition, POC moving forward, HEP provided and trained    Medbride Code     Ongoing HEP   Access Code: [email or web address]  URL: [email or web address]  Date: 2024  Prepared by: Vidya De La Garza     Exercises  - Supine Shoulder Flexion Extension AAROM with Dowel  - 1 x daily - 7 x weekly - 3 sets - 10 reps  - Standing Shoulder External Rotation AAROM with Dowel  - 1 x daily - 7 x weekly - 3 sets - 10 reps  - Shoulder Scaption AAROM with Dowel  - 1 x daily - 7 x weekly - 3 sets - 10 reps  - Seated Scapular Retraction  - 1 x daily - 7 x weekly  - 3 sets - 10 reps - 5 hold   Timed Minutes         Total Timed Treatment:     41   mins  Total Time of Visit:             41   mins         ASSESSMENT/PLAN     GOALS  Goals                                                    Progress Note due by 6/30/24                                                                Recert due by 8/28/24   STG by: 4 weeks Comments Date Status   Decrease subjective pain to 2/10         Improve postural awareness to decrease L impingement                    LTG by: 8 weeks         Improve L shoulder flexion to at least symmetrical with R shoulder to improve ability to reach overhead for home tasks         Able to resume household and work activities without exacerbation of symptoms         Gross shoulder MMT at 4+         Understands improved ergonomics for work and HEP for flexibility and stability         Improve QuickDASH score to 40 or less         Reports no pain except occasional minor twinges no more than 1/10             Assessment/Plan     ASSESSMENT:   Pt reported no pain today, noting he has still been having weakness and a pinch sensation in his shoulder when raising his arm overhead. He presented with tightness during PROM in all motions, and had pinching in his shoulder with Flex and ABD. He noted some hand/forearm fatigue following exercises, but had no discomfort. He reported some decreased tightness following treatment.    PLAN:   Work toward increasing LUE ROM and strength, while also addressing postural strain that further impairs his ROM. Progress HEP for same.     SIGNATURE: Benjamin Bhagat PTA, [place] License #: [id number]  Electronically Signed on 6/26/2024        [place] [address]  [phone]

## 2024-06-28 ENCOUNTER — TREATMENT (OUTPATIENT)
Dept: PHYSICAL THERAPY | Facility: CLINIC | Age: 44
End: 2024-06-28
Payer: MEDICAID

## 2024-06-28 DIAGNOSIS — M25.512 CHRONIC LEFT SHOULDER PAIN: Primary | ICD-10-CM

## 2024-06-28 DIAGNOSIS — M67.912 DISORDER OF LEFT ROTATOR CUFF: ICD-10-CM

## 2024-06-28 DIAGNOSIS — M19.012 ARTHRITIS OF LEFT ACROMIOCLAVICULAR JOINT: ICD-10-CM

## 2024-06-28 DIAGNOSIS — I69.30 H/O: STROKE WITH RESIDUAL EFFECTS: ICD-10-CM

## 2024-06-28 DIAGNOSIS — G89.29 CHRONIC LEFT SHOULDER PAIN: Primary | ICD-10-CM

## 2024-06-28 NOTE — PROGRESS NOTES
Physical Therapy Treatment Note and 30 Day Progress Note  [address] [place] [address]    Patient: James Taylor Jr.                                                 Visit Date: 2024  :     1980    Referring practitioner:    Roberto Carlos Gillespie MD  Date of Initial Visit:          Type: THERAPY  Noted: 2024    Patient seen for 3 sessions    Visit Diagnoses:    ICD-10-CM ICD-9-CM   1. Chronic left shoulder pain  M25.512 719.41    G89.29 338.29   2. Arthritis of left acromioclavicular joint  M19.012 716.91   3. Disorder of left rotator cuff  M67.912 726.10   4. H/O: stroke with residual effects  I69.30 438.9     SUBJECTIVE     Subjective: States he still has pain in his shoulder when raising it over his head. Notes he had a little pain in his shoulder after last time, and went home and took some medicine.     PAIN: 8/10 when overhead     OBJECTIVE     Objective     UE ROM and MMT Left Right   SHOULDER AROM PROM MMT AROM PROM MMT   Flexion   2+*   4+   Extension   3+*   4   ABD   2-*   4+   ER   3+*   4   IR   4   4   *pain      Therapeutic Exercises    50494 Units Comments   Assessed goals for PN          Timed Minutes 15     Manual Therapy     79729  Comments   L sh PROM    L sh LAD    STM to R shoulder blobally    Timed Minutes 15     Modalities Comments   Cold laser/Estim combo L shoulder       Minutes 10        Therapy Education/Self Care [id number]   Education offered today Nature of condition, POC moving forward, HEP provided and trained    Medbride Code     Ongoing HEP   Access Code: [email or web address]  URL: [email or web address]  Date: 2024  Prepared by: Vidya De La Garza     Exercises  - Supine Shoulder Flexion Extension AAROM with Dowel  - 1 x daily - 7 x weekly - 3 sets - 10 reps  - Standing Shoulder External Rotation AAROM with Dowel  - 1 x daily - 7 x weekly - 3 sets - 10 reps  - Shoulder Scaption AAROM with Dowel  - 1 x daily  - 7 x weekly - 3 sets - 10 reps  - Seated Scapular Retraction  - 1 x daily - 7 x weekly - 3 sets - 10 reps - 5 hold   Timed Minutes         Total Timed Treatment:     40   mins  Total Time of Visit:             40   mins         ASSESSMENT/PLAN     GOALS  Goals                                                    Progress Note due by 7/27/24                                                                Recert due by 8/28/24   STG by: 4 weeks Comments Date Status   Decrease subjective pain to 2/10  8/10 going overhead 6/27 ongoing   Improve postural awareness to decrease L impingement   still has forward shoulders  6/27 ongoing             LTG by: 8 weeks         Improve L shoulder flexion to at least symmetrical with R shoulder to improve ability to reach overhead for home tasks  still has decreased shoulder mobility  6/27  ongoing   Able to resume household and work activities without exacerbation of symptoms  has increased pain with overhead activities  6/27 ongoing   Gross shoulder MMT at 4+  see chart above  6/27 ongoing   Understands improved ergonomics for work and HEP for flexibility and stability  performs a few times a week  6/27 ongoing   Improve QuickDASH score to 40 or less  61.36  6/27  ongoing   Reports no pain except occasional minor twinges no more than 1/10  8/10 when overhead 6/27 ongoing       Assessment/Plan     ASSESSMENT:   Pt reported no real improvement since SOC, noting he still has increased pain when raising his LUE overhead. He has not met any goals so far, and his QuickDASH has increased since the eval. He is still having some  weakness. He cont to have tightness into all motions, with some TP activity in his proximal bicep and anterior/medial deltoids. Skilled PT is indicated to improve his quality of life, while increasing his ability to perform daily tasks and activities pain free.    PLAN:   Work toward increasing LUE ROM and strength, while also addressing postural strain that  further impairs his ROM. Progress HEP for same.     SIGNATURE: Benjamin Bhagat PTA, [place] License #: [id number]  Electronically Signed on 6/28/2024        [address]  [place] [address]  [phone]

## 2024-06-28 NOTE — PROGRESS NOTES
Progress Note Addendum      Patient: James Taylor Jr.           : 1980  Visit Date: 2024  Referring practitioner: Roberto Carlos Gillespie MD  Date of Initial Visit: Type: THERAPY  Noted: 2024  Patient seen for 3 sessions  Visit Diagnoses:    ICD-10-CM ICD-9-CM   1. Chronic left shoulder pain  M25.512 719.41    G89.29 338.29   2. Arthritis of left acromioclavicular joint  M19.012 716.91   3. Disorder of left rotator cuff  M67.912 726.10   4. H/O: stroke with residual effects  I69.30 438.9       PT G-Codes  Outcome Measure Options: Quick DASH  Quick DASH Score: 61.36  Clinical Progress: unchanged  Home Program Compliance: Yes  Progress toward previous goals: Partially Met  Prognosis to achieve goals: good    Objective     Assessment & Plan       Assessment  Impairments: abnormal coordination, abnormal gait, abnormal muscle firing, abnormal muscle tone, abnormal or restricted ROM, activity intolerance, impaired physical strength and lacks appropriate home exercise program   Functional limitations: carrying objects, lifting, walking, pulling, pushing, standing, reaching behind back and reaching overhead   Prognosis: good    Plan  Therapy options: will be seen for skilled therapy services  Planned modality interventions: dry needling and low level laser therapy  Planned therapy interventions: abdominal trunk stabilization, ADL retraining, balance/weight-bearing training, body mechanics training, fine motor coordination training, flexibility, functional ROM exercises, gait training, home exercise program, IADL retraining, joint mobilization, neuromuscular re-education, manual therapy, motor coordination training, soft tissue mobilization, strengthening, stretching, therapeutic activities and transfer training  Frequency: 2x week  Duration in weeks: 12  Treatment plan discussed with: PTA        I reviewed the treatment and goals with James Bhagat PTA and agree with the POC.    SIGNATURE: Mark Bullard, PT,  License #: [id number]  Electronically Signed on 6/28/2024

## 2024-07-03 ENCOUNTER — TREATMENT (OUTPATIENT)
Dept: PHYSICAL THERAPY | Facility: CLINIC | Age: 44
End: 2024-07-03
Payer: MEDICAID

## 2024-07-03 DIAGNOSIS — M19.012 ARTHRITIS OF LEFT ACROMIOCLAVICULAR JOINT: ICD-10-CM

## 2024-07-03 DIAGNOSIS — M67.912 DISORDER OF LEFT ROTATOR CUFF: ICD-10-CM

## 2024-07-03 DIAGNOSIS — M25.512 CHRONIC LEFT SHOULDER PAIN: Primary | ICD-10-CM

## 2024-07-03 DIAGNOSIS — G89.29 CHRONIC LEFT SHOULDER PAIN: Primary | ICD-10-CM

## 2024-07-03 DIAGNOSIS — I69.30 H/O: STROKE WITH RESIDUAL EFFECTS: ICD-10-CM

## 2024-07-03 NOTE — PROGRESS NOTES
Physical Therapy Treatment Note and 30 Day Progress Note  [address] [place] [address]    Patient: James Taylor Jr.                                                 Visit Date: 7/3/2024  :     1980    Referring practitioner:    Roberto Carlos Gillespie MD  Date of Initial Visit:          Type: THERAPY  Noted: 2024    Patient seen for 4 sessions    Visit Diagnoses:    ICD-10-CM ICD-9-CM   1. Chronic left shoulder pain  M25.512 719.41    G89.29 338.29   2. Arthritis of left acromioclavicular joint  M19.012 716.91   3. Disorder of left rotator cuff  M67.912 726.10   4. H/O: stroke with residual effects  I69.30 438.9     SUBJECTIVE     Subjective: States he still has pain in his shoulder when raising it over his head. Notes he felt good after last time.     PAIN: 8/10 when overhead     OBJECTIVE     Objective     Therapeutic Exercises    05285 Units Comments   SA punches 2x10    Ball squeezes, hard Y ball 2x20    AAROM to 90 deg flex w/ 1# bar 2x10    Supine BUE phasic w/ YTB 2x10         Timed Minutes 20     Manual Therapy     82473  Comments   L sh PROM    L sh LAD    STM to R shoulder blobally    Timed Minutes 10     Modalities Comments   Cold laser/Estim combo L shoulder       Minutes 10        Therapy Education/Self Care 42518   Education offered today Nature of condition, POC moving forward, HEP provided and trained    Medbride Code     Ongoing HEP   Access Code: [email or web address]  URL: [email or web address]  Date: 2024  Prepared by: Vidya De La Garza     Exercises  - Supine Shoulder Flexion Extension AAROM with Dowel  - 1 x daily - 7 x weekly - 3 sets - 10 reps  - Standing Shoulder External Rotation AAROM with Dowel  - 1 x daily - 7 x weekly - 3 sets - 10 reps  - Shoulder Scaption AAROM with Dowel  - 1 x daily - 7 x weekly - 3 sets - 10 reps  - Seated Scapular Retraction  - 1 x daily - 7 x weekly - 3 sets - 10 reps - 5 hold   Timed  Minutes         Total Timed Treatment:     40   mins  Total Time of Visit:             40   mins         ASSESSMENT/PLAN     GOALS  Goals                                                    Progress Note due by 7/27/24                                                                Recert due by 8/28/24   STG by: 4 weeks Comments Date Status   Decrease subjective pain to 2/10  8/10 going overhead 6/27 ongoing   Improve postural awareness to decrease L impingement   still has forward shoulders  6/27 ongoing             LTG by: 8 weeks         Improve L shoulder flexion to at least symmetrical with R shoulder to improve ability to reach overhead for home tasks  still has decreased shoulder mobility  6/27  ongoing   Able to resume household and work activities without exacerbation of symptoms  has increased pain with overhead activities  6/27 ongoing   Gross shoulder MMT at 4+  see chart above  6/27 ongoing   Understands improved ergonomics for work and HEP for flexibility and stability  performs a few times a week  6/27 ongoing   Improve QuickDASH score to 40 or less  61.36  6/27  ongoing   Reports no pain except occasional minor twinges no more than 1/10  8/10 when overhead 6/27 ongoing       Assessment/Plan     ASSESSMENT:   Pt reported cont pain when raising his LUE overhead, noting his  might be a little better but still has numbness in his fingertips. He cont to have tightness into all motions, mainly with ABD and ER. He cont to have some TP activity around his bicep/deltoid junction. He reported some decreased tightness following treatment.    PLAN:   Work toward increasing LUE ROM and strength, while also addressing postural strain that further impairs his ROM. Progress HEP for same.     SIGNATURE: Benjamin Bhagat PTA, [place] License #: [id number]  Electronically Signed on 7/3/2024        [address]  [place] [address]  [phone]

## 2024-07-05 ENCOUNTER — TREATMENT (OUTPATIENT)
Dept: PHYSICAL THERAPY | Facility: CLINIC | Age: 44
End: 2024-07-05
Payer: MEDICAID

## 2024-07-05 DIAGNOSIS — M25.512 CHRONIC LEFT SHOULDER PAIN: Primary | ICD-10-CM

## 2024-07-05 DIAGNOSIS — M19.012 ARTHRITIS OF LEFT ACROMIOCLAVICULAR JOINT: ICD-10-CM

## 2024-07-05 DIAGNOSIS — I69.30 H/O: STROKE WITH RESIDUAL EFFECTS: ICD-10-CM

## 2024-07-05 DIAGNOSIS — M67.912 DISORDER OF LEFT ROTATOR CUFF: ICD-10-CM

## 2024-07-05 DIAGNOSIS — G89.29 CHRONIC LEFT SHOULDER PAIN: Primary | ICD-10-CM

## 2024-07-05 NOTE — PROGRESS NOTES
Physical Therapy Treatment Note  [address], [place] [address]    Patient: James Taylor Jr.                                                 Visit Date: 2024  :     1980    Referring practitioner:    Roberto Carlos Gillespie MD  Date of Initial Visit:          Type: THERAPY  Noted: 2024    Patient seen for 5 sessions    Visit Diagnoses:    ICD-10-CM ICD-9-CM   1. Chronic left shoulder pain  M25.512 719.41    G89.29 338.29   2. Arthritis of left acromioclavicular joint  M19.012 716.91   3. Disorder of left rotator cuff  M67.912 726.10   4. H/O: stroke with residual effects  I69.30 438.9     SUBJECTIVE     Subjective: States he is able to move a little more than before, but still has increased pain with overhead motions.     PAIN: 7-8/10 when overhead     OBJECTIVE     Objective     Therapeutic Exercises    10418 Units Comments   SA punches 2x10    Supine sh flex to 45 deg 2x10    Supine sh ER stretch w/ 1# DB 2x10    Wrist flex/Ext w/ R tbar 2x10 ea    Wrist sup/pro w/ R tbar 2x10 ea         Timed Minutes 19     Manual Therapy     35799  Comments   L sh PROM    L sh LAD    STM to R shoulder globally    Timed Minutes 12     Modalities Comments   Cold laser/Estim combo L shoulder    DCP setting   Minutes 10        Therapy Education/Self Care 97427   Education offered today Nature of condition, POC moving forward, HEP provided and trained    Medbride Code     Ongoing HEP   Access Code: [email or web address]  URL: [email or web address]  Date: 2024  Prepared by: Vidya De La Garza     Exercises  - Supine Shoulder Flexion Extension AAROM with Dowel  - 1 x daily - 7 x weekly - 3 sets - 10 reps  - Standing Shoulder External Rotation AAROM with Dowel  - 1 x daily - 7 x weekly - 3 sets - 10 reps  - Shoulder Scaption AAROM with Dowel  - 1 x daily - 7 x weekly - 3 sets - 10 reps  - Seated Scapular Retraction  - 1 x daily - 7 x weekly - 3 sets - 10 reps -  5 hold   Timed Minutes         Total Timed Treatment:     41   mins  Total Time of Visit:             41   mins         ASSESSMENT/PLAN     GOALS  Goals                                                    Progress Note due by 7/27/24                                                                Recert due by 8/28/24   STG by: 4 weeks Comments Date Status   Decrease subjective pain to 2/10  8/10 going overhead 6/27 ongoing   Improve postural awareness to decrease L impingement   still has forward shoulders  6/27 ongoing             LTG by: 8 weeks         Improve L shoulder flexion to at least symmetrical with R shoulder to improve ability to reach overhead for home tasks  still has decreased shoulder mobility  6/27  ongoing   Able to resume household and work activities without exacerbation of symptoms  has increased pain with overhead activities  6/27 ongoing   Gross shoulder MMT at 4+  see chart above  6/27 ongoing   Understands improved ergonomics for work and HEP for flexibility and stability  performs a few times a week  6/27 ongoing   Improve QuickDASH score to 40 or less  61.36  6/27  ongoing   Reports no pain except occasional minor twinges no more than 1/10  8/10 when overhead 6/27 ongoing       Assessment/Plan     ASSESSMENT:   Pt reported cont pain when raising his LUE overhead, noting he feels like he can move better in the range that he has. He cont to have tightness into all motions, with TP activity in his bicep/deltoid junction. He had decreased discomfort during exercises compared to previous visits. He reported some decreased tightness following treatment.    PLAN:   Work toward increasing LUE ROM and strength, while also addressing postural strain that further impairs his ROM. Progress HEP for same.     SIGNATURE: Benjamin Bhagat PTA, [place] License #: [id number]  Electronically Signed on 7/5/2024        [place] [address]  [phone]

## 2024-07-08 ENCOUNTER — LAB (OUTPATIENT)
Dept: LAB | Facility: HOSPITAL | Age: 44
End: 2024-07-08
Payer: MEDICAID

## 2024-07-08 DIAGNOSIS — K86.1 CHRONIC PANCREATITIS, UNSPECIFIED PANCREATITIS TYPE: ICD-10-CM

## 2024-07-08 LAB
ALBUMIN SERPL-MCNC: 4.8 G/DL (ref 3.5–5.2)
ALBUMIN/GLOB SERPL: 1.4 G/DL
ALP SERPL-CCNC: 167 U/L (ref 39–117)
ALT SERPL W P-5'-P-CCNC: 63 U/L (ref 1–41)
AMYLASE SERPL-CCNC: 172 U/L (ref 28–100)
ANION GAP SERPL CALCULATED.3IONS-SCNC: 14 MMOL/L (ref 5–15)
AST SERPL-CCNC: 58 U/L (ref 1–40)
BACTERIA UR QL AUTO: ABNORMAL /HPF
BASOPHILS # BLD AUTO: 0.06 10*3/MM3 (ref 0–0.2)
BASOPHILS NFR BLD AUTO: 0.7 % (ref 0–1.5)
BILIRUB SERPL-MCNC: 0.6 MG/DL (ref 0–1.2)
BILIRUB UR QL STRIP: NEGATIVE
BUN SERPL-MCNC: 12 MG/DL (ref 6–20)
BUN/CREAT SERPL: 11.5 (ref 7–25)
CALCIUM SPEC-SCNC: 9.9 MG/DL (ref 8.6–10.5)
CHLORIDE SERPL-SCNC: 97 MMOL/L (ref 98–107)
CLARITY UR: CLEAR
CO2 SERPL-SCNC: 24 MMOL/L (ref 22–29)
COLOR UR: YELLOW
CREAT SERPL-MCNC: 1.04 MG/DL (ref 0.76–1.27)
DEPRECATED RDW RBC AUTO: 36.7 FL (ref 37–54)
EGFRCR SERPLBLD CKD-EPI 2021: 91.4 ML/MIN/1.73
EOSINOPHIL # BLD AUTO: 0.31 10*3/MM3 (ref 0–0.4)
EOSINOPHIL NFR BLD AUTO: 3.5 % (ref 0.3–6.2)
ERYTHROCYTE [DISTWIDTH] IN BLOOD BY AUTOMATED COUNT: 12.7 % (ref 12.3–15.4)
ERYTHROCYTE [SEDIMENTATION RATE] IN BLOOD: 2 MM/HR (ref 0–15)
GLOBULIN UR ELPH-MCNC: 3.5 GM/DL
GLUCOSE SERPL-MCNC: 80 MG/DL (ref 65–99)
GLUCOSE UR STRIP-MCNC: NEGATIVE MG/DL
HCT VFR BLD AUTO: 46.6 % (ref 37.5–51)
HGB BLD-MCNC: 17.4 G/DL (ref 13–17.7)
HGB UR QL STRIP.AUTO: NEGATIVE
HYALINE CASTS UR QL AUTO: ABNORMAL /LPF
IMM GRANULOCYTES # BLD AUTO: 0.03 10*3/MM3 (ref 0–0.05)
IMM GRANULOCYTES NFR BLD AUTO: 0.3 % (ref 0–0.5)
KETONES UR QL STRIP: ABNORMAL
LEUKOCYTE ESTERASE UR QL STRIP.AUTO: ABNORMAL
LIPASE SERPL-CCNC: 22 U/L (ref 13–60)
LYMPHOCYTES # BLD AUTO: 3.83 10*3/MM3 (ref 0.7–3.1)
LYMPHOCYTES NFR BLD AUTO: 43.6 % (ref 19.6–45.3)
MCH RBC QN AUTO: 30.2 PG (ref 26.6–33)
MCHC RBC AUTO-ENTMCNC: 37.3 G/DL (ref 31.5–35.7)
MCV RBC AUTO: 80.8 FL (ref 79–97)
MONOCYTES # BLD AUTO: 0.44 10*3/MM3 (ref 0.1–0.9)
MONOCYTES NFR BLD AUTO: 5 % (ref 5–12)
NEUTROPHILS NFR BLD AUTO: 4.12 10*3/MM3 (ref 1.7–7)
NEUTROPHILS NFR BLD AUTO: 46.9 % (ref 42.7–76)
NITRITE UR QL STRIP: NEGATIVE
PH UR STRIP.AUTO: <=5 [PH] (ref 5–8)
PLATELET # BLD AUTO: 167 10*3/MM3 (ref 140–450)
PMV BLD AUTO: 10.8 FL (ref 6–12)
POTASSIUM SERPL-SCNC: 4.3 MMOL/L (ref 3.5–5.2)
PROT SERPL-MCNC: 8.3 G/DL (ref 6–8.5)
PROT UR QL STRIP: NEGATIVE
RBC # BLD AUTO: 5.77 10*6/MM3 (ref 4.14–5.8)
RBC # UR STRIP: ABNORMAL /HPF
REF LAB TEST METHOD: ABNORMAL
SODIUM SERPL-SCNC: 135 MMOL/L (ref 136–145)
SP GR UR STRIP: 1.01 (ref 1–1.03)
SQUAMOUS #/AREA URNS HPF: ABNORMAL /HPF
UROBILINOGEN UR QL STRIP: ABNORMAL
WBC # UR STRIP: ABNORMAL /HPF
WBC NRBC COR # BLD AUTO: 8.79 10*3/MM3 (ref 3.4–10.8)

## 2024-07-08 PROCEDURE — 36415 COLL VENOUS BLD VENIPUNCTURE: CPT

## 2024-07-08 PROCEDURE — 85652 RBC SED RATE AUTOMATED: CPT

## 2024-07-08 PROCEDURE — 83690 ASSAY OF LIPASE: CPT

## 2024-07-08 PROCEDURE — 80053 COMPREHEN METABOLIC PANEL: CPT | Performed by: NURSE PRACTITIONER

## 2024-07-08 PROCEDURE — 82150 ASSAY OF AMYLASE: CPT

## 2024-07-08 PROCEDURE — 85025 COMPLETE CBC W/AUTO DIFF WBC: CPT | Performed by: NURSE PRACTITIONER

## 2024-07-08 PROCEDURE — 81001 URINALYSIS AUTO W/SCOPE: CPT | Performed by: NURSE PRACTITIONER

## 2024-07-09 ENCOUNTER — OFFICE VISIT (OUTPATIENT)
Dept: FAMILY MEDICINE CLINIC | Facility: CLINIC | Age: 44
End: 2024-07-09
Payer: MEDICAID

## 2024-07-09 VITALS
WEIGHT: 185 LBS | TEMPERATURE: 97.1 F | SYSTOLIC BLOOD PRESSURE: 130 MMHG | RESPIRATION RATE: 20 BRPM | HEIGHT: 70 IN | HEART RATE: 74 BPM | BODY MASS INDEX: 26.48 KG/M2 | DIASTOLIC BLOOD PRESSURE: 70 MMHG | OXYGEN SATURATION: 98 %

## 2024-07-09 DIAGNOSIS — Z23 PNEUMOCOCCAL VACCINE ADMINISTERED: ICD-10-CM

## 2024-07-09 DIAGNOSIS — Z63.4 BEREAVEMENT: ICD-10-CM

## 2024-07-09 DIAGNOSIS — I10 PRIMARY HYPERTENSION: ICD-10-CM

## 2024-07-09 DIAGNOSIS — F41.9 ANXIETY: ICD-10-CM

## 2024-07-09 DIAGNOSIS — R45.4 IRRITABLE: ICD-10-CM

## 2024-07-09 DIAGNOSIS — R17 JAUNDICE: ICD-10-CM

## 2024-07-09 DIAGNOSIS — F10.20 ALCOHOLISM: ICD-10-CM

## 2024-07-09 DIAGNOSIS — K86.1 CHRONIC PANCREATITIS, UNSPECIFIED PANCREATITIS TYPE: ICD-10-CM

## 2024-07-09 DIAGNOSIS — N34.1 NONGONOCOCCAL URETHRITIS DUE TO UREAPLASMA UREALYTICUM: ICD-10-CM

## 2024-07-09 DIAGNOSIS — Z00.00 WELLNESS EXAMINATION: Primary | ICD-10-CM

## 2024-07-09 DIAGNOSIS — F43.29 STRESS AND ADJUSTMENT REACTION: ICD-10-CM

## 2024-07-09 DIAGNOSIS — A49.3 NONGONOCOCCAL URETHRITIS DUE TO UREAPLASMA UREALYTICUM: ICD-10-CM

## 2024-07-09 PROCEDURE — 3075F SYST BP GE 130 - 139MM HG: CPT | Performed by: NURSE PRACTITIONER

## 2024-07-09 PROCEDURE — 1125F AMNT PAIN NOTED PAIN PRSNT: CPT | Performed by: NURSE PRACTITIONER

## 2024-07-09 PROCEDURE — 1160F RVW MEDS BY RX/DR IN RCRD: CPT | Performed by: NURSE PRACTITIONER

## 2024-07-09 PROCEDURE — 1159F MED LIST DOCD IN RCRD: CPT | Performed by: NURSE PRACTITIONER

## 2024-07-09 PROCEDURE — 3078F DIAST BP <80 MM HG: CPT | Performed by: NURSE PRACTITIONER

## 2024-07-09 PROCEDURE — 90471 IMMUNIZATION ADMIN: CPT | Performed by: NURSE PRACTITIONER

## 2024-07-09 PROCEDURE — 90677 PCV20 VACCINE IM: CPT | Performed by: NURSE PRACTITIONER

## 2024-07-09 PROCEDURE — 99396 PREV VISIT EST AGE 40-64: CPT | Performed by: NURSE PRACTITIONER

## 2024-07-09 RX ORDER — NALTREXONE HYDROCHLORIDE 50 MG/1
50 TABLET, FILM COATED ORAL DAILY
Qty: 30 TABLET | Refills: 2 | Status: SHIPPED | OUTPATIENT
Start: 2024-07-09

## 2024-07-09 RX ORDER — SERTRALINE HYDROCHLORIDE 100 MG/1
100 TABLET, FILM COATED ORAL DAILY
Qty: 30 TABLET | Refills: 2 | Status: SHIPPED | OUTPATIENT
Start: 2024-07-09

## 2024-07-09 RX ORDER — DOXYCYCLINE HYCLATE 100 MG/1
100 CAPSULE ORAL 2 TIMES DAILY
Qty: 20 CAPSULE | Refills: 0 | Status: SHIPPED | OUTPATIENT
Start: 2024-07-09 | End: 2024-07-19

## 2024-07-09 SDOH — SOCIAL STABILITY - SOCIAL INSECURITY: DISSAPEARANCE AND DEATH OF FAMILY MEMBER: Z63.4

## 2024-07-09 NOTE — PATIENT INSTRUCTIONS
Increase zoloft to 100mg    Restart naltrexone to help with alcohol craving.     Take tens unit to DME of choice. If not able to fill script  contact number on back of card to see where you can  tens unit.     Take doxycycline twice daily for 10 days. Avoid sexual relations until completed.

## 2024-07-09 NOTE — PROGRESS NOTES
NAM Renteria  [facility]   Family Medicine  [address]  [place] [address]  Phone: [phone]  Fax: [phone]         Chief Complaint:  Chief Complaint   Patient presents with    3 month follow up      Patient stated no medication refills needed     Annual Exam        History:  James Taylor Jr. is a 43 y.o. male.  History of Present Illness    Annual Exam:   Immunizations:      - Tetanus: Unknown or >10 years ago. Recommend to have at pharmacy or on injury.The patient is uncertain about his tetanus vaccination status. He sustained a finger fracture at his workplace      - Influenza: Recommend yearly.      - Prevnar: Will be given today.       - Shingrix: Series after 50      - COVID: Recommended per CDC guidelines      -RSV: Not indicated   CRC screenin2024  DEXA: DEXA scan at 65 if indicated  PSA: not indicated. He denies any family history of prostate cancer.  AAA screening:  Low dose CT chest:  Mental health concerns:  Smoking status:  reports that he has been smoking cigarettes. He started smoking about 12 years ago. He has a 15.4 pack-year smoking history. He has been exposed to tobacco smoke. He has never used smokeless tobacco. He reports that he does not currently use alcohol after a past usage of about 4.0 standard drinks of alcohol per week. He reports that he does not use drugs.      2. HTN: His blood pressure has been well-controlled, although he does not monitor his blood pressure at home. His current medication regimen includes Hytrin 5 mg at bedtime, metoprolol 50 mg, losartan 25 mg, and Norvasc 10 mg.     3. Chronic pancreatitis/ etoh abuse: He denies experiencing abdominal pain. Recent laboratory tests were conducted. His mother passed away on 06/10/2023, during which he experienced an ETOH relapse. Naltrexone provided some relief previously. He denies experiencing nausea, vomiting, or constipation.    4 The patient's pregnant wife reports  "recurrent ureaplasma BV infection. She request that pt be treated for ureaplasma Urealyticum.      5. The patient continues to experience pain in his feet and toes.He never received the tens unit ordered after last appointment.          ROS:  Review of Systems   Constitutional:  Positive for fatigue. Negative for fever and unexpected weight change.   HENT:  Negative for congestion, ear pain, rhinorrhea, sinus pressure, sinus pain and voice change.    Eyes:  Negative for visual disturbance.   Respiratory:  Negative for shortness of breath and wheezing.    Cardiovascular:  Negative for chest pain and palpitations.   Gastrointestinal:  Negative for abdominal pain, nausea and vomiting.   Genitourinary:  Negative for dysuria and flank pain.   Musculoskeletal:  Negative for back pain, myalgias and neck pain.        Foot pain   Skin:  Negative for color change and rash.   Neurological:  Negative for dizziness, weakness, numbness and headaches.   Psychiatric/Behavioral:  Negative for behavioral problems, dysphoric mood, self-injury and sleep disturbance. The patient is nervous/anxious.            Current Outpatient Medications   Medication Instructions    amLODIPine (NORVASC) 10 mg, Oral, Daily    doxycycline (VIBRAMYCIN) 100 mg, Oral, 2 Times Daily    gabapentin (NEURONTIN) 300 MG capsule TID prn alcohol withdrawal/ chronic back pain/ neuropathy.    losartan (COZAAR) 25 mg, Oral, Daily    metoprolol succinate XL (TOPROL-XL) 50 mg, Oral, Daily    naltrexone (DEPADE) 50 mg, Oral, Daily    omeprazole (PRILOSEC) 20 mg, Oral, Daily    sertraline (ZOLOFT) 100 mg, Oral, Daily, To help with mood. Take with food    terazosin (HYTRIN) 5 mg, Oral, Nightly       OBJECTIVE:  /70 (BP Location: Left arm, Patient Position: Sitting, Cuff Size: Adult)   Pulse 74   Temp 97.1 °F (36.2 °C) (Infrared)   Resp 20   Ht 177.8 cm (70\")   Wt 83.9 kg (185 lb)   SpO2 98%   BMI 26.54 kg/m²    Physical Exam  Vitals and nursing note reviewed. "   Constitutional:       Appearance: Normal appearance. He is well-developed.   HENT:      Head: Normocephalic and atraumatic.      Right Ear: Tympanic membrane, ear canal and external ear normal.      Left Ear: Tympanic membrane, ear canal and external ear normal.      Nose: Nose normal. No septal deviation, nasal tenderness or congestion.      Mouth/Throat:      Lips: Pink. No lesions.      Mouth: Mucous membranes are moist. No oral lesions.      Dentition: Normal dentition.      Pharynx: Oropharynx is clear. No pharyngeal swelling, oropharyngeal exudate or posterior oropharyngeal erythema.   Eyes:      General: Lids are normal. Vision grossly intact. No scleral icterus.        Right eye: No discharge.         Left eye: No discharge.      Extraocular Movements: Extraocular movements intact.      Conjunctiva/sclera: Conjunctivae normal.      Right eye: Right conjunctiva is not injected.      Left eye: Left conjunctiva is not injected.      Pupils: Pupils are equal, round, and reactive to light.        Comments: Jaundice noted.    Neck:      Thyroid: No thyroid mass.      Trachea: Trachea normal.   Cardiovascular:      Rate and Rhythm: Normal rate and regular rhythm.      Heart sounds: Normal heart sounds. No murmur heard.     No gallop.   Pulmonary:      Effort: Pulmonary effort is normal.      Breath sounds: Normal breath sounds and air entry. No wheezing, rhonchi or rales.   Musculoskeletal:         General: No tenderness or deformity. Normal range of motion.      Cervical back: Full passive range of motion without pain, normal range of motion and neck supple.      Thoracic back: Normal.      Right lower leg: No edema.      Left lower leg: No edema.   Skin:     General: Skin is warm and dry.      Coloration: Skin is not jaundiced.      Findings: No rash.   Neurological:      Mental Status: He is alert and oriented to person, place, and time.      Sensory: Sensation is intact.      Motor: Motor function is intact.       Coordination: Coordination is intact.      Gait: Gait is intact.      Deep Tendon Reflexes: Reflexes are normal and symmetric.   Psychiatric:         Mood and Affect: Mood and affect normal.         Behavior: Behavior normal.         Judgment: Judgment normal.       Physical Exam  Vital Signs  Vitals show a blood pressure of 130/70.         Procedures    Results  Laboratory Studies  Lipase was 22. Amylase was 172. Sodium was slightly low. Liver enzymes were elevated.    Assessment/Plan:     Diagnoses and all orders for this visit:    1. Wellness examination (Primary)    2. Primary hypertension    3. 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  Dispense: 20 capsule; Refill: 0    4. Chronic pancreatitis, unspecified pancreatitis type    5. Irritable  -     sertraline (ZOLOFT) 100 MG tablet; Take 1 tablet by mouth Daily. To help with mood. Take with food  Dispense: 30 tablet; Refill: 2    6. Alcoholism  -     naltrexone (DEPADE) 50 MG tablet; Take 1 tablet by mouth Daily.  Dispense: 30 tablet; Refill: 2    7. Stress and adjustment reaction  -     sertraline (ZOLOFT) 100 MG tablet; Take 1 tablet by mouth Daily. To help with mood. Take with food  Dispense: 30 tablet; Refill: 2    8. Anxiety  -     sertraline (ZOLOFT) 100 MG tablet; Take 1 tablet by mouth Daily. To help with mood. Take with food  Dispense: 30 tablet; Refill: 2    9. Bereavement    10. Pneumococcal vaccine administered  -     Pneumococcal Conjugate Vaccine 20-Valent All    11. Jaundice          An After Visit Summary was printed and given to the patient at discharge.  Return in about 4 weeks (around 8/6/2024).       Assessment & Plan  1. Wellness visit.  The dosage of Zoloft will be escalated to 100 mg. Naltrexone will be reintroduced to aid in alcohol cessation.. A TENS unit will be provided to DME of choice. Doxycycline will be  administered twice daily for a duration of 10 days for treatment of UU.     Follow-up  A follow-up appointment is scheduled for 1 month from now.    I spent 35 minutes caring for [place] on this date of service. This time includes time spent by me in the following activities: preparing for the visit, reviewing tests, performing a medically appropriate examination and/or evaluation, counseling and educating the patient/family/caregiver, referring and communicating with other health care professionals, documenting information in the medical record, independently interpreting results and communicating that information with the patient/family/caregiver, care coordination, ordering medications, and ordering test(s)     Casi BROWNING 7/11/2024   Electronically signed.    Patient or patient representative verbalized consent for the use of Ambient Listening during the visit with  NAM Renteria for chart documentation. 7/11/2024  22:52 CDT

## 2024-07-11 PROBLEM — F41.9 ANXIETY: Status: ACTIVE | Noted: 2024-07-11

## 2024-07-12 ENCOUNTER — TELEPHONE (OUTPATIENT)
Dept: FAMILY MEDICINE CLINIC | Facility: CLINIC | Age: 44
End: 2024-07-12
Payer: MEDICAID

## 2024-07-12 NOTE — TELEPHONE ENCOUNTER
Pt is aware of lab results----- Message from Casi Skinner sent at 7/11/2024 10:40 PM CDT -----  Amylase elevated at 172.  Patient's lipase is within normal limits at 22.  Sed rate within normal limits.  Amylase could be elevating due to his increased alcohol consumption recently or due to his chronic pancreatitis.  Patient needs to try and avoid alcohol.

## 2024-07-15 ENCOUNTER — TREATMENT (OUTPATIENT)
Dept: PHYSICAL THERAPY | Facility: CLINIC | Age: 44
End: 2024-07-15
Payer: MEDICAID

## 2024-07-15 DIAGNOSIS — M67.912 DISORDER OF LEFT ROTATOR CUFF: ICD-10-CM

## 2024-07-15 DIAGNOSIS — M25.512 CHRONIC LEFT SHOULDER PAIN: Primary | ICD-10-CM

## 2024-07-15 DIAGNOSIS — I69.30 H/O: STROKE WITH RESIDUAL EFFECTS: ICD-10-CM

## 2024-07-15 DIAGNOSIS — M19.012 ARTHRITIS OF LEFT ACROMIOCLAVICULAR JOINT: ICD-10-CM

## 2024-07-15 DIAGNOSIS — G89.29 CHRONIC LEFT SHOULDER PAIN: Primary | ICD-10-CM

## 2024-07-15 NOTE — PROGRESS NOTES
Physical Therapy Treatment Note  [address] [place] [address]    Patient: James Taylor Jr.                                                 Visit Date: 7/15/2024  :     1980    Referring practitioner:    Roberto Carlos Gillespie MD  Date of Initial Visit:          Type: THERAPY  Noted: 2024    Patient seen for 6 sessions    Visit Diagnoses:    ICD-10-CM ICD-9-CM   1. Chronic left shoulder pain  M25.512 719.41    G89.29 338.29   2. Arthritis of left acromioclavicular joint  M19.012 716.91   3. Disorder of left rotator cuff  M67.912 726.10   4. H/O: stroke with residual effects  I69.30 438.9     SUBJECTIVE     Subjective: pt was a little sore following last session but not too much. He is doing well with his HEP.       PAIN: no pain at rest, only when elevating arm      OBJECTIVE     Objective     Therapeutic Exercises    90785 Units Comments   L UE isometric presses against small ball and wall- flex, abd, IR, and ER   X10 each     L UE AAROM table slides on SB- flex and abd  x10 each    L UE AAROM flexion in SL over SB   2x10          Applied sureprep and KT tape to L shoulder   Scap retract to promote improved GH relational positions        Timed Minutes 20     Manual Therapy     04729  Comments   L sh LAD    L GH posterior mobs         Timed Minutes 10     Modalities Comments   Cold laser/Estim combo L shoulder       Minutes 10        Therapy Education/Self Care 93695   Education offered today Nature of condition, POC moving forward, HEP provided and trained    Medbride Code     Ongoing HEP   Access Code: [email or web address]  URL: [email or web address]  Date: 2024  Prepared by: Vidya De La Garza     Exercises  - Supine Shoulder Flexion Extension AAROM with Dowel  - 1 x daily - 7 x weekly - 3 sets - 10 reps  - Standing Shoulder External Rotation AAROM with Dowel  - 1 x daily - 7 x weekly - 3 sets - 10 reps  - Shoulder Scaption AAROM with  Dowel  - 1 x daily - 7 x weekly - 3 sets - 10 reps  - Seated Scapular Retraction  - 1 x daily - 7 x weekly - 3 sets - 10 reps - 5 hold   Timed Minutes         Total Timed Treatment:     40   mins  Total Time of Visit:             40   mins         ASSESSMENT/PLAN     GOALS  Goals                                                    Progress Note due by 7/27/24                                                                Recert due by 8/28/24   STG by: 4 weeks Comments Date Status   Decrease subjective pain to 2/10  8/10 going overhead 6/27 ongoing   Improve postural awareness to decrease L impingement   still has forward shoulders  6/27 ongoing             LTG by: 8 weeks         Improve L shoulder flexion to at least symmetrical with R shoulder to improve ability to reach overhead for home tasks  still has decreased shoulder mobility  6/27  ongoing   Able to resume household and work activities without exacerbation of symptoms  has increased pain with overhead activities  6/27 ongoing   Gross shoulder MMT at 4+  see chart above  6/27 ongoing   Understands improved ergonomics for work and HEP for flexibility and stability  performs a few times a week  6/27 ongoing   Improve QuickDASH score to 40 or less  61.36  6/27  ongoing   Reports no pain except occasional minor twinges no more than 1/10  8/10 when overhead 6/27 ongoing       Assessment/Plan     ASSESSMENT:   Today we engaged in isometric and AAROM strengthening. It did not seem to make a difference in active flexion when OP was applied to anterior shoulder, however he does demonstrate increased rounded shoulders. KT tape was utilized to address this.     PLAN:   Work toward increasing LUE ROM and strength, while also addressing postural strain that further impairs his ROM. Progress HEP for same.     SIGNATURE: Kendy Pimentel PTA, [place] License #: [id number]  Electronically Signed on 7/15/2024        [address] [place] [address] [place]  [place] [address]  [phone]

## 2024-07-29 ENCOUNTER — TELEPHONE (OUTPATIENT)
Dept: PHYSICAL THERAPY | Facility: CLINIC | Age: 44
End: 2024-07-29

## 2024-08-02 ENCOUNTER — TREATMENT (OUTPATIENT)
Dept: PHYSICAL THERAPY | Facility: CLINIC | Age: 44
End: 2024-08-02
Payer: MEDICAID

## 2024-08-02 DIAGNOSIS — I69.30 H/O: STROKE WITH RESIDUAL EFFECTS: ICD-10-CM

## 2024-08-02 DIAGNOSIS — M19.012 ARTHRITIS OF LEFT ACROMIOCLAVICULAR JOINT: ICD-10-CM

## 2024-08-02 DIAGNOSIS — M25.512 CHRONIC LEFT SHOULDER PAIN: Primary | ICD-10-CM

## 2024-08-02 DIAGNOSIS — G89.29 CHRONIC LEFT SHOULDER PAIN: Primary | ICD-10-CM

## 2024-08-02 DIAGNOSIS — M67.912 DISORDER OF LEFT ROTATOR CUFF: ICD-10-CM

## 2024-08-02 NOTE — PROGRESS NOTES
Physical Therapy Treatment Note  [address] [place] [address]    Patient: James Taylor Jr.                                                 Visit Date: 2024  :     1980    Referring practitioner:    Roberto Carlos Gillespie MD  Date of Initial Visit:          Type: THERAPY  Noted: 2024    Patient seen for 7 sessions    Visit Diagnoses:    ICD-10-CM ICD-9-CM   1. Chronic left shoulder pain  M25.512 719.41    G89.29 338.29   2. Arthritis of left acromioclavicular joint  M19.012 716.91   3. Disorder of left rotator cuff  M67.912 726.10   4. H/O: stroke with residual effects  I69.30 438.9       SUBJECTIVE     Subjective: States he still has pain when trying to raise his arm, noting it is worse out to the side than to the front.    PAIN: no pain at rest, only when elevating arm      OBJECTIVE     Objective     Therapeutic Exercises    43063 Units Comments   Wall ball roll into flex x20    IR/ER ISO walkouts w/ YTB X15 ea    Scap 4 way @ cybex L2 X15 ea    L UE AAROM table slides on SB- abd  x20         Timed Minutes 26     Manual Therapy     06075  Comments   L sh PROM    L sh LAD    L GH posterior mobs     L UT/LS STM    Timed Minutes 15        Therapy Education/Self Care 93895   Education offered today    Jeanette Code  [email or web address]   Ongoing HEP     Date: 2024  Prepared by: Vidya De La Garza     Exercises  - Supine Shoulder Flexion Extension AAROM with Dowel  - 1 x daily - 7 x weekly - 3 sets - 10 reps  - Standing Shoulder External Rotation AAROM with Dowel  - 1 x daily - 7 x weekly - 3 sets - 10 reps  - Shoulder Scaption AAROM with Dowel  - 1 x daily - 7 x weekly - 3 sets - 10 reps  - Seated Scapular Retraction  - 1 x daily - 7 x weekly - 3 sets - 10 reps - 5 hold   Timed Minutes         Total Timed Treatment:     41   mins  Total Time of Visit:             41   mins         ASSESSMENT/PLAN     GOALS  Goals                                                     Progress Note due by 8/28/24                                                                Recert due by 8/28/24   STG by: 4 weeks Comments Date Status   Decrease subjective pain to 2/10  8/10 going overhead 7/29 ongoing   Improve postural awareness to decrease L impingement   still has forward shoulders  7/29 ongoing   LTG by: 8 weeks         Improve L shoulder flexion to at least symmetrical with R shoulder to improve ability to reach overhead for home tasks  still has decreased shoulder mobility  7/29  ongoing   Able to resume household and work activities without exacerbation of symptoms  has increased pain with overhead activities  7/29 ongoing   Gross shoulder MMT at 4+  see chart above  7/29 ongoing   Understands improved ergonomics for work and HEP for flexibility and stability  performs a few times a week  7/29 ongoing   Improve QuickDASH score to 40 or less  61.36  7/29  ongoing   Reports no pain except occasional minor twinges no more than 1/10  8/10 when overhead 7/29 ongoing       Assessment/Plan     ASSESSMENT: Pt reported cont pain and difficulty raising his LUE into ABD, noting FLEX has gotten slightly easier. He noted no discomfort during exercises, but did require some verbal/tactile cueing to perform scap 4 way with correct technique. He cont to have some L sh/UT/LS tightness, noting some decreased tightness with overhead flex following treatment.    PLAN: Work toward increasing LUE ROM and strength, while also addressing postural strain that further impairs his ROM. Progress HEP for same.     SIGNATURE: Benjamin Bhagat PTA, [place] License #: [id number]  Electronically Signed on 8/2/2024        [address] [place] [address]. [address]  [phone]

## 2024-08-06 ENCOUNTER — TREATMENT (OUTPATIENT)
Dept: PHYSICAL THERAPY | Facility: CLINIC | Age: 44
End: 2024-08-06
Payer: MEDICAID

## 2024-08-06 DIAGNOSIS — I69.30 H/O: STROKE WITH RESIDUAL EFFECTS: ICD-10-CM

## 2024-08-06 DIAGNOSIS — M25.512 CHRONIC LEFT SHOULDER PAIN: Primary | ICD-10-CM

## 2024-08-06 DIAGNOSIS — G89.29 CHRONIC LEFT SHOULDER PAIN: Primary | ICD-10-CM

## 2024-08-06 DIAGNOSIS — M19.012 ARTHRITIS OF LEFT ACROMIOCLAVICULAR JOINT: ICD-10-CM

## 2024-08-06 DIAGNOSIS — M67.912 DISORDER OF LEFT ROTATOR CUFF: ICD-10-CM

## 2024-08-06 PROCEDURE — 97140 MANUAL THERAPY 1/> REGIONS: CPT | Performed by: PHYSICAL THERAPIST

## 2024-08-06 PROCEDURE — 97110 THERAPEUTIC EXERCISES: CPT | Performed by: PHYSICAL THERAPIST

## 2024-08-06 PROCEDURE — 97032 APPL MODALITY 1+ESTIM EA 15: CPT | Performed by: PHYSICAL THERAPIST

## 2024-08-06 NOTE — PROGRESS NOTES
Physical Therapy Treatment Note  [address] [place] [address]    Patient: James Taylor Jr.                                                 Visit Date: 2024  :     1980    Referring practitioner:    Roberto Carlos Gillespie MD  Date of Initial Visit:          Type: THERAPY  Noted: 2024    Patient seen for 8 sessions    Visit Diagnoses:    ICD-10-CM ICD-9-CM   1. Chronic left shoulder pain  M25.512 719.41    G89.29 338.29   2. Arthritis of left acromioclavicular joint  M19.012 716.91   3. Disorder of left rotator cuff  M67.912 726.10   4. H/O: stroke with residual effects  I69.30 438.9       SUBJECTIVE     Subjective: States he still has pain when raising his arm out to the side, but straight up in front of him is a little easier.    PAIN: no pain at rest, only when elevating arm      OBJECTIVE     Objective     Therapeutic Exercises    72512 Units Comments   High/low rows @ cybex L3 2x10    Wall ball roll into flex x20    BUE phasic w/ RTB 2x10         Timed Minutes 20     Manual Therapy     65727  Comments   L sh PROM    L sh LAD        Timed Minutes 10     Modalities Comments   Cold laser/estim combo L sh    DCP   Minutes 10        Therapy Education/Self Care 22798   Education offered today    [facility] Code  [email or web address]   Ongoing HEP     Date: 2024  Prepared by: Vidya De La Garza     Exercises  - Supine Shoulder Flexion Extension AAROM with Dowel  - 1 x daily - 7 x weekly - 3 sets - 10 reps  - Standing Shoulder External Rotation AAROM with Dowel  - 1 x daily - 7 x weekly - 3 sets - 10 reps  - Shoulder Scaption AAROM with Dowel  - 1 x daily - 7 x weekly - 3 sets - 10 reps  - Seated Scapular Retraction  - 1 x daily - 7 x weekly - 3 sets - 10 reps - 5 hold   Timed Minutes         Total Timed Treatment:     40   mins  Total Time of Visit:             40   mins         ASSESSMENT/PLAN     GOALS  Goals                                                     Progress Note due by 8/28/24                                                                Recert due by 8/28/24   STG by: 4 weeks Comments Date Status   Decrease subjective pain to 2/10  8/10 going overhead 7/29 ongoing   Improve postural awareness to decrease L impingement   still has forward shoulders  7/29 ongoing   LTG by: 8 weeks         Improve L shoulder flexion to at least symmetrical with R shoulder to improve ability to reach overhead for home tasks  still has decreased shoulder mobility  7/29  ongoing   Able to resume household and work activities without exacerbation of symptoms  has increased pain with overhead activities  7/29 ongoing   Gross shoulder MMT at 4+  see chart above  7/29 ongoing   Understands improved ergonomics for work and HEP for flexibility and stability  performs a few times a week  7/29 ongoing   Improve QuickDASH score to 40 or less  61.36  7/29  ongoing   Reports no pain except occasional minor twinges no more than 1/10  8/10 when overhead 7/29 ongoing       Assessment/Plan     ASSESSMENT: Pt reported cont pain and difficulty when raising his LUE into ABD, noting some motions do feel a little easier moving into. He noted no discomfort during exercises, but did note some fatigue following rows @ cybex. He cont to have some L sh/UT/LS tightness, noting some decreased pain and tightness with overhead flex following treatment.    PLAN: Work toward increasing LUE ROM and strength, while also addressing postural strain that further impairs his ROM. Progress HEP for same.     SIGNATURE: Benjamin Bhagat PTA, [place] License #: [id number]  Electronically Signed on 8/6/2024        [address] [place] [address]. [address]  [phone]

## 2024-08-08 ENCOUNTER — TREATMENT (OUTPATIENT)
Dept: PHYSICAL THERAPY | Facility: CLINIC | Age: 44
End: 2024-08-08
Payer: MEDICAID

## 2024-08-08 ENCOUNTER — OFFICE VISIT (OUTPATIENT)
Dept: FAMILY MEDICINE CLINIC | Facility: CLINIC | Age: 44
End: 2024-08-08
Payer: MEDICAID

## 2024-08-08 VITALS
HEIGHT: 70 IN | DIASTOLIC BLOOD PRESSURE: 70 MMHG | RESPIRATION RATE: 19 BRPM | SYSTOLIC BLOOD PRESSURE: 130 MMHG | TEMPERATURE: 97.9 F | WEIGHT: 194 LBS | BODY MASS INDEX: 27.77 KG/M2 | OXYGEN SATURATION: 98 % | HEART RATE: 50 BPM

## 2024-08-08 DIAGNOSIS — F41.9 ANXIETY: ICD-10-CM

## 2024-08-08 DIAGNOSIS — M25.512 CHRONIC LEFT SHOULDER PAIN: Primary | ICD-10-CM

## 2024-08-08 DIAGNOSIS — Z63.4 BEREAVEMENT: ICD-10-CM

## 2024-08-08 DIAGNOSIS — I69.30 H/O: STROKE WITH RESIDUAL EFFECTS: ICD-10-CM

## 2024-08-08 DIAGNOSIS — K86.1 CHRONIC PANCREATITIS, UNSPECIFIED PANCREATITIS TYPE: ICD-10-CM

## 2024-08-08 DIAGNOSIS — I10 PRIMARY HYPERTENSION: Primary | ICD-10-CM

## 2024-08-08 DIAGNOSIS — M67.912 DISORDER OF LEFT ROTATOR CUFF: ICD-10-CM

## 2024-08-08 DIAGNOSIS — G89.29 CHRONIC LEFT SHOULDER PAIN: Primary | ICD-10-CM

## 2024-08-08 DIAGNOSIS — M19.012 ARTHRITIS OF LEFT ACROMIOCLAVICULAR JOINT: ICD-10-CM

## 2024-08-08 DIAGNOSIS — F32.A DEPRESSION, UNSPECIFIED DEPRESSION TYPE: ICD-10-CM

## 2024-08-08 DIAGNOSIS — F10.20 ALCOHOLISM: ICD-10-CM

## 2024-08-08 PROCEDURE — 3078F DIAST BP <80 MM HG: CPT | Performed by: NURSE PRACTITIONER

## 2024-08-08 PROCEDURE — 1125F AMNT PAIN NOTED PAIN PRSNT: CPT | Performed by: NURSE PRACTITIONER

## 2024-08-08 PROCEDURE — 3075F SYST BP GE 130 - 139MM HG: CPT | Performed by: NURSE PRACTITIONER

## 2024-08-08 PROCEDURE — 99214 OFFICE O/P EST MOD 30 MIN: CPT | Performed by: NURSE PRACTITIONER

## 2024-08-08 SDOH — SOCIAL STABILITY - SOCIAL INSECURITY: DISSAPEARANCE AND DEATH OF FAMILY MEMBER: Z63.4

## 2024-08-08 NOTE — PROGRESS NOTES
NAM Renteria  [facility]   Family Medicine  [address]  [place] [address]  Phone: [phone]  Fax: [phone]         Chief Complaint:  Chief Complaint   Patient presents with    1-month follow up     Patient is following up on medications.        History:  James Taylor Jr. is a 43 y.o. male.  History of Present Illness  The patient is a 43-year-old male who presents for evaluation of multiple medical concerns.     Hypertension: Since his last visit a month ago, his blood pressure has remained stable. He is surprised that his blood pressure was elevated during this visit.His blood pressure has been well-controlled, although he does not monitor his blood pressure at home. His current medication regimen includes Hytrin 5 mg at bedtime, metoprolol 50 mg, losartan 25 mg, and Norvasc 10 mg.     2. Depression, anxiety, bereavement: He reports an improvement in his nerves since the increase in his sertraline dosage. However, he is experiencing pain. Naltrexone has been beneficial for him. He was unable to obtain the TENS unit due to insurance issues. He is experiencing back and abdominal pain. He has completed his course of doxycycline.      3. Chronic pancreatitis/ etoh abuse: He denies experiencing abdominal pain.  He denies experiencing nausea, vomiting, or constipation.  Reports that he has decreased his EtOH ingestion down to 1 beer per day.     5. The patient continues to experience pain in his feet and toes.He never received the tens unit ordered after last appointment.              ROS:  Review of Systems   Constitutional:  Positive for fatigue. Negative for fever and unexpected weight change.   HENT:  Negative for congestion, ear pain, rhinorrhea, sinus pressure, sinus pain and voice change.    Eyes:  Negative for visual disturbance.   Respiratory:  Negative for shortness of breath and wheezing.    Cardiovascular:  Negative for chest pain and palpitations.  "  Gastrointestinal:  Negative for abdominal pain, nausea and vomiting.   Genitourinary:  Negative for dysuria and flank pain.   Musculoskeletal:  Negative for back pain, myalgias and neck pain.        Foot pain   Skin:  Negative for color change and rash.   Neurological:  Negative for dizziness, weakness, numbness and headaches.   Psychiatric/Behavioral:  Negative for behavioral problems, dysphoric mood, self-injury and sleep disturbance. The patient is nervous/anxious.         reports that he has been smoking cigarettes. He started smoking about 12 years ago. He has a 15.5 pack-year smoking history. He has been exposed to tobacco smoke. He has never used smokeless tobacco. He reports that he does not currently use alcohol after a past usage of about 4.0 standard drinks of alcohol per week. He reports that he does not use drugs.    Current Outpatient Medications   Medication Instructions    amLODIPine (NORVASC) 10 mg, Oral, Daily    gabapentin (NEURONTIN) 300 MG capsule TID prn alcohol withdrawal/ chronic back pain/ neuropathy.    losartan (COZAAR) 25 mg, Oral, Daily    metoprolol succinate XL (TOPROL-XL) 50 mg, Oral, Daily    naltrexone (DEPADE) 50 mg, Oral, Daily    omeprazole (PRILOSEC) 20 mg, Oral, Daily    sertraline (ZOLOFT) 100 mg, Oral, Daily, To help with mood. Take with food    terazosin (HYTRIN) 5 mg, Oral, Nightly       OBJECTIVE:  /70 (BP Location: Left arm, Patient Position: Sitting, Cuff Size: Adult)   Pulse 50   Temp 97.9 °F (36.6 °C) (Infrared)   Resp 19   Ht 177.8 cm (70\")   Wt 88 kg (194 lb)   SpO2 98%   BMI 27.84 kg/m²    Physical Exam  Vitals and nursing note reviewed.   Constitutional:       Appearance: Normal appearance. He is well-developed.   HENT:      Head: Normocephalic and atraumatic.      Right Ear: Tympanic membrane, ear canal and external ear normal.      Left Ear: Tympanic membrane, ear canal and external ear normal.      Nose: Nose normal. No septal deviation, nasal " tenderness or congestion.      Mouth/Throat:      Lips: Pink. No lesions.      Mouth: Mucous membranes are moist. No oral lesions.      Dentition: Normal dentition.      Pharynx: Oropharynx is clear. No pharyngeal swelling, oropharyngeal exudate or posterior oropharyngeal erythema.   Eyes:      General: Lids are normal. Vision grossly intact. No scleral icterus.        Right eye: No discharge.         Left eye: No discharge.      Extraocular Movements: Extraocular movements intact.      Conjunctiva/sclera: Conjunctivae normal.      Right eye: Right conjunctiva is not injected.      Left eye: Left conjunctiva is not injected.      Pupils: Pupils are equal, round, and reactive to light.        Comments: Jaundice noted.    Neck:      Thyroid: No thyroid mass.      Trachea: Trachea normal.   Cardiovascular:      Rate and Rhythm: Normal rate and regular rhythm.      Heart sounds: Normal heart sounds. No murmur heard.     No gallop.   Pulmonary:      Effort: Pulmonary effort is normal.      Breath sounds: Normal breath sounds and air entry. No wheezing, rhonchi or rales.   Musculoskeletal:         General: No tenderness or deformity. Normal range of motion.      Cervical back: Full passive range of motion without pain, normal range of motion and neck supple.      Thoracic back: Normal.      Right lower leg: No edema.      Left lower leg: No edema.   Skin:     General: Skin is warm and dry.      Coloration: Skin is not jaundiced.      Findings: No rash.   Neurological:      Mental Status: He is alert and oriented to person, place, and time.      Sensory: Sensation is intact.      Motor: Motor function is intact.      Coordination: Coordination is intact.      Gait: Gait is intact.      Deep Tendon Reflexes: Reflexes are normal and symmetric.   Psychiatric:         Mood and Affect: Mood and affect normal.         Behavior: Behavior normal.         Judgment: Judgment normal.       Physical Exam  Vital Signs  Vitals show a  blood pressure of 130/70.         Procedures    Results      Assessment/Plan:     Diagnoses and all orders for this visit:    1. Primary hypertension (Primary)    2. Chronic pancreatitis, unspecified pancreatitis type    3. Anxiety    4. Alcoholism    5. Bereavement          An After Visit Summary was printed and given to the patient at discharge.  No follow-ups on file.       Assessment & Plan  1. Hypertension.    2. Anxiety.  Continuation of sertraline 100 mg is advised.    3. Back pain.  Our nurse will attempt to inquire about the TENS unit. Continuation of naltrexone is advised.    4. Ureaplasma.  He has completed the course of doxycycline.    Follow-up  A follow-up visit is scheduled for 3 months from now, or sooner if necessary.    I spent 31 minutes caring for Manvel on this date of service. This time includes time spent by me in the following activities: preparing for the visit, reviewing tests, performing a medically appropriate examination and/or evaluation, counseling and educating the patient/family/caregiver, documenting information in the medical record, independently interpreting results and communicating that information with the patient/family/caregiver, and care coordination         Casi BROWNING 8/8/2024   Electronically signed.    Patient or patient representative verbalized consent for the use of Ambient Listening during the visit with  NAM Renteria for chart documentation. 8/9/2024  20:17 CDT

## 2024-08-08 NOTE — PROGRESS NOTES
Physical Therapy Treatment Note  [address] [place] [address]    Patient: James Taylor Jr.                                                 Visit Date: 2024  :     1980    Referring practitioner:    Roberto Carlos Gillespie MD  Date of Initial Visit:          Type: THERAPY  Noted: 2024    Patient seen for 9 sessions    Visit Diagnoses:    ICD-10-CM ICD-9-CM   1. Chronic left shoulder pain  M25.512 719.41    G89.29 338.29   2. Arthritis of left acromioclavicular joint  M19.012 716.91   3. Disorder of left rotator cuff  M67.912 726.10   4. H/O: stroke with residual effects  I69.30 438.9       SUBJECTIVE     Subjective: States raising his arm overhead is still hurting some, but reaching behind his back feels better.    PAIN: no pain at rest, only when elevating arm      OBJECTIVE     Objective     Therapeutic Exercises    17532 Units Comments   Chest press w/ 3# DB x10    SA rolls on wall w/ bolster & YTB sh ER  2x10    LUE ISO presses w/ blue ball on wall 10x3 sec ea Flex/ABD/IR/ER/EXT   Ball on wall up/down & side/side w/ rainbow foam ball X20 ea    Ball on wall CW/CCW w/ rainbow color ball 2x10    BHB sh IR stretch w/ belt     Timed Minutes 30     Manual Therapy     54346  Comments   L sh PROM    L sh LAD    STM to sh globally    Timed Minutes 12        Therapy Education/Self Care 65757   Education offered today    [facility] Code  [id number]   Ongoing HEP     Date: 2024  Prepared by: Vidya De La Garza     Exercises  - Supine Shoulder Flexion Extension AAROM with Dowel  - 1 x daily - 7 x weekly - 3 sets - 10 reps  - Standing Shoulder External Rotation AAROM with Dowel  - 1 x daily - 7 x weekly - 3 sets - 10 reps  - Shoulder Scaption AAROM with Dowel  - 1 x daily - 7 x weekly - 3 sets - 10 reps  - Seated Scapular Retraction  - 1 x daily - 7 x weekly - 3 sets - 10 reps - 5 hold   Timed Minutes         Total Timed Treatment:     42    mins  Total Time of Visit:             42   mins         ASSESSMENT/PLAN     GOALS  Goals                                                    Progress Note due by 8/28/24                                                                Recert due by 8/28/24   STG by: 4 weeks Comments Date Status   Decrease subjective pain to 2/10  8/10 going overhead 7/29 ongoing   Improve postural awareness to decrease L impingement   still has forward shoulders  7/29 ongoing   LTG by: 8 weeks         Improve L shoulder flexion to at least symmetrical with R shoulder to improve ability to reach overhead for home tasks  still has decreased shoulder mobility  7/29  ongoing   Able to resume household and work activities without exacerbation of symptoms  has increased pain with overhead activities  7/29 ongoing   Gross shoulder MMT at 4+  see chart above  7/29 ongoing   Understands improved ergonomics for work and HEP for flexibility and stability  performs a few times a week  7/29 ongoing   Improve QuickDASH score to 40 or less  61.36  7/29  ongoing   Reports no pain except occasional minor twinges no more than 1/10  8/10 when overhead 7/29 ongoing       Assessment/Plan     ASSESSMENT: Pt reported cont pain and difficulty when raising his LUE overhead, noting he has been trying to do more with it around the house. He had no discomfort with exercises, but did have some fatigue with a few of them. He cont to have some L sh/UT/LS/pec tightness, noting some decreased pain and tightness with overhead flex following treatment.    PLAN: Work toward increasing LUE ROM and strength, while also addressing postural strain that further impairs his ROM. Progress HEP for same.     SIGNATURE: Benjamin Bhagat PTA, [place] License #: [id number]  Electronically Signed on 8/8/2024        [facility] [address]  [phone]

## 2024-08-15 ENCOUNTER — TREATMENT (OUTPATIENT)
Dept: PHYSICAL THERAPY | Facility: CLINIC | Age: 44
End: 2024-08-15
Payer: MEDICAID

## 2024-08-15 DIAGNOSIS — M25.512 CHRONIC LEFT SHOULDER PAIN: Primary | ICD-10-CM

## 2024-08-15 DIAGNOSIS — M19.012 ARTHRITIS OF LEFT ACROMIOCLAVICULAR JOINT: ICD-10-CM

## 2024-08-15 DIAGNOSIS — G89.29 CHRONIC LEFT SHOULDER PAIN: Primary | ICD-10-CM

## 2024-08-15 DIAGNOSIS — I69.30 H/O: STROKE WITH RESIDUAL EFFECTS: ICD-10-CM

## 2024-08-15 DIAGNOSIS — M67.912 DISORDER OF LEFT ROTATOR CUFF: ICD-10-CM

## 2024-08-15 NOTE — PROGRESS NOTES
Physical Therapy Treatment Note  [address], [place] [address]    Patient: James Taylor Jr.                                                 Visit Date: 8/15/2024  :     1980    Referring practitioner:    Roberto Carlos Gillespie MD  Date of Initial Visit:          Type: THERAPY  Noted: 2024    Patient seen for 10 sessions    Visit Diagnoses:    ICD-10-CM ICD-9-CM   1. Chronic left shoulder pain  M25.512 719.41    G89.29 338.29   2. Arthritis of left acromioclavicular joint  M19.012 716.91   3. Disorder of left rotator cuff  M67.912 726.10   4. H/O: stroke with residual effects  I69.30 438.9       SUBJECTIVE     Subjective: States he still has some pain and tightness when raising his arm, but he feels achy today d/t the weather.    PAIN: no pain at rest, only when elevating arm      OBJECTIVE     Objective     Therapeutic Exercises    19119 Units Comments   Seated pec stretch w/ bolster     Sh flex stretch SB on wall 20x5 sec    4 way ball throws w/ Y med ball X10 ea Over ea shoulder, chest pass, overhead   Supine L sh ER stretch w/ white barr 10x10 sec    Ball on wall up/down & side/side w/ Y med ball 2X10 ea    Supine sh flex w/ 1# DB x10 Focus on stretch        Timed Minutes 26     Manual Therapy     24977  Comments   L sh PROM    L sh LAD    IASTM w/ Powerboost L1 ball to L UT/LS/Tx mauricio    STM to L UT/LS    Timed Minutes 17        Therapy Education/Self Care 61602   Education offered today    [facility] Code  [id number]   Ongoing HEP     Date: 2024  Prepared by: Vidya De La Garza     Exercises  - Supine Shoulder Flexion Extension AAROM with Dowel  - 1 x daily - 7 x weekly - 3 sets - 10 reps  - Standing Shoulder External Rotation AAROM with Dowel  - 1 x daily - 7 x weekly - 3 sets - 10 reps  - Shoulder Scaption AAROM with Dowel  - 1 x daily - 7 x weekly - 3 sets - 10 reps  - Seated Scapular Retraction  - 1 x daily - 7 x weekly - 3 sets -  10 reps - 5 hold   Timed Minutes         Total Timed Treatment:     43   mins  Total Time of Visit:             43   mins         ASSESSMENT/PLAN     GOALS  Goals                                                    Progress Note due by 8/28/24                                                                Recert due by 8/28/24   STG by: 4 weeks Comments Date Status   Decrease subjective pain to 2/10  8/10 going overhead 7/29 ongoing   Improve postural awareness to decrease L impingement   still has forward shoulders  7/29 ongoing   LTG by: 8 weeks         Improve L shoulder flexion to at least symmetrical with R shoulder to improve ability to reach overhead for home tasks  still has decreased shoulder mobility  7/29  ongoing   Able to resume household and work activities without exacerbation of symptoms  has increased pain with overhead activities  7/29 ongoing   Gross shoulder MMT at 4+  see chart above  7/29 ongoing   Understands improved ergonomics for work and HEP for flexibility and stability  performs a few times a week  7/29 ongoing   Improve QuickDASH score to 40 or less  61.36  7/29  ongoing   Reports no pain except occasional minor twinges no more than 1/10  8/10 when overhead 7/29 ongoing       Assessment/Plan     ASSESSMENT: Pt reported cont pain and difficulty when raising his LUE overhead, noting he feels more stiff and achy today d/t the weather. He had no discomfort with exercises, but did have some fatigue following ball throws. He cont to have some L sh/UT/LS/pec tightness, and presented with decreased scapular rotation with overhead movements.    PLAN: Work toward increasing LUE ROM and strength, while also addressing postural strain that further impairs his ROM. Progress HEP for same.     SIGNATURE: Benjamin Bhagat PTA, [place] License #: [id number]  Electronically Signed on 8/15/2024        [address] [place] [address] [place] [address]  [phone]

## 2024-08-19 ENCOUNTER — TRANSCRIBE ORDERS (OUTPATIENT)
Dept: PHYSICAL THERAPY | Facility: CLINIC | Age: 44
End: 2024-08-19
Payer: MEDICAID

## 2024-08-19 DIAGNOSIS — M25.512 CHRONIC LEFT SHOULDER PAIN: Primary | ICD-10-CM

## 2024-08-19 DIAGNOSIS — G89.29 CHRONIC LEFT SHOULDER PAIN: Primary | ICD-10-CM

## 2024-08-19 DIAGNOSIS — R53.1 WEAKNESS FOLLOWING CEREBROVASCULAR ACCIDENT (CVA): ICD-10-CM

## 2024-08-19 DIAGNOSIS — I69.398 WEAKNESS FOLLOWING CEREBROVASCULAR ACCIDENT (CVA): ICD-10-CM

## 2024-09-10 ENCOUNTER — HOSPITAL ENCOUNTER (OUTPATIENT)
Dept: PHYSICAL THERAPY | Facility: HOSPITAL | Age: 44
Setting detail: THERAPIES SERIES
Discharge: HOME | End: 2024-09-10
Payer: MEDICAID

## 2024-09-10 DIAGNOSIS — I61.0 NONTRAUMATIC SUBCORTICAL HEMORRHAGE OF CEREBRAL HEMISPHERE, UNSPECIFIED LATERALITY: Primary | ICD-10-CM

## 2024-09-10 DIAGNOSIS — M19.012 ARTHRITIS OF LEFT ACROMIOCLAVICULAR JOINT: ICD-10-CM

## 2024-09-10 DIAGNOSIS — M67.912 DISORDER OF LEFT ROTATOR CUFF: ICD-10-CM

## 2024-09-10 DIAGNOSIS — M25.512 CHRONIC LEFT SHOULDER PAIN: ICD-10-CM

## 2024-09-10 DIAGNOSIS — G89.29 CHRONIC LEFT SHOULDER PAIN: ICD-10-CM

## 2024-09-10 PROCEDURE — 97110 THERAPEUTIC EXERCISES: CPT

## 2024-09-10 PROCEDURE — 97140 MANUAL THERAPY 1/> REGIONS: CPT

## 2024-09-10 NOTE — THERAPY TREATMENT NOTE
Outpatient Physical Therapy Ortho Progress Note and Treatment Note   [facility]     Patient Name: James Taylor Jr.  : 1980  MRN: [id number]  Today's Date: 9/10/2024      Visit Date: 09/10/2024      Subjective He reports his shoulder is about the same rates pain 7/10    Patient Active Problem List   Diagnosis    Intracranial bleeding    Primary hypertension    Elevated transaminase level    Alcohol abuse    Nontraumatic subcortical hemorrhage of cerebral hemisphere    Body mass index (BMI) of 21.0 to 21.9 in adult    Smoker    Alcohol dependence with unspecified alcohol-induced disorder    Elevated liver function tests    Blister of right foot    Irritable    Acute pancreatitis    Thrombocytopenia    Abnormal CT of the abdomen    Colon cancer screening    Chronic superficial gastritis without bleeding    Alcoholism    Chronic pancreatitis    Decreased pedal pulses    Neuropathy    Chronic back pain    Overweight with body mass index (BMI) of 27 to 27.9 in adult    Anxiety        Past Medical History:   Diagnosis Date    Asthma     Headache     Hypertension     Stroke         Past Surgical History:   Procedure Laterality Date    COLONOSCOPY N/A 2023    normal exam    ENDOSCOPY N/A 2023    Erythematous mucosa in the antrum bx normal, otherwise normal    UPPER ENDOSCOPIC ULTRASOUND W/ FNA  2024    No obvious structural changes to cause pancreatitis. - Dr. Saravia       Visit Dx:     ICD-10-CM ICD-9-CM   1. Nontraumatic subcortical hemorrhage of cerebral hemisphere, unspecified laterality  I61.0 431   2. Chronic left shoulder pain  M25.512 719.41    G89.29 338.29   3. Arthritis of left acromioclavicular joint  M19.012 716.91   4. Disorder of left rotator cuff  M67.912 726.10           Objective      Therapeutic Exercises    12356 Units Comments   Obtained an updated Quick dash     Reviewed all goals for a progress note     Seated pec stretch w/ bolster      Seated # 5 plate rotation x 3 x 30 sec  on/off     ER and IR walkouts with yellow T band x 15 each     Serratus wall push x 15      Reverse Codmans # 1 x 3 sets      Timed Minutes 32             UE MMT   SHOULDER Strength L Strength R   flexion 3-/5    extension     ABD 3+/5    ADD     ER 3+/5    IR 3+/5         ELBOW     flexion     extension     pronation     supination          WRIST     flexion     extension          SCAPULAR       Upper trap       Middle trap       Lower trap       Serratus anterior       *pain       Manual Therapy     [id number]  Comments   STM L shoulder globally supine                   Timed Minutes 13               Therapy Education/Self Care 93186   Education offered today     Jeanette Code  [id number]   Ongoing HEP      Date: 05/31/2024  Prepared by: Vidya De La Garza     Exercises  - Supine Shoulder Flexion Extension AAROM with Dowel  - 1 x daily - 7 x weekly - 3 sets - 10 reps  - Standing Shoulder External Rotation AAROM with Dowel  - 1 x daily - 7 x weekly - 3 sets - 10 reps  - Shoulder Scaption AAROM with Dowel  - 1 x daily - 7 x weekly - 3 sets - 10 reps  - Seated Scapular Retraction  - 1 x daily - 7 x weekly - 3 sets - 10 reps - 5 hold   Timed Minutes            Goals                                                    Progress Note due by 10/10/24                                                                Recert due by 12/9/24   STG by: 4 weeks Comments Date Status   Decrease subjective pain to 2/10 7/10 9/10 ongoing   Improve postural awareness to decrease L impingement  Requires cueing 9/10 ongoing   LTG by: 8 weeks       Improve L shoulder flexion to at least symmetrical with R shoulder to improve ability to reach overhead for home tasks L 74 degrees R 144 degrees AROM 9/10  ongoing   Able to resume household and work activities without exacerbation of symptoms washing his back is the most difficult 9/10 ongoing   Gross shoulder MMT at 4+ See table 9/10 ongoing   Understands improved ergonomics for work and HEP for  flexibility and stability Reports going well 9/10 ongoing   Improve QuickDASH score to 40 or less 47.73 9/10  ongoing   Reports no pain except occasional minor twinges no more than 1/10 7/10 9/10 ongoing     Assessment/Plan   His Quick Dash improved from 61.36 to 47.73 which is promising but it's also his original score on the initial evaluation. He does present with some significant global L shoulder weakness.        Continue working to improve his strength and AROM. Consider utilizing the finger ladder and seated pulley.                      Outcome Measure Options: Quick DASH  Quick DASH  Open a tight or new jar.: Mild Difficulty  Do heavy household chores (e.g., wash walls, wash floors): Mild Difficulty  Carry a shopping bag or briefcase: Moderate Difficulty  Wash your back: Severe Difficulty  Use a knife to cut food: Mild Difficulty  Recreational activities in which you take some force or impact through your arm, should or hand (e.g. golf, hammering, tennis, etc.): Severe Difficulty  During the past week, to what extent has your arm, shoulder, or hand problem interfered with your normal social activites with family, friends, neighbors or groups?: Moderately  During the past week, were you limited in your work or other regular daily activities as a result of your arm, shoulder or hand problem?: Moderately Limited  Arm, Shoulder, or hand pain: Moderate  Tingling (pins and needles) in your arm, shoulder, or hand: Moderate  During the past week, how much difficulty have you had sleeping because of the pain in your arm, shoulder or hand?: Moderate Difficiculty  Number of Questions Answered: 11  Quick DASH Score: 47.73                 PT G-Codes  Outcome Measure Options: Quick DASH  Quick DASH Score: 47.73         Signature: Raimundo Monaco, PTA  9/10/2024

## 2024-09-11 NOTE — THERAPY PROGRESS REPORT/RE-CERT
Patient: James Taylor Jr.           : 1980  Visit Date: 9/10/2024  Referring practitioner: Roberto Carlos Gillespie MD  Date of Initial Visit: Type: THERAPY  Episode: L Shld pain, weakness     Visit Diagnoses:    ICD-10-CM ICD-9-CM   1. Nontraumatic subcortical hemorrhage of cerebral hemisphere, unspecified laterality  I61.0 431   2. Chronic left shoulder pain  M25.512 719.41    G89.29 338.29   3. Arthritis of left acromioclavicular joint  M19.012 716.91   4. Disorder of left rotator cuff  M67.912 726.10       PT G-Codes  Outcome Measure Options: Quick DASH  Quick DASH Score: 47.73  Clinical Progress: improved  Home Program Compliance: Yes  Progress toward previous goals: Partially Met  Prognosis to achieve goals: good    Objective     Assessment & Plan       Assessment  Impairments: abnormal muscle tone, abnormal or restricted ROM, activity intolerance, impaired physical strength, lacks appropriate home exercise program and pain with function   Functional limitations: carrying objects, lifting, uncomfortable because of pain, reaching behind back, reaching overhead and unable to perform repetitive tasks   Assessment details:   This patient would benefit from skilled PT.   Prognosis: good    Plan  Therapy options: will be seen for skilled therapy services  Planned modality interventions: dry needling, low level laser therapy and TENS  Planned therapy interventions: soft tissue mobilization, manual therapy, stretching, strengthening, joint mobilization, functional ROM exercises, therapeutic activities, neuromuscular re-education, body mechanics training, flexibility and home exercise program  Frequency: 2x week  Duration in weeks: 12  Treatment plan discussed with: PTA        Anticipated CPT codes: Therapeutic Exercise 31383, Manual Therapy 18338, Therapeutic Activity 56325, Neuromuscular ReEducation 04777, and Self Care/Home Management 15536    I reviewed the treatment and goals with Raimundo Monaco PTA and  agree with the POC.    SIGNATURE: Mark Bullard PT, License #: [id number]  Electronically Signed on 9/11/2024

## 2024-09-13 ENCOUNTER — APPOINTMENT (OUTPATIENT)
Dept: PHYSICAL THERAPY | Facility: HOSPITAL | Age: 44
End: 2024-09-13
Payer: MEDICAID

## 2024-09-17 ENCOUNTER — HOSPITAL ENCOUNTER (OUTPATIENT)
Dept: PHYSICAL THERAPY | Facility: HOSPITAL | Age: 44
Setting detail: THERAPIES SERIES
Discharge: HOME | End: 2024-09-17
Payer: MEDICAID

## 2024-09-17 DIAGNOSIS — M19.012 ARTHRITIS OF LEFT ACROMIOCLAVICULAR JOINT: ICD-10-CM

## 2024-09-17 DIAGNOSIS — M67.912 DISORDER OF LEFT ROTATOR CUFF: ICD-10-CM

## 2024-09-17 DIAGNOSIS — G89.29 CHRONIC LEFT SHOULDER PAIN: ICD-10-CM

## 2024-09-17 DIAGNOSIS — I61.0 NONTRAUMATIC SUBCORTICAL HEMORRHAGE OF CEREBRAL HEMISPHERE, UNSPECIFIED LATERALITY: Primary | ICD-10-CM

## 2024-09-17 DIAGNOSIS — M25.512 CHRONIC LEFT SHOULDER PAIN: ICD-10-CM

## 2024-09-17 PROCEDURE — 97110 THERAPEUTIC EXERCISES: CPT

## 2024-09-17 PROCEDURE — 97140 MANUAL THERAPY 1/> REGIONS: CPT

## 2024-09-19 ENCOUNTER — HOSPITAL ENCOUNTER (OUTPATIENT)
Dept: PHYSICAL THERAPY | Facility: HOSPITAL | Age: 44
Setting detail: THERAPIES SERIES
Discharge: HOME | End: 2024-09-19
Payer: MEDICAID

## 2024-09-19 DIAGNOSIS — G89.29 CHRONIC LEFT SHOULDER PAIN: ICD-10-CM

## 2024-09-19 DIAGNOSIS — M19.012 ARTHRITIS OF LEFT ACROMIOCLAVICULAR JOINT: ICD-10-CM

## 2024-09-19 DIAGNOSIS — M25.512 CHRONIC LEFT SHOULDER PAIN: ICD-10-CM

## 2024-09-19 DIAGNOSIS — M67.912 DISORDER OF LEFT ROTATOR CUFF: ICD-10-CM

## 2024-09-19 DIAGNOSIS — I61.0 NONTRAUMATIC SUBCORTICAL HEMORRHAGE OF CEREBRAL HEMISPHERE, UNSPECIFIED LATERALITY: Primary | ICD-10-CM

## 2024-09-19 PROCEDURE — 97140 MANUAL THERAPY 1/> REGIONS: CPT

## 2024-09-19 PROCEDURE — 97110 THERAPEUTIC EXERCISES: CPT

## 2024-09-23 ENCOUNTER — APPOINTMENT (OUTPATIENT)
Dept: PHYSICAL THERAPY | Facility: HOSPITAL | Age: 44
End: 2024-09-23
Payer: MEDICAID

## 2024-09-25 ENCOUNTER — APPOINTMENT (OUTPATIENT)
Dept: PHYSICAL THERAPY | Facility: HOSPITAL | Age: 44
End: 2024-09-25
Payer: MEDICAID

## 2024-10-07 ENCOUNTER — APPOINTMENT (OUTPATIENT)
Dept: PHYSICAL THERAPY | Facility: HOSPITAL | Age: 44
End: 2024-10-07
Payer: MEDICAID

## 2024-10-09 ENCOUNTER — APPOINTMENT (OUTPATIENT)
Dept: PHYSICAL THERAPY | Facility: HOSPITAL | Age: 44
End: 2024-10-09
Payer: MEDICAID

## 2024-10-15 ENCOUNTER — HOSPITAL ENCOUNTER (OUTPATIENT)
Dept: PHYSICAL THERAPY | Facility: HOSPITAL | Age: 44
Setting detail: THERAPIES SERIES
Discharge: HOME | End: 2024-10-15
Payer: MEDICAID

## 2024-10-15 DIAGNOSIS — M67.912 DISORDER OF LEFT ROTATOR CUFF: ICD-10-CM

## 2024-10-15 DIAGNOSIS — G89.29 CHRONIC LEFT SHOULDER PAIN: ICD-10-CM

## 2024-10-15 DIAGNOSIS — M19.012 ARTHRITIS OF LEFT ACROMIOCLAVICULAR JOINT: ICD-10-CM

## 2024-10-15 DIAGNOSIS — M25.512 CHRONIC LEFT SHOULDER PAIN: ICD-10-CM

## 2024-10-15 DIAGNOSIS — I61.0 NONTRAUMATIC SUBCORTICAL HEMORRHAGE OF CEREBRAL HEMISPHERE, UNSPECIFIED LATERALITY: Primary | ICD-10-CM

## 2024-10-15 PROCEDURE — 97140 MANUAL THERAPY 1/> REGIONS: CPT

## 2024-10-15 PROCEDURE — 97110 THERAPEUTIC EXERCISES: CPT

## 2024-10-15 NOTE — THERAPY TREATMENT NOTE
Physical Therapy Treatment Note  [facility]  A department [address] [address]    Patient: James Taylor Jr.                                                 Visit Date: 10/15/2024  :     1980    Referring practitioner:    Roberto Carlos Gillespie MD  Date of Initial Visit:          Type: THERAPY  Episode: L Shld pain, weakness     Visit Diagnoses:    ICD-10-CM ICD-9-CM   1. Nontraumatic subcortical hemorrhage of cerebral hemisphere, unspecified laterality  I61.0 431   2. Chronic left shoulder pain  M25.512 719.41    G89.29 338.29   3. Arthritis of left acromioclavicular joint  M19.012 716.91   4. Disorder of left rotator cuff  M67.912 726.10     SUBJECTIVE     Subjective:Pt reports L shoulder remains the same with no new changes      PAIN: 0/10     PT G-Codes  Outcome Measure Options: Quick DASH  Quick DASH Score: 45.45    OBJECTIVE     Objective        UE MMT   SHOULDER Strength L Strength R   flexion 4+ 4+   extension     ABD 4+* 5   ADD     ER     IR          ELBOW     flexion 4+ 4-   extension 3+* 4   pronation     supination          WRIST     flexion     extension          SCAPULAR       Upper trap       Middle trap       Lower trap       Serratus anterior       *pain     Therapeutic Exercises    41047 Units Comments   Scifit UBE seat# 6 res 1.5 3 min fwd/bwd each     Pulley in Flexion, scaption, and abduction     Wall walks with LUE     Progress note assessment                    Timed Minutes 30        Manual Therapy     17361  Comments   LUE AP shoulder joint mobilization  Grades 2-3; oscillating and sustained hold                           Timed Minutes 10     Therapy Education/Self Care 54282   Education offered today    Mirae Code [id number]    Ongoing HEP   Date: 2024  Prepared by: Vdiya De La Garza     Exercises  - Supine Shoulder Flexion Extension AAROM with Dowel  - 1 x daily - 7 x weekly - 3 sets - 10 reps  - Standing  Shoulder External Rotation AAROM with Dowel  - 1 x daily - 7 x weekly - 3 sets - 10 reps  - Shoulder Scaption AAROM with Dowel  - 1 x daily - 7 x weekly - 3 sets - 10 reps  - Seated Scapular Retraction  - 1 x daily - 7 x weekly - 3 sets - 10 reps - 5 hold      ADDED:  Wall walks with LUE       Timed Minutes        Total Timed Treatment:     40   mins  Total Time of Visit:             40  mins         ASSESSMENT/PLAN     GOALS  Goals                                                    Progress Note due by 10/10/24                                                                Recert due by 12/9/24   STG by: 4 weeks Comments Date Status   Decrease subjective pain to 2/10 0/10 today; however, does experience pain with lifting it.  10/15 ongoing   Improve postural awareness to decrease L impingement  Requires cueing 10/15 ongoing   LTG by: 8 weeks         Improve L shoulder flexion to at least symmetrical with R shoulder to improve ability to reach overhead for home tasks R shoulder flexion: 135  L shoulder flexion: 78 10/15  ongoing   Able to resume household and work activities without exacerbation of symptoms washing his back continues to be the most difficult 10/15 ongoing   Gross shoulder MMT at 4+ See table 10/15 ongoing   Understands improved ergonomics for work and HEP for flexibility and stability Reports going well; performing at least 1x per day 10/15 ongoing   Improve QuickDASH score to 40 or less 45.45 10/15  ongoing   Reports no pain except occasional minor twinges no more than 1/10 Reports it can get up to a 10; usually with active movements 10/15 ongoing     Anticipated CPT codes: Therapeutic Exercise 97734, Manual Therapy 82349, Therapeutic Activity 10623, Neuromuscular ReEducation 67996, Self Care/Home Management 97480, Estim Attended 27138, and Estim Unattended [id number]      Assessment/Plan   Assessment  Impairments: abnormal muscle tone, abnormal or restricted ROM, activity intolerance, impaired physical  strength, lacks appropriate home exercise program and pain with function   Functional limitations: carrying objects, lifting, uncomfortable because of pain, reaching behind back, reaching overhead and unable to perform repetitive tasks   Assessment details:   This patient would benefit from skilled PT.   Prognosis: good     Plan  Therapy options: will be seen for skilled therapy services  Planned modality interventions: dry needling, low level laser therapy and TENS  Planned therapy interventions: soft tissue mobilization, manual therapy, stretching, strengthening, joint mobilization, functional ROM exercises, therapeutic activities, neuromuscular re-education, body mechanics training, flexibility and home exercise program  Frequency: 2x week  Duration in weeks: 12  Treatment plan discussed with:patient  ASSESSMENT:   Pt has met 0 of 8 goals at this time. It does seem that he has had a lapse in care d/t inability to make appt. He continues to demonstrate pain with AROM and some with PROM . He does feel that PT is helping to reduce pain and he feels that he is making some improvement. Today, he is guarded with manual therapy and requires cues to decrease muscle guarding. During therapeutic exercise, he demonstrated compensation and shoulder elevation beyond typical with shoulder flexion, abduction, and scaption. Pt would benefit from continued skilled therapy to improve his mobility, decrease pain, decrease strength, improve ability to perform ADLs/IADLs thus improving his quality of life.     PLAN:   Continue working to improve his strength and AROM. Bolster his HEP at appropriate    SIGNATURE: Dhara Monroe PT DPT, [place] License #: [id number]  Electronically Signed on 10/15/2024        Chris Jaramillo. [id number]  [phone]

## 2024-10-22 ENCOUNTER — APPOINTMENT (OUTPATIENT)
Dept: PHYSICAL THERAPY | Facility: HOSPITAL | Age: 44
End: 2024-10-22
Payer: MEDICAID

## 2024-10-28 ENCOUNTER — HOSPITAL ENCOUNTER (OUTPATIENT)
Dept: PHYSICAL THERAPY | Facility: HOSPITAL | Age: 44
Setting detail: THERAPIES SERIES
Discharge: HOME | End: 2024-10-28
Payer: MEDICAID

## 2024-10-28 DIAGNOSIS — M25.512 CHRONIC LEFT SHOULDER PAIN: ICD-10-CM

## 2024-10-28 DIAGNOSIS — M67.912 DISORDER OF LEFT ROTATOR CUFF: ICD-10-CM

## 2024-10-28 DIAGNOSIS — M19.012 ARTHRITIS OF LEFT ACROMIOCLAVICULAR JOINT: ICD-10-CM

## 2024-10-28 DIAGNOSIS — I61.0 NONTRAUMATIC SUBCORTICAL HEMORRHAGE OF CEREBRAL HEMISPHERE, UNSPECIFIED LATERALITY: Primary | ICD-10-CM

## 2024-10-28 DIAGNOSIS — G89.29 CHRONIC LEFT SHOULDER PAIN: ICD-10-CM

## 2024-10-28 PROCEDURE — 97140 MANUAL THERAPY 1/> REGIONS: CPT

## 2024-10-28 PROCEDURE — 97110 THERAPEUTIC EXERCISES: CPT

## 2024-10-28 NOTE — THERAPY TREATMENT NOTE
Physical Therapy Treatment Note  [facility]  A department [address], [place] [address]    Patient: James Taylor Jr.                                                 Visit Date: 10/28/2024  :     1980    Referring practitioner:    Roberto Carlos Gillespie MD  Date of Initial Visit:          Type: THERAPY  Episode: L Shld pain, weakness     Visit Diagnoses:    ICD-10-CM ICD-9-CM   1. Nontraumatic subcortical hemorrhage of cerebral hemisphere, unspecified laterality  I61.0 431   2. Chronic left shoulder pain  M25.512 719.41    G89.29 338.29   3. Arthritis of left acromioclavicular joint  M19.012 716.91   4. Disorder of left rotator cuff  M67.912 726.10       SUBJECTIVE     Subjective: States he has not had any pain lately, noting he does still have tightness and stiffness.       PAIN: 0/10      OBJECTIVE     Objective     Therapeutic Exercises    11020 Units Comments   Scifit UBE seat# 6 res 2.5 3 min fwd/bwd each     AAROM sh flex on wall w/ SB 20x3 sec    Sh ABD on table w/ SB 20x3 sec    Sh IR/ER/flex/EXT ISO on wall w/ blue ball 15x3 sec ea         Timed Minutes 24        Manual Therapy     36848  Comments    L sh PROM      LUE LAD      L sh inf/post mobs       STM to L UT/LS/shoulder globally     Timed Minutes 14     Therapy Education/Self Care 50902   Education offered today    [facility] Code [id number]    Ongoing HEP   Date: 2024  Prepared by: Vidya De La Garza     Exercises  - Supine Shoulder Flexion Extension AAROM with Dowel  - 1 x daily - 7 x weekly - 3 sets - 10 reps  - Standing Shoulder External Rotation AAROM with Dowel  - 1 x daily - 7 x weekly - 3 sets - 10 reps  - Shoulder Scaption AAROM with Dowel  - 1 x daily - 7 x weekly - 3 sets - 10 reps  - Seated Scapular Retraction  - 1 x daily - 7 x weekly - 3 sets - 10 reps - 5 hold      ADDED:  Wall walks with LUE       Timed Minutes        Total Timed Treatment:     38   mins  Total Time of  Visit:             38  mins         ASSESSMENT/PLAN     GOALS  Goals                                                    Progress Note due by 11/14/24                                                                Recert due by 12/9/24   STG by: 4 weeks Comments Date Status   Decrease subjective pain to 2/10 0/10 today; however, does experience pain with lifting it.  10/15 ongoing   Improve postural awareness to decrease L impingement  Requires cueing 10/15 ongoing   LTG by: 8 weeks         Improve L shoulder flexion to at least symmetrical with R shoulder to improve ability to reach overhead for home tasks R shoulder flexion: 135  L shoulder flexion: 78 10/15  ongoing   Able to resume household and work activities without exacerbation of symptoms washing his back continues to be the most difficult 10/15 ongoing   Gross shoulder MMT at 4+ See table 10/15 ongoing   Understands improved ergonomics for work and HEP for flexibility and stability Reports going well; performing at least 1x per day 10/15 ongoing   Improve QuickDASH score to 40 or less 45.45 10/15  ongoing   Reports no pain except occasional minor twinges no more than 1/10 Reports it can get up to a 10; usually with active movements 10/15 ongoing     Anticipated CPT codes: Therapeutic Exercise 31604, Manual Therapy 58354, Therapeutic Activity 84439, Neuromuscular ReEducation 40928, Self Care/Home Management 30380, Estim Attended [id number], and Estim Unattended [id number]      Assessment/Plan     ASSESSMENT:   Pt reported some cont tightness and stiffness mainly with AROM, noting he has not had any pain lately. He noted no discomfort during exercises. He presented with cont tightness in all motions ABD/ER mainly, reporting decreased tightness with some improved shoulder mobility following treatment.     PLAN:   Continue working to improve his strength and AROM. Bolster his HEP at appropriate    SIGNATURE: Benjamin Bhagat PTA, [place] License #: [id number]  Electronically Signed  on 10/28/2024        [address] [place] [address]  [phone]

## 2024-10-29 ENCOUNTER — OFFICE VISIT (OUTPATIENT)
Dept: FAMILY MEDICINE CLINIC | Facility: CLINIC | Age: 44
End: 2024-10-29
Payer: MEDICAID

## 2024-10-29 VITALS
HEART RATE: 84 BPM | SYSTOLIC BLOOD PRESSURE: 118 MMHG | WEIGHT: 195.38 LBS | RESPIRATION RATE: 19 BRPM | OXYGEN SATURATION: 97 % | HEIGHT: 70 IN | DIASTOLIC BLOOD PRESSURE: 80 MMHG | TEMPERATURE: 97.2 F | BODY MASS INDEX: 27.97 KG/M2

## 2024-10-29 DIAGNOSIS — K86.1 CHRONIC PANCREATITIS, UNSPECIFIED PANCREATITIS TYPE: ICD-10-CM

## 2024-10-29 DIAGNOSIS — Z63.4 BEREAVEMENT: ICD-10-CM

## 2024-10-29 DIAGNOSIS — R45.4 IRRITABLE: ICD-10-CM

## 2024-10-29 DIAGNOSIS — F41.9 ANXIETY: ICD-10-CM

## 2024-10-29 DIAGNOSIS — M62.81 MUSCLE WEAKNESS AFFECTING MOVEMENT OF FOOT: ICD-10-CM

## 2024-10-29 DIAGNOSIS — K59.00 CONSTIPATION, UNSPECIFIED CONSTIPATION TYPE: ICD-10-CM

## 2024-10-29 DIAGNOSIS — F43.29 STRESS AND ADJUSTMENT REACTION: ICD-10-CM

## 2024-10-29 DIAGNOSIS — I61.0 NONTRAUMATIC SUBCORTICAL HEMORRHAGE OF CEREBRAL HEMISPHERE, UNSPECIFIED LATERALITY: ICD-10-CM

## 2024-10-29 DIAGNOSIS — I69.319 COGNITIVE DEFICIT S/P CVA (CEREBROVASCULAR ACCIDENT): ICD-10-CM

## 2024-10-29 DIAGNOSIS — Z30.09 FAMILY PLANNING: Primary | ICD-10-CM

## 2024-10-29 DIAGNOSIS — R45.86 MOOD SWINGS: ICD-10-CM

## 2024-10-29 DIAGNOSIS — G62.9 NEUROPATHY: ICD-10-CM

## 2024-10-29 DIAGNOSIS — I10 PRIMARY HYPERTENSION: ICD-10-CM

## 2024-10-29 PROCEDURE — 99214 OFFICE O/P EST MOD 30 MIN: CPT | Performed by: NURSE PRACTITIONER

## 2024-10-29 PROCEDURE — 1125F AMNT PAIN NOTED PAIN PRSNT: CPT | Performed by: NURSE PRACTITIONER

## 2024-10-29 PROCEDURE — 3074F SYST BP LT 130 MM HG: CPT | Performed by: NURSE PRACTITIONER

## 2024-10-29 PROCEDURE — 3079F DIAST BP 80-89 MM HG: CPT | Performed by: NURSE PRACTITIONER

## 2024-10-29 RX ORDER — OXCARBAZEPINE 150 MG/1
TABLET, FILM COATED ORAL
Qty: 30 TABLET | Refills: 1 | Status: SHIPPED | OUTPATIENT
Start: 2024-10-29

## 2024-10-29 RX ORDER — LACTULOSE 10 G/15ML
SOLUTION ORAL
Qty: 473 ML | Refills: 1 | Status: SHIPPED | OUTPATIENT
Start: 2024-10-29

## 2024-10-29 RX ORDER — SERTRALINE HYDROCHLORIDE 100 MG/1
100 TABLET, FILM COATED ORAL DAILY
Qty: 30 TABLET | Refills: 2 | Status: SHIPPED | OUTPATIENT
Start: 2024-10-29

## 2024-10-29 SDOH — SOCIAL STABILITY - SOCIAL INSECURITY: DISSAPEARANCE AND DEATH OF FAMILY MEMBER: Z63.4

## 2024-10-29 NOTE — PROGRESS NOTES
NAM Renteria  [facility]   Family Medicine  [address]  [place] [address]  Phone: [phone]  Fax: [phone]         Chief Complaint:  Chief Complaint   Patient presents with    Referral Discussion     For physical therapy    Constipation        History:  James Taylor Jr. is a 44 y.o. male.  History of Present Illness  The patient is a 44-year-old male who presents today for follow-up of chronic conditions.    Family-planning: Patient request referral to urology for vasectomy.    Depression, anxiety, irritability, mood swings: Patient reports since the passing of his mother he is struggled with depression.  Patient's Zoloft increased to 100 at previous appointment.  Patient has not found much improvement with increased dosage.  He denies SI or HI.    Status post nontraumatic subcortical hemorrhage of cerebral hemisphere, neuropathy, muscle weakness affecting movement of foot, defect status post CVA: Patient request referral back to physical therapy.  He also request letter stating he is still under the care of of physical therapy and multiple providers since his CVA.      Constipation: Patient reports he is still suffering with constipation.  Patient reports that over-the-counter regimens thus far have not improved his symptoms.           ROS:  Review of Systems   Constitutional:  Positive for fatigue. Negative for fever and unexpected weight change.   HENT:  Negative for congestion, ear pain, rhinorrhea, sinus pressure, sinus pain and voice change.    Eyes:  Negative for visual disturbance.   Respiratory:  Negative for shortness of breath and wheezing.    Cardiovascular:  Negative for chest pain and palpitations.   Gastrointestinal:  Negative for abdominal pain, nausea and vomiting.   Genitourinary:  Negative for dysuria and flank pain.   Musculoskeletal:  Negative for back pain, myalgias and neck pain.        Foot pain   Skin:  Negative for color change and rash.  "  Neurological:  Positive for weakness. Negative for dizziness, numbness and headaches.   Psychiatric/Behavioral:  Positive for agitation. Negative for behavioral problems, dysphoric mood, self-injury and sleep disturbance. The patient is nervous/anxious.         reports that he has been smoking cigarettes. He started smoking about 12 years ago. He has a 15.7 pack-year smoking history. He has been exposed to tobacco smoke. He has never used smokeless tobacco. He reports that he does not currently use alcohol after a past usage of about 4.0 standard drinks of alcohol per week. He reports that he does not use drugs.    Current Outpatient Medications   Medication Instructions    amLODIPine (NORVASC) 10 mg, Oral, Daily    gabapentin (NEURONTIN) 300 MG capsule TID prn alcohol withdrawal/ chronic back pain/ neuropathy.    lactulose (CHRONULAC) 10 GM/15ML solution 30 ml Tid x 3 days then reduce down to twice daily dosing.    losartan (COZAAR) 25 mg, Oral, Daily    metoprolol succinate XL (TOPROL-XL) 50 mg, Oral, Daily    naltrexone (DEPADE) 50 mg, Oral, Daily    omeprazole (PRILOSEC) 20 mg, Oral, Daily    OXcarbazepine (Trileptal) 150 MG tablet Take 1/2 tablet at bedtime for 14 days then increase to one full tablet.    sertraline (ZOLOFT) 100 mg, Oral, Daily, To help with mood. Take with food    terazosin (HYTRIN) 5 mg, Oral, Nightly       OBJECTIVE:  /80 (BP Location: Left arm, Patient Position: Sitting, Cuff Size: Adult)   Pulse 84   Temp 97.2 °F (36.2 °C) (Infrared)   Resp 19   Ht 177.8 cm (70\")   Wt 88.6 kg (195 lb 6 oz)   SpO2 97%   BMI 28.03 kg/m²    Physical Exam  Vitals and nursing note reviewed.   Constitutional:       Appearance: Normal appearance. He is well-developed.   HENT:      Head: Normocephalic and atraumatic.      Right Ear: Tympanic membrane, ear canal and external ear normal.      Left Ear: Tympanic membrane, ear canal and external ear normal.      Nose: Nose normal. No septal deviation, " nasal tenderness or congestion.      Mouth/Throat:      Lips: Pink. No lesions.      Mouth: Mucous membranes are moist. No oral lesions.      Dentition: Normal dentition.      Pharynx: Oropharynx is clear. No pharyngeal swelling, oropharyngeal exudate or posterior oropharyngeal erythema.   Eyes:      General: Lids are normal. Vision grossly intact. No scleral icterus.        Right eye: No discharge.         Left eye: No discharge.      Extraocular Movements: Extraocular movements intact.      Conjunctiva/sclera: Conjunctivae normal.      Right eye: Right conjunctiva is not injected.      Left eye: Left conjunctiva is not injected.      Pupils: Pupils are equal, round, and reactive to light.   Neck:      Thyroid: No thyroid mass.      Trachea: Trachea normal.   Cardiovascular:      Rate and Rhythm: Normal rate and regular rhythm.      Heart sounds: Normal heart sounds. No murmur heard.     No gallop.   Pulmonary:      Effort: Pulmonary effort is normal.      Breath sounds: Normal breath sounds and air entry. No wheezing, rhonchi or rales.   Musculoskeletal:         General: No tenderness or deformity. Normal range of motion.      Cervical back: Full passive range of motion without pain, normal range of motion and neck supple.      Thoracic back: Normal.      Right lower leg: No edema.      Left lower leg: No edema.   Skin:     General: Skin is warm and dry.      Coloration: Skin is not jaundiced.      Findings: No rash.   Neurological:      Mental Status: He is alert and oriented to person, place, and time.      Sensory: Sensation is intact.      Motor: Motor function is intact.      Coordination: Coordination is intact.      Gait: Gait is intact.      Deep Tendon Reflexes: Reflexes are normal and symmetric.   Psychiatric:         Mood and Affect: Mood and affect normal.         Behavior: Behavior normal.         Judgment: Judgment normal.       Physical Exam  Distension noted in the abdomen.          Procedures    Results      Assessment/Plan:     Diagnoses and all orders for this visit:    1. Family planning (Primary)  -     Ambulatory Referral to Urology    2. Irritable  -     sertraline (ZOLOFT) 100 MG tablet; Take 1 tablet by mouth Daily. To help with mood. Take with food  Dispense: 30 tablet; Refill: 2    3. Stress and adjustment reaction  -     sertraline (ZOLOFT) 100 MG tablet; Take 1 tablet by mouth Daily. To help with mood. Take with food  Dispense: 30 tablet; Refill: 2    4. Anxiety  -     sertraline (ZOLOFT) 100 MG tablet; Take 1 tablet by mouth Daily. To help with mood. Take with food  Dispense: 30 tablet; Refill: 2    5. Primary hypertension    6. Nontraumatic subcortical hemorrhage of cerebral hemisphere, unspecified laterality    7. Chronic pancreatitis, unspecified pancreatitis type    8. Neuropathy  -     Ambulatory Referral to Physical Therapy for Evaluation & Treatment    9. Constipation, unspecified constipation type  -     lactulose (CHRONULAC) 10 GM/15ML solution; 30 ml Tid x 3 days then reduce down to twice daily dosing.  Dispense: 473 mL; Refill: 1    10. Muscle weakness affecting movement of foot  -     Ambulatory Referral to Physical Therapy for Evaluation & Treatment    11. Cognitive deficit S/P CVA (cerebrovascular accident)  -     Ambulatory Referral to Physical Therapy for Evaluation & Treatment    12. Mood swings  -     OXcarbazepine (Trileptal) 150 MG tablet; Take 1/2 tablet at bedtime for 14 days then increase to one full tablet.  Dispense: 30 tablet; Refill: 1    13. Bereavement  -     OXcarbazepine (Trileptal) 150 MG tablet; Take 1/2 tablet at bedtime for 14 days then increase to one full tablet.  Dispense: 30 tablet; Refill: 1          An After Visit Summary was printed and given to the patient at discharge.  No follow-ups on file.       Assessment & Plan  1. Constipation.  Lactulose has been prescribed, which can be taken up to three times a day to alleviate symptoms. He  is advised to start with once a day and adjust as needed based on his response.    2. Referral to Urology.  A referral to urology has been made for further evaluation regarding his request for a vasectomy.    3. Referral to Physical Therapy.  A referral to physical therapy has been provided to address the weakness on the left side.      I spent 35 minutes caring for James on this date of service. This time includes time spent by me in the following activities: preparing for the visit, reviewing tests, performing a medically appropriate examination and/or evaluation, counseling and educating the patient/family/caregiver, referring and communicating with other health care professionals, documenting information in the medical record, independently interpreting results and communicating that information with the patient/family/caregiver, care coordination, and ordering medications         Casi BROWNING 11/1/2024   Electronically signed.    Patient or patient representative verbalized consent for the use of Ambient Listening during the visit with  NAM Renteria for chart documentation. 11/1/2024  19:46 CDT

## 2024-10-29 NOTE — LETTER
October 29, 2024    James Taylor Jr.  [address] [place] [address]        Mr. James Taylor is under my care for s/p cva, chronic pancreatitis, left sided weakness related to cva. He is still pursing physical therapy to regain strength and is not released to work at this time.       Casi Skinner, APRN

## 2024-10-30 DIAGNOSIS — G62.9 NEUROPATHY: ICD-10-CM

## 2024-10-30 DIAGNOSIS — G89.29 CHRONIC BACK PAIN, UNSPECIFIED BACK LOCATION, UNSPECIFIED BACK PAIN LATERALITY: ICD-10-CM

## 2024-10-30 DIAGNOSIS — F10.20 ALCOHOLISM: ICD-10-CM

## 2024-10-30 DIAGNOSIS — M54.9 CHRONIC BACK PAIN, UNSPECIFIED BACK LOCATION, UNSPECIFIED BACK PAIN LATERALITY: ICD-10-CM

## 2024-10-30 RX ORDER — GABAPENTIN 300 MG/1
CAPSULE ORAL
Qty: 90 CAPSULE | Refills: 2 | Status: SHIPPED | OUTPATIENT
Start: 2024-10-30

## 2024-10-30 NOTE — TELEPHONE ENCOUNTER
Can we have pt swing by for a UDS and controlled substance agreement. He comes to physical therapy here.

## 2024-11-04 ENCOUNTER — HOSPITAL ENCOUNTER (OUTPATIENT)
Dept: ULTRASOUND IMAGING | Facility: HOSPITAL | Age: 44
Discharge: HOME | End: 2024-11-04
Payer: MEDICAID

## 2024-11-04 ENCOUNTER — LAB (OUTPATIENT)
Dept: LAB | Facility: HOSPITAL | Age: 44
End: 2024-11-04
Payer: MEDICAID

## 2024-11-04 DIAGNOSIS — K76.0 FATTY LIVER: ICD-10-CM

## 2024-11-04 LAB
ALBUMIN SERPL-MCNC: 4.3 G/DL (ref 3.5–5.2)
ALBUMIN/GLOB SERPL: 1.3 G/DL
ALP SERPL-CCNC: 146 U/L (ref 39–117)
ALT SERPL W P-5'-P-CCNC: 36 U/L (ref 1–41)
ANION GAP SERPL CALCULATED.3IONS-SCNC: 6 MMOL/L (ref 5–15)
AST SERPL-CCNC: 37 U/L (ref 1–40)
BASOPHILS # BLD AUTO: 0.07 10*3/MM3 (ref 0–0.2)
BASOPHILS NFR BLD AUTO: 0.8 % (ref 0–1.5)
BILIRUB SERPL-MCNC: 0.7 MG/DL (ref 0–1.2)
BUN SERPL-MCNC: 13 MG/DL (ref 6–20)
BUN/CREAT SERPL: 11 (ref 7–25)
CALCIUM SPEC-SCNC: 9.5 MG/DL (ref 8.6–10.5)
CHLORIDE SERPL-SCNC: 106 MMOL/L (ref 98–107)
CO2 SERPL-SCNC: 28 MMOL/L (ref 22–29)
CREAT SERPL-MCNC: 1.18 MG/DL (ref 0.76–1.27)
DEPRECATED RDW RBC AUTO: 40.1 FL (ref 37–54)
EGFRCR SERPLBLD CKD-EPI 2021: 78 ML/MIN/1.73
EOSINOPHIL # BLD AUTO: 0.47 10*3/MM3 (ref 0–0.4)
EOSINOPHIL NFR BLD AUTO: 5.1 % (ref 0.3–6.2)
ERYTHROCYTE [DISTWIDTH] IN BLOOD BY AUTOMATED COUNT: 13.6 % (ref 12.3–15.4)
GLOBULIN UR ELPH-MCNC: 3.2 GM/DL
GLUCOSE SERPL-MCNC: 100 MG/DL (ref 65–99)
HCT VFR BLD AUTO: 45.1 % (ref 37.5–51)
HGB BLD-MCNC: 16.1 G/DL (ref 13–17.7)
IMM GRANULOCYTES # BLD AUTO: 0.04 10*3/MM3 (ref 0–0.05)
IMM GRANULOCYTES NFR BLD AUTO: 0.4 % (ref 0–0.5)
LYMPHOCYTES # BLD AUTO: 4.02 10*3/MM3 (ref 0.7–3.1)
LYMPHOCYTES NFR BLD AUTO: 43.6 % (ref 19.6–45.3)
MCH RBC QN AUTO: 29.2 PG (ref 26.6–33)
MCHC RBC AUTO-ENTMCNC: 35.7 G/DL (ref 31.5–35.7)
MCV RBC AUTO: 81.7 FL (ref 79–97)
MONOCYTES # BLD AUTO: 0.51 10*3/MM3 (ref 0.1–0.9)
MONOCYTES NFR BLD AUTO: 5.5 % (ref 5–12)
NEUTROPHILS NFR BLD AUTO: 4.11 10*3/MM3 (ref 1.7–7)
NEUTROPHILS NFR BLD AUTO: 44.6 % (ref 42.7–76)
NRBC BLD AUTO-RTO: 0 /100 WBC (ref 0–0.2)
PLATELET # BLD AUTO: 176 10*3/MM3 (ref 140–450)
PMV BLD AUTO: 11.4 FL (ref 6–12)
POTASSIUM SERPL-SCNC: 4.4 MMOL/L (ref 3.5–5.2)
PROT SERPL-MCNC: 7.5 G/DL (ref 6–8.5)
RBC # BLD AUTO: 5.52 10*6/MM3 (ref 4.14–5.8)
SODIUM SERPL-SCNC: 140 MMOL/L (ref 136–145)
WBC NRBC COR # BLD AUTO: 9.22 10*3/MM3 (ref 3.4–10.8)

## 2024-11-04 PROCEDURE — 0690T QUAN US TIS CHARAC W/DX US: CPT

## 2024-11-04 PROCEDURE — 36415 COLL VENOUS BLD VENIPUNCTURE: CPT

## 2024-11-04 PROCEDURE — 76705 ECHO EXAM OF ABDOMEN: CPT

## 2024-11-04 PROCEDURE — 85025 COMPLETE CBC W/AUTO DIFF WBC: CPT

## 2024-11-04 PROCEDURE — 80053 COMPREHEN METABOLIC PANEL: CPT

## 2024-11-05 ENCOUNTER — HOSPITAL ENCOUNTER (OUTPATIENT)
Dept: PHYSICAL THERAPY | Facility: HOSPITAL | Age: 44
Setting detail: THERAPIES SERIES
Discharge: HOME | End: 2024-11-05
Payer: MEDICAID

## 2024-11-05 DIAGNOSIS — M25.512 CHRONIC LEFT SHOULDER PAIN: Primary | ICD-10-CM

## 2024-11-05 DIAGNOSIS — G89.29 CHRONIC LEFT SHOULDER PAIN: Primary | ICD-10-CM

## 2024-11-05 DIAGNOSIS — G89.29 CHRONIC BACK PAIN, UNSPECIFIED BACK LOCATION, UNSPECIFIED BACK PAIN LATERALITY: Primary | ICD-10-CM

## 2024-11-05 DIAGNOSIS — M67.912 DISORDER OF LEFT ROTATOR CUFF: ICD-10-CM

## 2024-11-05 DIAGNOSIS — M19.012 ARTHRITIS OF LEFT ACROMIOCLAVICULAR JOINT: ICD-10-CM

## 2024-11-05 DIAGNOSIS — M54.9 CHRONIC BACK PAIN, UNSPECIFIED BACK LOCATION, UNSPECIFIED BACK PAIN LATERALITY: Primary | ICD-10-CM

## 2024-11-05 DIAGNOSIS — M62.81 MUSCLE WEAKNESS AFFECTING MOVEMENT OF FOOT: ICD-10-CM

## 2024-11-05 DIAGNOSIS — R29.898 LEFT HAND WEAKNESS: ICD-10-CM

## 2024-11-05 DIAGNOSIS — I69.30 H/O: STROKE WITH RESIDUAL EFFECTS: ICD-10-CM

## 2024-11-05 PROCEDURE — 97110 THERAPEUTIC EXERCISES: CPT

## 2024-11-05 PROCEDURE — 97535 SELF CARE MNGMENT TRAINING: CPT

## 2024-11-05 NOTE — PROGRESS NOTES
Physical Therapy Treatment Note  [facility]  A department [address] [place], [place] [address]    Patient: James Taylor Jr.                                                 Visit Date: 2024  :     1980    Referring practitioner:    Roberto Carlos Gillespie MD  Date of Initial Visit:          Type: THERAPY  Episode: L Shld pain, weakness   Number of visits this episode: 16    Visit Diagnoses:    ICD-10-CM ICD-9-CM   1. Chronic left shoulder pain  M25.512 719.41    G89.29 338.29   2. Arthritis of left acromioclavicular joint  M19.012 716.91   3. Disorder of left rotator cuff  M67.912 726.10   4. H/O: stroke with residual effects  I69.30 438.9     SUBJECTIVE     Subjective: He hasn't had pain in a while. His L hand feels weak when he goes to carry groceries.     PAIN: 0/10       OBJECTIVE     Objective          Active Range of Motion   Left Shoulder   Flexion: 129 degrees   Abduction: 85 degrees   External rotation 45°: 49 degrees   External rotation BTH: Active external rotation behind the head: hand to occiput.   Internal rotation BTB: Active internal rotation behind the back: hand to ~L1.           Therapeutic Exercises    39824 Units Comments   HEP assigned and reviewed     Timed Minutes 28     Therapy Education/Self Care 11217   Education offered today  make  D/C from this POC to start new POC for new PT order; seek OT referral to continue to address fine and gross motor LUE deficits   [facility] Code [id number]    Ongoing HEP     Date: 2024  Prepared by: Sandra Alex    Exercises  - Supine Shoulder Flexion Extension AAROM with Dowel  - 1 x daily - 7 x weekly - 3 sets - 10 reps  - Standing Shoulder External Rotation AAROM with Dowel  - 1 x daily - 7 x weekly - 3 sets - 10 reps  - Shoulder Scaption AAROM with Dowel  - 1 x daily - 7 x weekly - 3 sets - 10 reps  - Seated Scapular Retraction  - 1 x daily - 7 x weekly - 3 sets - 10 reps - 5 hold  -  Supine Shoulder Press  - 1 x daily - 4 x weekly - 2-3 sets - 10 reps  - Seated Elbow Flexion with Self-Anchored Resistance  - 1 x daily - 4 x weekly - 2-3 sets - 10 reps  - Seated Elbow Extension with Self-Anchored Resistance  - 1 x daily - 4 x weekly - 2-3 sets - 10 reps  - Isometric Shoulder Abduction with Resistance Loop  - 1 x daily - 4 x weekly - 2 sets - 10 reps - 3 sec hold      Putty exercises with red putty   Timed Minutes  10         Total Timed Treatment:     38   mins  Total Time of Visit:             38  mins         ASSESSMENT/PLAN      GOALS  Goals                                                    Progress Note due by 11/14/24                                                                Recert due by 12/9/24   STG by: 4 weeks Comments Date Status   Decrease subjective pain to 2/10 0/10 today; however, does experience pain with lifting it.  10/15 ongoing   Improve postural awareness to decrease L impingement  Requires cueing 10/15 ongoing   LTG by: 8 weeks         Improve L shoulder flexion to at least symmetrical with R shoulder to improve ability to reach overhead for home tasks R shoulder flexion: 135  L shoulder flexion: 78 10/15  ongoing   Able to resume household and work activities without exacerbation of symptoms washing his back continues to be the most difficult 10/15 ongoing   Gross shoulder MMT at 4+ See table 10/15 ongoing   Understands improved ergonomics for work and HEP for flexibility and stability Reports going well; performing at least 1x per day 10/15 ongoing   Improve QuickDASH score to 40 or less 45.45 10/15  ongoing   Reports no pain except occasional minor twinges no more than 1/10 Reports it can get up to a 10; usually with active movements 10/15 ongoing      Anticipated CPT codes: Therapeutic Exercise 27804, Manual Therapy 92609, Therapeutic Activity 12798, Neuromuscular ReEducation 98387, Self Care/Home Management 94841, Estim Attended [id number], and Estim Unattended  [id number]      Assessment/Plan     ASSESSMENT: Today is his 16th visit since his PT IE on 5/31/2024. He still exhibits difficulty w/ tasks that require hand intrinsic strength such as carrying groceries. He also demo's continued LUE AROM deficits that interfere with his performance of ADL/IADLs such as washing his back. He has reported decreased average pain level over the last few sessions though still reports instances of 10/10 L shoulder pain. His HEP had not been updated since evaluation, thus bolstered for strength today. His last scheduled visit is 11/8. At that juncture, his response to new HEP will be assessed and he will be D/C d/t lack of functional progression within a 5 month time period.     PLAN: Received new PT order to address functional mobility deficits secondary to prior CVA which will be addressed in a neuro eval after D/C from this POC. This PT contacted Casi Skinner's office and requested OT eval to address ADL/IADL impairment from LUE.    SIGNATURE: Sandra Alex, PT CHR[facility] BELLA License #: [id number]  Electronically Signed on 11/5/2024        Chris Jaramillo. [id number]  [phone]

## 2024-11-07 ENCOUNTER — TELEPHONE (OUTPATIENT)
Dept: UROLOGY | Facility: CLINIC | Age: 44
End: 2024-11-07

## 2024-11-07 NOTE — PROGRESS NOTES
"Chief Complaint  \"I want a vasectomy \"    Subjective          History of Present Illness  James Taylor Jr. presents to [facility] UROLOGY for:    The patient has been pondering the option of a vasectomy for about a year.   He presently has 5 children and is .   He voices no additional questions about birth control options.   No prior genital procedures.       Current Outpatient Medications:     amLODIPine (NORVASC) 10 MG tablet, Take 1 tablet by mouth Daily., Disp: 30 tablet, Rfl: 5    gabapentin (NEURONTIN) 300 MG capsule, TID prn alcohol withdrawal/ chronic back pain/ neuropathy., Disp: 90 capsule, Rfl: 2    lactulose (CHRONULAC) 10 GM/15ML solution, 30 ml Tid x 3 days then reduce down to twice daily dosing., Disp: 473 mL, Rfl: 1    losartan (COZAAR) 25 MG tablet, Take 1 tablet by mouth Daily., Disp: 30 tablet, Rfl: 5    metoprolol succinate XL (TOPROL-XL) 50 MG 24 hr tablet, TAKE 1 TABLET BY MOUTH EVERY DAY, Disp: 90 tablet, Rfl: 0    naltrexone (DEPADE) 50 MG tablet, Take 1 tablet by mouth Daily., Disp: 30 tablet, Rfl: 2    omeprazole (priLOSEC) 20 MG capsule, Take 1 capsule by mouth Daily., Disp: 30 capsule, Rfl: 11    OXcarbazepine (Trileptal) 150 MG tablet, Take 1/2 tablet at bedtime for 14 days then increase to one full tablet., Disp: 30 tablet, Rfl: 1    sertraline (ZOLOFT) 100 MG tablet, Take 1 tablet by mouth Daily. To help with mood. Take with food, Disp: 30 tablet, Rfl: 2    terazosin (HYTRIN) 5 MG capsule, Take 1 capsule by mouth Every Night., Disp: , Rfl:   Past Medical History:   Diagnosis Date    Asthma     Headache     Hypertension     Stroke      Past Surgical History:   Procedure Laterality Date    COLONOSCOPY N/A 12/13/2023    normal exam    ENDOSCOPY N/A 12/13/2023    Erythematous mucosa in the antrum bx normal, otherwise normal    UPPER ENDOSCOPIC ULTRASOUND W/ FNA  01/17/2024    No obvious structural changes to cause pancreatitis. - Dr. Saravia         Review  of " systems  Constitutional: Negative for chills and fever.   Gastrointestinal: Negative for abdominal pain, anal bleeding and blood in stool.   Genitourinary: Negative for flank pain and hematuria.      Objective   PHYSICAL EXAM  Vital Signs:   There were no vitals taken for this visit.    Constitutional: Well nourished, Well developed; No apparent distress  Psychiatric: Appropriate affect; Alert and oriented  Musculoskeletal: Normal gait and station  GI: Abdomen is soft, non-tender  Respiratory: No distress; Unlabored movement; No accessory musculature needed with symmetric movements  ; Penis and testicles are normal.  The vas deferens is palpable on the right appears accessible for vasectomy.  I could not feel the left vas deferens.  Vitals reviewed.        DATA  Result Review :         Results for orders placed or performed in visit on 11/04/24   Comprehensive Metabolic Panel    Collection Time: 11/04/24  9:23 AM    Specimen: Blood   Result Value Ref Range    Glucose 100 (H) 65 - 99 mg/dL    BUN 13 6 - 20 mg/dL    Creatinine 1.18 0.76 - 1.27 mg/dL    Sodium 140 136 - 145 mmol/L    Potassium 4.4 3.5 - 5.2 mmol/L    Chloride 106 98 - 107 mmol/L    CO2 28.0 22.0 - 29.0 mmol/L    Calcium 9.5 8.6 - 10.5 mg/dL    Total Protein 7.5 6.0 - 8.5 g/dL    Albumin 4.3 3.5 - 5.2 g/dL    ALT (SGPT) 36 1 - 41 U/L    AST (SGOT) 37 1 - 40 U/L    Alkaline Phosphatase 146 (H) 39 - 117 U/L    Total Bilirubin 0.7 0.0 - 1.2 mg/dL    Globulin 3.2 gm/dL    A/G Ratio 1.3 g/dL    BUN/Creatinine Ratio 11.0 7.0 - 25.0    Anion Gap 6.0 5.0 - 15.0 mmol/L    eGFR 78.0 >60.0 mL/min/1.73   CBC Auto Differential    Collection Time: 11/04/24  9:23 AM    Specimen: Blood   Result Value Ref Range    WBC 9.22 3.40 - 10.80 10*3/mm3    RBC 5.52 4.14 - 5.80 10*6/mm3    Hemoglobin 16.1 13.0 - 17.7 g/dL    Hematocrit 45.1 37.5 - 51.0 %    MCV 81.7 79.0 - 97.0 fL    MCH 29.2 26.6 - 33.0 pg    MCHC 35.7 31.5 - 35.7 g/dL    RDW 13.6 12.3 - 15.4 %    RDW-SD 40.1  37.0 - 54.0 fl    MPV 11.4 6.0 - 12.0 fL    Platelets 176 140 - 450 10*3/mm3    Neutrophil % 44.6 42.7 - 76.0 %    Lymphocyte % 43.6 19.6 - 45.3 %    Monocyte % 5.5 5.0 - 12.0 %    Eosinophil % 5.1 0.3 - 6.2 %    Basophil % 0.8 0.0 - 1.5 %    Immature Grans % 0.4 0.0 - 0.5 %    Neutrophils, Absolute 4.11 1.70 - 7.00 10*3/mm3    Lymphocytes, Absolute 4.02 (H) 0.70 - 3.10 10*3/mm3    Monocytes, Absolute 0.51 0.10 - 0.90 10*3/mm3    Eosinophils, Absolute 0.47 (H) 0.00 - 0.40 10*3/mm3    Basophils, Absolute 0.07 0.00 - 0.20 10*3/mm3    Immature Grans, Absolute 0.04 0.00 - 0.05 10*3/mm3    nRBC 0.0 0.0 - 0.2 /100 WBC                ASSESSMENT AND PLAN      Problem List Items Addressed This Visit          Genitourinary and Reproductive     Encounter for vasectomy counseling - Primary    Relevant Orders    Case Request (Completed)     The patient desires vasectomy.   There are 4 things I put significant emphasis on in this visit.    -Vasectomy should be considered a permanent form of birth control.  I discussed how vasectomy accomplishes infertility.  I explained  the patient should not have this procedure thinking this would be a temporary birth control solution that could later be reversed by procedure.  I did explain vasectomy reversals can be done but they may well be ineffective at producing a pregnancy.  I explained the procedure  is often not covered by patient's insurance.  I also explained that this can get very expensive especially if it is combined with other infertility techniques to achieve a pregnancy.    -Vasectomy is not immediately effective.  I explained to the patient that sperm can be located in the ejaculatory ducts that are alive for several months.  In fact there are studies that have shown it may take some patients up to 6 months to clear sperm.  I explained the difference in life limiting sperm versus dead sperm.  I explained why we put such an emphasis on the requirement for patients to bring in  his semen analysis at about 3 months to be sure we do not see sperm.  I did explain that if the specimen has rare dead sperm or no sperm, we consider that a successful vasectomy.     -Vasectomy is not a perfect form of birth control.  Somewhere between 1 in a  1000 (0.1%) to 1 in 2000 (0.2%) vasectomies will fail due to recanalization which can lead to resumption of fertility.  I explained this usually does not result in a symptom that would warrant a patient his fertility status has changed.  I did explain that some patients bring in a sample once a year or so but this does not reduce the risk of recanalization occurring.     -Vasectomy does have complications.  Initially we discussed bleeding with possible hematoma formation, infection, sperm granuloma, testicular atrophy from this potential complications.  I explained that while some patients will experience some postoperative discomfort this is not unusual.  However, up to 1% of patients will develop chronic testicular pain that can be prolonged as either a constant or intermittent discomfort.  It is explained that chronic pain requires a multidisciplinary approach rarely benefiting from anything procedural that a urologist could do to fix it.    Recommend he had open exploration to see whether or not he has a left-sided vas deferens.      FOLLOW UP     No follow-ups on file.        (Please note that portions of this note were completed with a voice recognition program.)  Flaco Dias MD  11/15/24  07:01 CST

## 2024-11-07 NOTE — TELEPHONE ENCOUNTER
Caller: James Taylor Jr.     Relationship:  SELF    Best call back number: [phone]     PATIENT CALLED REQUESTING TO CANCEL SAME DAY APPT.    Did the patient call AFTER the start time of their scheduled appointment?  NO    Was the patient's appointment rescheduled?  YES     Any additional information:

## 2024-11-08 ENCOUNTER — HOSPITAL ENCOUNTER (OUTPATIENT)
Dept: PHYSICAL THERAPY | Facility: HOSPITAL | Age: 44
Setting detail: THERAPIES SERIES
Discharge: HOME | End: 2024-11-08
Payer: MEDICAID

## 2024-11-08 DIAGNOSIS — G89.29 CHRONIC LEFT SHOULDER PAIN: Primary | ICD-10-CM

## 2024-11-08 DIAGNOSIS — M67.912 DISORDER OF LEFT ROTATOR CUFF: ICD-10-CM

## 2024-11-08 DIAGNOSIS — M25.512 CHRONIC LEFT SHOULDER PAIN: Primary | ICD-10-CM

## 2024-11-08 DIAGNOSIS — M19.012 ARTHRITIS OF LEFT ACROMIOCLAVICULAR JOINT: ICD-10-CM

## 2024-11-08 PROCEDURE — 97110 THERAPEUTIC EXERCISES: CPT

## 2024-11-08 NOTE — PROGRESS NOTES
Physical Therapy Treatment Note and Discharge Note  [facility]  A department of [address] [place], [place] [address]    Patient: James Taylor Jr.                                                 Visit Date: 2024  :     1980    Referring practitioner:    Roberto Carlos Gillespie MD  Date of Initial Visit:          Type: THERAPY  Episode: L Shld pain, weakness   Number of visits this episode: 17    Visit Diagnoses:    ICD-10-CM ICD-9-CM   1. Chronic left shoulder pain  M25.512 719.41    G89.29 338.29   2. Arthritis of left acromioclavicular joint  M19.012 716.91   3. Disorder of left rotator cuff  M67.912 726.10     SUBJECTIVE     Subjective: Pt reports he is feeling okay today. He states right now he isn't having any difficulties. He denies pain as well.     PAIN: 0/10       OBJECTIVE       DISCHARGE SUMMARY   Discharge date 2024   Dates of this episode  through    Number of visits on this episode 17   Reason for discharge lack of progress   Outcomes achieved Refer to the goals table for specifics on goals   Discharge plan Continue with current home exercise program as instructed  Future need for rehabilitation activities  Refer to other services (specify):OT             Objective          Active Range of Motion   Left Shoulder   Flexion: 129 degrees   Abduction: 85 degrees   External rotation 45°: 49 degrees   External rotation BTH: Active external rotation behind the head: hand to occiput.   Internal rotation BTB: Active internal rotation behind the back: hand to ~L1.     AROM measured on                  UE MMT    SHOULDER Strength L on 10/15 L MMT on  Strength R on 10/15 R MMT on    flexion 4+ 4+ 4+ 5   extension         ABD 4+* 4 * dropped DT pain 5 5   ADD         ER   5   4*   IR   5   5             ELBOW         flexion 4+ 5 4- 5   extension 3+* 4 4 Compensation 4-       Therapeutic Exercises    97531 Units Comments    Performed 5 reps of each exercise from HEP     QuickDa assessment     Reviewed all goals           Timed Minutes 31     Therapy Education/Self Care 87845   Education offered today  make 11/8 D/C from this POC to start new POC for new PT order; seek OT referral to continue to address fine and gross motor LUE deficits   Jeanette Code [id number]    Ongoing HEP     Date: 11/05/2024  Prepared by: Sandra Alex    Exercises  - Supine Shoulder Flexion Extension AAROM with Dowel  - 1 x daily - 7 x weekly - 3 sets - 10 reps  - Standing Shoulder External Rotation AAROM with Dowel  - 1 x daily - 7 x weekly - 3 sets - 10 reps  - Shoulder Scaption AAROM with Dowel  - 1 x daily - 7 x weekly - 3 sets - 10 reps  - Seated Scapular Retraction  - 1 x daily - 7 x weekly - 3 sets - 10 reps - 5 hold  - Supine Shoulder Press  - 1 x daily - 4 x weekly - 2-3 sets - 10 reps  - Seated Elbow Flexion with Self-Anchored Resistance  - 1 x daily - 4 x weekly - 2-3 sets - 10 reps  - Seated Elbow Extension with Self-Anchored Resistance  - 1 x daily - 4 x weekly - 2-3 sets - 10 reps  - Isometric Shoulder Abduction with Resistance Loop  - 1 x daily - 4 x weekly - 2 sets - 10 reps - 3 sec hold      Putty exercises with red putty   Timed Minutes           Total Timed Treatment:     31   mins  Total Time of Visit:             31  mins         ASSESSMENT/PLAN      GOALS  Goals                                                    Progress Note due by 11/14/24                                                                Recert due by 12/9/24   STG by: 4 weeks Comments Date Status   Decrease subjective pain to 2/10 0/10 today; however, does experience pain upon first waking up and if he uses his LUE a lot 11/08 ongoing   Improve postural awareness to decrease L impingement  Requires cueing; seated posture poor/fair without VC 11/08 ongoing   LTG by: 8 weeks         Improve L shoulder flexion to at least symmetrical with R shoulder to improve ability to  reach overhead for home tasks R shoulder flexion: 140  L shoulder flexion:88 11/08  ongoing   Able to resume household and work activities without exacerbation of symptoms washing his back continues to be the most difficult; he was unable to help his friend clean up his yard which was frustrating to him 11/08 ongoing   Gross shoulder MMT at 4+ See table 11/08 ongoing   Understands improved ergonomics for work and HEP for flexibility and stability Tries to do as much as he can prior to his arm hurting 11/08 ongoing   Improve QuickDASH score to 40 or less 45.45 on 10/15    54.55 on 11/08 11/08  ongoing   Reports no pain except occasional minor twinges no more than 1/10 Reports it can get up to a 10 when it starts hurting 11/08 ongoing      Anticipated CPT codes: Therapeutic Exercise 02027, Manual Therapy 43793, Therapeutic Activity 72821, Neuromuscular ReEducation 43487, Self Care/Home Management 48841, Estim Attended 96580, and Estim Unattended [id number]      Assessment/Plan     ASSESSMENT:   Assessed all of pt's goals today to appropriately reflect progress since IE. He has met 0 goals within a 5 month time frame. Today we reviewed his newly established HEP. Corrected minor aspects (ex where to hold resistance band, keep elbow towards body during ER, etc.). He will be discharged from PT today and has a new referral for both PT and OT to address deficits.     PLAN: Discharged today. New PT eval upcoming.     SIGNATURE: Dhara Monroe, PT DPT, [place] License #: [id number]  Electronically Signed on 11/8/2024        [address] [place] [address]  Chris Rosales. [id number]  [phone]

## 2024-11-12 ENCOUNTER — APPOINTMENT (OUTPATIENT)
Dept: OCCUPATIONAL THERAPY | Facility: HOSPITAL | Age: 44
End: 2024-11-12
Payer: MEDICAID

## 2024-11-14 ENCOUNTER — APPOINTMENT (OUTPATIENT)
Dept: OCCUPATIONAL THERAPY | Facility: HOSPITAL | Age: 44
End: 2024-11-14
Payer: MEDICAID

## 2024-11-14 ENCOUNTER — OFFICE VISIT (OUTPATIENT)
Dept: UROLOGY | Facility: CLINIC | Age: 44
End: 2024-11-14
Payer: MEDICAID

## 2024-11-14 DIAGNOSIS — Z30.09 ENCOUNTER FOR VASECTOMY COUNSELING: Primary | ICD-10-CM

## 2024-11-14 PROCEDURE — 1160F RVW MEDS BY RX/DR IN RCRD: CPT | Performed by: UROLOGY

## 2024-11-14 PROCEDURE — 1159F MED LIST DOCD IN RCRD: CPT | Performed by: UROLOGY

## 2024-11-14 PROCEDURE — 99203 OFFICE O/P NEW LOW 30 MIN: CPT | Performed by: UROLOGY

## 2024-11-15 PROBLEM — Z30.09 ENCOUNTER FOR VASECTOMY COUNSELING: Status: ACTIVE | Noted: 2024-11-14

## 2024-11-20 ENCOUNTER — HOSPITAL ENCOUNTER (OUTPATIENT)
Dept: PHYSICAL THERAPY | Facility: HOSPITAL | Age: 44
Setting detail: THERAPIES SERIES
Discharge: HOME | End: 2024-11-20
Payer: MEDICAID

## 2024-11-20 DIAGNOSIS — Z74.09 IMPAIRED FUNCTIONAL MOBILITY, BALANCE, GAIT, AND ENDURANCE: Primary | ICD-10-CM

## 2024-11-20 PROCEDURE — 97162 PT EVAL MOD COMPLEX 30 MIN: CPT

## 2024-11-20 NOTE — PROGRESS NOTES
Physical Therapy Initial Evaluation and Plan of Care  [facility]  A department of [place] [address], [place] [address]    Patient: James Taylor Jr.   : 1980  Referring practitioner: Roberto Carlos Gillespie MD  Date of Initial Visit: 2024  Today's Date: 2024    Visit Diagnoses:    ICD-10-CM ICD-9-CM   1. Impaired functional mobility, balance, gait, and endurance  Z74.09 V49.89     Past Medical History:   Diagnosis Date    Asthma     Headache     Hypertension     Stroke      Past Surgical History:   Procedure Laterality Date    COLONOSCOPY N/A 2023    normal exam    ENDOSCOPY N/A 2023    Erythematous mucosa in the antrum bx normal, otherwise normal    UPPER ENDOSCOPIC ULTRASOUND W/ FNA  2024    No obvious structural changes to cause pancreatitis. - Dr. Saravia         SUBJECTIVE     Subjective Evaluation    History of Present Illness  Mechanism of injury: He has trouble standing for a long period of time because his legs will start feeling weak. He reports one fall in the last 6 months d/t his L foot drop. He reports needing to rest after ~15 mins d/t LE weakness. He reports having to go down stairs sideways holding onto the rail. He has 1 flight of steps inside the home w/ L HR on ascent. He reports needing to sit in the shower d/t concerns of falling. He reports fatigue has improved. He reports little activity during the day. His goal would be to improve his stability and endurance when walking. He reports some pain and aching in the LLE earlier when it was cold but otherwise no regular pain complaints. His girlfriend stays with him in the apartment.    Patient Goals  Patient goals for therapy: increased motion, improved balance and increased strength          OBJECTIVE     Objective     FUNCTIONAL OUTCOME MEASURES  TU.98 sec, DT TU.31 secs  FGA:   5x STS:  "30.68 from 18\" surface height  6 MWT: 1002' 11/20 no LOB slightly increased EREN no foot drop noted slow naren  miniBEST: 23/28  Mini-BEST test:  Anticipatory:  SIT TO STAND: (2) Normal: : Comes to stand without use of hands and stabilizes independently.  RISE TO TOES: (1) Moderate: Heels up, but not full range (smaller than when holding hands), OR noticeable instability for 3 s.    LEFT STAND ON ONE LEG: (2) Normal: 20 s.  RIGHT STAND ON ONE LEG: (2) Normal: 20 s.   Reactive Postural Control:    Forward - COMPENSATORY STEPPING CORRECTION: (2) Normal: Recovers independently with a single, large step (second realignment step is allowed).    Backward - COMPENSATORY STEPPING CORRECTION: (2) Normal: Recovers independently with a single, large step (second realignment step is allowed).    Lateral - Compensatory Stepping Correction (2) Normal: Recovers independently with 1 step  (crossover or lateral OK)  Sensory Orientation:    Feet together, EO, Firm surface: STANCE:  (2) Normal: 30 s.      Feet together, EC, Foam surface: STANCE:  (2) Normal: 30 s.      Incline, EC:  (2) Normal: Stands independently 30 s and aligns with gravity.    Dynamic Gait:     CHANGE IN GAIT SPEED: (2) Normal: Significantly changes walking speed without imbalance.      WALK WITH HEAD TURNS - HORIZONTAL: (1) Moderate: performs head turns with reduction in gait speed.      WALK WITH PIVOT TURNS: (1) Moderate: Turns with feet close SLOW (>4 steps) with good balance.     STEP OVER OBSTACLES: (2) Normal: Able to step over box with minimal change of gait speed and with good balance.    (0) Severe: Stops counting while walking OR stops walking while counting.          Therapy Education/Self Care 62030   Education offered today POC, HEP, core OM interpretation   Medbride Code    Ongoing HEP   To be assigned at subsequent sessions   Timed Minutes        Total Timed Treatment:     0   mins  Total Time of Visit:            45   mins    ASSESSMENT/PLAN " "    GOALS:  Goals                                                    Progress Note due by 12/20/2024                                                                Recert due by 2/18/2025   LTG by: 12 weeks Comments Date Status   Patient will report compliance with initial HEP.        Patient to improve initial 6 MWT distance by >/=120' no LOB and LRAD on level surfaces RPE </=11/20 on 6-20 scale to improve capacity for community ambulation.       Patient to complete 5x STS from </=18\" surface height in </=10 secs to indicate improved functional strength and capacity for STS transfers.      Patient to perform TUG within 10 sec without LOB for improved functional mobility.       Patient to improve FGA score to >/= 23/30 to decrease patient's risk of falls and improve community ambulation.      Patient to improve mini-BEST test balance score to >/= 27/28 to decrease risk of falls.         Anticipated CPT codes: Gait Training 56304, Therapeutic Exercise 35683, Manual Therapy 77247, Therapeutic Activity 21477, Neuromuscular ReEducation 96164, Self Care/Home Management 32009, Estim Attended 51712, and Estim Unattended 18005      Assessment & Plan       Assessment  Impairments: abnormal coordination, abnormal gait, abnormal muscle firing, abnormal muscle tone, abnormal or restricted ROM, activity intolerance, impaired balance, impaired physical strength, lacks appropriate home exercise program, pain with function and safety issue   Functional limitations: carrying objects, lifting, sleeping, walking, pulling, pushing, uncomfortable because of pain, moving in bed, sitting, standing, stooping, reaching behind back, reaching overhead and unable to perform repetitive tasks   Assessment details: James Taylor is a 44 year old female who presents w/ impaired functional mobility secondary to 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 The pt exhibits B LE weakness w/ functional testing via 5x STS; impaired static/dynamic " standing balance as evidenced by FGA, TUG, and miniBEST results; and impaired activity tolerance evidenced by 6 MET results. Due to the aforementioned anatomical and functional limitations, the pt will require skilled OP PT services to optimize functional recovery, safety, and independence.  Prognosis: good    Plan  Therapy options: will be seen for skilled therapy services  Planned modality interventions: thermotherapy (hydrocollator packs), cryotherapy, low level laser therapy, TENS, dry needling, electrical stimulation/Slovenian stimulation and ultrasound  Planned therapy interventions: abdominal trunk stabilization, manual therapy, ADL retraining, motor coordination training, balance/weight-bearing training, neuromuscular re-education, body mechanics training, postural training, soft tissue mobilization, spinal/joint mobilization, fine motor coordination training, flexibility, functional ROM exercises, strengthening, gait training, stretching, home exercise program, therapeutic activities, IADL retraining, joint mobilization and transfer training  Frequency: 2x week  Duration in weeks: 12  Treatment plan discussed with: patient  Plan details: Patient will be seen 2x/wk x 12wks with treatment to include strengthening, stretching, manual therapy, neuromuscular re-education, balance, gait and endurance training.            SIGNATURE: Sandra Alex, PT DPT, [place] License #: [id number]  Electronically Signed on 11/20/2024    Initial Certification  Certification Period: 11/20/2024 through 2/17/2025  I certify that the therapy services are furnished while this patient is under my care.  The services outlined above are required by this patient, and will be reviewed every 90 days.     PHYSICIAN: Roberto Carlos Gillespie MD (NPI: [id number])    Signature: _______________________________________DATE: _________    Please sign and return via fax to [phone].   Thank you so much for letting us work with James. I appreciate  your letting us work with your patients. If you have any questions or concerns, please don't hesitate to contact me.

## 2024-11-21 ENCOUNTER — HOSPITAL ENCOUNTER (OUTPATIENT)
Dept: OCCUPATIONAL THERAPY | Facility: HOSPITAL | Age: 44
Setting detail: THERAPIES SERIES
End: 2024-11-21
Payer: MEDICAID

## 2024-11-26 ENCOUNTER — OFFICE VISIT (OUTPATIENT)
Dept: FAMILY MEDICINE CLINIC | Facility: CLINIC | Age: 44
End: 2024-11-26
Payer: MEDICAID

## 2024-11-26 VITALS
RESPIRATION RATE: 19 BRPM | WEIGHT: 194 LBS | OXYGEN SATURATION: 98 % | HEIGHT: 70 IN | TEMPERATURE: 98.2 F | BODY MASS INDEX: 27.77 KG/M2 | DIASTOLIC BLOOD PRESSURE: 79 MMHG | SYSTOLIC BLOOD PRESSURE: 120 MMHG | HEART RATE: 72 BPM

## 2024-11-26 DIAGNOSIS — R45.4 IRRITABLE: Primary | ICD-10-CM

## 2024-11-26 DIAGNOSIS — Z30.09 FAMILY PLANNING: ICD-10-CM

## 2024-11-26 DIAGNOSIS — R45.86 MOOD SWINGS: ICD-10-CM

## 2024-11-26 DIAGNOSIS — G62.9 NEUROPATHY: ICD-10-CM

## 2024-11-26 DIAGNOSIS — F43.29 STRESS AND ADJUSTMENT REACTION: ICD-10-CM

## 2024-11-26 DIAGNOSIS — F41.9 ANXIETY: ICD-10-CM

## 2024-11-26 DIAGNOSIS — K59.00 CONSTIPATION, UNSPECIFIED CONSTIPATION TYPE: ICD-10-CM

## 2024-11-26 DIAGNOSIS — Z63.4 BEREAVEMENT: ICD-10-CM

## 2024-11-26 DIAGNOSIS — I10 PRIMARY HYPERTENSION: ICD-10-CM

## 2024-11-26 PROCEDURE — 99214 OFFICE O/P EST MOD 30 MIN: CPT | Performed by: NURSE PRACTITIONER

## 2024-11-26 PROCEDURE — 3078F DIAST BP <80 MM HG: CPT | Performed by: NURSE PRACTITIONER

## 2024-11-26 PROCEDURE — 1160F RVW MEDS BY RX/DR IN RCRD: CPT | Performed by: NURSE PRACTITIONER

## 2024-11-26 PROCEDURE — 3074F SYST BP LT 130 MM HG: CPT | Performed by: NURSE PRACTITIONER

## 2024-11-26 PROCEDURE — 1159F MED LIST DOCD IN RCRD: CPT | Performed by: NURSE PRACTITIONER

## 2024-11-26 PROCEDURE — 1125F AMNT PAIN NOTED PAIN PRSNT: CPT | Performed by: NURSE PRACTITIONER

## 2024-11-26 RX ORDER — OXCARBAZEPINE 150 MG/1
TABLET, FILM COATED ORAL
Qty: 30 TABLET | Refills: 1 | Status: SHIPPED | OUTPATIENT
Start: 2024-11-26 | End: 2024-11-26

## 2024-11-26 RX ORDER — OXCARBAZEPINE 150 MG/1
TABLET, FILM COATED ORAL
Qty: 30 TABLET | Refills: 2 | Status: SHIPPED | OUTPATIENT
Start: 2024-11-26

## 2024-11-26 SDOH — SOCIAL STABILITY - SOCIAL INSECURITY: DISSAPEARANCE AND DEATH OF FAMILY MEMBER: Z63.4

## 2024-11-26 NOTE — PROGRESS NOTES
NAM Renteria  [facility]   Family Medicine  [address]  [place] [address]  Phone: [phone]  Fax: [phone]         Chief Complaint:  Chief Complaint   Patient presents with    1-month follow up     Patient is following up on he's medications.        History:  James Taylor Jr. is a 44 y.o. male.  History of Present Illness  The patient presents for evaluation of multiple medical concerns.    He is considering a vasectomy and has consulted with a urologist. His last visit was on 10/29/2024, during which a referral to urology was discussed.    He reports that his current medications are effective. Since the increase in his Zoloft dosage, he has noticed an improvement in his depression, anxiety, and irritability. Trileptal, taken as a full tablet at bedtime, has been effective in managing his mood swings, agitation, and irritability.    He is currently undergoing physical therapy for residual weakness and is scheduled to start occupational therapy tomorrow.    He is also taking lactulose, which has been beneficial, and reports having a bowel movement every other day.       ROS:  Review of Systems   Constitutional:  Negative for fatigue, fever and unexpected weight change.   HENT:  Negative for congestion, ear pain, rhinorrhea, sinus pressure, sinus pain and voice change.    Eyes:  Negative for visual disturbance.   Respiratory:  Negative for shortness of breath and wheezing.    Cardiovascular:  Negative for chest pain and palpitations.   Gastrointestinal:  Negative for abdominal pain, nausea and vomiting.   Genitourinary:  Negative for dysuria and flank pain.   Musculoskeletal:  Negative for back pain, myalgias and neck pain.        Foot pain   Skin:  Negative for color change and rash.   Neurological:  Positive for weakness. Negative for dizziness, numbness and headaches.   Psychiatric/Behavioral:  Negative for behavioral problems, dysphoric mood, self-injury and  "sleep disturbance.         reports that he has been smoking cigarettes. He started smoking about 12 years ago. He has a 15.8 pack-year smoking history. He has been exposed to tobacco smoke. He has never used smokeless tobacco. He reports that he does not currently use alcohol after a past usage of about 4.0 standard drinks of alcohol per week. He reports that he does not use drugs.    Current Outpatient Medications   Medication Instructions    amLODIPine (NORVASC) 10 mg, Oral, Daily    gabapentin (NEURONTIN) 300 MG capsule TID prn alcohol withdrawal/ chronic back pain/ neuropathy.    lactulose (CHRONULAC) 10 GM/15ML solution 30 ml Tid x 3 days then reduce down to twice daily dosing.    losartan (COZAAR) 25 mg, Oral, Daily    metoprolol succinate XL (TOPROL-XL) 50 mg, Oral, Daily    naltrexone (DEPADE) 50 mg, Oral, Daily    omeprazole (PRILOSEC) 20 mg, Oral, Daily    OXcarbazepine (Trileptal) 150 MG tablet One full tablet at bedtime.    sertraline (ZOLOFT) 100 mg, Oral, Daily, To help with mood. Take with food    terazosin (HYTRIN) 5 mg, Oral, Nightly       OBJECTIVE:  /79 (BP Location: Left arm, Patient Position: Sitting, Cuff Size: Adult)   Pulse 72   Temp 98.2 °F (36.8 °C) (Infrared)   Resp 19   Ht 177.8 cm (70\")   Wt 88 kg (194 lb)   SpO2 98%   BMI 27.84 kg/m²    Physical Exam  Vitals and nursing note reviewed.   Constitutional:       Appearance: Normal appearance. He is well-developed.   HENT:      Head: Normocephalic and atraumatic.      Right Ear: Tympanic membrane, ear canal and external ear normal.      Left Ear: Tympanic membrane, ear canal and external ear normal.      Nose: Nose normal. No septal deviation, nasal tenderness or congestion.      Mouth/Throat:      Lips: Pink. No lesions.      Mouth: Mucous membranes are moist. No oral lesions.      Dentition: Normal dentition.      Pharynx: Oropharynx is clear. No pharyngeal swelling, oropharyngeal exudate or posterior oropharyngeal erythema. "   Eyes:      General: Lids are normal. Vision grossly intact. No scleral icterus.        Right eye: No discharge.         Left eye: No discharge.      Extraocular Movements: Extraocular movements intact.      Conjunctiva/sclera: Conjunctivae normal.      Right eye: Right conjunctiva is not injected.      Left eye: Left conjunctiva is not injected.      Pupils: Pupils are equal, round, and reactive to light.   Neck:      Thyroid: No thyroid mass.      Trachea: Trachea normal.   Cardiovascular:      Rate and Rhythm: Normal rate and regular rhythm.      Heart sounds: Normal heart sounds. No murmur heard.     No gallop.   Pulmonary:      Effort: Pulmonary effort is normal.      Breath sounds: Normal breath sounds and air entry. No wheezing, rhonchi or rales.   Musculoskeletal:         General: No tenderness or deformity. Normal range of motion.      Cervical back: Full passive range of motion without pain, normal range of motion and neck supple.      Thoracic back: Normal.      Right lower leg: No edema.      Left lower leg: No edema.   Skin:     General: Skin is warm and dry.      Coloration: Skin is not jaundiced.      Findings: No rash.   Neurological:      Mental Status: He is alert and oriented to person, place, and time.      Sensory: Sensation is intact.      Motor: Motor function is intact.      Coordination: Coordination is intact.      Gait: Gait is intact.      Deep Tendon Reflexes: Reflexes are normal and symmetric.   Psychiatric:         Mood and Affect: Mood and affect normal.         Behavior: Behavior normal.         Judgment: Judgment normal.       Physical Exam           Procedures    Results      Assessment/Plan:     Diagnoses and all orders for this visit:    1. Irritable (Primary)    2. Mood swings  -     Discontinue: OXcarbazepine (Trileptal) 150 MG tablet; Take 1/2 tablet at bedtime for 14 days then increase to one full tablet.  Dispense: 30 tablet; Refill: 1  -     OXcarbazepine (Trileptal) 150  MG tablet; One full tablet at bedtime.  Dispense: 30 tablet; Refill: 2    3. Bereavement  -     Discontinue: OXcarbazepine (Trileptal) 150 MG tablet; Take 1/2 tablet at bedtime for 14 days then increase to one full tablet.  Dispense: 30 tablet; Refill: 1  -     OXcarbazepine (Trileptal) 150 MG tablet; One full tablet at bedtime.  Dispense: 30 tablet; Refill: 2    4. Neuropathy    5. Anxiety    6. Stress and adjustment reaction    7. Primary hypertension    8. Family planning    9. Constipation, unspecified constipation type          An After Visit Summary was printed and given to the patient at discharge.  No follow-ups on file.       Assessment & Plan      I spent 31 minutes caring for [place] on this date of service. This time includes time spent by me in the following activities: preparing for the visit, reviewing tests, performing a medically appropriate examination and/or evaluation, counseling and educating the patient/family/caregiver, referring and communicating with other health care professionals, documenting information in the medical record, independently interpreting results and communicating that information with the patient/family/caregiver, care coordination, and ordering medications         Casi BROWNING 11/26/2024   Electronically signed.    Patient or patient representative verbalized consent for the use of Ambient Listening during the visit with  NAM Renteria for chart documentation. 12/2/2024  15:50 CST

## 2024-11-27 ENCOUNTER — HOSPITAL ENCOUNTER (OUTPATIENT)
Dept: OCCUPATIONAL THERAPY | Facility: HOSPITAL | Age: 44
Setting detail: THERAPIES SERIES
End: 2024-11-27
Payer: MEDICAID

## 2024-11-27 ENCOUNTER — HOSPITAL ENCOUNTER (OUTPATIENT)
Dept: PHYSICAL THERAPY | Facility: HOSPITAL | Age: 44
Setting detail: THERAPIES SERIES
End: 2024-11-27
Payer: MEDICAID

## 2024-12-03 ENCOUNTER — HOSPITAL ENCOUNTER (OUTPATIENT)
Dept: PHYSICAL THERAPY | Facility: HOSPITAL | Age: 44
Setting detail: THERAPIES SERIES
Discharge: HOME | End: 2024-12-03
Payer: MEDICAID

## 2024-12-03 DIAGNOSIS — Z74.09 IMPAIRED FUNCTIONAL MOBILITY, BALANCE, GAIT, AND ENDURANCE: Primary | ICD-10-CM

## 2024-12-03 PROCEDURE — 97110 THERAPEUTIC EXERCISES: CPT

## 2024-12-03 PROCEDURE — 97116 GAIT TRAINING THERAPY: CPT

## 2024-12-03 NOTE — PROGRESS NOTES
" Physical Therapy Treatment Note  [facility]  A department [place] [address], [place] [address]    Patient: James Taylor Jr.                                                 Visit Date: 12/3/2024  :     1980    Referring practitioner:    Roberto Carlos Gillespie MD  Date of Initial Visit:          Type: THERAPY  Episode: impaired mobility, gait, endurance, strength, balance  Number of visits this episode: 2    Visit Diagnoses:    ICD-10-CM ICD-9-CM   1. Impaired functional mobility, balance, gait, and endurance  Z74.09 V49.89     SUBJECTIVE     Subjective: The cold weather has him stiff.     PAIN: 8/10 generalized       OBJECTIVE     Objective     Therapeutic Exercises    85423 Units Comments   STS weighted vest 2*10    Lower jacks 5    Timed Minutes 10     Neuromuscular Reeducation     08857 Comments   Agility ladder drills with weighted vest on    Timed Minutes 10       Gait Training          29635   Task/Terrain Asst AD Comments   Walking with weighted vest on performing physical dual tasks on command   No LOB  FGA activities   Timed Minutes 10      Therapy Education/Self Care 01219   Education offered today    Medbride Code    Ongoing HEP   To be assigned at next visit   Timed Minutes        Total Timed Treatment:     30   mins  Total Time of Visit:             30   mins         ASSESSMENT/PLAN       GOALS:  Goals                                                    Progress Note due by 2024                                                                Recert due by 2025   LTG by: 12 weeks Comments Date Status   Patient will report compliance with initial HEP.          Patient to improve initial 6 MWT distance by >/=120' no LOB and LRAD on level surfaces RPE </=11/20 on 6-20 scale to improve capacity for community ambulation.          Patient to complete 5x STS from </=18\" surface height in </=10 secs to indicate improved functional strength " and capacity for STS transfers.         Patient to perform TUG within 10 sec without LOB for improved functional mobility.         Patient to improve FGA score to >/= 23/30 to decrease patient's risk of falls and improve community ambulation.         Patient to improve mini-BEST test balance score to >/= 27/28 to decrease risk of falls.            Anticipated CPT codes: Gait Training 69211, Therapeutic Exercise 69405, Manual Therapy 96260, Therapeutic Activity 09795, Neuromuscular ReEducation 48329, Self Care/Home Management 26526, Estim Attended 20077, and Estim Unattended 78427      Assessment/Plan     ASSESSMENT: Session truncated d/t patient tardiness. He exhibited good dynamic balance with various dual-tasking activities today. He also exhibited good activity tolerance as he did not require seated rest break between standing tasks with weighted vest.    PLAN: progress POC per pt tolerance    SIGNATURE: Sandra Alex PT DPSUHA, [place] License #: [id number]  Electronically Signed on 12/3/2024        [place] [address]  [phone]

## 2024-12-09 ENCOUNTER — OFFICE VISIT (OUTPATIENT)
Dept: GASTROENTEROLOGY | Facility: CLINIC | Age: 44
End: 2024-12-09
Payer: MEDICAID

## 2024-12-09 ENCOUNTER — APPOINTMENT (OUTPATIENT)
Dept: PHYSICAL THERAPY | Facility: HOSPITAL | Age: 44
End: 2024-12-09
Payer: MEDICAID

## 2024-12-09 VITALS
TEMPERATURE: 96.9 F | HEART RATE: 53 BPM | HEIGHT: 70 IN | OXYGEN SATURATION: 98 % | DIASTOLIC BLOOD PRESSURE: 70 MMHG | SYSTOLIC BLOOD PRESSURE: 110 MMHG | BODY MASS INDEX: 27.29 KG/M2 | WEIGHT: 190.6 LBS

## 2024-12-09 DIAGNOSIS — K76.0 FATTY LIVER: ICD-10-CM

## 2024-12-09 DIAGNOSIS — Z78.9 NONSMOKER: ICD-10-CM

## 2024-12-09 DIAGNOSIS — F10.10 ETOH ABUSE: ICD-10-CM

## 2024-12-09 DIAGNOSIS — K85.20 ALCOHOL-INDUCED ACUTE PANCREATITIS, UNSPECIFIED COMPLICATION STATUS: Primary | ICD-10-CM

## 2024-12-09 PROCEDURE — 1159F MED LIST DOCD IN RCRD: CPT | Performed by: CLINICAL NURSE SPECIALIST

## 2024-12-09 PROCEDURE — 1160F RVW MEDS BY RX/DR IN RCRD: CPT | Performed by: CLINICAL NURSE SPECIALIST

## 2024-12-09 PROCEDURE — 3074F SYST BP LT 130 MM HG: CPT | Performed by: CLINICAL NURSE SPECIALIST

## 2024-12-09 PROCEDURE — 99214 OFFICE O/P EST MOD 30 MIN: CPT | Performed by: CLINICAL NURSE SPECIALIST

## 2024-12-09 PROCEDURE — 3078F DIAST BP <80 MM HG: CPT | Performed by: CLINICAL NURSE SPECIALIST

## 2024-12-09 NOTE — PROGRESS NOTES
James Taylor Jr.  1980 12/9/2024  Chief Complaint   Patient presents with    GI Problem     Yearly follow up visit     Subjective   HPI  James Taylor Jr. is a 44 y.o. male who presents with a complaint of hx of ETOH pancreatitis. Fatty liver course constant ongoing. He has lost his mother since I last saw him. He was drinking a lot after that and now he is drinking couple cans of beer per day. He is not AA. He has had EUS with Dr Harley Saravia on 1/17/24 no structural changes to cause pancreatitis. He has a good appetite. He is eating ok. He has had endo/colon evaluations.   11/4/24 last labs.  ALT (SGPT)  1 - 41 U/L 36 63 High  15 26 51 High   63 High    AST (SGOT)  1 - 40 U/L 37 58 High  19 22 37  58 High    Alkaline Phosphatase  39 - 117 U/L 146 High  167 High  104 125 High  88  87     Ultrasound of the liver  IMPRESSION:  1. Fatty infiltration of the liver, the ultrasound right hepatic fat  fraction measures 18%. No liver mass is identified there is a small  stable subcentimeter cyst within the liver at the gallbladder fossa.  2. Normal appearance of the gallbladder, no biliary ductal dilatation is  identified.     This report was signed and finalized on 11/4/2024 12:19 PM by Dr. Andrew Garcia MD.  Past Medical History:   Diagnosis Date    Asthma     Headache     Hypertension     Stroke      Past Surgical History:   Procedure Laterality Date    COLONOSCOPY N/A 12/13/2023    normal exam    ENDOSCOPY N/A 12/13/2023    Erythematous mucosa in the antrum bx normal, otherwise normal    UPPER ENDOSCOPIC ULTRASOUND W/ FNA  01/17/2024    No obvious structural changes to cause pancreatitis. - Dr. Saravia       Outpatient Medications Marked as Taking for the 12/9/24 encounter (Office Visit) with Yudelka Taylor APRN   Medication Sig Dispense Refill    amLODIPine (NORVASC) 10 MG tablet Take 1 tablet by mouth Daily. 30 tablet 5    gabapentin (NEURONTIN) 300 MG capsule TID prn alcohol withdrawal/ chronic back pain/  neuropathy. 90 capsule 2    lactulose (CHRONULAC) 10 GM/15ML solution 30 ml Tid x 3 days then reduce down to twice daily dosing. 473 mL 1    losartan (COZAAR) 25 MG tablet Take 1 tablet by mouth Daily. 30 tablet 5    metoprolol succinate XL (TOPROL-XL) 50 MG 24 hr tablet TAKE 1 TABLET BY MOUTH EVERY DAY 90 tablet 0    naltrexone (DEPADE) 50 MG tablet Take 1 tablet by mouth Daily. 30 tablet 2    omeprazole (priLOSEC) 20 MG capsule Take 1 capsule by mouth Daily. 30 capsule 11    OXcarbazepine (Trileptal) 150 MG tablet One full tablet at bedtime. 30 tablet 2    sertraline (ZOLOFT) 100 MG tablet Take 1 tablet by mouth Daily. To help with mood. Take with food 30 tablet 2    terazosin (HYTRIN) 5 MG capsule Take 1 capsule by mouth Every Night.       No Known Allergies  Social History     Socioeconomic History    Marital status: Significant Other   Tobacco Use    Smoking status: Every Day     Current packs/day: 1.00     Average packs/day: 1 pack/day for 15.8 years (15.8 ttl pk-yrs)     Types: Cigarettes     Start date: 4/2/2012     Passive exposure: Current    Smokeless tobacco: Never    Tobacco comments:     AVS   Vaping Use    Vaping status: Never Used   Substance and Sexual Activity    Alcohol use: Not Currently     Alcohol/week: 4.0 standard drinks of alcohol     Types: 4 Cans of beer per week     Comment: recently stopped    Drug use: No    Sexual activity: Yes     Partners: Female     Birth control/protection: Condom, Partner of same sex     Family History   Problem Relation Age of Onset    Anxiety disorder Mother     Diabetes Mother     Hyperlipidemia Mother     Cancer Father     Hyperlipidemia Father     Colon cancer Neg Hx     Colon polyps Neg Hx      Health Maintenance   Topic Date Due    TDAP/TD VACCINES (1 - Tdap) Never done    COVID-19 Vaccine (1 - 2024-25 season) Never done    INFLUENZA VACCINE  03/31/2025 (Originally 7/1/2024)    ANNUAL PHYSICAL  07/10/2025    BMI FOLLOWUP  10/29/2025    COLORECTAL CANCER  "SCREENING  12/13/2033    HEPATITIS C SCREENING  Completed    Pneumococcal Vaccine 0-64  Completed     Review of Systems   Constitutional:  Negative for activity change, appetite change, chills, diaphoresis, fatigue, fever and unexpected weight change.   HENT:  Negative for ear pain, hearing loss, mouth sores, sore throat, trouble swallowing and voice change.    Eyes: Negative.    Respiratory:  Negative for cough, choking, shortness of breath and wheezing.    Cardiovascular:  Negative for chest pain and palpitations.   Gastrointestinal:  Negative for abdominal pain, blood in stool, constipation, diarrhea, nausea and vomiting.   Endocrine: Negative for cold intolerance and heat intolerance.   Genitourinary:  Negative for decreased urine volume, dysuria, frequency, hematuria and urgency.   Musculoskeletal:  Negative for back pain, gait problem and myalgias.   Skin:  Negative for color change, pallor and rash.   Allergic/Immunologic: Negative for food allergies and immunocompromised state.   Neurological:  Negative for dizziness, tremors, seizures, syncope, weakness, light-headedness, numbness and headaches.   Hematological:  Negative for adenopathy. Does not bruise/bleed easily.   Psychiatric/Behavioral:  Negative for agitation and confusion. The patient is not nervous/anxious.    All other systems reviewed and are negative.    Objective   Vitals:    12/09/24 1418   BP: 110/70   Pulse: 53   Temp: 96.9 °F (36.1 °C)   SpO2: 98%   Weight: 86.5 kg (190 lb 9.6 oz)   Height: 177.8 cm (70\")     Body mass index is 27.35 kg/m².  Physical Exam  Constitutional:       Appearance: He is well-developed.   HENT:      Head: Normocephalic and atraumatic.   Eyes:      Pupils: Pupils are equal, round, and reactive to light.   Neck:      Trachea: No tracheal deviation.   Cardiovascular:      Rate and Rhythm: Normal rate and regular rhythm.      Heart sounds: Normal heart sounds. No murmur heard.     No friction rub. No gallop. "   Pulmonary:      Effort: Pulmonary effort is normal. No respiratory distress.      Breath sounds: Normal breath sounds. No wheezing or rales.   Chest:      Chest wall: No tenderness.   Abdominal:      General: Bowel sounds are normal. There is no distension.      Palpations: Abdomen is soft. Abdomen is not rigid.      Tenderness: There is no abdominal tenderness. There is no guarding or rebound.   Musculoskeletal:         General: No tenderness or deformity. Normal range of motion.      Cervical back: Normal range of motion and neck supple.   Skin:     General: Skin is warm and dry.      Coloration: Skin is not pale.      Findings: No rash.   Neurological:      Mental Status: He is alert and oriented to person, place, and time.      Deep Tendon Reflexes: Reflexes are normal and symmetric.   Psychiatric:         Behavior: Behavior normal.         Thought Content: Thought content normal.         Judgment: Judgment normal.       Assessment & Plan   Diagnoses and all orders for this visit:    1. Alcohol-induced acute pancreatitis, unspecified complication status (Primary)    2. Fatty liver    3. Nonsmoker    4. ETOH abuse    I discussed how fatty liver can lead to cirrhosis, disability and pre-mature death.  How it also can be a sign of increased risk for cardiovascular disease.  I discussed the importance of getting rid of fat in the liver by controlling lipids and glucose, avoiding etoh helps, and gradual weight loss to ideal body weight is very important.       Part of this note may be an electronic transcription/translation of spoken language to printed text using the Dragon Dictation System.  Body mass index is 27.35 kg/m².  Return in about 1 year (around 12/9/2025).  There are no Patient Instructions on file for this visit.         All risks, benefits, alternatives, and indications of colonoscopy and/or Endoscopy procedure have been discussed with the patient. Risks to include perforation of the colon requiring  possible surgery or colostomy, risk of bleeding from biopsies or removal of colon tissue, possibility of missing a colon polyp or cancer, or adverse drug reaction.  Benefits to include the diagnosis and management of disease of the colon and rectum. Alternatives to include barium enema, radiographic evaluation, lab testing or no intervention. Pt verbalizes understanding and agrees.     Yudelka Taylor, APRN  12/9/2024  14:43 CST          If you smoke or use tobacco, 4 minutes reading provided  Steps to Quit Smoking  Smoking tobacco can be harmful to your health and can affect almost every organ in your body. Smoking puts you, and those around you, at risk for developing many serious chronic diseases. Quitting smoking is difficult, but it is one of the best things that you can do for your health. It is never too late to quit.  What are the benefits of quitting smoking?  When you quit smoking, you lower your risk of developing serious diseases and conditions, such as:  Lung cancer or lung disease, such as COPD.  Heart disease.  Stroke.  Heart attack.  Infertility.  Osteoporosis and bone fractures.  Additionally, symptoms such as coughing, wheezing, and shortness of breath may get better when you quit. You may also find that you get sick less often because your body is stronger at fighting off colds and infections. If you are pregnant, quitting smoking can help to reduce your chances of having a baby of low birth weight.  How do I get ready to quit?  When you decide to quit smoking, create a plan to make sure that you are successful. Before you quit:  Pick a date to quit. Set a date within the next two weeks to give you time to prepare.  Write down the reasons why you are quitting. Keep this list in places where you will see it often, such as on your bathroom mirror or in your car or wallet.  Identify the people, places, things, and activities that make you want to smoke (triggers) and avoid them. Make sure to take  these actions:  Throw away all cigarettes at home, at work, and in your car.  Throw away smoking accessories, such as ashtrays and lighters.  Clean your car and make sure to empty the ashtray.  Clean your home, including curtains and carpets.  Tell your family, friends, and coworkers that you are quitting. Support from your loved ones can make quitting easier.  Talk with your health care provider about your options for quitting smoking.  Find out what treatment options are covered by your health insurance.  What strategies can I use to quit smoking?  Talk with your healthcare provider about different strategies to quit smoking. Some strategies include:  Quitting smoking altogether instead of gradually lessening how much you smoke over a period of time. Research shows that quitting “cold turkey” is more successful than gradually quitting.  Attending in-person counseling to help you build problem-solving skills. You are more likely to have success in quitting if you attend several counseling sessions. Even short sessions of 10 minutes can be effective.  Finding resources and support systems that can help you to quit smoking and remain smoke-free after you quit. These resources are most helpful when you use them often. They can include:  Online chats with a counselor.  Telephone quitlines.  Printed self-help materials.  Support groups or group counseling.  Text messaging programs.  Mobile phone applications.  Taking medicines to help you quit smoking. (If you are pregnant or breastfeeding, talk with your health care provider first.) Some medicines contain nicotine and some do not. Both types of medicines help with cravings, but the medicines that include nicotine help to relieve withdrawal symptoms. Your health care provider may recommend:  Nicotine patches, gum, or lozenges.  Nicotine inhalers or sprays.  Non-nicotine medicine that is taken by mouth.  Talk with your health care provider about combining strategies,  such as taking medicines while you are also receiving in-person counseling. Using these two strategies together makes you more likely to succeed in quitting than if you used either strategy on its own.  If you are pregnant or breastfeeding, talk with your health care provider about finding counseling or other support strategies to quit smoking. Do not take medicine to help you quit smoking unless told to do so by your health care provider.  What things can I do to make it easier to quit?  Quitting smoking might feel overwhelming at first, but there is a lot that you can do to make it easier. Take these important actions:  Reach out to your family and friends and ask that they support and encourage you during this time. Call telephone quitlines, reach out to support groups, or work with a counselor for support.  Ask people who smoke to avoid smoking around you.  Avoid places that trigger you to smoke, such as bars, parties, or smoke-break areas at work.  Spend time around people who do not smoke.  Lessen stress in your life, because stress can be a smoking trigger for some people. To lessen stress, try:  Exercising regularly.  Deep-breathing exercises.  Yoga.  Meditating.  Performing a body scan. This involves closing your eyes, scanning your body from head to toe, and noticing which parts of your body are particularly tense. Purposefully relax the muscles in those areas.  Download or purchase mobile phone or tablet apps (applications) that can help you stick to your quit plan by providing reminders, tips, and encouragement. There are many free apps, such as QuitGuide from the CDC (Centers for Disease Control and Prevention). You can find other support for quitting smoking (smoking cessation) through smokefree.gov and other websites.  How will I feel when I quit smoking?  Within the first 24 hours of quitting smoking, you may start to feel some withdrawal symptoms. These symptoms are usually most noticeable 2-3 days  after quitting, but they usually do not last beyond 2-3 weeks. Changes or symptoms that you might experience include:  Mood swings.  Restlessness, anxiety, or irritation.  Difficulty concentrating.  Dizziness.  Strong cravings for sugary foods in addition to nicotine.  Mild weight gain.  Constipation.  Nausea.  Coughing or a sore throat.  Changes in how your medicines work in your body.  A depressed mood.  Difficulty sleeping (insomnia).  After the first 2-3 weeks of quitting, you may start to notice more positive results, such as:  Improved sense of smell and taste.  Decreased coughing and sore throat.  Slower heart rate.  Lower blood pressure.  Clearer skin.  The ability to breathe more easily.  Fewer sick days.  Quitting smoking is very challenging for most people. Do not get discouraged if you are not successful the first time. Some people need to make many attempts to quit before they achieve long-term success. Do your best to stick to your quit plan, and talk with your health care provider if you have any questions or concerns.  This information is not intended to replace advice given to you by your health care provider. Make sure you discuss any questions you have with your health care provider.  Document Released: 12/12/2002 Document Revised: 08/15/2017 Document Reviewed: 05/03/2016  [employer] Interactive Patient Education © 2017 [employer]

## 2024-12-11 ENCOUNTER — HOSPITAL ENCOUNTER (OUTPATIENT)
Dept: OCCUPATIONAL THERAPY | Facility: HOSPITAL | Age: 44
Setting detail: THERAPIES SERIES
Discharge: HOME | End: 2024-12-11
Payer: MEDICAID

## 2024-12-11 ENCOUNTER — HOSPITAL ENCOUNTER (OUTPATIENT)
Dept: PHYSICAL THERAPY | Facility: HOSPITAL | Age: 44
Setting detail: THERAPIES SERIES
Discharge: HOME | End: 2024-12-11
Payer: MEDICAID

## 2024-12-11 DIAGNOSIS — R29.818 FINE MOTOR IMPAIRMENT: Primary | ICD-10-CM

## 2024-12-11 DIAGNOSIS — R29.898 FINE MOTOR IMPAIRMENT: Primary | ICD-10-CM

## 2024-12-11 DIAGNOSIS — R29.898 WEAKNESS OF HAND: ICD-10-CM

## 2024-12-11 DIAGNOSIS — Z74.09 IMPAIRED FUNCTIONAL MOBILITY, BALANCE, GAIT, AND ENDURANCE: Primary | ICD-10-CM

## 2024-12-11 DIAGNOSIS — Z78.9 IMPAIRED INSTRUMENTAL ACTIVITIES OF DAILY LIVING (IADL): ICD-10-CM

## 2024-12-11 DIAGNOSIS — Z86.73 HISTORY OF STROKE: ICD-10-CM

## 2024-12-11 PROCEDURE — 97112 NEUROMUSCULAR REEDUCATION: CPT

## 2024-12-11 PROCEDURE — 97165 OT EVAL LOW COMPLEX 30 MIN: CPT

## 2024-12-11 PROCEDURE — 97110 THERAPEUTIC EXERCISES: CPT

## 2024-12-11 NOTE — PROGRESS NOTES
Physical Therapy Treatment Note  [facility]  A department [place] [address], [place] [address]    Patient: James Taylor Jr.                                                 Visit Date: 2024  :     1980    Referring practitioner:    Roberto Carlos Gillespie MD  Date of Initial Visit:          Type: THERAPY  Episode: impaired mobility, gait, endurance, strength, balance  Number of visits this episode: 3    Visit Diagnoses:    ICD-10-CM ICD-9-CM   1. Impaired functional mobility, balance, gait, and endurance  Z74.09 V49.89       SUBJECTIVE     Subjective: The cold weather has him stiff.     PAIN: 6/10 generalized       OBJECTIVE     Objective     Therapeutic Exercises    47448 Units Comments   TK <> HK 10    HK perturbations 30 sec B    Split jumps on mat 10    HEP     Lower jacks on mat 10    Timed Minutes 20     Neuromuscular Reeducation     30432 Comments   HEP    Timed Minutes 10        Therapy Education/Self Care 17640   Education offered today    [facility] Code [id number]   Ongoing HEP     Date: 2024  Prepared by: Sandra Alex    Exercises  - Standing Single Leg Stance with Counter Support  - 2-3 x daily - 4 x weekly - 2 sets - 2 reps - 30 seconds  - Single Leg Balance with Clock Reach  - 1 x daily - 4 x weekly - 2 sets - 10 reps  - Braided Sidestepping  - 1 x daily - 4 x weekly - 2 sets - 10 reps  - Walking with High Knee Taps  - 1 x daily - 4 x weekly - 2 sets - 10 reps  - Tandem Walking  - 1 x daily - 4 x weekly - 2 sets - 10 reps  - Heel Walking  - 1 x daily - 4 x weekly - 2 sets - 10 reps  - Toe Walking  - 1 x daily - 4 x weekly - 2 sets - 10 reps  - Alternating Step Taps with Counter Support  - 1 x daily - 4 x weekly - 2 sets - 10 reps  - Step-Over  - 1 x daily - 4 x weekly - 2 sets - 10 reps  - Supine Bridge with Gluteal Set and Spinal Articulation  - 1 x daily - 4 x weekly - 2 sets - 10 reps  - Lunge with Counter Support  - 1 x daily -  "4 x weekly - 2 sets - 10 reps  - Side Lunge with Counter Support  - 1 x daily - 4 x weekly - 2 sets - 10 reps    Walking program 60-75 mins of walking or 10,000 steps 5 days/wk   Timed Minutes        Total Timed Treatment:     30   mins  Total Time of Visit:             30   mins         ASSESSMENT/PLAN       GOALS:  Goals                                                    Progress Note due by 12/20/2024                                                                Recert due by 2/18/2025   LTG by: 12 weeks Comments Date Status   Patient will report compliance with initial HEP.          Patient to improve initial 6 MWT distance by >/=120' no LOB and LRAD on level surfaces RPE </=11/20 on 6-20 scale to improve capacity for community ambulation.          Patient to complete 5x STS from </=18\" surface height in </=10 secs to indicate improved functional strength and capacity for STS transfers.         Patient to perform TUG within 10 sec without LOB for improved functional mobility.         Patient to improve FGA score to >/= 23/30 to decrease patient's risk of falls and improve community ambulation.         Patient to improve mini-BEST test balance score to >/= 27/28 to decrease risk of falls.            Anticipated CPT codes: Gait Training 53325, Therapeutic Exercise 61912, Manual Therapy 60309, Therapeutic Activity 02539, Neuromuscular ReEducation 56371, Self Care/Home Management 11341, Estim Attended [id number], and Estim Unattended [id number]      Assessment/Plan     ASSESSMENT: Session truncated d/t patient tardiness. He exhibited good functional strength with various high-level activities today. Assigned HEP for balance and strength and assigned walking program as well for maintenance.    PLAN: progress POC per pt tolerance    SIGNATURE: Sandra Alex PT DPT, [place] License #: [id number]  Electronically Signed on 12/11/2024        Chris Jaramillo. [id number]  [phone]     "

## 2024-12-13 ENCOUNTER — TELEPHONE (OUTPATIENT)
Dept: UROLOGY | Facility: CLINIC | Age: 44
End: 2024-12-13
Payer: MEDICAID

## 2024-12-13 NOTE — TELEPHONE ENCOUNTER
Hub staff attempted to follow warm transfer process and was unsuccessful     Caller: James Taylor Jr.     Relationship to patient: SELF    Best call back number: [phone]     Patient is needing: PT IS SCHEDULED FOR A VASECTOMY ON 12-18-24 AND PT WOULD LIKE TO RESCHEDULE THAT FOR SOMETIME IN JANUARY.    PLEASE GIVE PT A CALL BACK.

## 2024-12-18 ENCOUNTER — HOSPITAL ENCOUNTER (OUTPATIENT)
Dept: PHYSICAL THERAPY | Facility: HOSPITAL | Age: 44
Setting detail: THERAPIES SERIES
End: 2024-12-18
Payer: MEDICAID

## 2024-12-18 DIAGNOSIS — Z74.09 IMPAIRED FUNCTIONAL MOBILITY, BALANCE, GAIT, AND ENDURANCE: Primary | ICD-10-CM

## 2024-12-23 ENCOUNTER — HOSPITAL ENCOUNTER (OUTPATIENT)
Dept: PHYSICAL THERAPY | Facility: HOSPITAL | Age: 44
Setting detail: THERAPIES SERIES
Discharge: HOME | End: 2024-12-23
Payer: MEDICAID

## 2024-12-23 DIAGNOSIS — Z74.09 IMPAIRED FUNCTIONAL MOBILITY, BALANCE, GAIT, AND ENDURANCE: Primary | ICD-10-CM

## 2024-12-23 PROCEDURE — 97112 NEUROMUSCULAR REEDUCATION: CPT

## 2024-12-23 NOTE — THERAPY TREATMENT NOTE
Physical Therapy Treatment Note and 30 Day Progress Note  [facility]  A department [place] [address], [place] [address]    Patient: James Taylor Jr.                                                 Visit Date: 2024  :     1980    Referring practitioner:    Roberto Carlos Gillespie MD  Date of Initial Visit:          Type: THERAPY  Episode: impaired mobility, gait, endurance, strength, balance  Number of visits this episode: 4    Visit Diagnoses:    ICD-10-CM ICD-9-CM   1. Impaired functional mobility, balance, gait, and endurance  Z74.09 V49.89     SUBJECTIVE     Subjective:He reports knee and foot pain      PAIN: 10/10    PT G-Codes  Outcome Measure Options: FGA (Functional Gait Assessment)  FGA Total Score: 20    OBJECTIVE     Objective       Neuromuscular Reeducation     85480 Comments   TUG    6 MWT    5 x STS    FGA        Timed Minutes 27      Therapeutic Exercises    93647 Units Comments   B pec and thoracic stretches in sitting with 1/2 roller     B unilateral DF stretches in supine with blue bolster                    Timed Minutes 8      Therapy Education/Self Care 64194   Education offered today    Jeanette Code    Ongoing HEP   [id number]   Date: 2024  Prepared by: Sandra Alex     Exercises  - Standing Single Leg Stance with Counter Support  - 2-3 x daily - 4 x weekly - 2 sets - 2 reps - 30 seconds  - Single Leg Balance with Clock Reach  - 1 x daily - 4 x weekly - 2 sets - 10 reps  - Braided Sidestepping  - 1 x daily - 4 x weekly - 2 sets - 10 reps  - Walking with High Knee Taps  - 1 x daily - 4 x weekly - 2 sets - 10 reps  - Tandem Walking  - 1 x daily - 4 x weekly - 2 sets - 10 reps  - Heel Walking  - 1 x daily - 4 x weekly - 2 sets - 10 reps  - Toe Walking  - 1 x daily - 4 x weekly - 2 sets - 10 reps  - Alternating Step Taps with Counter Support  - 1 x daily - 4 x weekly - 2 sets - 10 reps  - Step-Over  - 1 x daily - 4 x weekly - 2  "sets - 10 reps  - Supine Bridge with Gluteal Set and Spinal Articulation  - 1 x daily - 4 x weekly - 2 sets - 10 reps  - Lunge with Counter Support  - 1 x daily - 4 x weekly - 2 sets - 10 reps  - Side Lunge with Counter Support  - 1 x daily - 4 x weekly - 2 sets - 10 reps     Walking program 60-75 mins of walking or 10,000 steps 5 days/wk   Timed Minutes        Total Timed Treatment:     35   mins  Total Time of Visit:             35   mins         ASSESSMENT/PLAN     GOALS  Goals                                                    Progress Note due by 1/22/2025                                                                Recert due by 2/18/2025   LTG by: 12 weeks Comments Date Status   Patient will report compliance with initial HEP.   reinforced today  12/23  Ongoing   Patient to improve initial 6 MWT distance by >/=120' no LOB and LRAD on level surfaces RPE </=11/20 on 6-20 scale to improve capacity for community ambulation.   1,116 ft  12/23  Ongoing   Patient to complete 5x STS from </=18\" surface height in </=10 secs to indicate improved functional strength and capacity for STS transfers.  18.29 sec  12/23  Ongoing   Patient to perform TUG within 10 sec without LOB for improved functional mobility. 11.79 sec  12/23  Ongoing   Patient to improve FGA score to >/= 23/30 to decrease patient's risk of falls and improve community ambulation.  20  12/23  Ongoing   Patient to improve mini-BEST test balance score to >/= 27/28 to decrease risk of falls.  deferred due to time constraints  12/23  Ongoing     Anticipated CPT codes: Gait Training 49519, Therapeutic Exercise 43418, Manual Therapy 09944, Therapeutic Activity 34963, Neuromuscular ReEducation 43167, and Self Care/Home Management 05102      Assessment/Plan     ASSESSMENT:   He arrived late for today's session which truncated our treatment time. He has had intermittent attendance issues historically and we will monitor this going forward.     PLAN:   Progress " POC per pt tolerance     SIGNATURE: Raimundo Monaco, [place], [place] License #: [id number]  Electronically Signed on 12/23/2024        [address] [place] [address]  [phone]

## 2024-12-27 ENCOUNTER — APPOINTMENT (OUTPATIENT)
Dept: OCCUPATIONAL THERAPY | Facility: HOSPITAL | Age: 44
End: 2024-12-27
Payer: MEDICAID

## 2024-12-30 ENCOUNTER — HOSPITAL ENCOUNTER (OUTPATIENT)
Dept: PHYSICAL THERAPY | Facility: HOSPITAL | Age: 44
Setting detail: THERAPIES SERIES
Discharge: HOME | End: 2024-12-30
Payer: MEDICAID

## 2024-12-30 ENCOUNTER — HOSPITAL ENCOUNTER (OUTPATIENT)
Dept: OCCUPATIONAL THERAPY | Facility: HOSPITAL | Age: 44
Setting detail: THERAPIES SERIES
Discharge: HOME | End: 2024-12-30
Payer: MEDICAID

## 2024-12-30 DIAGNOSIS — Z74.09 IMPAIRED FUNCTIONAL MOBILITY, BALANCE, GAIT, AND ENDURANCE: Primary | ICD-10-CM

## 2024-12-30 DIAGNOSIS — R29.818 FINE MOTOR IMPAIRMENT: Primary | ICD-10-CM

## 2024-12-30 DIAGNOSIS — R29.898 FINE MOTOR IMPAIRMENT: Primary | ICD-10-CM

## 2024-12-30 DIAGNOSIS — R29.898 WEAKNESS OF HAND: ICD-10-CM

## 2024-12-30 DIAGNOSIS — Z78.9 IMPAIRED INSTRUMENTAL ACTIVITIES OF DAILY LIVING (IADL): ICD-10-CM

## 2024-12-30 DIAGNOSIS — Z86.73 HISTORY OF STROKE: ICD-10-CM

## 2024-12-30 PROCEDURE — 97110 THERAPEUTIC EXERCISES: CPT

## 2024-12-30 PROCEDURE — 97112 NEUROMUSCULAR REEDUCATION: CPT

## 2024-12-30 PROCEDURE — 97530 THERAPEUTIC ACTIVITIES: CPT

## 2024-12-30 NOTE — THERAPY TREATMENT NOTE
Occupational Therapy Treatment Note  [facility]  [address], [place] [address]    Patient: James Taylor Jr.                                                 Visit Date: 2024  :     1980    Referring practitioner:    Roberto Carlos Gillespie MD  Date of Initial Visit:          Type: THERAPY  Episode: L UE weakness   Number of visits this episode: 2    Visit Diagnoses:    ICD-10-CM ICD-9-CM   1. Fine motor impairment  R29.818 V49.89    R29.898    2. Impaired instrumental activities of daily living (IADL)  Z78.9 V49.89   3. Weakness of hand  R29.898 728.87   4. History of stroke  Z86.73 V12.54     SUBJECTIVE     Subjective:  Pt reports he is doing well and had a great dorys. Pt was 10 min late for appt today.     PAIN:  Pre-Treatment: 6/10; L shoulder   Post-Treatment: 6/10        OBJECTIVE     Objective     Therapeutic Activities    95788 Comments   Pt completed FM activity using standard tweezers to  toothpicks  using L hand with mod difficulty.     Pt completed B FM activity and pincer strengthening removing small beads from green thera-putty. Pt was able to complete with min difficulty/dropping.                 Timed Minutes 10     Therapeutic Exercises    93015 Comments   Thera-putty exercises focusing on L hand  strengthening 1 set of 10 reps each; finger flexion, key pinch, thumb flexion, lumbrical pinch, finger extension, finger abduction, finger adduction, finger tip pinch.     B shoulder AROM Exercises 1 set of 10 reps each; Shoulder shrugs, scapula retraction, shoulder flexion, shoulder extension, shoulder horizontal abduction and adduction, shoulder external and internal rotation.         Timed Minutes 20     Therapy Education/Self Care 37819   Education offered today Established HEP, reviewed and provided handouts.   Discussed attendance and plan to schedule week by week until attendance improves.   Jeanette  Code    Ongoing HEP   Green Thera-Putty Exercises   B shoulder AROM Exercises    Timed Minutes 5     Total Timed Treatment:     35   mins  Total Time of Visit:             35   mins         ASSESSMENT/PLAN     GOALS  Goals                                          Progress Note due by 1/11/25                                                      Recert due by 3/10/25   STG by: 1/11/25 Comments Date Status   Pt will be independent with daily completion of a HEP to address stroke deficits affecting IADL's.  Established HEP  12/11/24 New   Pt will increase L shoulder AROM to >120 degrees flexion and abduction to improve IADL performance.   B shoulder AROM exercises  12/11/24 New   Pt will complete simulated moderate indoor IADL tasks displaying proper use of energy conservation techniques.   12/11/24 New             LTG by: 3/10/25         Pt will increase L hand  strength to 60# for improved performance with IADL tasks. Thera-putty (green) hand  strengthening exercises  12/11/24 New   Pt will score <40 on the Quick Dash to indicate decreased severity of UE symptoms.    12/11/24 New   Pt will display improved B UE FMC by completing the 9 hole peg test in 20 seconds or less.   12/11/24 New      Anticipated CPT codes: Therapeutic Exercise 55233, Therapeutic Activity 97975, and Self Care/Home Management 42325      Assessment/Plan     ASSESSMENT:   Pt did well during today's OT session. Pt has had poor attendance and has not been seen for a OT tx session since initial OT eval on 12/11/24. We discussed importance of attendance and compliance with established HEP program for improvements in L UE deficits. Pt was able to tolerate B shoulder AROM exercises and L hand  strengthening thera-putty exercises with minimal c/o pain/discomfort. Pt demo'd moderate dropping during FM tasks completed with L UE.     PLAN:   Will address residual stroke deficits affecting L shoulder ROM, L hand  strength, FMC, and IADL  performance.     SIGNATURE: Carrie Yepez OT, [place] License #: [id number]  Electronically Signed on 12/30/2024        [address] [place] [place] [address]  [phone]

## 2024-12-30 NOTE — THERAPY TREATMENT NOTE
Physical Therapy Treatment Note  [facility]  A department [address] [place] [address]    Patient: James Taylor Jr.                                                 Visit Date: 2024  :     1980    Referring practitioner:    Roberto Carlos Gillespie MD  Date of Initial Visit:          Type: THERAPY  Episode: impaired mobility, gait, endurance, strength, balance  Number of visits this episode: 5    Visit Diagnoses:    ICD-10-CM ICD-9-CM   1. Impaired functional mobility, balance, gait, and endurance  Z74.09 V49.89       SUBJECTIVE     Subjective:He reports knee pain. He fell twice trying to the tandem exercise because his leg gave out.    PAIN: 6/10       OBJECTIVE     Objective     Mini-BEST test:  Anticipatory:  SIT TO STAND: (2) Normal: : Comes to stand without use of hands and stabilizes independently.  RISE TO TOES: (2) Normal: Stable for 3 s with maximum height.   LEFT STAND ON ONE LEG: (2) Normal: 20 s.  RIGHT STAND ON ONE LEG: (2) Normal: 20 s.   Reactive Postural Control:    Forward - COMPENSATORY STEPPING CORRECTION: (2) Normal: Recovers independently with a single, large step (second realignment step is allowed).    Backward - COMPENSATORY STEPPING CORRECTION: (2) Normal: Recovers independently with a single, large step (second realignment step is allowed).    Lateral - Compensatory Stepping Correction (2) Normal: Recovers independently with 1 step  (crossover or lateral OK)  Sensory Orientation:    Feet together, EO, Firm surface: STANCE:  (2) Normal: 30 s.      Feet together, EC, Foam surface: STANCE:  (2) Normal: 30 s.      Incline, EC:  (2) Normal: Stands independently 30 s and aligns with gravity.    Dynamic Gait:     CHANGE IN GAIT SPEED: (2) Normal: Significantly changes walking speed without imbalance.      WALK WITH HEAD TURNS - HORIZONTAL: (2) Normal: performs head turns with no change in gait speed and good balance.       WALK WITH PIVOT TURNS: (2) Normal: Turns with feet close FAST (< 3 steps) with good balance.     STEP OVER OBSTACLES: (2) Normal: Able to step over box with minimal change of gait speed and with good balance.     TU.79 sec; Dual Task TUG: 15.54 sec         (2) Normal: No noticeable change in sitting, standing or walking while backward counting when compared to TUG without  Dual Task.   Total:     Neuromuscular Reeducation     86570 Comments   Narrow EREN grapevine    Tandem walking forward backward EO/EC    miniBEST    Timed Minutes 30      Therapeutic Exercises    30705 Units Comments   Mod high plank at elevated mat table + Half jacks with alternating arm raise 10 B    Mod high plank at elevated mat table + mtn climber     Timed Minutes 10      Therapy Education/Self Care 01296   Education offered today    Jeanette Code [id number]    Ongoing HEP     Date: 2024  Prepared by: Sandra Alex     Exercises  - Standing Single Leg Stance with Counter Support  - 2-3 x daily - 4 x weekly - 2 sets - 2 reps - 30 seconds  - Single Leg Balance with Clock Reach  - 1 x daily - 4 x weekly - 2 sets - 10 reps  - Braided Sidestepping  - 1 x daily - 4 x weekly - 2 sets - 10 reps  - Walking with High Knee Taps  - 1 x daily - 4 x weekly - 2 sets - 10 reps  - Tandem Walking  - 1 x daily - 4 x weekly - 2 sets - 10 reps  - Heel Walking  - 1 x daily - 4 x weekly - 2 sets - 10 reps  - Toe Walking  - 1 x daily - 4 x weekly - 2 sets - 10 reps  - Alternating Step Taps with Counter Support  - 1 x daily - 4 x weekly - 2 sets - 10 reps  - Step-Over  - 1 x daily - 4 x weekly - 2 sets - 10 reps  - Supine Bridge with Gluteal Set and Spinal Articulation  - 1 x daily - 4 x weekly - 2 sets - 10 reps  - Lunge with Counter Support  - 1 x daily - 4 x weekly - 2 sets - 10 reps  - Side Lunge with Counter Support  - 1 x daily - 4 x weekly - 2 sets - 10 reps     Walking program 60-75 mins of walking or 10,000 steps 5 days/wk   Timed Minutes   "      Total Timed Treatment:     40   mins  Total Time of Visit:             40   mins         ASSESSMENT/PLAN     GOALS  Goals                                                    Progress Note due by 1/22/2025                                                                Recert due by 2/18/2025   LTG by: 12 weeks Comments Date Status   Patient will report compliance with initial HEP.   reinforced today  12/23  Ongoing   Patient to improve initial 6 MWT distance by >/=120' no LOB and LRAD on level surfaces RPE </=11/20 on 6-20 scale to improve capacity for community ambulation.   1,116 ft  12/23  Ongoing   Patient to complete 5x STS from </=18\" surface height in </=10 secs to indicate improved functional strength and capacity for STS transfers.  18.29 sec  12/23  Ongoing   Patient to perform TUG within 10 sec without LOB for improved functional mobility. 11.79 sec  12/23  Ongoing   Patient to improve FGA score to >/= 23/30 to decrease patient's risk of falls and improve community ambulation.  20  12/23  Ongoing   Patient to improve mini-BEST test balance score to >/= 27/28 to decrease risk of falls.  12/30: 28/28 12/30  MET     Anticipated CPT codes: Gait Training 61084, Therapeutic Exercise 98239, Manual Therapy 67938, Therapeutic Activity 69260, Neuromuscular ReEducation 77144, and Self Care/Home Management 82939      Assessment/Plan     ASSESSMENT: He does have some difficulty with narrow EREN activities w/o visual input, however he scored a 28/28 on the miniBEST which indicates good balance during higher level challenges.    PLAN: Progress POC per pt tolerance     SIGNATURE: Sandra Alex, EFFIE DPSUHA, [place] License #: [id number]  Electronically Signed on 12/30/2024        Chris Jaramillo. [id number]  [phone]   "

## 2024-12-31 ENCOUNTER — APPOINTMENT (OUTPATIENT)
Dept: PHYSICAL THERAPY | Facility: HOSPITAL | Age: 44
End: 2024-12-31
Payer: MEDICAID

## 2025-01-06 ENCOUNTER — APPOINTMENT (OUTPATIENT)
Dept: PHYSICAL THERAPY | Facility: HOSPITAL | Age: 45
End: 2025-01-06
Payer: MEDICAID

## 2025-01-08 ENCOUNTER — TELEPHONE (OUTPATIENT)
Dept: UROLOGY | Facility: CLINIC | Age: 45
End: 2025-01-08
Payer: MEDICAID

## 2025-01-08 NOTE — TELEPHONE ENCOUNTER
Called patient to remind them to arrive at patient registration on 1/10 at 0530 for the procedure with Dr. Dias. Spoke with patient. Told patient if they had any questions to please contact our office at [phone].

## 2025-01-09 ENCOUNTER — APPOINTMENT (OUTPATIENT)
Dept: OCCUPATIONAL THERAPY | Facility: HOSPITAL | Age: 45
End: 2025-01-09
Payer: MEDICAID

## 2025-01-09 ENCOUNTER — TELEPHONE (OUTPATIENT)
Dept: UROLOGY | Facility: CLINIC | Age: 45
End: 2025-01-09
Payer: MEDICAID

## 2025-01-09 NOTE — TELEPHONE ENCOUNTER
"Pt called to cancel surgery for tomorrow. He \"is not ready to get procedure done yet\". He will call back to schedule once he's ready.  "

## 2025-01-13 ENCOUNTER — APPOINTMENT (OUTPATIENT)
Dept: OCCUPATIONAL THERAPY | Facility: HOSPITAL | Age: 45
End: 2025-01-13
Payer: MEDICAID

## 2025-01-14 ENCOUNTER — HOSPITAL ENCOUNTER (OUTPATIENT)
Dept: OCCUPATIONAL THERAPY | Facility: HOSPITAL | Age: 45
Setting detail: THERAPIES SERIES
Discharge: HOME | End: 2025-01-14
Payer: MEDICAID

## 2025-01-14 ENCOUNTER — HOSPITAL ENCOUNTER (OUTPATIENT)
Dept: PHYSICAL THERAPY | Facility: HOSPITAL | Age: 45
Setting detail: THERAPIES SERIES
Discharge: HOME | End: 2025-01-14
Payer: MEDICAID

## 2025-01-14 DIAGNOSIS — Z86.73 HISTORY OF STROKE: ICD-10-CM

## 2025-01-14 DIAGNOSIS — R29.898 WEAKNESS OF HAND: ICD-10-CM

## 2025-01-14 DIAGNOSIS — R29.818 FINE MOTOR IMPAIRMENT: Primary | ICD-10-CM

## 2025-01-14 DIAGNOSIS — Z78.9 IMPAIRED INSTRUMENTAL ACTIVITIES OF DAILY LIVING (IADL): ICD-10-CM

## 2025-01-14 DIAGNOSIS — R29.898 FINE MOTOR IMPAIRMENT: Primary | ICD-10-CM

## 2025-01-14 DIAGNOSIS — Z74.09 IMPAIRED FUNCTIONAL MOBILITY, BALANCE, GAIT, AND ENDURANCE: Primary | ICD-10-CM

## 2025-01-14 PROCEDURE — 97112 NEUROMUSCULAR REEDUCATION: CPT

## 2025-01-14 PROCEDURE — 97110 THERAPEUTIC EXERCISES: CPT

## 2025-01-14 NOTE — THERAPY TREATMENT NOTE
"Occupational Therapy 30 Day Progress Note  [facility]  [address], [place] [address]    Patient: James Taylor Jr.                                                 Visit Date: 2025  :     1980    Referring practitioner:    Roberto Carlos Gillespie MD  Date of Initial Visit:          Type: THERAPY  Episode: L UE weakness   Number of visits this episode: 3    Visit Diagnoses:    ICD-10-CM ICD-9-CM   1. Fine motor impairment  R29.818 V49.89    R29.898    2. Impaired instrumental activities of daily living (IADL)  Z78.9 V49.89   3. Weakness of hand  R29.898 728.87   4. History of stroke  Z86.73 V12.54     SUBJECTIVE     Subjective:  Pt reports his pain is mainly in his \"joints\" from the \"cold weather\".    PAIN:  Pre-Treatment: 7/10; L shoulder   Post-Treatment: 6/10     Outcome Measure:   PT G-Codes  Outcome Measure Options: Quick DASH  Quick DASH Score: 54.55    9 Hole Peg Test: Left: 29.26s  Right: 22.93s      OBJECTIVE     Objective          Active Range of Motion   Left Shoulder   Flexion: 95 degrees with pain  Abduction: 85 degrees with pain    Strength/Myotome Testing     Left Wrist/Hand      (2nd hand position)     Trial 1: 40 lbs    Trial 2: 35 lbs    Trial 3: 33 lbs    Average: 36 lbs    Right Wrist/Hand      (2nd hand position)     Trial 1: 55 lbs    Trial 2: 65 lbs    Trial 3: 70 lbs    Average: 63.33 lbs     Therapeutic Exercises    70382 Comments   All ROM,  strength and outcome measures assessed for PN. See details above.      Pt completed B hand power grasp using 5# digi-flex in L hand and 7# digi-flex in R hand completing 1 set of 20 reps each. Pt then completed B hand isolated finger flexion using 3# digi-flex performing 1 set of 10 reps each digit.       Pt completed B hand/wrist strengthening in all planes using moderate resistance flex bar to simulate opening various jars and containers performing 1 set of " 10 reps.      Shoulder AROM exercises with 2# dowel bar completed 1 set of 15 reps each; shoulder flexion, shoulder press with scapula retraction, shoulder abduction, shoulder external rotation.       Timed Minutes 40     Therapy Education/Self Care 98554   Education offered today Encouraged pt to improve compliance with HEP for  strengthening and AROM of L shoulder.    [employer] Code    Ongoing HEP   Green Thera-Putty Exercises   B shoulder AROM Exercises    Timed Minutes 5     Total Timed Treatment:     45   mins  Total Time of Visit:             45   mins         ASSESSMENT/PLAN     GOALS  Goals                                          Progress Note due by 2/14/25                                                      Recert due by 3/10/25   STG by: 2/14/25 Comments Date Status   Pt will be independent with daily completion of a HEP to address stroke deficits affecting IADL's. Pt reports compliance with HEP. He reports some days he is unable to complete them d/t pain.    1/14/25 Ongoing    Pt will increase L shoulder AROM to >120 degrees flexion and abduction to improve IADL performance.  Flexion: 95 degrees with pain  Abduction: 85 degrees with pain 1/14/25 Ongoing    Pt will complete simulated moderate indoor IADL tasks displaying proper use of energy conservation techniques. Pt reports he has been working on dribbling a basketball with his L hand and gripping more items with his L hand when performing IADLs.  1/14/25 Ongoing           LTG by: 3/10/25      Pt will increase L hand  strength to 60# for improved performance with IADL tasks. L hand 36 lbs  1/14/25 Ongoing    Pt will score <40 on the Quick Dash to indicate decreased severity of UE symptoms.  54.55; Same as initial eval.  1/14/25 Ongoing    Pt will display improved B UE FMC by completing the 9 hole peg test in 20 seconds or less. Left: 29.26s  Improved  Right: 22.93s 1/14/25 Progressing       Anticipated CPT codes: Therapeutic Exercise 60603,  Therapeutic Activity 66335, and Self Care/Home Management 71663      Assessment/Plan     ASSESSMENT:   Pt has been seen for one tx session since eval d/t poor attendance. Pt has made minimal progress toward his goals d/t poor compliance with HEP and poor attendance with therapy sessions. Encouraged pt to improve HEP performance and emphasized importance of attending tx sessions. Pt demonstrated some improvement with L hand FMC and reports he is becoming more conscious of using L hand for tasks. Pt was able to tolerate strengthening exercises for B hands/wrists with minimal complaints of pain or fatigue. OT will continue weekly sessions focusing on residual stroke deficits affecting L shoulder ROM, L hand  strength, FMC, and IADL performance.     PLAN:   Will address residual stroke deficits affecting L shoulder ROM, L hand  strength, FMC, and IADL performance.     SIGNATURE: Carrie Yepez OT, [place] License #: [id number]  Electronically Signed on 1/14/2025        [address]  [place] [address]  [phone]

## 2025-01-14 NOTE — THERAPY TREATMENT NOTE
Physical Therapy Treatment Note  [facility]  [address], [place] [address]    Patient: James Taylor Jr.                                                 Visit Date: 2025  :     1980    Referring practitioner:    Roberto Carlos Gillespie MD  Date of Initial Visit:          Type: THERAPY  Episode: impaired mobility, gait, endurance, strength, balance  Number of visits this episode: 6    Visit Diagnoses:    ICD-10-CM ICD-9-CM   1. Impaired functional mobility, balance, gait, and endurance  Z74.09 V49.89     SUBJECTIVE     Subjective:He reports no new complaints      PAIN: 6/10         OBJECTIVE     Objective     Neuromuscular Reeducation     10369 Comments   Limits of Stability and Rhythmic WS'ing on Neurocom    A<>P and L<>R WS'ing on wobble board, Fitter L1    A<>P WSing on 1/2 roller both sides             Timed Minutes 39        Therapy Education/Self Care 49547   Education offered today    [facility] Code    Ongoing [facility]   [id number]   Date: 2024  Prepared by: Sandra Alex     Exercises  - Standing Single Leg Stance with Counter Support  - 2-3 x daily - 4 x weekly - 2 sets - 2 reps - 30 seconds  - Single Leg Balance with Clock Reach  - 1 x daily - 4 x weekly - 2 sets - 10 reps  - Braided Sidestepping  - 1 x daily - 4 x weekly - 2 sets - 10 reps  - Walking with High Knee Taps  - 1 x daily - 4 x weekly - 2 sets - 10 reps  - Tandem Walking  - 1 x daily - 4 x weekly - 2 sets - 10 reps  - Heel Walking  - 1 x daily - 4 x weekly - 2 sets - 10 reps  - Toe Walking  - 1 x daily - 4 x weekly - 2 sets - 10 reps  - Alternating Step Taps with Counter Support  - 1 x daily - 4 x weekly - 2 sets - 10 reps  - Step-Over  - 1 x daily - 4 x weekly - 2 sets - 10 reps  - Supine Bridge with Gluteal Set and Spinal Articulation  - 1 x daily - 4 x weekly - 2 sets - 10 reps  - Lunge with Counter Support  - 1 x daily - 4 x weekly - 2 sets - 10 reps  - Side  "Lunge with Counter Support  - 1 x daily - 4 x weekly - 2 sets - 10 reps     Walking program 60-75 mins of walking or 10,000 steps 5 days/wk   Timed Minutes        Total Timed Treatment:     39   mins  Total Time of Visit:             39   mins         ASSESSMENT/PLAN     GOALS  Goals                                                    Progress Note due by 1/22/2025                                                                Recert due by 2/18/2025   LTG by: 12 weeks Comments Date Status   Patient will report compliance with initial HEP.   reinforced today  1/14  Ongoing   Patient to improve initial 6 MWT distance by >/=120' no LOB and LRAD on level surfaces RPE </=11/20 on 6-20 scale to improve capacity for community ambulation.   1,116 ft  12/23  Ongoing   Patient to complete 5x STS from </=18\" surface height in </=10 secs to indicate improved functional strength and capacity for STS transfers.  18.29 sec  12/23  Ongoing   Patient to perform TUG within 10 sec without LOB for improved functional mobility. 11.79 sec  12/23  Ongoing   Patient to improve FGA score to >/= 23/30 to decrease patient's risk of falls and improve community ambulation.  20  12/23  Ongoing   Patient to improve mini-BEST test balance score to >/= 27/28 to decrease risk of falls.  12/30: 28/28 12/30  MET     Anticipated CPT codes: Gait Training 61362, Therapeutic Exercise 75369, Manual Therapy 57870, Therapeutic Activity 84011, Neuromuscular ReEducation 92847, and Self Care/Home Management 21583      Assessment/Plan     ASSESSMENT:   His Neurocom test results were much improved as compared to his previous test results performed in April of 2024. His reaction time was the biggest improvement but he still is lacking and not completely within his peer range.    PLAN:   Continue to challenge his balance and intermittently work on strengthening and endurance activities as well.     SIGNATURE: Raimundo Monaco, PTA, [place] License #: " [id number]  Electronically Signed on 1/14/2025        [address] [place] [address]  [phone]

## 2025-01-20 ENCOUNTER — APPOINTMENT (OUTPATIENT)
Dept: PHYSICAL THERAPY | Facility: HOSPITAL | Age: 45
End: 2025-01-20
Payer: MEDICAID

## 2025-01-20 ENCOUNTER — APPOINTMENT (OUTPATIENT)
Dept: OCCUPATIONAL THERAPY | Facility: HOSPITAL | Age: 45
End: 2025-01-20
Payer: MEDICAID

## 2025-01-23 ENCOUNTER — APPOINTMENT (OUTPATIENT)
Dept: OCCUPATIONAL THERAPY | Facility: HOSPITAL | Age: 45
End: 2025-01-23
Payer: MEDICAID

## 2025-01-27 ENCOUNTER — HOSPITAL ENCOUNTER (OUTPATIENT)
Dept: PHYSICAL THERAPY | Facility: HOSPITAL | Age: 45
Setting detail: THERAPIES SERIES
Discharge: HOME | End: 2025-01-27
Payer: MEDICAID

## 2025-01-27 DIAGNOSIS — Z74.09 IMPAIRED FUNCTIONAL MOBILITY, BALANCE, GAIT, AND ENDURANCE: Primary | ICD-10-CM

## 2025-01-27 PROCEDURE — 97112 NEUROMUSCULAR REEDUCATION: CPT

## 2025-01-27 NOTE — THERAPY TREATMENT NOTE
Physical Therapy Treatment Note and 30 Day Progress Note  [facility] Outpatient Therapy Services  A department [place] [address], [place] [address]    Patient: James Taylor Jr.                                                 Visit Date: 2025  :     1980    Referring practitioner:    [place] Carlos Gillespie MD  Date of Initial Visit:          Type: THERAPY  Episode: impaired mobility, gait, endurance, strength, balance  Number of visits this episode: 7    Visit Diagnoses:    ICD-10-CM ICD-9-CM   1. Impaired functional mobility, balance, gait, and endurance  Z74.09 V49.89     SUBJECTIVE     Subjective: Denies falls, near falls. He has been tired.     PAIN: 7/10 L knee       OBJECTIVE     Objective     Neuromuscular Reeducation     10713 Comments   Lunge hold no UE support/a    SLS w/ perturbations    PN - see goal grid    TUG: 10.10 sec; Dual Task TU.88 sec    Timed Minutes 40             Therapy Education/Self Care 38812   Education offered today    [facility] [id number]    Ongoing HEP     Date: 2024  Prepared by: Sandra Alex     Exercises  - Standing Single Leg Stance with Counter Support  - 2-3 x daily - 4 x weekly - 2 sets - 2 reps - 30 seconds  - Single Leg Balance with Clock Reach  - 1 x daily - 4 x weekly - 2 sets - 10 reps  - Braided Sidestepping  - 1 x daily - 4 x weekly - 2 sets - 10 reps  - Walking with High Knee Taps  - 1 x daily - 4 x weekly - 2 sets - 10 reps  - Tandem Walking  - 1 x daily - 4 x weekly - 2 sets - 10 reps  - Heel Walking  - 1 x daily - 4 x weekly - 2 sets - 10 reps  - Toe Walking  - 1 x daily - 4 x weekly - 2 sets - 10 reps  - Alternating Step Taps with Counter Support  - 1 x daily - 4 x weekly - 2 sets - 10 reps  - Step-Over  - 1 x daily - 4 x weekly - 2 sets - 10 reps  - Supine Bridge with Gluteal Set and Spinal Articulation  - 1 x daily - 4 x weekly - 2 sets - 10 reps  - Lunge with Counter Support  - 1 x daily - 4 x weekly - 2 sets -  "10 reps  - Side Lunge with Counter Support  - 1 x daily - 4 x weekly - 2 sets - 10 reps     Walking program 60-75 mins of walking or 10,000 steps 5 days/wk   Timed Minutes        Total Timed Treatment:     40   mins  Total Time of Visit:              40  mins         ASSESSMENT/PLAN     GOALS  Goals                                                    Progress Note due by 2/26/2025                                                                Recert due by 2/18/2025   LTG by: 12 weeks Comments Date Status   Patient will report compliance with initial HEP.   reinforced today  1/27  Ongoing   Patient to improve initial 6 MWT distance by >/=120' no LOB and LRAD on level surfaces RPE </=11/20 on 6-20 scale to improve capacity for community ambulation.   1,116 ft    1065' 13/20 on 1/27 1/27  Ongoing   Patient to complete 5x STS from </=18\" surface height in </=10 secs to indicate improved functional strength and capacity for STS transfers.  18.29 sec    19.60 secs from 18\" surface height on 1/27 1/27  Ongoing   Patient to perform TUG within 10 sec without LOB for improved functional mobility. 11.79 sec    1/27:10.10 secs      1/27  MET   Patient to improve FGA score to >/= 23/30 to decrease patient's risk of falls and improve community ambulation.  20    21/30 on 1/27 1/27  Ongoing   Patient to improve mini-BEST test balance score to >/= 27/28 to decrease risk of falls.  12/30: 28/28 12/30  MET     Anticipated CPT codes: Gait Training 95844, Therapeutic Exercise 20607, Manual Therapy 53093, Therapeutic Activity 32249, Neuromuscular ReEducation 00225, and Self Care/Home Management 45629      Assessment/Plan    Assessment  Impairments: abnormal coordination, abnormal gait, abnormal muscle firing, abnormal muscle tone, abnormal or restricted ROM, activity intolerance, impaired balance, impaired physical strength, lacks appropriate home exercise program, pain with function and safety issue   Functional limitations: " carrying objects, lifting, sleeping, walking, pulling, pushing, uncomfortable because of pain, moving in bed, sitting, standing, stooping, reaching behind back, reaching overhead and unable to perform repetitive tasks   Prognosis: good     Plan  Therapy options: will be seen for skilled therapy services  Planned modality interventions: thermotherapy (hydrocollator packs), cryotherapy, low level laser therapy, TENS, dry needling, electrical stimulation/[place] stimulation and ultrasound  Planned therapy interventions: abdominal trunk stabilization, manual therapy, ADL retraining, motor coordination training, balance/weight-bearing training, neuromuscular re-education, body mechanics training, postural training, soft tissue mobilization, spinal/joint mobilization, fine motor coordination training, flexibility, functional ROM exercises, strengthening, gait training, stretching, home exercise program, therapeutic activities, IADL retraining, joint mobilization and transfer training  Frequency: 2x week  Duration in weeks: 12  Treatment plan discussed with: patient  Plan details: Patient will be seen 2x/wk x 12wks with treatment to include strengthening, stretching, manual therapy, neuromuscular re-education, balance, gait and endurance training.          ASSESSMENT: Progress note performed per POC. He has achieved 2/5 initial goals and was only 6' away from meeting 6 MWT goal during previous PN. His L knee pain and early LLE fatigue onset decrease his gait speed which contributes to him not meeting his FGA goal.    PLAN: progress POC per pt tolerance    SIGNATURE: Sandra Alex PT DPS[place], [place] License #: [id number]  Electronically Signed on 1/27/2025        [place]. [id number]  [phone]

## 2025-01-29 ENCOUNTER — APPOINTMENT (OUTPATIENT)
Dept: OCCUPATIONAL THERAPY | Facility: HOSPITAL | Age: 45
End: 2025-01-29
Payer: MEDICAID

## 2025-02-11 ENCOUNTER — HOSPITAL ENCOUNTER (OUTPATIENT)
Dept: PHYSICAL THERAPY | Facility: HOSPITAL | Age: 45
Setting detail: THERAPIES SERIES
Discharge: HOME | End: 2025-02-11
Payer: MEDICAID

## 2025-02-11 DIAGNOSIS — Z74.09 IMPAIRED FUNCTIONAL MOBILITY, BALANCE, GAIT, AND ENDURANCE: Primary | ICD-10-CM

## 2025-02-11 PROCEDURE — 97112 NEUROMUSCULAR REEDUCATION: CPT

## 2025-02-11 PROCEDURE — 97110 THERAPEUTIC EXERCISES: CPT

## 2025-02-11 NOTE — THERAPY TREATMENT NOTE
Physical Therapy Treatment Note and 30 Day Progress Note  [facility] Outpatient Therapy Services  A department [place] [address], [place] [address]    Patient: James Taylor Jr.                                                 Visit Date: 2025  :     1980    Referring practitioner:    [place] Carlos Gillespie MD  Date of Initial Visit:          Type: THERAPY  Episode: impaired mobility, gait, endurance, strength, balance  Number of visits this episode: 8    Visit Diagnoses:    ICD-10-CM ICD-9-CM   1. Impaired functional mobility, balance, gait, and endurance  Z74.09 V49.89     SUBJECTIVE     Subjective: He has had 2 falls because his L knee gave out.    PAIN: 9/10 L knee       OBJECTIVE     Objective     Neuromuscular Reeducation     02182 Comments   Walking up/down hallway forward/backward catching/tossing ball crossing ML multiple planes/directions tandem, grapevine    Timed Minutes 23             Therapeutic Exercises    18170 Units Comments   [employer]     SCITFIT L5 sprints 10 sec sprint + 20 sec steady state    Timed Minutes 15         Therapy Education/Self Care 86797   Education offered today    [facility] Code [id number]    Ongoing HEP     Date: 2024  Prepared by: Sandra Alex     Exercises  - Standing Single Leg Stance with Counter Support  - 2-3 x daily - 4 x weekly - 2 sets - 2 reps - 30 seconds  - Single Leg Balance with Clock Reach  - 1 x daily - 4 x weekly - 2 sets - 10 reps  - Braided Sidestepping  - 1 x daily - 4 x weekly - 2 sets - 10 reps  - Walking with High Knee Taps  - 1 x daily - 4 x weekly - 2 sets - 10 reps  - Tandem Walking  - 1 x daily - 4 x weekly - 2 sets - 10 reps  - Heel Walking  - 1 x daily - 4 x weekly - 2 sets - 10 reps  - Toe Walking  - 1 x daily - 4 x weekly - 2 sets - 10 reps  - Alternating Step Taps with Counter Support  - 1 x daily - 4 x weekly - 2 sets - 10 reps  - Step-Over  - 1 x daily - 4 x weekly - 2 sets - 10 reps  - Supine Bridge  "with Gluteal Set and Spinal Articulation  - 1 x daily - 4 x weekly - 2 sets - 10 reps  - Lunge with Counter Support  - 1 x daily - 4 x weekly - 2 sets - 10 reps  - Side Lunge with Counter Support  - 1 x daily - 4 x weekly - 2 sets - 10 reps     Walking program 60-75 mins of walking or 10,000 steps 5 days/wk   Timed Minutes        Total Timed Treatment:     38   mins  Total Time of Visit:              38  mins         ASSESSMENT/PLAN     GOALS  Goals                                                    Progress Note due by 2/26/2025                                                                Recert due by 2/18/2025   LTG by: 12 weeks Comments Date Status   Patient will report compliance with initial HEP.   reinforced today  1/27  Ongoing   Patient to improve initial 6 MWT distance by >/=120' no LOB and LRAD on level surfaces RPE </=11/20 on 6-20 scale to improve capacity for community ambulation.   1,116 ft    1065' 13/20 on 1/27 1/27  Ongoing   Patient to complete 5x STS from </=18\" surface height in </=10 secs to indicate improved functional strength and capacity for STS transfers.  18.29 sec    19.60 secs from 18\" surface height on 1/27 1/27  Ongoing   Patient to perform TUG within 10 sec without LOB for improved functional mobility. 11.79 sec    1/27:10.10 secs      1/27  MET   Patient to improve FGA score to >/= 23/30 to decrease patient's risk of falls and improve community ambulation.  20    21/30 on 1/27 1/27  Ongoing   Patient to improve mini-BEST test balance score to >/= 27/28 to decrease risk of falls.  12/30: 28/28 12/30  MET     Anticipated CPT codes: Gait Training 76185, Therapeutic Exercise 40621, Manual Therapy 75257, Therapeutic Activity 43678, Neuromuscular ReEducation 43434, and Self Care/Home Management 37864      Assessment/Plan      ASSESSMENT: He exhibited good stepping, hip/trunk, and ankle strategy with high-level dynamic balance re-ed activities. His falls appear to be d/t L knee pain " and sensation of giving out unpredictably, however this has not been reproduced in clinic with a variety of challenging strength and balance tasks.     PLAN: progress POC per pt tolerance    SIGNATURE: Sandra Alex PT DPT, [place] License #: [id number]  Electronically Signed on 2/11/2025        [place] [address]  [phone]

## 2025-02-13 ENCOUNTER — APPOINTMENT (OUTPATIENT)
Dept: OCCUPATIONAL THERAPY | Facility: HOSPITAL | Age: 45
End: 2025-02-13
Payer: MEDICAID

## 2025-02-17 ENCOUNTER — HOSPITAL ENCOUNTER (OUTPATIENT)
Dept: PHYSICAL THERAPY | Facility: HOSPITAL | Age: 45
Setting detail: THERAPIES SERIES
End: 2025-02-17
Payer: MEDICAID

## 2025-02-24 ENCOUNTER — HOSPITAL ENCOUNTER (OUTPATIENT)
Dept: PHYSICAL THERAPY | Facility: HOSPITAL | Age: 45
Setting detail: THERAPIES SERIES
Discharge: HOME | End: 2025-02-24
Payer: MEDICAID

## 2025-02-24 DIAGNOSIS — Z74.09 IMPAIRED FUNCTIONAL MOBILITY, BALANCE, GAIT, AND ENDURANCE: Primary | ICD-10-CM

## 2025-02-24 PROCEDURE — 97110 THERAPEUTIC EXERCISES: CPT

## 2025-02-24 PROCEDURE — 97112 NEUROMUSCULAR REEDUCATION: CPT

## 2025-02-24 NOTE — THERAPY TREATMENT NOTE
Physical Therapy Treatment Note, 30 Day Progress Note, and 90 Day Recertification Note  [facility] Outpatient Therapy Services  A department [place] [address], [place] [address]    Patient: James Taylor Jr.                                                 Visit Date: 2025  :     1980    Referring practitioner:    [place] Carlos Gillespie MD  Date of Initial Visit:          Type: THERAPY  Episode: impaired mobility, gait, endurance, strength, balance  Number of visits this episode: 9    Visit Diagnoses:    ICD-10-CM ICD-9-CM   1. Impaired functional mobility, balance, gait, and endurance  Z74.09 V49.89     SUBJECTIVE     Subjective: He has had 1 fall because his L knee gave out.    PAIN: 5/10 L knee       OBJECTIVE     Objective     Neuromuscular Reeducation     05118 Comments   Walking up/down hallway forward/backward catching/tossing ball crossing ML multiple planes/directions tandem, grapevine    Timed Minutes 25             Therapeutic Exercises    00689 Units Comments   DL, SL leg press 1 min e 8 bands DL, 6 bands SL   SCITFIT L5 sprints 15 sec sprint + 20 sec steady state 4 mins   Timed Minutes 15         Therapy Education/Self Care 80621   Education offered today    [facility] [id number]    Ongoing HEP     Date: 2024  Prepared by: Sandra Alex     Exercises  - Standing Single Leg Stance with Counter Support  - 2-3 x daily - 4 x weekly - 2 sets - 2 reps - 30 seconds  - Single Leg Balance with Clock Reach  - 1 x daily - 4 x weekly - 2 sets - 10 reps  - Braided Sidestepping  - 1 x daily - 4 x weekly - 2 sets - 10 reps  - Walking with High Knee Taps  - 1 x daily - 4 x weekly - 2 sets - 10 reps  - Tandem Walking  - 1 x daily - 4 x weekly - 2 sets - 10 reps  - Heel Walking  - 1 x daily - 4 x weekly - 2 sets - 10 reps  - Toe Walking  - 1 x daily - 4 x weekly - 2 sets - 10 reps  - Alternating Step Taps with Counter Support  - 1 x daily - 4 x weekly - 2 sets - 10 reps  -  "Step-Over  - 1 x daily - 4 x weekly - 2 sets - 10 reps  - Supine Bridge with Gluteal Set and Spinal Articulation  - 1 x daily - 4 x weekly - 2 sets - 10 reps  - Lunge with Counter Support  - 1 x daily - 4 x weekly - 2 sets - 10 reps  - Side Lunge with Counter Support  - 1 x daily - 4 x weekly - 2 sets - 10 reps     Walking program 60-75 mins of walking or 10,000 steps 5 days/wk   Timed Minutes        Total Timed Treatment:     40   mins  Total Time of Visit:              40  mins         ASSESSMENT/PLAN     GOALS  Goals                                                    Progress Note due by 3/26/2025                                                                Recert due by 5/23/2025   LTG by: 12 weeks Comments Date Status   Patient will report compliance with initial HEP.    2/24  MET   Patient to improve initial 6 MWT distance by >/=120' no LOB and LRAD on level surfaces RPE </=11/20 on 6-20 scale to improve capacity for community ambulation.   1259' on 2/24 2/24  MET   Patient to complete 5x STS from </=18\" surface height in </=10 secs to indicate improved functional strength and capacity for STS transfers.  15.34 secs on 2/24 2/24  Ongoing   Patient to perform TUG within 10 sec without LOB for improved functional mobility. 11.79 sec    1/27:10.10 secs      1/27  MET   Patient to improve FGA score to >/= 23/30 to decrease patient's risk of falls and improve community ambulation.  26/30 on 2/24 2/24  MET   Patient to improve mini-BEST test balance score to >/= 27/28 to decrease risk of falls.  12/30: 28/28 12/30  MET     Anticipated CPT codes: Gait Training 84439, Therapeutic Exercise 14100, Manual Therapy 42659, Therapeutic Activity 64399, Neuromuscular ReEducation 96640, and Self Care/Home Management 73850      Assessment/Plan     Assessment  Impairments: abnormal coordination, abnormal gait, abnormal muscle firing, abnormal muscle tone, abnormal or restricted ROM, activity intolerance, impaired balance, " impaired physical strength, lacks appropriate home exercise program, pain with function and safety issue   Functional limitations: carrying objects, lifting, sleeping, walking, pulling, pushing, uncomfortable because of pain, moving in bed, sitting, standing, stooping, reaching behind back, reaching overhead and unable to perform repetitive tasks   Prognosis: good     Plan  Therapy options: will be seen for skilled therapy services  Planned modality interventions: thermotherapy (hydrocollator packs), cryotherapy, low level laser therapy, TENS, dry needling, electrical stimulation/[place] stimulation and ultrasound  Planned therapy interventions: abdominal trunk stabilization, manual therapy, ADL retraining, motor coordination training, balance/weight-bearing training, neuromuscular re-education, body mechanics training, postural training, soft tissue mobilization, spinal/joint mobilization, fine motor coordination training, flexibility, functional ROM exercises, strengthening, gait training, stretching, home exercise program, therapeutic activities, IADL retraining, joint mobilization and transfer training  Frequency: 2x week  Duration in weeks: 12  Treatment plan discussed with: patient       ASSESSMENT: Progress note and re-certification performed per POC. He has achieved 5/6 initial goals with barrier to achieving remaining goal being continued L knee pain interfering with 5x STS performance vs functional strength deficits. He will be appropriate to D/C to HEP next session.     PLAN: D/C NEXT SESSION    SIGNATURE: Sandra Alex PT DPT, [place] License #: [id number]  Electronically Signed on 2/24/2025       90 Day Recertification  Certification Period: 2/24/2025 through 5/24/2025  I certify that the therapy services are furnished while this patient is under my care.  The services outlined above are required by this patient, and will be reviewed every 90 days.     PHYSICIAN: [place] Carlos Gillespie MD (NPI:  [id number])    Signature:___________________________________________DATE: _________    Please sign and return via fax to [phone].   Thank you so much for letting us work with James. I appreciate your letting us work with your patients. If you have any questions or concerns, please don't hesitate to contact me.               [address] [address]  [phone]

## 2025-02-26 ENCOUNTER — OFFICE VISIT (OUTPATIENT)
Dept: FAMILY MEDICINE CLINIC | Facility: CLINIC | Age: 45
End: 2025-02-26
Payer: MEDICAID

## 2025-02-26 VITALS
DIASTOLIC BLOOD PRESSURE: 75 MMHG | SYSTOLIC BLOOD PRESSURE: 125 MMHG | HEIGHT: 70 IN | OXYGEN SATURATION: 98 % | BODY MASS INDEX: 28.69 KG/M2 | WEIGHT: 200.38 LBS | RESPIRATION RATE: 20 BRPM | TEMPERATURE: 98.6 F | HEART RATE: 65 BPM

## 2025-02-26 DIAGNOSIS — F41.9 ANXIETY: ICD-10-CM

## 2025-02-26 DIAGNOSIS — G89.29 CHRONIC PAIN OF LEFT KNEE: Primary | ICD-10-CM

## 2025-02-26 DIAGNOSIS — M25.562 CHRONIC PAIN OF LEFT KNEE: Primary | ICD-10-CM

## 2025-02-26 DIAGNOSIS — R45.4 IRRITABLE: ICD-10-CM

## 2025-02-26 DIAGNOSIS — R45.86 MOOD SWINGS: ICD-10-CM

## 2025-02-26 DIAGNOSIS — F43.29 STRESS AND ADJUSTMENT REACTION: ICD-10-CM

## 2025-02-26 DIAGNOSIS — I10 PRIMARY HYPERTENSION: Chronic | ICD-10-CM

## 2025-02-26 DIAGNOSIS — Z63.4 BEREAVEMENT: ICD-10-CM

## 2025-02-26 DIAGNOSIS — F10.20 ALCOHOLISM: ICD-10-CM

## 2025-02-26 DIAGNOSIS — I10 PRIMARY HYPERTENSION: ICD-10-CM

## 2025-02-26 PROCEDURE — 1125F AMNT PAIN NOTED PAIN PRSNT: CPT | Performed by: NURSE PRACTITIONER

## 2025-02-26 PROCEDURE — 3074F SYST BP LT 130 MM HG: CPT | Performed by: NURSE PRACTITIONER

## 2025-02-26 PROCEDURE — 99214 OFFICE O/P EST MOD 30 MIN: CPT | Performed by: NURSE PRACTITIONER

## 2025-02-26 PROCEDURE — 3078F DIAST BP <80 MM HG: CPT | Performed by: NURSE PRACTITIONER

## 2025-02-26 RX ORDER — OXCARBAZEPINE 150 MG/1
TABLET, FILM COATED ORAL
Qty: 180 TABLET | Refills: 4 | Status: SHIPPED | OUTPATIENT
Start: 2025-02-26

## 2025-02-26 RX ORDER — AMLODIPINE BESYLATE 10 MG/1
10 TABLET ORAL DAILY
Qty: 90 TABLET | Refills: 4 | Status: SHIPPED | OUTPATIENT
Start: 2025-02-26

## 2025-02-26 RX ORDER — METOPROLOL SUCCINATE 50 MG/1
50 TABLET, EXTENDED RELEASE ORAL DAILY
Qty: 90 TABLET | Refills: 4 | Status: SHIPPED | OUTPATIENT
Start: 2025-02-26

## 2025-02-26 RX ORDER — NALTREXONE HYDROCHLORIDE 50 MG/1
50 TABLET, FILM COATED ORAL DAILY
Qty: 90 TABLET | Refills: 4 | Status: SHIPPED | OUTPATIENT
Start: 2025-02-26

## 2025-02-26 RX ORDER — LOSARTAN POTASSIUM 25 MG/1
25 TABLET ORAL DAILY
Qty: 90 TABLET | Refills: 4 | Status: SHIPPED | OUTPATIENT
Start: 2025-02-26

## 2025-02-26 RX ORDER — SERTRALINE HYDROCHLORIDE 100 MG/1
100 TABLET, FILM COATED ORAL DAILY
Qty: 90 TABLET | Refills: 4 | Status: SHIPPED | OUTPATIENT
Start: 2025-02-26

## 2025-02-26 SDOH — SOCIAL STABILITY - SOCIAL INSECURITY: DISSAPEARANCE AND DEATH OF FAMILY MEMBER: Z63.4

## 2025-02-26 NOTE — PROGRESS NOTES
NAM Renteria  [facility]   Family Medicine  [address]  [place] [address]  Phone: [phone]  Fax: [phone]         Chief Complaint:  Chief Complaint   Patient presents with    Irritable     Patient is following up for a 3 month check up.        History:  James Taylor Jr. is a 44 y.o. male.  History of Present Illness  The patient is a 44-year-old male who presents for follow-up.    Agitation: He reports persistent irritability, which has not shown any improvement with his current medication regimen. He experiences constant agitation, particularly in the afternoons when his children return from school. He attributes this to the fact that his son often gets into trouble at school. He has been adhering to his prescribed Zoloft daily. However, he has not been taking naltrexone as it was not picked up. He is also on oxcarbazepine 250 mg, administered at bedtime.    Left knee pain: He has been experiencing issues with his left knee, which he describes as giving out on him. This problem has been ongoing for over 3 months. He has not had an x-ray of the knee. He is currently undergoing physical therapy once a week, which includes exercises for his leg and balance.    Hypertension: He is currently on amlodipine, losartan 25 mg daily, and metoprolol for blood pressure management.    Alcohol abuse: He reports that he is doing well with his drinking, consuming only one beer a day.    SOCIAL HISTORY  He admits to drinking one beer a day.    FAMILY HISTORY  His father is irritable.    MEDICATIONS  Zoloft, oxcarbazepine, amlodipine, losartan, metoprolol       ROS:  Review of Systems   Constitutional:  Negative for fatigue, fever and unexpected weight change.   HENT:  Negative for congestion, ear pain, rhinorrhea, sinus pressure, sinus pain and voice change.    Eyes:  Negative for visual disturbance.   Respiratory:  Negative for shortness of breath and wheezing.   "  Cardiovascular:  Negative for chest pain and palpitations.   Gastrointestinal:  Negative for abdominal pain, nausea and vomiting.   Genitourinary:  Negative for dysuria and flank pain.   Musculoskeletal:  Negative for back pain, myalgias and neck pain. Arthralgias: Left knee pain.  Skin:  Negative for color change and rash.   Neurological:  Negative for dizziness, weakness, numbness and headaches.   Psychiatric/Behavioral:  Positive for agitation. Negative for behavioral problems, dysphoric mood, self-injury and sleep disturbance.         reports that he has been smoking cigarettes. He started smoking about 12 years ago. He has a 16 pack-year smoking history. He has been exposed to tobacco smoke. He has never used smokeless tobacco. He reports that he does not currently use alcohol after a past usage of about 4.0 standard drinks of alcohol per week. He reports that he does not use drugs.    Current Outpatient Medications   Medication Instructions    amLODIPine (NORVASC) 10 mg, Oral, Daily    gabapentin (NEURONTIN) 300 MG capsule TID prn alcohol withdrawal/ chronic back pain/ neuropathy.    lactulose (CHRONULAC) 10 GM/15ML solution 30 ml Tid x 3 days then reduce down to twice daily dosing.    losartan (COZAAR) 25 mg, Oral, Daily    metoprolol succinate XL (TOPROL-XL) 50 mg, Oral, Daily    naltrexone (DEPADE) 50 mg, Oral, Daily    omeprazole (PRILOSEC) 20 mg, Oral, Daily    OXcarbazepine (Trileptal) 150 MG tablet 1/2 tablet am and 1 full tablet at bedtime.    sertraline (ZOLOFT) 100 mg, Oral, Daily, To help with mood. Take with food    terazosin (HYTRIN) 5 mg, Oral, Nightly       OBJECTIVE:  /75 (BP Location: Left arm, Patient Position: Sitting, Cuff Size: Adult)   Pulse 65   Temp 98.6 °F (37 °C) (Infrared)   Resp 20   Ht 177.8 cm (70\")   Wt 90.9 kg (200 lb 6 oz)   SpO2 98%   BMI 28.75 kg/m²    Physical Exam  Vitals and nursing note reviewed.   Constitutional:       Appearance: Normal appearance. He is " well-developed.   HENT:      Head: Normocephalic and atraumatic.      Right Ear: Tympanic membrane, ear canal and external ear normal.      Left Ear: Tympanic membrane, ear canal and external ear normal.      Nose: Nose normal. No septal deviation, nasal tenderness or congestion.      Mouth/Throat:      Lips: Pink. No lesions.      Mouth: Mucous membranes are moist. No oral lesions.      Dentition: Normal dentition.      Pharynx: Oropharynx is clear. No pharyngeal swelling, oropharyngeal exudate or posterior oropharyngeal erythema.   Eyes:      General: Lids are normal. Vision grossly intact. No scleral icterus.        Right eye: No discharge.         Left eye: No discharge.      Extraocular Movements: Extraocular movements intact.      Conjunctiva/sclera: Conjunctivae normal.      Right eye: Right conjunctiva is not injected.      Left eye: Left conjunctiva is not injected.      Pupils: Pupils are equal, round, and reactive to light.   Neck:      Thyroid: No thyroid mass.      Trachea: Trachea normal.   Cardiovascular:      Rate and Rhythm: Normal rate and regular rhythm.      Heart sounds: Normal heart sounds. No murmur heard.     No gallop.   Pulmonary:      Effort: Pulmonary effort is normal.      Breath sounds: Normal breath sounds and air entry. No wheezing, rhonchi or rales.   Musculoskeletal:         General: No deformity. Normal range of motion.      Cervical back: Full passive range of motion without pain, normal range of motion and neck supple.      Thoracic back: Normal.      Left knee: Decreased range of motion. Tenderness present over the medial joint line, ACL and PCL.      Right lower leg: No edema.      Left lower leg: No edema.   Skin:     General: Skin is warm and dry.      Coloration: Skin is not jaundiced.      Findings: No rash.   Neurological:      Mental Status: He is alert and oriented to person, place, and time.      Sensory: Sensation is intact.      Motor: Motor function is intact.       Coordination: Coordination is intact.      Gait: Gait is intact.      Deep Tendon Reflexes: Reflexes are normal and symmetric.   Psychiatric:         Mood and Affect: Mood and affect normal.         Behavior: Behavior normal.         Judgment: Judgment normal.       Physical Exam  Lungs were auscultated.  There is pain in the front of the left knee when it is rocked back and forth. There is no pain when the left knee is pushed back. There is pain in the foot when the left knee is rocked back and forth. There is pain in the front of the left knee when the feet are crossed.         Procedures    Results      Assessment/Plan:     Diagnoses and all orders for this visit:    1. Chronic pain of left knee (Primary)  -     XR Knee 1 or 2 View Left; Future    2. Alcoholism  -     naltrexone (DEPADE) 50 MG tablet; Take 1 tablet by mouth Daily.  Dispense: 90 tablet; Refill: 4    3. Mood swings  -     OXcarbazepine (Trileptal) 150 MG tablet; 1/2 tablet am and 1 full tablet at bedtime.  Dispense: 180 tablet; Refill: 4    4. Bereavement  -     OXcarbazepine (Trileptal) 150 MG tablet; 1/2 tablet am and 1 full tablet at bedtime.  Dispense: 180 tablet; Refill: 4    5. Irritable  -     sertraline (ZOLOFT) 100 MG tablet; Take 1 tablet by mouth Daily. To help with mood. Take with food  Dispense: 90 tablet; Refill: 4    6. Stress and adjustment reaction  -     sertraline (ZOLOFT) 100 MG tablet; Take 1 tablet by mouth Daily. To help with mood. Take with food  Dispense: 90 tablet; Refill: 4    7. Anxiety  -     sertraline (ZOLOFT) 100 MG tablet; Take 1 tablet by mouth Daily. To help with mood. Take with food  Dispense: 90 tablet; Refill: 4    8. Primary hypertension  -     metoprolol succinate XL (TOPROL-XL) 50 MG 24 hr tablet; Take 1 tablet by mouth Daily.  Dispense: 90 tablet; Refill: 4  -     losartan (COZAAR) 25 MG tablet; Take 1 tablet by mouth Daily.  Dispense: 90 tablet; Refill: 4  -     amLODIPine (NORVASC) 10 MG tablet; Take 1  tablet by mouth Daily.  Dispense: 90 tablet; Refill: 4    9. Primary hypertension  Comments:  moving HTN meds to morning dosing.  Orders:  -     metoprolol succinate XL (TOPROL-XL) 50 MG 24 hr tablet; Take 1 tablet by mouth Daily.  Dispense: 90 tablet; Refill: 4  -     losartan (COZAAR) 25 MG tablet; Take 1 tablet by mouth Daily.  Dispense: 90 tablet; Refill: 4  -     amLODIPine (NORVASC) 10 MG tablet; Take 1 tablet by mouth Daily.  Dispense: 90 tablet; Refill: 4          An After Visit Summary was printed and given to the patient at discharge.  Return in about 4 weeks (around 3/26/2025).       Assessment & Plan  1. Irritability.  He reports persistent irritability, particularly in the afternoons. He is currently taking Zoloft daily and oxcarbazepine 250 mg at bedtime. The dosage of oxcarbazepine will be increased to half a tablet in the morning and one full tablet at bedtime to help manage the agitation. A prescription for naltrexone will be refilled to assist with alcohol consumption.    2. Left knee pain.  He reports left knee pain that has been ongoing for over 3 months and occasionally gives out. An x-ray of the left knee will be ordered to evaluate the condition. If the x-ray results are inconclusive, an MRI will be considered. If arthritis is identified, an injection may be administered. If a meniscal tear or ACL issue is suspected, a referral to an orthopedic surgeon will be made.    3.  Hypertension  He is currently taking amlodipine, losartan 25 mg daily, and metoprolol for blood pressure management. All blood pressure medications will be refilled for one year to ensure continuity of care.    4. Alcohol use.  He reports consuming one beer per day. Naltrexone will be refilled to assist with reducing alcohol consumption.    Follow-up  The patient will follow up in 4 weeks.    I spent 33 minutes caring for [place] on this date of service. This time includes time spent by me in the following activities: preparing  for the visit, reviewing tests, performing a medically appropriate examination and/or evaluation, counseling and educating the patient/family/caregiver, and referring and communicating with other health care professionals         Casi BROWNING 3/1/2025   Electronically signed.    Patient or patient representative verbalized consent for the use of Ambient Listening during the visit with  NAM Renteria for chart documentation. 3/1/2025  15:31 CST

## 2025-02-26 NOTE — PATIENT INSTRUCTIONS
Increase trileptal to 1/2 tab am and 1 full tab at bedtime.     Xray of the left knee    We will proceed with MRI after the xray returns.

## 2025-03-03 ENCOUNTER — HOSPITAL ENCOUNTER (OUTPATIENT)
Dept: PHYSICAL THERAPY | Facility: HOSPITAL | Age: 45
Setting detail: THERAPIES SERIES
End: 2025-03-03
Payer: MEDICAID

## 2025-03-05 ENCOUNTER — APPOINTMENT (OUTPATIENT)
Dept: PHYSICAL THERAPY | Facility: HOSPITAL | Age: 45
End: 2025-03-05
Payer: MEDICAID

## 2025-03-12 ENCOUNTER — HOSPITAL ENCOUNTER (OUTPATIENT)
Dept: PHYSICAL THERAPY | Facility: HOSPITAL | Age: 45
Setting detail: THERAPIES SERIES
Discharge: HOME | End: 2025-03-12
Payer: MEDICAID

## 2025-03-12 DIAGNOSIS — Z74.09 IMPAIRED FUNCTIONAL MOBILITY, BALANCE, GAIT, AND ENDURANCE: Primary | ICD-10-CM

## 2025-03-12 PROCEDURE — 97116 GAIT TRAINING THERAPY: CPT | Performed by: PHYSICAL THERAPIST

## 2025-03-12 PROCEDURE — 97112 NEUROMUSCULAR REEDUCATION: CPT | Performed by: PHYSICAL THERAPIST

## 2025-03-12 NOTE — THERAPY TREATMENT NOTE
"Physical Therapy Treatment Note and Discharge Note  [facility] Outpatient Therapy Services  A department of [facility] [address], [place] [address]    Patient: James Taylor Jr.                                                 Visit Date: 3/12/2025  :     1980    Referring practitioner:    Roberto Carlos Gillespie MD  Date of Initial Visit:          Type: THERAPY  Episode: impaired mobility, gait, endurance, strength, balance  Number of visits this episode: 10    Visit Diagnoses:    ICD-10-CM ICD-9-CM   1. Impaired functional mobility, balance, gait, and endurance  Z74.09 V49.89     SUBJECTIVE     Subjective: He sees his MD next month. He feels like he is doing well.     PAIN: 4/10 L knee       OBJECTIVE     Objective     Gait Training          [address]   Task/Terrain Asst AD Comments   hallway SBA, VC none Cued to shift shoulders a bit left and spend more time on his left leg for more symmetry and stability               Timed Minutes 20       Neuromuscular Reeducation     [id number] Comments   SLS in // bars with hip flexion/abd/ext UE support as needed   Sustained forward lunge Emphasis on front leg stability   Side step on 6\" box In // bars with UE support as needed with cues on stable hips   Assessed goals    Diagonal hops Cued to \"stick the landing\"   Timed Minutes 25           Therapy Education/Self Care [id number]   Education offered today    Mirae Code [id number]    Ongoing HEP     Date: 2024  Prepared by: Sandra Alex     Exercises  - Standing Single Leg Stance with Counter Support  - 2-3 x daily - 4 x weekly - 2 sets - 2 reps - 30 seconds  - Single Leg Balance with Clock Reach  - 1 x daily - 4 x weekly - 2 sets - 10 reps  - Braided Sidestepping  - 1 x daily - 4 x weekly - 2 sets - 10 reps  - Walking with High Knee Taps  - 1 x daily - 4 x weekly - 2 sets - 10 reps  - Tandem Walking  - 1 x daily - 4 x weekly - 2 sets - 10 reps  - Heel Walking  - 1 x daily - 4 x weekly - 2 sets - 10 " "reps  - Toe Walking  - 1 x daily - 4 x weekly - 2 sets - 10 reps  - Alternating Step Taps with Counter Support  - 1 x daily - 4 x weekly - 2 sets - 10 reps  - Step-Over  - 1 x daily - 4 x weekly - 2 sets - 10 reps  - Supine Bridge with Gluteal Set and Spinal Articulation  - 1 x daily - 4 x weekly - 2 sets - 10 reps  - Lunge with Counter Support  - 1 x daily - 4 x weekly - 2 sets - 10 reps  - Side Lunge with Counter Support  - 1 x daily - 4 x weekly - 2 sets - 10 reps     Walking program 60-75 mins of walking or 10,000 steps 5 days/wk   Timed Minutes        Total Timed Treatment:     45   mins  Total Time of Visit:            45  mins         ASSESSMENT/PLAN     GOALS  Goals                                                    Progress Note due by 3/26/2025                                                                Recert due by 5/23/2025   LTG by: 12 weeks Comments Date Status   Patient will report compliance with initial HEP.    2/24  MET   Patient to improve initial 6 MWT distance by >/=120' no LOB and LRAD on level surfaces RPE </=11/20 on 6-20 scale to improve capacity for community ambulation.   1259' on 2/24 2/24  MET   Patient to complete 5x STS from </=18\" surface height in </=10 secs to indicate improved functional strength and capacity for STS transfers.  15.34'  on 2/24  12.6\" on 3/12    2/24 Partially met   Patient to perform TUG within 10 sec without LOB for improved functional mobility. 11.79 sec    1/27:10.10 secs      1/27  MET   Patient to improve FGA score to >/= 23/30 to decrease patient's risk of falls and improve community ambulation.  26/30 on 2/24 2/24  MET   Patient to improve mini-BEST test balance score to >/= 27/28 to decrease risk of falls.  12/30: 28/28 12/30  MET     Anticipated CPT codes: Gait Training 33296, Therapeutic Exercise 80001, Manual Therapy 67653, Therapeutic Activity 39442, Neuromuscular ReEducation 61465, and Self Care/Home Management 54636      Assessment/Plan    "   ASSESSMENT: He has met all his goals and was improved on his STS test taking 3 secs off his last test. He understands his HEP and how to improve his left hip stability and improve his gait. At this point, PT is no longer needed.     PLAN: D/C   DISCHARGE SUMMARY   Discharge date 3/12/2025   Dates of this episode 11/20/24 through 3/12/25   Number of visits on this episode 10   Reason for discharge maximum functional potential achieved  Independent  at baseline functional level   Outcomes achieved Refer to the goals table for specifics on goals   Discharge plan Continue with current home exercise program as instructed   Summary of care Emphasis was on hip stability and leg strengthening incorporating into functional tasks.    Discharge instruction See Education Table         SIGNATURE: Mark Bullard, PT, [place] License #: [id number]  Electronically Signed on 3/12/2025         [place] [address]  [phone]

## 2025-03-20 ENCOUNTER — DOCUMENTATION (OUTPATIENT)
Dept: OCCUPATIONAL THERAPY | Facility: HOSPITAL | Age: 45
End: 2025-03-20
Payer: MEDICAID

## 2025-03-20 DIAGNOSIS — R29.898 WEAKNESS OF HAND: ICD-10-CM

## 2025-03-20 DIAGNOSIS — R29.898 FINE MOTOR IMPAIRMENT: Primary | ICD-10-CM

## 2025-03-20 DIAGNOSIS — R29.818 FINE MOTOR IMPAIRMENT: Primary | ICD-10-CM

## 2025-03-20 DIAGNOSIS — Z86.73 HISTORY OF STROKE: ICD-10-CM

## 2025-03-20 DIAGNOSIS — Z78.9 IMPAIRED INSTRUMENTAL ACTIVITIES OF DAILY LIVING (IADL): ICD-10-CM

## 2025-03-20 NOTE — THERAPY DISCHARGE NOTE
Occupational Therapy Discharge Summary  [address] [place] [address]    Patient: James Taylor Jr.                                                                                     Today's Date: 3/20/2025  :     1980    Date of Initial Visit:          Type: THERAPY  Noted: 2024    Patient seen for Visit count could not be calculated. Make sure you are using a visit which is associated with an episode. sessions    Visit Diagnoses:    ICD-10-CM ICD-9-CM   1. Fine motor impairment  R29.818 V49.89    R29.898    2. Impaired instrumental activities of daily living (IADL)  Z78.9 V49.89   3. Weakness of hand  R29.898 728.87   4. History of stroke  Z86.73 V12.54       GOALS:          Goals                                          Progress Note due by 25                                                      Recert due by 3/10/25   STG by: 25 Comments Date Status   Pt will be independent with daily completion of a HEP to address stroke deficits affecting IADL's.  25 Not Met    Pt will increase L shoulder AROM to >120 degrees flexion and abduction to improve IADL performance.  1/15/25:   Flexion: 95 degrees with pain  Abduction: 85 degrees with pain 25 Not Met    Pt will complete simulated moderate indoor IADL tasks displaying proper use of energy conservation techniques.  25 Not Met           LTG by: 3/10/25       Pt will increase L hand  strength to 60# for improved performance with IADL tasks. 1/15/25: L hand 36 lbs  25 Not Met    Pt will score <40 on the Quick Dash to indicate decreased severity of UE symptoms.  1/15/25: 54.55; Same as initial eval.  25 Not Met    Pt will display improved B UE FMC by completing the 9 hole peg test in 20 seconds or less. 25:   Left: 29.26s; Improved  Right: 22.93s 25 Partially Met         DISCHARGE SUMMARY   Discharge date 3/20/2025   Dates of this episode 24 through 3/20/25   Reason for discharge patient did not return  to therapy   Outcomes achieved Refer to the goals table for specifics on goals   Discharge plan Continue with current home exercise program as instructed   Summary of care Pt had very poor attendance with OT sessions and was non-compliant with HEP.    Discharge instruction Continue HEP as instructed. See PCP with further concerns.      SIGNATURE: Carrie Yepez OT, [place] License #: [id number]  Electronically Signed on 3/20/2025

## 2025-04-14 ENCOUNTER — APPOINTMENT (OUTPATIENT)
Dept: GENERAL RADIOLOGY | Facility: HOSPITAL | Age: 45
End: 2025-04-14
Payer: MEDICAID

## 2025-04-14 ENCOUNTER — APPOINTMENT (OUTPATIENT)
Dept: CARDIOLOGY | Facility: HOSPITAL | Age: 45
End: 2025-04-14
Payer: MEDICAID

## 2025-04-14 ENCOUNTER — HOSPITAL ENCOUNTER (OUTPATIENT)
Facility: HOSPITAL | Age: 45
Setting detail: OBSERVATION
Discharge: HOME | End: 2025-04-15
Attending: INTERNAL MEDICINE | Admitting: INTERNAL MEDICINE
Payer: MEDICAID

## 2025-04-14 DIAGNOSIS — I10 PRIMARY HYPERTENSION: ICD-10-CM

## 2025-04-14 DIAGNOSIS — R07.9 CHEST PAIN, UNSPECIFIED TYPE: Primary | ICD-10-CM

## 2025-04-14 LAB
ALBUMIN SERPL-MCNC: 4.5 G/DL (ref 3.5–5.2)
ALBUMIN/GLOB SERPL: 1.3 G/DL
ALP SERPL-CCNC: 131 U/L (ref 39–117)
ALT SERPL W P-5'-P-CCNC: 44 U/L (ref 1–41)
AMPHET+METHAMPHET UR QL: NEGATIVE
AMPHETAMINES UR QL: NEGATIVE
ANION GAP SERPL CALCULATED.3IONS-SCNC: 13 MMOL/L (ref 5–15)
AST SERPL-CCNC: 48 U/L (ref 1–40)
BARBITURATES UR QL SCN: NEGATIVE
BASOPHILS # BLD AUTO: 0.07 10*3/MM3 (ref 0–0.2)
BASOPHILS NFR BLD AUTO: 1 % (ref 0–1.5)
BENZODIAZ UR QL SCN: NEGATIVE
BILIRUB SERPL-MCNC: 0.9 MG/DL (ref 0–1.2)
BUN SERPL-MCNC: 10 MG/DL (ref 6–20)
BUN/CREAT SERPL: 9.1 (ref 7–25)
BUPRENORPHINE SERPL-MCNC: NEGATIVE NG/ML
CALCIUM SPEC-SCNC: 9.3 MG/DL (ref 8.6–10.5)
CANNABINOIDS SERPL QL: NEGATIVE
CHLORIDE SERPL-SCNC: 100 MMOL/L (ref 98–107)
CO2 SERPL-SCNC: 23 MMOL/L (ref 22–29)
COCAINE UR QL: NEGATIVE
CREAT SERPL-MCNC: 1.1 MG/DL (ref 0.76–1.27)
DEPRECATED RDW RBC AUTO: 37.6 FL (ref 37–54)
EGFRCR SERPLBLD CKD-EPI 2021: 84.9 ML/MIN/1.73
EOSINOPHIL # BLD AUTO: 0.27 10*3/MM3 (ref 0–0.4)
EOSINOPHIL NFR BLD AUTO: 3.7 % (ref 0.3–6.2)
ERYTHROCYTE [DISTWIDTH] IN BLOOD BY AUTOMATED COUNT: 13.2 % (ref 12.3–15.4)
ETHANOL UR QL: 0.02 %
FENTANYL UR-MCNC: NEGATIVE NG/ML
GEN 5 1HR TROPONIN T REFLEX: 10 NG/L
GLOBULIN UR ELPH-MCNC: 3.6 GM/DL
GLUCOSE SERPL-MCNC: 109 MG/DL (ref 65–99)
HCT VFR BLD AUTO: 47.3 % (ref 37.5–51)
HGB BLD-MCNC: 17.3 G/DL (ref 13–17.7)
HOLD SPECIMEN: NORMAL
HOLD SPECIMEN: NORMAL
IMM GRANULOCYTES # BLD AUTO: 0.02 10*3/MM3 (ref 0–0.05)
IMM GRANULOCYTES NFR BLD AUTO: 0.3 % (ref 0–0.5)
INR PPP: 1.03 (ref 0.91–1.09)
LYMPHOCYTES # BLD AUTO: 3 10*3/MM3 (ref 0.7–3.1)
LYMPHOCYTES NFR BLD AUTO: 41.5 % (ref 19.6–45.3)
MCH RBC QN AUTO: 28.9 PG (ref 26.6–33)
MCHC RBC AUTO-ENTMCNC: 36.6 G/DL (ref 31.5–35.7)
MCV RBC AUTO: 79 FL (ref 79–97)
METHADONE UR QL SCN: NEGATIVE
MONOCYTES # BLD AUTO: 0.33 10*3/MM3 (ref 0.1–0.9)
MONOCYTES NFR BLD AUTO: 4.6 % (ref 5–12)
NEUTROPHILS NFR BLD AUTO: 3.54 10*3/MM3 (ref 1.7–7)
NEUTROPHILS NFR BLD AUTO: 48.9 % (ref 42.7–76)
NRBC BLD AUTO-RTO: 0 /100 WBC (ref 0–0.2)
NT-PROBNP SERPL-MCNC: 93 PG/ML (ref 0–450)
OPIATES UR QL: NEGATIVE
OXYCODONE UR QL SCN: NEGATIVE
PCP UR QL SCN: NEGATIVE
PLATELET # BLD AUTO: 174 10*3/MM3 (ref 140–450)
PMV BLD AUTO: 10.1 FL (ref 6–12)
POTASSIUM SERPL-SCNC: 4.1 MMOL/L (ref 3.5–5.2)
PROT SERPL-MCNC: 8.1 G/DL (ref 6–8.5)
PROTHROMBIN TIME: 14.1 SECONDS (ref 11.8–14.8)
RBC # BLD AUTO: 5.99 10*6/MM3 (ref 4.14–5.8)
SODIUM SERPL-SCNC: 136 MMOL/L (ref 136–145)
TRICYCLICS UR QL SCN: NEGATIVE
TROPONIN T NUMERIC DELTA: -3 NG/L
TROPONIN T SERPL HS-MCNC: 13 NG/L
WBC NRBC COR # BLD AUTO: 7.23 10*3/MM3 (ref 3.4–10.8)
WHOLE BLOOD HOLD COAG: NORMAL
WHOLE BLOOD HOLD SPECIMEN: NORMAL

## 2025-04-14 PROCEDURE — G0378 HOSPITAL OBSERVATION PER HR: HCPCS

## 2025-04-14 PROCEDURE — 80307 DRUG TEST PRSMV CHEM ANLYZR: CPT | Performed by: NURSE PRACTITIONER

## 2025-04-14 PROCEDURE — 36415 COLL VENOUS BLD VENIPUNCTURE: CPT

## 2025-04-14 PROCEDURE — 83880 ASSAY OF NATRIURETIC PEPTIDE: CPT | Performed by: NURSE PRACTITIONER

## 2025-04-14 PROCEDURE — 85610 PROTHROMBIN TIME: CPT | Performed by: NURSE PRACTITIONER

## 2025-04-14 PROCEDURE — 25010000002 DOBUTAMINE PER 250 MG: Performed by: INTERNAL MEDICINE

## 2025-04-14 PROCEDURE — 25010000002 ENOXAPARIN PER 10 MG: Performed by: INTERNAL MEDICINE

## 2025-04-14 PROCEDURE — 96375 TX/PRO/DX INJ NEW DRUG ADDON: CPT

## 2025-04-14 PROCEDURE — 93010 ELECTROCARDIOGRAM REPORT: CPT | Performed by: INTERNAL MEDICINE

## 2025-04-14 PROCEDURE — 96365 THER/PROPH/DIAG IV INF INIT: CPT

## 2025-04-14 PROCEDURE — 99285 EMERGENCY DEPT VISIT HI MDM: CPT

## 2025-04-14 PROCEDURE — 25010000002 LABETALOL 5 MG/ML SOLUTION: Performed by: NURSE PRACTITIONER

## 2025-04-14 PROCEDURE — 71045 X-RAY EXAM CHEST 1 VIEW: CPT

## 2025-04-14 PROCEDURE — 96372 THER/PROPH/DIAG INJ SC/IM: CPT

## 2025-04-14 PROCEDURE — 84484 ASSAY OF TROPONIN QUANT: CPT

## 2025-04-14 PROCEDURE — 93005 ELECTROCARDIOGRAM TRACING: CPT

## 2025-04-14 PROCEDURE — 25010000002 NITROGLYCERIN 200 MCG/ML SOLUTION: Performed by: INTERNAL MEDICINE

## 2025-04-14 PROCEDURE — 80053 COMPREHEN METABOLIC PANEL: CPT

## 2025-04-14 PROCEDURE — 82077 ASSAY SPEC XCP UR&BREATH IA: CPT | Performed by: NURSE PRACTITIONER

## 2025-04-14 PROCEDURE — 25010000002 HYDRALAZINE PER 20 MG: Performed by: NURSE PRACTITIONER

## 2025-04-14 PROCEDURE — 25510000001 PERFLUTREN 6.52 MG/ML SUSPENSION: Performed by: INTERNAL MEDICINE

## 2025-04-14 PROCEDURE — 85025 COMPLETE CBC W/AUTO DIFF WBC: CPT

## 2025-04-14 PROCEDURE — 96366 THER/PROPH/DIAG IV INF ADDON: CPT

## 2025-04-14 RX ORDER — METOPROLOL SUCCINATE 50 MG/1
50 TABLET, EXTENDED RELEASE ORAL DAILY
Status: DISCONTINUED | OUTPATIENT
Start: 2025-04-14 | End: 2025-04-15 | Stop reason: HOSPADM

## 2025-04-14 RX ORDER — BISACODYL 10 MG
10 SUPPOSITORY, RECTAL RECTAL DAILY PRN
Status: DISCONTINUED | OUTPATIENT
Start: 2025-04-14 | End: 2025-04-15 | Stop reason: HOSPADM

## 2025-04-14 RX ORDER — ENOXAPARIN SODIUM 100 MG/ML
40 INJECTION SUBCUTANEOUS
Status: DISCONTINUED | OUTPATIENT
Start: 2025-04-14 | End: 2025-04-15 | Stop reason: HOSPADM

## 2025-04-14 RX ORDER — HYDROCODONE BITARTRATE AND ACETAMINOPHEN 5; 325 MG/1; MG/1
1 TABLET ORAL EVERY 6 HOURS PRN
Refills: 0 | Status: DISCONTINUED | OUTPATIENT
Start: 2025-04-14 | End: 2025-04-15 | Stop reason: HOSPADM

## 2025-04-14 RX ORDER — PANTOPRAZOLE SODIUM 40 MG/1
40 TABLET, DELAYED RELEASE ORAL
Status: DISCONTINUED | OUTPATIENT
Start: 2025-04-15 | End: 2025-04-15 | Stop reason: HOSPADM

## 2025-04-14 RX ORDER — ACETAMINOPHEN 160 MG/5ML
650 SOLUTION ORAL EVERY 4 HOURS PRN
Status: DISCONTINUED | OUTPATIENT
Start: 2025-04-14 | End: 2025-04-15 | Stop reason: HOSPADM

## 2025-04-14 RX ORDER — LABETALOL HYDROCHLORIDE 5 MG/ML
20 INJECTION, SOLUTION INTRAVENOUS ONCE
Status: COMPLETED | OUTPATIENT
Start: 2025-04-14 | End: 2025-04-14

## 2025-04-14 RX ORDER — AMOXICILLIN 250 MG
2 CAPSULE ORAL 2 TIMES DAILY PRN
Status: DISCONTINUED | OUTPATIENT
Start: 2025-04-14 | End: 2025-04-15 | Stop reason: HOSPADM

## 2025-04-14 RX ORDER — TERAZOSIN 5 MG/1
5 CAPSULE ORAL NIGHTLY
Status: DISCONTINUED | OUTPATIENT
Start: 2025-04-14 | End: 2025-04-15 | Stop reason: HOSPADM

## 2025-04-14 RX ORDER — OXCARBAZEPINE 150 MG/1
75 TABLET, FILM COATED ORAL NIGHTLY
Status: DISCONTINUED | OUTPATIENT
Start: 2025-04-14 | End: 2025-04-15 | Stop reason: HOSPADM

## 2025-04-14 RX ORDER — SODIUM CHLORIDE 0.9 % (FLUSH) 0.9 %
10 SYRINGE (ML) INJECTION AS NEEDED
Status: DISCONTINUED | OUTPATIENT
Start: 2025-04-14 | End: 2025-04-15 | Stop reason: HOSPADM

## 2025-04-14 RX ORDER — SODIUM CHLORIDE 0.9 % (FLUSH) 0.9 %
10 SYRINGE (ML) INJECTION EVERY 12 HOURS SCHEDULED
Status: DISCONTINUED | OUTPATIENT
Start: 2025-04-14 | End: 2025-04-15 | Stop reason: HOSPADM

## 2025-04-14 RX ORDER — AMLODIPINE BESYLATE 10 MG/1
10 TABLET ORAL DAILY
Status: DISCONTINUED | OUTPATIENT
Start: 2025-04-14 | End: 2025-04-15 | Stop reason: HOSPADM

## 2025-04-14 RX ORDER — ACETAMINOPHEN 650 MG/1
650 SUPPOSITORY RECTAL EVERY 4 HOURS PRN
Status: DISCONTINUED | OUTPATIENT
Start: 2025-04-14 | End: 2025-04-15 | Stop reason: HOSPADM

## 2025-04-14 RX ORDER — HYDRALAZINE HYDROCHLORIDE 20 MG/ML
20 INJECTION INTRAMUSCULAR; INTRAVENOUS ONCE
Status: COMPLETED | OUTPATIENT
Start: 2025-04-14 | End: 2025-04-14

## 2025-04-14 RX ORDER — NITROGLYCERIN 20 MG/100ML
5-200 INJECTION INTRAVENOUS
Status: DISCONTINUED | OUTPATIENT
Start: 2025-04-14 | End: 2025-04-15 | Stop reason: HOSPADM

## 2025-04-14 RX ORDER — ACETAMINOPHEN 325 MG/1
650 TABLET ORAL EVERY 4 HOURS PRN
Status: DISCONTINUED | OUTPATIENT
Start: 2025-04-14 | End: 2025-04-15 | Stop reason: HOSPADM

## 2025-04-14 RX ORDER — NITROGLYCERIN 0.4 MG/1
0.4 TABLET SUBLINGUAL ONCE
Status: COMPLETED | OUTPATIENT
Start: 2025-04-14 | End: 2025-04-14

## 2025-04-14 RX ORDER — BISACODYL 5 MG/1
5 TABLET, DELAYED RELEASE ORAL DAILY PRN
Status: DISCONTINUED | OUTPATIENT
Start: 2025-04-14 | End: 2025-04-15 | Stop reason: HOSPADM

## 2025-04-14 RX ORDER — LOSARTAN POTASSIUM 25 MG/1
25 TABLET ORAL DAILY
Status: DISCONTINUED | OUTPATIENT
Start: 2025-04-14 | End: 2025-04-15 | Stop reason: HOSPADM

## 2025-04-14 RX ORDER — DOBUTAMINE HYDROCHLORIDE 100 MG/100ML
10 INJECTION INTRAVENOUS
Status: DISCONTINUED | OUTPATIENT
Start: 2025-04-14 | End: 2025-04-14

## 2025-04-14 RX ORDER — ASPIRIN 81 MG/1
81 TABLET ORAL DAILY
Status: DISCONTINUED | OUTPATIENT
Start: 2025-04-14 | End: 2025-04-15 | Stop reason: HOSPADM

## 2025-04-14 RX ORDER — SODIUM CHLORIDE 9 MG/ML
40 INJECTION, SOLUTION INTRAVENOUS AS NEEDED
Status: DISCONTINUED | OUTPATIENT
Start: 2025-04-14 | End: 2025-04-15 | Stop reason: HOSPADM

## 2025-04-14 RX ORDER — ONDANSETRON 2 MG/ML
4 INJECTION INTRAMUSCULAR; INTRAVENOUS EVERY 6 HOURS PRN
Status: DISCONTINUED | OUTPATIENT
Start: 2025-04-14 | End: 2025-04-15 | Stop reason: HOSPADM

## 2025-04-14 RX ORDER — LACTULOSE 10 G/15ML
10 SOLUTION ORAL 2 TIMES DAILY
Status: DISCONTINUED | OUTPATIENT
Start: 2025-04-14 | End: 2025-04-14

## 2025-04-14 RX ORDER — LACTULOSE 10 G/15ML
10 SOLUTION ORAL 2 TIMES DAILY
Status: DISCONTINUED | OUTPATIENT
Start: 2025-04-14 | End: 2025-04-15 | Stop reason: HOSPADM

## 2025-04-14 RX ORDER — GABAPENTIN 300 MG/1
300 CAPSULE ORAL EVERY 8 HOURS SCHEDULED
Status: DISCONTINUED | OUTPATIENT
Start: 2025-04-14 | End: 2025-04-15 | Stop reason: HOSPADM

## 2025-04-14 RX ORDER — NALOXONE HCL 0.4 MG/ML
0.4 VIAL (ML) INJECTION
Status: DISCONTINUED | OUTPATIENT
Start: 2025-04-14 | End: 2025-04-15 | Stop reason: HOSPADM

## 2025-04-14 RX ORDER — SERTRALINE HYDROCHLORIDE 100 MG/1
100 TABLET, FILM COATED ORAL DAILY
Status: DISCONTINUED | OUTPATIENT
Start: 2025-04-14 | End: 2025-04-15 | Stop reason: HOSPADM

## 2025-04-14 RX ORDER — ATORVASTATIN CALCIUM 40 MG/1
40 TABLET, FILM COATED ORAL NIGHTLY
Status: DISCONTINUED | OUTPATIENT
Start: 2025-04-14 | End: 2025-04-15 | Stop reason: HOSPADM

## 2025-04-14 RX ORDER — ASPIRIN 81 MG/1
324 TABLET, CHEWABLE ORAL ONCE
Status: COMPLETED | OUTPATIENT
Start: 2025-04-14 | End: 2025-04-14

## 2025-04-14 RX ORDER — POLYETHYLENE GLYCOL 3350 17 G/17G
17 POWDER, FOR SOLUTION ORAL DAILY PRN
Status: DISCONTINUED | OUTPATIENT
Start: 2025-04-14 | End: 2025-04-15 | Stop reason: HOSPADM

## 2025-04-14 RX ADMIN — HYDRALAZINE HYDROCHLORIDE 20 MG: 20 INJECTION INTRAMUSCULAR; INTRAVENOUS at 16:01

## 2025-04-14 RX ADMIN — ATORVASTATIN CALCIUM 40 MG: 40 TABLET, FILM COATED ORAL at 21:03

## 2025-04-14 RX ADMIN — DOBUTAMINE HYDROCHLORIDE 10 MCG/KG/MIN: 100 INJECTION INTRAVENOUS at 15:32

## 2025-04-14 RX ADMIN — TERAZOSIN HYDROCHLORIDE 5 MG: 5 CAPSULE ORAL at 21:03

## 2025-04-14 RX ADMIN — SERTRALINE HYDROCHLORIDE 100 MG: 100 TABLET ORAL at 17:59

## 2025-04-14 RX ADMIN — ENOXAPARIN SODIUM 40 MG: 100 INJECTION SUBCUTANEOUS at 17:59

## 2025-04-14 RX ADMIN — AMLODIPINE BESYLATE 10 MG: 10 TABLET ORAL at 17:59

## 2025-04-14 RX ADMIN — NITROGLYCERIN 5 MCG/MIN: 20 INJECTION INTRAVENOUS at 18:01

## 2025-04-14 RX ADMIN — Medication 10 ML: at 21:09

## 2025-04-14 RX ADMIN — ASPIRIN 81 MG: 81 TABLET, COATED ORAL at 17:59

## 2025-04-14 RX ADMIN — LABETALOL HYDROCHLORIDE 20 MG: 5 INJECTION INTRAVENOUS at 12:35

## 2025-04-14 RX ADMIN — LOSARTAN POTASSIUM 25 MG: 25 TABLET, FILM COATED ORAL at 17:59

## 2025-04-14 RX ADMIN — GABAPENTIN 300 MG: 300 CAPSULE ORAL at 21:03

## 2025-04-14 RX ADMIN — NITROGLYCERIN 0.4 MG: 0.4 TABLET SUBLINGUAL at 12:25

## 2025-04-14 RX ADMIN — PERFLUTREN 8.48 MG: 6.52 INJECTION, SUSPENSION INTRAVENOUS at 15:29

## 2025-04-14 RX ADMIN — METOPROLOL SUCCINATE 50 MG: 50 TABLET, FILM COATED, EXTENDED RELEASE ORAL at 17:59

## 2025-04-14 RX ADMIN — ASPIRIN 324 MG: 81 TABLET, CHEWABLE ORAL at 12:15

## 2025-04-14 NOTE — ED PROVIDER NOTES
Subjective   History of Present Illness  Patient is a 44-year-old male presents the emergency department with chest pain that started last night.  He states he continues to have chest pain today which is substernal in nature radiates to the right arm.  He has had some intermittent nausea as well.  Intermittent shortness of breath.  No diaphoresis.  No history of CAD.  Patient had a stroke about 2 years ago.  He has not taken his blood pressure medication today.  He denies any illicit drug use.  He strict he states he drinks about 2 beers daily.  He rates his chest pain as an 8 on a scale of 1-10 at present.  He has a history of asthma, headache, hypertension, and stroke.  Family medical history of heart disease with his mother.    History provided by:  Patient   used: No        Review of Systems   Constitutional: Negative.    HENT: Negative.     Eyes: Negative.    Respiratory: Negative.     Cardiovascular:  Positive for chest pain.        Patient is a 44-year-old male presents the emergency department with chest pain that started last night.  He states he continues to have chest pain today which is substernal in nature radiates to the right arm.  He has had some intermittent nausea as well.  Intermittent shortness of breath.  No diaphoresis.  No history of CAD.  Patient had a stroke about 2 years ago.  He has not taken his blood pressure medication today.  He denies any illicit drug use.  He strict he states he drinks about 2 beers daily.  He rates his chest pain as an 8 on a scale of 1-10 at present.  He has a history of asthma, headache, hypertension, and stroke.  Family medical history of heart disease with his mother.     Gastrointestinal: Negative.    Endocrine: Negative.    Genitourinary: Negative.    Musculoskeletal: Negative.    Skin: Negative.    Allergic/Immunologic: Negative.    Neurological: Negative.    Hematological: Negative.    Psychiatric/Behavioral: Negative.     All other  systems reviewed and are negative.      Past Medical History:   Diagnosis Date    Asthma     Headache     Hypertension     Stroke        No Known Allergies    Past Surgical History:   Procedure Laterality Date    COLONOSCOPY N/A 12/13/2023    normal exam    ENDOSCOPY N/A 12/13/2023    Erythematous mucosa in the antrum bx normal, otherwise normal    UPPER ENDOSCOPIC ULTRASOUND W/ FNA  01/17/2024    No obvious structural changes to cause pancreatitis. - Dr. Saravia       Family History   Problem Relation Age of Onset    Anxiety disorder Mother     Diabetes Mother     Hyperlipidemia Mother     Cancer Father     Hyperlipidemia Father     Colon cancer Neg Hx     Colon polyps Neg Hx        Social History     Socioeconomic History    Marital status: Significant Other   Tobacco Use    Smoking status: Every Day     Current packs/day: 1.00     Average packs/day: 1 pack/day for 16.1 years (16.1 ttl pk-yrs)     Types: Cigarettes     Start date: 4/2/2012     Passive exposure: Current    Smokeless tobacco: Never    Tobacco comments:     AVS   Vaping Use    Vaping status: Never Used   Substance and Sexual Activity    Alcohol use: Not Currently     Alcohol/week: 4.0 standard drinks of alcohol     Types: 4 Cans of beer per week     Comment: recently stopped    Drug use: No    Sexual activity: Yes     Partners: Female     Birth control/protection: Condom, Partner of same sex       Prior to Admission medications    Medication Sig Start Date End Date Taking? Authorizing Provider   amLODIPine (NORVASC) 10 MG tablet Take 1 tablet by mouth Daily. 2/26/25   Casi Skinner APRN   gabapentin (NEURONTIN) 300 MG capsule TID prn alcohol withdrawal/ chronic back pain/ neuropathy. 10/30/24   Casi Skinner APRN   lactulose (CHRONULAC) 10 GM/15ML solution 30 ml Tid x 3 days then reduce down to twice daily dosing. 10/29/24   Casi Skinner APRN   losartan (COZAAR) 25 MG tablet Take 1 tablet by mouth Daily. 2/26/25   " Casi Skinner APRN   metoprolol succinate XL (TOPROL-XL) 50 MG 24 hr tablet Take 1 tablet by mouth Daily. 2/26/25   Casi Skinner APRN   naltrexone (DEPADE) 50 MG tablet Take 1 tablet by mouth Daily. 2/26/25   Casi Skinner APRN   omeprazole (priLOSEC) 20 MG capsule Take 1 capsule by mouth Daily. 11/28/23   Yudelka Taylor APRN   OXcarbazepine (Trileptal) 150 MG tablet 1/2 tablet am and 1 full tablet at bedtime. 2/26/25   Casi Skinner APRN   sertraline (ZOLOFT) 100 MG tablet Take 1 tablet by mouth Daily. To help with mood. Take with food 2/26/25   Casi Skinner APRN   terazosin (HYTRIN) 5 MG capsule Take 1 capsule by mouth Every Night. 3/10/23   Feliciano Boothe APRN       /94 (BP Location: Left arm, Patient Position: Lying)   Pulse 80   Temp 98.1 °F (36.7 °C) (Oral)   Resp 18   Ht 177.8 cm (70\")   Wt 87.3 kg (192 lb 8 oz)   SpO2 99%   BMI 27.62 kg/m²     Objective   Physical Exam  Vitals and nursing note reviewed.   Constitutional:       Appearance: He is well-developed.      Comments: Nontoxic-appearing.  No acute distress.   HENT:      Head: Normocephalic and atraumatic.   Eyes:      Conjunctiva/sclera: Conjunctivae normal.      Pupils: Pupils are equal, round, and reactive to light.   Cardiovascular:      Rate and Rhythm: Normal rate and regular rhythm.      Heart sounds: Normal heart sounds.   Pulmonary:      Effort: Pulmonary effort is normal.      Breath sounds: Normal breath sounds.   Abdominal:      General: Bowel sounds are normal.      Palpations: Abdomen is soft.   Musculoskeletal:         General: Normal range of motion.      Cervical back: Normal range of motion and neck supple.   Skin:     General: Skin is warm and dry.   Neurological:      Mental Status: He is alert and oriented to person, place, and time.      Deep Tendon Reflexes: Reflexes are normal and symmetric.   Psychiatric:         Behavior: Behavior " normal.         Thought Content: Thought content normal.         Judgment: Judgment normal.         Procedures     HEART Score for Major Cardiac Events - MDCalc  Calculated on Apr 14 2025 2:27 PM  3 points -> Low Score (0-3 points) Risk of MACE of 0.9-1.7%.    Lab Results (last 24 hours)       Procedure Component Value Units Date/Time    CBC & Differential [[id number]]  (Abnormal) Collected: 04/14/25 1213    Specimen: Blood from Arm, Right Updated: 04/14/25 1222    Narrative:      The following orders were created for panel order CBC & Differential.  Procedure                               Abnormality         Status                     ---------                               -----------         ------                     CBC Auto Differential[[id number]]        Abnormal            Final result                 Please view results for these tests on the individual orders.    Comprehensive Metabolic Panel [[id number]]  (Abnormal) Collected: 04/14/25 1213    Specimen: Blood from Arm, Right Updated: 04/14/25 1245     Glucose 109 mg/dL      BUN 10 mg/dL      Creatinine 1.10 mg/dL      Sodium 136 mmol/L      Potassium 4.1 mmol/L      Chloride 100 mmol/L      CO2 23.0 mmol/L      Calcium 9.3 mg/dL      Total Protein 8.1 g/dL      Albumin 4.5 g/dL      ALT (SGPT) 44 U/L      AST (SGOT) 48 U/L      Alkaline Phosphatase 131 U/L      Total Bilirubin 0.9 mg/dL      Globulin 3.6 gm/dL      A/G Ratio 1.3 g/dL      BUN/Creatinine Ratio 9.1     Anion Gap 13.0 mmol/L      eGFR 84.9 mL/min/1.73     Narrative:      GFR Categories in Chronic Kidney Disease (CKD)      GFR Category          GFR (mL/min/1.73)    Interpretation  G1                     90 or greater         Normal or high (1)  G2                      60-89                Mild decrease (1)  G3a                   45-59                Mild to moderate decrease  G3b                   30-44                Moderate to severe decrease  G4                    15-29                Severe  decrease  G5                    14 or less           Kidney failure          (1)In the absence of evidence of kidney disease, neither GFR category G1 or G2 fulfill the criteria for CKD.    eGFR calculation 2021 CKD-EPI creatinine equation, which does not include race as a factor    High Sensitivity Troponin T [[id number]]  (Normal) Collected: 04/14/25 1213    Specimen: Blood from Arm, Right Updated: 04/14/25 1242     HS Troponin T 13 ng/L     Narrative:      High Sensitive Troponin T Reference Range:  <14.0 ng/L- Negative Female for AMI  <22.0 ng/L- Negative Male for AMI  >=14 - Abnormal Female indicating possible myocardial injury.  >=22 - Abnormal Male indicating possible myocardial injury.   Clinicians would have to utilize clinical acumen, EKG, Troponin, and serial changes to determine if it is an Acute Myocardial Infarction or myocardial injury due to an underlying chronic condition.         CBC Auto Differential [[id number]]  (Abnormal) Collected: 04/14/25 1213    Specimen: Blood from Arm, Right Updated: 04/14/25 1222     WBC 7.23 10*3/mm3      RBC 5.99 10*6/mm3      Hemoglobin 17.3 g/dL      Hematocrit 47.3 %      MCV 79.0 fL      MCH 28.9 pg      MCHC 36.6 g/dL      RDW 13.2 %      RDW-SD 37.6 fl      MPV 10.1 fL      Platelets 174 10*3/mm3      Neutrophil % 48.9 %      Lymphocyte % 41.5 %      Monocyte % 4.6 %      Eosinophil % 3.7 %      Basophil % 1.0 %      Immature Grans % 0.3 %      Neutrophils, Absolute 3.54 10*3/mm3      Lymphocytes, Absolute 3.00 10*3/mm3      Monocytes, Absolute 0.33 10*3/mm3      Eosinophils, Absolute 0.27 10*3/mm3      Basophils, Absolute 0.07 10*3/mm3      Immature Grans, Absolute 0.02 10*3/mm3      nRBC 0.0 /100 WBC     Protime-INR [[id number]]  (Normal) Collected: 04/14/25 1213    Specimen: Blood from Arm, Right Updated: 04/14/25 1302     Protime 14.1 Seconds      INR 1.03    BNP [[id number]]  (Normal) Collected: 04/14/25 1213    Specimen: Blood from Arm, Right Updated: 04/14/25  1242     proBNP 93.0 pg/mL     Narrative:      This assay is used as an aid in the diagnosis of individuals suspected of having heart failure. It can be used as an aid in the diagnosis of acute decompensated heart failure (ADHF) in patients presenting with signs and symptoms of ADHF to the emergency department (ED). In addition, NT-proBNP of <300 pg/mL indicates ADHF is not likely.    Age Range Result Interpretation  NT-proBNP Concentration (pg/mL:      <50             Positive            >450                   Gray                 300-450                    Negative             <300    50-75           Positive            >900                  Gray                300-900                  Negative            <300      >75             Positive            >1800                  Gray                300-1800                  Negative            <300    Ethanol [[id number]] Collected: 04/14/25 1213    Specimen: Blood from Arm, Right Updated: 04/14/25 1240     Ethanol % 0.020 %     Narrative:      Not for legal purposes. Chain of Custody not followed.     Urine Drug Screen - Urine, Clean Catch [[id number]]  (Normal) Collected: 04/14/25 1245    Specimen: Urine, Clean Catch Updated: 04/14/25 1316     THC, Screen, Urine Negative     Phencyclidine (PCP), Urine Negative     Cocaine Screen, Urine Negative     Methamphetamine, Ur Negative     Opiate Screen Negative     Amphetamine Screen, Urine Negative     Benzodiazepine Screen, Urine Negative     Tricyclic Antidepressants Screen Negative     Methadone Screen, Urine Negative     Barbiturates Screen, Urine Negative     Oxycodone Screen, Urine Negative     Buprenorphine, Screen, Urine Negative    Narrative:      Cutoff For Drugs Screened:    Amphetamines               500 ng/ml  Barbiturates               200 ng/ml  Benzodiazepines            150 ng/ml  Cocaine                    150 ng/ml  Methadone                  200 ng/ml  Opiates                    100 ng/ml  Phencyclidine                25 ng/ml  THC                         50 ng/ml  Methamphetamine            500 ng/ml  Tricyclic Antidepressants  300 ng/ml  Oxycodone                  100 ng/ml  Buprenorphine               10 ng/ml    The normal value for all drugs tested is negative. This report includes unconfirmed screening results, with the cutoff values listed, to be used for medical treatment purposes only.  Unconfirmed results must not be used for non-medical purposes such as employment or legal testing.  Clinical consideration should be applied to any drug of abuse test, particularly when unconfirmed results are used.      Fentanyl, Urine - Urine, Clean Catch [[id number]]  (Normal) Collected: 04/14/25 1245    Specimen: Urine, Clean Catch Updated: 04/14/25 1314     Fentanyl, Urine Negative    Narrative:      Negative Threshold:      Fentanyl 5 ng/mL     The normal value for the drug tested is negative. This report includes final unconfirmed screening results to be used for medical treatment purposes only. Unconfirmed results must not be used for non-medical purposes such as employment or legal testing. Clinical consideration should be applied to any drug of abuse test, particularly when unconfirmed results are used.           High Sensitivity Troponin T 1Hr [[id number]]  (Normal) Collected: 04/14/25 1339    Specimen: Blood Updated: 04/14/25 1413     HS Troponin T 10 ng/L      Troponin T Numeric Delta -3 ng/L     Narrative:      High Sensitive Troponin T Reference Range:  <14.0 ng/L- Negative Female for AMI  <22.0 ng/L- Negative Male for AMI  >=14 - Abnormal Female indicating possible myocardial injury.  >=22 - Abnormal Male indicating possible myocardial injury.   Clinicians would have to utilize clinical acumen, EKG, Troponin, and serial changes to determine if it is an Acute Myocardial Infarction or myocardial injury due to an underlying chronic condition.                 XR Chest 1 View   Final Result       No acute findings.        This report was signed and finalized on 4/14/2025 12:26 PM by Dr. Jackson Weber MD.              ED Course  ED Course as of 04/14/25 1757   Mon Apr 14, 2025   1240 Pt states that chest pain is better at this time. States that pain is 1-2 on scale of 1-10 at this time [CW]   1559 Pt went to stress lab and b/p was 218/114. Was brought back to the er. Has had labetalol. Will give hydralazine at this time  [CW]   1621 B/p 149/87 at this time. Stress advised that they would not do his dobutamine today due to blood pressure. He is unable to do exercise stress due to previous cva with residual left side weakness. He is not having chest pain at this time. Have placed call to hospitalist at this time for further.  [CW]   1648 Spoke with Dr. Ferreira - hospitalist on call- has graciously accepted the pt for admission. Advised to admit to Dr. Kaplan. Reviewed results with pt and pt family they are in agreement with care plan  [CW]      ED Course User Index  [CW] Casi Calle APRN        Medical Decision Making  Patient is a 44-year-old male presents the emergency department with chest pain that started last night.  He states he continues to have chest pain today which is substernal in nature radiates to the right arm.  He has had some intermittent nausea as well.  Intermittent shortness of breath.  No diaphoresis.  No history of CAD.  Patient had a stroke about 2 years ago.  He has not taken his blood pressure medication today.  He denies any illicit drug use.  He strict he states he drinks about 2 beers daily.  He rates his chest pain as an 8 on a scale of 1-10 at present.  He has a history of asthma, headache, hypertension, and stroke.  Family medical history of heart disease with his mother.  Course of treatment in the ED: Nontoxic-appearing.  No acute distress.  Vitals blood pressure 210/102 temp 97.7 heart rate 72 respirations 18 O2 sats 100% on room air.  PERRLA extraocular movements are intact.  CV  normal sinus rhythm.  Lungs clear to auscultation.  Abdomen is soft, nondistended nontender.  Cardiac workup has been initiated.  Aspirin has been given and nitroglycerin has been given.  Have ordered that the patient be on telemetry.  Differential diagnosis to include but not limited to: acs; angina; pleurisy; pneumonia; hypertensive crisis; and other   Pt went to stress lab and b/p was 218/114. Was brought back to the er. Has had labetalol. Will give hydralazine at this time   Pt went to stress lab and b/p was 218/114. Was brought back to the er. Has had labetalol. Will give hydralazine at this time   B/p 149/87 at this time. Stress advised that they would not do his dobutamine today due to blood pressure. He is unable to do exercise stress due to previous cva with residual left side weakness. He is not having chest pain at this time. Have placed call to hospitalist at this time for further.   Spoke with Dr. Ferreira - hospitalist on call- has graciously accepted the pt for admission. Advised to admit to Dr. Kaplan. Reviewed results with pt and pt family they are in agreement with care plan       Problems Addressed:  Chest pain, unspecified type: complicated acute illness or injury  Primary hypertension: complicated acute illness or injury    Amount and/or Complexity of Data Reviewed  Labs: ordered. Decision-making details documented in ED Course.  Radiology: ordered. Decision-making details documented in ED Course.  ECG/medicine tests: ordered. Decision-making details documented in ED Course.    Risk  Prescription drug management.  Decision regarding hospitalization.         Final diagnoses:   Chest pain, unspecified type   Primary hypertension          Casi Calle, APRN  04/14/25 [id number]

## 2025-04-14 NOTE — NURSING NOTE
Unable to perform stress at this time since b/p is  217/118.  Patient returned to ED by nurse. Report given to RN while she was hooking up patient.  Nurse practitioner also updated r/t stress testing.

## 2025-04-14 NOTE — NURSING NOTE
Patient arrived to 4B.  Able to ambulate to bed.  Hypertensive, otherwise VSS.  Tele applied, NSR.  Nitro drip started at 5mcg/min.  Skin assessment done with NANCY Gutierrez.  Skin intact.  Safety precautions in place.  No acute distress or discomfort at this time.

## 2025-04-14 NOTE — PLAN OF CARE
Goal Outcome Evaluation:  Plan of Care Reviewed With: patient        Progress: no change  Outcome Evaluation: A&Ox4.  RA.  NSR on tele.  Hypertensive, otherwise VSS.  Nitro drip infusing at 5mcg/min.  Safety precautions in place.  No acute distress or discomfort at this time.

## 2025-04-14 NOTE — H&P
[facility] Medicine Services  HISTORY AND PHYSICAL    Date of Admission: 4/14/2025  Primary Care Physician: Casi Skinner APRN    Subjective   Primary Historian: Patient    Chief Complaint: Chest pain    History of Present Illness  Mr. Taylor is a 44-year-old male from home with past medical history of asthma, headache, hypertension and history of previous stroke, who presented to the emergency room with complaint of chest pain since 8 PM last night, history was provided by patient.  He developed substernal chest pain which was constant, pressure-like and nonradiating.  Pain continued all through the night and this morning prompting his emergency room visit.  He has a history of tobacco use and despite his stroke continues to use tobacco and also alcohol.  In the emergency room, serial troponin was negative and EKG did not show any acute change.  He was found to have elevated blood pressure with systolic greater than 220 and had multiple shots of hydralazine.  He was scheduled to get dobutamine stress test in the emergency room but was unable because of uncontrolled blood pressure.  During my visit, patient continued to complain of chest pain, blood pressure was somewhat improved but not within normal limits.      Review of Systems   Otherwise complete ROS reviewed and negative except as mentioned in the HPI.    Past Medical History:   Past Medical History:   Diagnosis Date    Asthma     Headache     Hypertension     Stroke      Past Surgical History:  Past Surgical History:   Procedure Laterality Date    COLONOSCOPY N/A 12/13/2023    normal exam    ENDOSCOPY N/A 12/13/2023    Erythematous mucosa in the antrum bx normal, otherwise normal    UPPER ENDOSCOPIC ULTRASOUND W/ FNA  01/17/2024    No obvious structural changes to cause pancreatitis. - Dr. Saravia     Social History:  reports that he has been smoking cigarettes. He started smoking about 13 years ago. He has a  16.1 pack-year smoking history. He has been exposed to tobacco smoke. He has never used smokeless tobacco. He reports that he does not currently use alcohol after a past usage of about 4.0 standard drinks of alcohol per week. He reports that he does not use drugs.    Family History: family history includes Anxiety disorder in his mother; Cancer in his father; Diabetes in his mother; Hyperlipidemia in his father and mother.       Allergies:  No Known Allergies    Medications:  Prior to Admission medications    Medication Sig Start Date End Date Taking? Authorizing Provider   amLODIPine (NORVASC) 10 MG tablet Take 1 tablet by mouth Daily. 2/26/25   Casi Skinner APRN   gabapentin (NEURONTIN) 300 MG capsule TID prn alcohol withdrawal/ chronic back pain/ neuropathy. 10/30/24   Casi Skinner APRN   lactulose (CHRONULAC) 10 GM/15ML solution 30 ml Tid x 3 days then reduce down to twice daily dosing. 10/29/24   Casi Skinner APRN   losartan (COZAAR) 25 MG tablet Take 1 tablet by mouth Daily. 2/26/25   Casi Skinner APRN   metoprolol succinate XL (TOPROL-XL) 50 MG 24 hr tablet Take 1 tablet by mouth Daily. 2/26/25   Casi Skinner APRN   naltrexone (DEPADE) 50 MG tablet Take 1 tablet by mouth Daily. 2/26/25   Casi Skinner APRN   omeprazole (priLOSEC) 20 MG capsule Take 1 capsule by mouth Daily. 11/28/23   Yudelka Taylor APRN   OXcarbazepine (Trileptal) 150 MG tablet 1/2 tablet am and 1 full tablet at bedtime. 2/26/25   Casi Skinner APRN   sertraline (ZOLOFT) 100 MG tablet Take 1 tablet by mouth Daily. To help with mood. Take with food 2/26/25   Casi Skinner APRN   terazosin (HYTRIN) 5 MG capsule Take 1 capsule by mouth Every Night. 3/10/23   Feliciano Boothe APRN     I have utilized all available immediate resources to obtain, update, or review the patient's current medications (including all prescriptions,  "over-the-counter products, herbals, cannabis/cannabidiol products, and vitamin/mineral/dietary (nutritional) supplements).    Objective     Vital Signs: /94 (BP Location: Left arm, Patient Position: Lying)   Pulse 80   Temp 98.1 °F (36.7 °C) (Oral)   Resp 18   Ht 177.8 cm (70\")   Wt 87.3 kg (192 lb 8 oz)   SpO2 99%   BMI 27.62 kg/m²   Physical Exam   GEN: Awake, alert, interactive, in NAD  HEENT: Atraumatic, PERRLA, EOMI, Anicteric, Trachea midline  Lungs: CTAB, no wheezing/rales/rhonchi  Heart: RRR, +S1/s2, no rub  ABD: soft, nt/nd, +BS, no guarding/rebound  Extremities: atraumatic, no cyanosis, no edema  Skin: no rashes or lesions  Neuro: AAOx3, no focal deficits  Psych: normal mood & affect      Results Reviewed:  Lab Results (last 24 hours)       Procedure Component Value Units Date/Time    High Sensitivity Troponin T 1Hr [[id number]]  (Normal) Collected: 04/14/25 1339    Specimen: Blood Updated: 04/14/25 1413     HS Troponin T 10 ng/L      Troponin T Numeric Delta -3 ng/L     Narrative:      High Sensitive Troponin T Reference Range:  <14.0 ng/L- Negative Female for AMI  <22.0 ng/L- Negative Male for AMI  >=14 - Abnormal Female indicating possible myocardial injury.  >=22 - Abnormal Male indicating possible myocardial injury.   Clinicians would have to utilize clinical acumen, EKG, Troponin, and serial changes to determine if it is an Acute Myocardial Infarction or myocardial injury due to an underlying chronic condition.         Urine Drug Screen - Urine, Clean Catch [[id number]]  (Normal) Collected: 04/14/25 1245    Specimen: Urine, Clean Catch Updated: 04/14/25 1316     THC, Screen, Urine Negative     Phencyclidine (PCP), Urine Negative     Cocaine Screen, Urine Negative     Methamphetamine, Ur Negative     Opiate Screen Negative     Amphetamine Screen, Urine Negative     Benzodiazepine Screen, Urine Negative     Tricyclic Antidepressants Screen Negative     Methadone Screen, Urine Negative     " Barbiturates Screen, Urine Negative     Oxycodone Screen, Urine Negative     Buprenorphine, Screen, Urine Negative    Narrative:      Cutoff For Drugs Screened:    Amphetamines               500 ng/ml  Barbiturates               200 ng/ml  Benzodiazepines            150 ng/ml  Cocaine                    150 ng/ml  Methadone                  200 ng/ml  Opiates                    100 ng/ml  Phencyclidine               25 ng/ml  THC                         50 ng/ml  Methamphetamine            500 ng/ml  Tricyclic Antidepressants  300 ng/ml  Oxycodone                  100 ng/ml  Buprenorphine               10 ng/ml    The normal value for all drugs tested is negative. This report includes unconfirmed screening results, with the cutoff values listed, to be used for medical treatment purposes only.  Unconfirmed results must not be used for non-medical purposes such as employment or legal testing.  Clinical consideration should be applied to any drug of abuse test, particularly when unconfirmed results are used.      Fentanyl, Urine - Urine, Clean Catch [[id number]]  (Normal) Collected: 04/14/25 1245    Specimen: Urine, Clean Catch Updated: 04/14/25 1314     Fentanyl, Urine Negative    Narrative:      Negative Threshold:      Fentanyl 5 ng/mL     The normal value for the drug tested is negative. This report includes final unconfirmed screening results to be used for medical treatment purposes only. Unconfirmed results must not be used for non-medical purposes such as employment or legal testing. Clinical consideration should be applied to any drug of abuse test, particularly when unconfirmed results are used.           Protime-INR [[id number]]  (Normal) Collected: 04/14/25 1213    Specimen: Blood from Arm, Right Updated: 04/14/25 1302     Protime 14.1 Seconds      INR 1.03    Comprehensive Metabolic Panel [[id number]]  (Abnormal) Collected: 04/14/25 1213    Specimen: Blood from Arm, Right Updated: 04/14/25 1245     Glucose  109 mg/dL      BUN 10 mg/dL      Creatinine 1.10 mg/dL      Sodium 136 mmol/L      Potassium 4.1 mmol/L      Chloride 100 mmol/L      CO2 23.0 mmol/L      Calcium 9.3 mg/dL      Total Protein 8.1 g/dL      Albumin 4.5 g/dL      ALT (SGPT) 44 U/L      AST (SGOT) 48 U/L      Alkaline Phosphatase 131 U/L      Total Bilirubin 0.9 mg/dL      Globulin 3.6 gm/dL      A/G Ratio 1.3 g/dL      BUN/Creatinine Ratio 9.1     Anion Gap 13.0 mmol/L      eGFR 84.9 mL/min/1.73     Narrative:      GFR Categories in Chronic Kidney Disease (CKD)      GFR Category          GFR (mL/min/1.73)    Interpretation  G1                     90 or greater         Normal or high (1)  G2                      60-89                Mild decrease (1)  G3a                   45-59                Mild to moderate decrease  G3b                   30-44                Moderate to severe decrease  G4                    15-29                Severe decrease  G5                    14 or less           Kidney failure          (1)In the absence of evidence of kidney disease, neither GFR category G1 or G2 fulfill the criteria for CKD.    eGFR calculation 2021 CKD-EPI creatinine equation, which does not include race as a factor    High Sensitivity Troponin T [[id number]]  (Normal) Collected: 04/14/25 1213    Specimen: Blood from Arm, Right Updated: 04/14/25 1242     HS Troponin T 13 ng/L     Narrative:      High Sensitive Troponin T Reference Range:  <14.0 ng/L- Negative Female for AMI  <22.0 ng/L- Negative Male for AMI  >=14 - Abnormal Female indicating possible myocardial injury.  >=22 - Abnormal Male indicating possible myocardial injury.   Clinicians would have to utilize clinical acumen, EKG, Troponin, and serial changes to determine if it is an Acute Myocardial Infarction or myocardial injury due to an underlying chronic condition.         BNP [[id number]]  (Normal) Collected: 04/14/25 1213    Specimen: Blood from Arm, Right Updated: 04/14/25 1242     proBNP  93.0 pg/mL     Narrative:      This assay is used as an aid in the diagnosis of individuals suspected of having heart failure. It can be used as an aid in the diagnosis of acute decompensated heart failure (ADHF) in patients presenting with signs and symptoms of ADHF to the emergency department (ED). In addition, NT-proBNP of <300 pg/mL indicates ADHF is not likely.    Age Range Result Interpretation  NT-proBNP Concentration (pg/mL:      <50             Positive            >450                   Gray                 300-450                    Negative             <300    50-75           Positive            >900                  Gray                300-900                  Negative            <300      >75             Positive            >1800                  Gray                300-1800                  Negative            <300    Ethanol [[id number]] Collected: 04/14/25 1213    Specimen: Blood from Arm, Right Updated: 04/14/25 1240     Ethanol % 0.020 %     Narrative:      Not for legal purposes. Chain of Custody not followed.     [place] Draw [[id number]] Collected: 04/14/25 1213    Specimen: Blood from Arm, Right Updated: 04/14/25 1230    Narrative:      The following orders were created for panel order [place] Draw.  Procedure                               Abnormality         Status                     ---------                               -----------         ------                     Green Top (Gel)[[id number]]                                  Final result               Lavender Top[[id number]]                                     Final result               Red Top[[id number]]                                          Final result               Light Blue Top[[id number]]                                   Final result                 Please view results for these tests on the individual orders.    Green Top (Gel) [[id number]] Collected: 04/14/25 1213    Specimen: Blood from Arm, Right Updated: 04/14/25 1230     Extra  Tube Hold for add-ons.     Comment: Auto resulted.       Lavender Top [[id number]] Collected: 04/14/25 1213    Specimen: Blood from Arm, Right Updated: 04/14/25 1230     Extra Tube hold for add-on     Comment: Auto resulted       Red Top [[id number]] Collected: 04/14/25 1213    Specimen: Blood from Arm, Right Updated: 04/14/25 1230     Extra Tube Hold for add-ons.     Comment: Auto resulted.       Light Blue Top [[id number]] Collected: 04/14/25 1213    Specimen: Blood from Arm, Right Updated: 04/14/25 1230     Extra Tube Hold for add-ons.     Comment: Auto resulted       CBC & Differential [[id number]]  (Abnormal) Collected: 04/14/25 1213    Specimen: Blood from Arm, Right Updated: 04/14/25 1222    Narrative:      The following orders were created for panel order CBC & Differential.  Procedure                               Abnormality         Status                     ---------                               -----------         ------                     CBC Auto Differential[[id number]]        Abnormal            Final result                 Please view results for these tests on the individual orders.    CBC Auto Differential [[id number]]  (Abnormal) Collected: 04/14/25 1213    Specimen: Blood from Arm, Right Updated: 04/14/25 1222     WBC 7.23 10*3/mm3      RBC 5.99 10*6/mm3      Hemoglobin 17.3 g/dL      Hematocrit 47.3 %      MCV 79.0 fL      MCH 28.9 pg      MCHC 36.6 g/dL      RDW 13.2 %      RDW-SD 37.6 fl      MPV 10.1 fL      Platelets 174 10*3/mm3      Neutrophil % 48.9 %      Lymphocyte % 41.5 %      Monocyte % 4.6 %      Eosinophil % 3.7 %      Basophil % 1.0 %      Immature Grans % 0.3 %      Neutrophils, Absolute 3.54 10*3/mm3      Lymphocytes, Absolute 3.00 10*3/mm3      Monocytes, Absolute 0.33 10*3/mm3      Eosinophils, Absolute 0.27 10*3/mm3      Basophils, Absolute 0.07 10*3/mm3      Immature Grans, Absolute 0.02 10*3/mm3      nRBC 0.0 /100 WBC           Imaging Results (Last 24 Hours)       Procedure  Component Value Units Date/Time    XR Chest 1 View [[id number]] Collected: 04/14/25 1225     Updated: 04/14/25 1229    Narrative:      EXAM/TECHNIQUE: XR CHEST 1 VW-     INDICATION: Chest Pain Triage Protocol     COMPARISON: 3/4/2023     FINDINGS:     Cardiomediastinal silhouette is within normal limits.      No pleural effusion. No visible pneumothorax. No focal consolidation.      No acute osseous findings.       Impression:         No acute findings.     This report was signed and finalized on 4/14/2025 12:26 PM by Dr. Jackson Weber MD.             I have personally reviewed and interpreted the radiology studies and ECG obtained at time of admission.     Assessment / Plan   Assessment:   Active Hospital Problems    Diagnosis     **Chest pain        Treatment Plan  The patient will be admitted to my service here at [facility].     -Chest pain rule out acute coronary syndrome  Patient's serial troponin was negative, and EKG did not show any acute change.  He continued to have chest pain in the emergency room and was not able to do dobutamine stress test in the emergency room.  He will be admitted for further evaluation of chest pain with stress test in the morning.   since patient is still having ongoing chest pain, will also start him on nitro drip.    - Suspected hypertensive urgency  Patient is on multiple medications at home including metoprolol, amlodipine, losartan and terazosin.  We will restart home medications and also start him on nitro drip and wean off as tolerated.    Chronic medical conditions-  Headache  History of stroke  Asthma 9-year-old male changed    DVT prophylaxis-Lovenox    Medical Decision Making  Number and Complexity of problems: Multiple  Differential Diagnosis: Chest pain, acute coronary syndrome, uncontrolled blood pressure    Conditions and Status        Condition is unchanged.     [facility] Data  External documents reviewed: No  Cardiac tracing (EKG, telemetry)  interpretation: Yes  Radiology interpretation: Yes  Labs reviewed: Yes  Any tests that were considered but not ordered: No     Decision rules/scores evaluated (example [id number], Wells, etc): Yes     Discussed with: Patient and spouse at bedside     Care Planning  Shared decision making: Yes, with patient and spouse  Code status and discussions: Yes, with patient and spouse [full code]    Disposition  Social Determinants of Health that impact treatment or disposition: No  Estimated length of stay is 1 to 2 days.     I confirmed that the patient's advanced care plan is present, code status is documented, and a surrogate decision maker is listed in the patient's medical record.     The patient's surrogate decision maker is Sister.     The patient was seen and examined by me on 4/14/2025 at 5 PM..    Electronically signed by Eliseo Ramos MD, 04/14/25, 17:45 CDT.

## 2025-04-15 ENCOUNTER — APPOINTMENT (OUTPATIENT)
Dept: CARDIOLOGY | Facility: HOSPITAL | Age: 45
End: 2025-04-15
Payer: MEDICAID

## 2025-04-15 ENCOUNTER — READMISSION MANAGEMENT (OUTPATIENT)
Dept: CALL CENTER | Facility: HOSPITAL | Age: 45
End: 2025-04-15
Payer: MEDICAID

## 2025-04-15 VITALS
BODY MASS INDEX: 27.56 KG/M2 | TEMPERATURE: 97.6 F | SYSTOLIC BLOOD PRESSURE: 128 MMHG | DIASTOLIC BLOOD PRESSURE: 88 MMHG | HEIGHT: 70 IN | WEIGHT: 192.5 LBS | RESPIRATION RATE: 16 BRPM | OXYGEN SATURATION: 100 % | HEART RATE: 52 BPM

## 2025-04-15 LAB
BH CV REST NUCLEAR ISOTOPE DOSE: 12 MCI
BH CV STRESS BP STAGE 1: NORMAL
BH CV STRESS COMMENTS STAGE 1: NORMAL
BH CV STRESS DOSE REGADENOSON STAGE 1: 0.4
BH CV STRESS DURATION MIN STAGE 1: 0
BH CV STRESS DURATION SEC STAGE 1: 10
BH CV STRESS HR STAGE 1: 79
BH CV STRESS NUCLEAR ISOTOPE DOSE: 36 MCI
BH CV STRESS PROTOCOL 1: NORMAL
BH CV STRESS RECOVERY BP: NORMAL MMHG
BH CV STRESS RECOVERY HR: 57 BPM
BH CV STRESS STAGE 1: 1
MAXIMAL PREDICTED HEART RATE: 176 BPM
PERCENT MAX PREDICTED HR: 44.89 %
SPECT HRT GATED+EF W RNC IV: 68 %
STRESS BASELINE BP: NORMAL MMHG
STRESS BASELINE HR: 46 BPM
STRESS PERCENT HR: 53 %
STRESS POST EXERCISE DUR SEC: 10 SEC
STRESS POST PEAK BP: NORMAL MMHG
STRESS POST PEAK HR: 79 BPM
STRESS TARGET HR: 150 BPM

## 2025-04-15 PROCEDURE — G0378 HOSPITAL OBSERVATION PER HR: HCPCS

## 2025-04-15 PROCEDURE — 93018 CV STRESS TEST I&R ONLY: CPT | Performed by: INTERNAL MEDICINE

## 2025-04-15 PROCEDURE — A9502 TC99M TETROFOSMIN: HCPCS | Performed by: INTERNAL MEDICINE

## 2025-04-15 PROCEDURE — 25010000002 REGADENOSON 0.4 MG/5ML SOLUTION: Performed by: INTERNAL MEDICINE

## 2025-04-15 PROCEDURE — 93017 CV STRESS TEST TRACING ONLY: CPT

## 2025-04-15 PROCEDURE — 78452 HT MUSCLE IMAGE SPECT MULT: CPT | Performed by: INTERNAL MEDICINE

## 2025-04-15 PROCEDURE — 34310000005 TECHNETIUM TETROFOSMIN KIT: Performed by: INTERNAL MEDICINE

## 2025-04-15 PROCEDURE — 78452 HT MUSCLE IMAGE SPECT MULT: CPT

## 2025-04-15 RX ORDER — GABAPENTIN 300 MG/1
300 CAPSULE ORAL 3 TIMES DAILY PRN
COMMUNITY

## 2025-04-15 RX ORDER — LACTULOSE 10 G/15ML
20 SOLUTION ORAL 2 TIMES DAILY PRN
COMMUNITY

## 2025-04-15 RX ORDER — REGADENOSON 0.08 MG/ML
0.4 INJECTION, SOLUTION INTRAVENOUS ONCE
Status: COMPLETED | OUTPATIENT
Start: 2025-04-15 | End: 2025-04-15

## 2025-04-15 RX ADMIN — AMLODIPINE BESYLATE 10 MG: 10 TABLET ORAL at 11:00

## 2025-04-15 RX ADMIN — LOSARTAN POTASSIUM 25 MG: 25 TABLET, FILM COATED ORAL at 11:00

## 2025-04-15 RX ADMIN — TETROFOSMIN 1 DOSE: 1.38 INJECTION, POWDER, LYOPHILIZED, FOR SOLUTION INTRAVENOUS at 07:55

## 2025-04-15 RX ADMIN — METOPROLOL SUCCINATE 50 MG: 50 TABLET, FILM COATED, EXTENDED RELEASE ORAL at 11:00

## 2025-04-15 RX ADMIN — TETROFOSMIN 1 DOSE: 1.38 INJECTION, POWDER, LYOPHILIZED, FOR SOLUTION INTRAVENOUS at 09:35

## 2025-04-15 RX ADMIN — ASPIRIN 81 MG: 81 TABLET, COATED ORAL at 11:00

## 2025-04-15 RX ADMIN — Medication 10 ML: at 11:01

## 2025-04-15 RX ADMIN — GABAPENTIN 300 MG: 300 CAPSULE ORAL at 06:37

## 2025-04-15 RX ADMIN — SERTRALINE HYDROCHLORIDE 100 MG: 100 TABLET ORAL at 11:00

## 2025-04-15 RX ADMIN — PANTOPRAZOLE SODIUM 40 MG: 40 TABLET, DELAYED RELEASE ORAL at 06:36

## 2025-04-15 RX ADMIN — REGADENOSON 0.4 MG: 0.08 INJECTION, SOLUTION INTRAVENOUS at 09:30

## 2025-04-15 NOTE — DISCHARGE SUMMARY
[facility] Medicine Services  DISCHARGE SUMMARY       Date of Admission: 4/14/2025  Date of Discharge:  4/15/2025  Primary Care Physician: Casi Skinner APRN    Presenting Problem/History of Present Illness:  Chest pain    Final Discharge Diagnoses:  Active Hospital Problems    Diagnosis     **Chest pain        Consults: None    Procedures Performed: Stress test    Pertinent Test Results:       Imaging Results (All)       Procedure Component Value Units Date/Time    XR Chest 1 View [[id number]] Collected: 04/14/25 1225     Updated: 04/14/25 1229    Narrative:      EXAM/TECHNIQUE: XR CHEST 1 VW-     INDICATION: Chest Pain Triage Protocol     COMPARISON: 3/4/2023     FINDINGS:     Cardiomediastinal silhouette is within normal limits.      No pleural effusion. No visible pneumothorax. No focal consolidation.      No acute osseous findings.       Impression:         No acute findings.     This report was signed and finalized on 4/14/2025 12:26 PM by Dr. Jackson Weber MD.             LAB RESULTS:      Lab 04/14/25  1213   WBC 7.23   HEMOGLOBIN 17.3   HEMATOCRIT 47.3   PLATELETS 174   NEUTROS ABS 3.54   IMMATURE GRANS (ABS) 0.02   LYMPHS ABS 3.00   MONOS ABS 0.33   EOS ABS 0.27   MCV 79.0   PROTIME 14.1         Lab 04/14/25  1213   SODIUM 136   POTASSIUM 4.1   CHLORIDE 100   CO2 23.0   ANION GAP 13.0   BUN 10   CREATININE 1.10   EGFR 84.9   GLUCOSE 109*   CALCIUM 9.3         Lab 04/14/25  1213   TOTAL PROTEIN 8.1   ALBUMIN 4.5   GLOBULIN 3.6   ALT (SGPT) 44*   AST (SGOT) 48*   BILIRUBIN 0.9   ALK PHOS 131*         Lab 04/14/25  1339 04/14/25  1213   PROBNP  --  93.0   HSTROP T 10 13   PROTIME  --  14.1   INR  --  1.03                 Brief Urine Lab Results  (Last result in the past 365 days)        Color   Clarity   Blood   Leuk Est   Nitrite   Protein   CREAT   Urine HCG        07/08/24 0952 Yellow   Clear   Negative   Trace   Negative   Negative              "    Microbiology Results (last 10 days)       ** No results found for the last 240 hours. **            Hospital Course:     -Chest pain rule out acute coronary syndrome  Patient's serial troponin was negative, and EKG did not show any acute change.  He continued to have chest pain in the emergency room and was not able to do dobutamine stress test in the emergency room.  He was admitted and worked up further with stress test which was reported as normal and chest pain has resolved, therefore he will be discharged to follow-up with his PCP within 1 to 2 weeks of discharge.  Suspect patient's symptoms may likely be related to his uncontrolled blood pressure when he presented.     -Suspected hypertensive urgency  Patient is on multiple medications at home including metoprolol, amlodipine, losartan and terazosin.    He was started on nitro drip and home medications were restarted, blood pressure is controlled currently on his home medications and he will be discharged with no changes.  We recommend better compliance postdischarge and also he was strongly advised to discontinue tobacco use.    -Chronic tobacco use  Patient was strongly advised to discontinue use.    Chronic medical conditions-  Headache  History of stroke  Asthma       Physical Exam on Discharge:  /88 (BP Location: Left arm, Patient Position: Lying)   Pulse 52   Temp 97.6 °F (36.4 °C) (Oral)   Resp 16   Ht 177.8 cm (70\")   Wt 87.3 kg (192 lb 8 oz)   SpO2 100%   BMI 27.62 kg/m²   Physical Exam  GEN: Awake, alert, interactive, in NAD  HEENT: Atraumatic, PERRLA, EOMI, Anicteric, Trachea midline  Lungs: CTAB, no wheezing/rales/rhonchi  Heart: RRR, +S1/s2, no rub  ABD: soft, nt/nd, +BS, no guarding/rebound  Extremities: atraumatic, no cyanosis, no edema  Skin: no rashes or lesions  Neuro: AAOx3, no focal deficits  Psych: normal mood & affect     Condition on Discharge: Stable     Discharge Disposition:  Home or Self Care    Discharge " Medications:     Discharge Medications        Continue These Medications        Instructions Start Date   amLODIPine 10 MG tablet  Commonly known as: NORVASC   10 mg, Oral, Daily      gabapentin 300 MG capsule  Commonly known as: NEURONTIN   300 mg, Oral, 3 Times Daily PRN      lactulose 10 GM/15ML solution  Commonly known as: CHRONULAC   20 g, Oral, 2 Times Daily PRN      losartan 25 MG tablet  Commonly known as: COZAAR   25 mg, Oral, Daily      metoprolol succinate XL 50 MG 24 hr tablet  Commonly known as: TOPROL-XL   50 mg, Oral, Daily      naltrexone 50 MG tablet  Commonly known as: DEPADE   50 mg, Oral, Daily      omeprazole 20 MG capsule  Commonly known as: priLOSEC   20 mg, Oral, Daily      OXcarbazepine 150 MG tablet  Commonly known as: Trileptal   1/2 tablet am and 1 full tablet at bedtime.      sertraline 100 MG tablet  Commonly known as: ZOLOFT   100 mg, Oral, Daily, To help with mood. Take with food      terazosin 5 MG capsule  Commonly known as: HYTRIN   5 mg, Oral, Nightly             Discharge Diet: Regular diet    Activity at Discharge: As tolerated    Follow-up Appointments:   Future Appointments   Date Time Provider Department Center   4/17/2025 10:00 AM Tosin Newton APRN [facility]       Test Results Pending at Discharge: None    Electronically signed by Eliseo Ramos MD, 04/15/25, 12:38 CDT.    Time: 35 minutes.

## 2025-04-15 NOTE — PAYOR COMM NOTE
"  4/15/25 [facility] [phone]  [phone]    ER ADMIT TO OBSERVATION ON 4/14/25.    AUTH #[id number]            James Taylor Jr. (44 y.o. Male)       Date of Birth   1980    Social Security Number       Address   [address] [place] [address]  [facility] [place] [address]    Home Phone   [phone]    MRN   [id number]       Uatsdin   Shinto    Marital Status   Significant Other                            Admission Date   4/14/2025    Admission Type   Emergency    Admitting Provider   Eliseo Ramos MD    Attending Provider   Eliseo Ramos MD    Department, Room/Bed   [facility] 4B, 402/1       Discharge Date       Discharge Disposition   Home or Self Care    Discharge Destination                                 Attending Provider: Eliseo Ramos MD    Allergies: No Known Allergies    Isolation: None   Infection: None   Code Status: CPR    Ht: 177.8 cm (70\")   Wt: 87.3 kg (192 lb 8 oz)    Admission Cmt: None   Principal Problem: Chest pain [R07.9]                   Active Insurance as of 4/14/2025       Primary Coverage       Payor Plan Insurance Group Employer/Plan Group    [place] [id number]       Payor Plan Address Payor Plan Phone Number Payor Plan Fax Number Effective Dates    HU[employer]ICAL [address] [address] [phone]  1/1/2021 - None Entered    Formerly [facility] [id number]         Subscriber Name Subscriber Birth Date Member ID       JAMES TAYLOR JR. 1980 [id number]                     Emergency Contacts        (Rel.) Home Phone Work Phone Mobile Phone    Alfredo Taylor (Brother) -- -- [phone]    Guillermina Lucas (Sister) -- -- [phone]    Sidra Tompkins (Significant Other) -- -- [phone]           [facility] Encounter Date/Time: 4/14/2025 [facility]   Hospital Account: [id number]    MRN: [id number]   Patient:  James Taylor Jr.   Contact Serial #: [id number]   SSN:          ENCOUNTER           "   Patient Class: Observation   Unit: [address] Service: Medicine     Bed: 402/1   Admitting Provider: Eliseo Ramos MD   Referring Physician:     Attending Provider: Eliseo Ramos MD   Adm Diagnosis: Chest pain [R07.9]               PATIENT             Name: James Taylor Jr. : 1980 (44 yrs)   Address: [facility] [address] [place] Sex: Male   City: [place] [address]   County: [facility]   Marital Status: SIGNIFICANT Ethnicity: NOT        Race: BLACK   Primary Care Provider: Casi Skinner* Patients Phone: Home Phone: [phone]     Mobile Phone: [phone]     EMERGENCY CONTACT   Contact Name Legal Guardian? Relationship to Patient Home Phone Work Phone Mobile Phone   1. Alfredo Taylor  2. Guillermina Lucas      Brother  Sister           [phone]   GUARANTOR             Guarantor: James Taylor     : 1980   Address: [facility] [address]  [place]: [place] [address]     Relation to Patient: Self       Home Phone: [phone]   Guarantor ID: [id number]       Work Phone:     GUARANTOR EMPLOYER   Employer:           Status: DISABLED   COVERAGE          PRIMARY INSURANCE   Payor[place] Plan[place]   Group Number: [id number] Insurance Type: INDEMNITY   Subscriber Name: JAMES TAYLOR JR. Subscriber : 1980   Subscriber ID: [id number] Coverage Address: [place] [address]   Pat. Rel. to Subscriber: Self Coverage Phone: [phone]   SECONDARY INSURANCE   Payor: N/A Plan: N/A   Group Number:   Insurance Type:     Subscriber Name:   Subscriber :     Subscriber ID:   Coverage Address:     Pat. Rel. to Subscriber:   Coverage Phone:        Contact Serial # ([id number])         April 15, 2025    Chart ID ([id number])         Eliseo Ramos MD   Physician  Hospitalist     H&P     Signed     Date of Service: 25  Creation Time: 25     Signed       Expand All Collapse All          [facility] Medicine Services  HISTORY AND PHYSICAL     Date of Admission: 4/14/2025  Primary Care Physician: Casi Skinner APRN     Subjective   Primary Historian: Patient     Chief Complaint: Chest pain     History of Present Illness  Mr. Taylor is a 44-year-old male from home with past medical history of asthma, headache, hypertension and history of previous stroke, who presented to the emergency room with complaint of chest pain since 8 PM last night, history was provided by patient.  He developed substernal chest pain which was constant, pressure-like and nonradiating.  Pain continued all through the night and this morning prompting his emergency room visit.  He has a history of tobacco use and despite his stroke continues to use tobacco and also alcohol.  In the emergency room, serial troponin was negative and EKG did not show any acute change.  He was found to have elevated blood pressure with systolic greater than 220 and had multiple shots of hydralazine.  He was scheduled to get dobutamine stress test in the emergency room but was unable because of uncontrolled blood pressure.  During my visit, patient continued to complain of chest pain, blood pressure was somewhat improved but not within normal limits.        Review of Systems   Otherwise complete ROS reviewed and negative except as mentioned in the HPI.     Past Medical History:   Medical History        Past Medical History:   Diagnosis Date    Asthma      Headache      Hypertension      Stroke           Past Surgical History:  Surgical History         Past Surgical History:   Procedure Laterality Date    COLONOSCOPY N/A 12/13/2023     normal exam    ENDOSCOPY N/A 12/13/2023     Erythematous mucosa in the antrum bx normal, otherwise normal    UPPER ENDOSCOPIC ULTRASOUND W/ FNA   01/17/2024     No obvious structural changes to cause pancreatitis. - Dr. Saravia         Social History:  reports that he has been  smoking cigarettes. He started smoking about 13 years ago. He has a 16.1 pack-year smoking history. He has been exposed to tobacco smoke. He has never used smokeless tobacco. He reports that he does not currently use alcohol after a past usage of about 4.0 standard drinks of alcohol per week. He reports that he does not use drugs.     Family History: family history includes Anxiety disorder in his mother; Cancer in his father; Diabetes in his mother; Hyperlipidemia in his father and mother.        Allergies:  Allergies   No Known Allergies        Medications:          Prior to Admission medications    Medication Sig Start Date End Date Taking? Authorizing Provider   amLODIPine (NORVASC) 10 MG tablet Take 1 tablet by mouth Daily. 2/26/25     Casi Skinner APRN   gabapentin (NEURONTIN) 300 MG capsule TID prn alcohol withdrawal/ chronic back pain/ neuropathy. 10/30/24     Casi Skinner APRN   lactulose (CHRONULAC) 10 GM/15ML solution 30 ml Tid x 3 days then reduce down to twice daily dosing. 10/29/24     Casi Skinner APRN   losartan (COZAAR) 25 MG tablet Take 1 tablet by mouth Daily. 2/26/25     Casi Skinner APRN   metoprolol succinate XL (TOPROL-XL) 50 MG 24 hr tablet Take 1 tablet by mouth Daily. 2/26/25     Casi Skinner APRN   naltrexone (DEPADE) 50 MG tablet Take 1 tablet by mouth Daily. 2/26/25     Casi Skinner APRN   omeprazole (priLOSEC) 20 MG capsule Take 1 capsule by mouth Daily. 11/28/23     Yudelka Taylor APRN   OXcarbazepine (Trileptal) 150 MG tablet 1/2 tablet am and 1 full tablet at bedtime. 2/26/25     Casi Skinner APRN   sertraline (ZOLOFT) 100 MG tablet Take 1 tablet by mouth Daily. To help with mood. Take with food 2/26/25     Casi Skinner APRN   terazosin (HYTRIN) 5 MG capsule Take 1 capsule by mouth Every Night. 3/10/23     Feliciano Boothe APRN I have utilized all available  "immediate resources to obtain, update, or review the patient's current medications (including all prescriptions, over-the-counter products, herbals, cannabis/cannabidiol products, and vitamin/mineral/dietary (nutritional) supplements).     Objective      Vital Signs: /94 (BP Location: Left arm, Patient Position: Lying)   Pulse 80   Temp 98.1 °F (36.7 °C) (Oral)   Resp 18   Ht 177.8 cm (70\")   Wt 87.3 kg (192 lb 8 oz)   SpO2 99%   BMI 27.62 kg/m²   Physical Exam   GEN: Awake, alert, interactive, in NAD  HEENT: Atraumatic, PERRLA, EOMI, Anicteric, Trachea midline  Lungs: CTAB, no wheezing/rales/rhonchi  Heart: RRR, +S1/s2, no rub  ABD: soft, nt/nd, +BS, no guarding/rebound  Extremities: atraumatic, no cyanosis, no edema  Skin: no rashes or lesions  Neuro: AAOx3, no focal deficits  Psych: normal mood & affect        Results Reviewed:  Lab Results (last 24 hours)         Procedure Component Value Units Date/Time     High Sensitivity Troponin T 1Hr [[id number]]  (Normal) Collected: 04/14/25 1339     Specimen: Blood Updated: 04/14/25 1413       HS Troponin T 10 ng/L         Troponin T Numeric Delta -3 ng/L       Narrative:       High Sensitive Troponin T Reference Range:  <14.0 ng/L- Negative Female for AMI  <22.0 ng/L- Negative Male for AMI  >=14 - Abnormal Female indicating possible myocardial injury.  >=22 - Abnormal Male indicating possible myocardial injury.   Clinicians would have to utilize clinical acumen, EKG, Troponin, and serial changes to determine if it is an Acute Myocardial Infarction or myocardial injury due to an underlying chronic condition.           Urine Drug Screen - Urine, Clean Catch [[id number]]  (Normal) Collected: 04/14/25 1245     Specimen: Urine, Clean Catch Updated: 04/14/25 1316       THC, Screen, Urine Negative       Phencyclidine (PCP), Urine Negative       Cocaine Screen, Urine Negative       Methamphetamine, Ur Negative       Opiate Screen Negative       Amphetamine Screen, " Urine Negative       Benzodiazepine Screen, Urine Negative       Tricyclic Antidepressants Screen Negative       Methadone Screen, Urine Negative       Barbiturates Screen, Urine Negative       Oxycodone Screen, Urine Negative       Buprenorphine, Screen, Urine Negative     Narrative:       Cutoff For Drugs Screened:     Amphetamines               500 ng/ml  Barbiturates               200 ng/ml  Benzodiazepines            150 ng/ml  Cocaine                    150 ng/ml  Methadone                  200 ng/ml  Opiates                    100 ng/ml  Phencyclidine               25 ng/ml  THC                         50 ng/ml  Methamphetamine            500 ng/ml  Tricyclic Antidepressants  300 ng/ml  Oxycodone                  100 ng/ml  Buprenorphine               10 ng/ml     The normal value for all drugs tested is negative. This report includes unconfirmed screening results, with the cutoff values listed, to be used for medical treatment purposes only.  Unconfirmed results must not be used for non-medical purposes such as employment or legal testing.  Clinical consideration should be applied to any drug of abuse test, particularly when unconfirmed results are used.       Fentanyl, Urine - Urine, Clean Catch [[id number]]  (Normal) Collected: 04/14/25 1245     Specimen: Urine, Clean Catch Updated: 04/14/25 1314       Fentanyl, Urine Negative     Narrative:       Negative Threshold:       Fentanyl 5 ng/mL      The normal value for the drug tested is negative. This report includes final unconfirmed screening results to be used for medical treatment purposes only. Unconfirmed results must not be used for non-medical purposes such as employment or legal testing. Clinical consideration should be applied to any drug of abuse test, particularly when unconfirmed results are used.            Protime-INR [[id number]]  (Normal) Collected: 04/14/25 1213     Specimen: Blood from Arm, Right Updated: 04/14/25 1302       Protime 14.1  Seconds         INR 1.03     Comprehensive Metabolic Panel [[id number]]  (Abnormal) Collected: 04/14/25 1213     Specimen: Blood from Arm, Right Updated: 04/14/25 1245       Glucose 109 mg/dL         BUN 10 mg/dL         Creatinine 1.10 mg/dL         Sodium 136 mmol/L         Potassium 4.1 mmol/L         Chloride 100 mmol/L         CO2 23.0 mmol/L         Calcium 9.3 mg/dL         Total Protein 8.1 g/dL         Albumin 4.5 g/dL         ALT (SGPT) 44 U/L         AST (SGOT) 48 U/L         Alkaline Phosphatase 131 U/L         Total Bilirubin 0.9 mg/dL         Globulin 3.6 gm/dL         A/G Ratio 1.3 g/dL         BUN/Creatinine Ratio 9.1       Anion Gap 13.0 mmol/L         eGFR 84.9 mL/min/1.73       Narrative:       GFR Categories in Chronic Kidney Disease (CKD)                         GFR Category          GFR (mL/min/1.73)    Interpretation  G1                       90 or greater              Normal or high (1)  G2                               60-89                Mild decrease (1)  G3a                   45-59                Mild to moderate decrease  G3b                   30-44                Moderate to severe decrease  G4                    15-29                Severe decrease  G5                    14 or less           Kidney failure                                                 (1)In the absence of evidence of kidney disease, neither GFR category G1 or G2 fulfill the criteria for CKD.     eGFR calculation 2021 CKD-EPI creatinine equation, which does not include race as a factor     High Sensitivity Troponin T [[id number]]  (Normal) Collected: 04/14/25 1213     Specimen: Blood from Arm, Right Updated: 04/14/25 1242       HS Troponin T 13 ng/L       Narrative:       High Sensitive Troponin T Reference Range:  <14.0 ng/L- Negative Female for AMI  <22.0 ng/L- Negative Male for AMI  >=14 - Abnormal Female indicating possible myocardial injury.  >=22 - Abnormal Male indicating possible myocardial injury.   Clinicians  would have to utilize clinical acumen, EKG, Troponin, and serial changes to determine if it is an Acute Myocardial Infarction or myocardial injury due to an underlying chronic condition.           BNP [[id number]]  (Normal) Collected: 04/14/25 1213     Specimen: Blood from Arm, Right Updated: 04/14/25 1242       proBNP 93.0 pg/mL       Narrative:       This assay is used as an aid in the diagnosis of individuals suspected of having heart failure. It can be used as an aid in the diagnosis of acute decompensated heart failure (ADHF) in patients presenting with signs and symptoms of ADHF to the emergency department (ED). In addition, NT-proBNP of <300 pg/mL indicates ADHF is not likely.     Age Range         Result Interpretation  NT-proBNP Concentration (pg/mL:        <50             Positive            >450                         Thomas                           300-450                           Negative               <300     50-75           Positive            >900                  Gray                300-900                  Negative            <300        >75             Positive            >1800                  Gray                300-1800                  Negative            <300     Ethanol [[id number]] Collected: 04/14/25 1213     Specimen: Blood from Arm, Right Updated: 04/14/25 1240       Ethanol % 0.020 %       Narrative:       Not for legal purposes. Chain of Custody not followed.      [place] Draw [[id number]] Collected: 04/14/25 1213     Specimen: Blood from Arm, Right Updated: 04/14/25 1230     Narrative:       The following orders were created for panel order [place] Draw.  Procedure                               Abnormality         Status                     ---------                               -----------         ------                     Green Top (Gel)[[id number]]                                  Final result               Lavender Top[[id number]]                                     Final result                Red Top[[id number]]                                          Final result               Light Blue Top[[id number]]                                   Final result                  Please view results for these tests on the individual orders.     Green Top (Gel) [[id number]] Collected: 04/14/25 1213     Specimen: Blood from Arm, Right Updated: 04/14/25 1230       Extra Tube Hold for add-ons.       Comment: Auto resulted.        Lavender Top [[id number]] Collected: 04/14/25 1213     Specimen: Blood from Arm, Right Updated: 04/14/25 1230       Extra Tube hold for add-on       Comment: Auto resulted        Red Top [[id number]] Collected: 04/14/25 1213     Specimen: Blood from Arm, Right Updated: 04/14/25 1230       Extra Tube Hold for add-ons.       Comment: Auto resulted.        Light Blue Top [[id number]] Collected: 04/14/25 1213     Specimen: Blood from Arm, Right Updated: 04/14/25 1230       Extra Tube Hold for add-ons.       Comment: Auto resulted        CBC & Differential [[id number]]  (Abnormal) Collected: 04/14/25 1213     Specimen: Blood from Arm, Right Updated: 04/14/25 1222     Narrative:       The following orders were created for panel order CBC & Differential.  Procedure                               Abnormality         Status                     ---------                               -----------         ------                     CBC Auto Differential[[id number]]        Abnormal            Final result                  Please view results for these tests on the individual orders.     CBC Auto Differential [[id number]]  (Abnormal) Collected: 04/14/25 1213     Specimen: Blood from Arm, Right Updated: 04/14/25 1222       WBC 7.23 10*3/mm3         RBC 5.99 10*6/mm3         Hemoglobin 17.3 g/dL         Hematocrit 47.3 %         MCV 79.0 fL         MCH 28.9 pg         MCHC 36.6 g/dL         RDW 13.2 %         RDW-SD 37.6 fl         MPV 10.1 fL         Platelets 174 10*3/mm3         Neutrophil % 48.9 %          Lymphocyte % 41.5 %         Monocyte % 4.6 %         Eosinophil % 3.7 %         Basophil % 1.0 %         Immature Grans % 0.3 %         Neutrophils, Absolute 3.54 10*3/mm3         Lymphocytes, Absolute 3.00 10*3/mm3         Monocytes, Absolute 0.33 10*3/mm3         Eosinophils, Absolute 0.27 10*3/mm3         Basophils, Absolute 0.07 10*3/mm3         Immature Grans, Absolute 0.02 10*3/mm3         nRBC 0.0 /100 WBC               Imaging Results (Last 24 Hours)         Procedure Component Value Units Date/Time     XR Chest 1 View [[id number]] Collected: 04/14/25 1225       Updated: 04/14/25 1229     Narrative:       EXAM/TECHNIQUE: XR CHEST 1 VW-     INDICATION: Chest Pain Triage Protocol     COMPARISON: 3/4/2023     FINDINGS:     Cardiomediastinal silhouette is within normal limits.      No pleural effusion. No visible pneumothorax. No focal consolidation.      No acute osseous findings.        Impression:          No acute findings.     This report was signed and finalized on 4/14/2025 12:26 PM by Dr. Jackson Weber MD.                I have personally reviewed and interpreted the radiology studies and ECG obtained at time of admission.      Assessment / Plan   Assessment:        Active Hospital Problems     Diagnosis      **Chest pain           Treatment Plan  The patient will be admitted to my service here at [facility].      -Chest pain rule out acute coronary syndrome  Patient's serial troponin was negative, and EKG did not show any acute change.  He continued to have chest pain in the emergency room and was not able to do dobutamine stress test in the emergency room.  He will be admitted for further evaluation of chest pain with stress test in the morning.   since patient is still having ongoing chest pain, will also start him on nitro drip.     - Suspected hypertensive urgency  Patient is on multiple medications at home including metoprolol, amlodipine, losartan and terazosin.  We will restart  home medications and also start him on nitro drip and wean off as tolerated.     Chronic medical conditions-  Headache  History of stroke  Asthma 9-year-old male changed     DVT prophylaxis-Lovenox     Medical Decision Making  Number and Complexity of problems: Multiple  Differential Diagnosis: Chest pain, acute coronary syndrome, uncontrolled blood pressure     Conditions and Status        Condition is unchanged.     [facility] Data  External documents reviewed: No  Cardiac tracing (EKG, telemetry) interpretation: Yes  Radiology interpretation: Yes  Labs reviewed: Yes  Any tests that were considered but not ordered: No     Decision rules/scores evaluated (example [id number], Wells, etc): Yes     Discussed with: Patient and spouse at bedside     Care Planning  Shared decision making: Yes, with patient and spouse  Code status and discussions: Yes, with patient and spouse [full code]     Disposition  Social Determinants of Health that impact treatment or disposition: No  Estimated length of stay is 1 to 2 days.      I confirmed that the patient's advanced care plan is present, code status is documented, and a surrogate decision maker is listed in the patient's medical record.      The patient's surrogate decision maker is Sister.      The patient was seen and examined by me on 2025 at 5 PM..     Electronically signed by Eliseo Ramos MD, 25, 17:45 CDT.                  [facility] CARDIOLOGY  [address] [place] [id number] [phone]            Patient Information    Patient Name  James Taylor Jr. MRN  [id number] Legal Sex  Male  (Age)  1980 (44 y.o.)       Regadenoson Stress Test with Myocardial Perfusion SPECT (Multi Study)    Patient Name: James Taylor Jr.   Patient MRN: [id number]   Patient : 1980 (44 y.o.)   Legal Sex: Male    Accession Number: [id number]   Date of Study: 4/15/25   Ordering Provider: Eliseo Ramos MD    Clinical Indications: Chest Pain        "[facility]  Performing Staff   ECG Readfield, SPECT Readfield: Beka Del Rio MD     Tech: Jeanie Montemayor    Support Staff: Monalisa Monroe RN           Admission Information    Admission Date/Time Discharge Date/Time Room/Bed   04/14/25  1203  402/1        [facility] PACS Images     Show images for Stress Test With Myocardial Perfusion One Day       Interpretation Summary         Myocardial perfusion imaging indicates a normal myocardial perfusion study with no evidence of ischemia (a small mild intensity fixed apical defect is noted with normal contractility, suggestive of physiologic apical thinning as opposed to prior apical infarction).    Left ventricular ejection fraction is normal (Calculated EF = 68%).    Impressions are consistent with a low risk study.                Patient Hx Of Height, Weight, and Vitals    Height Weight BSA (Calculated - sq m) BMI (Calculated) Retired BMI (kg/m2) Pulse BP   177.8 cm (70\") 87.3 kg (192 lb 8 oz) 2.05 sq meters 27.6  52 128/88        Imaging Contrast/Medications:     technetium tetrofosmin (Tc-MYOVIEW) injection 1 dose   Given: 1 dose Intravenous    regadenoson (LEXISCAN) injection 0.4 mg   Given: 0.4 mg Intravenous    technetium tetrofosmin (Tc-MYOVIEW) injection 1 dose   Given: 1 dose Intravenous                      Stress Procedure    Rest ECG Baseline ECG rhythm of sinus bradycardia noted. Non-specific ST-T wave changes noted.   Stress Description A pharmacological stress test was performed using regadenoson.   The patient reached the end of the protocol.   The clinician observed expected effects during the stress test. The patient denied chest pain.   Blood pressure demonstrated a normal response to stress. Heart rate demonstrated a normal response to stress.   Stress ECG Stress ECG of normal sinus rhythm noted. Non-specific ST-T wave changes noted during stress.   There was no ST segment deviation noted during stress.   There were no arrhythmias during " stress.   There were no significant arrhythmias noted during stress.      Stress ECG was interpretable and is consistent with a normal stress ECG.   Recovery ECG During recovery, the patient complained of no significant symptoms following stress.     Sinus bradycardia was noted during recovery. There was no ST segment deviation noted during recovery.   There were no arrhythmias during recovery.       Study Description    Nuclear Study Description A 1-day rest/stress protocol myocardial perfusion imaging study was performed. A 20 G peripheral IV was started in the right forearm. While at rest, the patient was injected intravenously with 12.0 mCi of technetium tetrofosmin at 07:55 CDT. Regadenoson (0.4 mg / 5 mL) was given intravenously over approximately 10 seconds followed by 5 mL flush of saline according to protocol. While at peak stress, the patient was injected intravenously with 36.0 mCi of technetium tetrofosmin at 09:35 CDT. The total amount of radiation received in the study is about 12.7 mSv.   Nuclear Perfusion Images Overall image quality is good. There are no artifacts present. Raw images reviewed with no abnormalities noted.       Nuclear Perfusion Findings    Study Impression Myocardial perfusion imaging indicates a normal myocardial perfusion study with no evidence of ischemia. Impressions are consistent with a low risk study.   Rest Perfusion Defect 1 There is a small sized defect with mild reduction in uptake present in the apex.   Stress Perfusion Defect 1 There is a small sized defect of mild severity present in the apex.   Ventricle Size / Description Left ventricular ejection fraction is normal (Calculated EF = 68%). Normal LV cavity size. Normal LV wall motion noted.             Stress Data    Stage Heart Rate (BPM) Blood Pressure (mmHg) Minutes Seconds Dose (mg) Comments   1       79       124/76       0       10       0.4       10 sec bolus injection                 Stress  Measurements    Baseline Vitals   Baseline HR 46 bpm         Baseline /86 mmHg          Peak Stress Vitals   Peak HR 79 bpm         Peak /76 mmHg          Recovery Vitals   Recovery HR 57 bpm         Recovery /73 mmHg          Exercise Data   Target HR (85%) 150 bpm         Max. Pred. HR (100%) 176 bpm         Percent Max Pred HR 44.89 %         Exercise duration (sec) 10 sec                     Scans on Order [id number]    Scan on 4/15/2025 0951 by Monalisa Monroe RN: EKG's                  Signed    Electronically signed by Beka Del Rio MD on 4/15/25 at 1103 CDT       Reading Providers     Reading Role Read Date   Beka Del Rio MD  [facility] [place], S[facility] [place] 4/15/2025                   Current Facility-Administered Medications   Medication Dose Route Frequency Provider Last Rate Last Admin    acetaminophen (TYLENOL) tablet 650 mg  650 mg Oral Q4H PRN Eliseo Ramos MD        Or    acetaminophen (TYLENOL) 160 MG/5ML oral solution 650 mg  650 mg Oral Q4H PRN Eliseo Ramos MD        Or    acetaminophen (TYLENOL) suppository 650 mg  650 mg Rectal Q4H PRN Eliseo Ramos MD        amLODIPine (NORVASC) tablet 10 mg  10 mg Oral Daily Eliseo Ramos MD   10 mg at 04/15/25 1100    aspirin EC tablet 81 mg  81 mg Oral Daily Eliseo Ramos MD   81 mg at 04/15/25 1100    atorvastatin (LIPITOR) tablet 40 mg  40 mg Oral Nightly Eliseo Ramos MD   40 mg at 04/14/25 2103    atropine sulfate injection 2 mg  2 mg Intravenous Once Eliseo Ramos MD        sennosides-docusate (PERICOLACE) 8.6-50 MG per tablet 2 tablet  2 tablet Oral BID PRN Eliseo Ramos MD        And    polyethylene glycol (MIRALAX) packet 17 g  17 g Oral Daily PRN Eliseo Ramos MD        And    bisacodyl (DULCOLAX) EC tablet 5 mg  5 mg Oral Daily PRN Eliseo Ramos MD        And    bisacodyl (DULCOLAX) suppository 10 mg  10 mg Rectal Daily PRN Eliseo Ramos MD         Calcium Replacement - Follow Nurse / BPA Driven Protocol   Not Applicable PRN Eliseo Ramos MD        enoxaparin sodium (LOVENOX) syringe 40 mg  40 mg Subcutaneous Q24H Eliseo Ramos MD   40 mg at 04/14/25 1759    gabapentin (NEURONTIN) capsule 300 mg  300 mg Oral Q8H Eliseo Ramos MD   300 mg at 04/15/25 0637    HYDROcodone-acetaminophen (NORCO) 5-325 MG per tablet 1 tablet  1 tablet Oral Q6H PRN Eliseo Ramos MD        lactulose solution 10 g  10 g Oral BID Eliseo Ramos MD        losartan (COZAAR) tablet 25 mg  25 mg Oral Daily Eliseo Ramos MD   25 mg at 04/15/25 1100    Magnesium Low Dose Replacement - Follow Nurse / BPA Driven Protocol   Not Applicable PRN Eliseo Ramos MD        metoprolol succinate XL (TOPROL-XL) 24 hr tablet 50 mg  50 mg Oral Daily Eliseo Ramos MD   50 mg at 04/15/25 1100    morphine injection 4 mg  4 mg Intravenous Q4H PRN Eliseo Ramos MD        And    naloxone (NARCAN) injection 0.4 mg  0.4 mg Intravenous Q5 Min PRN Eliseo Ramos MD        nitroglycerin (TRIDIL) 200 mcg/ml infusion  5-200 mcg/min Intravenous Titrated Eliseo Ramos MD   Stopped at 04/14/25 2232    ondansetron (ZOFRAN) injection 4 mg  4 mg Intravenous Q6H PRN Eliseo Ramos MD        OXcarbazepine (TRILEPTAL) tablet 75 mg  75 mg Oral Nightly Eliseo Ramos MD        pantoprazole (PROTONIX) EC tablet 40 mg  40 mg Oral Q AM Eliseo Ramos MD   40 mg at 04/15/25 0636    Phosphorus Replacement - Follow Nurse / BPA Driven Protocol   Not Applicable PRN Eliseo Ramos MD        Potassium Replacement - Follow Nurse / BPA Driven Protocol   Not Applicable PRN Eliseo Ramos MD        sertraline (ZOLOFT) tablet 100 mg  100 mg Oral Daily Eliseo Ramos MD   100 mg at 04/15/25 1100    sodium chloride 0.9 % flush 10 mL  10 mL Intravenous PRN Eliseo Ramos MD        sodium chloride 0.9 % flush 10 mL  10 mL Intravenous PRN Richard,  Eliseo MONTIEL MD        sodium chloride 0.9 % flush 10 mL  10 mL Intravenous Q12H Eliseo Ramos MD   10 mL at 04/15/25 1101    sodium chloride 0.9 % flush 10 mL  10 mL Intravenous PRN Eliseo Ramos MD        sodium chloride 0.9 % infusion 40 mL  40 mL Intravenous PRN Eliseo Ramos MD        terazosin (HYTRIN) capsule 5 mg  5 mg Oral Nightly Eliseo Ramos MD   5 mg at 04/14/25 3588

## 2025-04-15 NOTE — OUTREACH NOTE
Prep Survey      Flowsheet Row Responses   [facility] facility patient discharged from? [facility]   Is LACE score < 7 ? Yes   Eligibility [facility]   Date of Admission 04/14/25   Date of Discharge 04/15/25   Discharge Disposition Home or Self Care   Discharge diagnosis Chest pain   Does the patient have one of the following disease processes/diagnoses(primary or secondary)? Other   Does the patient have Home health ordered? No   Is there a DME ordered? No   Prep survey completed? Yes            SHAILA A - Registered Nurse

## 2025-04-15 NOTE — PLAN OF CARE
Goal Outcome Evaluation:  Plan of Care Reviewed With: patient  Patient on nitro gtt at 1900 shift change, put patient on 30minute vital sign cycle to check bp because of nitro gtt and home bp meds. Vitals stable until 2130, then checked at 2230 bp was 78/41, stopped nitro gtt and notified on call , patient did not complain of any chest pain in the night, said he felt better, bp rebounded later in night, safety precautions maintained, patient NPO after midnight for stress test.

## 2025-04-15 NOTE — CASE MANAGEMENT/SOCIAL WORK
Discharge Planning Assessment  [facility]     Patient Name: James Taylor Jr.  MRN: [id number]  Today's Date: 4/15/2025    Admit Date: 4/14/2025        Discharge Needs Assessment       Row Name 04/15/25 0940       Living Environment    People in Home spouse;child(dougie), dependent    Name(s) of People in Home Staci and baby    Current Living Arrangements home    Potentially Unsafe Housing Conditions none    Primary Care Provided by self    Provides Primary Care For child(dougie)    Family Caregiver if Needed spouse    Family Caregiver Names Staci    Able to Return to Prior Arrangements yes       Resource/Environmental Concerns    Resource/Environmental Concerns none       Transportation Needs    In the past 12 months, has lack of transportation kept you from medical appointments or from getting medications? no    In the past 12 months, has lack of transportation kept you from meetings, work, or from getting things needed for daily living? No       Food Insecurity    Within the past 12 months, you worried that your food would run out before you got the money to buy more. Never true    Within the past 12 months, the food you bought just didn't last and you didn't have money to get more. Never true       Transition Planning    Patient/Family Anticipates Transition to home with family    Transportation Anticipated family or friend will provide       Discharge Needs Assessment    Readmission Within the Last 30 Days no previous admission in last 30 days    Equipment Currently Used at Home none    Concerns to be Addressed no discharge needs identified    Do you want help finding or keeping work or a job? I do not need or want help    Do you want help with school or training? For example, starting or completing job training or getting a high school diploma, GED or equivalent No    Equipment Needed After Discharge none    Discharge Coordination/Progress spoke to spouse who patient lives with; patient is independent at home prior  to illness; has RX coverage and PCP; will follow for DC needs                   Discharge Plan       Row Name 04/15/25 0845       Plan    Final Discharge Disposition Code 01 - home or self-care                       Demographic Summary    No documentation.                  Functional Status    No documentation.                  Psychosocial    No documentation.                  Abuse/Neglect    No documentation.                  Legal    No documentation.                  Substance Abuse    No documentation.                  Patient Forms    No documentation.                     Vida Malin RN

## 2025-04-16 ENCOUNTER — NURSE TRIAGE (OUTPATIENT)
Dept: CALL CENTER | Facility: HOSPITAL | Age: 45
End: 2025-04-16
Payer: MEDICAID

## 2025-04-16 ENCOUNTER — TRANSITIONAL CARE MANAGEMENT TELEPHONE ENCOUNTER (OUTPATIENT)
Dept: CALL CENTER | Facility: HOSPITAL | Age: 45
End: 2025-04-16
Payer: MEDICAID

## 2025-04-16 LAB
QT INTERVAL: 390 MS
QT INTERVAL: 414 MS
QTC INTERVAL: 423 MS
QTC INTERVAL: 429 MS

## 2025-04-16 NOTE — TELEPHONE ENCOUNTER
"Reason for Disposition   [1] Patient says chest pain feels exactly the same as previously diagnosed \"heartburn\" AND [2] describes burning in chest AND [3] accompanying sour taste in mouth    Additional Information   Negative: SEVERE difficulty breathing (e.g., struggling for each breath, speaks in single words)   Negative: Difficult to awaken or acting confused (e.g., disoriented, slurred speech)   Negative: Shock suspected (e.g., cold/pale/clammy skin, too weak to stand, low BP, rapid pulse)   Negative: Passed out (i.e., lost consciousness, collapsed and was not responding)   Negative: [1] Chest pain lasts > 5 minutes AND [2] age > 44   Negative: [1] Chest pain lasts > 5 minutes AND [2] age > 30 AND [3] one or more cardiac risk factors (e.g., diabetes, high blood pressure, high cholesterol, smoker, or strong family history of heart disease)   Negative: [1] Chest pain lasts > 5 minutes AND [2] history of heart disease (i.e., angina, heart attack, heart failure, bypass surgery, takes nitroglycerin)   Negative: [1] Chest pain lasts > 5 minutes AND [2] described as crushing, pressure-like, or heavy   Negative: Heart beating < 50 beats per minute OR > 140 beats per minute   Negative: Visible sweat on face or sweat dripping down face   Negative: Sounds like a life-threatening emergency to the triager   Negative: Followed a chest injury   Negative: SEVERE chest pain   Negative: [1] Chest pain (or \"angina\") comes and goes AND [2] is happening more often (increasing in frequency) or getting worse (increasing in severity)  (Exception: Chest pains that last only a few seconds.)   Negative: Pain also in shoulder(s) or arm(s) or jaw  (Exception: Pain is clearly made worse by movement.)   Negative: Difficulty breathing   Negative: Dizziness or lightheadedness   Negative: Coughing up blood   Negative: Cocaine use within last 3 days   Negative: Major surgery in past month   Negative: Hip or leg fracture (broken bone) in past month " "(or had cast on leg or ankle in past month)   Negative: Illness requiring prolonged bedrest in past month (e.g., immobilization, long hospital stay)   Negative: Long-distance travel in past month (e.g., car, bus, train, plane; with trip lasting 6 or more hours)   Negative: History of prior \"blood clot\" in leg or lungs (i.e., deep vein thrombosis, pulmonary embolism)   Negative: History of inherited increased risk of blood clots (e.g., Factor 5 Leiden, Anti-thrombin 3, Protein C or Protein S deficiency, Prothrombin mutation)   Negative: Cancer treatment in past six months (or has cancer now)   Negative: [1] Chest pain lasts > 5 minutes AND [2] occurred in past 3 days (72 hours) (Exception: Feels exactly the same as previously diagnosed heartburn and has accompanying sour taste in mouth.)   Negative: Taking a deep breath makes pain worse   Negative: Patient sounds very sick or weak to the triager   Negative: [1] Chest pain lasts > 5 minutes AND [2] occurred > 3 days ago (72 hours) AND [3] NO chest pain or cardiac symptoms now   Negative: [1] Chest pain lasts < 5 minutes AND [2] NO chest pain or cardiac symptoms (e.g., breathing difficulty, sweating) now  (Exception: Chest pains that last only a few seconds.)   Negative: Fever > 100.4 F (38.0 C)   Negative: Rash in same area as pain (may be described as \"small blisters\")    Answer Assessment - Initial Assessment Questions  1. LOCATION: \"Where does it hurt?\"        Chest pain and epigastric pain since 4/14.  2. RADIATION: \"Does the pain go anywhere else?\" (e.g., into neck, jaw, arms, back)      No  3. ONSET: \"When did the chest pain begin?\" (Minutes, hours or days)       2 days ago.  4. PATTERN: \"Does the pain come and go, or has it been constant since it started?\"  \"Does it get worse with exertion?\"       Sleeping makes it better. Eating makes him want to vomit.  5. DURATION: \"How long does it last\" (e.g., seconds, minutes, hours)      Constant.  6. SEVERITY: \"How bad " "is the pain?\"  (e.g., Scale 1-10; mild, moderate, or severe)     - MILD (1-3): doesn't interfere with normal activities      - MODERATE (4-7): interferes with normal activities or awakens from sleep     - SEVERE (8-10): excruciating pain, unable to do any normal activities        5/10  7. CARDIAC RISK FACTORS: \"Do you have any history of heart problems or risk factors for heart disease?\" (e.g., angina, prior heart attack; diabetes, high blood pressure, high cholesterol, smoker, or strong family history of heart disease)      Yes  8. PULMONARY RISK FACTORS: \"Do you have any history of lung disease?\"  (e.g., blood clots in lung, asthma, emphysema, birth control pills)      No  9. CAUSE: \"What do you think is causing the chest pain?\"      No  10. OTHER SYMPTOMS: \"Do you have any other symptoms?\" (e.g., dizziness, nausea, vomiting, sweating, fever, difficulty breathing, cough)        Nausea and difficulty eating.  11. PREGNANCY: \"Is there any chance you are pregnant?\" \"When was your last menstrual period?\"        na    Protocols used: Chest Pain-ADULT-AH    "

## 2025-04-16 NOTE — TELEPHONE ENCOUNTER
Pt's girlfriend calling for pt. Pt present in room. Pt having continued CP and epigastric pain 5/10 with inability to eat due to having nausea. No emesis. Pt just discharged yesterday from  with negative work-up/stress test for CP. Pt having abd pain at hospital, but did not tell anyone. Per caller, PCP out until next week. This RN per protocol encouraged pt to either see another provider in office or return to ED for continued CP with abd pain and nausea.

## 2025-04-16 NOTE — OUTREACH NOTE
Call Center TCM Note      Flowsheet Row Responses   [facility] patient discharged from? [place]   Does the patient have one of the following disease processes/diagnoses(primary or secondary)? Other   TCM attempt successful? Yes   Call start time 1135   Call end time 1139   Discharge diagnosis Chest pain   Meds reviewed with patient/caregiver? Yes   Does the patient have all medications ordered at discharge? N/A   Is the patient taking all medications as directed (includes completed medication regime)? Yes   Comments PCP appt 4/17/25 10 am. Not listed as hosp dc fu appt but does meet TCM guidelines. Pt continues to Co stomach discomfort denies vomiting but does have nausea. TCm call complete. Denies CHest pain at this time.   Does the patient have an appointment with their PCP within 7-14 days of discharge? Yes   Has home health visited the patient within 72 hours of discharge? N/A   Psychosocial issues? No   Did the patient receive a copy of their discharge instructions? Yes   Nursing interventions Reviewed instructions with patient   What is the patient's perception of their health status since discharge? Improving   Is the patient/caregiver able to teach back signs and symptoms related to disease process for when to call PCP? Yes   Is the patient/caregiver able to teach back signs and symptoms related to disease process for when to call 911? Yes   Is the patient/caregiver able to teach back the hierarchy of who to call/visit for symptoms/problems? PCP, Specialist, Home health nurse, Urgent Care, ED, 911 Yes   TCM call completed? Yes   Wrap up additional comments Pt reports he is not having chest pain or discomfort now. States it is more his stomach he is having the issues with. Pt taking RTN meds including prilosec . encouraged Pt to stay hydrated. C/O nausea but no vomiting. Pt is aware not to consume heavy spiced food. bland foods only. Pt is aware of when to seek treatment. PCP appt tomorrow.   Call end  time 1133            CINTHIA FIORE - Registered Nurse    4/16/2025, 11:40 CDT

## 2025-04-17 ENCOUNTER — LAB (OUTPATIENT)
Dept: LAB | Facility: HOSPITAL | Age: 45
End: 2025-04-17
Payer: MEDICAID

## 2025-04-17 ENCOUNTER — OFFICE VISIT (OUTPATIENT)
Dept: FAMILY MEDICINE CLINIC | Facility: CLINIC | Age: 45
End: 2025-04-17
Payer: MEDICAID

## 2025-04-17 VITALS
SYSTOLIC BLOOD PRESSURE: 128 MMHG | HEIGHT: 70 IN | TEMPERATURE: 97.7 F | HEART RATE: 75 BPM | BODY MASS INDEX: 27.63 KG/M2 | DIASTOLIC BLOOD PRESSURE: 82 MMHG | WEIGHT: 193 LBS | OXYGEN SATURATION: 99 %

## 2025-04-17 DIAGNOSIS — K86.1 CHRONIC PANCREATITIS, UNSPECIFIED PANCREATITIS TYPE: ICD-10-CM

## 2025-04-17 DIAGNOSIS — R10.11 RIGHT UPPER QUADRANT PAIN: ICD-10-CM

## 2025-04-17 DIAGNOSIS — I10 PRIMARY HYPERTENSION: ICD-10-CM

## 2025-04-17 DIAGNOSIS — F10.20 ALCOHOLISM: ICD-10-CM

## 2025-04-17 DIAGNOSIS — Z00.00 HEALTHCARE MAINTENANCE: Primary | ICD-10-CM

## 2025-04-17 DIAGNOSIS — Z00.00 HEALTHCARE MAINTENANCE: ICD-10-CM

## 2025-04-17 LAB
ALBUMIN SERPL-MCNC: 4.3 G/DL (ref 3.5–5.2)
ALBUMIN/GLOB SERPL: 1.1 G/DL
ALP SERPL-CCNC: 108 U/L (ref 39–117)
ALT SERPL W P-5'-P-CCNC: 37 U/L (ref 1–41)
ANION GAP SERPL CALCULATED.3IONS-SCNC: 11 MMOL/L (ref 5–15)
AST SERPL-CCNC: 32 U/L (ref 1–40)
BILIRUB SERPL-MCNC: 1.5 MG/DL (ref 0–1.2)
BUN SERPL-MCNC: 12 MG/DL (ref 6–20)
BUN/CREAT SERPL: 10.8 (ref 7–25)
CALCIUM SPEC-SCNC: 9.6 MG/DL (ref 8.6–10.5)
CHLORIDE SERPL-SCNC: 94 MMOL/L (ref 98–107)
CHOLEST SERPL-MCNC: 76 MG/DL (ref 0–200)
CO2 SERPL-SCNC: 26 MMOL/L (ref 22–29)
CREAT SERPL-MCNC: 1.11 MG/DL (ref 0.76–1.27)
EGFRCR SERPLBLD CKD-EPI 2021: 84 ML/MIN/1.73
GLOBULIN UR ELPH-MCNC: 3.8 GM/DL
GLUCOSE SERPL-MCNC: 94 MG/DL (ref 65–99)
HBA1C MFR BLD: 4.6 % (ref 4.8–5.6)
HDLC SERPL-MCNC: 34 MG/DL (ref 40–60)
LDLC SERPL CALC-MCNC: 27 MG/DL (ref 0–100)
LDLC/HDLC SERPL: 0.84 {RATIO}
LIPASE SERPL-CCNC: 173 U/L (ref 13–60)
POTASSIUM SERPL-SCNC: 4.3 MMOL/L (ref 3.5–5.2)
PROT SERPL-MCNC: 8.1 G/DL (ref 6–8.5)
SODIUM SERPL-SCNC: 131 MMOL/L (ref 136–145)
TRIGL SERPL-MCNC: 67 MG/DL (ref 0–150)
VLDLC SERPL-MCNC: 15 MG/DL (ref 5–40)

## 2025-04-17 PROCEDURE — 83036 HEMOGLOBIN GLYCOSYLATED A1C: CPT

## 2025-04-17 PROCEDURE — 36415 COLL VENOUS BLD VENIPUNCTURE: CPT

## 2025-04-17 PROCEDURE — 83690 ASSAY OF LIPASE: CPT

## 2025-04-17 PROCEDURE — 80053 COMPREHEN METABOLIC PANEL: CPT

## 2025-04-17 PROCEDURE — 80061 LIPID PANEL: CPT

## 2025-04-17 NOTE — PROGRESS NOTES
NAM Whitman  [facility]   Family Medicine  [address]  [place] [address]  Phone: [phone]  Fax: [phone]         Chief Complaint:  Chief Complaint   Patient presents with    Hypertension    Med check        History:  James Taylor Jr. is a 44 y.o. male that is an established patient. He  is here for evaluation of the above complaint and management of chronic conditions.    HPI   History of Present Illness  The patient presents for evaluation of abdominal pain, elevated blood pressure, and mood disorder. He is here with an adult female. Hospital follow up for chest pain.    Date of admit: 4/14/2025  Date of d/c: 4/15/2025  TCM: 4/16/2025, TIANA Mills RN    Abdominal discomfort is reported, which he attributes to gas. He is under the care of Yudelka Taylor, in GI, and has been advised to schedule an appointment if necessary. Lactulose therapy is currently being used. He has a known history of chronic pancreatitis.  Symptoms may be related to gallbladder issues, as chest and abdominal pain have been previously experienced. The gallbladder is still intact. ALT and AST levels were elevated during recent hospitalization.    Workup for chest pain was attributed to uncontrolled hypertension.    Blood pressure readings are within normal range today, although a significant spike was experienced during the recent emergency room visit. Compliance with antihypertensive medication regimen is reported, taking them both in the morning and at night.    Mood remains stable. Trileptal continues to be taken as part of the treatment plan.    Alcohol intake has been reduced to a few drinks per day, and naltrexone is still being taken. He reports he is taking the naltrexone regularly.    SOCIAL HISTORY  He has cut back on drinking and now consumes a couple of drinks a day.    Transitional Care Management Certification  I certify that the following are true:  Communication was made within 2  "business days of discharge.  Complexity of Medical Decision Making is moderate.  Face to face visit occurred within 2 days.    *Note: 21941 is for high complexity patients with a face to face visit within 7 days of discharge.  41193 is for high complexity patients with a face to face on days 8-14 post discharge or medium complexity with face to face visit within 14 days post discharge.       Results  Labs   - Lipase: Slightly high   - Glucose: 109       ROS:  Review of Systems   Constitutional: Negative.    HENT: Negative.     Respiratory: Negative.     Gastrointestinal:  Positive for abdominal pain. Negative for diarrhea, nausea and vomiting.   Psychiatric/Behavioral: Negative.           reports that he has been smoking cigarettes. He started smoking about 13 years ago. He has a 16.1 pack-year smoking history. He has been exposed to tobacco smoke. He has never used smokeless tobacco. He reports that he does not currently use alcohol after a past usage of about 4.0 standard drinks of alcohol per week. He reports that he does not use drugs.    Current Outpatient Medications   Medication Instructions    amLODIPine (NORVASC) 10 mg, Oral, Daily    gabapentin (NEURONTIN) 300 mg, 3 Times Daily PRN    lactulose (CHRONULAC) 20 g, 2 Times Daily PRN    losartan (COZAAR) 25 mg, Oral, Daily    metoprolol succinate XL (TOPROL-XL) 50 mg, Oral, Daily    naltrexone (DEPADE) 50 mg, Oral, Daily    omeprazole (PRILOSEC) 20 mg, Oral, Daily    OXcarbazepine (Trileptal) 150 MG tablet 1/2 tablet am and 1 full tablet at bedtime.    sertraline (ZOLOFT) 100 mg, Oral, Daily, To help with mood. Take with food    terazosin (HYTRIN) 5 mg, Oral, Nightly       OBJECTIVE:  /82   Pulse 75   Temp 97.7 °F (36.5 °C)   Ht 177.8 cm (70\")   Wt 87.5 kg (193 lb)   SpO2 99%   BMI 27.69 kg/m²    Physical Exam  Vitals and nursing note reviewed.   Constitutional:       Appearance: Normal appearance.   HENT:      Head: Normocephalic and atraumatic. "      Nose: Nose normal.   Eyes:      Conjunctiva/sclera: Conjunctivae normal.   Cardiovascular:      Rate and Rhythm: Normal rate and regular rhythm.   Pulmonary:      Effort: Pulmonary effort is normal. No respiratory distress.      Breath sounds: Normal breath sounds. No wheezing or rales.   Abdominal:      General: Bowel sounds are normal.      Palpations: Abdomen is soft.      Tenderness: There is abdominal tenderness in the right upper quadrant. There is no right CVA tenderness, left CVA tenderness, guarding or rebound. Negative signs include Jeter's sign.          Comments: Pain described   Neurological:      General: No focal deficit present.      Mental Status: He is alert and oriented to person, place, and time.   Psychiatric:         Mood and Affect: Mood normal.         Behavior: Behavior normal.       Physical Exam  Gastrointestinal: Soft, no tenderness, no distention, no masses    Procedures    Assessment/Plan:     Diagnoses and all orders for this visit:    1. Healthcare maintenance (Primary)  -     Lipid Panel; Future  -     Lipase; Future  -     Hemoglobin A1c; Future    2. Alcoholism    3. Primary hypertension    4. Chronic pancreatitis, unspecified pancreatitis type  -     Comprehensive metabolic panel; Future  -     Lipase; Future    5. Right upper quadrant pain  -     US Gallbladder; Future  -     Lipase; Future           Assessment & Plan  1. Abdominal pain.  - Reports ongoing abdominal pain, suspected to be due to gas.  - Physical examination reveals pain localized to the right side of the abdomen without exacerbation on deep breath.  - An ultrasound of the gallbladder will be ordered to investigate further. Blood work, including liver and kidney function tests, cholesterol panel, and lipase levels, will be conducted today.  - Advised to follow up with gastroenterologist, Dr. Yudelka Taylor, for further evaluation.    2. Elevated blood pressure.  - Blood pressure was significantly elevated  during a recent ER visit but is currently well-controlled.  - Current blood pressure medications are effective, taken both in the morning and at night.  - No issues with medication adherence reported.  - Continue current blood pressure medications as prescribed.    3. Mood disorder.  - Reports mood has been alright.  - Currently on Trileptal, which is being managed by Casi.  - No changes in mood or medication effectiveness reported.  - Continue Trileptal as prescribed.    4. Alcohol use.  - Has reduced alcohol intake to a couple of drinks per day.  - Encouraged to completely abstain from alcohol to improve overall health, including stomach pain and liver function.  - Currently on naltrexone to assist with alcohol use reduction.  - Continue naltrexone as prescribed and monitor alcohol intake.    An After Visit Summary was printed and given to the patient at discharge.  Return in about 3 months (around 7/17/2025).       There are no Patient Instructions on file for this visit.      Discussion:     I spent 35 minutes caring for James on this date of service. This time includes time spent by me in the following activities: preparing for the visit, reviewing tests, performing a medically appropriate examination and/or evaluation, counseling and educating the patient/family/caregiver, documenting information in the medical record, independently interpreting results and communicating that information with the patient/family/caregiver, care coordination, ordering test(s), obtaining a separately obtained history, and reviewing a separately obtained history   Patient or patient representative verbalized consent for the use of Ambient Listening during the visit with  NAM Whitman for chart documentation. 4/17/2025  13:36 CDT    Tosin BROWNING 4/17/2025   Electronically signed.

## 2025-05-01 ENCOUNTER — HOSPITAL ENCOUNTER (OUTPATIENT)
Dept: ULTRASOUND IMAGING | Facility: HOSPITAL | Age: 45
Discharge: HOME | End: 2025-05-01
Admitting: NURSE PRACTITIONER
Payer: MEDICAID

## 2025-05-01 DIAGNOSIS — R10.11 RIGHT UPPER QUADRANT PAIN: ICD-10-CM

## 2025-05-01 PROCEDURE — 76705 ECHO EXAM OF ABDOMEN: CPT

## 2025-05-16 ENCOUNTER — NURSE TRIAGE (OUTPATIENT)
Dept: CALL CENTER | Facility: HOSPITAL | Age: 45
End: 2025-05-16
Payer: MEDICAID

## 2025-05-16 NOTE — TELEPHONE ENCOUNTER
"Reason for Disposition   MODERATE pain (e.g., interferes with normal activities) and present > 3 days    Additional Information   Negative: Similar pain previously and it was from 'heart attack'   Negative: Similar pain previously from 'angina' and not relieved by nitroglycerin   Negative: Sounds like a life-threatening emergency to the triager   Negative: Followed a hand or wrist injury   Negative: Chest pain   Negative: Caused by an animal bite   Negative: Wound looks infected   Negative: Fever and red area (or area very tender to touch)   Negative: Swollen joint and fever   Negative: Patient sounds very sick or weak to the triager   Negative: SEVERE pain (e.g., excruciating, unable to use hand at all)   Negative: Red area or streak > 2 inches (or 5 cm)   Negative: Weakness (i.e., loss of strength) of new-onset in hand or finger   (Exception: Not truly weak, hand feels weak because of pain.)   Negative: Numbness (i.e., loss of sensation) of new-onset in hand or fingers  (Exception: Slight tingling; numbness present > 2 weeks.)   Negative: Looks like a boil, infected sore, deep ulcer, or other infected rash (spreading redness, pus)   Negative: Localized rash is very painful (no fever)    Answer Assessment - Initial Assessment Questions  1. ONSET: \"When did the pain start?\"      3 days  2. LOCATION: \"Where is the pain located?\"      Right wrist  3. PAIN: \"How bad is the pain?\" (Scale 1-10; or mild, moderate, severe)    - MILD (1-3): doesn't interfere with normal activities    - MODERATE (4-7): interferes with normal activities (e.g., work or school) or awakens from sleep    - SEVERE (8-10): excruciating pain, unable to use hand at all      Pain rated 10  4. WORK OR EXERCISE: \"Has there been any recent work or exercise that involved this part (i.e., hand or wrist) of the body?\"      None he can think of  5. CAUSE: \"What do you think is causing the pain?\"      No idea  6. AGGRAVATING FACTORS: \"What makes the pain " "worse?\" (e.g., using computer)      Movement hurts.  Has not tried RICE approach.    7. OTHER SYMPTOMS: \"Do you have any other symptoms?\" (e.g., neck pain, swelling, rash, numbness, fever)      Is not swollen.  Is not hot to the touch.    8. PREGNANCY: \"Is there any chance you are pregnant?\" \"When was your last menstrual period?\"      na    Protocols used: Hand and Wrist Pain-ADULT-[place]    "

## 2025-05-16 NOTE — TELEPHONE ENCOUNTER
Caller does not see any bite marks, no red streaks, no redness or swelling, no rash, is not hot to touch, denies injury, has good use of the right hand with gripping and holding objects.

## 2025-05-28 ENCOUNTER — HOSPITAL ENCOUNTER (EMERGENCY)
Facility: HOSPITAL | Age: 45
Discharge: HOME | End: 2025-05-29
Attending: EMERGENCY MEDICINE
Payer: MEDICAID

## 2025-05-28 DIAGNOSIS — S09.90XA INJURY OF HEAD, INITIAL ENCOUNTER: ICD-10-CM

## 2025-05-28 DIAGNOSIS — F10.920 ALCOHOLIC INTOXICATION WITHOUT COMPLICATION: ICD-10-CM
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
## 2025-05-28 PROCEDURE — 99284 EMERGENCY DEPT VISIT MOD MDM: CPT | Performed by: EMERGENCY MEDICINE

## 2025-05-28 RX ORDER — SODIUM CHLORIDE 0.9 % (FLUSH) 0.9 %
10 SYRINGE (ML) INJECTION AS NEEDED
Status: DISCONTINUED | OUTPATIENT
Start: 2025-05-28 | End: 2025-05-29 | Stop reason: HOSPADM

## 2025-05-29 ENCOUNTER — APPOINTMENT (OUTPATIENT)
Dept: CT IMAGING | Facility: HOSPITAL | Age: 45
End: 2025-05-29
Payer: MEDICAID

## 2025-05-29 VITALS
OXYGEN SATURATION: 97 % | HEART RATE: 85 BPM | DIASTOLIC BLOOD PRESSURE: 78 MMHG | TEMPERATURE: 97.7 F | BODY MASS INDEX: 28.49 KG/M2 | RESPIRATION RATE: 16 BRPM | SYSTOLIC BLOOD PRESSURE: 121 MMHG | HEIGHT: 70 IN | WEIGHT: 199 LBS

## 2025-05-29 LAB
ANION GAP SERPL CALCULATED.3IONS-SCNC: 12 MMOL/L (ref 5–15)
BASOPHILS # BLD AUTO: 0.05 10*3/MM3 (ref 0–0.2)
BASOPHILS NFR BLD AUTO: 0.6 % (ref 0–1.5)
BUN SERPL-MCNC: 8 MG/DL (ref 6–20)
BUN/CREAT SERPL: 8.2 (ref 7–25)
CALCIUM SPEC-SCNC: 8.4 MG/DL (ref 8.6–10.5)
CHLORIDE SERPL-SCNC: 102 MMOL/L (ref 98–107)
CO2 SERPL-SCNC: 23 MMOL/L (ref 22–29)
CREAT SERPL-MCNC: 0.98 MG/DL (ref 0.76–1.27)
DEPRECATED RDW RBC AUTO: 39.1 FL (ref 37–54)
EGFRCR SERPLBLD CKD-EPI 2021: 97.5 ML/MIN/1.73
EOSINOPHIL # BLD AUTO: 0.28 10*3/MM3 (ref 0–0.4)
EOSINOPHIL NFR BLD AUTO: 3.4 % (ref 0.3–6.2)
ERYTHROCYTE [DISTWIDTH] IN BLOOD BY AUTOMATED COUNT: 13.9 % (ref 12.3–15.4)
ETHANOL UR QL: 0.19 %
GLUCOSE SERPL-MCNC: 108 MG/DL (ref 65–99)
HCT VFR BLD AUTO: 38.8 % (ref 37.5–51)
HGB BLD-MCNC: 14.1 G/DL (ref 13–17.7)
IMM GRANULOCYTES # BLD AUTO: 0.03 10*3/MM3 (ref 0–0.05)
IMM GRANULOCYTES NFR BLD AUTO: 0.4 % (ref 0–0.5)
LYMPHOCYTES # BLD AUTO: 3.39 10*3/MM3 (ref 0.7–3.1)
LYMPHOCYTES NFR BLD AUTO: 41.1 % (ref 19.6–45.3)
MCH RBC QN AUTO: 28.5 PG (ref 26.6–33)
MCHC RBC AUTO-ENTMCNC: 36.3 G/DL (ref 31.5–35.7)
MCV RBC AUTO: 78.5 FL (ref 79–97)
MONOCYTES # BLD AUTO: 0.39 10*3/MM3 (ref 0.1–0.9)
MONOCYTES NFR BLD AUTO: 4.7 % (ref 5–12)
NEUTROPHILS NFR BLD AUTO: 4.11 10*3/MM3 (ref 1.7–7)
NEUTROPHILS NFR BLD AUTO: 49.8 % (ref 42.7–76)
NRBC BLD AUTO-RTO: 0 /100 WBC (ref 0–0.2)
PLATELET # BLD AUTO: 166 10*3/MM3 (ref 140–450)
PMV BLD AUTO: 10.4 FL (ref 6–12)
POTASSIUM SERPL-SCNC: 3.8 MMOL/L (ref 3.5–5.2)
RBC # BLD AUTO: 4.94 10*6/MM3 (ref 4.14–5.8)
SODIUM SERPL-SCNC: 137 MMOL/L (ref 136–145)
WBC NRBC COR # BLD AUTO: 8.25 10*3/MM3 (ref 3.4–10.8)

## 2025-05-29 PROCEDURE — 25810000003 SODIUM CHLORIDE 0.9 % SOLUTION: Performed by: EMERGENCY MEDICINE

## 2025-05-29 PROCEDURE — 80048 BASIC METABOLIC PNL TOTAL CA: CPT | Performed by: EMERGENCY MEDICINE

## 2025-05-29 PROCEDURE — 85025 COMPLETE CBC W/AUTO DIFF WBC: CPT | Performed by: EMERGENCY MEDICINE

## 2025-05-29 PROCEDURE — 72125 CT NECK SPINE W/O DYE: CPT

## 2025-05-29 PROCEDURE — 70450 CT HEAD/BRAIN W/O DYE: CPT

## 2025-05-29 PROCEDURE — 82077 ASSAY SPEC XCP UR&BREATH IA: CPT | Performed by: EMERGENCY MEDICINE

## 2025-05-29 RX ADMIN — SODIUM CHLORIDE 1000 ML: 9 INJECTION, SOLUTION INTRAVENOUS at 00:16

## 2025-05-29 NOTE — ED PROVIDER NOTES
Subjective   History of Present Illness  Pt presents to the ED with report of fall on stairs - reports he slipped causing fall.  Pt admits to etoh use tonight.  No LOC.  No n/v.  No neck/back pain.  No chest pain/SOB/abdominal pain.  Pt denies any numb/tingling.  + HA.         Review of Systems   Constitutional:  Negative for chills.   HENT:  Negative for congestion.    Respiratory:  Negative for shortness of breath.    Cardiovascular:  Negative for chest pain.   Gastrointestinal:  Negative for abdominal pain, nausea and vomiting.   Musculoskeletal:  Negative for back pain and neck pain.   Skin:  Negative for rash.   Neurological:  Positive for headaches. Negative for syncope, speech difficulty, weakness and numbness.   All other systems reviewed and are negative.      Past Medical History:   Diagnosis Date    Asthma     Headache     Hypertension     Stroke        No Known Allergies    Past Surgical History:   Procedure Laterality Date    COLONOSCOPY N/A 12/13/2023    Dr. Brody-normal exam    ENDOSCOPY N/A 12/13/2023    Erythematous mucosa in the antrum bx normal, otherwise normal    UPPER ENDOSCOPIC ULTRASOUND W/ FNA  01/17/2024    No obvious structural changes to cause pancreatitis. - Dr. Saravia       Family History   Problem Relation Age of Onset    Anxiety disorder Mother     Diabetes Mother     Hyperlipidemia Mother     Cancer Father     Hyperlipidemia Father     Colon cancer Neg Hx     Colon polyps Neg Hx        Social History     Socioeconomic History    Marital status: Significant Other   Tobacco Use    Smoking status: Every Day     Current packs/day: 1.00     Average packs/day: 1 pack/day for 16.3 years (16.3 ttl pk-yrs)     Types: Cigarettes     Start date: 4/2/2012     Passive exposure: Current    Smokeless tobacco: Never    Tobacco comments:     AVS   Vaping Use    Vaping status: Never Used   Substance and Sexual Activity    Alcohol use: Not Currently     Alcohol/week: 4.0 standard drinks of alcohol      Types: 4 Cans of beer per week     Comment: recently stopped    Drug use: No    Sexual activity: Yes     Partners: Female     Birth control/protection: Condom, Partner of same sex           Objective   Physical Exam  Vitals and nursing note reviewed.   Constitutional:       General: He is not in acute distress.  HENT:      Head: Normocephalic and atraumatic.      Nose: Nose normal.      Mouth/Throat:      Mouth: Mucous membranes are moist.   Eyes:      Extraocular Movements: Extraocular movements intact.      Conjunctiva/sclera: Conjunctivae normal.      Pupils: Pupils are equal, round, and reactive to light.      Comments: + bilateral horiz nystagmus   Cardiovascular:      Rate and Rhythm: Normal rate and regular rhythm.      Pulses: Normal pulses.      Heart sounds: Normal heart sounds.   Pulmonary:      Effort: Pulmonary effort is normal.      Breath sounds: Normal breath sounds.   Abdominal:      General: Abdomen is flat. Bowel sounds are normal.      Palpations: Abdomen is soft.   Musculoskeletal:         General: No deformity or signs of injury.   Skin:     General: Skin is warm and dry.      Capillary Refill: Capillary refill takes less than 2 seconds.   Neurological:      General: No focal deficit present.      Mental Status: He is alert and oriented to person, place, and time.      Sensory: No sensory deficit.      Motor: No weakness.         Procedures           ED Course      Labs Reviewed   BASIC METABOLIC PANEL - Abnormal; Notable for the following components:       Result Value    Glucose 108 (*)     Calcium 8.4 (*)     All other components within normal limits    Narrative:     GFR Categories in Chronic Kidney Disease (CKD)              GFR Category          GFR (mL/min/1.73)    Interpretation  G1                    90 or greater        Normal or high (1)  G2                    60-89                Mild decrease (1)  G3a                   45-59                Mild to moderate decrease  G3b                    30-44                Moderate to severe decrease  G4                    15-29                Severe decrease  G5                    14 or less           Kidney failure    (1)In the absence of evidence of kidney disease, neither GFR category G1 or G2 fulfill the criteria for CKD.    eGFR calculation 2021 CKD-EPI creatinine equation, which does not include race as a factor   CBC WITH AUTO DIFFERENTIAL - Abnormal; Notable for the following components:    MCV 78.5 (*)     MCHC 36.3 (*)     Monocyte % 4.7 (*)     Lymphocytes, Absolute 3.39 (*)     All other components within normal limits   ETHANOL    Narrative:     Not for legal purposes. Chain of Custody not followed.    CBC AND DIFFERENTIAL    Narrative:     The following orders were created for panel order CBC & Differential.  Procedure                               Abnormality         Status                     ---------                               -----------         ------                     CBC Auto Differential[[id number]]        Abnormal            Final result                 Please view results for these tests on the individual orders.     CT Head Without Contrast    (Results Pending)   CT Cervical Spine Without Contrast    (Results Pending)                                                        Medical Decision Making  Pt stable in ED - resting comfortably and in NAD.  Given JOSE A with head injury and etoh intoxication - CT head and cspine obtained - no acute findings on these.  No evid of other injuries.  No neuro deficits.  At this point - feel pt stable for discharge to home.  Prec given.  D/w pt/family    Amount and/or Complexity of Data Reviewed  Labs: ordered.  Radiology: ordered.    Risk  Prescription drug management.        Final diagnoses:   Fall, initial encounter   Injury of head, initial encounter   Alcoholic intoxication without complication       ED Disposition  ED Disposition       ED Disposition   Discharge    Condition   Stable     Comment   --               Casi Skinner, APRN  [address] [facility] [address]  [place] [id number] [phone]               Medication List      No changes were made to your prescriptions during this visit.            Geovanni Mendoza,   05/28/25 [id number]       Geovanni Mendoza,   05/29/25 [id number]

## 2025-06-02 DIAGNOSIS — R10.13 EPIGASTRIC PAIN: ICD-10-CM

## 2025-06-03 RX ORDER — OMEPRAZOLE 20 MG/1
20 CAPSULE, DELAYED RELEASE ORAL DAILY
Qty: 90 CAPSULE | Refills: 3 | Status: SHIPPED | OUTPATIENT
Start: 2025-06-03

## 2025-07-17 ENCOUNTER — LAB (OUTPATIENT)
Dept: LAB | Facility: HOSPITAL | Age: 45
End: 2025-07-17
Payer: MEDICAID

## 2025-07-17 ENCOUNTER — OFFICE VISIT (OUTPATIENT)
Dept: FAMILY MEDICINE CLINIC | Facility: CLINIC | Age: 45
End: 2025-07-17
Payer: MEDICAID

## 2025-07-17 ENCOUNTER — HOSPITAL ENCOUNTER (OUTPATIENT)
Dept: GENERAL RADIOLOGY | Facility: HOSPITAL | Age: 45
Discharge: HOME | End: 2025-07-17
Payer: MEDICAID

## 2025-07-17 VITALS
BODY MASS INDEX: 28.2 KG/M2 | SYSTOLIC BLOOD PRESSURE: 130 MMHG | OXYGEN SATURATION: 97 % | RESPIRATION RATE: 20 BRPM | WEIGHT: 197 LBS | HEART RATE: 60 BPM | TEMPERATURE: 97.1 F | DIASTOLIC BLOOD PRESSURE: 90 MMHG | HEIGHT: 70 IN

## 2025-07-17 DIAGNOSIS — F10.20 ALCOHOLISM: ICD-10-CM

## 2025-07-17 DIAGNOSIS — K86.1 CHRONIC PANCREATITIS, UNSPECIFIED PANCREATITIS TYPE: ICD-10-CM

## 2025-07-17 DIAGNOSIS — R29.898 LEFT HAND WEAKNESS: ICD-10-CM

## 2025-07-17 DIAGNOSIS — R17 JAUNDICE: ICD-10-CM

## 2025-07-17 DIAGNOSIS — F17.200 SMOKER: ICD-10-CM

## 2025-07-17 DIAGNOSIS — M25.562 CHRONIC PAIN OF LEFT KNEE: ICD-10-CM

## 2025-07-17 DIAGNOSIS — Z86.73 STATUS POST CVA: ICD-10-CM

## 2025-07-17 DIAGNOSIS — R29.898 WEAKNESS OF LEFT FOOT: ICD-10-CM

## 2025-07-17 DIAGNOSIS — G89.29 CHRONIC PAIN OF LEFT KNEE: ICD-10-CM

## 2025-07-17 DIAGNOSIS — Z00.00 WELLNESS EXAMINATION: Primary | ICD-10-CM

## 2025-07-17 DIAGNOSIS — Z12.5 PROSTATE CANCER SCREENING: ICD-10-CM

## 2025-07-17 LAB
ALBUMIN SERPL-MCNC: 4.4 G/DL (ref 3.5–5.2)
ALBUMIN/GLOB SERPL: 1.4 G/DL
ALP SERPL-CCNC: 112 U/L (ref 39–117)
ALT SERPL W P-5'-P-CCNC: 60 U/L (ref 1–41)
AMYLASE SERPL-CCNC: 195 U/L (ref 28–100)
ANION GAP SERPL CALCULATED.3IONS-SCNC: 12 MMOL/L (ref 5–15)
ANISOCYTOSIS BLD QL: ABNORMAL
AST SERPL-CCNC: 63 U/L (ref 1–40)
BASOPHILS # BLD MANUAL: 0 10*3/MM3 (ref 0–0.2)
BASOPHILS NFR BLD MANUAL: 0 % (ref 0–1.5)
BILIRUB CONJ SERPL-MCNC: 0.3 MG/DL (ref 0–0.3)
BILIRUB SERPL-MCNC: 0.9 MG/DL (ref 0–1.2)
BUN SERPL-MCNC: 11.1 MG/DL (ref 6–20)
BUN/CREAT SERPL: 11 (ref 7–25)
CALCIUM SPEC-SCNC: 9.3 MG/DL (ref 8.6–10.5)
CHLORIDE SERPL-SCNC: 103 MMOL/L (ref 98–107)
CO2 SERPL-SCNC: 23 MMOL/L (ref 22–29)
CREAT SERPL-MCNC: 1.01 MG/DL (ref 0.76–1.27)
CRP SERPL-MCNC: <0.3 MG/DL (ref 0–0.5)
DEPRECATED RDW RBC AUTO: 39.5 FL (ref 37–54)
EGFRCR SERPLBLD CKD-EPI 2021: 94 ML/MIN/1.73
EOSINOPHIL # BLD MANUAL: 0.08 10*3/MM3 (ref 0–0.4)
EOSINOPHIL NFR BLD MANUAL: 1.1 % (ref 0.3–6.2)
ERYTHROCYTE [DISTWIDTH] IN BLOOD BY AUTOMATED COUNT: 13.7 % (ref 12.3–15.4)
FOLATE SERPL-MCNC: 9.01 NG/ML (ref 4.78–24.2)
GGT SERPL-CCNC: 129 U/L (ref 8–61)
GLOBULIN UR ELPH-MCNC: 3.2 GM/DL
GLUCOSE SERPL-MCNC: 104 MG/DL (ref 65–99)
HCT VFR BLD AUTO: 43.8 % (ref 37.5–51)
HGB BLD-MCNC: 16.2 G/DL (ref 13–17.7)
LIPASE SERPL-CCNC: 32 U/L (ref 13–60)
LYMPHOCYTES # BLD MANUAL: 4.14 10*3/MM3 (ref 0.7–3.1)
LYMPHOCYTES NFR BLD MANUAL: 3.2 % (ref 5–12)
MAGNESIUM SERPL-MCNC: 2.2 MG/DL (ref 1.6–2.6)
MCH RBC QN AUTO: 29.5 PG (ref 26.6–33)
MCHC RBC AUTO-ENTMCNC: 37 G/DL (ref 31.5–35.7)
MCV RBC AUTO: 79.8 FL (ref 79–97)
MONOCYTES # BLD: 0.23 10*3/MM3 (ref 0.1–0.9)
NEUTROPHILS # BLD AUTO: 2.84 10*3/MM3 (ref 1.7–7)
NEUTROPHILS NFR BLD MANUAL: 38.9 % (ref 42.7–76)
PHOSPHATE SERPL-MCNC: 2.5 MG/DL (ref 2.5–4.5)
PLATELET # BLD AUTO: 130 10*3/MM3 (ref 140–450)
PMV BLD AUTO: 9.9 FL (ref 6–12)
POLYCHROMASIA BLD QL SMEAR: ABNORMAL
POTASSIUM SERPL-SCNC: 4 MMOL/L (ref 3.5–5.2)
PROT SERPL-MCNC: 7.6 G/DL (ref 6–8.5)
PSA SERPL-MCNC: 1.34 NG/ML (ref 0–4)
RBC # BLD AUTO: 5.49 10*6/MM3 (ref 4.14–5.8)
SMALL PLATELETS BLD QL SMEAR: ABNORMAL
SODIUM SERPL-SCNC: 138 MMOL/L (ref 136–145)
STOMATOCYTES BLD QL SMEAR: ABNORMAL
TSH SERPL DL<=0.05 MIU/L-ACNC: 1.62 UIU/ML (ref 0.27–4.2)
URATE SERPL-MCNC: 6.9 MG/DL (ref 3.4–7)
VARIANT LYMPHS NFR BLD MANUAL: 26.3 % (ref 0–5)
VARIANT LYMPHS NFR BLD MANUAL: 30.5 % (ref 19.6–45.3)
VIT B12 BLD-MCNC: 927 PG/ML (ref 211–946)
WBC MORPH BLD: NORMAL
WBC NRBC COR # BLD AUTO: 7.29 10*3/MM3 (ref 3.4–10.8)

## 2025-07-17 PROCEDURE — G0103 PSA SCREENING: HCPCS

## 2025-07-17 PROCEDURE — 84100 ASSAY OF PHOSPHORUS: CPT

## 2025-07-17 PROCEDURE — 85025 COMPLETE CBC W/AUTO DIFF WBC: CPT | Performed by: NURSE PRACTITIONER

## 2025-07-17 PROCEDURE — 82746 ASSAY OF FOLIC ACID SERUM: CPT

## 2025-07-17 PROCEDURE — 82977 ASSAY OF GGT: CPT

## 2025-07-17 PROCEDURE — 83735 ASSAY OF MAGNESIUM: CPT

## 2025-07-17 PROCEDURE — 82248 BILIRUBIN DIRECT: CPT

## 2025-07-17 PROCEDURE — 82150 ASSAY OF AMYLASE: CPT

## 2025-07-17 PROCEDURE — 73560 X-RAY EXAM OF KNEE 1 OR 2: CPT

## 2025-07-17 PROCEDURE — 84630 ASSAY OF ZINC: CPT

## 2025-07-17 PROCEDURE — 36415 COLL VENOUS BLD VENIPUNCTURE: CPT

## 2025-07-17 PROCEDURE — 82607 VITAMIN B-12: CPT

## 2025-07-17 PROCEDURE — 84425 ASSAY OF VITAMIN B-1: CPT

## 2025-07-17 PROCEDURE — 84443 ASSAY THYROID STIM HORMONE: CPT

## 2025-07-17 PROCEDURE — 83690 ASSAY OF LIPASE: CPT

## 2025-07-17 PROCEDURE — 84550 ASSAY OF BLOOD/URIC ACID: CPT

## 2025-07-17 PROCEDURE — 85007 BL SMEAR W/DIFF WBC COUNT: CPT | Performed by: NURSE PRACTITIONER

## 2025-07-17 PROCEDURE — 86140 C-REACTIVE PROTEIN: CPT

## 2025-07-17 PROCEDURE — 80053 COMPREHEN METABOLIC PANEL: CPT | Performed by: NURSE PRACTITIONER

## 2025-07-17 RX ORDER — NICOTINE 21 MG/24HR
1 PATCH, TRANSDERMAL 24 HOURS TRANSDERMAL EVERY 24 HOURS
Qty: 30 PATCH | Refills: 1 | Status: SHIPPED | OUTPATIENT
Start: 2025-07-17

## 2025-07-17 NOTE — PROGRESS NOTES
NAM Renteria  [facility]   Family Medicine  [address]  [place] [address]  Phone: [phone]  Fax: [phone]         Chief Complaint:  Chief Complaint   Patient presents with    Chronic pain of left knee     Follow up        History:  James Taylor Jr. is a 44 y.o. male.    1.Wellness:   Immunizations:      - Tetanus: Unknown or >10 years ago. Recommend to have at pharmacy or on injury.      - Influenza: Recommend yearly.      - Prevnar: Not indicated.       - Shingrix: Series after 50      - COVID: Recommended per CDC guidelines      -RSV: Not indicated.   CRC screening:   DEXA: DEXA scan at 65 if indicated  PSA:  American urological Association recommends that -American man between the ages of 40-54 be screened for prostate cancer.  Will order PSA today.  AAA screening:not indicated.   Low dose CT chest:not indicated.   Mental health concerns:  Smoking status: reports that he has been smoking cigarettes. He started smoking about 13 years ago. He has a 16.4 pack-year smoking history. He has been exposed to tobacco smoke. He has never used smokeless tobacco. He reports that he does not currently use alcohol after a past usage of about 4.0 standard drinks of alcohol per week. He reports that he does not use drugs.  History of Present Illness  The patient presents for a wellness visit and left knee pain. He is accompanied by his wife, son, and baby daughter.    Left knee pain: He reports that his left knee occasionally gives out while walking, causing intermittent pain. The pain intensifies when he attempts to walk on it. He has not yet undergone the x-ray that was previously ordered due to other commitments. He completed physical therapy 6 months ago and believes he may need to resume it.    Status post CVA with left-sided weakness: He continues to experience weakness in his left hand and foot, residual effects from a previous stroke. His abdominal condition  is stable.    Social History:  Marital Status:   Tobacco: He smokes cigarettes.    FAMILY HISTORY  His father and uncle had bone marrow cancer.      Last office visit : 2/26/2025    Next office visit : Visit date not found  Controlled Substance Agreement Signed: 7/17/2025  Last UDS:    Last Urine Toxicity          Latest Ref Rng & Units 4/14/2025   LAST URINE TOXICITY RESULTS   Amphetamine, Urine Qual Negative Negative    Barbiturates Screen, Urine Negative Negative    Benzodiazepine Screen, Urine Negative Negative    Buprenorphine, Screen, Urine Negative Negative    Cocaine Screen, Urine Negative Negative    Fentanyl, Urine Negative Negative    Methadone Screen , Urine Negative Negative    Methamphetamine, Ur Negative Negative       MARK Report:   As part of this patient's treatment plan, I am prescribing controlled substances. The patient has been made aware of appropriate use of such medications, including potential risk of somnolence, limited ability to drive and /or work safely, and potential for dependence or overdose. It has also been made clear that these medications are for use by this patient only, without concomitant use of alcohol or other substances unless prescribed.        ROS:  Review of Systems   Constitutional:  Negative for fatigue, fever and unexpected weight change.   HENT:  Negative for congestion, ear pain, rhinorrhea, sinus pressure, sinus pain and voice change.    Eyes:  Negative for visual disturbance.   Respiratory:  Negative for shortness of breath and wheezing.    Cardiovascular:  Negative for chest pain and palpitations.   Gastrointestinal:  Negative for abdominal pain, nausea and vomiting.   Genitourinary:  Negative for dysuria and flank pain.   Musculoskeletal:  Positive for arthralgias (left knee pain). Negative for back pain, myalgias and neck pain.        Left hand weakness and left foot weakness   Skin:  Negative for color change and rash.   Neurological:  Negative for  "dizziness, weakness, numbness and headaches.   Psychiatric/Behavioral:  Negative for behavioral problems, dysphoric mood, self-injury and sleep disturbance.         reports that he has been smoking cigarettes. He started smoking about 13 years ago. He has a 16.4 pack-year smoking history. He has been exposed to tobacco smoke. He has never used smokeless tobacco. He reports that he does not currently use alcohol after a past usage of about 4.0 standard drinks of alcohol per week. He reports that he does not use drugs.    Current Outpatient Medications   Medication Instructions    amLODIPine (NORVASC) 10 mg, Oral, Daily    gabapentin (NEURONTIN) 300 mg, 3 Times Daily PRN    lactulose (CHRONULAC) 20 g, 2 Times Daily PRN    losartan (COZAAR) 25 mg, Oral, Daily    metoprolol succinate XL (TOPROL-XL) 50 mg, Oral, Daily    naltrexone (DEPADE) 50 mg, Oral, Daily    nicotine (Nicoderm CQ) 21 MG/24HR patch 1 patch, Transdermal, Every 24 Hours    omeprazole (PRILOSEC) 20 mg, Oral, Daily    OXcarbazepine (Trileptal) 150 MG tablet 1/2 tablet am and 1 full tablet at bedtime.    sertraline (ZOLOFT) 100 mg, Oral, Daily, To help with mood. Take with food    terazosin (HYTRIN) 5 mg, Oral, Nightly       OBJECTIVE:  /90 (BP Location: Left arm, Patient Position: Sitting, Cuff Size: Adult)   Pulse 60   Temp 97.1 °F (36.2 °C) (Infrared)   Resp 20   Ht 177.8 cm (70\")   Wt 89.4 kg (197 lb)   SpO2 97%   BMI 28.27 kg/m²    Physical Exam  Vitals and nursing note reviewed.   Constitutional:       Appearance: Normal appearance. He is well-developed.   HENT:      Head: Normocephalic and atraumatic.      Right Ear: Tympanic membrane, ear canal and external ear normal.      Left Ear: Tympanic membrane, ear canal and external ear normal.      Nose: Nose normal. No septal deviation, nasal tenderness or congestion.      Mouth/Throat:      Lips: Pink. No lesions.      Mouth: Mucous membranes are moist. No oral lesions.      Dentition: " Normal dentition.      Pharynx: Oropharynx is clear. No pharyngeal swelling, oropharyngeal exudate or posterior oropharyngeal erythema.   Eyes:      General: Lids are normal. Vision grossly intact. No scleral icterus.        Right eye: No discharge.         Left eye: No discharge.      Extraocular Movements: Extraocular movements intact.      Conjunctiva/sclera: Conjunctivae normal.      Right eye: Right conjunctiva is not injected.      Left eye: Left conjunctiva is not injected.      Pupils: Pupils are equal, round, and reactive to light.   Neck:      Thyroid: No thyroid mass.      Trachea: Trachea normal.   Cardiovascular:      Rate and Rhythm: Normal rate and regular rhythm.      Heart sounds: Normal heart sounds. No murmur heard.     No gallop.   Pulmonary:      Effort: Pulmonary effort is normal.      Breath sounds: Normal breath sounds and air entry. No wheezing, rhonchi or rales.   Musculoskeletal:         General: No deformity.      Cervical back: Full passive range of motion without pain, normal range of motion and neck supple.      Thoracic back: Normal.      Left knee: Decreased range of motion. Tenderness present over the medial joint line, ACL and PCL.      Right lower leg: No edema.      Left lower leg: No edema.   Skin:     General: Skin is warm and dry.      Coloration: Skin is not jaundiced.      Findings: No rash.   Neurological:      Mental Status: He is alert and oriented to person, place, and time.      Sensory: Sensation is intact.      Motor: Motor function is intact.      Coordination: Coordination is intact.      Gait: Gait is intact.      Deep Tendon Reflexes: Reflexes are normal and symmetric.   Psychiatric:         Mood and Affect: Mood and affect normal.         Behavior: Behavior normal.         Judgment: Judgment normal.       Physical Exam  Respiratory: Clear to auscultation, no wheezing, rales or rhonchi  Musculoskeletal: Left knee: No pain on palpation or manipulation. Difficulty  lifting the left foot. Anterior knee pain noted. Possible meniscus tear suspected.         Procedures    Results  GGT elevated indicating liver damage.  It has improved from 2 years ago which was 1932.  Patient's amylase is elevated at 195.  This higher than previous check 1 year ago.  Folate, B12, PSA, CRP, magnesium, uric acid, lipase, phosphorus, TSH, bilirubin, all within normal limits.    Mild degeneration of osteoarthritis involving the patellofemoral joint.  Patient can return for injection of left knee and can also perform physical therapy to help with discomfort.  If discomfort persist we will proceed with MRI of the left knee.      Assessment/Plan:     Diagnoses and all orders for this visit:    1. Wellness examination (Primary)    2. Prostate cancer screening  -     PSA SCREENING; Future    3. Chronic pain of left knee  -     Uric acid; Future  -     Ambulatory Referral to Physical Therapy for Evaluation & Treatment    4. Smoker  -     nicotine (Nicoderm CQ) 21 MG/24HR patch; Place 1 patch on the skin as directed by provider Daily.  Dispense: 30 patch; Refill: 1    5. Chronic pancreatitis, unspecified pancreatitis type  -     Amylase; Future  -     Lipase; Future  -     CBC & Differential  -     Comprehensive Metabolic Panel  -     C-reactive Protein; Future  -     Gamma GT; Future  -     Vitamin B1, Whole Blood; Future  -     Vitamin B12; Future  -     Folate; Future  -     Magnesium; Future  -     Zinc; Future  -     Phosphorus; Future  -     TSH; Future  -     Manual Differential    6. Alcoholism  -     Gamma GT; Future  -     Bilirubin, Total & Direct; Future  -     Vitamin B1, Whole Blood; Future  -     Vitamin B12; Future  -     Folate; Future  -     Magnesium; Future  -     Zinc; Future  -     Phosphorus; Future  -     TSH; Future    7. Jaundice  -     Vitamin B1, Whole Blood; Future  -     Vitamin B12; Future  -     Folate; Future  -     Magnesium; Future  -     Zinc; Future  -     Phosphorus;  Future  -     TSH; Future    8. Left hand weakness  -     Ambulatory Referral to Physical Therapy for Evaluation & Treatment    9. Weakness of left foot  -     Ambulatory Referral to Physical Therapy for Evaluation & Treatment    10. Status post CVA  -     Ambulatory Referral to Physical Therapy for Evaluation & Treatment          An After Visit Summary was printed and given to the patient at discharge.  Return in about 4 weeks (around 8/14/2025).       Assessment & Plan  1. Left knee pain.  - The left knee pain could be indicative of a meniscus tear, especially given the instability experienced.  - Physical examination reveals difficulty in movement and pain in the anterior part of the knee.  - Discussion included the possibility of a meniscus tear and the impact of foot weakness on knee mechanics.  - An x-ray of the left knee has been ordered. If the pain persists, a corticosteroid injection may be administered.    2. Wellness visit.  - He is due for a PSA check and other routine labs.  - Physical examination and review of records indicate the need for PSA and other labs.  - Counseling provided on the importance of PSA screening and routine labs.  - Lab work will be conducted today.    3. Abdominal issues.  - He reports no current abdominal issues.  - Previous labs were redrawn to check pancreatic function, including amylase and lipase levels.  - Review of records and discussion confirmed no current abdominal complaints.  - No further treatment required at this time.    4. Medication management.  - Prescription for Neurontin provided.  - Controlled substance agreement for Neurontin signed.  - PDMP checked and is as expected.  - Medication sent to pharmacy.    I spent 10 minutes on wellness exam and 32 minutes on care of chronic conditions minutes caring for Pierce on this date of service. This time includes time spent by me in the following activities: preparing for the visit, reviewing tests, performing a medically  appropriate examination and/or evaluation, counseling and educating the patient/family/caregiver, referring and communicating with other health care professionals, documenting information in the medical record, independently interpreting results and communicating that information with the patient/family/caregiver, care coordination, ordering medications, and ordering test(s)         Casi BROWNING 7/17/2025   Electronically signed.    Patient or patient representative verbalized consent for the use of Ambient Listening during the visit with  NAM Renteria for chart documentation. 7/17/2025  22:22 CDT

## 2025-07-20 LAB — ZINC SERPL-MCNC: 66 UG/DL (ref 44–115)

## 2025-07-21 LAB — VIT B1 BLD-SCNC: 76.8 NMOL/L (ref 66.5–200)

## 2025-08-21 ENCOUNTER — OFFICE VISIT (OUTPATIENT)
Dept: FAMILY MEDICINE CLINIC | Facility: CLINIC | Age: 45
End: 2025-08-21
Payer: MEDICAID

## 2025-08-21 VITALS
WEIGHT: 191.25 LBS | SYSTOLIC BLOOD PRESSURE: 125 MMHG | OXYGEN SATURATION: 98 % | BODY MASS INDEX: 27.38 KG/M2 | HEIGHT: 70 IN | TEMPERATURE: 97.8 F | RESPIRATION RATE: 20 BRPM | HEART RATE: 73 BPM | DIASTOLIC BLOOD PRESSURE: 85 MMHG

## 2025-08-21 DIAGNOSIS — Z79.899 MEDICATION MANAGEMENT: ICD-10-CM

## 2025-08-21 DIAGNOSIS — M25.562 CHRONIC PAIN OF LEFT KNEE: ICD-10-CM

## 2025-08-21 DIAGNOSIS — R53.83 OTHER FATIGUE: Primary | ICD-10-CM

## 2025-08-21 DIAGNOSIS — I10 PRIMARY HYPERTENSION: ICD-10-CM

## 2025-08-21 DIAGNOSIS — R79.89 ELEVATED LIVER FUNCTION TESTS: ICD-10-CM

## 2025-08-21 DIAGNOSIS — F10.20 ALCOHOLISM: ICD-10-CM

## 2025-08-21 DIAGNOSIS — G89.29 CHRONIC PAIN OF LEFT KNEE: ICD-10-CM

## 2025-08-21 DIAGNOSIS — T88.7XXA MEDICATION SIDE EFFECT: ICD-10-CM

## 2025-08-21 PROCEDURE — 99214 OFFICE O/P EST MOD 30 MIN: CPT | Performed by: NURSE PRACTITIONER

## 2025-08-21 PROCEDURE — 3074F SYST BP LT 130 MM HG: CPT | Performed by: NURSE PRACTITIONER

## 2025-08-21 PROCEDURE — 1125F AMNT PAIN NOTED PAIN PRSNT: CPT | Performed by: NURSE PRACTITIONER

## 2025-08-21 PROCEDURE — 3079F DIAST BP 80-89 MM HG: CPT | Performed by: NURSE PRACTITIONER

## (undated) DEVICE — MASK,OXYGEN,MED CONC,ADLT,7' TUB, UC: Brand: PENDING

## (undated) DEVICE — CUFF,BP,DISP,1 TUBE,ADULT,HP: Brand: MEDLINE

## (undated) DEVICE — THE CHANNEL CLEANING BRUSH IS A NYLON FLEXI BRUSH ATTACHED TO A FLEXIBLE PLASTIC SHEATH DESIGNED TO SAFELY REMOVE DEBRIS FROM FLEXIBLE ENDOSCOPES.

## (undated) DEVICE — CONMED SCOPE SAVER BITE BLOCK, 20X27 MM: Brand: SCOPE SAVER

## (undated) DEVICE — ENDO KIT,LOURDES HOSPITAL: Brand: MEDLINE INDUSTRIES, INC.

## (undated) DEVICE — SENSR O2 OXIMAX FNGR A/ 18IN NONSTR

## (undated) DEVICE — FRCP BX RADJAW4 NDL 2.8 240 STD OG

## (undated) DEVICE — Device: Brand: DEFENDO AIR/WATER/SUCTION AND BIOPSY VALVE

## (undated) DEVICE — YANKAUER,BULB TIP WITH VENT: Brand: ARGYLE